# Patient Record
Sex: MALE | Race: WHITE | Employment: OTHER | URBAN - NONMETROPOLITAN AREA
[De-identification: names, ages, dates, MRNs, and addresses within clinical notes are randomized per-mention and may not be internally consistent; named-entity substitution may affect disease eponyms.]

---

## 2020-05-13 ENCOUNTER — HOSPITAL ENCOUNTER (EMERGENCY)
Age: 70
Discharge: HOME | End: 2020-05-13
Attending: EMERGENCY MEDICINE
Payer: MEDICARE

## 2020-05-13 VITALS
WEIGHT: 170 LBS | HEART RATE: 69 BPM | SYSTOLIC BLOOD PRESSURE: 120 MMHG | DIASTOLIC BLOOD PRESSURE: 88 MMHG | TEMPERATURE: 98 F | OXYGEN SATURATION: 98 % | RESPIRATION RATE: 17 BRPM

## 2020-05-13 LAB
EKG ATRIAL RATE: 72 BPM
EKG P AXIS: -9 DEGREES
EKG P-R INTERVAL: 154 MS
EKG Q-T INTERVAL: 404 MS
EKG QRS DURATION: 82 MS
EKG QTC CALCULATION (BAZETT): 442 MS
EKG R AXIS: -7 DEGREES
EKG T AXIS: 25 DEGREES
EKG VENTRICULAR RATE: 72 BPM

## 2020-05-13 PROCEDURE — 12011 RPR F/E/E/N/L/M 2.5 CM/<: CPT

## 2020-05-13 PROCEDURE — 2500000003 HC RX 250 WO HCPCS: Performed by: PHYSICIAN ASSISTANT

## 2020-05-13 PROCEDURE — 96372 THER/PROPH/DIAG INJ SC/IM: CPT

## 2020-05-13 PROCEDURE — 93005 ELECTROCARDIOGRAM TRACING: CPT | Performed by: PHYSICIAN ASSISTANT

## 2020-05-13 PROCEDURE — 93010 ELECTROCARDIOGRAM REPORT: CPT | Performed by: INTERNAL MEDICINE

## 2020-05-13 PROCEDURE — 99282 EMERGENCY DEPT VISIT SF MDM: CPT

## 2020-05-13 RX ORDER — LIDOCAINE HYDROCHLORIDE AND EPINEPHRINE 10; 10 MG/ML; UG/ML
20 INJECTION, SOLUTION INFILTRATION; PERINEURAL ONCE
Status: COMPLETED | OUTPATIENT
Start: 2020-05-13 | End: 2020-05-13

## 2020-05-13 RX ADMIN — LIDOCAINE HYDROCHLORIDE AND EPINEPHRINE 20 ML: 10; 10 INJECTION, SOLUTION INFILTRATION; PERINEURAL at 12:58

## 2020-05-13 SDOH — HEALTH STABILITY: MENTAL HEALTH: HOW OFTEN DO YOU HAVE A DRINK CONTAINING ALCOHOL?: NEVER

## 2020-05-13 ASSESSMENT — PAIN SCALES - GENERAL: PAINLEVEL_OUTOF10: 0

## 2020-05-13 NOTE — ED PROVIDER NOTES
Laceration Repair Procedure Note    Indication: Laceration    Procedure: The patient was placed in the appropriate position and anesthesia around the laceration was obtained by infiltration using 1% Lidocaine with epinephrine. The area was then cleansed with Shur-Clens and draped in a sterile fashion and explored with no foreign bodies discovered. The laceration was closed with 6-0- Vicryl using running sutures. There were no additional lacerations requiring repair. The wound area was then dressed with bacitracin. Total repaired wound length: 2 cm. Other Items: None and Suture count:     The patient tolerated the procedure well.     Complications: None        Chayo Mujica PA-C  05/13/20 1240

## 2020-05-13 NOTE — ED TRIAGE NOTES
Pt presents to ER after falling while running hitting his head. Pt has about 1 inch laceration to right cheek bone, abrasions to right shoulder and right knee. pt denies losing consciousness or any blood thinner use. Pain is rated 5 out of 10. EKG completed.

## 2020-05-18 ENCOUNTER — HOSPITAL ENCOUNTER (OUTPATIENT)
Age: 70
Discharge: HOME | End: 2020-05-18
Payer: MEDICARE

## 2020-05-18 ENCOUNTER — HOSPITAL ENCOUNTER (OUTPATIENT)
Dept: GENERAL RADIOLOGY | Age: 70
Discharge: HOME | End: 2020-05-18
Payer: MEDICARE

## 2020-05-18 ENCOUNTER — NURSE ONLY (OUTPATIENT)
Dept: LAB | Age: 70
End: 2020-05-18

## 2020-05-18 ENCOUNTER — TELEPHONE (OUTPATIENT)
Dept: FAMILY MEDICINE CLINIC | Age: 70
End: 2020-05-18

## 2020-05-18 ENCOUNTER — VIRTUAL VISIT (OUTPATIENT)
Dept: FAMILY MEDICINE CLINIC | Age: 70
End: 2020-05-18
Payer: COMMERCIAL

## 2020-05-18 VITALS — SYSTOLIC BLOOD PRESSURE: 146 MMHG | DIASTOLIC BLOOD PRESSURE: 87 MMHG | HEART RATE: 55 BPM

## 2020-05-18 PROBLEM — S09.90XA HEAD INJURY: Status: ACTIVE | Noted: 2020-05-18

## 2020-05-18 PROBLEM — R41.3 MEMORY DIFFICULTIES: Status: ACTIVE | Noted: 2020-05-18

## 2020-05-18 LAB
ALBUMIN SERPL-MCNC: 4.6 G/DL (ref 3.5–5.1)
ALP BLD-CCNC: 72 U/L (ref 38–126)
ALT SERPL-CCNC: 10 U/L (ref 11–66)
ANION GAP SERPL CALCULATED.3IONS-SCNC: 11 MEQ/L (ref 8–16)
AST SERPL-CCNC: 15 U/L (ref 5–40)
BASOPHILS # BLD: 0.9 %
BASOPHILS ABSOLUTE: 0 THOU/MM3 (ref 0–0.1)
BILIRUB SERPL-MCNC: 0.5 MG/DL (ref 0.3–1.2)
BILIRUBIN DIRECT: < 0.2 MG/DL (ref 0–0.3)
BUN BLDV-MCNC: 16 MG/DL (ref 7–22)
CALCIUM SERPL-MCNC: 9.9 MG/DL (ref 8.5–10.5)
CHLORIDE BLD-SCNC: 103 MEQ/L (ref 98–111)
CHOLESTEROL, TOTAL: 215 MG/DL (ref 100–199)
CO2: 26 MEQ/L (ref 23–33)
CREAT SERPL-MCNC: 0.7 MG/DL (ref 0.4–1.2)
EOSINOPHIL # BLD: 1.5 %
EOSINOPHILS ABSOLUTE: 0.1 THOU/MM3 (ref 0–0.4)
ERYTHROCYTE [DISTWIDTH] IN BLOOD BY AUTOMATED COUNT: 13.4 % (ref 11.5–14.5)
ERYTHROCYTE [DISTWIDTH] IN BLOOD BY AUTOMATED COUNT: 45.6 FL (ref 35–45)
GFR SERPL CREATININE-BSD FRML MDRD: > 90 ML/MIN/1.73M2
GLUCOSE BLD-MCNC: 90 MG/DL (ref 70–108)
HCT VFR BLD CALC: 44.1 % (ref 42–52)
HDLC SERPL-MCNC: 41 MG/DL
HEMOGLOBIN: 13.8 GM/DL (ref 14–18)
IMMATURE GRANS (ABS): 0.02 THOU/MM3 (ref 0–0.07)
IMMATURE GRANULOCYTES: 0.4 %
LDL CHOLESTEROL CALCULATED: 122 MG/DL
LYMPHOCYTES # BLD: 23.3 %
LYMPHOCYTES ABSOLUTE: 1.3 THOU/MM3 (ref 1–4.8)
MAGNESIUM: 2.1 MG/DL (ref 1.6–2.4)
MCH RBC QN AUTO: 28.8 PG (ref 26–33)
MCHC RBC AUTO-ENTMCNC: 31.3 GM/DL (ref 32.2–35.5)
MCV RBC AUTO: 91.9 FL (ref 80–94)
MONOCYTES # BLD: 9.2 %
MONOCYTES ABSOLUTE: 0.5 THOU/MM3 (ref 0.4–1.3)
NUCLEATED RED BLOOD CELLS: 0 /100 WBC
PLATELET # BLD: 238 THOU/MM3 (ref 130–400)
PMV BLD AUTO: 11.3 FL (ref 9.4–12.4)
POTASSIUM SERPL-SCNC: 3.7 MEQ/L (ref 3.5–5.2)
RBC # BLD: 4.8 MILL/MM3 (ref 4.7–6.1)
SEG NEUTROPHILS: 64.7 %
SEGMENTED NEUTROPHILS ABSOLUTE COUNT: 3.5 THOU/MM3 (ref 1.8–7.7)
SODIUM BLD-SCNC: 140 MEQ/L (ref 135–145)
T4 FREE: 0.98 NG/DL (ref 0.93–1.76)
TOTAL PROTEIN: 7.2 G/DL (ref 6.1–8)
TRIGL SERPL-MCNC: 259 MG/DL (ref 0–199)
TSH SERPL DL<=0.05 MIU/L-ACNC: 6.31 UIU/ML (ref 0.4–4.2)
VITAMIN D 25-HYDROXY: 19 NG/ML (ref 30–100)
WBC # BLD: 5.4 THOU/MM3 (ref 4.8–10.8)

## 2020-05-18 PROCEDURE — 99204 OFFICE O/P NEW MOD 45 MIN: CPT | Performed by: FAMILY MEDICINE

## 2020-05-18 PROCEDURE — 4040F PNEUMOC VAC/ADMIN/RCVD: CPT | Performed by: FAMILY MEDICINE

## 2020-05-18 PROCEDURE — 3017F COLORECTAL CA SCREEN DOC REV: CPT | Performed by: FAMILY MEDICINE

## 2020-05-18 PROCEDURE — 1123F ACP DISCUSS/DSCN MKR DOCD: CPT | Performed by: FAMILY MEDICINE

## 2020-05-18 PROCEDURE — 73030 X-RAY EXAM OF SHOULDER: CPT

## 2020-05-18 PROCEDURE — G8427 DOCREV CUR MEDS BY ELIG CLIN: HCPCS | Performed by: FAMILY MEDICINE

## 2020-05-18 ASSESSMENT — ENCOUNTER SYMPTOMS
COUGH: 0
CONSTIPATION: 0
DIARRHEA: 0
VOMITING: 0
SHORTNESS OF BREATH: 0
TROUBLE SWALLOWING: 0
EYE PAIN: 0
NAUSEA: 0
BLOOD IN STOOL: 0
ABDOMINAL PAIN: 0

## 2020-05-18 NOTE — PATIENT INSTRUCTIONS
prevent dehydration. If you have kidney, heart, or liver disease and have to limit fluids, talk with your doctor before you increase your fluid intake. · Try to avoid things that you think may set off vasovagal syncope. · Talk to your doctor about any medicines you take. Some medicines may increase the chance of this condition occurring. · If you feel symptoms, lie down with your legs raised. Talk to your doctor about what to do if your symptoms come back. When should you call for help? Call 911 anytime you think you may need emergency care. For example, call if:    · You have symptoms of a heart problem. These may include:  ? Chest pain or pressure. ? Severe trouble breathing. ? A fast or irregular heartbeat.    Watch closely for changes in your health, and be sure to contact your doctor if:    · You have more episodes of fainting at home.     · You do not get better as expected. Where can you learn more? Go to https://[employer]."[employer]. org and sign in to your [employer] account. Enter L754 in the [employer] box to learn more about \"Vasovagal Syncope: Care Instructions. \"     If you do not have an account, please click on the \"Sign Up Now\" link. Current as of: June 26, 2019Content Version: 12.4  © 6607-0473 Healthwise, Incorporated. Care instructions adapted under license by [facility] ([facility]). If you have questions about a medical condition or this instruction, always ask your healthcare professional. [place] [address] any warranty or liability for your use of this information. Patient Education        Learning About a Closed Head Injury  What is a closed head injury? A closed head injury happens when your head gets hit hard. The strong force of the blow causes your brain to shake in your skull. This movement can cause the brain to bruise, swell, or tear. Sometimes nerves or blood vessels also get damaged. This can cause bleeding in or around the brain.   A

## 2020-05-18 NOTE — PROGRESS NOTES
Negative for agitation. Objective:     As this is a video visit new vitals are not able to be obtained - the vitals listed below are from previous visits to our facility. Vitals:    05/18/20 0938   BP: (!) 146/87   Pulse: 55       There is no height or weight on file to calculate BMI. Wt Readings from Last 3 Encounters:   05/13/20 170 lb (77.1 kg)     BP Readings from Last 3 Encounters:   05/18/20 (!) 146/87   05/13/20 120/88       Physical Exam  Constitutional:       General: He is not in acute distress. Appearance: He is well-developed. He is not diaphoretic. HENT:      Head: Normocephalic. Right Ear: External ear normal.      Left Ear: External ear normal.   Eyes:      General: No scleral icterus. Right eye: No discharge. Left eye: No discharge. Conjunctiva/sclera: Conjunctivae normal.   Neck:      Musculoskeletal: Normal range of motion. Pulmonary:      Effort: Pulmonary effort is normal.   Skin:     General: Skin is warm and dry. Findings: No erythema or rash. Neurological:      Mental Status: He is alert and oriented to person, place, and time. Cranial Nerves: No cranial nerve deficit. Psychiatric:         Behavior: Behavior normal.         Thought Content: Thought content normal.         Judgment: Judgment normal.           Lab Results   Component Value Date    WBC 5.4 05/18/2020    HGB 13.8 (L) 05/18/2020    HCT 44.1 05/18/2020     05/18/2020    CHOL 215 (H) 05/18/2020    TRIG 259 (H) 05/18/2020    HDL 41 05/18/2020    LDLCALC 122 05/18/2020    AST 15 05/18/2020     05/18/2020    K 3.7 05/18/2020     05/18/2020    CREATININE 0.7 05/18/2020    BUN 16 05/18/2020    CO2 26 05/18/2020    TSH 6.310 (H) 05/18/2020    LABGLOM >90 05/18/2020    MG 2.1 05/18/2020    CALCIUM 9.9 05/18/2020    VITD25 19 (L) 05/18/2020       Imaging Results:    No results found. Assessment:      Diagnosis Orders   1.  Vasovagal syncope  Basic Metabolic

## 2020-05-19 ENCOUNTER — TELEPHONE (OUTPATIENT)
Dept: FAMILY MEDICINE CLINIC | Age: 70
End: 2020-05-19

## 2020-05-19 LAB — HOMOCYSTEINE: 11.3 UMOL/L

## 2020-05-22 ENCOUNTER — TELEPHONE (OUTPATIENT)
Dept: FAMILY MEDICINE CLINIC | Age: 70
End: 2020-05-22

## 2020-05-26 ENCOUNTER — HOSPITAL ENCOUNTER (OUTPATIENT)
Dept: OCCUPATIONAL THERAPY | Age: 70
Setting detail: THERAPIES SERIES
Discharge: HOME | End: 2020-05-26
Payer: MEDICARE

## 2020-05-26 PROCEDURE — 97035 APP MDLTY 1+ULTRASOUND EA 15: CPT

## 2020-05-26 PROCEDURE — 97110 THERAPEUTIC EXERCISES: CPT

## 2020-05-26 PROCEDURE — 97165 OT EVAL LOW COMPLEX 30 MIN: CPT

## 2020-05-26 ASSESSMENT — PAIN SCALES - GENERAL: PAINLEVEL_OUTOF10: 2

## 2020-05-26 ASSESSMENT — PAIN DESCRIPTION - LOCATION: LOCATION: SHOULDER

## 2020-05-26 ASSESSMENT — PAIN DESCRIPTION - ORIENTATION: ORIENTATION: RIGHT

## 2020-05-26 NOTE — PROGRESS NOTES
referring physician: June 16th  Chart Reviewed: Yes    Restrictions/Precautions:       Position Activity Restriction  Other position/activity restrictions: occasional lightheadedness; migraines since the fall on 5-13-20         Subjective:  Subjective: \"I can move my arm just about to any location if I do it slowly\". Pain:  Pain Assessment  Patient Currently in Pain: Yes  Pain Assessment: 0-10  Pain Level: 2  Pain Location: Shoulder  Pain Orientation: Right    Social/Functional History:    Lives With: Spouse             ADL Assistance: Independent     Meal Prep Responsibility: Secondary  Laundry Responsibility: Secondary  Cleaning Responsibility: Primary  Shopping Responsibility: Secondary  Dependent Care Responsibility: Secondary    Active : Yes  Occupation: Retired  Leisure & Hobbies: running, camping, swimming, pickleball, refinishing a building    Objective     Sensation  Overall Sensation Status: WFL(reports pain, does not remember any numbness or tingling at this time)         Hand Dominance: Right       RUE PROM (degrees)  R Shoulder Flex  0-180: 140  R Shoulder ABduction 0-180: 148  R Shoulder Int Rotation  0-70: 40  R Shoulder Ext Rotation  0-90: 40 in scaption and in abduction  RUE AROM (degrees)  R Shoulder Flexion 0-180: 133  R Shoulder ABduction 0-180: 85  R Shoulder Int Rotation  0-70: thumb tip 3 inches above the waistband  R Shoulder Ext Rotation 0-90: 72 with the arm at the side, unable to move into ER when at 90 degrees abduction              Other Assessment: denies any changes in fine motor coordination       ADL  Additional Comments: At this time patient reports increased difficulty with sleeping, participation in hobbies, putting on an overhead shirt, looping a belt, reaching into upper cupboards, washing hair/body. Activity Tolerance: Additional Comments: tolerated evaluation and treatment well.      Assessment:  Assessment: Patient presents to the and 60 degrees ER and IR to increase ability to wash his hair without pain. Short term goal 3: Patient will report pain with activity no greater than 1/10 with movement to increase ability to perform his showering routine. Short term goal 4: Patient will be independent with HEP to increase ability to sleep on the right side. Long term goals  Time Frame for Long term goals : 8 weeks  Long term goal 1: Patient will report ability to sleep on the right side without pain. Evaluation Complexity: Based on the findings of patient history, examination, clinical presentation, and decision making during this evaluation, this patient is of low complexity.     Alice Guzmán, SUZANNE, OTR/L [id number]

## 2020-05-27 ENCOUNTER — HOSPITAL ENCOUNTER (OUTPATIENT)
Dept: OCCUPATIONAL THERAPY | Age: 70
Setting detail: THERAPIES SERIES
Discharge: HOME | End: 2020-05-27
Payer: MEDICARE

## 2020-05-27 PROCEDURE — 97035 APP MDLTY 1+ULTRASOUND EA 15: CPT

## 2020-05-27 PROCEDURE — 97110 THERAPEUTIC EXERCISES: CPT

## 2020-05-27 ASSESSMENT — PAIN DESCRIPTION - LOCATION: LOCATION: SHOULDER

## 2020-05-27 ASSESSMENT — PAIN SCALES - GENERAL: PAINLEVEL_OUTOF10: 1

## 2020-05-27 ASSESSMENT — PAIN DESCRIPTION - ORIENTATION: ORIENTATION: RIGHT

## 2020-05-27 NOTE — PROGRESS NOTES
[facility]  OUTPATIENT OCCUPATIONAL THERAPY  Daily Note  [address]    Time In: 1100  Time Out: 2408  Minutes: 45  Timed Code Treatment Minutes: 45 Minutes                Date: 2020  Patient Name: Cayla Brown        CSN: [id number]   : 1950  (81 y.o.)  Gender: male   Referring Practitioner: Maya Young DO  Diagnosis:  Injury of right shoulder, initial encounter, S49.91XA      Additional Pertinent Hx: Patient reports that he fell onto the right shoulder on 2020. Reports that he is not sure or why he fell. Reports that he has had headaches since. Reports that his pain is average at 1-2, but can increase to a 3-4.  x-rays revealed no fractures and no additional testing. Has had migraine since the fall. General:  OT Visit Information  Onset Date: 20  OT Insurance Information: MEDICARE - no ionto or hot/cold packs  Total # of Visits to Date: 2  Certification Period Expiration Date: 20  Progress Note Counter: 2/10 for PN  Comments: return to the referring physician: not scheduled yet       Restrictions/Precautions:       Position Activity Restriction  Other position/activity restrictions: occasional lightheadedness; migraines since the fall on 20         Subjective:  Subjective: \"I havn't done much today\". Reports that his pain increased to a 4-5/10 this AM when opening the drapes. Pain:  Patient Currently in Pain: Yes  Pain Assessment: 0-10  Pain Level: 1  Pain Location: Shoulder  Pain Orientation: Right       Objective:     Upper Extremity Function  UE AROM: sidelying scapular depression  UE PROM: supine PROM with focus on ROM this date - tightness initially with flexion, ER and IR.     UE Stretching: STM to subscapularis, cross friction massage to tendons on the anterior and lateral shoulder; sleeper stretch, posterior and inferior joint mobs              Ultrasound  Ultrasound Location: anterior shoulder: 50%, 0.8w/cm2,

## 2020-06-02 ENCOUNTER — PATIENT MESSAGE (OUTPATIENT)
Dept: FAMILY MEDICINE CLINIC | Age: 70
End: 2020-06-02

## 2020-06-02 ENCOUNTER — HOSPITAL ENCOUNTER (OUTPATIENT)
Dept: OCCUPATIONAL THERAPY | Age: 70
Setting detail: THERAPIES SERIES
Discharge: HOME | End: 2020-06-02
Payer: MEDICARE

## 2020-06-02 PROCEDURE — 97035 APP MDLTY 1+ULTRASOUND EA 15: CPT

## 2020-06-02 PROCEDURE — 97110 THERAPEUTIC EXERCISES: CPT

## 2020-06-02 ASSESSMENT — PAIN DESCRIPTION - ORIENTATION: ORIENTATION: RIGHT

## 2020-06-02 ASSESSMENT — PAIN DESCRIPTION - LOCATION: LOCATION: SHOULDER

## 2020-06-02 ASSESSMENT — PAIN SCALES - GENERAL: PAINLEVEL_OUTOF10: 1

## 2020-06-02 NOTE — PROGRESS NOTES
[address] [facility]  OUTPATIENT OCCUPATIONAL THERAPY  Daily Note  [address]    Time In: 830  Time Out: 5488  Minutes: 50  Timed Code Treatment Minutes: 50 Minutes                Date: 2020  Patient Name: Cassi Wilkins        CSN: [id number]   : 1950  (81 y.o.)  Gender: male   Referring Practitioner: Terrence Romero DO  Diagnosis:  Injury of right shoulder, initial encounter, S49.91XA           General:  OT Visit Information  Onset Date: 20  OT Insurance Information: MEDICARE - no ionto or hot/cold packs  Total # of Visits to Date: 3  Certification Period Expiration Date: 20  Progress Note Counter: 3/10 for PN  Comments: return to the referring physician: not scheduled yet       Restrictions/Precautions:       Position Activity Restriction  Other position/activity restrictions: occasional lightheadedness; migraines since the fall on 20         Subjective:  Subjective: Reports that he has a headache today. When showering pain can increase to 5-6/10, when at rest, pain is mostly 1/10. Pain:  Patient Currently in Pain: Yes  Pain Assessment: 0-10(up to 5-6/10 with movement)  Pain Level: 1  Pain Location: Shoulder  Pain Orientation: Right       Objective:     Upper Extremity Function  UE AROM: sidelying scapular depression and retraction  UE PROM: supine PROM this date - tightness initially with flexion, ER and IR. UE Stretching: STM to infraspinatus, sleeper stretch, posterior and inferior joint mobs  UE Strengthing: Prone horizontal abduction with limited motion to avoid pain. prone rows                                     Ultrasound  Ultrasound Location: anterior shoulder: 50%, 0.8w/cm2, 1mHz for 8 minutes with US gel          Activity Tolerance: Additional Comments: tolerated treatment well.     Assessment:  Assessment: Patient is progressing toward goals, initially pain at end range PROM but pain decreased after activation of scapular stabilizing muscles    Patient Education:  Patient Education: wall slides and prone rows to HEP            Plan:  Current Treatment Recommendations: Strengthening, ROM, Manual Therapy:  Jt Manip, Manual Therapy:  STM  Plan Comment: Continue with current POC  Specific instructions for Next Treatment: IVONE, [address], MOT, OTR/L [id number]

## 2020-06-04 ENCOUNTER — HOSPITAL ENCOUNTER (OUTPATIENT)
Dept: OCCUPATIONAL THERAPY | Age: 70
Setting detail: THERAPIES SERIES
Discharge: HOME | End: 2020-06-04
Payer: MEDICARE

## 2020-06-04 PROCEDURE — 97110 THERAPEUTIC EXERCISES: CPT

## 2020-06-04 PROCEDURE — 97035 APP MDLTY 1+ULTRASOUND EA 15: CPT

## 2020-06-04 ASSESSMENT — PAIN DESCRIPTION - ORIENTATION: ORIENTATION: RIGHT

## 2020-06-04 ASSESSMENT — PAIN SCALES - GENERAL: PAINLEVEL_OUTOF10: 1

## 2020-06-04 ASSESSMENT — PAIN DESCRIPTION - LOCATION: LOCATION: SHOULDER

## 2020-06-08 ENCOUNTER — HOSPITAL ENCOUNTER (OUTPATIENT)
Dept: OCCUPATIONAL THERAPY | Age: 70
Setting detail: THERAPIES SERIES
Discharge: HOME | End: 2020-06-08
Payer: MEDICARE

## 2020-06-08 PROCEDURE — 97110 THERAPEUTIC EXERCISES: CPT

## 2020-06-08 PROCEDURE — 97035 APP MDLTY 1+ULTRASOUND EA 15: CPT

## 2020-06-08 NOTE — PROGRESS NOTES
[facility]  OUTPATIENT OCCUPATIONAL THERAPY  Daily Note  [address]    Time In: 0915  Time Out: 1000  Minutes: 45  Timed Code Treatment Minutes: 45 Minutes                Date: 2020  Patient Name: Sharon Castaneda        CSN: [id number]   : 1950  (81 y.o.)  Gender: male   Referring Practitioner: Enrigue Olszewski, DO  Diagnosis:  Injury of right shoulder, initial encounter, S49.91XA           General:  OT Visit Information  Onset Date: 20  OT Insurance Information: MEDICARE - no ionto or hot/cold packs  Total # of Visits to Date: 5  Certification Period Expiration Date: 20  Progress Note Counter: 5/10 for PN  Comments: return to the referring physician: not scheduled yet       Restrictions/Precautions:       Position Activity Restriction  Other position/activity restrictions: occasional lightheadedness; migraines since the fall on 20         Subjective:  Subjective: Patient reports his shoulder continues to improve, mild soreness 30 minutes following last session but it went away. Pain:  Patient Currently in Pain: No(No pain at rest today. )       Objective:     Upper Extremity Function  UE AROM: Seated scapular pinches x10 reps, sidelying shoulder depressors x10 reps to warm up. UE PROM: IASTM to R shoulder girdle following ultrasound, deep pressure at upper trap to decrease tightness, supine PROM to R shoulder all motions to tolerance with most tightness end range IR and flexion. UE AAROM: Supine dowel tyrone chest press, ER with elbow at side, flexion full arc in neutral position x10 reps each. UE Stretching: Upper trap stretch, corner stretch, pulleys, sleeper stretch  UE Strengthing: Supine peach theraband extension x8 reps, prone horizontal abduction palm in x10, bent over 2# row x10. Biodex 90 RPM x3 minutes backward without issues noted.                                       Ultrasound  Ultrasound Location: Right anterior

## 2020-06-08 NOTE — TELEPHONE ENCOUNTER
From: Lauren Boland  To: Lina Hunter DO  Sent: 6/2/2020 6:09 PM EDT  Subject: Non-Urgent Medical Question    2 questions:  I have noticed a short piece of thread projecting ~1/8 in. out of the top of my well healed facial laceration. In the ER I was told that the sutures were made with biodegradable thread. So why is this piece sticking out of my cheek? Yesterday and today I have noticed that my headache raises from 1 or 2 to 4 or 5 and my heart rate climbs from 60s to low 70s BPM up to 110+ when I am outside in 80+ F for 20 - 30 minutes. Headache remains while in the heat. When I come inside to [facility] of 68 F or less my headache and heart rate both go back down within 10 - 15 minutes. I have never been bothered by extreme hot or cold in the past. 80 certainly isn't extreme. Is this something to do with my concussion and should I be concerned?

## 2020-06-10 ENCOUNTER — HOSPITAL ENCOUNTER (OUTPATIENT)
Dept: OCCUPATIONAL THERAPY | Age: 70
Setting detail: THERAPIES SERIES
Discharge: HOME | End: 2020-06-10
Payer: MEDICARE

## 2020-06-10 PROCEDURE — 97110 THERAPEUTIC EXERCISES: CPT

## 2020-06-10 PROCEDURE — 97035 APP MDLTY 1+ULTRASOUND EA 15: CPT

## 2020-06-10 ASSESSMENT — PAIN SCALES - GENERAL: PAINLEVEL_OUTOF10: 0

## 2020-06-10 NOTE — PROGRESS NOTES
[address] [place] [address]  OUTPATIENT OCCUPATIONAL THERAPY  Daily Note  [address]    Time In: 7000  Time Out: 372 7101  Minutes: 30  Timed Code Treatment Minutes: 30 Minutes                Date: 6/10/2020  Patient Name: Leonidas Atkinson        CSN: [id number]   : 1950  (81 y.o.)  Gender: male   Referring Practitioner: Tim Stanton DO  Diagnosis:  Injury of right shoulder, initial encounter, S49.91XA           General:  OT Visit Information  Onset Date: 20  OT Insurance Information: MEDICARE - no ionto or hot/cold packs  Total # of Visits to Date: 6  Certification Period Expiration Date: 20  Progress Note Counter: 6  Comments: return to the referring physician: not scheduled yet       Restrictions/Precautions:       Position Activity Restriction  Other position/activity restrictions: occasional lightheadedness; migraines since the fall on 20         Subjective:  Subjective: Patient reports that he has no pain and he was able to mow the lawn on this past Monday and Tuesday with no issues with his shoulder. Pain:  Patient Currently in Pain: No(No pain at rest today. )  Pain Assessment: 0-10  Pain Level: 0       Objective:     Upper Extremity Function  UE AROM: Seated scapular pinches x10 reps, sidelying shoulder depressors x10 reps to warm up. UE PROM: Supine PROM to R shoulder all motions to tolerance. UE AAROM: Supine dowel tyrone chest press, ER with elbow at side, flexion full arc in neutral position x10 reps each. UE Strengthing: Supine peach theraband riivalid 10 reps each, prone horizontal abduction palm in x10, thumb up x10, prone row 2# row x10. Ultrasound  Ultrasound Location: Right anterior shoulder  Ultrasound Parameters: 50% 0.8w/cm2, 1 MHz for 8 minutes with US gel          Activity Tolerance: Additional Comments: Tolerated treatment well. Assessment:  Assessment: Patient is progressing towards goals.

## 2020-06-16 ENCOUNTER — HOSPITAL ENCOUNTER (OUTPATIENT)
Dept: OCCUPATIONAL THERAPY | Age: 70
Setting detail: THERAPIES SERIES
Discharge: HOME | End: 2020-06-16
Payer: MEDICARE

## 2020-06-16 ENCOUNTER — NURSE ONLY (OUTPATIENT)
Dept: LAB | Age: 70
End: 2020-06-16

## 2020-06-16 ENCOUNTER — OFFICE VISIT (OUTPATIENT)
Dept: FAMILY MEDICINE CLINIC | Age: 70
End: 2020-06-16
Payer: COMMERCIAL

## 2020-06-16 VITALS
TEMPERATURE: 97 F | SYSTOLIC BLOOD PRESSURE: 122 MMHG | WEIGHT: 181 LBS | BODY MASS INDEX: 29.09 KG/M2 | DIASTOLIC BLOOD PRESSURE: 78 MMHG | HEART RATE: 56 BPM | OXYGEN SATURATION: 98 % | HEIGHT: 66 IN | RESPIRATION RATE: 16 BRPM

## 2020-06-16 LAB
HEPATITIS C ANTIBODY: NEGATIVE
TSH SERPL DL<=0.05 MIU/L-ACNC: 5.83 UIU/ML (ref 0.4–4.2)

## 2020-06-16 PROCEDURE — G8417 CALC BMI ABV UP PARAM F/U: HCPCS | Performed by: STUDENT IN AN ORGANIZED HEALTH CARE EDUCATION/TRAINING PROGRAM

## 2020-06-16 PROCEDURE — G8427 DOCREV CUR MEDS BY ELIG CLIN: HCPCS | Performed by: STUDENT IN AN ORGANIZED HEALTH CARE EDUCATION/TRAINING PROGRAM

## 2020-06-16 PROCEDURE — 99213 OFFICE O/P EST LOW 20 MIN: CPT | Performed by: STUDENT IN AN ORGANIZED HEALTH CARE EDUCATION/TRAINING PROGRAM

## 2020-06-16 PROCEDURE — 97110 THERAPEUTIC EXERCISES: CPT

## 2020-06-16 PROCEDURE — 1123F ACP DISCUSS/DSCN MKR DOCD: CPT | Performed by: STUDENT IN AN ORGANIZED HEALTH CARE EDUCATION/TRAINING PROGRAM

## 2020-06-16 PROCEDURE — 4040F PNEUMOC VAC/ADMIN/RCVD: CPT | Performed by: STUDENT IN AN ORGANIZED HEALTH CARE EDUCATION/TRAINING PROGRAM

## 2020-06-16 PROCEDURE — 3017F COLORECTAL CA SCREEN DOC REV: CPT | Performed by: STUDENT IN AN ORGANIZED HEALTH CARE EDUCATION/TRAINING PROGRAM

## 2020-06-16 PROCEDURE — 1036F TOBACCO NON-USER: CPT | Performed by: STUDENT IN AN ORGANIZED HEALTH CARE EDUCATION/TRAINING PROGRAM

## 2020-06-16 RX ORDER — ACETAMINOPHEN 160 MG
TABLET,DISINTEGRATING ORAL DAILY
COMMUNITY
End: 2021-02-16 | Stop reason: ALTCHOICE

## 2020-06-16 SDOH — ECONOMIC STABILITY: INCOME INSECURITY: HOW HARD IS IT FOR YOU TO PAY FOR THE VERY BASICS LIKE FOOD, HOUSING, MEDICAL CARE, AND HEATING?: NOT HARD AT ALL

## 2020-06-16 SDOH — ECONOMIC STABILITY: TRANSPORTATION INSECURITY
IN THE PAST 12 MONTHS, HAS THE LACK OF TRANSPORTATION KEPT YOU FROM MEDICAL APPOINTMENTS OR FROM GETTING MEDICATIONS?: NO

## 2020-06-16 SDOH — ECONOMIC STABILITY: FOOD INSECURITY: WITHIN THE PAST 12 MONTHS, THE FOOD YOU BOUGHT JUST DIDN'T LAST AND YOU DIDN'T HAVE MONEY TO GET MORE.: NEVER TRUE

## 2020-06-16 SDOH — ECONOMIC STABILITY: TRANSPORTATION INSECURITY
IN THE PAST 12 MONTHS, HAS LACK OF TRANSPORTATION KEPT YOU FROM MEETINGS, WORK, OR FROM GETTING THINGS NEEDED FOR DAILY LIVING?: NO

## 2020-06-16 SDOH — ECONOMIC STABILITY: FOOD INSECURITY: WITHIN THE PAST 12 MONTHS, YOU WORRIED THAT YOUR FOOD WOULD RUN OUT BEFORE YOU GOT MONEY TO BUY MORE.: NEVER TRUE

## 2020-06-16 ASSESSMENT — ENCOUNTER SYMPTOMS
CONSTIPATION: 0
WHEEZING: 0
DIARRHEA: 0
ABDOMINAL PAIN: 0
SHORTNESS OF BREATH: 0

## 2020-06-16 ASSESSMENT — PATIENT HEALTH QUESTIONNAIRE - PHQ9
SUM OF ALL RESPONSES TO PHQ9 QUESTIONS 1 & 2: 0
SUM OF ALL RESPONSES TO PHQ QUESTIONS 1-9: 0
2. FEELING DOWN, DEPRESSED OR HOPELESS: 0
1. LITTLE INTEREST OR PLEASURE IN DOING THINGS: 0
SUM OF ALL RESPONSES TO PHQ QUESTIONS 1-9: 0

## 2020-06-16 NOTE — PROGRESS NOTES
Health Maintenance Due   Topic Date Due    Hepatitis C screen  1950    DTaP/Tdap/Td vaccine (1 - Tdap) 09/17/1971    Shingles Vaccine (1 of 2) 09/17/2002    Colon cancer screen colonoscopy  09/17/2002    Pneumococcal 65+ years Vaccine (1 of 1 - PPSV23) 09/17/2017
resident.   GE modifier added to this encounter      [place] ,  6/16/2020 9:02 AM

## 2020-06-16 NOTE — PATIENT INSTRUCTIONS
Thank you   1. Thank you for trusting us with your healthcare needs. You may receive a survey regarding today's visit. It would help us out if you would take a few moments to provide your feedback. We value your input. 2. Please bring in ALL medications BOTTLES, including inhalers, herbal supplements, over the counter, prescribed & non-prescribed medicine. The office would like actual medication bottles and a list.   3. Please note our OFFICE POLICIES:   a. Prior to getting your labs drawn, please check with your insurance company for benefits and eligibility of lab services. Often, insurance companies cover certain tests for preventative visits only. It is patient's responsibility to see what is covered. b. We are unable to change a diagnosis after the test has been performed. c. Lab orders will not be re-printed. Please hold onto your original lab orders and take them to your lab to be completed. d. If you no show your scheduled appointment three times, you will be dismissed from this practice. e. Saurabh Aster must be completed 24 hours prior to your schedule appointment. 4. If the list below has been completed, PLEASE FAX RECORDS TO OUR OFFICE @ [phone].  Once the records have been received we will update your records at our office:  Health Maintenance Due   Topic Date Due    Hepatitis C screen  1950    DTaP/Tdap/Td vaccine (1 - Tdap) 09/17/1971    Shingles Vaccine (1 of 2) 09/17/2002    Colon cancer screen colonoscopy  09/17/2002    Pneumococcal 65+ years Vaccine (1 of 1 - PPSV23) 09/17/2017

## 2020-06-18 ENCOUNTER — TELEPHONE (OUTPATIENT)
Dept: FAMILY MEDICINE CLINIC | Age: 70
End: 2020-06-18

## 2020-06-18 ENCOUNTER — HOSPITAL ENCOUNTER (OUTPATIENT)
Dept: OCCUPATIONAL THERAPY | Age: 70
Setting detail: THERAPIES SERIES
Discharge: HOME | End: 2020-06-18
Payer: MEDICARE

## 2020-06-18 PROCEDURE — 97110 THERAPEUTIC EXERCISES: CPT

## 2020-06-23 ENCOUNTER — HOSPITAL ENCOUNTER (OUTPATIENT)
Dept: OCCUPATIONAL THERAPY | Age: 70
Setting detail: THERAPIES SERIES
Discharge: HOME | End: 2020-06-23
Payer: MEDICARE

## 2020-06-23 PROCEDURE — 97110 THERAPEUTIC EXERCISES: CPT

## 2020-06-23 ASSESSMENT — PAIN SCALES - GENERAL: PAINLEVEL_OUTOF10: 0

## 2020-06-23 NOTE — PROGRESS NOTES
[facility]  OUTPATIENT OCCUPATIONAL THERAPY  Daily Note  [address]    Time In: 1464  Time Out: 1100  Minutes: 43  Timed Code Treatment Minutes: 43 Minutes                Date: 2020  Patient Name: Jamal Mark        CSN: [id number]   : 1950  (81 y.o.)  Gender: male   Referring Practitioner: Grant Cason DO  Diagnosis:  Injury of right shoulder, initial encounter, S49.91XA           General:  OT Visit Information  Onset Date: 20  OT Insurance Information: MEDICARE - no ionto or hot/cold packs  Total # of Visits to Date: 9  Certification Period Expiration Date: 20  Progress Note Counter: 9/10 for PN  Comments: return to the referring physician: not scheduled yet       Restrictions/Precautions:       Position Activity Restriction  Other position/activity restrictions: occasional lightheadedness; migraines since the fall on 20         Subjective:  Subjective: Patient reports his shoulder is feeling good but is still having the headaches from the fall. Pain:  Patient Currently in Pain: No(No pain at rest today. )  Pain Assessment: 0-10  Pain Level: 0       Objective:     Upper Extremity Function  UE AROM: Seated scapular pinches x15 reps, shoulder depressors x15 to warm up. UE PROM: Supine PROM to R shoulder all motions to tolerance all within functional limits. Mild complaints of pain with abduction. Added a slight distraction to the shoulder joint while abducting and patient tolerated the motion better. UE Stretching: Sidelying sleeper stretch 10 second hold x5 reps, supine over vertical towel roll over pressure for pec stretch. UE Strengthing: Supine peach theraband riivalid 10 reps each, standing orange band bilateral row x10 reps, orange theraband ary x10 reps each, biodex 75 RPM x4 minutes backward without issues noted. Activity Tolerance: Additional Comments:  Tolerated treatment well.    Assessment:  Assessment: Patient is progressing towards goals. Patient able to tolerate going to the next level of theraband for ary exercises. Patient Education:  Patient Education: Added Riivalid and [place] exercises to HEP. Plan:  Current Treatment Recommendations: Strengthening, ROM, Manual Therapy:  Jt Manip, Manual Therapy:  STM  Plan Comment: Continue with current POC.    Specific instructions for Next Treatment: IVONE, Johnt #[id number]

## 2020-06-25 ENCOUNTER — HOSPITAL ENCOUNTER (OUTPATIENT)
Dept: OCCUPATIONAL THERAPY | Age: 70
Setting detail: THERAPIES SERIES
Discharge: HOME | End: 2020-06-25
Payer: MEDICARE

## 2020-06-25 ENCOUNTER — TELEPHONE (OUTPATIENT)
Dept: FAMILY MEDICINE CLINIC | Age: 70
End: 2020-06-25

## 2020-06-25 PROCEDURE — 97110 THERAPEUTIC EXERCISES: CPT

## 2020-06-25 ASSESSMENT — PAIN SCALES - GENERAL: PAINLEVEL_OUTOF10: 0

## 2020-06-25 NOTE — TELEPHONE ENCOUNTER
Patient called stating that he was diagnosed with a mild concussion 05/13/2020 after fall on [place] while running and was told that if the pain increases to contact his PCP. He states that yesterday the pain stayed at a level 5 and was lethargic most of the day even after taking Curamin and Ibuprofen. Patient states that so far today he has been having a pain at a level 2. Which patient states that this is his normal since the fall and is usually a 1-2.

## 2020-06-25 NOTE — DISCHARGE SUMMARY
ROM and strength has improved both passive and active. Patient shows good understanding of HEP and aggreeable to continue on own at home. Patient Education:  Patient Education: Reviewed progress towards goals and POC to discharge. Plan:  Plan Comment: Discharge 6/25/2020         Short term goals  Short term goal 1: Patient will increase right shoulder AROM greater than 150 degrees flexion, 105 degrees abduction, thumb tip 4 inches above the waistband and 30 degrees ER in abduction to increase ability to put on an overhead shirt. GOAL MET flexion 150 degrees, 163 degrees abduction, 7 inches above waist band for IR, 90 degree ER in abduction with no continued difficulty for independent dressing task. DISCHARGE GOAL 6/25/2020  Short term goal 2: Patient will increase right shoulder PROM greater than 160 degrees flexion and abduction, and 60 degrees ER and IR to increase ability to wash his hair without pain. GOAL  degrees flexion, 90 degrees ER and 70 degrees IR with no continued difficulty with independent bathing, including haircare. DISCHARGE GOAL 6/25/2020  Short term goal 3: Patient will report pain with activity no greater than 1/10 with movement to increase ability to perform his showering routine. GOAL MET Patient reports occasional pain with activities no greater that 1/10 while completing ADLs. DISCHARGE GOAL 6/25/2020  Short term goal 4: Patient will be independent with HEP to increase ability to sleep on the right side. GOAL MET Patient is independent with HEP and is able to sleep on right side. DISCHARGE GOAL 6/25/2020  Long term goals  Long term goal 1: Patient will report ability to sleep on the right side without pain. GOAL MET Patient able to sleep on right side without pain.  DISCHARGE GOAL 6/25/2020         MAGNOLIA MALDONADO/NATALIE #[id number]

## 2020-06-29 ENCOUNTER — TELEPHONE (OUTPATIENT)
Dept: FAMILY MEDICINE CLINIC | Age: 70
End: 2020-06-29

## 2020-06-29 NOTE — TELEPHONE ENCOUNTER
Patient notified and voiced understanding. Not happy with the response time of this message. States that he is doing better. But when the headache gets to the 5-6 level the first time it was located behind the right eye and then this past time on Wednesday it was behind the left eye. When he has his 1-2 level pain which seems to be more common is normally all over constant ache. Patient also concerned as he was instructed by Dr. Nicolas First to try to keep his heart rate under 100. Recently has started to try to run again and heart rate was elevated up to 160 and takes a while to come back down below 100. When at rest is around 66. Patient denied needing a sooner appointment than the 7/10/2020 follow up.

## 2020-07-01 NOTE — TELEPHONE ENCOUNTER
As long as his running and increased heart rate do not worsen the headache, I am ok with him continuing to exercise.

## 2020-07-10 ENCOUNTER — VIRTUAL VISIT (OUTPATIENT)
Dept: FAMILY MEDICINE CLINIC | Age: 70
End: 2020-07-10
Payer: MEDICARE

## 2020-07-10 PROBLEM — R51.9 NONINTRACTABLE HEADACHE: Status: ACTIVE | Noted: 2020-07-10

## 2020-07-10 PROBLEM — S06.0X0A CONCUSSION WITH NO LOSS OF CONSCIOUSNESS: Status: ACTIVE | Noted: 2020-07-10

## 2020-07-10 PROCEDURE — 3017F COLORECTAL CA SCREEN DOC REV: CPT | Performed by: STUDENT IN AN ORGANIZED HEALTH CARE EDUCATION/TRAINING PROGRAM

## 2020-07-10 PROCEDURE — G8417 CALC BMI ABV UP PARAM F/U: HCPCS | Performed by: STUDENT IN AN ORGANIZED HEALTH CARE EDUCATION/TRAINING PROGRAM

## 2020-07-10 PROCEDURE — 4040F PNEUMOC VAC/ADMIN/RCVD: CPT | Performed by: STUDENT IN AN ORGANIZED HEALTH CARE EDUCATION/TRAINING PROGRAM

## 2020-07-10 PROCEDURE — 1123F ACP DISCUSS/DSCN MKR DOCD: CPT | Performed by: STUDENT IN AN ORGANIZED HEALTH CARE EDUCATION/TRAINING PROGRAM

## 2020-07-10 PROCEDURE — 1036F TOBACCO NON-USER: CPT | Performed by: STUDENT IN AN ORGANIZED HEALTH CARE EDUCATION/TRAINING PROGRAM

## 2020-07-10 PROCEDURE — 99213 OFFICE O/P EST LOW 20 MIN: CPT | Performed by: STUDENT IN AN ORGANIZED HEALTH CARE EDUCATION/TRAINING PROGRAM

## 2020-07-10 PROCEDURE — G8427 DOCREV CUR MEDS BY ELIG CLIN: HCPCS | Performed by: STUDENT IN AN ORGANIZED HEALTH CARE EDUCATION/TRAINING PROGRAM

## 2020-07-10 ASSESSMENT — ENCOUNTER SYMPTOMS
SHORTNESS OF BREATH: 0
WHEEZING: 0
CONSTIPATION: 0
ABDOMINAL PAIN: 0
DIARRHEA: 0

## 2020-07-10 NOTE — PROGRESS NOTES
This was a video visit with the patient at their home and the resident and myself in the continuity clinic. S: 81 y.o. male with   Chief Complaint   Patient presents with    3 Month Follow-Up       He went to the [facility] for the fall and syncope - had a concussion - a headache since then for a 2-3 range - motrin helps it but feels like something is off and worried about what else may be happening  Waking up with the headache    He is back to running - did not make the headache worse    BP Readings from Last 3 Encounters:   06/16/20 122/78   05/18/20 (!) 146/87   05/13/20 120/88     Wt Readings from Last 3 Encounters:   06/16/20 181 lb (82.1 kg)   05/13/20 170 lb (77.1 kg)           O: VS: There were no vitals filed for this visit. There is no height or weight on file to calculate BMI.    AAO/NAD, appropriate affect for mood  Normocephalic, atraumatic, eyes - conjunctiva and sclera normal,   skin no rashes on exposed areas   Judgement and insight intact  No Acute Distress      Lab Results   Component Value Date    WBC 5.4 05/18/2020    HGB 13.8 (L) 05/18/2020    HCT 44.1 05/18/2020     05/18/2020    CHOL 215 (H) 05/18/2020    TRIG 259 (H) 05/18/2020    HDL 41 05/18/2020    LDLCALC 122 05/18/2020    AST 15 05/18/2020     05/18/2020    K 3.7 05/18/2020     05/18/2020    CREATININE 0.7 05/18/2020    BUN 16 05/18/2020    CO2 26 05/18/2020    TSH 5.830 (H) 06/16/2020    LABGLOM >90 05/18/2020    MG 2.1 05/18/2020    CALCIUM 9.9 05/18/2020    VITD25 19 (L) 05/18/2020       No results found. Diagnosis Orders   1. Nonintractable headache, unspecified chronicity pattern, unspecified headache type  Lipid Panel    TSH with Reflex    CBC    Basic Metabolic Panel    CT HEAD WO CONTRAST   2. Concussion without loss of consciousness, subsequent encounter  CT HEAD WO CONTRAST   3. Elevated cholesterol  Lipid Panel    Basic Metabolic Panel   4. Anemia, unspecified type  CBC    Basic Metabolic Panel   5. TSH elevation  TSH with Reflex    Basic Metabolic Panel       Plan  Will offer a head CT    Will recheck the thyroid and cholesterol and in a few months in the fall       Return in about 2 months (around 9/10/2020). Orders Placed:  Orders Placed This Encounter   Procedures    CT HEAD WO CONTRAST    Lipid Panel    TSH with Reflex    CBC    Basic Metabolic Panel     Medications Prescribed:  No orders of the defined types were placed in this encounter. Future Appointments   Date Time Provider Joya Cronin   9/15/2020  1:30 PM Geri Alaniz MD SRPX  RES [facility] Maintenance Due   Topic Date Due    DTaP/Tdap/Td vaccine (1 - Tdap) 09/17/1971    Shingles Vaccine (1 of 2) 09/17/2002    Colon cancer screen colonoscopy  09/17/2002    Pneumococcal 65+ years Vaccine (1 of 1 - PPSV23) 09/17/2017         Attending Physician Statement  I have discussed the case, including pertinent history and exam findings with the resident. [id number] video call that turned into a telephone at the endI agree with the documented assessment and plan as documented by the resident.   GE modifier added to this encounter      Ellie Guardado DO 7/10/2020 12:17 PM

## 2020-07-10 NOTE — PROGRESS NOTES
7/10/2020    TELEHEALTH EVALUATION -- Audio/Visual (During OOGJI-53 public health emergency)    HPI:  THIS VISIT WAS PERFORMED VIA A SYNCHRONOUS TELECOMMUNICATION SYSTEM  I was present in the office, patient was in their home. Visit was started at 12  Was supervised by Dr. Ana Allen (:  1950) has requested an audio/video evaluation for the following concern(s):    History of concussion . Lingering headaches   Yesterday HA was 3-4/10. Today 1/10. Headache still has never gone away. Since it occurred, only bad 2/3 days. Mostly 2-3. Will wake up with them some days   Curamin helps with the headache. Ibuprofen also helps with the headache. Has ran twice since. 3 miles then 4-5 miles. Neither time exacerbated his HA. Had a colonoscopy on Wednesday. Sleep has been a bit off since but overall improving    TSH still mildly elevated  Slight anemia on prior blood work   Cholesterol elevated on prior labs     Review of Systems   Respiratory: Negative for shortness of breath and wheezing. Cardiovascular: Negative for chest pain and palpitations. Gastrointestinal: Negative for abdominal pain, constipation and diarrhea. Current Outpatient Medications   Medication Sig Dispense Refill    NONFORMULARY Curamin for pain Taking PRN      B Complex Vitamins (VITAMIN B COMPLEX 100 IJ) Take by mouth daily      Cholecalciferol (VITAMIN D3) 50 MCG ( UT) CAPS Take by mouth daily       No current facility-administered medications for this visit. Allergies   Allergen Reactions    Codeine Other (See Comments)     migraine    Hydrocodone Other (See Comments)     migraine     No past medical history on file.   Past Surgical History:   Procedure Laterality Date    APPENDECTOMY      HERNIA REPAIR       Family History   Problem Relation Age of Onset    Stroke Mother     Heart Disease Father     Other Father         Brain Aneuryseum       There is no immunization history on file for this patient. Health Maintenance   Topic Date Due    DTaP/Tdap/Td vaccine (1 - Tdap) 09/17/1971    Shingles Vaccine (1 of 2) 09/17/2002    Colon cancer screen colonoscopy  09/17/2002    Pneumococcal 65+ years Vaccine (1 of 1 - PPSV23) 09/17/2017    Flu vaccine (1) 09/01/2020    Lipid screen  05/18/2025    Hepatitis C screen  Completed    Hepatitis A vaccine  Aged Out    Hepatitis B vaccine  Aged Out    Hib vaccine  Aged Out    Meningococcal (ACWY) vaccine  Aged Out     Social History     Tobacco Use    Smoking status: Never Smoker    Smokeless tobacco: Never Used   Substance Use Topics    Alcohol use: Never     Frequency: Never    Drug use: Never       PHYSICAL EXAMINATION:  Due to this being a TeleHealth encounter, evaluation of the following organ systems is limited: Vitals/Constitutional/EENT/Resp/CV/GI//MS/Neuro/Skin/Heme-Lymph-Imm. Vital Signs: (As obtained by patient/caregiver or practitioner observation)    Blood pressure-  Heart rate-    Respiratory rate-    Temperature-  Pulse oximetry-     Wt Readings from Last 3 Encounters:   06/16/20 181 lb (82.1 kg)   05/13/20 170 lb (77.1 kg)     Temp Readings from Last 3 Encounters:   06/16/20 97 °F (36.1 °C) (Oral)   05/13/20 98 °F (36.7 °C) (Oral)     BP Readings from Last 3 Encounters:   06/16/20 122/78   05/18/20 (!) 146/87   05/13/20 120/88     Pulse Readings from Last 3 Encounters:   06/16/20 56   05/18/20 55   05/13/20 69       Physical Exam  Constitutional:       General: He is not in acute distress. Appearance: He is not ill-appearing. HENT:      Head: Normocephalic and atraumatic. Right Ear: External ear normal.      Left Ear: External ear normal.      Mouth/Throat:      Mouth: Mucous membranes are moist.   Eyes:      General:         Right eye: No discharge. Left eye: No discharge. Extraocular Movements: Extraocular movements intact.       Conjunctiva/sclera: Conjunctivae normal.   Neck:      Musculoskeletal: Normal electronic signature was used to authenticate this note. --Jewel Kerns MD on 7/10/2020 at 11:08 AM    {Coding Help -- Use CPT 22470-02023 with EM elements or Time rules for Office Visits. Pursuant to the emergency declaration under the [address] [place] [address], [facility] waiver authority and the [employer] Act, this Virtual  Visit was conducted, with patient's consent, to reduce the patient's risk of exposure to COVID-19 and provide continuity of care for an established patient. Services were provided through a video synchronous discussion virtually to substitute for in-person clinic visit.

## 2020-07-15 ENCOUNTER — TELEPHONE (OUTPATIENT)
Dept: FAMILY MEDICINE CLINIC | Age: 70
End: 2020-07-15

## 2020-07-15 ENCOUNTER — APPOINTMENT (OUTPATIENT)
Dept: CT IMAGING | Age: 70
End: 2020-07-15
Payer: MEDICARE

## 2020-07-15 ENCOUNTER — HOSPITAL ENCOUNTER (EMERGENCY)
Age: 70
Discharge: HOME | End: 2020-07-15
Payer: MEDICARE

## 2020-07-15 VITALS
OXYGEN SATURATION: 97 % | HEIGHT: 67 IN | RESPIRATION RATE: 18 BRPM | BODY MASS INDEX: 27.47 KG/M2 | DIASTOLIC BLOOD PRESSURE: 79 MMHG | HEART RATE: 57 BPM | WEIGHT: 175 LBS | SYSTOLIC BLOOD PRESSURE: 128 MMHG | TEMPERATURE: 98 F

## 2020-07-15 PROCEDURE — 6360000002 HC RX W HCPCS: Performed by: NURSE PRACTITIONER

## 2020-07-15 PROCEDURE — 99284 EMERGENCY DEPT VISIT MOD MDM: CPT

## 2020-07-15 PROCEDURE — 96375 TX/PRO/DX INJ NEW DRUG ADDON: CPT

## 2020-07-15 PROCEDURE — 96374 THER/PROPH/DIAG INJ IV PUSH: CPT

## 2020-07-15 PROCEDURE — 70450 CT HEAD/BRAIN W/O DYE: CPT

## 2020-07-15 PROCEDURE — 2580000003 HC RX 258: Performed by: NURSE PRACTITIONER

## 2020-07-15 RX ORDER — METOCLOPRAMIDE HYDROCHLORIDE 5 MG/ML
10 INJECTION INTRAMUSCULAR; INTRAVENOUS ONCE
Status: COMPLETED | OUTPATIENT
Start: 2020-07-15 | End: 2020-07-15

## 2020-07-15 RX ORDER — KETOROLAC TROMETHAMINE 30 MG/ML
15 INJECTION, SOLUTION INTRAMUSCULAR; INTRAVENOUS ONCE
Status: COMPLETED | OUTPATIENT
Start: 2020-07-15 | End: 2020-07-15

## 2020-07-15 RX ORDER — DIPHENHYDRAMINE HYDROCHLORIDE 50 MG/ML
25 INJECTION INTRAMUSCULAR; INTRAVENOUS ONCE
Status: COMPLETED | OUTPATIENT
Start: 2020-07-15 | End: 2020-07-15

## 2020-07-15 RX ORDER — 0.9 % SODIUM CHLORIDE 0.9 %
1000 INTRAVENOUS SOLUTION INTRAVENOUS ONCE
Status: COMPLETED | OUTPATIENT
Start: 2020-07-15 | End: 2020-07-15

## 2020-07-15 RX ADMIN — SODIUM CHLORIDE 1000 ML: 9 INJECTION, SOLUTION INTRAVENOUS at 04:19

## 2020-07-15 RX ADMIN — DIPHENHYDRAMINE HYDROCHLORIDE 25 MG: 50 INJECTION INTRAMUSCULAR; INTRAVENOUS at 04:19

## 2020-07-15 RX ADMIN — METOCLOPRAMIDE 10 MG: 5 INJECTION, SOLUTION INTRAMUSCULAR; INTRAVENOUS at 04:19

## 2020-07-15 RX ADMIN — KETOROLAC TROMETHAMINE 15 MG: 30 INJECTION, SOLUTION INTRAMUSCULAR at 04:19

## 2020-07-15 ASSESSMENT — ENCOUNTER SYMPTOMS
NAUSEA: 1
EYE REDNESS: 0
EYE PAIN: 0
VOMITING: 0
SORE THROAT: 0
EYE DISCHARGE: 0
SINUS PAIN: 0
RESPIRATORY NEGATIVE: 1
SINUS PRESSURE: 0
ABDOMINAL PAIN: 0

## 2020-07-15 ASSESSMENT — PAIN SCALES - GENERAL
PAINLEVEL_OUTOF10: 0
PAINLEVEL_OUTOF10: 7

## 2020-07-15 NOTE — TELEPHONE ENCOUNTER
[address] [facility]  [address], [address]  Phone: [phone]  Fax: [phone]    July 15, 2020    Adi Pardo  [address]      Dear Sam Maxwell,    This letter is regarding your Emergency Department (ED) visit at [address] [place] [address] on 7/15/20. Dr. Mendez Rhodes wanted to make sure that you understand your discharge instructions and that you were able to fill any prescriptions that may have been ordered for you. Please contact the office at the above phone number if the ED advised you to follow up with Dr. Mendez Rhodes, or if you have any further questions or needs. Also did you know -   *Visiting the ED for a non-emergency could result in higher co-pays than you would normally be subject to paying? *You can call your doctor even after hours so they can direct you to the most appropriate care. [facility]) practices can often offer you an appointment on the same day that you call. *We have some [facility] offices that offer Walk-in appointments; check our website for availability in your community, [email or web address].      *Evisits are now available for patients for $36 through [employer] for certain conditions:  * Sinus, cold and or cough       * Diarrhea            * Headache  * Heartburn                                * Poison Harleen          * Back pain     * Urinary problems                         If you do not have [employer] and are interested, please contact the office and a staff member may assist you or go to [email or web address].     Sincerely,     Mendez Rhodes, DO and your [facility]

## 2020-07-15 NOTE — ED NOTES
ED nurse-to-nurse bedside report    Chief Complaint   Patient presents with    Headache    Concussion      LOC: alert and orientated to name, place, date  Vital signs   Vitals:    07/15/20 0332 07/15/20 0422   BP: (!) 189/96 (!) 143/89   Pulse: 58 52   Resp: 18 16   Temp: 98 °F (36.7 °C)    TempSrc: Oral    SpO2: 100% 99%   Weight: 175 lb (79.4 kg)    Height: 5' 7\" (1.702 m)       Pain:    Pain Interventions: medications  Pain Goal: 3  Oxygen: No    Current needs required none   Telemetry: No  LDAs:   Peripheral IV 07/15/20 Left Antecubital (Active)   Site Assessment Clean; Intact;Dry 07/15/20 0423   Line Status Infusing 07/15/20 0423   Dressing Status Clean;Dry; Intact 07/15/20 0423     Continuous Infusions:   Mobility: Independent  [place] Fall Risk Score: Fall Risk 6/16/2020   2 or more falls in past year? no   Fall with injury in past year?  yes     Fall Interventions: none  Report given to: Ivan Vazquez RN  07/15/20 [id number]

## 2020-07-15 NOTE — ED TRIAGE NOTES
Pt to er. Pt states he ahs a headache and a concussion. States he fell on may 13th and dx concussion. States that he has had a headache everyday since but pain has worsened tonight. Pt states supposed to have a CT on Friday due to the constant headaches. Pt denies hitting head again. Denies emesis or blurry vision. Assessment completed. Resp regular. Family at side. Call light in reach. Pt a & o x 3.

## 2020-07-17 ENCOUNTER — NURSE ONLY (OUTPATIENT)
Dept: LAB | Age: 70
End: 2020-07-17

## 2020-07-17 ENCOUNTER — VIRTUAL VISIT (OUTPATIENT)
Dept: FAMILY MEDICINE CLINIC | Age: 70
End: 2020-07-17
Payer: MEDICARE

## 2020-07-17 PROBLEM — F07.81 POST CONCUSSIVE SYNDROME: Status: ACTIVE | Noted: 2020-07-17

## 2020-07-17 LAB
ANION GAP SERPL CALCULATED.3IONS-SCNC: 11 MEQ/L (ref 8–16)
BUN BLDV-MCNC: 17 MG/DL (ref 7–22)
C-REACTIVE PROTEIN: 0.15 MG/DL (ref 0–1)
CALCIUM SERPL-MCNC: 9.6 MG/DL (ref 8.5–10.5)
CHLORIDE BLD-SCNC: 104 MEQ/L (ref 98–111)
CO2: 25 MEQ/L (ref 23–33)
CREAT SERPL-MCNC: 0.9 MG/DL (ref 0.4–1.2)
ERYTHROCYTE [DISTWIDTH] IN BLOOD BY AUTOMATED COUNT: 13.5 % (ref 11.5–14.5)
ERYTHROCYTE [DISTWIDTH] IN BLOOD BY AUTOMATED COUNT: 45.9 FL (ref 35–45)
GFR SERPL CREATININE-BSD FRML MDRD: 84 ML/MIN/1.73M2
GLUCOSE BLD-MCNC: 128 MG/DL (ref 70–108)
HCT VFR BLD CALC: 40.7 % (ref 42–52)
HEMOGLOBIN: 12.8 GM/DL (ref 14–18)
MAGNESIUM: 2 MG/DL (ref 1.6–2.4)
MCH RBC QN AUTO: 28.9 PG (ref 26–33)
MCHC RBC AUTO-ENTMCNC: 31.4 GM/DL (ref 32.2–35.5)
MCV RBC AUTO: 91.9 FL (ref 80–94)
PLATELET # BLD: 240 THOU/MM3 (ref 130–400)
PMV BLD AUTO: 11.2 FL (ref 9.4–12.4)
POTASSIUM SERPL-SCNC: 4.1 MEQ/L (ref 3.5–5.2)
RBC # BLD: 4.43 MILL/MM3 (ref 4.7–6.1)
SODIUM BLD-SCNC: 140 MEQ/L (ref 135–145)
WBC # BLD: 6.7 THOU/MM3 (ref 4.8–10.8)

## 2020-07-17 PROCEDURE — 99213 OFFICE O/P EST LOW 20 MIN: CPT | Performed by: STUDENT IN AN ORGANIZED HEALTH CARE EDUCATION/TRAINING PROGRAM

## 2020-07-17 PROCEDURE — 3017F COLORECTAL CA SCREEN DOC REV: CPT | Performed by: STUDENT IN AN ORGANIZED HEALTH CARE EDUCATION/TRAINING PROGRAM

## 2020-07-17 PROCEDURE — 1036F TOBACCO NON-USER: CPT | Performed by: STUDENT IN AN ORGANIZED HEALTH CARE EDUCATION/TRAINING PROGRAM

## 2020-07-17 PROCEDURE — 1123F ACP DISCUSS/DSCN MKR DOCD: CPT | Performed by: STUDENT IN AN ORGANIZED HEALTH CARE EDUCATION/TRAINING PROGRAM

## 2020-07-17 PROCEDURE — G8428 CUR MEDS NOT DOCUMENT: HCPCS | Performed by: STUDENT IN AN ORGANIZED HEALTH CARE EDUCATION/TRAINING PROGRAM

## 2020-07-17 PROCEDURE — G8417 CALC BMI ABV UP PARAM F/U: HCPCS | Performed by: STUDENT IN AN ORGANIZED HEALTH CARE EDUCATION/TRAINING PROGRAM

## 2020-07-17 PROCEDURE — 4040F PNEUMOC VAC/ADMIN/RCVD: CPT | Performed by: STUDENT IN AN ORGANIZED HEALTH CARE EDUCATION/TRAINING PROGRAM

## 2020-07-17 RX ORDER — MAGNESIUM 200 MG
400 TABLET ORAL 2 TIMES DAILY
Qty: 90 TABLET | Refills: 3
Start: 2020-07-17 | End: 2021-01-28

## 2020-07-17 ASSESSMENT — ENCOUNTER SYMPTOMS
SHORTNESS OF BREATH: 0
WHEEZING: 0
ABDOMINAL PAIN: 0
DIARRHEA: 0
CONSTIPATION: 0

## 2020-07-17 NOTE — PROGRESS NOTES
This was a video visit with the patient at their home and the resident and myself in the continuity clinic. S: 81 y.o. male with   Chief Complaint   Patient presents with    Headache       Constant low dull headache for months since hitting his head - went to the [facility] with a really bad migraine    It is better with magnesium    Had a fall when running - doubt cardiac, have not ruled otu neurological    BP Readings from Last 3 Encounters:   07/15/20 128/79   06/16/20 122/78   05/18/20 (!) 146/87     Wt Readings from Last 3 Encounters:   07/15/20 175 lb (79.4 kg)   06/16/20 181 lb (82.1 kg)   05/13/20 170 lb (77.1 kg)           O: VS: There were no vitals filed for this visit. There is no height or weight on file to calculate BMI.    AAO/NAD, appropriate affect for mood  Normocephalic, atraumatic, eyes - conjunctiva and sclera normal,   skin no rashes on exposed areas   Judgement and insight intact  No Acute Distress      Lab Results   Component Value Date    WBC 6.7 07/17/2020    HGB 12.8 (L) 07/17/2020    HCT 40.7 (L) 07/17/2020     07/17/2020    CHOL 215 (H) 05/18/2020    TRIG 259 (H) 05/18/2020    HDL 41 05/18/2020    LDLCALC 122 05/18/2020    AST 15 05/18/2020     07/17/2020    K 4.1 07/17/2020     07/17/2020    CREATININE 0.9 07/17/2020    BUN 17 07/17/2020    CO2 25 07/17/2020    TSH 5.830 (H) 06/16/2020    LABGLOM 84 (A) 07/17/2020    MG 2.0 07/17/2020    CALCIUM 9.6 07/17/2020    VITD25 19 (L) 05/18/2020       Ct Head Wo Contrast    Result Date: 7/15/2020  PROCEDURE: CT HEAD WO CONTRAST CLINICAL INFORMATION: hx of concussion, headache. COMPARISON: No prior study. TECHNIQUE: Noncontrast 5 mm axial images were obtained through the brain. All CT scans at this facility use dose modulation, iterative reconstruction, and/or weight-based dosing when appropriate to reduce radiation dose to as low as reasonably achievable. FINDINGS: Exam is negative for acute fracture.  There is chronic changes of the right maxillary sinus and postsurgical changes correlate with chronic sinusitis. The brain is negative for hemorrhage, midline shift, edema, hydrocephalus or infarct. . 1. Negative CT of the brain. 2. 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 Correlate with history of chronic sinusitis. No acute pathology of the sinus on current study. **This report has been created using voice recognition software. It may contain minor errors which are inherent in voice recognition technology. ** Final report electronically signed by Dr. Kalli Hastings on 7/15/2020 4:47 AM           Diagnosis Orders   1. Post concussive syndrome  CBC    C-Reactive Protein    Magnesium    Basic Metabolic Panel    magnesium 200 MG TABS tablet   2. Concussion without loss of consciousness, subsequent encounter  CBC    C-Reactive Protein    Magnesium    Basic Metabolic Panel    magnesium 200 MG TABS tablet   3. Nonintractable headache, unspecified chronicity pattern, unspecified headache type  magnesium 200 MG TABS tablet   4. At high risk for falls         Plan  Will stay on the magnesium for the headache    If the headache happens again will order the MRI    Sed rate cbc magnesium    Will see you back in sept       Return in about 2 months (around 9/17/2020).     Orders Placed:  Orders Placed This Encounter   Procedures    CBC    C-Reactive Protein    Magnesium    Basic Metabolic Panel     Medications Prescribed:  Orders Placed This Encounter   Medications    magnesium 200 MG TABS tablet     Sig: Take 2 tablets by mouth 2 times daily     Dispense:  90 tablet     Refill:  3       Future Appointments   Date Time Provider Joya Cronin   9/15/2020  1:30 PM Rosy Iverson MD SRPX  RES [facility] Maintenance Due   Topic Date Due    DTaP/Tdap/Td vaccine (1 - Tdap) 09/17/1971    Diabetes screen  09/17/1992    Shingles Vaccine (1 of 2) 09/17/2002    Colon cancer screen colonoscopy  09/17/2002    Pneumococcal 65+ years Vaccine (1 of 1 - PPSV23) 09/17/2017    Annual Wellness Visit (AWV)  07/15/2020         Attending Physician Statement  I have discussed the case, including pertinent history and exam findings with the resident. [id number] I agree with the documented assessment and plan as documented by the resident. GE modifier added to this encounter      Ashlyn Martin DO 7/18/2020 7:19 AM    On the basis of positive falls risk screening, assessment and plan is as follows: referral to physical therapy provided for strength and balance training, patient will follow up in 4 week(s) for further evaluation. On the basis of positive falls risk screening, assessment and plan is as follows: mri brain ordered if headache not better.

## 2020-07-17 NOTE — PROGRESS NOTES
2020    TELEHEALTH EVALUATION -- Audio/Visual (During JRYGM-61 public health emergency)    HPI:  THIS VISIT WAS PERFORMED VIA A SYNCHRONOUS TELECOMMUNICATION SYSTEM  I was present in the office, patient was in their home. Visit was started at 65  Was supervised by Dr. Judy Alan (:  1950) has requested an audio/video evaluation for the following concern(s): Woke up at 220 am with a splitting headache 2 days ago. 15 minutes later took curamin which had no effect. 15 minutes after woke wife and went to the ED. Was worked up in the ED and got IV meds. The medicines worked well and brought headache down to a 0-1, best since the initial injury. Also got a CT which was negative. Pain was over the R eye and temple. That's where the pain normally is for him. No vision changes. Was having some phonophobia and photophobia with it. ED told him to start magnesium    Since then headache has been mostly 0-1. States he has had times where it has been completely gone. States BP was a little elevated this am, 150/100. Normally runs with normal limits. Review of Systems   Respiratory: Negative for shortness of breath and wheezing. Cardiovascular: Negative for chest pain and palpitations. Gastrointestinal: Negative for abdominal pain, constipation and diarrhea. Neurological: Positive for headaches. Current Outpatient Medications   Medication Sig Dispense Refill    magnesium 200 MG TABS tablet Take 2 tablets by mouth 2 times daily 90 tablet 3    NONFORMULARY Curamin for pain Taking PRN      B Complex Vitamins (VITAMIN B COMPLEX 100 IJ) Take by mouth daily      Cholecalciferol (VITAMIN D3) 50 MCG ( UT) CAPS Take by mouth daily       No current facility-administered medications for this visit. Allergies   Allergen Reactions    Codeine Other (See Comments)     migraine    Hydrocodone Other (See Comments)     migraine     No past medical history on file.   Past Surgical and atraumatic. Right Ear: External ear normal.      Left Ear: External ear normal.      Mouth/Throat:      Mouth: Mucous membranes are moist.   Eyes:      General:         Right eye: No discharge. Left eye: No discharge. Extraocular Movements: Extraocular movements intact. Conjunctiva/sclera: Conjunctivae normal.   Neck:      Musculoskeletal: Normal range of motion. Pulmonary:      Effort: Pulmonary effort is normal. No respiratory distress. Musculoskeletal: Normal range of motion. Skin:     Findings: No lesion or rash. Neurological:      General: No focal deficit present. Mental Status: He is alert and oriented to person, place, and time. Cranial Nerves: No cranial nerve deficit. Psychiatric:         Mood and Affect: Mood normal.         Behavior: Behavior normal.         Thought Content: Thought content normal.         Judgment: Judgment normal.         Other pertinent observable physical exam findings-     ASSESSMENT/PLAN:  Pilar Garces was seen today for headache. Diagnoses and all orders for this visit:    Post concussive syndrome  -     CBC; Future  -     C-Reactive Protein; Future  -     Magnesium; Future  -     Basic Metabolic Panel; Future  -     magnesium 200 MG TABS tablet; Take 2 tablets by mouth 2 times daily    Concussion without loss of consciousness, subsequent encounter  -     CBC; Future  -     C-Reactive Protein; Future  -     Magnesium; Future  -     Basic Metabolic Panel; Future  -     magnesium 200 MG TABS tablet; Take 2 tablets by mouth 2 times daily    Nonintractable headache, unspecified chronicity pattern, unspecified headache type  -     magnesium 200 MG TABS tablet;  Take 2 tablets by mouth 2 times daily      Will order blood work today  Can increase Mg to BID/TID as tolerated  Consider MRI/ neuro consult if intense headache returns   Instructed patient to return to ED if intense headache returns  Encouraged routine BP monitoring  All questions answered   F/u 2 months, earlier if needed     Medications Prescribed:  Orders Placed This Encounter   Medications    magnesium 200 MG TABS tablet     Sig: Take 2 tablets by mouth 2 times daily     Dispense:  90 tablet     Refill:  3       No follow-ups on file. Future Appointments   Date Time Provider Joya Cronin   9/15/2020  1:30 PM Monica Richard MD SRPX  RES [address]         An  electronic signature was used to authenticate this note. --Monica Richard MD on 7/17/2020 at 8:40 AM    {Coding Help -- Use CPT 14956-50810 with EM elements or Time rules for Office Visits. Pursuant to the emergency declaration under the [facility], [facility]5 waiver authority and the [employer] Act, this Virtual  Visit was conducted, with patient's consent, to reduce the patient's risk of exposure to COVID-19 and provide continuity of care for an established patient. Services were provided through a video synchronous discussion virtually to substitute for in-person clinic visit.

## 2020-09-08 ENCOUNTER — NURSE ONLY (OUTPATIENT)
Dept: LAB | Age: 70
End: 2020-09-08

## 2020-09-08 LAB
CHOLESTEROL, TOTAL: 192 MG/DL (ref 100–199)
HDLC SERPL-MCNC: 44 MG/DL
LDL CHOLESTEROL CALCULATED: 119 MG/DL
T4 FREE: 1.1 NG/DL (ref 0.93–1.76)
TRIGL SERPL-MCNC: 144 MG/DL (ref 0–199)
TSH SERPL DL<=0.05 MIU/L-ACNC: 4.98 UIU/ML (ref 0.4–4.2)

## 2020-09-15 ENCOUNTER — OFFICE VISIT (OUTPATIENT)
Dept: FAMILY MEDICINE CLINIC | Age: 70
End: 2020-09-15
Payer: MEDICARE

## 2020-09-15 VITALS
HEIGHT: 67 IN | HEART RATE: 57 BPM | WEIGHT: 179.6 LBS | DIASTOLIC BLOOD PRESSURE: 70 MMHG | RESPIRATION RATE: 12 BRPM | OXYGEN SATURATION: 98 % | BODY MASS INDEX: 28.19 KG/M2 | SYSTOLIC BLOOD PRESSURE: 110 MMHG | TEMPERATURE: 97 F

## 2020-09-15 PROCEDURE — G8427 DOCREV CUR MEDS BY ELIG CLIN: HCPCS | Performed by: STUDENT IN AN ORGANIZED HEALTH CARE EDUCATION/TRAINING PROGRAM

## 2020-09-15 PROCEDURE — 1123F ACP DISCUSS/DSCN MKR DOCD: CPT | Performed by: STUDENT IN AN ORGANIZED HEALTH CARE EDUCATION/TRAINING PROGRAM

## 2020-09-15 PROCEDURE — 3017F COLORECTAL CA SCREEN DOC REV: CPT | Performed by: STUDENT IN AN ORGANIZED HEALTH CARE EDUCATION/TRAINING PROGRAM

## 2020-09-15 PROCEDURE — 99213 OFFICE O/P EST LOW 20 MIN: CPT | Performed by: STUDENT IN AN ORGANIZED HEALTH CARE EDUCATION/TRAINING PROGRAM

## 2020-09-15 PROCEDURE — 4040F PNEUMOC VAC/ADMIN/RCVD: CPT | Performed by: STUDENT IN AN ORGANIZED HEALTH CARE EDUCATION/TRAINING PROGRAM

## 2020-09-15 PROCEDURE — G8417 CALC BMI ABV UP PARAM F/U: HCPCS | Performed by: STUDENT IN AN ORGANIZED HEALTH CARE EDUCATION/TRAINING PROGRAM

## 2020-09-15 PROCEDURE — 1036F TOBACCO NON-USER: CPT | Performed by: STUDENT IN AN ORGANIZED HEALTH CARE EDUCATION/TRAINING PROGRAM

## 2020-09-15 RX ORDER — ASCORBIC ACID 500 MG
1000 TABLET ORAL DAILY
COMMUNITY
End: 2021-12-07

## 2020-09-15 ASSESSMENT — ENCOUNTER SYMPTOMS
CONSTIPATION: 0
ABDOMINAL PAIN: 0
WHEEZING: 0
DIARRHEA: 0
SHORTNESS OF BREATH: 0

## 2020-09-15 NOTE — PROGRESS NOTES
Hydrocodone Other (See Comments)     migraine     History reviewed. No pertinent past medical history. Past Surgical History:   Procedure Laterality Date    APPENDECTOMY      HERNIA REPAIR       Family History   Problem Relation Age of Onset    Stroke Mother     Heart Disease Father     Other Father         Brain Aneuryseum     Social History     Tobacco Use    Smoking status: Never Smoker    Smokeless tobacco: Never Used   Substance Use Topics    Alcohol use: Never     Frequency: Never      Subjective:   Review of Systems   Respiratory: Negative for shortness of breath and wheezing. Cardiovascular: Negative for chest pain and palpitations. Gastrointestinal: Negative for abdominal pain, constipation and diarrhea. Objective:     Vitals:    09/15/20 1328   BP: 110/70   Site: Left Upper Arm   Position: Sitting   Cuff Size: Medium Adult   Pulse: 57   Resp: 12   Temp: 97 °F (36.1 °C)   TempSrc: Temporal   SpO2: 98%   Weight: 179 lb 9.6 oz (81.5 kg)   Height: 5' 7\" (1.702 m)     Body mass index is 28.13 kg/m². Wt Readings from Last 3 Encounters:   09/15/20 179 lb 9.6 oz (81.5 kg)   07/15/20 175 lb (79.4 kg)   06/16/20 181 lb (82.1 kg)     BP Readings from Last 3 Encounters:   09/15/20 110/70   07/15/20 128/79   06/16/20 122/78     Physical Exam  Vitals signs and nursing note reviewed. Constitutional:       General: He is not in acute distress. Appearance: He is well-developed. He is not diaphoretic. HENT:      Head: Normocephalic and atraumatic. Right Ear: External ear normal.      Left Ear: External ear normal.      Nose: Nose normal.   Eyes:      General:         Right eye: No discharge. Left eye: No discharge. Conjunctiva/sclera: Conjunctivae normal.   Cardiovascular:      Rate and Rhythm: Normal rate and regular rhythm. Heart sounds: Normal heart sounds. No murmur. Pulmonary:      Effort: Pulmonary effort is normal.      Breath sounds: Normal breath sounds.  No 10/13/2020), or MAW. Medications Prescribed:  No orders of the defined types were placed in this encounter. Future Appointments   Date Time Provider Joya Cronin   9/25/2020  8:40 AM Lakeshia Maya MD [address]   10/13/2020  9:20 AM MD SAL Pratt [address]       Patient given educational materials - see patient instructions. Discussed use, benefit, and sideeffects of prescribed medications. All patient questions answered. Pt voiced understanding. Reviewed health maintenance. Instructed to continue current medications, diet and exercise. Patient agreed with treatment plan. Follow up as directed.      Electronically signed by Lakeshia Maya MD on 9/15/2020 at 2:39 PM

## 2020-09-15 NOTE — PROGRESS NOTES
S: 81 y.o. male with   Chief Complaint   Patient presents with    Follow-up     2 Month Follow-up Headaches and Concussion also review labs completed 09/08/2020       HPI: fall and concussion with chronic headaches. Some headache free days now. Improving. Wrist cuff shows 140 SBP at home. BP Readings from Last 3 Encounters:   09/15/20 110/70   07/15/20 128/79   06/16/20 122/78     Wt Readings from Last 3 Encounters:   09/15/20 179 lb 9.6 oz (81.5 kg)   07/15/20 175 lb (79.4 kg)   06/16/20 181 lb (82.1 kg)           O: VS:  height is 5' 7\" (1.702 m) and weight is 179 lb 9.6 oz (81.5 kg). His temporal temperature is 97 °F (36.1 °C). His blood pressure is 110/70 and his pulse is 57. His respiration is 12 and oxygen saturation is 98%. Diagnosis Orders   1. Post concussive syndrome     2. Nonintractable headache, unspecified chronicity pattern, unspecified headache type         Plan:  F/u with BP cuff. Concussion symptoms improving. Monitor. Health Maintenance Due   Topic Date Due    DTaP/Tdap/Td vaccine (1 - Tdap) 09/17/1971    Diabetes screen  09/17/1992    Shingles Vaccine (1 of 2) 09/17/2002    Pneumococcal 65+ years Vaccine (1 of 1 - PPSV23) 09/17/2017    Annual Wellness Visit (AWV)  07/15/2020    Flu vaccine (1) 09/01/2020         Attending Physician Statement  I have discussed the case, including pertinent history and exam findings with the resident. I agree with the documented assessment and plan as documented by the resident.   GE modifier added to this encounter      Valentin Santana DO 9/15/2020 2:10 PM

## 2020-09-15 NOTE — PATIENT INSTRUCTIONS
Thank you   1. Thank you for trusting us with your healthcare needs. You may receive a survey regarding today's visit. It would help us out if you would take a few moments to provide your feedback. We value your input. 2. Please bring in ALL medications BOTTLES, including inhalers, herbal supplements, over the counter, prescribed & non-prescribed medicine. The office would like actual medication bottles and a list.   3. Please note our OFFICE POLICIES:   a. Prior to getting your labs drawn, please check with your insurance company for benefits and eligibility of lab services. Often, insurance companies cover certain tests for preventative visits only. It is patient's responsibility to see what is covered. b. We are unable to change a diagnosis after the test has been performed. c. Lab orders will not be re-printed. Please hold onto your original lab orders and take them to your lab to be completed. d. If you no show your scheduled appointment three times, you will be dismissed from this practice. e. Vinetta Champagne must be completed 24 hours prior to your schedule appointment. 4. If the list below has been completed, PLEASE FAX RECORDS TO OUR OFFICE @ [phone]. Once the records have been received we will update your records at our office:  Health Maintenance Due   Topic Date Due    DTaP/Tdap/Td vaccine (1 - Tdap) 09/17/1971    Diabetes screen  09/17/1992    Shingles Vaccine (1 of 2) 09/17/2002    Pneumococcal 65+ years Vaccine (1 of 1 - PPSV23) 09/17/2017    Annual Wellness Visit (AWV)  07/15/2020    Flu vaccine (1) 09/01/2020             Patient Education        DASH Diet: Care Instructions  Your Care Instructions     The DASH diet is an eating plan that can help lower your blood pressure. DASH stands for Dietary Approaches to Stop Hypertension. Hypertension is high blood pressure. The DASH diet focuses on eating foods that are high in calcium, potassium, and magnesium.  These nutrients can lower account, please click on the \"Sign Up Now\" link. Current as of: December 16, 2019               Content Version: 12.5  © 8117-3780 Healthwise, Incorporated. Care instructions adapted under license by [facility] ([facility]). If you have questions about a medical condition or this instruction, always ask your healthcare professional. [address] any warranty or liability for your use of this information.

## 2020-09-25 ENCOUNTER — TELEPHONE (OUTPATIENT)
Dept: FAMILY MEDICINE CLINIC | Age: 70
End: 2020-09-25

## 2020-09-25 ENCOUNTER — OFFICE VISIT (OUTPATIENT)
Dept: FAMILY MEDICINE CLINIC | Age: 70
End: 2020-09-25
Payer: MEDICARE

## 2020-09-25 VITALS
OXYGEN SATURATION: 99 % | WEIGHT: 181.2 LBS | HEART RATE: 65 BPM | RESPIRATION RATE: 16 BRPM | BODY MASS INDEX: 28.44 KG/M2 | DIASTOLIC BLOOD PRESSURE: 86 MMHG | HEIGHT: 67 IN | TEMPERATURE: 97.1 F | SYSTOLIC BLOOD PRESSURE: 132 MMHG

## 2020-09-25 LAB — HBA1C MFR BLD: 5.6 % (ref 4.3–5.7)

## 2020-09-25 PROCEDURE — 3017F COLORECTAL CA SCREEN DOC REV: CPT | Performed by: STUDENT IN AN ORGANIZED HEALTH CARE EDUCATION/TRAINING PROGRAM

## 2020-09-25 PROCEDURE — 1123F ACP DISCUSS/DSCN MKR DOCD: CPT | Performed by: STUDENT IN AN ORGANIZED HEALTH CARE EDUCATION/TRAINING PROGRAM

## 2020-09-25 PROCEDURE — 4040F PNEUMOC VAC/ADMIN/RCVD: CPT | Performed by: STUDENT IN AN ORGANIZED HEALTH CARE EDUCATION/TRAINING PROGRAM

## 2020-09-25 PROCEDURE — G0438 PPPS, INITIAL VISIT: HCPCS | Performed by: STUDENT IN AN ORGANIZED HEALTH CARE EDUCATION/TRAINING PROGRAM

## 2020-09-25 RX ORDER — PNEUMOCOCCAL VACCINE POLYVALENT 25; 25; 25; 25; 25; 25; 25; 25; 25; 25; 25; 25; 25; 25; 25; 25; 25; 25; 25; 25; 25; 25; 25 UG/.5ML; UG/.5ML; UG/.5ML; UG/.5ML; UG/.5ML; UG/.5ML; UG/.5ML; UG/.5ML; UG/.5ML; UG/.5ML; UG/.5ML; UG/.5ML; UG/.5ML; UG/.5ML; UG/.5ML; UG/.5ML; UG/.5ML; UG/.5ML; UG/.5ML; UG/.5ML; UG/.5ML; UG/.5ML; UG/.5ML
0.5 INJECTION, SOLUTION INTRAMUSCULAR; SUBCUTANEOUS ONCE
Qty: 0.5 ML | Refills: 0 | Status: SHIPPED | OUTPATIENT
Start: 2020-09-25 | End: 2020-09-25

## 2020-09-25 RX ORDER — PNEUMOCOCCAL VACCINE POLYVALENT 25; 25; 25; 25; 25; 25; 25; 25; 25; 25; 25; 25; 25; 25; 25; 25; 25; 25; 25; 25; 25; 25; 25 UG/.5ML; UG/.5ML; UG/.5ML; UG/.5ML; UG/.5ML; UG/.5ML; UG/.5ML; UG/.5ML; UG/.5ML; UG/.5ML; UG/.5ML; UG/.5ML; UG/.5ML; UG/.5ML; UG/.5ML; UG/.5ML; UG/.5ML; UG/.5ML; UG/.5ML; UG/.5ML; UG/.5ML; UG/.5ML; UG/.5ML
0.5 INJECTION, SOLUTION INTRAMUSCULAR; SUBCUTANEOUS ONCE
Qty: 0.5 ML | Refills: 0 | Status: SHIPPED | OUTPATIENT
Start: 2020-09-25 | End: 2020-09-25 | Stop reason: SDUPTHER

## 2020-09-25 ASSESSMENT — LIFESTYLE VARIABLES: HOW OFTEN DO YOU HAVE A DRINK CONTAINING ALCOHOL: 0

## 2020-09-25 ASSESSMENT — PATIENT HEALTH QUESTIONNAIRE - PHQ9
1. LITTLE INTEREST OR PLEASURE IN DOING THINGS: 0
2. FEELING DOWN, DEPRESSED OR HOPELESS: 0
SUM OF ALL RESPONSES TO PHQ9 QUESTIONS 1 & 2: 0
SUM OF ALL RESPONSES TO PHQ QUESTIONS 1-9: 0
SUM OF ALL RESPONSES TO PHQ QUESTIONS 1-9: 0

## 2020-09-25 NOTE — PATIENT INSTRUCTIONS
Personalized Preventive Plan for Gabriel Treadwell - 9/25/2020  Medicare offers a range of preventive health benefits. Some of the tests and screenings are paid in full while other may be subject to a deductible, co-insurance, and/or copay. Some of these benefits include a comprehensive review of your medical history including lifestyle, illnesses that may run in your family, and various assessments and screenings as appropriate. After reviewing your medical record and screening and assessments performed today your provider may have ordered immunizations, labs, imaging, and/or referrals for you. A list of these orders (if applicable) as well as your Preventive Care list are included within your After Visit Summary for your review. Other Preventive Recommendations:    · A preventive eye exam performed by an eye specialist is recommended every 1-2 years to screen for glaucoma; cataracts, macular degeneration, and other eye disorders. · A preventive dental visit is recommended every 6 months. · Try to get at least 150 minutes of exercise per week or 10,000 steps per day on a pedometer . · Order or download the FREE \"Exercise & Physical Activity: Your Everyday Guide\" from [employer]. Call 4-136.517.3335 or search [employer] online. · You need 3313-1230 mg of calcium and 6016-6106 IU of vitamin D per day. It is possible to meet your calcium requirement with diet alone, but a vitamin D supplement is usually necessary to meet this goal.  · When exposed to the sun, use a sunscreen that protects against both UVA and UVB radiation with an SPF of 30 or greater. Reapply every 2 to 3 hours or after sweating, drying off with a towel, or swimming. · Always wear a seat belt when traveling in a car. Always wear a helmet when riding a bicycle or motorcycle.

## 2020-09-25 NOTE — PROGRESS NOTES
Medicare Annual Wellness Visit  Name: Corie Brady Date: 2020   MRN: [id number] Sex: Male   Age: 78 y.o. Ethnicity: Non-/Non    : 1950 Race: Mojgan Keen is here for Medicare AWV (Wellness Physical)    Screenings for behavioral, psychosocial and functional/safety risks, and cognitive dysfunction are all negative except as indicated below. These results, as well as other patient data from the [address] form, are documented in Flowsheets linked to this Encounter. Allergies   Allergen Reactions    Codeine Other (See Comments)     migraine    Hydrocodone Other (See Comments)     migraine         Prior to Visit Medications    Medication Sig Taking? Authorizing Provider   Tdap (ADACEL) 5-2-15.5 LF-MCG/0.5 injection Inject 0.5 mLs into the muscle once for 1 dose Yes Doug Kuo MD   zoster recombinant adjuvanted vaccine [facility]) 50 MCG/0.5ML SUSR injection Inject 0.5 mLs into the muscle once for 1 dose Yes Doug Kuo MD   pneumococcal polyvalent (PNEUMOVAX 23) 25 MCG/0.5ML inj Inject 0.5 mLs into the muscle once for 1 dose Yes Doug Kuo MD   vitamin C (ASCORBIC ACID) 500 MG tablet Take 1,000 mg by mouth daily Yes Historical Provider, MD   magnesium 200 MG TABS tablet Take 2 tablets by mouth 2 times daily  Patient taking differently: Take 200 mg by mouth daily  Yes Duog Kuo MD   NONFORMULARY Curamin for pain Taking PRN Yes Historical Provider, MD   B Complex Vitamins (VITAMIN B COMPLEX 100 IJ) Take by mouth daily Yes Historical Provider, MD   Cholecalciferol (VITAMIN D3) 50 MCG (2000) CAPS Take by mouth daily Yes Historical Provider, MD       History reviewed. No pertinent past medical history.     Past Surgical History:   Procedure Laterality Date    APPENDECTOMY      HERNIA REPAIR           Family History   Problem Relation Age of Onset    Stroke Mother     Heart Disease Father     Other Father         Belen Flight your eyesight?: No  Have you had an eye exam within the past year?: Yes  Hearing/Vision Interventions:  · Hearing concerns:  audiology referral provided    Safety:  Safety  Do you have working smoke detectors?: Yes  Have all throw rugs been removed or fastened?: (!) No  Do you have non-slip mats or surfaces in all bathtubs/showers?: Yes  Do all of your stairways have a railing or banister?: Yes  Are your doorways, halls and stairs free of clutter?: Yes  Do you always fasten your seatbelt when you are in a car?: Yes  Safety Interventions:  · Home safety tips provided    Personalized Preventive Plan   Current Health Maintenance Status    There is no immunization history on file for this patient. Health Maintenance   Topic Date Due    DTaP/Tdap/Td vaccine (1 - Tdap) 09/17/1971    Diabetes screen  09/17/1992    Shingles Vaccine (1 of 2) 09/17/2002    Pneumococcal 65+ years Vaccine (1 of 1 - PPSV23) 09/17/2017    Annual Wellness Visit (AWV)  07/15/2020    Flu vaccine (1) 09/01/2020    Lipid screen  09/08/2025    Colon cancer screen colonoscopy  07/08/2030    Hepatitis C screen  Completed    Hepatitis A vaccine  Aged Out    Hepatitis B vaccine  Aged Out    Hib vaccine  Aged Out    Meningococcal (ACWY) vaccine  Aged Out     Recommendations for raksul Due: see orders and patient instructions/AVS.  . Recommended screening schedule for the next 5-10 years is provided to the patient in written form: see Patient Instructions/AVS.    Abhijit Fernández was seen today for medicare awv. Diagnoses and all orders for this visit:    Need for pneumococcal vaccination  -     pneumococcal polyvalent (PNEUMOVAX 23) 25 MCG/0.5ML inj; Inject 0.5 mLs into the muscle once for 1 dose    ACP (advance care planning)  -     [address]    Need for prophylactic vaccination against diphtheria-tetanus-pertussis (DTP)  -     Tdap (ADACEL) 5-2-15.5 LF-MCG/0.5 injection;  Inject 0.5 mLs into the muscle once for 1 dose    Need for prophylactic vaccination and inoculation against varicella  -     zoster recombinant adjuvanted vaccine [facility]) 50 MCG/0.5ML SUSR injection;  Inject 0.5 mLs into the muscle once for 1 dose    Routine general medical examination at a health care facility    Hearing disorder, unspecified laterality  -     [id number] [facility]iology [facility]. [facility]    Encounter for screening for diabetes mellitus  -     POCT glycosylated hemoglobin (Hb A1C)    Elevated glucose level  -     POCT glycosylated hemoglobin (Hb A1C)        Electronically signed by Jewel Kerns MD on 9/25/2020 at 9:17 AM

## 2020-09-25 NOTE — PROGRESS NOTES
S: 78 y.o. male with   Chief Complaint   Patient presents with    Medicare AW     Wellness Physical       HPI: needs audiology referral and spiritual care services. BP Readings from Last 3 Encounters:   09/25/20 132/86   09/15/20 110/70   07/15/20 128/79     Wt Readings from Last 3 Encounters:   09/25/20 181 lb 3.2 oz (82.2 kg)   09/15/20 179 lb 9.6 oz (81.5 kg)   07/15/20 175 lb (79.4 kg)           O: VS:  height is 5' 7\" (1.702 m) and weight is 181 lb 3.2 oz (82.2 kg). His temporal temperature is 97.1 °F (36.2 °C). His blood pressure is 132/86 and his pulse is 65. His respiration is 16 and oxygen saturation is 99%. Diagnosis Orders   1. Need for pneumococcal vaccination  pneumococcal polyvalent (PNEUMOVAX 23) 25 MCG/0.5ML inj   2. ACP (advance care planning)  [address]   3. Need for prophylactic vaccination against diphtheria-tetanus-pertussis (DTP)  Tdap (ADACEL) 5-2-15.5 LF-MCG/0.5 injection   4. Need for prophylactic vaccination and inoculation against varicella  zoster recombinant adjuvanted vaccine (SHINGRIX) 50 MCG/0.5ML SUSR injection   5. Routine general medical examination at a health care facility     6. Hearing disorder, unspecified laterality  [facility] - [facility]   7. Encounter for screening for diabetes mellitus  POCT glycosylated hemoglobin (Hb A1C)       Plan:  Referral for ACP placed. Audiology referral placed. Immunizations ordered. DM screening done      Health Maintenance Due   Topic Date Due    DTaP/Tdap/Td vaccine (1 - Tdap) 09/17/1971    Diabetes screen  09/17/1992    Shingles Vaccine (1 of 2) 09/17/2002    Pneumococcal 65+ years Vaccine (1 of 1 - PPSV23) 09/17/2017    Annual Wellness Visit (AWV)  07/15/2020    Flu vaccine (1) 09/01/2020         Attending Physician Statement  I have discussed the case, including pertinent history and exam findings with the resident.   I agree with the documented assessment and plan as documented by the resident.   GE modifier added to this encounter      Radha Ruano DO 9/25/2020 9:16 AM

## 2020-09-28 ENCOUNTER — TELEPHONE (OUTPATIENT)
Dept: SPIRITUAL SERVICES | Facility: CLINIC | Age: 70
End: 2020-09-28

## 2020-09-28 NOTE — TELEPHONE ENCOUNTER
I made an initial attempt to contact Mick Koleleah via telephone call to offer support, if desired, for the completion of Advance Directives documents as part of an [address] conversation. No answer. Left message. Chaplains will follow-up.

## 2020-10-02 ENCOUNTER — TELEPHONE (OUTPATIENT)
Dept: SPIRITUAL SERVICES | Facility: CLINIC | Age: 70
End: 2020-10-02

## 2020-10-06 ENCOUNTER — OFFICE VISIT (OUTPATIENT)
Dept: INTERNAL MEDICINE CLINIC | Age: 70
End: 2020-10-06
Payer: MEDICARE

## 2020-10-06 VITALS — HEIGHT: 66 IN | BODY MASS INDEX: 28.99 KG/M2 | WEIGHT: 180.4 LBS | TEMPERATURE: 97.5 F

## 2020-10-06 PROBLEM — E78.00 ELEVATED LOW DENSITY LIPOPROTEIN (LDL) CHOLESTEROL LEVEL: Status: ACTIVE | Noted: 2020-10-06

## 2020-10-06 PROCEDURE — 97802 MEDICAL NUTRITION INDIV IN: CPT | Performed by: DIETITIAN, REGISTERED

## 2020-10-06 NOTE — PROGRESS NOTES
[address] [place] [address]. [address] [place] [address] Ross., [facility], [address]  [phone] (phone)  [phone] (fax)    Patient Name: Juan Ramon Arteaga. Date of Birth: [id number]. MRN: [id number]      Assessment: Patient is a 78 y.o. male seen for Initial MNT visit for Elevated LDL cholesterol level.     -Nutritionally relevant labs:   Lab Results   Component Value Date/Time    LABA1C 5.6 09/25/2020 08:14 AM    GLUCOSE 128 (H) 07/17/2020 02:04 PM    GLUCOSE 90 05/18/2020 12:31 PM    CHOL 192 09/08/2020 11:13 AM    HDL 44 09/08/2020 11:13 AM    LDLCALC 119 09/08/2020 11:13 AM    TRIG 144 09/08/2020 11:13 AM     Retired. Here with wife [facility]  Up 7 - 830 am.  Checks BP running high recently. Pt does not want to be put on a Statin med. And wants to follow heart healthy lifestyle and nutrition changes first    -Food recall:   Breakfast: based on running days vs non running days. Running once a week right now. Wants to do more. On Wed. Mornings goes to the  for swimming. Occ lifts weights on running days. Currently running once a week. When running - will eat a pre-run snack - melani cracker with almond butter and 1/2 glass of whole milk. On non-run days - cereal - \"Honey bunches of oats\" or \"Life\"    On Fridays - Oatmeal (steel cut or rolled oats),   Will have Eggs 1-2x/week. After a run brunch - 2 eggs with whole wheat toast and Protein shake made with 1 1/2 cups whole milk (pt resistant to lower fat milk) OR bagel. Lunch: Leftovers. On Wed. After Y meets up with friends and goes to [facility] for lunch - 2 piece fish, okra, green bean/brocc/coleslaw, 2 hush puppies. Dr Nora Nicole or Root beer or water to drink  Dinner: 6 pm - last night - stir vieira - no skin chicken breast, brocc/carrots/zucchini/mushrroms/peppers/red onions with sesame oil and seasonings. Rice qunioa mix with this. Tries to have fish 1 night a week.    Snacks: Evening - occ - handful of nuts OR sometimes chips but not as often anymore. Use to eat tortilla chips and salsa. Will eat Corn chips with brat and baked beans meal.    Eating out - 2x/week. [place] or [place] or [place] or [place] or [place]. Brkf on the way to Y - egg mcmuffin with [place] weinstein or sausage. Use to drink a large pop with this but cutting this out. Limiting to 2 cans of Dr Pepper/day. Always drinking water - has bottled water with him all the time. Vice -  Dr Jenkins Canes and salty foods and occ iced brownie, only whole milk. They have a Garden and have salads with most meals. -Main Beverages: water. 2 cans Dr. Loving Fruit. -Impression of Dietary Intake: on average, 3 meals per day, on average, 2 dining out or fast food meals per week, on average, 0 servings fruit per day, on average, 2-4 servings vegetables per day. Current Outpatient Medications on File Prior to Visit   Medication Sig Dispense Refill    Fexofenadine HCl (MUCINEX ALLERGY PO) Take by mouth as needed      vitamin C (ASCORBIC ACID) 500 MG tablet Take 1,000 mg by mouth daily      magnesium 200 MG TABS tablet Take 2 tablets by mouth 2 times daily (Patient taking differently: Take 200 mg by mouth daily ) 90 tablet 3    NONFORMULARY Curamin for pain Taking PRN      B Complex Vitamins (VITAMIN B COMPLEX 100 IJ) Take by mouth daily      Cholecalciferol (VITAMIN D3) 50 MCG (2000 UT) CAPS Take by mouth daily       No current facility-administered medications on file prior to visit. Vitals from current and previous visits:  Temp 97.5 °F (36.4 °C)   Ht 5' 6\" (1.676 m)   Wt 180 lb 6.4 oz (81.8 kg)   BMI 29.12 kg/m²     -Body mass index is 29.12 kg/m². 25-29.9 - Overweight. Per pt goal weight to be able to run [employer] again:  150 - 155#    -Weight goal: lose weight.      Nutrition Diagnosis:   Food and nutrition-related knowledge deficit related to Lack of previous MNT/currently undergoing MNT as evidenced by Conditions associated with a diagnosis or treatment: elevated low density lipoprotein level and per pt report wanting to avoid statin medication. Intervention:  -Impression: Pt and wife are proactive in following the heart healthy guidelines. They already do many things right with regard to healthy eating. Wife here with pt and she does the cooking. She was taking notes during the session.    -Instructed the patient on: Healthy Choices When [employer], Meal Planning for Regular, Balanced Meals & Snacks and heart healthy guidelines, good fats vs bad/ugly fats, lower fat food substitutions.    -Handouts given for: food logging, weight management tips and heart healthy nutrition therapy, heart healthy label reading, fats & cholesterol, good/bad/ugly fats, lower fat food choices, fancy plate pictures, budget  recipes, eat seafood twice a week, roasted vegetables recipes    Patient Instructions   1.) Choose foods that are less than 5 grams of total fat/serving when reading the label  - Choose foods with less than 2 grams of saturated fat/serving and 0 trans fats when reading the label. 2.)  To help with Blood pressure - budget sodium/meal 500 - 600 mg sodium/meal x 3 meals/day = 1500 - 1800 mg sodium/day. Recommendation is <2000 mg sodium/day    3.) Keep added sugars <50 gm/day.    4.) Great job with keeping active!!    Bring a 2 week food log to next dietitian appt. Thanks. -General Diet Recommendations: low fat, low cholesterol, substitute healthy fats, such as olive oil, canola oil, grapeseed oil for saturated fats like butter, margarine, and shortening, minimize processed foods, reduce sodium intake, minimize simple sugars and increase fiber intake.    -Nutrition prescription: 1800 calories/day. (patient runs and keeps active with exercise)    Comprehension verified using teachback method.     Monitoring/Evaluation:   -Followup visit: 8 weeks with dietitian.   -Receptiveness to education/goals: Agreeable.  -Evaluation of education: Indicates understanding.  -Readiness to change: action - ready to set action plan and implement following heart healthy guidelines. -Expected compliance: good. Thank you for your referral of this patient. Total time involved in direct patient education: 45 minutes for initial MNT visit.

## 2020-10-15 ENCOUNTER — TELEPHONE (OUTPATIENT)
Dept: FAMILY MEDICINE CLINIC | Age: 70
End: 2020-10-15

## 2020-10-15 NOTE — TELEPHONE ENCOUNTER
Pt sent my chart message to advise he received Pneumonia vaccine at [facility] on 10/8. Did not see that this has been scanned in or added to pt chart. Please update HM. Pt also inquired about audiology referral. I called audiology and made appt. Pt is aware of appt date, time, and location.

## 2020-10-19 ENCOUNTER — CLINICAL DOCUMENTATION (OUTPATIENT)
Dept: SPIRITUAL SERVICES | Facility: CLINIC | Age: 70
End: 2020-10-19

## 2020-10-19 NOTE — PROGRESS NOTES
I met with Matthias Thornton, along with his wife, to provide support in the completion of Advance Directives documents as part of an [address] conversation. He was A/O X4, and verified through ID as well as conversation.  explained both documents ([address] and Living Will) for clarity and understanding. Glenn completed, signed, and had notarized both documents according to his wishes at this time. Both original documents were returned to him prior to departing, as well as copy of both documents.  faxed a copy of both documents ([address]) to Medical Records to be scanned into his file upon completion.

## 2020-11-11 ENCOUNTER — HOSPITAL ENCOUNTER (OUTPATIENT)
Dept: AUDIOLOGY | Age: 70
Discharge: HOME | End: 2020-11-11
Payer: MEDICARE

## 2020-11-11 PROCEDURE — 92557 COMPREHENSIVE HEARING TEST: CPT | Performed by: AUDIOLOGIST

## 2020-11-11 PROCEDURE — 92567 TYMPANOMETRY: CPT | Performed by: AUDIOLOGIST

## 2020-11-11 NOTE — PROGRESS NOTES
AUDIOLOGICAL EVALUATION      REASON FOR TESTING:  The patient stated that his wife feels that he is not hearing as well as he should. He also notices difficulty understanding the TV. He denies otalgia, tinnitus and dizziness. He does have some history of noise exposure with the use of hearing protection. OTOSCOPY: Clear canal and normal appearing tympanic membrane, bilaterally. AUDIOGRAM        Reliability: Good  Audiometer Used:  [id number]        COMMENTS: Audiometry revealed normal hearing sloping to a mild sensorineural hearing loss at 5498-8309 Hz in both ears. Excellent word understanding ability in each ear. Tympanometry revealed normal middle ear function in both ears. RECOMMENDATION(S): Bilateral hearing aids are recommended. The patient was not ready to schedule a hearing aid consultation on this date. Consistent use of hearing protection when exposed to loud noise is encouraged. Annual audiometric testing to monitor thresholds.

## 2020-11-17 ENCOUNTER — HOSPITAL ENCOUNTER (OUTPATIENT)
Dept: AUDIOLOGY | Age: 70
Discharge: HOME | End: 2020-11-17

## 2020-11-17 PROCEDURE — 9990000010 HC NO CHARGE VISIT: Performed by: AUDIOLOGIST

## 2020-11-17 NOTE — PROGRESS NOTES
New Hearing Aid Consultation:  Available hearing aid styles, technologies and options were discussed. The patient is interested in pursuing Phonak Audeo P70 in the color P7 with #0 receivers and domes.   The patient's hearing aid fitting is scheduled for 12/15/2020

## 2020-11-19 ENCOUNTER — TELEPHONE (OUTPATIENT)
Dept: FAMILY MEDICINE CLINIC | Age: 70
End: 2020-11-19

## 2020-12-08 ENCOUNTER — OFFICE VISIT (OUTPATIENT)
Dept: INTERNAL MEDICINE CLINIC | Age: 70
End: 2020-12-08
Payer: MEDICARE

## 2020-12-08 VITALS — HEIGHT: 66 IN | TEMPERATURE: 97.5 F | WEIGHT: 178 LBS | BODY MASS INDEX: 28.61 KG/M2

## 2020-12-08 PROCEDURE — 97803 MED NUTRITION INDIV SUBSEQ: CPT | Performed by: DIETITIAN, REGISTERED

## 2020-12-08 NOTE — PROGRESS NOTES
mouth daily      magnesium 200 MG TABS tablet Take 2 tablets by mouth 2 times daily (Patient taking differently: Take 200 mg by mouth daily ) 90 tablet 3    NONFORMULARY Curamin for pain Taking PRN      B Complex Vitamins (VITAMIN B COMPLEX 100 IJ) Take by mouth daily      Cholecalciferol (VITAMIN D3) 50 MCG (2000 UT) CAPS Take by mouth daily       No current facility-administered medications on file prior to visit. Vitals from current and previous visits:  Temp 97.5 °F (36.4 °C)   Ht 5' 6\" (1.676 m)   Wt 178 lb (80.7 kg)   BMI 28.73 kg/m²     -Body mass index is 28.73 kg/m². 25-29.9 - Overweight. - Patient lost and 2 pounds over the last 6 weeks. .  -Weight goal: lose weight. Nutrition Diagnosis:   Food and nutrition-related knowledge deficit related to currently undergoing MNT as evidenced by Conditions associated with a diagnosis or treatment: elevated LDL cholesterol and for the prevention of heart disease. Intervention:  -Impression: Pt attends session with wife and she prepares foods low in fat and cholesterol most of the time. Pt chooses red meat when dining out.    -Instructed the patient on: See patient instructions below, rationale in keeping added sugars down, lowering saturated fat in meal/snack planning while increasing soluble fiber. Rationale for keeping physically active.    -Handouts given for:  Steps to dining out, soluble fiber food choices, Stevia samples and coupon, eat right with less sodium. Patient Instructions   1.)  Cut back on your [facility] - added sugars add up.    2.)  Good job cutting back on the amount of sweetened cereal.  - Good job cutting back on Dr. Nathaniel Gill and drinking primarily water. - Keeping added sugars down helps with keeping TG <150    3.)  Aim for 2-3 fresh fruit servings/day  - Aim for double portions of vegetables at each meal or have raw veggies for snacks. 4.)  Soluble fiber- lowers cholesterol.   Incorporate into meals and

## 2020-12-08 NOTE — PATIENT INSTRUCTIONS
1. )  Cut back on your [facility] - added sugars add up.    2.)  Good job cutting back on the amount of sweetened cereal.  - Good job cutting back on Dr. Alice Martinez and drinking primarily water. - Keeping added sugars down helps with keeping TG <150    3.)  Aim for 2-3 fresh fruit servings/day  - Aim for double portions of vegetables at each meal or have raw veggies for snacks. 4.)  Soluble fiber- lowers cholesterol. Incorporate into meals and snack times. 5.)  Trudi Ad job keeping active. This keeps your HDL (which is your good cholesterol) higher. Also avoiding trans fats intake low. Refer to [employer] trip[employer]. website for more recipes and more heart healthy information.

## 2020-12-15 ENCOUNTER — HOSPITAL ENCOUNTER (OUTPATIENT)
Dept: AUDIOLOGY | Age: 70
Discharge: HOME | End: 2020-12-15

## 2020-12-15 PROCEDURE — V5261 HEARING AID, DIGIT, BIN, BTE: HCPCS | Performed by: AUDIOLOGIST

## 2020-12-15 PROCEDURE — V5160 DISPENSING FEE BINAURAL: HCPCS | Performed by: AUDIOLOGIST

## 2020-12-15 NOTE — PROGRESS NOTES
[facility]#: [id number]   ACCT#: [de-identified]    DIAGNOSIS: H90.3    NEW HEARING AID FITTING: Dispensed Candy Gallus P70-R hearing aid(s) for the right and left ear(s). Explained care, use and insertion/removal.  Programmed. Hearing aids were not paired to the patient's phone or TV streamer per his request.  Hearing aid fitting  recheck scheduled for 12/29/2020. Speech mapping to be completed at follow up visit.

## 2020-12-29 ENCOUNTER — TELEPHONE (OUTPATIENT)
Dept: FAMILY MEDICINE CLINIC | Age: 70
End: 2020-12-29

## 2020-12-29 ENCOUNTER — HOSPITAL ENCOUNTER (OUTPATIENT)
Dept: AUDIOLOGY | Age: 70
Discharge: HOME | End: 2020-12-29

## 2020-12-29 PROCEDURE — 9990000010 HC NO CHARGE VISIT: Performed by: AUDIOLOGIST

## 2020-12-29 NOTE — TELEPHONE ENCOUNTER
----- Message from Delonte Lopez LPN sent at 3/62/6399 12:09 PM EDT -----  Regarding:  Follow-up Visit  Schedule Follow-up with Dr. Adalgisa Alston in Jan, 2021

## 2021-01-08 ENCOUNTER — HOSPITAL ENCOUNTER (OUTPATIENT)
Dept: AUDIOLOGY | Age: 71
Discharge: HOME | End: 2021-01-08

## 2021-01-08 PROCEDURE — 9990000010 HC NO CHARGE VISIT: Performed by: AUDIOLOGIST

## 2021-01-11 ENCOUNTER — TELEPHONE (OUTPATIENT)
Dept: FAMILY MEDICINE CLINIC | Age: 71
End: 2021-01-11

## 2021-01-11 NOTE — TELEPHONE ENCOUNTER
appt scheduled Wednesday at 1 pm.  Does he need lipids checked before that appointment? No labs are ordered. Please advise.

## 2021-01-13 ENCOUNTER — OFFICE VISIT (OUTPATIENT)
Dept: FAMILY MEDICINE CLINIC | Age: 71
End: 2021-01-13
Payer: MEDICARE

## 2021-01-13 VITALS
WEIGHT: 179 LBS | BODY MASS INDEX: 28.77 KG/M2 | HEART RATE: 66 BPM | TEMPERATURE: 98.1 F | RESPIRATION RATE: 16 BRPM | HEIGHT: 66 IN | DIASTOLIC BLOOD PRESSURE: 84 MMHG | OXYGEN SATURATION: 98 % | SYSTOLIC BLOOD PRESSURE: 130 MMHG

## 2021-01-13 DIAGNOSIS — R94.6 ABNORMAL RESULTS OF THYROID FUNCTION STUDIES: ICD-10-CM

## 2021-01-13 DIAGNOSIS — F07.81 POST CONCUSSIVE SYNDROME: ICD-10-CM

## 2021-01-13 DIAGNOSIS — R79.89 ELEVATED TSH: ICD-10-CM

## 2021-01-13 DIAGNOSIS — E78.5 HYPERLIPIDEMIA, UNSPECIFIED HYPERLIPIDEMIA TYPE: ICD-10-CM

## 2021-01-13 DIAGNOSIS — I10 ESSENTIAL HYPERTENSION: Primary | ICD-10-CM

## 2021-01-13 PROCEDURE — G8427 DOCREV CUR MEDS BY ELIG CLIN: HCPCS | Performed by: STUDENT IN AN ORGANIZED HEALTH CARE EDUCATION/TRAINING PROGRAM

## 2021-01-13 PROCEDURE — 1036F TOBACCO NON-USER: CPT | Performed by: STUDENT IN AN ORGANIZED HEALTH CARE EDUCATION/TRAINING PROGRAM

## 2021-01-13 PROCEDURE — 1123F ACP DISCUSS/DSCN MKR DOCD: CPT | Performed by: STUDENT IN AN ORGANIZED HEALTH CARE EDUCATION/TRAINING PROGRAM

## 2021-01-13 PROCEDURE — 3017F COLORECTAL CA SCREEN DOC REV: CPT | Performed by: STUDENT IN AN ORGANIZED HEALTH CARE EDUCATION/TRAINING PROGRAM

## 2021-01-13 PROCEDURE — 4040F PNEUMOC VAC/ADMIN/RCVD: CPT | Performed by: STUDENT IN AN ORGANIZED HEALTH CARE EDUCATION/TRAINING PROGRAM

## 2021-01-13 PROCEDURE — G8417 CALC BMI ABV UP PARAM F/U: HCPCS | Performed by: STUDENT IN AN ORGANIZED HEALTH CARE EDUCATION/TRAINING PROGRAM

## 2021-01-13 PROCEDURE — 99214 OFFICE O/P EST MOD 30 MIN: CPT | Performed by: STUDENT IN AN ORGANIZED HEALTH CARE EDUCATION/TRAINING PROGRAM

## 2021-01-13 PROCEDURE — G8484 FLU IMMUNIZE NO ADMIN: HCPCS | Performed by: STUDENT IN AN ORGANIZED HEALTH CARE EDUCATION/TRAINING PROGRAM

## 2021-01-13 RX ORDER — LISINOPRIL 10 MG/1
10 TABLET ORAL DAILY
Qty: 90 TABLET | Refills: 1 | Status: SHIPPED | OUTPATIENT
Start: 2021-01-13 | End: 2021-05-18 | Stop reason: SDUPTHER

## 2021-01-13 ASSESSMENT — PATIENT HEALTH QUESTIONNAIRE - PHQ9
SUM OF ALL RESPONSES TO PHQ QUESTIONS 1-9: 0
2. FEELING DOWN, DEPRESSED OR HOPELESS: 0
1. LITTLE INTEREST OR PLEASURE IN DOING THINGS: 0

## 2021-01-13 NOTE — PATIENT INSTRUCTIONS
Thank you   1. Thank you for trusting us with your healthcare needs. You may receive a survey regarding today's visit. It would help us out if you would take a few moments to provide your feedback. We value your input. 2. Please bring in ALL medications BOTTLES, including inhalers, herbal supplements, over the counter, prescribed & non-prescribed medicine. The office would like actual medication bottles and a list.   3. Please note our OFFICE POLICIES:   a. Prior to getting your labs drawn, please check with your insurance company for benefits and eligibility of lab services. Often, insurance companies cover certain tests for preventative visits only. It is patient's responsibility to see what is covered. b. We are unable to change a diagnosis after the test has been performed. c. Lab orders will not be re-printed. Please hold onto your original lab orders and take them to your lab to be completed. d. If you no show your scheduled appointment three times, you will be dismissed from this practice. e. Michell Kings must be completed 24 hours prior to your schedule appointment. 4. If the list below has been completed, PLEASE FAX RECORDS TO OUR OFFICE @ [phone].  Once the records have been received we will update your records at our office:  Health Maintenance Due   Topic Date Due    Shingles Vaccine (2 of 2) 12/17/2020

## 2021-01-13 NOTE — PROGRESS NOTES
Luisa Mendoza is a 78 y.o. male who presents today for:  Chief Complaint   Patient presents with    Follow-up     Elevated B/P       Goals    None         HPI:   HPI  BPs at home [phone] systolic     Has been losing some weight by working on diet     No longer having frequent headaches. Feels his concussion has resolved. Back to swimming running lifting     Need to recheck thyroid labs     Patient refuses statin     No question data found. Health Maintenance reviewed -   Health Maintenance   Topic Date Due    Shingles Vaccine (2 of 2) 12/17/2020    Potassium monitoring  07/17/2021    Creatinine monitoring  07/17/2021    Annual Wellness Visit (AWV)  09/26/2021    Diabetes screen  09/25/2023    Lipid screen  09/08/2025    Colon cancer screen colonoscopy  07/08/2030    DTaP/Tdap/Td vaccine (2 - Td) 10/22/2030    Flu vaccine  Completed    Pneumococcal 65+ years Vaccine  Completed    Hepatitis C screen  Completed    Hepatitis A vaccine  Aged Out    Hepatitis B vaccine  Aged Out    Hib vaccine  Aged Out    Meningococcal (ACWY) vaccine  Aged Out       Current Outpatient Medications   Medication Sig Dispense Refill    lisinopril (PRINIVIL;ZESTRIL) 10 MG tablet Take 1 tablet by mouth daily 90 tablet 1    Fexofenadine HCl (MUCINEX ALLERGY PO) Take by mouth as needed      vitamin C (ASCORBIC ACID) 500 MG tablet Take 1,000 mg by mouth daily      magnesium 200 MG TABS tablet Take 2 tablets by mouth 2 times daily (Patient taking differently: Take 200 mg by mouth daily ) 90 tablet 3    NONFORMULARY Curamin for pain Taking PRN      B Complex Vitamins (VITAMIN B COMPLEX 100 IJ) Take by mouth daily      Cholecalciferol (VITAMIN D3) 50 MCG (2000 UT) CAPS Take by mouth daily       No current facility-administered medications for this visit.       Allergies   Allergen Reactions    Codeine Other (See Comments)     migraine    Hydrocodone Other (See Comments)     migraine History reviewed. No pertinent past medical history. Past Surgical History:   Procedure Laterality Date    APPENDECTOMY      HERNIA REPAIR       Family History   Problem Relation Age of Onset    Stroke Mother     Heart Disease Father     Other Father         Brain Aneuryseum     Social History     Tobacco Use    Smoking status: Never Smoker    Smokeless tobacco: Never Used   Substance Use Topics    Alcohol use: Never     Frequency: Never      Subjective:   Review of Systems    Objective:     Vitals:    01/13/21 1254 01/13/21 1259   BP: (!) 150/82 130/84   Site: Left Upper Arm Left Upper Arm   Position: Sitting Sitting   Cuff Size: Medium Adult Medium Adult   Pulse: 66    Resp: 16    Temp: 98.1 °F (36.7 °C)    TempSrc: Temporal    SpO2: 98%    Weight: 179 lb (81.2 kg)    Height: 5' 6\" (1.676 m)      Body mass index is 28.89 kg/m². Wt Readings from Last 3 Encounters:   01/13/21 179 lb (81.2 kg)   12/08/20 178 lb (80.7 kg)   10/06/20 180 lb 6.4 oz (81.8 kg)     BP Readings from Last 3 Encounters:   01/13/21 130/84   09/25/20 132/86   09/15/20 110/70     Physical Exam    Lab Results   Component Value Date    WBC 6.7 07/17/2020    HGB 12.8 (L) 07/17/2020    HCT 40.7 (L) 07/17/2020     07/17/2020    CHOL 192 09/08/2020    TRIG 144 09/08/2020    HDL 44 09/08/2020    LDLCALC 119 09/08/2020    AST 15 05/18/2020     07/17/2020    K 4.1 07/17/2020     07/17/2020    CREATININE 0.9 07/17/2020    BUN 17 07/17/2020    CO2 25 07/17/2020    TSH 4.980 (H) 09/08/2020    LABA1C 5.6 09/25/2020    LABGLOM 84 (A) 07/17/2020    MG 2.0 07/17/2020    CALCIUM 9.6 07/17/2020    VITD25 19 (L) 05/18/2020       Imaging Results:    No results found. Assessment / Plan:   Hope Manning was seen today for follow-up. Diagnoses and all orders for this visit:    Essential hypertension  -     lisinopril (PRINIVIL;ZESTRIL) 10 MG tablet; Take 1 tablet by mouth daily    Elevated TSH  -     TSH;  Future  -     T4, Free; Future Hyperlipidemia, unspecified hyperlipidemia type  -     Lipid Panel; Future    Post concussive syndrome    Abnormal results of thyroid function studies   -     TSH; Future  -     T4, Free; Future      BP at home running high  Will start lisinopril  Will recheck thyroid as has been high, trending to normal without meds  Headaches have improved, will trial stopping magnesium   Patient does not want statin, will recheck lipids per patient request, recommended ASA 81 and fish oil  Follow up in 1 month to recheck BP        Return in about 4 weeks (around 2/10/2021). Medications Prescribed:  Orders Placed This Encounter   Medications    lisinopril (PRINIVIL;ZESTRIL) 10 MG tablet     Sig: Take 1 tablet by mouth daily     Dispense:  90 tablet     Refill:  1       Future Appointments   Date Time Provider Joya Cronin   2/16/2021 10:00 AM Shyla Hernandez MD [facility] [address]   4/6/2021  9:00 AM Ninoska Amor RD, LD [facility] [address]   9/27/2021  9:00 AM Magali France DO [facility] [address]       Patient given educational materials - see patient instructions. Discussed use, benefit, and sideeffects of prescribed medications. All patient questions answered. Pt voiced understanding. Reviewed health maintenance. Instructed to continue current medications, diet and exercise. Patient agreed with treatment plan. Follow up as directed.      Electronically signed by Shyla Hernandez MD on 1/13/2021 at 2:42 PM

## 2021-01-26 ENCOUNTER — NURSE ONLY (OUTPATIENT)
Dept: LAB | Age: 71
End: 2021-01-26

## 2021-01-26 ENCOUNTER — TELEPHONE (OUTPATIENT)
Dept: FAMILY MEDICINE CLINIC | Age: 71
End: 2021-01-26

## 2021-01-26 DIAGNOSIS — E78.5 HYPERLIPIDEMIA, UNSPECIFIED HYPERLIPIDEMIA TYPE: ICD-10-CM

## 2021-01-26 DIAGNOSIS — R79.89 ELEVATED TSH: ICD-10-CM

## 2021-01-26 DIAGNOSIS — R94.6 ABNORMAL RESULTS OF THYROID FUNCTION STUDIES: ICD-10-CM

## 2021-01-26 LAB
CHOLESTEROL, TOTAL: 217 MG/DL (ref 100–199)
HDLC SERPL-MCNC: 46 MG/DL
LDL CHOLESTEROL CALCULATED: 147 MG/DL
T4 FREE: 1.05 NG/DL (ref 0.93–1.76)
TRIGL SERPL-MCNC: 118 MG/DL (ref 0–199)
TSH SERPL DL<=0.05 MIU/L-ACNC: 6.85 UIU/ML (ref 0.4–4.2)

## 2021-01-26 NOTE — TELEPHONE ENCOUNTER
Patient would like to become new patient with Dr. Keren Gutierrez due to elevated lipid and not wanting to start statin. Please schedule.

## 2021-01-28 ENCOUNTER — OFFICE VISIT (OUTPATIENT)
Dept: FAMILY MEDICINE CLINIC | Age: 71
End: 2021-01-28
Payer: MEDICARE

## 2021-01-28 ENCOUNTER — NURSE ONLY (OUTPATIENT)
Dept: LAB | Age: 71
End: 2021-01-28

## 2021-01-28 VITALS
SYSTOLIC BLOOD PRESSURE: 126 MMHG | HEART RATE: 61 BPM | OXYGEN SATURATION: 99 % | DIASTOLIC BLOOD PRESSURE: 78 MMHG | BODY MASS INDEX: 27.97 KG/M2 | HEIGHT: 66 IN | WEIGHT: 174 LBS | TEMPERATURE: 97.1 F | RESPIRATION RATE: 12 BRPM

## 2021-01-28 DIAGNOSIS — R35.1 NOCTURIA: ICD-10-CM

## 2021-01-28 DIAGNOSIS — E78.00 ELEVATED CHOLESTEROL: Primary | ICD-10-CM

## 2021-01-28 DIAGNOSIS — Z71.89 ACP (ADVANCE CARE PLANNING): ICD-10-CM

## 2021-01-28 DIAGNOSIS — R41.3 MEMORY DIFFICULTIES: ICD-10-CM

## 2021-01-28 DIAGNOSIS — Z23 NEED FOR PROPHYLACTIC VACCINATION AGAINST DIPHTHERIA-TETANUS-PERTUSSIS (DTP): ICD-10-CM

## 2021-01-28 DIAGNOSIS — K90.89 OTHER INTESTINAL MALABSORPTION: ICD-10-CM

## 2021-01-28 DIAGNOSIS — R79.89 ELEVATED TSH: ICD-10-CM

## 2021-01-28 DIAGNOSIS — R94.6 ABNORMAL RESULTS OF THYROID FUNCTION STUDIES: ICD-10-CM

## 2021-01-28 DIAGNOSIS — I10 ESSENTIAL HYPERTENSION: ICD-10-CM

## 2021-01-28 DIAGNOSIS — F07.81 POST CONCUSSIVE SYNDROME: ICD-10-CM

## 2021-01-28 DIAGNOSIS — R79.89 TSH ELEVATION: ICD-10-CM

## 2021-01-28 DIAGNOSIS — Z80.42 FAMILY HISTORY OF PROSTATE CANCER: ICD-10-CM

## 2021-01-28 DIAGNOSIS — E78.5 HYPERLIPIDEMIA, UNSPECIFIED HYPERLIPIDEMIA TYPE: ICD-10-CM

## 2021-01-28 DIAGNOSIS — D64.9 ANEMIA, UNSPECIFIED TYPE: ICD-10-CM

## 2021-01-28 DIAGNOSIS — R73.09 ELEVATED GLUCOSE LEVEL: ICD-10-CM

## 2021-01-28 DIAGNOSIS — Z00.00 ROUTINE GENERAL MEDICAL EXAMINATION AT A HEALTH CARE FACILITY: ICD-10-CM

## 2021-01-28 DIAGNOSIS — E78.00 ELEVATED CHOLESTEROL: ICD-10-CM

## 2021-01-28 DIAGNOSIS — Z23 NEED FOR PNEUMOCOCCAL VACCINATION: ICD-10-CM

## 2021-01-28 DIAGNOSIS — E78.00 ELEVATED LDL CHOLESTEROL LEVEL: ICD-10-CM

## 2021-01-28 LAB
AMYLASE: 87 U/L (ref 20–104)
AVERAGE GLUCOSE: 105 MG/DL (ref 70–126)
BASOPHILS # BLD: 1 %
BASOPHILS ABSOLUTE: 0.1 THOU/MM3 (ref 0–0.1)
CALCIUM SERPL-MCNC: 10.2 MG/DL (ref 8.5–10.5)
CHOLESTEROL, TOTAL: 223 MG/DL (ref 100–199)
EOSINOPHIL # BLD: 1.6 %
EOSINOPHILS ABSOLUTE: 0.1 THOU/MM3 (ref 0–0.4)
ERYTHROCYTE [DISTWIDTH] IN BLOOD BY AUTOMATED COUNT: 13.8 % (ref 11.5–14.5)
ERYTHROCYTE [DISTWIDTH] IN BLOOD BY AUTOMATED COUNT: 46.2 FL (ref 35–45)
HBA1C MFR BLD: 5.5 % (ref 4.4–6.4)
HCT VFR BLD CALC: 44.4 % (ref 42–52)
HDLC SERPL-MCNC: 47 MG/DL
HEMOGLOBIN: 14.2 GM/DL (ref 14–18)
IMMATURE GRANS (ABS): 0.02 THOU/MM3 (ref 0–0.07)
IMMATURE GRANULOCYTES: 0.3 %
IRON: 79 UG/DL (ref 65–195)
LDL CHOLESTEROL CALCULATED: 143 MG/DL
LIPASE: 46.8 U/L (ref 5.6–51.3)
LYMPHOCYTES # BLD: 24.8 %
LYMPHOCYTES ABSOLUTE: 1.5 THOU/MM3 (ref 1–4.8)
MAGNESIUM: 2.2 MG/DL (ref 1.6–2.4)
MCH RBC QN AUTO: 29 PG (ref 26–33)
MCHC RBC AUTO-ENTMCNC: 32 GM/DL (ref 32.2–35.5)
MCV RBC AUTO: 90.8 FL (ref 80–94)
MONOCYTES # BLD: 7.2 %
MONOCYTES ABSOLUTE: 0.4 THOU/MM3 (ref 0.4–1.3)
NUCLEATED RED BLOOD CELLS: 0 /100 WBC
PLATELET # BLD: 270 THOU/MM3 (ref 130–400)
PMV BLD AUTO: 11.9 FL (ref 9.4–12.4)
PROSTATE SPECIFIC ANTIGEN: 0.71 NG/ML (ref 0–1)
PTH INTACT: 24.4 PG/ML (ref 15–65)
RBC # BLD: 4.89 MILL/MM3 (ref 4.7–6.1)
RHEUMATOID FACTOR: < 10 IU/ML (ref 0–13)
SEDIMENTATION RATE, ERYTHROCYTE: 5 MM/HR (ref 0–10)
SEG NEUTROPHILS: 65.1 %
SEGMENTED NEUTROPHILS ABSOLUTE COUNT: 4 THOU/MM3 (ref 1.8–7.7)
T3 TOTAL: 97 NG/DL (ref 72–181)
TOTAL IRON BINDING CAPACITY: 327 UG/DL (ref 171–450)
TRIGL SERPL-MCNC: 164 MG/DL (ref 0–199)
TSH SERPL DL<=0.05 MIU/L-ACNC: 5.93 UIU/ML (ref 0.4–4.2)
VITAMIN D 25-HYDROXY: 32 NG/ML (ref 30–100)
WBC # BLD: 6.2 THOU/MM3 (ref 4.8–10.8)

## 2021-01-28 PROCEDURE — G8484 FLU IMMUNIZE NO ADMIN: HCPCS | Performed by: FAMILY MEDICINE

## 2021-01-28 PROCEDURE — 3017F COLORECTAL CA SCREEN DOC REV: CPT | Performed by: FAMILY MEDICINE

## 2021-01-28 PROCEDURE — 1036F TOBACCO NON-USER: CPT | Performed by: FAMILY MEDICINE

## 2021-01-28 PROCEDURE — 1123F ACP DISCUSS/DSCN MKR DOCD: CPT | Performed by: FAMILY MEDICINE

## 2021-01-28 PROCEDURE — 99214 OFFICE O/P EST MOD 30 MIN: CPT | Performed by: FAMILY MEDICINE

## 2021-01-28 PROCEDURE — G8417 CALC BMI ABV UP PARAM F/U: HCPCS | Performed by: FAMILY MEDICINE

## 2021-01-28 PROCEDURE — G8427 DOCREV CUR MEDS BY ELIG CLIN: HCPCS | Performed by: FAMILY MEDICINE

## 2021-01-28 PROCEDURE — 4040F PNEUMOC VAC/ADMIN/RCVD: CPT | Performed by: FAMILY MEDICINE

## 2021-01-28 ASSESSMENT — ENCOUNTER SYMPTOMS
GASTROINTESTINAL NEGATIVE: 1
BACK PAIN: 1

## 2021-01-28 NOTE — PATIENT INSTRUCTIONS
Thank you   1. Thank you for trusting us with your healthcare needs. You may receive a survey regarding today's visit. It would help us out if you would take a few moments to provide your feedback. We value your input. 2. Please bring in ALL medications BOTTLES, including inhalers, herbal supplements, over the counter, prescribed & non-prescribed medicine. The office would like actual medication bottles and a list.   3. Please note our OFFICE POLICIES:   a. Prior to getting your labs drawn, please check with your insurance company for benefits and eligibility of lab services. Often, insurance companies cover certain tests for preventative visits only. It is patient's responsibility to see what is covered. b. We are unable to change a diagnosis after the test has been performed. c. Lab orders will not be re-printed. Please hold onto your original lab orders and take them to your lab to be completed. d. If you no show your scheduled appointment three times, you will be dismissed from this practice. e. [place] must be completed 24 hours prior to your schedule appointment. 4. If the list below has been completed, PLEASE FAX RECORDS TO OUR OFFICE @ [phone]. Once the records have been received we will update your records at our office: There are no preventive care reminders to display for this patient.

## 2021-01-28 NOTE — PROGRESS NOTES
[id number] [facility] Rigoberto VILLASENOR [address] [address]  Dept: [phone]  Dept Fax: [phone]  Loc: Edd Yung is a 78 y.o. White male. Jamshid Rowell  presents to the [facility] clinic today for   Chief Complaint   Patient presents with   Carlene Shane Doctor     molina protocol    Hyperlipidemia     LDL HIGH    Thyroid Problem   ,  and;   1. Elevated cholesterol    2. Essential hypertension    3. Elevated TSH    4. Post concussive syndrome    5. Hyperlipidemia, unspecified hyperlipidemia type    6. Abnormal results of thyroid function studies     7. Need for pneumococcal vaccination    8. Need for prophylactic vaccination against diphtheria-tetanus-pertussis (DTP)    9. ACP (advance care planning)    10. TSH elevation    11. Anemia, unspecified type    12. Routine general medical examination at a health care facility    13. Elevated LDL cholesterol level    14. Elevated glucose level    15. Memory difficulties    16. Nocturia    17. Family history of prostate cancer    18. Other intestinal malabsorption      I have reviewed [facility] medical, surgical and other pertinent history in detail, and have updated medication and allergy information in the computerized patientrecord. Clinical Care Team:     -Referring Provider for today's consult: Self Referred  -Primary Care Provider: Chante Reyes DO    Medical/Surgical History:   He  has no past medical history on file. His  has a past surgical history that includes hernia repair and Appendectomy. Family/Social History:     His family history includes Heart Disease in his father; Other in his father; Stroke in his mother. He  reports that he has never smoked. He has never used smokeless tobacco. He reports that he does not drink alcohol or use drugs.     Medications/Allergies/Immunizations:     His current medication(s) include Current Outpatient Medications:     Apoaequorin (PREVAGEN PO), Take by mouth every morning, Disp: , Rfl:     lisinopril (PRINIVIL;ZESTRIL) 10 MG tablet, Take 1 tablet by mouth daily, Disp: 90 tablet, Rfl: 1    Fexofenadine HCl (MUCINEX ALLERGY PO), Take by mouth as needed, Disp: , Rfl:     vitamin C (ASCORBIC ACID) 500 MG tablet, Take 1,000 mg by mouth daily, Disp: , Rfl:     NONFORMULARY, Curamin for pain Taking PRN, Disp: , Rfl:     B Complex Vitamins (VITAMIN B COMPLEX 100 IJ), Take by mouth daily, Disp: , Rfl:     Cholecalciferol (VITAMIN D3) 50 MCG (2000 UT) CAPS, Take by mouth daily, Disp: , Rfl:   Allergies: Codeine and Hydrocodone,  Immunizations:   Immunization History   Administered Date(s) Administered    Influenza, Quadv, adjuvanted, 65 yrs +, IM, PF (Fluad) 10/08/2020    Pneumococcal Polysaccharide ([id number]) 10/08/2020    Tdap (Boostrix, Adacel) 10/22/2020    Zoster Recombinant (Shingrix) 10/22/2020, 01/14/2021        History of PresentIllness:     Glenn's had concerns including Established New Doctor (molina protocol), Hyperlipidemia (LDL HIGH), and Thyroid Problem. Griselda Chowdhury  presents to the [address] [place] [address] today for;   Chief Complaint   Patient presents with   Alla auguste protocol    Hyperlipidemia     LDL HIGH    Thyroid Problem   , ,  abnormal labs follow up and these conditions as he  Is looking today for:     1. Elevated cholesterol    2. Essential hypertension    3. Elevated TSH    4. Post concussive syndrome    5. Hyperlipidemia, unspecified hyperlipidemia type    6. Abnormal results of thyroid function studies     7. Need for pneumococcal vaccination    8. Need for prophylactic vaccination against diphtheria-tetanus-pertussis (DTP)    9. ACP (advance care planning)    10. TSH elevation    11. Anemia, unspecified type    12. Routine general medical examination at a health care facility    13.  Elevated LDL cholesterol level 14. Elevated glucose level    15. Memory difficulties    16. Nocturia    17. Family history of prostate cancer    18. Other intestinal malabsorption      HPI    Subjective:     Review of Systems   Constitutional: Positive for fatigue and unexpected weight change. HENT: Positive for hearing loss. Eyes: Positive for visual disturbance. Respiratory:        Snores   Cardiovascular: Negative. Gastrointestinal: Negative. Endocrine: Negative. Genitourinary: Positive for difficulty urinating and frequency. Musculoskeletal: Positive for arthralgias, back pain and neck pain. Skin: Negative. Allergic/Immunologic: Positive for food allergies. Neurological: Positive for dizziness, syncope, light-headedness and headaches. Hematological: Negative. Psychiatric/Behavioral: Positive for decreased concentration. All other systems reviewed and are negative. Objective:     /78 (Site: Left Upper Arm, Position: Sitting, Cuff Size: Medium Adult)   Pulse 61   Temp 97.1 °F (36.2 °C) (Temporal)   Resp 12   Ht 5' 5.98\" (1.676 m)   Wt 174 lb (78.9 kg)   SpO2 99%   BMI 28.10 kg/m²   Physical Exam  Vitals signs and nursing note reviewed. Constitutional:       Appearance: Normal appearance. HENT:      Head: Normocephalic. Pulmonary:      Effort: Pulmonary effort is normal.   Neurological:      Mental Status: He is alert. Psychiatric:         Mood and Affect: Mood normal.         Thought Content: Thought content normal.            Laboratory Data:   Lab results were searched in Care Everywhere and/or those brought by the pateint were reviewed today with Tommy Doherty and he has a copy of their most recent labs to take home with them as notedbelow;       Imaging Data:   Imaging Data:       Assessment & Plan:       Impression:  1. Elevated cholesterol    2. Essential hypertension    3. Elevated TSH    4. Post concussive syndrome    5.  Hyperlipidemia, unspecified hyperlipidemia type 6. Abnormal results of thyroid function studies     7. Need for pneumococcal vaccination    8. Need for prophylactic vaccination against diphtheria-tetanus-pertussis (DTP)    9. ACP (advance care planning)    10. TSH elevation    11. Anemia, unspecified type    12. Routine general medical examination at a health care facility    13. Elevated LDL cholesterol level    14. Elevated glucose level    15. Memory difficulties    16. Nocturia    17. Family history of prostate cancer    18. Other intestinal malabsorption      Assessment and Plan:  After reviewing the patients chief complaints, reviewing their labfindings in great detail (with the patient and those accompanying them) which correlate to their chief complaints, symptoms, and or medical conditions; suggestions were made relating to changes in diet and or supplementswhich may improve the complaints and which will be reflected in their future lab findings; Chief Complaint   Patient presents with   Jorge A medley    Hyperlipidemia     LDL HIGH    Thyroid Problem   ;    Plans for the next visits:  - Abnormal and non-optimal Labs were ordered today to be repeated in the next 120-365 days to assess changes from adjustments in nutrition and or nutrients. - Patient instructed when having ablood draw to ask the  to divide their lab draws into multiple draws over several days if not feeling good at the time of the lab draw or if either prefers to do several smaller blood draws over several days  -Patient instructed to check with insurer before each lab draw and to to to the lab which the insurer directs them for the most cost effective lab draw with the least patient's cost  - Aaronniurka Bowers  will be scheduled subsequentto those results. Cam Coronel will bring in his drink and food log to his next visit    Chronic Problems Addressed on this Visit:                                   1.  Intensity of Service;     Uncontrolled items at this visit; Chief Complaint   Patient presents with   Bob medley    Hyperlipidemia     LDL HIGH    Thyroid Problem   ; Improved items at this visit; Stable items atthis visit;  2. Patients food and drinks were reviewed with the patient,       - Sharon Romero will bring food+drink symptom log to next visit for inclusion in their record      - 75 better food list reviewed & given topatient with the omega 6 food list to avoid         - Gluten in corn and oats abstracts sheet reviewed and given to the patient today   3. Greater than 45 minutes were spent face to face on this visit of which >50% was for counseling and coordination of care. Patients food and drinks were reviewed with thepatient,   - they will bring a food drink symptom log to future visits for inclusion in their record    - 75 better food list reviewed & given to patient along with the omega 6 food list to avoid      - Glutenin corn and oats abstracts sheet reviewed and given to the patient today    - 23 Foods containing Latex-like proteins was reviewed and copy to be taken if desired     - Nutrient Supplements list provided and copyto be taken if desired    - [facility] [employer] web site offered to patient to review at their convenience by staff with login information    Note:  I have discussed with the patient that with all nutraceuticals, there is often mixed data and emerging research which needs to be monitored; as well as an array of NIHfact sheets on nutrients and supplements. - B-50 or B-100 released balanced B complex tabs  - Cinnamon bark 500 mg (without Chromium or extracts)   some brands list 1000 mg / serving of 2 capsules,    some brands have 1000 mg caps with the undesireable chromium / extract  - Calcium carbonate/citrate, magnesium oxide/citrate, Vit D 3  as 3-4 tabs/caps/serving     Some Local Brands may contain Zincwhich is acceptable for the first bottle or two  - Magnesium oxide 250 mg tabs for those having < 2 bowel movements daily  - Magnesium citrate 200 mg if having > 2bowel movement/day  - Centrum Silver or look-a-like for most patients, Centrum plain or look-a-like with iron  - Vitamin D-3 comes as 1,000 IU or 2,000 IU or 5,000 IU gel caps or Liquid drops      Some brands containing or derived from soy oil or corn oil are OK if not allergic to soy  - Elemental Iron 65 mg tabsat bedtime is available over the counter if need more iron     Usually turns bowel movements grey, green or black but not a concern  - Apricot Kernel Oil (by Now) for dry skin sensitive perineal or perianal area skin    Nutrients for ongoing use by Mail order for less expense from [employer] ;  - Strength Fish Oil , 240 Softgels Item #[id number]  -B-100 time released balanced B complex Item #[id number]  - Cinnamon bark 500 mg without Chromium or extract Item #238940  - Calcium carbonate 1000 mg, Magnesium oxide 500 mg, Vit D 3  400 IU Item #[id number]  - Magnesium oxide 500 mg tabs Item #136440 if less than 2 bowel movements daily  - [employer] Seniors Item #786274 for mostpatients, One Daily Item #851958 with iron  - Vit D 3  1,000 Item #[id number]      2,000 IU Item #574869  ,000 IU Item #064109     Some brands containing orderived from soy oil or corn oil are OK if not allergic to soy    Nutrients for Special Needs by Yasmine Rivera for less expense from www. puritan.com ;  -Elemental Iron 65 mg tabs Item #166600 if need more iron for low iron on labs Usually turns bowel movements grey, green or black but not a concern  - Time released Niacin 250 mg Item #720219 for cold intolerance, low libido or impotence  - DHEA 50 mg Item #[id number] for improving DHEA levels on labs if having Fatigue    If stools too loose substitute for your Magnesium oxide using;   Magnesium citrate 200 mg tabs(NOT liquid) at [employer]   Magnesium gluconate 550 mgby Oziel at [employer] com or amazon. com  Magnesium chloride foot soaks or body sprays  www.[employer]ity   Magnesium chloride flakes 14.99 Item #: [id number] if Backordered get spray    Food Drink Symptom Log;  I asked this patient to track these items and any other symptoms on their list on a weekly basis to documenttheir progress or lack of same. This can be done on the symptom tracking sheet I gave them at today's visit but looks like this:                                                      Rate on scale of 0-10 with zero = notnoticeable  Symptom:                            Week 1               2                 3                 4               Etc            Hair loss    Foot cramps    Paresthesia    Aches    IBS (irritable bowel)    Constipation    Diarrhea  Nocturia    (up to bathroom at night)    Fatigue/Energy level  Stress      On the other side of the sheet they can track their food, drink, environment, activity, symptoms etc      Avoiding Latex-like proteins inmy foods; Avocados, Bananas, Celery, Figs & Kiwi proteins have latex-like proteins to inflame our immunesystems  How Can I Have A Latex Allergy? Eating foods with latex-like protein exposes us to latex allergies. Our body cannot tell the differencebetween these latex-like proteins and latex from rubber products since many people are allergic to fruit, vegetables and latex. Read labels on pre-packaged foods. This list to avoid is only a guide if you are known allergicto latex or have a latex rash on your chin, cheeks and lines on your neck and chest. The amount of latex is different in each food product or fruit variety. to Avoid out of Season if not grown locally: Melon, Nectarine, Papaya, Cherry, Passion fruit, Plum, Chestnuts, and Tomato. Avocado, Banana, Celery, Figs, and Kiwi always contain Latex-like protein. Whats in Season? Strawberries taste better in June than December because June is strawberry season so buy locally grown produce \"in season\" for the best flavor, cost and less Latex. Locally grown produce notonly tastes great requires little of no ethylene exposure in food distribution so has less latex content. Out of season, use canned, frozen or dried sinceprocessed ripe and are latex lower!!!   Month     [place] LocallyGrown Produce  January, February, March: use canned, frozen or dried fruits since lower in latex  April; asparagus, radishes  May; asparagus, broccoli, green onions, greens, peas, radishes,rhubarb  June; asparagus, beets, beans, broccoli, cabbage, cantaloupe, carrots, green onions, greens, lettuce,onions, parsley, peas, radishes, rhubarb, strawberries, watermelons  July; beans, beets, blueberries,broccoli, cabbage, cantaloupe, carrots, cauliflower, celery, cucumbers, eggplant, grapes, green onions, greens, lettuce, onions, parsley, peas, peaches, bell peppers, potatoes, radishes, summer raspberries, squash, sweetcorn, tomatoes, turnips, watermelons August; apples, beans, beets, blueberries, cabbage, cantaloupe, carrots,cauliflower, celery, cucumbers, eggplant, grapes, green onions, greens, lettuce, onions, parsley, peas, peaches, pears, bell peppers, potatoes, radishes, squash, sweet corn, tomatoes, turnips, watermelons  September; apples, beans, beets, blueberries, cabbage, cantaloupe, carrots, cauliflower, celery, cucumbers, eggplant, grapes,green onions, greens, lettuce, onions, parsley, peas, peaches, pears, bell peppers, plums, potatoes, pumpkins, radishes, fall red raspberries, squash, sweet corn, tomatoes, turnips, watermelons  October; apples, beets, broccoli, cabbage, carrots, cauliflower, celery, green onions, greens, lettuce, parsley, peas, pears, potatoes,pumpkins, radishes, fall red raspberries, squash, turnips  November; broccoli, cabbage, carrots, parsley,pears, peas  December: use canned, frozen ordried fruits since lower in latex    Upto half of latex-sensitive patients show allergic reactions to fruits (avocados, bananas, kiwifruits, papayas, peaches),   Annals of Allergy, 1994. These plants contain the same proteins that are allergens in latex. People with fruit allergies should warn physicians beforeundergoing procedures which may cause anaphylactic reaction if in contact with latex gloves. Some of the common foods with defined cross-reactivity to latexare avocado, banana, kiwi, chestnut, raw potato, tomato,stone fruits (e.g., peach, cherry), hazelnut, melons, celery, carrot, apple, pear, papaya, and almond.   Foods with less well-defined cross-reactivity to latex are peanuts, peppers, citrus fruits, coconut, pineapple, lizbet,fig, passion fruit, Ugli fruit, and grape This fruit/latex cross-reactivity is worsened by ethylene, a gas used to hasten commercial ripening. In nature, plants produce low levels of the hormone ethylene, which regulates germination, flowering, and ripening. Forced ripening by high ethyleneconcentrations, plants produce allergenic wound-repair proteins, which are similar to wound-repair proteins made during the tapping of rubber trees. Sensitive individualswho ingest the fruit get a higher dose and worse reaction. Some people may even first become sensitized to latex through fruit. Can food processing increase theconcentrations of allergenic proteins? Latex-sensitized children (and adults) in [place] often experience allergic reactions after eating bananas ripenedartificially with ethylene. In the [place] [facility], food distribution centers treat unripe bananas and other produce with ethylene to ripen; not commonly done in [facility] since fruit is tree-ripened there. Does treatmentof food with ethylene induce banana proteins that cross-react with latex? (Koki et al.    References:   Latex in Foods Allergy, http://ehp.niehs.nih.gov/members/2003/5811/5811.html    Search web for \" Whats in Season \" for whereyou live or are at the time you food shop  www.nutritioncouncil.org/pdf/healthy/SeasonalProduce. pdf ,   Management of Latex, ://[facility]. [email or web address].edu/  search for latex

## 2021-01-29 LAB
C-REACTIVE PROTEIN, HIGH SENSITIVITY: 1.1 MG/L
HOMOCYSTEINE: 10.9 UMOL/L
THYROXINE (T4): 7.1 UG/DL (ref 4.5–10.9)

## 2021-01-30 LAB — TESTOSTERONE FREE: NORMAL

## 2021-01-31 LAB — ANA SCREEN: NORMAL

## 2021-02-01 LAB
DHEA UNCONJUGATED: 1.03 NG/ML (ref 0.63–4.7)
THYROID PEROXIDASE ANTIBODY: < 4 IU/ML (ref 0–25)

## 2021-02-02 LAB — DHEAS (DHEA SULFATE): NORMAL

## 2021-02-16 ENCOUNTER — OFFICE VISIT (OUTPATIENT)
Dept: FAMILY MEDICINE CLINIC | Age: 71
End: 2021-02-16
Payer: MEDICARE

## 2021-02-16 ENCOUNTER — VIRTUAL VISIT (OUTPATIENT)
Dept: FAMILY MEDICINE CLINIC | Age: 71
End: 2021-02-16
Payer: MEDICARE

## 2021-02-16 VITALS
TEMPERATURE: 96.6 F | WEIGHT: 175.2 LBS | DIASTOLIC BLOOD PRESSURE: 62 MMHG | HEIGHT: 66 IN | HEART RATE: 71 BPM | OXYGEN SATURATION: 99 % | SYSTOLIC BLOOD PRESSURE: 100 MMHG | RESPIRATION RATE: 16 BRPM | BODY MASS INDEX: 28.16 KG/M2

## 2021-02-16 DIAGNOSIS — D64.9 ANEMIA, UNSPECIFIED TYPE: ICD-10-CM

## 2021-02-16 DIAGNOSIS — R79.89 ELEVATED TSH: ICD-10-CM

## 2021-02-16 DIAGNOSIS — K90.89 OTHER INTESTINAL MALABSORPTION: ICD-10-CM

## 2021-02-16 DIAGNOSIS — R79.89 TSH ELEVATION: ICD-10-CM

## 2021-02-16 DIAGNOSIS — E03.8 SUBCLINICAL HYPOTHYROIDISM: ICD-10-CM

## 2021-02-16 DIAGNOSIS — I10 ESSENTIAL HYPERTENSION: Primary | ICD-10-CM

## 2021-02-16 DIAGNOSIS — F07.81 POST CONCUSSIVE SYNDROME: ICD-10-CM

## 2021-02-16 DIAGNOSIS — E78.5 HYPERLIPIDEMIA, UNSPECIFIED HYPERLIPIDEMIA TYPE: ICD-10-CM

## 2021-02-16 DIAGNOSIS — E78.00 ELEVATED CHOLESTEROL: ICD-10-CM

## 2021-02-16 DIAGNOSIS — R94.6 ABNORMAL RESULTS OF THYROID FUNCTION STUDIES: ICD-10-CM

## 2021-02-16 DIAGNOSIS — R35.1 NOCTURIA: ICD-10-CM

## 2021-02-16 PROCEDURE — 4040F PNEUMOC VAC/ADMIN/RCVD: CPT | Performed by: FAMILY MEDICINE

## 2021-02-16 PROCEDURE — 4040F PNEUMOC VAC/ADMIN/RCVD: CPT | Performed by: STUDENT IN AN ORGANIZED HEALTH CARE EDUCATION/TRAINING PROGRAM

## 2021-02-16 PROCEDURE — G8484 FLU IMMUNIZE NO ADMIN: HCPCS | Performed by: STUDENT IN AN ORGANIZED HEALTH CARE EDUCATION/TRAINING PROGRAM

## 2021-02-16 PROCEDURE — G8427 DOCREV CUR MEDS BY ELIG CLIN: HCPCS | Performed by: FAMILY MEDICINE

## 2021-02-16 PROCEDURE — 1036F TOBACCO NON-USER: CPT | Performed by: STUDENT IN AN ORGANIZED HEALTH CARE EDUCATION/TRAINING PROGRAM

## 2021-02-16 PROCEDURE — 3017F COLORECTAL CA SCREEN DOC REV: CPT | Performed by: STUDENT IN AN ORGANIZED HEALTH CARE EDUCATION/TRAINING PROGRAM

## 2021-02-16 PROCEDURE — G8417 CALC BMI ABV UP PARAM F/U: HCPCS | Performed by: STUDENT IN AN ORGANIZED HEALTH CARE EDUCATION/TRAINING PROGRAM

## 2021-02-16 PROCEDURE — 99213 OFFICE O/P EST LOW 20 MIN: CPT | Performed by: STUDENT IN AN ORGANIZED HEALTH CARE EDUCATION/TRAINING PROGRAM

## 2021-02-16 PROCEDURE — 1123F ACP DISCUSS/DSCN MKR DOCD: CPT | Performed by: FAMILY MEDICINE

## 2021-02-16 PROCEDURE — 1123F ACP DISCUSS/DSCN MKR DOCD: CPT | Performed by: STUDENT IN AN ORGANIZED HEALTH CARE EDUCATION/TRAINING PROGRAM

## 2021-02-16 PROCEDURE — 99214 OFFICE O/P EST MOD 30 MIN: CPT | Performed by: FAMILY MEDICINE

## 2021-02-16 PROCEDURE — G8427 DOCREV CUR MEDS BY ELIG CLIN: HCPCS | Performed by: STUDENT IN AN ORGANIZED HEALTH CARE EDUCATION/TRAINING PROGRAM

## 2021-02-16 PROCEDURE — 3017F COLORECTAL CA SCREEN DOC REV: CPT | Performed by: FAMILY MEDICINE

## 2021-02-16 ASSESSMENT — ENCOUNTER SYMPTOMS
CONSTIPATION: 0
DIARRHEA: 0
SHORTNESS OF BREATH: 0
ABDOMINAL PAIN: 0
WHEEZING: 0

## 2021-02-16 NOTE — PROGRESS NOTES
  B Complex-Biotin-FA (B-100 COMPLEX CR PO), Take by mouth 3 times daily, Disp: , Rfl:     magnesium citrate solution, Take 250 mLs by mouth 4 times daily (before meals and nightly) Taking 2 soft gels TID , Disp: , Rfl:     Omega-3 Fatty Acids (OMEGA-3 FISH OIL CONCENTRATE PO), Take 2 capsules by mouth 4 times daily, Disp: , Rfl:     CINNAMON PO, Take 600 mg by mouth 4 times daily (before meals and nightly) Cinnamon Bark Taking 1-2 capsules QID, Disp: , Rfl:     Apoaequorin (PREVAGEN PO), Take by mouth every morning, Disp: , Rfl:     lisinopril (PRINIVIL;ZESTRIL) 10 MG tablet, Take 1 tablet by mouth daily, Disp: 90 tablet, Rfl: 1    vitamin C (ASCORBIC ACID) 500 MG tablet, Take 1,000 mg by mouth daily, Disp: , Rfl:     NONFORMULARY, Curamin for pain Taking PRN, Disp: , Rfl:   Allergies: Codeine and Hydrocodone,  Immunizations:   Immunization History   Administered Date(s) Administered    Influenza, Quadv, adjuvanted, 65 yrs +, IM, PF (Fluad) 10/08/2020    Pneumococcal Polysaccharide ([id number]) 10/08/2020    Tdap (Boostrix, Adacel) 10/22/2020    Zoster Recombinant (Shingrix) 10/22/2020, 01/14/2021        History of PresentIllness:     Glenn's had concerns including Discuss Labs. Isak Leonard  presents to the [address] [place] [address] today for;   Chief Complaint   Patient presents with   [address] [place] [address]   , ,  abnormal labs follow up and these conditions as he  Is looking today for:     1. Essential hypertension    2. Subclinical hypothyroidism    3. Elevated TSH    4. Post concussive syndrome    5. Hyperlipidemia, unspecified hyperlipidemia type    6. Abnormal results of thyroid function studies     7. TSH elevation    8. Anemia, unspecified type    9. Elevated cholesterol    10. Nocturia    11. Other intestinal malabsorption      HPI    Subjective:     Review of Systems   All other systems reviewed and are negative. Objective: There were no vitals taken for this visit.   Physical Exam Constitutional:       Appearance: Normal appearance. HENT:      Head: Normocephalic. Pulmonary:      Effort: Pulmonary effort is normal.   Neurological:      Mental Status: He is alert. Psychiatric:         Mood and Affect: Mood normal.         Thought Content: Thought content normal.            Laboratory Data:   Lab results were searched in Care Everywhere and/or those brought by the pateint were reviewed today with Kelby Dwyer and he has a copy of their most recent labs to take home with them as notedbelow;       Imaging Data:   Imaging Data:       Assessment & Plan:       Impression:  1. Essential hypertension    2. Subclinical hypothyroidism    3. Elevated TSH    4. Post concussive syndrome    5. Hyperlipidemia, unspecified hyperlipidemia type    6. Abnormal results of thyroid function studies     7. TSH elevation    8. Anemia, unspecified type    9. Elevated cholesterol    10. Nocturia    11. Other intestinal malabsorption      Assessment and Plan:  After reviewing the patients chief complaints, reviewing their labfindings in great detail (with the patient and those accompanying them) which correlate to their chief complaints, symptoms, and or medical conditions; suggestions were made relating to changes in diet and or supplementswhich may improve the complaints and which will be reflected in their future lab findings; Chief Complaint   Patient presents with   [employer] [place]   ;    Plans for the next visits:  - Abnormal and non-optimal Labs were ordered today to be repeated in the next 120-365 days to assess changes from adjustments in nutrition and or nutrients.    - Patient instructed when having ablood draw to ask the  to divide their lab draws into multiple draws over several days if not feeling good at the time of the lab draw or if either prefers to do several smaller blood draws over several days -Patient instructed to check with insurer before each lab draw and to to to the lab which the insurer directs them for the most cost effective lab draw with the least patient's cost  - Danna Rojas  will be scheduled subsequentto those results. Dwaine Novak will bring in his drink and food log to his next visit    Chronic Problems Addressed on this Visit:                                   1.  Intensity of Service; Uncontrolled items at this visit; Chief Complaint   Patient presents with   [address]   ; Improved items at this visit; Stable items atthis visit;  2. Patients food and drinks were reviewed with the patient,       - Danna Rojas will bring food+drink symptom log to next visit for inclusion in their record      - 75 better food list reviewed & given topatient with the omega 6 food list to avoid         - Gluten in corn and oats abstracts sheet reviewed and given to the patient today   3. Greater than 25 GT minutes were spent face to face on this visit of which >50% was for counseling and coordination of care; by doxy,me video visit which was performed due to the Covid-19 pandemic via a 'Sn[employer] telecommunication system and the Location of the Patient was in their Home, while the Location of the provider was in the provider's home;  This includes time spent with the patient face to face on this visit of which >50% was for counseling and coordination of care as well as time spent before and after the visit reviewing the chart, documenting the encounter, reviewing labs,reports, studies, making phone calls, etc..      Patients food and drinks were reviewed with thepatient,   - they will bring a food drink symptom log to future visits for inclusion in their record    - 75 better food list reviewed & given to patient along with the omega 6 food list to avoid      - Glutenin corn and oats abstracts sheet reviewed and given to the patient today - 23 Foods containing Latex-like proteins was reviewed and copy to be taken if desired     - Nutrient Supplements list provided and copyto be taken if desired    - [facility]. [employer] web site offered to patient to review at their convenience by staff with login information    Note:  I have discussed with the patient that with all nutraceuticals, there is often mixed data and emerging research which needs to be monitored; as well as an array of NIHfact sheets on nutrients and supplements. If I have recommended cinnamon at the request of this patient to assist them in control of their blood sugar, triglyceride and or weight issues. I discussed that thepatient's clinical use of cinnamon bark, calcium, magnesium, Vitamin D and pharmaceutical grade [employer] #531726 fish oil or triple-strength fish oil, and B-75 two phase time-released B complex by Vitaliy Ibarra will be for atime-limited trial to determine their individual effectiveness and safety in this patient. I also referred the patient to the NMCD: Nutrition, Metabolism, and Cardiovascular Diseases (journal) and concerns about long-termuse and hepatotoxicity of cinnamon and other nutrients and suggest they frequently search nih.gov for the latest non-proprietary information on nutriceuticals as well as consider a subscription to [employer] fordetails on reviewed supplements, or at the least review the nutrient files at [address] at [facility] [place], an insulin mimetic, reduces some High Carbohydrate Dietary Impacts. Methylhydroxychalcone polymers insulin-enhancing properties in fat cells are responsible for enhanced glucose uptake, inhibiting hepatic HMG-CoA reductase and lowers lipids. www.jacn. org/content/20/4/327.full     But cinnamon with additivessuch as Cinnamon Extract are not effective as insulin mimetics.  :eStoreDirectory.at Nutrients for Start up from [employer] or "[employer] for ease to get started now ;  Eemrald Rodarte has some useable products;  - Triple Strength Fish Oil, enteric coated  - Vit D 3 5000 IU gel caps  - Iron ferrous sulfat 325 mg tabs  - Centrum Silver look-a-like for most patients, or  - Centrum plain look-a-like if need iron    Localpharmacies or chains such as [employer], [employer], [employer], have;  - Triple Strength Fish Oil (enteric coated ifavailable) or    If not enteric coated, can take from freezer for less burps  - B-50 or B-100 released balanced B complex tabs  - Cinnamon bark 500 mg (without Chromium or extracts)   some brands list 1000 mg / serving of 2 capsules,    some brands have 1000 mg caps with the undesireable chromium / extract  - Calcium carbonate/citrate, magnesium oxide/citrate, Vit D 3  as 3-4 tabs/caps/serving     Some Local Brands may contain Zincwhich is acceptable for the first bottle or two  - Magnesium oxide 250 mg tabs for those having < 2 bowel movements daily  - Magnesium citrate 200 mg if having > 2bowel movement/day  - Centrum Silver or look-a-like for most patients, Centrum plain or look-a-like with iron  - Vitamin D-3 comes as 1,000 IU or 2,000 IU or 5,000 IU gel caps or Liquid drops      Some brands containing or derived from soy oil or corn oil are OK if not allergic to soy  - Elemental Iron 65 mg tabsat bedtime is available over the counter if need more iron     Usually turns bowel movements grey, green or black but not a concern  - Apricot Kernel Oil (by Now) for dry skin sensitive perineal or perianal area skin    Nutrients for ongoing use by Mail order for less expense from www. [employer] ;  - Strength Fish Oil , 240 Softgels Item [id number]  -B-100 time released balanced B complex Item #740228  - Cinnamon bark 500 mg without Chromium or extract Item #189753  - Calcium carbonate 1000 mg, Magnesium oxide 500 mg, Vit D 3  400 IU Item #263517 - Magnesium oxide 500 mg tabs Item #082853 if less than 2 bowel movements daily  - [employer] Item #276428 for mostpatients, One Daily Item #842086 with iron  - Vit D 3  1,000 Item #413344      2,000 IU Item #002325  ,000 IU Item #295375     Some brands containing orderived from soy oil or corn oil are OK if not allergic to soy    Nutrients for Special Needs by Angelo Best for less expense from www. puritan.com ;  -Elemental Iron 65 mg tabs Item #168007 if need more iron for low iron on labs    Usually turns bowel movements grey, green or black but not a concern  - Time released Niacin 250 mg Item #070858 for cold intolerance, low libido or impotence  - DHEA 50 mg Item #799293 for improving DHEA levels on labs if having Fatigue    If stools too loose substitute for your Magnesium oxide using;   Magnesium citrate 200 mg tabs(NOT liquid) at [employer]   Magnesium gluconate 550 mgby Oziel at [employer] or amazon. com  Magnesium chloride foot soaks or body sprays  www.[employer]   Magnesium chloride flakes 14.99 Item #: [id number] if Backordered get spray    Food Drink Symptom Log;  I asked this patient to track these items and any other symptoms on their list on a weekly basis to documenttheir progress or lack of same.  This can be done on the symptom tracking sheet I gave them at today's visit but looks like this:                                                      Rate on scale of 0-10 with zero = notnoticeable  Symptom:                            Week 1               2                 3                 4               Etc            Hair loss    Foot cramps    Paresthesia    Aches    IBS (irritable bowel)    Constipation    Diarrhea  Nocturia    (up to bathroom at night)    Fatigue/Energy level  Stress      On the other side of the sheet they can track their food, drink, environment, activity, symptoms etc      Avoiding Latex-like proteins inmy foods; Avocados, Bananas, Celery, Figs & Kiwi proteins have latex-like proteins to inflame our immunesystems  How Can I Have A Latex Allergy? Eating foods with latex-like protein exposes us to latex allergies. Our body cannot tell the differencebetween these latex-like proteins and latex from rubber products since many people are allergic to fruit, vegetables and latex. Read labels on pre-packaged foods. This list to avoid is only a guide if you are known allergicto latex or have a latex rash on your chin, cheeks and lines on your neck and chest. The amount of latex is different in each food product or fruit variety. to Avoid out of Season if not grown locally: Melon, Nectarine, Papaya, Cherry, Passion fruit, Plum, Chestnuts, and Tomato. Avocado, Banana, Celery, Figs, and Kiwi always contain Latex-like protein. Whats in Season? Strawberries taste better in June than December because June is strawberry season so buy locally grown produce \"in season\" for the best flavor, cost and less Latex. Locally grown produce notonly tastes great requires little of no ethylene exposure in food distribution so has less latex content. Out of season, use canned, frozen or dried sinceprocessed ripe and are latex lower!!!   Month     [place] Locally[employer]rown Produce  January, February, March: use canned, frozen or dried fruits since lower in latex  April; asparagus, radishes  May; asparagus, broccoli, green onions, greens, peas, radishes,rhubarb  June; asparagus, beets, beans, broccoli, cabbage, cantaloupe, carrots, green onions, greens, lettuce,onions, parsley, peas, radishes, rhubarb, strawberries, watermelons  July; beans, beets, blueberries,broccoli, cabbage, cantaloupe, carrots, cauliflower, celery, cucumbers, eggplant, grapes, green onions, greens, lettuce, onions, parsley, peas, peaches, bell peppers, potatoes, radishes, summer raspberries, squash, sweetcorn, tomatoes, turnips, watermelons August; apples, beans, beets, blueberries, cabbage, cantaloupe, carrots,cauliflower, celery, cucumbers, eggplant, grapes, green onions, greens, lettuce, onions, parsley, peas, peaches, pears, bell peppers, potatoes, radishes, squash, sweet corn, tomatoes, turnips, watermelons  September; apples, beans, beets, blueberries, cabbage, cantaloupe, carrots, cauliflower, celery, cucumbers, eggplant, grapes,green onions, greens, lettuce, onions, parsley, peas, peaches, pears, bell peppers, plums, potatoes, pumpkins, radishes, fall red raspberries, squash, sweet corn, tomatoes, turnips, watermelons  October; apples, beets, broccoli, cabbage, carrots, cauliflower, celery, green onions, greens, lettuce, parsley, peas, pears, potatoes,pumpkins, radishes, fall red raspberries, squash, turnips  November; broccoli, cabbage, carrots, parsley,pears, peas  December: use canned, frozen ordried fruits since lower in latex    Upto half of latex-sensitive patients show allergic reactions to fruits (avocados, bananas, kiwifruits, papayas, peaches),   Annals of Allergy, 1994. These plants contain the same proteins that are allergens in latex. People with fruit allergies should warn physicians beforeundergoing procedures which may cause anaphylactic reaction if in contact with latex gloves. Some of the common foods with defined cross-reactivity to latexare avocado, banana, kiwi, chestnut, raw potato, tomato,stone fruits (e.g., peach, cherry), hazelnut, melons, celery, carrot, apple, pear, papaya, and almond.   Foods with less well-defined cross-reactivity to latex are peanuts, peppers, citrus fruits, coconut, pineapple, lizbet,fig, passion fruit, Ugli fruit, and grape

## 2021-02-16 NOTE — PATIENT INSTRUCTIONS
Continue lisinopril 10mg Daily  Drink 80oz of water daily     Thank you   1. Thank you for trusting us with your healthcare needs. You may receive a survey regarding today's visit. It would help us out if you would take a few moments to provide your feedback. We value your input. 2. Please bring in ALL medications BOTTLES, including inhalers, herbal supplements, over the counter, prescribed & non-prescribed medicine. The office would like actual medication bottles and a list.   3. Please note our OFFICE POLICIES:   a. Prior to getting your labs drawn, please check with your insurance company for benefits and eligibility of lab services. Often, insurance companies cover certain tests for preventative visits only. It is patient's responsibility to see what is covered. b. We are unable to change a diagnosis after the test has been performed. c. Lab orders will not be re-printed. Please hold onto your original lab orders and take them to your lab to be completed. d. If you no show your scheduled appointment three times, you will be dismissed from this practice. e. Antonio Johnsone must be completed 24 hours prior to your schedule appointment. 4. If the list below has been completed, PLEASE FAX RECORDS TO OUR OFFICE @ [phone].  Once the records have been received we will update your records at our office:  Health Maintenance Due   Topic Date Due    COVID-19 Vaccine (1 of 2) 09/17/1966

## 2021-02-16 NOTE — PROGRESS NOTES
Health Maintenance Due   Topic Date Due    COVID-19 Vaccine (1 of 2) 09/17/1966 Scheduled for Thursday 02/18/2021 morning

## 2021-02-16 NOTE — PROGRESS NOTES
Elsie Pascal is a 78 y.o.male    Chief Complaint   Patient presents with    1 Month Follow-Up     review labs completed 01/26/2021        Pt presents for 4 week follow up of HTN- on Lisinopril 10 mg daily. The home BP readings have been in the 120's / 50-60's range usually, occasionally 140-150/ 60-70's. Tolerating well- no side effects. ? Occasional balance issues with Yoga since starting lisinopril. Patient Active Problem List   Diagnosis    Injury of head    Memory impairment    Concussion with no loss of consciousness    Nonintractable headache    Post concussive syndrome    Elevated low density lipoprotein (LDL) cholesterol level    S/P appendectomy    Essential hypertension    Subclinical hypothyroidism       Current Outpatient Medications   Medication Sig Dispense Refill    vitamin D (CHOLECALCIFEROL) 25 MCG (1000 UT) TABS tablet Take 1,000 Units by mouth daily Taking 2 tabs in morning and 1 tab at lunch, dinner, and bedtime      B Complex-Biotin-FA (B-100 COMPLEX CR PO) Take by mouth 3 times daily      magnesium citrate solution Take 250 mLs by mouth once Taking 2 soft gels TID      Omega-3 Fatty Acids (OMEGA-3 FISH OIL CONCENTRATE PO) Take 2 capsules by mouth 4 times daily      CINNAMON PO Take by mouth Cinnamon Bark Taking 2 capsules QID      Apoaequorin (PREVAGEN PO) Take by mouth every morning      lisinopril (PRINIVIL;ZESTRIL) 10 MG tablet Take 1 tablet by mouth daily 90 tablet 1    vitamin C (ASCORBIC ACID) 500 MG tablet Take 1,000 mg by mouth daily      NONFORMULARY Curamin for pain Taking PRN       No current facility-administered medications for this visit.         Review of Systems per Dr. Lo Gomez     /62 (Site: Left Upper Arm, Position: Sitting, Cuff Size: Medium Adult)   Pulse 71   Temp 96.6 °F (35.9 °C) (Temporal)   Resp 16   Ht 5' 6\" (1.676 m)   Wt 175 lb 3.2 oz (79.5 kg)   SpO2 99%   BMI 28.28 kg/m²  Potassium monitoring  07/17/2021    Creatinine monitoring  07/17/2021    Annual Wellness Visit (AWV)  09/26/2021    Diabetes screen  01/28/2024    Lipid screen  01/28/2026    Colon cancer screen colonoscopy  07/08/2030    DTaP/Tdap/Td vaccine (2 - Td) 10/22/2030    Flu vaccine  Completed    Shingles Vaccine  Completed    Pneumococcal 65+ years Vaccine  Completed    Hepatitis C screen  Completed    Hepatitis A vaccine  Aged Out    Hepatitis B vaccine  Aged Out    Hib vaccine  Aged Out    Meningococcal (ACWY) vaccine  Aged Out       AAA ultrasound (Male, 65-75, smoked ever) indicated at this time? No tobacco history  CT Lung Screen (55-80, 30 pk-yrs, smoking or quit <15 years) indicated at this time? No tobacco history    Future Appointments   Date Time Provider Joya Cronin   2/16/2021  1:30 PM Roxann Velasquez MD [facility] [place]   4/6/2021  9:00 AM Katie Kumari RD, LD [facility] [place]   4/20/2021  2:00 PM Roxann Velasquez MD [facility] [place]   5/18/2021 10:40 AM Malcolm Scott MD [facility] [place]   9/27/2021  9:00 AM Guero Grace DO [facility] [place]       ASSESSMENT       Diagnosis Orders   1. Essential hypertension  Comprehensive Metabolic Panel   2. Subclinical hypothyroidism  TSH    T4, Free       PLAN      After discussion with Dr. Giselle Nicole, we agreed on plan as follows:    Continue lisinopril 10 mg- 1 pill daily. Encouraged increased water intake, drinking greater than 64 ounces daily. Given elevated TSH/normal free T4, patient meets criteria for subclinical hypothyroidism- will hold on medication at this time again as patient asymptomatic.   Patient refuses statins at this time  Recheck free T4/TSH and CMP in 3 months  Follow-up in 3 months            Attending Physician Statement I have discussed the case, including pertinent history and exam findings with the resident. I agree with the documented assessment and plan as documented by the resident.   GE modifier added  to this encounter     Electronically signed by Lizzy Larson MD on 2/16/2021 at 12:38 PM

## 2021-02-16 NOTE — PROGRESS NOTES
Deep Harris is a 78 y.o. male who presents today for:  Chief Complaint   Patient presents with    1 Month Follow-Up     review labs completed 01/26/2021       HPI:   Has not noticed any side effects from the lisinopril   Home SBP 120s/60s on average, some 140/150s    Started some Yoga, having some balance issues that resolve quickly    Started seeing Dr. Kerrie San insecurity    Worry: Never true    Inability: Never true     Health Maintenance reviewed -   Health Maintenance   Topic Date Due    COVID-19 Vaccine (1 of 2) 09/17/1966    Potassium monitoring  07/17/2021    Creatinine monitoring  07/17/2021    Annual Wellness Visit (AWV)  09/26/2021    Diabetes screen  01/28/2024    Lipid screen  01/28/2026    Colon cancer screen colonoscopy  07/08/2030    DTaP/Tdap/Td vaccine (2 - Td) 10/22/2030    Flu vaccine  Completed    Shingles Vaccine  Completed    Pneumococcal 65+ years Vaccine  Completed    Hepatitis C screen  Completed    Hepatitis A vaccine  Aged Out    Hepatitis B vaccine  Aged Out    Hib vaccine  Aged Out    Meningococcal (ACWY) vaccine  Aged Out     Current Outpatient Medications   Medication Sig Dispense Refill    vitamin D (CHOLECALCIFEROL) 25 MCG (1000 UT) TABS tablet Take 1,000 Units by mouth daily Taking 2 tabs in morning and 1 tab at lunch, dinner, and bedtime      B Complex-Biotin-FA (B-100 COMPLEX CR PO) Take by mouth 3 times daily      magnesium citrate solution Take 250 mLs by mouth once Taking 2 soft gels TID      Omega-3 Fatty Acids (OMEGA-3 FISH OIL CONCENTRATE PO) Take 2 capsules by mouth 4 times daily      CINNAMON PO Take by mouth Cinnamon Bark Taking 2 capsules QID      Apoaequorin (PREVAGEN PO) Take by mouth every morning      lisinopril (PRINIVIL;ZESTRIL) 10 MG tablet Take 1 tablet by mouth daily 90 tablet 1    vitamin C (ASCORBIC ACID) 500 MG tablet Take 1,000 mg by mouth daily      NONFORMULARY Curamin for pain Taking PRN No current facility-administered medications for this visit. Social History     Tobacco Use    Smoking status: Never Smoker    Smokeless tobacco: Never Used   Substance Use Topics    Alcohol use: Never     Frequency: Never      Subjective:   Review of Systems   Respiratory: Negative for shortness of breath and wheezing. Cardiovascular: Negative for chest pain and palpitations. Gastrointestinal: Negative for abdominal pain, constipation and diarrhea. Objective:     Vitals:    02/16/21 0957   BP: 100/62   Site: Left Upper Arm   Position: Sitting   Cuff Size: Medium Adult   Pulse: 71   Resp: 16   Temp: 96.6 °F (35.9 °C)   TempSrc: Temporal   SpO2: 99%   Weight: 175 lb 3.2 oz (79.5 kg)   Height: 5' 6\" (1.676 m)     Body mass index is 28.28 kg/m². Wt Readings from Last 3 Encounters:   02/16/21 175 lb 3.2 oz (79.5 kg)   01/28/21 174 lb (78.9 kg)   01/13/21 179 lb (81.2 kg)     BP Readings from Last 3 Encounters:   02/16/21 100/62   01/28/21 126/78   01/13/21 130/84     Physical Exam  Vitals signs and nursing note reviewed. Constitutional:       General: He is not in acute distress. Appearance: He is well-developed. He is not diaphoretic. HENT:      Head: Normocephalic and atraumatic. Right Ear: External ear normal.      Left Ear: External ear normal.      Nose: Nose normal.   Eyes:      General:         Right eye: No discharge. Left eye: No discharge. Conjunctiva/sclera: Conjunctivae normal.   Cardiovascular:      Rate and Rhythm: Normal rate and regular rhythm. Heart sounds: Normal heart sounds. No murmur. Pulmonary:      Effort: Pulmonary effort is normal.      Breath sounds: Normal breath sounds. No wheezing. Abdominal:      General: There is no distension. Palpations: Abdomen is soft. Tenderness: There is no abdominal tenderness. Skin:     General: Skin is warm and dry. Findings: No erythema or rash.    Neurological: Mental Status: He is alert and oriented to person, place, and time. Cranial Nerves: No cranial nerve deficit. Psychiatric:         Behavior: Behavior normal.         Thought Content: Thought content normal.         Judgment: Judgment normal.         Lab Results   Component Value Date    LABA1C 5.5 01/28/2021       Lab Results   Component Value Date    CHOL 223 (H) 01/28/2021    TRIG 164 01/28/2021    HDL 47 01/28/2021    LDLCALC 143 01/28/2021       The 10-year ASCVD risk score (Jordyn Zhang et al., 2013) is: 13.3%    Values used to calculate the score:      Age: 78 years      Sex: Male      Is Non- : No      Diabetic: No      Tobacco smoker: No      Systolic Blood Pressure: 610 mmHg      Is BP treated: Yes      HDL Cholesterol: 47 mg/dL      Total Cholesterol: 223 mg/dL    Lab Results   Component Value Date     07/17/2020    K 4.1 07/17/2020     07/17/2020    CO2 25 07/17/2020    BUN 17 07/17/2020    CREATININE 0.9 07/17/2020    GLUCOSE 128 (H) 07/17/2020    CALCIUM 10.2 01/28/2021    PROT 7.2 05/18/2020    LABALBU 4.6 05/18/2020    BILITOT 0.5 05/18/2020    ALKPHOS 72 05/18/2020    AST 15 05/18/2020    ALT 10 (L) 05/18/2020    LABGLOM 84 (A) 07/17/2020       No results found for: Mniesh Nicole    Lab Results   Component Value Date    TSH 5.930 (H) 01/28/2021    L3JKXMV 97 01/28/2021    T4FREE 1.05 01/26/2021       Lab Results   Component Value Date    WBC 6.2 01/28/2021    HGB 14.2 01/28/2021    HCT 44.4 01/28/2021    MCV 90.8 01/28/2021     01/28/2021       Lab Results   Component Value Date    PSA 0.71 01/28/2021     Assessment / Plan:   Kelsy Dominguez was seen today for 1 month follow-up. Diagnoses and all orders for this visit:    Essential hypertension  -     Comprehensive Metabolic Panel; Future    Subclinical hypothyroidism  -     TSH;  Future  -     T4, Free; Future      BP stable on lisinopril, will continue, labs prior to next visit Will continue to follow thyroid labs  Encouraged adequate hydration   Patient not interested in statins, working with Dr. Mariposa Dale   Follow up in 3 months, earlier prn        Return in about 3 months (around 5/16/2021). Medications Prescribed:  No orders of the defined types were placed in this encounter. Future Appointments   Date Time Provider Joya Roseanne   2/16/2021  1:30 PM Jyoti Rodriguez MD [facility] [address]   4/6/2021  9:00 AM Pablo Millard RD, PAULA [facility] [address]   4/20/2021  2:00 PM Jyoti Rodriguez MD [facility] [address]   5/18/2021 10:40 AM [facility] [address]   9/27/2021  9:00 AM Ghazal Cornejo DO [facility] [address]       Patient given educational materials - see patient instructions. Discussed use, benefit, and sideeffects of prescribed medications. All patient questions answered. Pt voiced understanding. Reviewed health maintenance. Instructed to continue current medications, diet and exercise. Patient agreed with treatment plan. Follow up as directed.      Electronically signed by Cornelio Huff MD on 2/16/2021 at 10:53 AM

## 2021-04-06 ENCOUNTER — NURSE ONLY (OUTPATIENT)
Dept: LAB | Age: 71
End: 2021-04-06

## 2021-04-06 DIAGNOSIS — I10 ESSENTIAL HYPERTENSION: ICD-10-CM

## 2021-04-06 DIAGNOSIS — E03.8 SUBCLINICAL HYPOTHYROIDISM: ICD-10-CM

## 2021-04-06 LAB
ALBUMIN SERPL-MCNC: 4.3 G/DL (ref 3.5–5.1)
ALP BLD-CCNC: 49 U/L (ref 38–126)
ALT SERPL-CCNC: 16 U/L (ref 11–66)
ANION GAP SERPL CALCULATED.3IONS-SCNC: 9 MEQ/L (ref 8–16)
AST SERPL-CCNC: 18 U/L (ref 5–40)
BILIRUB SERPL-MCNC: 0.4 MG/DL (ref 0.3–1.2)
BUN BLDV-MCNC: 22 MG/DL (ref 7–22)
CALCIUM SERPL-MCNC: 9.6 MG/DL (ref 8.5–10.5)
CHLORIDE BLD-SCNC: 105 MEQ/L (ref 98–111)
CO2: 28 MEQ/L (ref 23–33)
CREAT SERPL-MCNC: 1 MG/DL (ref 0.4–1.2)
GFR SERPL CREATININE-BSD FRML MDRD: 74 ML/MIN/1.73M2
GLUCOSE BLD-MCNC: 58 MG/DL (ref 70–108)
POTASSIUM SERPL-SCNC: 4.1 MEQ/L (ref 3.5–5.2)
SODIUM BLD-SCNC: 142 MEQ/L (ref 135–145)
T4 FREE: 1.03 NG/DL (ref 0.93–1.76)
TOTAL PROTEIN: 7.3 G/DL (ref 6.1–8)
TSH SERPL DL<=0.05 MIU/L-ACNC: 6.13 UIU/ML (ref 0.4–4.2)

## 2021-04-20 ENCOUNTER — OFFICE VISIT (OUTPATIENT)
Dept: FAMILY MEDICINE CLINIC | Age: 71
End: 2021-04-20
Payer: MEDICARE

## 2021-04-20 VITALS
RESPIRATION RATE: 12 BRPM | OXYGEN SATURATION: 99 % | DIASTOLIC BLOOD PRESSURE: 68 MMHG | BODY MASS INDEX: 27.87 KG/M2 | HEART RATE: 57 BPM | TEMPERATURE: 97.7 F | WEIGHT: 173.4 LBS | SYSTOLIC BLOOD PRESSURE: 116 MMHG | HEIGHT: 66 IN

## 2021-04-20 DIAGNOSIS — S06.0X0D CONCUSSION WITHOUT LOSS OF CONSCIOUSNESS, SUBSEQUENT ENCOUNTER: ICD-10-CM

## 2021-04-20 DIAGNOSIS — R94.6 ABNORMAL RESULTS OF THYROID FUNCTION STUDIES: ICD-10-CM

## 2021-04-20 DIAGNOSIS — R35.1 NOCTURIA: ICD-10-CM

## 2021-04-20 DIAGNOSIS — E03.8 SUBCLINICAL HYPOTHYROIDISM: ICD-10-CM

## 2021-04-20 DIAGNOSIS — R41.3 MEMORY DIFFICULTIES: ICD-10-CM

## 2021-04-20 DIAGNOSIS — R79.89 TSH ELEVATION: ICD-10-CM

## 2021-04-20 DIAGNOSIS — I10 ESSENTIAL HYPERTENSION: Primary | ICD-10-CM

## 2021-04-20 DIAGNOSIS — D64.9 ANEMIA, UNSPECIFIED TYPE: ICD-10-CM

## 2021-04-20 DIAGNOSIS — R79.89 ELEVATED TSH: ICD-10-CM

## 2021-04-20 DIAGNOSIS — F07.81 POST CONCUSSIVE SYNDROME: ICD-10-CM

## 2021-04-20 DIAGNOSIS — K90.89 OTHER INTESTINAL MALABSORPTION: ICD-10-CM

## 2021-04-20 DIAGNOSIS — E78.00 ELEVATED LDL CHOLESTEROL LEVEL: ICD-10-CM

## 2021-04-20 DIAGNOSIS — E78.00 ELEVATED CHOLESTEROL: ICD-10-CM

## 2021-04-20 DIAGNOSIS — E78.5 HYPERLIPIDEMIA, UNSPECIFIED HYPERLIPIDEMIA TYPE: ICD-10-CM

## 2021-04-20 PROCEDURE — 1036F TOBACCO NON-USER: CPT | Performed by: FAMILY MEDICINE

## 2021-04-20 PROCEDURE — G8417 CALC BMI ABV UP PARAM F/U: HCPCS | Performed by: FAMILY MEDICINE

## 2021-04-20 PROCEDURE — 3017F COLORECTAL CA SCREEN DOC REV: CPT | Performed by: FAMILY MEDICINE

## 2021-04-20 PROCEDURE — G8427 DOCREV CUR MEDS BY ELIG CLIN: HCPCS | Performed by: FAMILY MEDICINE

## 2021-04-20 PROCEDURE — 99214 OFFICE O/P EST MOD 30 MIN: CPT | Performed by: FAMILY MEDICINE

## 2021-04-20 PROCEDURE — 4040F PNEUMOC VAC/ADMIN/RCVD: CPT | Performed by: FAMILY MEDICINE

## 2021-04-20 PROCEDURE — 1123F ACP DISCUSS/DSCN MKR DOCD: CPT | Performed by: FAMILY MEDICINE

## 2021-04-20 NOTE — PROGRESS NOTES
(CHOLECALCIFEROL) 25 MCG (1000 UT) TABS tablet, Take 5,000 Units by mouth daily Skip Sunday, Disp: , Rfl:     B Complex-Biotin-FA (B-100 COMPLEX CR PO), Take by mouth 3 times daily, Disp: , Rfl:     magnesium citrate solution, Take 250 mLs by mouth 4 times daily (before meals and nightly) Taking 2 soft gels TID , Disp: , Rfl:     Omega-3 Fatty Acids (OMEGA-3 FISH OIL CONCENTRATE PO), Take 2 capsules by mouth 4 times daily, Disp: , Rfl:     CINNAMON PO, Take 600 mg by mouth 4 times daily (before meals and nightly) Cinnamon Bark Taking 1-2 capsules QID, Disp: , Rfl:     Apoaequorin (PREVAGEN PO), Take by mouth every morning, Disp: , Rfl:     lisinopril (PRINIVIL;ZESTRIL) 10 MG tablet, Take 1 tablet by mouth daily, Disp: 90 tablet, Rfl: 1    vitamin C (ASCORBIC ACID) 500 MG tablet, Take 1,000 mg by mouth daily, Disp: , Rfl:     NONFORMULARY, Curamin for pain Taking PRN, Disp: , Rfl:   Allergies: Codeine and Hydrocodone  Immunizations:   Immunization History   Administered Date(s) Administered    Influenza, Quadv, adjuvanted, 65 yrs +, IM, PF (Fluad) 10/08/2020    Pneumococcal Polysaccharide ([id number]) 10/08/2020    Tdap (Boostrix, Adacel) 10/22/2020    Zoster Recombinant (Shingrix) 10/22/2020, 01/14/2021        History of Present Illness:     Glenn's had concerns including 3 Month Follow-Up, Other (foods), Hypertension (better), Hyperlipidemia, Memory Loss (short term), and Neck Pain (soreness but not perfect). Pato Perez  presents to the [address] [place] [address] today for;   Chief Complaint   Patient presents with    3 Month Follow-Up    Other     foods    Hypertension     better    Hyperlipidemia    Memory Loss     short term    Neck Pain     soreness but not perfect   , abnormal labs follow up and these conditions as he  Is looking today for:     1. Essential hypertension    2. Subclinical hypothyroidism    3. Post concussive syndrome    4. Elevated TSH    5.  Hyperlipidemia, unspecified hyperlipidemia type    6. Abnormal results of thyroid function studies     7. Anemia, unspecified type    8. TSH elevation    9. Elevated cholesterol    10. Nocturia    11. Other intestinal malabsorption    12. Elevated LDL cholesterol level    13. Memory difficulties    14. Concussion without loss of consciousness, subsequent encounter      HPI    Subjective:     Review of Systems   All other systems reviewed and are negative. Objective:     /68 (Site: Left Upper Arm, Position: Sitting, Cuff Size: Medium Adult)   Pulse 57   Temp 97.7 °F (36.5 °C) (Oral)   Resp 12   Ht 5' 5.98\" (1.676 m)   Wt 173 lb 6.4 oz (78.7 kg)   SpO2 99%   BMI 28.00 kg/m²   Physical Exam  Vitals signs and nursing note reviewed. Constitutional:       Appearance: Normal appearance. HENT:      Head: Normocephalic. Pulmonary:      Effort: Pulmonary effort is normal.   Neurological:      Mental Status: He is alert. Psychiatric:         Mood and Affect: Mood normal.         Thought Content: Thought content normal.            Laboratory Data:   Lab results were searched in Care Everywhere and/or those brought by the pateint were reviewed today with Alyson Rojo and he has a copy of their most recent labs to take home with them as noted below;       Imaging Data:   Imaging Data:       Assessment & Plan:       Impression:  1. Essential hypertension    2. Subclinical hypothyroidism    3. Post concussive syndrome    4. Elevated TSH    5. Hyperlipidemia, unspecified hyperlipidemia type    6. Abnormal results of thyroid function studies     7. Anemia, unspecified type    8. TSH elevation    9. Elevated cholesterol    10. Nocturia    11. Other intestinal malabsorption    12. Elevated LDL cholesterol level    13. Memory difficulties    14.  Concussion without loss of consciousness, subsequent encounter      Assessment and Plan:  After reviewing the patients chief complaints, reviewing their labfindings in great detail (with the patient and those accompanying them) which correlate to their chief complaints, symptoms, and or medical conditions; suggestions were made relating to changes in diet and or supplements which may improve the complaints and which will be reflected in their future lab findings; Chief Complaint   Patient presents with    3 Month Follow-Up    Other     foods    Hypertension     better    Hyperlipidemia    Memory Loss     short term    Neck Pain     soreness but not perfect   ;    Plans for the next visits:  - Abnormal and non-optimal Labs were ordered today to be repeated in the next 120-365 days to assess changes from adjustments in nutrition and or nutrients. - Patient instructed when having a blood draw to ask the  to divide their lab draws into multiple draws over several days if not feeling good at the time of the lab draw or if either prefers to do several smaller blood draws over several days  -Patient instructed to check with insurer before each lab draw and to to to the lab which the insurer directs them for the most cost effective lab draw with the least patient's cost  - Fco Marx  will be scheduled subsequent to those results. Bertin Zaragoza will bring in his drink and food log to his next visit    Chronic Problems Addressed on this Visit:                                   1.  Intensity of Service; Uncontrolled items at this visit; Chief Complaint   Patient presents with    3 Month Follow-Up    Other     foods    Hypertension     better    Hyperlipidemia    Memory Loss     short term    Neck Pain     soreness but not perfect   ; Improved items at this visit; Stable items at this visit;  2.  Patients food and drinks were reviewed with the patient,       - Fco Marx will bring food+drink symptom log to next visit for inclusion in their record      - 75 better food list reviewed & given to patient with the omega 6 food list to avoid         - Gluten in corn and oats abstracts sheet reviewed and given to the patient today   3. Greater than 25  minutes were spent face to face on this visit of which >50% was for counseling and coordination of care. Patients food and drinks were reviewed with the patient,   - They will bring a food drink symptom log to future visits for inclusion in their record    - 75 better food list reviewed & given to patient along with the omega 6 food list to avoid      - Gluten in corn and oats abstracts sheet reviewed and given to the patient today    - 23 Foods containing Latex-like proteins was reviewed and copy to be taken if desired     - Nutrient Supplements list provided and copyto be taken if desired    - [employer] web site offered to patient to review at their convenience by staff with login information    Note:  I have discussed with the patient that with all nutraceuticals, there is often mixed data and emerging research which needs to be monitored; as well as an array of NIH fact sheets on nutrients and supplements. If I have recommended cinnamon at the request of this patient to assist them in control of their blood sugar, triglyceride, and/or weight issues. I discussed that the patient's clinical use of cinnamon bark, calcium, magnesium, Vitamin D, and pharmaceutical grade [employer] #670482 fish oil or triple-strength fish oil, and B-75 two phase time-released B-complex by Zeeshan Lomeli will be for a time-limited trial to determine their individual effectiveness and safety in this patient. I also referred the patient to the NMCD: Nutrition, Metabolism, and Cardiovascular Diseases (journal) and concerns about long-term use and hepatotoxicity of cinnamon and other nutrients and suggest they frequently search nih.gov for the latest non-proprietary information on nutriceuticals as well as consider a subscription to [employer] for details on reviewed supplements, or at the least review the nutrient files at [facility] at or black but not a concern  - Apricot Kernel Oil (by Now) for dry skin sensitive perineal or perianal area skin    Nutrients for ongoing use by Mail order for less expense from [employer] ;  - Strength Fish Oil , 240 Softgels Item #168470  -B-100 time released balanced B complex Item #378572  - Cinnamon bark 500 mg without Chromium or extract Item #136650  - Calcium carbonate 1000 mg, Magnesium oxide 500 mg, Vit D-3 400 IU Item #393986  - Magnesium oxide 500 mg tabs Item #655182 if less than 2 bowel movements daily  - [employer] Sen[employer] Item #932918 for most patients, One Daily Item #036347 with iron  - Vit D 3  1,000 Item #861161      2,000 IU Item #010448   Item #579547     Some brands containing orderived from soy oil or corn oil are OK if not allergic to soy    Nutrients for Special Needs by Mail order for less expense from www. puritan.com;  -Elemental Iron 65 mg tabs Item #186952 if need more iron for low iron on labs    Usually turns bowel movements grey, green or black but not a concern  - Time released Niacin 250 mg Item #397056 for cold intolerance, low libido or impotence  - DHEA 50 mg Item #204242 for improving DHEA levels on labs if having Fatigue    If stools too loose substitute for your Magnesium oxide using;   Magnesium citrate 200 mg tabs (NOT liquid) at [employer]   Magnesium gluconate 550 mg by Mena at [employer] or [employer]  Magnesium chloride foot soaks or body sprays  www.[employer]. [employer]   Magnesium chloride flakes 14.99 Item #: LDF315 if back-ordered, get spray    Food Drink Symptom Log;  I asked this patient to track these items and any other symptoms on their list on a weekly basis to documenttheir progress or lack of same.  This can be done on the symptom tracking sheet I gave them at today's visit but looks like this:                                                      Rate on scale of 0-10 with zero = not noticeable  Symptom:                            Week 1               2 onions, greens, lettuce, onions, parsley, peas, radishes, rhubarb, strawberries, watermelons  July: beans, beets, blueberries, broccoli, cabbage, cantaloupe, carrots, cauliflower, celery, cucumbers, eggplant, grapes, green onions, greens, lettuce, onions, parsley, peas, peaches, bell peppers, potatoes, radishes, summer raspberries, squash, sweetcorn, tomatoes, turnips, watermelons  August: apples, beans, beets, blueberries, cabbage, cantaloupe, carrots, cauliflower, celery, cucumbers, eggplant, grapes, green onions, greens, lettuce, onions, parsley, peas, peaches, pears, bell peppers, potatoes, radishes, squash, sweet corn, tomatoes, turnips, watermelons  September: apples, beans, beets, blueberries, cabbage, cantaloupe, carrots, cauliflower, celery, cucumbers, eggplant, grapes, green onions, greens, lettuce, onions, parsley, peas, peaches, pears, bell peppers, plums, potatoes, pumpkins, radishes, fall red raspberries, squash, sweet corn, tomatoes, turnips, watermelons  October: apples, beets, broccoli, cabbage, carrots, cauliflower, celery, green onions, greens, lettuce, parsley, peas, pears, potatoes, pumpkins, radishes, fall red raspberries, squash, turnips  November: broccoli, cabbage, carrots, parsley, pears, peas  December: use canned, frozen or dried fruits since lower in latex    Upto half of latex-sensitive patients show allergic reactions to fruits (avocados, bananas, kiwifruits, papayas, peaches),   Annals of Allergy, 1994. These plants contain the same proteins that are allergens in latex. People with fruit allergies should warn physicians before undergoing procedures which may cause anaphylactic reaction if in contact with latex gloves. Some of the common foods with defined cross-reactivity to latex are avocado, banana, kiwi, chestnut, raw potato, tomato, stone fruits (e.g., peach, cherry), hazelnut, melons, celery, carrot, apple, pear, papaya, and almond.   Foods with less well-defined cross-reactivity

## 2021-05-18 ENCOUNTER — OFFICE VISIT (OUTPATIENT)
Dept: FAMILY MEDICINE CLINIC | Age: 71
End: 2021-05-18
Payer: MEDICARE

## 2021-05-18 VITALS
WEIGHT: 175.4 LBS | DIASTOLIC BLOOD PRESSURE: 76 MMHG | TEMPERATURE: 97.4 F | HEART RATE: 60 BPM | SYSTOLIC BLOOD PRESSURE: 126 MMHG | RESPIRATION RATE: 16 BRPM | BODY MASS INDEX: 28.19 KG/M2 | HEIGHT: 66 IN | OXYGEN SATURATION: 99 %

## 2021-05-18 DIAGNOSIS — E03.8 SUBCLINICAL HYPOTHYROIDISM: Primary | ICD-10-CM

## 2021-05-18 DIAGNOSIS — I10 ESSENTIAL HYPERTENSION: ICD-10-CM

## 2021-05-18 PROCEDURE — 1036F TOBACCO NON-USER: CPT | Performed by: STUDENT IN AN ORGANIZED HEALTH CARE EDUCATION/TRAINING PROGRAM

## 2021-05-18 PROCEDURE — G8427 DOCREV CUR MEDS BY ELIG CLIN: HCPCS | Performed by: STUDENT IN AN ORGANIZED HEALTH CARE EDUCATION/TRAINING PROGRAM

## 2021-05-18 PROCEDURE — 99213 OFFICE O/P EST LOW 20 MIN: CPT | Performed by: STUDENT IN AN ORGANIZED HEALTH CARE EDUCATION/TRAINING PROGRAM

## 2021-05-18 PROCEDURE — 3017F COLORECTAL CA SCREEN DOC REV: CPT | Performed by: STUDENT IN AN ORGANIZED HEALTH CARE EDUCATION/TRAINING PROGRAM

## 2021-05-18 PROCEDURE — 4040F PNEUMOC VAC/ADMIN/RCVD: CPT | Performed by: STUDENT IN AN ORGANIZED HEALTH CARE EDUCATION/TRAINING PROGRAM

## 2021-05-18 PROCEDURE — G8417 CALC BMI ABV UP PARAM F/U: HCPCS | Performed by: STUDENT IN AN ORGANIZED HEALTH CARE EDUCATION/TRAINING PROGRAM

## 2021-05-18 PROCEDURE — 1123F ACP DISCUSS/DSCN MKR DOCD: CPT | Performed by: STUDENT IN AN ORGANIZED HEALTH CARE EDUCATION/TRAINING PROGRAM

## 2021-05-18 RX ORDER — LISINOPRIL 10 MG/1
10 TABLET ORAL DAILY
Qty: 90 TABLET | Refills: 1 | Status: SHIPPED | OUTPATIENT
Start: 2021-05-18 | End: 2021-06-22

## 2021-05-18 ASSESSMENT — ENCOUNTER SYMPTOMS
SHORTNESS OF BREATH: 0
WHEEZING: 0

## 2021-05-18 NOTE — PROGRESS NOTES
S: 78 y.o. male with   Chief Complaint   Patient presents with    3 Month Follow-Up     review labs completed 04/06/2021    Discuss Medications     Patient has stopped lisinopril       HPI: please see resident note for HPI and ROS. BP Readings from Last 3 Encounters:   05/18/21 126/76   04/20/21 116/68   02/16/21 100/62     Wt Readings from Last 3 Encounters:   05/18/21 175 lb 6.4 oz (79.6 kg)   04/20/21 173 lb 6.4 oz (78.7 kg)   02/16/21 175 lb 3.2 oz (79.5 kg)       O: VS:  height is 5' 5.98\" (1.676 m) and weight is 175 lb 6.4 oz (79.6 kg). His oral temperature is 97.4 °F (36.3 °C). His blood pressure is 126/76 and his pulse is 60. His respiration is 16 and oxygen saturation is 99%. Diagnosis Orders   1. Subclinical hypothyroidism     2. Essential hypertension  lisinopril (PRINIVIL;ZESTRIL) 10 MG tablet       Plan:  Continue current medications. Thyroid labs reviewed. Subacute hypothyroidism. Monitor. F/u 4 months for MAW. There are no preventive care reminders to display for this patient. Attending Physician Statement  I have discussed the case, including pertinent history and exam findings with the resident. I agree with the documented assessment and plan as documented by the resident.         Kellee Liu DO 5/18/2021 12:06 PM

## 2021-05-18 NOTE — PROGRESS NOTES
04/06/2021    GLUCOSE 58 (L) 04/06/2021    CALCIUM 9.6 04/06/2021    PROT 7.3 04/06/2021    LABALBU 4.3 04/06/2021    BILITOT 0.4 04/06/2021    ALKPHOS 49 04/06/2021    AST 18 04/06/2021    ALT 16 04/06/2021    LABGLOM 74 (A) 04/06/2021       Lab Results   Component Value Date    WBC 6.2 01/28/2021    HGB 14.2 01/28/2021    HCT 44.4 01/28/2021    MCV 90.8 01/28/2021     01/28/2021       Lab Results   Component Value Date    TSH 6.130 (H) 04/06/2021    S7OOOKO 97 01/28/2021    T4FREE 1.03 04/06/2021       Lab Results   Component Value Date    CHOL 223 (H) 01/28/2021    TRIG 164 01/28/2021    HDL 47 01/28/2021    LDLCALC 143 01/28/2021         Lab Results   Component Value Date    LABA1C 5.5 01/28/2021       Lab Results   Component Value Date    PSA 0.71 01/28/2021     Assessment / Plan:   Mckenna Cunha was seen today for 3 month follow-up and discuss medications. Diagnoses and all orders for this visit:    Subclinical hypothyroidism    Essential hypertension  -     lisinopril (PRINIVIL;ZESTRIL) 10 MG tablet; Take 1 tablet by mouth daily      Patient presents to follow-up labs  Patient's TSH and T4 have remained stable, patient is not having any hypothyroid side effects at this time, no need to start Synthroid, continue to monitor  Encourage patient to continue taking lisinopril for better blood pressure control, denies any side effects, will refill  Patient has no other concerns at this time  Follow-up in 4 months for Medicare annual wellness, earlier as needed       Return in about 4 months (around 9/18/2021) for [address] [place] [address].     Medications Prescribed:  Orders Placed This Encounter   Medications    lisinopril (PRINIVIL;ZESTRIL) 10 MG tablet     Sig: Take 1 tablet by mouth daily     Dispense:  90 tablet     Refill:  1       Future Appointments   Date Time Provider Joya Cronin   9/21/2021  1:00 PM Kamlesh Todd MD [facility] [address] [place] [address]   10/4/2021 11:00 AM Neri Hernandez MD [facility] [address]       Patient given educational materials - see patient instructions. Discussed use, benefit, and side effects of prescribed medications. All patient questions answered. Pt voiced understanding. Reviewed health maintenance. Instructed to continue current medications, diet and exercise. Patient agreed with treatment plan. Follow up as directed. Part of this visit was documented via [id number] technology, please excuse any errors.      Electronically signed by Lakisha Saini MD on 5/18/2021 at 10:58 AM

## 2021-05-20 ENCOUNTER — TELEPHONE (OUTPATIENT)
Dept: FAMILY MEDICINE CLINIC | Age: 71
End: 2021-05-20

## 2021-05-28 ENCOUNTER — APPOINTMENT (OUTPATIENT)
Dept: CT IMAGING | Age: 71
DRG: 087 | End: 2021-05-28
Payer: MEDICARE

## 2021-05-28 ENCOUNTER — HOSPITAL ENCOUNTER (INPATIENT)
Age: 71
LOS: 5 days | Discharge: NURSING, REHAB OR LONG-TERM CARE | DRG: 087 | End: 2021-06-02
Attending: FAMILY MEDICINE | Admitting: SURGERY
Payer: MEDICARE

## 2021-05-28 DIAGNOSIS — S06.311A: Primary | ICD-10-CM

## 2021-05-28 DIAGNOSIS — S01.01XA LACERATION OF SCALP, INITIAL ENCOUNTER: ICD-10-CM

## 2021-05-28 DIAGNOSIS — S09.90XA CLOSED HEAD INJURY, INITIAL ENCOUNTER: ICD-10-CM

## 2021-05-28 DIAGNOSIS — S06.33AA SKULL FRACTURE WITH CEREBRAL CONTUSION, CLOSED, INITIAL ENCOUNTER (HCC): ICD-10-CM

## 2021-05-28 DIAGNOSIS — S02.91XA SKULL FRACTURE WITH CEREBRAL CONTUSION, CLOSED, INITIAL ENCOUNTER (HCC): ICD-10-CM

## 2021-05-28 PROBLEM — S06.31AA: Status: ACTIVE | Noted: 2021-05-28

## 2021-05-28 PROBLEM — I61.9 INTRAPARENCHYMAL HEMORRHAGE OF BRAIN (HCC): Status: ACTIVE | Noted: 2021-05-28

## 2021-05-28 PROBLEM — S02.101A CLOSED FRACTURE OF RIGHT SIDE OF BASE OF SKULL (HCC): Status: ACTIVE | Noted: 2021-05-28

## 2021-05-28 LAB
ALBUMIN SERPL-MCNC: 4.4 G/DL (ref 3.5–5.1)
ALP BLD-CCNC: 40 U/L (ref 38–126)
ALT SERPL-CCNC: 17 U/L (ref 11–66)
ANION GAP SERPL CALCULATED.3IONS-SCNC: 15 MEQ/L (ref 8–16)
APTT: 22.7 SECONDS (ref 22–38)
AST SERPL-CCNC: 43 U/L (ref 5–40)
BASOPHILS # BLD: 0.9 %
BASOPHILS ABSOLUTE: 0.1 THOU/MM3 (ref 0–0.1)
BILIRUB SERPL-MCNC: 0.6 MG/DL (ref 0.3–1.2)
BUN BLDV-MCNC: 17 MG/DL (ref 7–22)
CALCIUM SERPL-MCNC: 9.5 MG/DL (ref 8.5–10.5)
CHLORIDE BLD-SCNC: 102 MEQ/L (ref 98–111)
CO2: 19 MEQ/L (ref 23–33)
CREAT SERPL-MCNC: 0.9 MG/DL (ref 0.4–1.2)
EKG ATRIAL RATE: 62 BPM
EKG P AXIS: 69 DEGREES
EKG P-R INTERVAL: 140 MS
EKG Q-T INTERVAL: 486 MS
EKG QRS DURATION: 80 MS
EKG QTC CALCULATION (BAZETT): 493 MS
EKG R AXIS: 43 DEGREES
EKG T AXIS: 61 DEGREES
EKG VENTRICULAR RATE: 62 BPM
EOSINOPHIL # BLD: 1.2 %
EOSINOPHILS ABSOLUTE: 0.1 THOU/MM3 (ref 0–0.4)
ERYTHROCYTE [DISTWIDTH] IN BLOOD BY AUTOMATED COUNT: 13 % (ref 11.5–14.5)
ERYTHROCYTE [DISTWIDTH] IN BLOOD BY AUTOMATED COUNT: 44.1 FL (ref 35–45)
GFR SERPL CREATININE-BSD FRML MDRD: 84 ML/MIN/1.73M2
GLUCOSE BLD-MCNC: 108 MG/DL (ref 70–108)
HCT VFR BLD CALC: 40.9 % (ref 42–52)
HEMOGLOBIN: 12.7 GM/DL (ref 14–18)
IMMATURE GRANS (ABS): 0.02 THOU/MM3 (ref 0–0.07)
IMMATURE GRANULOCYTES: 0.2 %
INR BLD: 0.99 (ref 0.85–1.13)
LYMPHOCYTES # BLD: 39.1 %
LYMPHOCYTES ABSOLUTE: 3.1 THOU/MM3 (ref 1–4.8)
MCH RBC QN AUTO: 28.8 PG (ref 26–33)
MCHC RBC AUTO-ENTMCNC: 31.1 GM/DL (ref 32.2–35.5)
MCV RBC AUTO: 92.7 FL (ref 80–94)
MONOCYTES # BLD: 9.2 %
MONOCYTES ABSOLUTE: 0.7 THOU/MM3 (ref 0.4–1.3)
NUCLEATED RED BLOOD CELLS: 0 /100 WBC
OSMOLALITY CALCULATION: 274 MOSMOL/KG (ref 275–300)
PLATELET # BLD: 266 THOU/MM3 (ref 130–400)
PMV BLD AUTO: 10.4 FL (ref 9.4–12.4)
POTASSIUM REFLEX MAGNESIUM: 4.3 MEQ/L (ref 3.5–5.2)
RBC # BLD: 4.41 MILL/MM3 (ref 4.7–6.1)
SEG NEUTROPHILS: 49.4 %
SEGMENTED NEUTROPHILS ABSOLUTE COUNT: 4 THOU/MM3 (ref 1.8–7.7)
SODIUM BLD-SCNC: 136 MEQ/L (ref 135–145)
TOTAL PROTEIN: 7 G/DL (ref 6.1–8)
WBC # BLD: 8 THOU/MM3 (ref 4.8–10.8)

## 2021-05-28 PROCEDURE — 2500000003 HC RX 250 WO HCPCS: Performed by: PHYSICIAN ASSISTANT

## 2021-05-28 PROCEDURE — 99223 1ST HOSP IP/OBS HIGH 75: CPT | Performed by: SURGERY

## 2021-05-28 PROCEDURE — 99285 EMERGENCY DEPT VISIT HI MDM: CPT

## 2021-05-28 PROCEDURE — 70496 CT ANGIOGRAPHY HEAD: CPT

## 2021-05-28 PROCEDURE — 70450 CT HEAD/BRAIN W/O DYE: CPT

## 2021-05-28 PROCEDURE — 12002 RPR S/N/AX/GEN/TRNK2.6-7.5CM: CPT

## 2021-05-28 PROCEDURE — 2000000000 HC ICU R&B

## 2021-05-28 PROCEDURE — 6370000000 HC RX 637 (ALT 250 FOR IP): Performed by: PHYSICIAN ASSISTANT

## 2021-05-28 PROCEDURE — 85730 THROMBOPLASTIN TIME PARTIAL: CPT

## 2021-05-28 PROCEDURE — 70498 CT ANGIOGRAPHY NECK: CPT

## 2021-05-28 PROCEDURE — 97129 THER IVNTJ 1ST 15 MIN: CPT

## 2021-05-28 PROCEDURE — 6820000002 HC L2 INJURY CALL ACTIVATION: Performed by: SURGERY

## 2021-05-28 PROCEDURE — 80053 COMPREHEN METABOLIC PANEL: CPT

## 2021-05-28 PROCEDURE — APPSS60 APP SPLIT SHARED TIME 46-60 MINUTES: Performed by: PHYSICIAN ASSISTANT

## 2021-05-28 PROCEDURE — 6360000002 HC RX W HCPCS: Performed by: FAMILY MEDICINE

## 2021-05-28 PROCEDURE — 2580000003 HC RX 258: Performed by: PHYSICIAN ASSISTANT

## 2021-05-28 PROCEDURE — 99223 1ST HOSP IP/OBS HIGH 75: CPT | Performed by: NEUROLOGICAL SURGERY

## 2021-05-28 PROCEDURE — 96374 THER/PROPH/DIAG INJ IV PUSH: CPT

## 2021-05-28 PROCEDURE — 36415 COLL VENOUS BLD VENIPUNCTURE: CPT

## 2021-05-28 PROCEDURE — 85610 PROTHROMBIN TIME: CPT

## 2021-05-28 PROCEDURE — 0HQ0XZZ REPAIR SCALP SKIN, EXTERNAL APPROACH: ICD-10-PCS | Performed by: FAMILY MEDICINE

## 2021-05-28 PROCEDURE — 85025 COMPLETE CBC W/AUTO DIFF WBC: CPT

## 2021-05-28 PROCEDURE — 93005 ELECTROCARDIOGRAM TRACING: CPT | Performed by: FAMILY MEDICINE

## 2021-05-28 PROCEDURE — 6360000002 HC RX W HCPCS: Performed by: PHYSICIAN ASSISTANT

## 2021-05-28 PROCEDURE — 92523 SPEECH SOUND LANG COMPREHEN: CPT

## 2021-05-28 PROCEDURE — 6360000004 HC RX CONTRAST MEDICATION: Performed by: PHYSICIAN ASSISTANT

## 2021-05-28 PROCEDURE — 72125 CT NECK SPINE W/O DYE: CPT

## 2021-05-28 RX ORDER — SODIUM CHLORIDE 0.9 % (FLUSH) 0.9 %
5-40 SYRINGE (ML) INJECTION EVERY 12 HOURS SCHEDULED
Status: DISCONTINUED | OUTPATIENT
Start: 2021-05-28 | End: 2021-06-02 | Stop reason: HOSPADM

## 2021-05-28 RX ORDER — SODIUM CHLORIDE 9 MG/ML
INJECTION, SOLUTION INTRAVENOUS CONTINUOUS
Status: DISCONTINUED | OUTPATIENT
Start: 2021-05-28 | End: 2021-05-31

## 2021-05-28 RX ORDER — SODIUM CHLORIDE 0.9 % (FLUSH) 0.9 %
5-40 SYRINGE (ML) INJECTION PRN
Status: DISCONTINUED | OUTPATIENT
Start: 2021-05-28 | End: 2021-06-02 | Stop reason: HOSPADM

## 2021-05-28 RX ORDER — PROMETHAZINE HYDROCHLORIDE 25 MG/1
12.5 TABLET ORAL EVERY 6 HOURS PRN
Status: DISCONTINUED | OUTPATIENT
Start: 2021-05-28 | End: 2021-06-02 | Stop reason: HOSPADM

## 2021-05-28 RX ORDER — SODIUM CHLORIDE 9 MG/ML
25 INJECTION, SOLUTION INTRAVENOUS PRN
Status: DISCONTINUED | OUTPATIENT
Start: 2021-05-28 | End: 2021-06-02 | Stop reason: HOSPADM

## 2021-05-28 RX ORDER — ONDANSETRON 2 MG/ML
4 INJECTION INTRAMUSCULAR; INTRAVENOUS EVERY 6 HOURS PRN
Status: DISCONTINUED | OUTPATIENT
Start: 2021-05-28 | End: 2021-06-02 | Stop reason: HOSPADM

## 2021-05-28 RX ORDER — ONDANSETRON 2 MG/ML
4 INJECTION INTRAMUSCULAR; INTRAVENOUS ONCE
Status: COMPLETED | OUTPATIENT
Start: 2021-05-28 | End: 2021-05-28

## 2021-05-28 RX ORDER — ACETAMINOPHEN 325 MG/1
650 TABLET ORAL EVERY 4 HOURS PRN
Status: DISCONTINUED | OUTPATIENT
Start: 2021-05-28 | End: 2021-06-02 | Stop reason: HOSPADM

## 2021-05-28 RX ORDER — FENTANYL CITRATE 50 UG/ML
50 INJECTION, SOLUTION INTRAMUSCULAR; INTRAVENOUS
Status: DISCONTINUED | OUTPATIENT
Start: 2021-05-28 | End: 2021-06-02 | Stop reason: HOSPADM

## 2021-05-28 RX ORDER — LISINOPRIL 10 MG/1
10 TABLET ORAL DAILY
Status: ON HOLD | COMMUNITY
End: 2021-06-10 | Stop reason: HOSPADM

## 2021-05-28 RX ORDER — POLYETHYLENE GLYCOL 3350 17 G/17G
17 POWDER, FOR SOLUTION ORAL DAILY PRN
Status: DISCONTINUED | OUTPATIENT
Start: 2021-05-28 | End: 2021-06-02 | Stop reason: HOSPADM

## 2021-05-28 RX ORDER — TRANEXAMIC ACID 100 MG/ML
1000 INJECTION, SOLUTION INTRAVENOUS ONCE
Status: COMPLETED | OUTPATIENT
Start: 2021-05-28 | End: 2021-05-28

## 2021-05-28 RX ORDER — LEVETIRACETAM 500 MG/1
500 TABLET ORAL 2 TIMES DAILY
Status: DISCONTINUED | OUTPATIENT
Start: 2021-05-28 | End: 2021-06-02 | Stop reason: HOSPADM

## 2021-05-28 RX ORDER — FENTANYL CITRATE 50 UG/ML
25 INJECTION, SOLUTION INTRAMUSCULAR; INTRAVENOUS
Status: DISCONTINUED | OUTPATIENT
Start: 2021-05-28 | End: 2021-06-02 | Stop reason: HOSPADM

## 2021-05-28 RX ADMIN — IOPAMIDOL 80 ML: 755 INJECTION, SOLUTION INTRAVENOUS at 18:34

## 2021-05-28 RX ADMIN — SODIUM CHLORIDE, PRESERVATIVE FREE 10 ML: 5 INJECTION INTRAVENOUS at 21:32

## 2021-05-28 RX ADMIN — SODIUM CHLORIDE 125 ML/HR: 9 INJECTION, SOLUTION INTRAVENOUS at 22:38

## 2021-05-28 RX ADMIN — LEVETIRACETAM 500 MG: 500 TABLET, FILM COATED ORAL at 21:32

## 2021-05-28 RX ADMIN — FAMOTIDINE 20 MG: 10 INJECTION, SOLUTION INTRAVENOUS at 21:32

## 2021-05-28 RX ADMIN — TRANEXAMIC ACID 1000 MG: 1 INJECTION, SOLUTION INTRAVENOUS at 18:37

## 2021-05-28 RX ADMIN — ONDANSETRON 4 MG: 2 INJECTION INTRAMUSCULAR; INTRAVENOUS at 18:46

## 2021-05-28 RX ADMIN — FAMOTIDINE 20 MG: 10 INJECTION, SOLUTION INTRAVENOUS at 16:25

## 2021-05-28 RX ADMIN — SODIUM CHLORIDE: 9 INJECTION, SOLUTION INTRAVENOUS at 14:36

## 2021-05-28 RX ADMIN — ONDANSETRON 4 MG: 2 INJECTION INTRAMUSCULAR; INTRAVENOUS at 13:28

## 2021-05-28 SDOH — HEALTH STABILITY: MENTAL HEALTH: HOW OFTEN DO YOU HAVE A DRINK CONTAINING ALCOHOL?: NEVER

## 2021-05-28 ASSESSMENT — PAIN DESCRIPTION - DESCRIPTORS: DESCRIPTORS: SHARP

## 2021-05-28 ASSESSMENT — PAIN DESCRIPTION - LOCATION: LOCATION: HEAD

## 2021-05-28 ASSESSMENT — PAIN SCALES - GENERAL
PAINLEVEL_OUTOF10: 10
PAINLEVEL_OUTOF10: 0
PAINLEVEL_OUTOF10: 0

## 2021-05-28 ASSESSMENT — PAIN DESCRIPTION - PAIN TYPE: TYPE: ACUTE PAIN

## 2021-05-28 NOTE — ED NOTES
Pt having increased confusion. PT trying to get up out of bed. PT speech is becoming less recognizable. Wife at bedside. VSS. Provider notified.       Shruti Lawson RN  05/28/21 [address] Andres Person  05/28/21 [id number]

## 2021-05-28 NOTE — ED NOTES
Pt resting in bed with side rails up 2x2 and call light in reach. Vital signs assessed and stable. IV site checked. Pt updated on plan of care. Pt voiced understanding. Pt verbalized no other needs or concerns at this time. Neuro completed. Next neuro will be completed at 1330.        Aquiles Poole RN  05/28/21 800 [facility] Marek Hendrix  05/28/21 [id number]

## 2021-05-28 NOTE — ED NOTES
PT able to say and spell his name and knows he is \"waiting\" something. PT in no distress.       Danny Maher RN  05/28/21 [id number]

## 2021-05-28 NOTE — ED NOTES
Pt resting in bed with side rails up 2x2 and call light in reach. Vital signs assessed and stable. IV site checked. Pt updated on plan of care. Pt voiced understanding. Pt verbalized no other needs or concerns at this time. Neuro completed. RN will continue to monitor with 1:1 care.        Danny Maher RN  05/28/21 [id number]

## 2021-05-28 NOTE — ED NOTES
Bed: 009A  Expected date: 5/28/21  Expected time:   Means of arrival:   Comments:      Su Aaron RN  05/28/21 [id number]

## 2021-05-28 NOTE — ED PROVIDER NOTES
EMERGENCY MEDICINE DEPARTMENT ENCOUNTER      CHIEF COMPLAINT    Chief Complaint   Patient presents with    Fall    Loss of Consciousness       HPI    Roderick Lazo is a 78 y.o. male, previously healthy on who presents with a head injury, laceration, agitation and generalized acute confusion since the onset this morning while playing pickle ball. He was backing up when he lost his balance and struck the back of his head against a wall. The duration has been constant since the onset. The generalized confusion is associated with brief LOC and resultant agitation and bleeding. Pt is not on any anticoagulants. No aggravating or alleviating factors. REVIEW OF SYSTEMS    Neuro: +confusion and agitation  Cardiac: No chest pain or palpitations  Respiratory: No shortness of breath or new cough  General: No fevers  Skin: +scalp laceration   : No dysuria or hematuria  GI: No vomiting or diarrhea  See HPI for further details. All other systems reviewed and are negative. PAST MEDICAL OR SURGICAL HISTORY    Past Medical History:   Diagnosis Date    Hyperlipidemia     Hypertension      History reviewed. No pertinent surgical history. CURRENT MEDICATIONS        ALLERGIES    Allergies   Allergen Reactions    Oxycodone      Wife states gives pt a headache       FAMILY OR SOCIAL HISTORY    History reviewed. No pertinent family history.   Social History     Socioeconomic History    Marital status:      Spouse name: Not on file    Number of children: Not on file    Years of education: Not on file    Highest education level: Not on file   Occupational History    Not on file   Tobacco Use    Smoking status: Never Smoker    Smokeless tobacco: Never Used   Vaping Use    Vaping Use: Never assessed   Substance and Sexual Activity    Alcohol use: Never    Drug use: Never    Sexual activity: Not on file   Other Topics Concern    Not on file   Social History Narrative    Not on file     Social Determinants of Health     Financial Resource Strain:     Difficulty of Paying Living Expenses:    Food Insecurity:     Worried About Running Out of Food in the Last Year:     [address] in the Last Year:    Transportation Needs:     Lack of Transportation (Medical):      Lack of Transportation (Non-Medical):    Physical Activity:     Days of Exercise per Week:     Minutes of Exercise per Session:    Stress:     Feeling of Stress :    Social Connections:     Frequency of Communication with Friends and Family:     Frequency of Social Gatherings with Friends and Family:     Attends [employer] Services:     Active Member of Clubs or Organizations:     Attends Club or Organization Meetings:     Marital Status:    Intimate Partner Violence:     Fear of Current or Ex-Partner:     Emotionally Abused:     Physically Abused:     Sexually Abused:        PHYSICAL EXAM    VITAL SIGNS: /74   Pulse 55   Temp 97.7 °F (36.5 °C) (Oral)   Resp 20   Ht 5' 7\" (1.702 m)   Wt 170 lb 3.1 oz (77.2 kg)   SpO2 98%   BMI 26.66 kg/m²   Constitutional:  Well developed, well nourished, severe acute distress, pale  Eyes:  Pupils equally round and reactive to light, sclera nonicteric  HENT:  3 cm linear laceration to occiput, moist mucous membranes; no palpable crepitus to occiput  NECK: Normal range of motion, no JVD  Respiratory:  No respiratory distress, normal breath sounds, no wheezing   Cardiovascular:  normal rate, normal rhythm, no murmurs   GI:  Soft, nondistended, normal bowel sounds, nontender  Musculoskeletal:  No edema, no acute deformities   Integument:  Skin is warm and dry, no obvious rash    Neurologic: awake, alert, oriented x2 (self and place, not time), tremulous  Psychiatric:  agitated affect, does not appear anxious    Labs Reviewed   COMPREHENSIVE METABOLIC PANEL W/ REFLEX TO MG FOR LOW K - Abnormal; Notable for the following components:       Result Value    CO2 19 (*)     AST 43 (*)     All other following conditions:  CNS failure or compromise    Critical care was time spent personally by me on the following activities:  Examination of patient, evaluation of patient's response to treatment, discussions with consultants, ordering and review of laboratory studies, ordering and performing treatments and interventions, ordering and review of radiographic studies and re-evaluation of patient's condition    Lac Repair    Date/Time: 5/28/2021 6:30 PM  Performed by: Monica Carbajal MD  Authorized by: Nabila Hill MD     Consent:     Consent obtained:  Verbal    Consent given by:  Patient    Risks discussed:  Poor cosmetic result and poor wound healing    Alternatives discussed:  No treatment  Anesthesia (see MAR for exact dosages): Anesthesia method:  None  Laceration details:     Location:  Scalp    Scalp location:  Occipital    Length (cm):  3    Depth (mm):  0.5  Repair type:     Repair type:  Simple  Exploration:     Hemostasis achieved with:  Direct pressure    Wound exploration: entire depth of wound probed and visualized      Wound extent: no fascia violation noted, no foreign bodies/material noted and no nerve damage noted      Contaminated: no    Treatment:     Area cleansed with:  Saline    Amount of cleaning:  Standard    Irrigation solution:  Tap water    Irrigation method:  Tap    Visualized foreign bodies/material removed: no    Skin repair:     Repair method:  Staples    Number of staples:  7  Approximation:     Approximation:  Loose  Post-procedure details:     Dressing:  Open (no dressing)    Patient tolerance of procedure: Tolerated well, no immediate complications        ED COURSE & MEDICAL DECISION MAKING    Pertinent Labs & Imaging studies reviewed and interpreted. (See chart for details)  See chart for details of medications given during the ED stay.     Vitals:    05/28/21 1630 05/28/21 1700 05/28/21 1730 05/28/21 1800   BP: (!) 145/70 122/86 133/83 138/74   Pulse: 53 52 59 55   Resp: 16 15 16 20   Temp:       TempSrc:       SpO2: 100% 100% 95% 98%   Weight:       Height:         EKG Interpretation. EKG Interpretation    Interpreted by emergency department physician on 5/28/21     Rhythm: normal sinus   Rate: 62 bpm  Axis: normal  Ectopy: none  Conduction: normal  ST Segments: no acute change  T Waves: no acute change  Q Waves: none    Clinical Impression: non-specific EKG    Differential diagnosis: closed head injury, ICH, skull fracture, concussion, syncope, Dehydration, potentially/metabolic problem, anemia, infection, hypotension, Stroke, Structural CNS mass lesion, other    FINAL IMPRESSION    1. Intraparenchymal hematoma of right side of brain due to trauma, with loss of consciousness of 30 minutes or less, initial encounter ([facility] [address]    2. Laceration of scalp, initial encounter    3. Closed head injury, initial encounter    4. Skull fracture with cerebral contusion, closed, initial encounter ([facility] [address]        PLAN  MDM - pt did not recall what happened which precipitated his head injury and resultant LOC. It is unclear if there was a syncopal episode which led to the LOC and head injury. His scalp laceration was immediately repaired by me with medical staples followed by CT head and c-spine imaging. His GCS was 14. Pt does not qualify for trauma alert per protocol, but may need consult / trauma activation depending on his CT findings. As far as I know he is not on any anti-coagulants. Right basilar skull fracture avulsed not depressed with right frontal contracoup intra-cerebral hemorrhage, acting agitated. I received call from Dr. Mark Otero of radiology regarding this reading. I also communicated with Dr. Hernandez of trauma surgery regarding pt's injury (formally a trauma consult). Pt may need TXA pending neurosurgery consultation by trauma team.    Pt will need admission to ICU under trauma service.             Carmen Perez MD  05/28/21 3617

## 2021-05-28 NOTE — H&P
Consciousness []No [x]Yes[]Unknown  Unknown Duration(min)  Mechanism of Injury:  []Motor Vehicle crash   []Single Vehicle [] []Passenger []Scene Fatality []Front Seat  []Restrained   []Air Bag Deployed   []Ejected []Rollover []Pedestrian []Trapped   Type of vehicle:   Protective Devices:   []Motorcycle  Wearing Helmet []Yes []No  []Bicycle  Wearing Helmet []Yes []No  [x]Fall   Distance -standing   []Assault    Abuse Reported []Yes []No  []Gunshot  []Stabbing  []Work Related  []Burn: []Flame []Scald []Electrical []Chemical []Contact []Inhalation []House Fire  []Other:   Patient Active Problem List   Diagnosis    Intraparenchymal hemorrhage of brain (HCC)    Closed fracture of right side of base of skull (HCC)    Closed head injury    Scalp laceration, initial encounter     Subjective   Chief Complaint: Fall with laceration to head    History of Present Illness:    He is a 78 y.o. male evaluated for trauma consult, brought by EMS following a mechanical fall with positive LOC unknown duration; past medical history hypertension/hyperlipidemia. Per report of wife at bedside, patient was playing pickle ball when he went for a ball and fell backwards striking his posterior scalp and was unconscious for several moments reported by bystanders. Wife states patient has been acutely confused since incident, and occasionally appears agitated. Patient reports he does not know what is happening. Does not appear to cooperate with commands. Wife denies patient being on any blood thinning medications. Patient vomited once after being in the ED for an extended period. Patient unable to answer ROS due to confusion. Care in coordination with trauma surgeon Dr. Chapo Khalil. Review of Systems:   Review of Systems  Review of systems omitted at this time, pt unable to reliably answer questions secondary to Confusion\"  Patient has no known allergies. History reviewed. No pertinent surgical history.   Past Medical History:   Diagnosis Date    Hyperlipidemia     Hypertension      History reviewed. No pertinent surgical history. Social History     Socioeconomic History    Marital status:      Spouse name: None    Number of children: None    Years of education: None    Highest education level: None   Occupational History    None   Tobacco Use    Smoking status: Never Smoker    Smokeless tobacco: Never Used   Vaping Use    Vaping Use: Never assessed   Substance and Sexual Activity    Alcohol use: Never    Drug use: Never    Sexual activity: None   Other Topics Concern    None   Social History Narrative    None     Social Determinants of Health     Financial Resource Strain:     Difficulty of Paying Living Expenses:    Food Insecurity:     Worried About Running Out of Food in the Last Year:     Ran Out of Food in the Last Year:    Transportation Needs:     Lack of Transportation (Medical):  Lack of Transportation (Non-Medical):    Physical Activity:     Days of Exercise per Week:     Minutes of Exercise per Session:    Stress:     Feeling of Stress :    Social Connections:     Frequency of Communication with Friends and Family:     Frequency of Social Gatherings with Friends and Family:     Attends Hinduism Services:     Active Member of Clubs or Organizations:     Attends Club or Organization Meetings:     Marital Status:    Intimate Partner Violence:     Fear of Current or Ex-Partner:     Emotionally Abused:     Physically Abused:     Sexually Abused:      History reviewed. No pertinent family history.     Home medications:    Previous Medications    LISINOPRIL (PRINIVIL;ZESTRIL) 10 MG TABLET    Take 10 mg by mouth daily       Hospital medications:  Scheduled Meds:  Continuous Infusions:  PRN Meds:  Objective   ED TRIAGE VITALS  BP: 133/71, Temp: 97.9 °F (36.6 °C), Pulse: 52, Resp: 16, SpO2: 100 %  [place] Coma Scale  Eye Opening: Spontaneous  Best Verbal Response: Confused  Best Motor Response: Obeys commands  [place] Coma Scale Score: 14  Results for orders placed or performed during the hospital encounter of 05/28/21   Protime-INR   Result Value Ref Range    INR 0.99 0.85 - 1.13   APTT   Result Value Ref Range    aPTT 22.7 22.0 - 38.0 seconds   Comprehensive Metabolic Panel w/ Reflex to MG   Result Value Ref Range    Glucose 108 70 - 108 mg/dL    CREATININE 0.9 0.4 - 1.2 mg/dL    BUN 17 7 - 22 mg/dL    Sodium 136 135 - 145 meq/L    Potassium reflex Magnesium 4.3 3.5 - 5.2 meq/L    Chloride 102 98 - 111 meq/L    CO2 19 (L) 23 - 33 meq/L    Calcium 9.5 8.5 - 10.5 mg/dL    AST 43 (H) 5 - 40 U/L    Alkaline Phosphatase 40 38 - 126 U/L    Total Protein 7.0 6.1 - 8.0 g/dL    Albumin 4.4 3.5 - 5.1 g/dL    Total Bilirubin 0.6 0.3 - 1.2 mg/dL    ALT 17 11 - 66 U/L   CBC Auto Differential   Result Value Ref Range    WBC 8.0 4.8 - 10.8 thou/mm3    RBC 4.41 (L) 4.70 - 6.10 mill/mm3    Hemoglobin 12.7 (L) 14.0 - 18.0 gm/dl    Hematocrit 40.9 (L) 42.0 - 52.0 %    MCV 92.7 80.0 - 94.0 fL    MCH 28.8 26.0 - 33.0 pg    MCHC 31.1 (L) 32.2 - 35.5 gm/dl    RDW-CV 13.0 11.5 - 14.5 %    RDW-SD 44.1 35.0 - 45.0 fL    Platelets 898 544 - 450 thou/mm3    MPV 10.4 9.4 - 12.4 fL    Seg Neutrophils 49.4 %    Lymphocytes 39.1 %    Monocytes 9.2 %    Eosinophils 1.2 %    Basophils 0.9 %    Immature Granulocytes 0.2 %    Segs Absolute 4.0 1 - 7 thou/mm3    Lymphocytes Absolute 3.1 1.0 - 4.8 thou/mm3    Monocytes Absolute 0.7 0.4 - 1.3 thou/mm3    Eosinophils Absolute 0.1 0.0 - 0.4 thou/mm3    Basophils Absolute 0.1 0.0 - 0.1 thou/mm3    Immature Grans (Abs) 0.02 0.00 - 0.07 thou/mm3    nRBC 0 /100 wbc   Anion Gap   Result Value Ref Range    Anion Gap 15.0 8.0 - 16.0 meq/L   Osmolality   Result Value Ref Range    Osmolality Calc 274.0 (L) 275.0 - 300.0 mOsmol/kg   Glomerular Filtration Rate, Estimated   Result Value Ref Range    Est, Glom Filt Rate 84 (A) ml/min/1.73m2       Physical Exam:  Patient Vitals for the past 24 hrs: BP Temp Temp src Pulse Resp SpO2 Height Weight   05/28/21 1400 133/71   52  100 %     05/28/21 1345 132/88   61  100 %     05/28/21 1330 127/82   53  97 %     05/28/21 1320    (!) 49  100 %     05/28/21 1305 131/76   56  100 %     05/28/21 1250    61  100 %     05/28/21 1224 137/79   62  100 %     05/28/21 1111 122/77 97.9 °F (36.6 °C) Oral 60 16  6' (1.829 m) 175 lb (79.4 kg)     Primary Assessment:  Airway: Patent, trachea midline  Breathing: Breath sounds present and equal bilaterally, spontaneous, and unlabored  Circulation: Hemodynamically stable, 2+peripheral pulses. Disability: KAUR x 4, not following commands. GCS =12    Secondary Assessment:  GENERAL: Presents sitting upright in bed unassisted, A&Ox1 to person, only; In no acute distress and well nourished  HEAD: Laceration to right posterior parietal scalp. No raccoon eyes, allen signs or visible CSF leakage. EYES: No apparent trauma, discharge, or hematoma bilaterally. PERRL at 3mm and unable to assess EOMs secondary to lack of command following. THROAT: Patient not following commands and will not open mouth. NECK:Neck is atraumatic, trachea midline, no visible lacerations, step off deformity, expanding swelling or midline tenderness. CARDIO: No visible chest wall deformity. No heaves or lifts. Strong/regular S1/S2 rate and rhythm. No rubs murmurs, or gallops. 2+ radial, posterior tibial, and dorsalis pedal pulses. Capillary refill <2 sec. No extremity edema noted. PULMONARY:  Trachea midline, no audible wheezing, increased respiratory effort, accessory muscle use, or asymmetrical chest wall movement. Lung sounds are clear and audible to ascultation in superior and inferior fields. ABDOMEN: Abdomen is non distended without lacerations. Abdomen soft and nontender to palpation in all quadrants. MSK: Moving all extremities without obvious pain. No other deformity, contusion, or bleeding.    strength 4/5 and equal bilaterally (patient not following commands so questionable accuracy). No tenderness to palpation at the midline of cervical, thoracic, and lumbar spine. NEURO: Not follows commands, and does not recalls recent events. Remaining neurological exam inconclusive secondary to patient confusion. SKIN: Appropriate for ethnicity, warm and dry. No visible deformity, contusions, abrasions, punctures, burns, tenderness, lacerations, or swelling. WOUND: 3 centimeter laceration noted to right posterior parietal scalp as noted above, was closed prior to evaluation with surgical staples. , No surrounding calor, dolor, rubor. odor, discharge, swelling, bleeding present, or foreign body suspected; wound edges well approximated. Medical Decision Making: On exam patient vital signs stable. Patient work-up previously performed by ED providers. Patient appears to be acutely confused, struggles with fine motor tasks like putting in his hearing aids, intermittently recognizes his wife at bedside, not able to follow any commands. Positive reported loss of consciousness. Consulted neurosurgery for further evaluation and management of intracranial hemorrhage and basilar skull fracture. Admission to ICU. Continue to closely monitor for neurological changes. Repeat scan in 6 hours. Radiology:     CT Head WO Contrast   Final Result    IMPRESSION:  Left frontal lobe acute intraparenchymal hemorrhage   Right basilar skull fracture            **This report has been created using voice recognition software. It may contain minor errors which are inherent in voice recognition technology. **      Final report electronically signed by Dr. Ronna Villalta on 5/28/2021 12:50 PM      CT Cervical Spine WO Contrast   Final Result   Dextroscoliosis. No acute cervical spine fracture. Right basilar skull fracture which is slightly comminuted and has distracted fragment.             **This report has been created using voice

## 2021-05-28 NOTE — PROGRESS NOTES
Pt transported to ICU by RN. Bedside report given. Neuro check completed on transport. 15 min checks will be completed by ICU RN going forward. PT stable. VSS.

## 2021-05-28 NOTE — ED NOTES
PT came in via squad. Squad reported that the pt was playing pickle ball and dove backwards hitting his head on the wall. PT stated that he knows he is in the hospital but he does not know the month or president. PT has a small 1 inch laceration with controled bleeding. PT speech is clear. PT able to follow commands. Provider notified. Provider came to bedside and stapled head laceration. PT tolerated, but complained of severe pain. Provider notified of pain. PT's  wife came in and is at bedside.       Silva Walker RN  05/28/21 1142

## 2021-05-28 NOTE — PROGRESS NOTES
progression, potential IPR candidate    EDUCATION:  Learner: Patient and Significant Other  Education:  Reviewed results and recommendations of this evaluation, Reviewed ST goals and Plan of Care and Reviewed recommendations for follow-up  Evaluation of Education: Needs further instruction and No evidence of learning   *spouse demonstrates an understanding of education without assistance    PLAN:  Skilled SLP intervention on acute care 3-5 x per week or until goals met and/or pt plateaus in function. Specific interventions for next session may include: ?clinical swallow evaluation, expressive and receptive language tasks. PATIENT GOAL:    Did not state. Will further assess during treatment. SHORT TERM GOALS:  Short-term Goals  Timeframe for Short-term Goals: 2 weeks  Goal 1: Pt will complete automatic speech sequences, sentence completion, confrontational naming, and basic verbal fluency tasks with 50% accuracy, max cues to improve overall verbal output. Goal 2: Pt will answer basic yes/no questions and execute 1-step commands with 50% accuracy, max cues to improve comprehension for participation in current setting. Goal 3: Pt will ID words/objects/pictures in F02-3 with 50% accuracy, mod cues to improve recognition skills for contribution to ADL completion. Goal 4: Pt will complete functional carryover tasks (orientation, biographical data, call light, staff relations) with 25% accuracy, max cues to improve awareness to current environment. Goal 5: Complete clinical swallow evaluation upon clearance from neurosurgery to ascertain needs for potential dysphagia management.     LONG TERM GOALS:  No LTG established given short RENAE Cates M.A., [address] [place] [address]

## 2021-05-28 NOTE — ED NOTES
Pt resting in bed with side rails up 2x2 and call light in reach. Vital signs assessed and stable. IV site checked. Pt updated on plan of care. Pt voiced understanding. Pt verbalized no other needs or concerns at this time. Neuro completed.          Bhavik Vizcaino RN  05/28/21 4003

## 2021-05-28 NOTE — ED NOTES
PT in bed. VSS. Neuro completed Trauma team at bedside. PT and family verbalized no other needs.      Kiara Fofana RN  05/28/21 3531

## 2021-05-28 NOTE — CONSULTS
chest pain or palpitations  Respiratory: No shortness of breath or new cough  General: No fevers  Skin: +scalp laceration   : No dysuria or hematuria  GI: No vomiting or diarrhea  See HPI for further details. All other systems reviewed and are negative. PROBLEM LIST:  Patient Active Problem List   Diagnosis    [facility]arenchymal hemorrhage of brain ([facility] [address]    Closed fracture of right side of base of skull (HCC)    Closed head injury    Scalp laceration, initial encounter       MEDICATIONS:   Prior to Admission medications    Medication Sig Start Date End Date Taking?  Authorizing Provider   lisinopril (PRINIVIL;ZESTRIL) 10 MG tablet Take 10 mg by mouth daily   Yes Historical Provider, MD       Current Facility-Administered Medications   Medication Dose Route Frequency Provider Last Rate Last Admin    sodium chloride flush 0.9 % injection 5-40 mL  5-40 mL Intravenous 2 times per day [employer]        sodium chloride flush 0.9 % injection 5-40 mL  5-40 mL Intravenous PRN ADRIANA Fan        0.9 % sodium chloride infusion  25 mL Intravenous PRN [employer]        acetaminophen (TYLENOL) tablet 650 mg  650 mg Oral Q4H PRN ADRIANA Fan        promethazine (PHENERGAN) tablet 12.5 mg  12.5 mg Oral Q6H PRN ADRIANA Fan        Or    ondansetron (ZOFRAN) injection 4 mg  4 mg Intravenous Q6H PRN ADRIANA Fan        polyethylene glycol (GLYCOLAX) packet 17 g  17 g Oral Daily PRN ADRIANA Fan        0.9 % sodium chloride infusion   Intravenous Continuous ADRIANA Fan 125 mL/hr at 05/28/21 1436 New Bag at 05/28/21 1436    fentaNYL (SUBLIMAZE) injection 25 mcg  25 mcg Intravenous Q1H PRN ADRIANA Fan        Or    fentaNYL (SUBLIMAZE) injection 50 mcg  50 mcg Intravenous Q1H PRN ADRIANA Fan        famotidine (PEPCID) injection 20 mg  20 mg Intravenous BID ADRIANA Fan            sodium chloride flush, 5-40 mL, PRN  sodium chloride, 25 mL, PRN  acetaminophen,

## 2021-05-29 ENCOUNTER — APPOINTMENT (OUTPATIENT)
Dept: CT IMAGING | Age: 71
DRG: 087 | End: 2021-05-29
Payer: MEDICARE

## 2021-05-29 PROBLEM — S06.33AA FOCAL HEMORRHAGIC CONTUSION OF CEREBRUM (HCC): Status: ACTIVE | Noted: 2021-05-29

## 2021-05-29 LAB
ALBUMIN SERPL-MCNC: 3.6 G/DL (ref 3.5–5.1)
ALP BLD-CCNC: 37 U/L (ref 38–126)
ALT SERPL-CCNC: 12 U/L (ref 11–66)
ANION GAP SERPL CALCULATED.3IONS-SCNC: 10 MEQ/L (ref 8–16)
AST SERPL-CCNC: 24 U/L (ref 5–40)
BASOPHILS # BLD: 0.2 %
BASOPHILS ABSOLUTE: 0 THOU/MM3 (ref 0–0.1)
BILIRUB SERPL-MCNC: 0.8 MG/DL (ref 0.3–1.2)
BUN BLDV-MCNC: 16 MG/DL (ref 7–22)
CALCIUM SERPL-MCNC: 8.4 MG/DL (ref 8.5–10.5)
CHLORIDE BLD-SCNC: 104 MEQ/L (ref 98–111)
CO2: 19 MEQ/L (ref 23–33)
CREAT SERPL-MCNC: 0.9 MG/DL (ref 0.4–1.2)
EOSINOPHIL # BLD: 0 %
EOSINOPHILS ABSOLUTE: 0 THOU/MM3 (ref 0–0.4)
ERYTHROCYTE [DISTWIDTH] IN BLOOD BY AUTOMATED COUNT: 13.3 % (ref 11.5–14.5)
ERYTHROCYTE [DISTWIDTH] IN BLOOD BY AUTOMATED COUNT: 47.7 FL (ref 35–45)
GFR SERPL CREATININE-BSD FRML MDRD: 84 ML/MIN/1.73M2
GLUCOSE BLD-MCNC: 127 MG/DL (ref 70–108)
HCT VFR BLD CALC: 37.7 % (ref 42–52)
HEMOGLOBIN: 11.4 GM/DL (ref 14–18)
IMMATURE GRANS (ABS): 0.07 THOU/MM3 (ref 0–0.07)
IMMATURE GRANULOCYTES: 0.5 %
LYMPHOCYTES # BLD: 8.2 %
LYMPHOCYTES ABSOLUTE: 1.2 THOU/MM3 (ref 1–4.8)
MCH RBC QN AUTO: 29.5 PG (ref 26–33)
MCHC RBC AUTO-ENTMCNC: 30.2 GM/DL (ref 32.2–35.5)
MCV RBC AUTO: 97.7 FL (ref 80–94)
MONOCYTES # BLD: 8.8 %
MONOCYTES ABSOLUTE: 1.3 THOU/MM3 (ref 0.4–1.3)
NUCLEATED RED BLOOD CELLS: 0 /100 WBC
PLATELET # BLD: 199 THOU/MM3 (ref 130–400)
PMV BLD AUTO: 10.9 FL (ref 9.4–12.4)
POTASSIUM REFLEX MAGNESIUM: 4 MEQ/L (ref 3.5–5.2)
RBC # BLD: 3.86 MILL/MM3 (ref 4.7–6.1)
SEG NEUTROPHILS: 82.3 %
SEGMENTED NEUTROPHILS ABSOLUTE COUNT: 12 THOU/MM3 (ref 1.8–7.7)
SODIUM BLD-SCNC: 133 MEQ/L (ref 135–145)
TOTAL PROTEIN: 6.4 G/DL (ref 6.1–8)
WBC # BLD: 14.6 THOU/MM3 (ref 4.8–10.8)

## 2021-05-29 PROCEDURE — 6370000000 HC RX 637 (ALT 250 FOR IP): Performed by: NURSE PRACTITIONER

## 2021-05-29 PROCEDURE — 99449 NTRPROF PH1/NTRNET/EHR 31/>: CPT | Performed by: PSYCHIATRY & NEUROLOGY

## 2021-05-29 PROCEDURE — 6370000000 HC RX 637 (ALT 250 FOR IP): Performed by: PHYSICIAN ASSISTANT

## 2021-05-29 PROCEDURE — 70450 CT HEAD/BRAIN W/O DYE: CPT

## 2021-05-29 PROCEDURE — 2500000003 HC RX 250 WO HCPCS: Performed by: PHYSICIAN ASSISTANT

## 2021-05-29 PROCEDURE — 2060000000 HC ICU INTERMEDIATE R&B

## 2021-05-29 PROCEDURE — 85025 COMPLETE CBC W/AUTO DIFF WBC: CPT

## 2021-05-29 PROCEDURE — 99233 SBSQ HOSP IP/OBS HIGH 50: CPT | Performed by: NEUROLOGICAL SURGERY

## 2021-05-29 PROCEDURE — 2580000003 HC RX 258: Performed by: PHYSICIAN ASSISTANT

## 2021-05-29 PROCEDURE — 80053 COMPREHEN METABOLIC PANEL: CPT

## 2021-05-29 PROCEDURE — 36415 COLL VENOUS BLD VENIPUNCTURE: CPT

## 2021-05-29 PROCEDURE — 92610 EVALUATE SWALLOWING FUNCTION: CPT

## 2021-05-29 PROCEDURE — 99232 SBSQ HOSP IP/OBS MODERATE 35: CPT | Performed by: SURGERY

## 2021-05-29 PROCEDURE — APPSS30 APP SPLIT SHARED TIME 16-30 MINUTES: Performed by: PHYSICIAN ASSISTANT

## 2021-05-29 PROCEDURE — 6360000002 HC RX W HCPCS: Performed by: PHYSICIAN ASSISTANT

## 2021-05-29 PROCEDURE — 99222 1ST HOSP IP/OBS MODERATE 55: CPT | Performed by: PHYSICAL MEDICINE & REHABILITATION

## 2021-05-29 PROCEDURE — APPSS60 APP SPLIT SHARED TIME 46-60 MINUTES: Performed by: NURSE PRACTITIONER

## 2021-05-29 RX ORDER — DOCUSATE SODIUM 100 MG/1
100 CAPSULE, LIQUID FILLED ORAL 2 TIMES DAILY
Status: DISCONTINUED | OUTPATIENT
Start: 2021-05-29 | End: 2021-06-02 | Stop reason: HOSPADM

## 2021-05-29 RX ORDER — FAMOTIDINE 20 MG/1
20 TABLET, FILM COATED ORAL 2 TIMES DAILY
Status: DISCONTINUED | OUTPATIENT
Start: 2021-05-29 | End: 2021-06-02 | Stop reason: HOSPADM

## 2021-05-29 RX ADMIN — SODIUM CHLORIDE, PRESERVATIVE FREE 10 ML: 5 INJECTION INTRAVENOUS at 08:12

## 2021-05-29 RX ADMIN — ONDANSETRON 4 MG: 2 INJECTION INTRAMUSCULAR; INTRAVENOUS at 02:14

## 2021-05-29 RX ADMIN — SODIUM CHLORIDE: 9 INJECTION, SOLUTION INTRAVENOUS at 23:45

## 2021-05-29 RX ADMIN — LEVETIRACETAM 500 MG: 500 TABLET, FILM COATED ORAL at 08:08

## 2021-05-29 RX ADMIN — ACETAMINOPHEN 650 MG: 325 TABLET ORAL at 12:15

## 2021-05-29 RX ADMIN — FAMOTIDINE 20 MG: 20 TABLET ORAL at 21:41

## 2021-05-29 RX ADMIN — DOCUSATE SODIUM 100 MG: 100 CAPSULE, LIQUID FILLED ORAL at 21:40

## 2021-05-29 RX ADMIN — ACETAMINOPHEN 650 MG: 325 TABLET ORAL at 08:08

## 2021-05-29 RX ADMIN — FAMOTIDINE 20 MG: 10 INJECTION, SOLUTION INTRAVENOUS at 08:08

## 2021-05-29 RX ADMIN — SODIUM CHLORIDE 125 ML/HR: 9 INJECTION, SOLUTION INTRAVENOUS at 06:47

## 2021-05-29 RX ADMIN — SODIUM CHLORIDE, PRESERVATIVE FREE 10 ML: 5 INJECTION INTRAVENOUS at 21:41

## 2021-05-29 RX ADMIN — LEVETIRACETAM 500 MG: 500 TABLET, FILM COATED ORAL at 23:41

## 2021-05-29 ASSESSMENT — ENCOUNTER SYMPTOMS
RHINORRHEA: 0
BACK PAIN: 0
EYE DISCHARGE: 0
ABDOMINAL PAIN: 0
COUGH: 0
TROUBLE SWALLOWING: 0
PHOTOPHOBIA: 1
CONSTIPATION: 0
DIARRHEA: 0
NAUSEA: 0
EYE PAIN: 0
WHEEZING: 0
SORE THROAT: 1
VOMITING: 0
SHORTNESS OF BREATH: 0

## 2021-05-29 ASSESSMENT — PAIN DESCRIPTION - LOCATION
LOCATION: HEAD
LOCATION: HEAD

## 2021-05-29 ASSESSMENT — PAIN SCALES - GENERAL
PAINLEVEL_OUTOF10: 2
PAINLEVEL_OUTOF10: 0
PAINLEVEL_OUTOF10: 3
PAINLEVEL_OUTOF10: 0
PAINLEVEL_OUTOF10: 3

## 2021-05-29 ASSESSMENT — PAIN DESCRIPTION - PROGRESSION
CLINICAL_PROGRESSION: NOT CHANGED

## 2021-05-29 ASSESSMENT — PAIN DESCRIPTION - ORIENTATION
ORIENTATION: POSTERIOR;RIGHT
ORIENTATION: POSTERIOR;RIGHT

## 2021-05-29 ASSESSMENT — PAIN DESCRIPTION - FREQUENCY
FREQUENCY: CONTINUOUS
FREQUENCY: CONTINUOUS

## 2021-05-29 ASSESSMENT — PAIN DESCRIPTION - DESCRIPTORS
DESCRIPTORS: ACHING
DESCRIPTORS: ACHING

## 2021-05-29 ASSESSMENT — PAIN DESCRIPTION - PAIN TYPE
TYPE: ACUTE PAIN
TYPE: ACUTE PAIN

## 2021-05-29 ASSESSMENT — PAIN DESCRIPTION - ONSET
ONSET: ON-GOING
ONSET: ON-GOING

## 2021-05-29 NOTE — PROGRESS NOTES
[address] THERAPY MISSED TREATMENT NOTE  STR NEUROSCIENCES 4A  [id number]/[id number]      Date: 2021  Patient Name: Leslie Ghosh        CSN: [id number]   : 1950  (78 y.o.)  Gender: male                REASON FOR MISSED TREATMENT: Pt has strict bedrest orders, Nurse reports will contact neurosx to get upgraded activity orders. Will check back as time allows.

## 2021-05-29 NOTE — PROGRESS NOTES
Dr. Judson Covarrubias updated on critical CT and CTA results. New order obtained to consult NeuroInterventionalist. [id number] was called and consult is in process.

## 2021-05-29 NOTE — CONSULTS
Physical Medicine & Rehabilitation Consultation Note      Admitting Physician: sEsence Vaughan MD    Primary Care Provider: Kamlesh Todd MD     Reason for Consult:  Intracranial hemorrhage & basilar skull fracture ; for placement for IPR    History of Present Illness:  Christopher Pedersen is a 78 y.o. right-handed  male with history of hypertension and hyperlipidemia, ws admitted to [facility] on 5/28/2021 for mechanical fall. The patient does not remember what happened to him. The last thing he can remember is that he was playing pickle ball. Patient record showed that the patient was playing pickle ball on 5/28/2021. He went after a fall and fell backward with back of his head hitting the ground with loss of consciousness for a short period of time according the witness. He was confused and agitated when he was sent to [facility] ER. He had an episode of vomiting while he was in ER. Initial CT of head showed left frontal lobe acute intraparenchymal hemorrhage and right basilar skull fracture. Neurosurgery was consulted. CT of head repeated few hours later showed interval increase in left frontal lobe contusion with multiple foci of intraparenchymal hemorrhage measuring up to 1.2 cm, interval increase right cerebellar contusion with foci of intraparenchymal hemorrhage measuring up to approximately 1 cm, new small subdural hematomas & trace adjacent subarachnoid blood within left posterior parietal and anterior temporal regions. No surgical intervention was recommended. CTA of neck & head revealed no abnormality other than small focus of venous injury/thrombus within right transverse sinus. Repeated CT of head on 5/29/2021 showed no significant change. The patient currently is oriented to person, place and time other than year. He complains of headache, photophobia, neck pain and some sore throat. He denies having weakness, numbness or tingling sensation.  So far he has been seen by speech therapist only. He has not been evaluated by PT & OT. Current Rehabilitation Assessments:  PT:  Evaluation pending       OT:  evaluation pending   (5/29/2021) REASON FOR MISSED TREATMENT: Pt has strict bedrest orders, Nurse reports will contact neurosx to get upgraded activity orders. Will check back as time allows. ST:    Pt presents with swallow function that is suspected to be essentially [facility] based on findings outlined above. All labial/lingual/pharyngeal structures intact and appear to be functioning appropriately at bedside. Oral phase highly unremarkable during consumption of hard/textured solids with pt demonstrating adequate mastication pattern for textural breakdown, cohesive bolus formation, and manipulation. Thin liquids consumed without overt difficulty and with suspected control/containment of fluid bolus. Patient did consume all PO intake within 35 degree positioning; refused higher positioning despite encouragement d/t c/o 'headache.' Despite 35 degree positioning; NO overt s/s aspiration exhibited across all consistencies/trials consumed, certainly not able to exclude pharyngeal phase dysfunction and airway invasion events in its entirety at bedside alone. Pt's swallow physiology does appear appropriate to support PO intake without distress. Recommend continuation of regular diet with thin liquids. Patient would benefit from skilled dysphagia tx to complete dietary analysis 1-2x with focus on compensatory strategies (upright position) to reduce risk of aspiration. Pt presents with a severe-profound expressive and receptive language deficit as evidenced by skilled clinical findings outlined above. Inability to express BASIC wants/needs s/t prevalence for perseverative speech, neologisms, and semantic and phonemic paraphasias; limited-0 insight into communicative breakdowns with no attempts to self-repair erroneous output.  Significant difficulties for comprehension of basic/yes no questions and for execution of 1-step commands, impacting participation in current medical setting and for contribution to ADLs. Highly anticipate a profound cognitive deficit (oriented to self only via auditory recognition of personal name) with further impairments noted for functional carryover skills. Limitations for sustained attention with frequent needs for re-directions to elicit progression through evaluation. Skilled ST services are recommended in an intensive setting to address the above aforementioned impairments to improve effective communication and safe participation in current+future settings. Past Medical History:        Diagnosis Date    Hyperlipidemia     Hypertension        Past Surgical History:    History reviewed. No pertinent surgical history. Allergies:     Allergies   Allergen Reactions    Codone [Hydrocodone] Other (See Comments)     headache    Oxycodone      Wife states gives pt a headache        Current Medications:   Current Facility-Administered Medications   Medication Dose Route Frequency Provider Last Rate Last Admin    docusate sodium (COLACE) capsule 100 mg  100 mg Oral BID Jason Jose Luis, APRN - CNP        famotidine (PEPCID) tablet 20 mg  20 mg Oral BID Jason Jose Luis, APRN - CNP        sodium chloride flush 0.9 % injection 5-40 mL  5-40 mL Intravenous 2 times per day [facility], [facility] Naveed PA   10 mL at 05/29/21 1418    sodium chloride flush 0.9 % injection 5-40 mL  5-40 mL Intravenous PRN ADRIANA Fan        0.9 % sodium chloride infusion  25 mL Intravenous PRN Linnell Goodpasture, PA        acetaminophen (TYLENOL) tablet 650 mg  650 mg Oral Q4H PRN ADRIANA Fan   650 mg at 05/29/21 1215    promethazine (PHENERGAN) tablet 12.5 mg  12.5 mg Oral Q6H PRN ADRIANA Fan        Or    ondansetron (ZOFRAN) injection 4 mg  4 mg Intravenous Q6H PRN ADRIANA Fan   4 mg at 05/29/21 0214    polyethylene glycol (GLYCOLAX) packet 17 g Occupation: retired in 2018 ; 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 Negative for chills, diaphoresis and fever. HENT: Positive for sore throat. Negative for ear discharge, ear pain, hearing loss, mouth sores, rhinorrhea, sneezing, tinnitus and trouble swallowing. Eyes: Positive for photophobia. Negative for pain, discharge and visual disturbance. Respiratory: Negative for cough, shortness of breath and wheezing. Cardiovascular: Negative for chest pain, palpitations and leg swelling. Gastrointestinal: Negative for abdominal pain, constipation, diarrhea, nausea and vomiting. Endocrine: Negative for cold intolerance and heat intolerance. Genitourinary: Negative for difficulty urinating and dysuria. Musculoskeletal: Positive for neck pain. Negative for arthralgias, back pain, gait problem, joint swelling and myalgias. Skin: Negative for rash. Allergic/Immunologic: Negative for food allergies. Neurological: Positive for headaches. Negative for dizziness, tremors, seizures, speech difficulty, weakness, light-headedness and numbness. Hematological: Does not bruise/bleed easily. Psychiatric/Behavioral: Positive for decreased concentration. Negative for confusion, dysphoric mood, hallucinations and sleep disturbance. The patient is not nervous/anxious.         Physical Exam:  BP (!) 100/56   Pulse 51   Temp 98.3 °F (36.8 °C) (Oral)   Resp 16   Ht 5' 7\" (1.702 m)   Wt 167 lb 12.3 oz (76.1 kg)   SpO2 100%   BMI 26.28 kg/m²   Physical Exam  General:  well-developed, well nourished  male ; in no acute distress ; appropriate affect & mood ; lying on bed comfortably ; prefers dark environment   Eyes: pupil equally round ; extra-ocular motion intact bilaterally  Head, Ear, Nose, Mouth & Throat : normocephalic ; tenderness at posterior head over the occipital region more on the right side ; no tenderness at face ;l no discharge from ears or nose ; no deformity ; no facial swelling ; oral mucosa pink   Neck :  supple ; tenderness with mild muscle spasm at right posterior neck right paraspinal region  Cardiovascular : regular rate & rhythm ; normal S1 & S2 heart sound ; no murmur ; normal peripheral pulse at bilateral upper & lower extremities  Pulmonary : lung clear to auscultation ; no wheezing ; no rale  Gastrointestinal : soft, flat abdomen ; tenderness at right lower quadrant without rebound tenderness ; normal bowel sound present   Back : no tenderness; no muscle spasm  Skin: no skin lesion or rash ; no pitting edema at all 4 extremities  Musculoskeletal : no limb asymmetry; no limb deformity; no tenderness at bilateral upper & lower extremities; no palpable mass at limbs ; no joints laxity or crepitation ; normal functional joints ROM at bilateral upper & lower extremities  Cerebral :  alert ; awake ; oriented to place, person and time (except year - 2001 instead of 2021) ; able to recall all 3/3 items given immediately and about 3 minutes later ; able to repeat series of 5 digit numbers forward and backward in correct order ; abstract thinking intact ; performs serial 7 substraction test for 5 steps and making 3 mistakes ([id number])   Cerebellum : no dysmetria with bilateral finger-to-nose test ; mild dysmetria with bilateral heel-to-shin test  Cranial Nerves :  grossly intact CN II to XII function Sensory : intact light touch and pin prick sensation at bilateral upper & lower extremities  Motor : normal tone at bilateral upper & lower extremities ; normal 5/5 muscle strength at bilateral upper & lower extremities  Reflex : 3+ right knee reflex ; 2+ left knee and bilateral ankles reflexes : 1+ bilateral biceps, left triceps reflexes ; zero right triceps and bilateral brachioradialis reflexes   Pathological Reflex :  No Pedro's sign ; no Babinski sign ; no ankle clonus  Gait :  Not assessed      Diagnostics:  Recent Results (from the past 24 hour(s))   Comprehensive Metabolic Panel w/ Reflex to MG    Collection Time: 05/29/21  3:36 AM   Result Value Ref Range    Glucose 127 (H) 70 - 108 mg/dL    CREATININE 0.9 0.4 - 1.2 mg/dL    BUN 16 7 - 22 mg/dL    Sodium 133 (L) 135 - 145 meq/L    Potassium reflex Magnesium 4.0 3.5 - 5.2 meq/L    Chloride 104 98 - 111 meq/L    CO2 19 (L) 23 - 33 meq/L    Calcium 8.4 (L) 8.5 - 10.5 mg/dL    AST 24 5 - 40 U/L    Alkaline Phosphatase 37 (L) 38 - 126 U/L    Total Protein 6.4 6.1 - 8.0 g/dL    Albumin 3.6 3.5 - 5.1 g/dL    Total Bilirubin 0.8 0.3 - 1.2 mg/dL    ALT 12 11 - 66 U/L   CBC auto differential    Collection Time: 05/29/21  3:36 AM   Result Value Ref Range    WBC 14.6 (H) 4.8 - 10.8 thou/mm3    RBC 3.86 (L) 4.70 - 6.10 mill/mm3    Hemoglobin 11.4 (L) 14.0 - 18.0 gm/dl    Hematocrit 37.7 (L) 42.0 - 52.0 %    MCV 97.7 (H) 80.0 - 94.0 fL    MCH 29.5 26.0 - 33.0 pg    MCHC 30.2 (L) 32.2 - 35.5 gm/dl    RDW-CV 13.3 11.5 - 14.5 %    RDW-SD 47.7 (H) 35.0 - 45.0 fL    Platelets 667 095 - 726 thou/mm3    MPV 10.9 9.4 - 12.4 fL    Seg Neutrophils 82.3 %    Lymphocytes 8.2 %    Monocytes 8.8 %    Eosinophils 0.0 %    Basophils 0.2 %    Immature Granulocytes 0.5 %    Segs Absolute 12.0 (H) 1 - 7 thou/mm3    Lymphocytes Absolute 1.2 1.0 - 4.8 thou/mm3    Monocytes Absolute 1.3 0.4 - 1.3 thou/mm3    Eosinophils Absolute 0.0 0.0 - 0.4 thou/mm3    Basophils Absolute 0.0 0.0 - 0.1 thou/mm3    Immature Grans (Abs) 0.07 0.00 - 0.07 thou/mm3    nRBC 0 /100 wbc   Anion Gap    Collection Time: 05/29/21  3:36 AM   Result Value Ref Range    Anion Gap 10.0 8.0 - 16.0 meq/L   Glomerular Filtration Rate, Estimated    Collection Time: 05/29/21  3:36 AM   Result Value Ref Range    Est, Glom Filt Rate 84 (A) ml/min/1.73m2     CT of head without contrast (5/28/2021 12:50 pm) : Impression   Left frontal lobe acute intraparenchymal hemorrhage   Right basilar skull fracture     CT of cervical spine without contrast (5/28/2021) : Impression   Dextroscoliosis. No acute cervical spine fracture. Right basilar skull fracture which is slightly comminuted and has distracted fragment. CT of head without contrast (5/28/2021 7:55 pm) : Impression   1. Left frontal lobe contusion with multiple foci of intraparenchymal hemorrhage measuring up to 1.2 cm in greatest dimension, with interval increase. Right cerebellar contusion with foci of intraparenchymal hemorrhage measuring up to approximately 1 cm in greatest dimension, with interval increase. New small subdural hematomas and trace adjacent subarachnoid blood products within the left posterior parietal and anterior temporal regions. 2. Right occipital bone fracture, without significant change. CTA of head with & without contrast (5/28/2021) : Impression   1.  Patent head CTA.  No evidence of arterial injury. 2.  Small filling defect within the right transverse sinus, immediately adjacent to the fracture site.  This could represent a small focus of venous injury/thrombus. CTA of neck with & without contrast (5/28/2021) : Impression   Patent neck CTA.  No evidence of vascular injury at the level of the neck. CT of head without contrast (5/29/2021) : Impression   No significant change versus prior.        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 It was my pleasure to evaluate Katie Pacheco today. Please call with questions.     Aurelia Nickerson MD

## 2021-05-29 NOTE — PROGRESS NOTES
Addendum by Dr. Bonnie Simmons MD:  I have seen and examined the patient independently. Face to face evaluation and examination was performed. The below evaluation and note have been reviewed. Labs and radiographs were reviewed. I Have discussed with Neurosurgery PA about this patient in detail. The below assessment and plan have been reviewed. Please see my modifications mentioned below.      My additional comments and modifications:    -Acute event over the night.  -Patient is more awake and oriented today otherwise there is no change in patient rectal exam comparing with yesterday. Repeated brain CT yesterday:    1. Left frontal lobe contusion with multiple foci of intraparenchymal    hemorrhage measuring up to 1.2 cm in greatest dimension, with interval    increase. Right cerebellar contusion with foci of intraparenchymal    hemorrhage measuring up to approximately 1 cm in greatest dimension, with    interval increase. New small subdural hematomas and trace adjacent    subarachnoid blood products within the left posterior parietal and    anterior temporal regions. 2. Right occipital bone fracture, without significant change. Today repeat brain CT showed stable exam.    Brain and neck CTA:   Showed:    1.  Patent head CTA.  No evidence of arterial injury. 2.  Small filling defect within the right transverse sinus, immediately    adjacent to the fracture site.  This could represent a small focus of    venous injury/thrombus.        Patient's recommendation treatment plan from neurosurgical perspective:  · There is no acute neurosurgical event intervention is indicated at this time. · Medical management per ICU team and the patient's primary. · Keep systolic blood pressures less than 160 and above 100. · Seizure precautions. · Brain MRI tomorrow. · inpatient rehab consultation. · Neurosurgery will follow.   · The case was discussed in detail with the ICU team patient family.  Jessica Fiore, MD         Neurosurgery Progress Note    Patient:  Stephanie Burciaga      Unit/Bed:4D-11/011-A    YOB: 1950    MRN: [id number]     Acct: [de-identified]     Admit date: 5/28/2021    Chief Complaint   Patient presents with    Fall    Loss of Consciousness       Patient Seen, Chart, Physician notes, Labs, Radiology studies reviewed. Subjective: Patient is seen and evaluated in the ICU setting with evaluation exam findings reviewed and discussed with Dr. Gwenetta Osler with nursing. Patient is more alert and interactive and oriented today with pain moderately controlled. Past, Family, Social History unchanged from admission. Diet:  Diet NPO Effective Now    Medications:  Scheduled Meds:   sodium chloride flush  5-40 mL Intravenous 2 times per day    famotidine (PEPCID) injection  20 mg Intravenous BID    levETIRAcetam  500 mg Oral BID     Continuous Infusions:   sodium chloride      sodium chloride 125 mL/hr (05/29/21 0647)     PRN Meds:sodium chloride flush, sodium chloride, acetaminophen, promethazine **OR** ondansetron, polyethylene glycol, fentanNYL **OR** fentanNYL    Objective: Patient is lying in bed on his right side, comfortably with pain moderately controlled. He remained stable and intact to his baseline on exam with some improvement for orientation now oriented to name place and day. Vitals: BP (!) 89/57   Pulse 50   Temp 97.8 °F (36.6 °C) (Oral)   Resp 14   Ht 5' 7\" (1.702 m)   Wt 167 lb 12.3 oz (76.1 kg)   SpO2 99%   BMI 26.28 kg/m²   Physical Exam:  Alert and attentive and more interactive. Language appropriate, with expressive aphasia (improving). Pupils equal.  Facial strength symmetric. Physical Exam  Patient remained stable and intact his baseline with improved orientation and pain moderately controlled. ROS:  Review of Systems  Patient complains of fatigue and persistent headache and soreness at the contusion site.   He denies chest pain or shortness of breath, nausea or vomiting. A more comprehensive review of systems is unobtainable due to continued patient status    24 hour intake/output:    Intake/Output Summary (Last 24 hours) at 5/29/2021 1106  Last data filed at 5/29/2021 0500  Gross per 24 hour   Intake 1644 ml   Output 1100 ml   Net 544 ml     Last 3 weights: Wt Readings from Last 3 Encounters:   05/29/21 167 lb 12.3 oz (76.1 kg)         CBC:   Recent Labs     05/28/21  1120 05/29/21  0336   WBC 8.0 14.6*   HGB 12.7* 11.4*    199     BMP:    Recent Labs     05/28/21  1120 05/29/21  0336    133*   K 4.3 4.0    104   CO2 19* 19*   BUN 17 16   CREATININE 0.9 0.9   GLUCOSE 108 127*     Calcium:  Recent Labs     05/29/21  0336   CALCIUM 8.4*     Magnesium:No results for input(s): MG in the last 72 hours. Glucose:No results for input(s): POCGLU in the last 72 hours. HgbA1C: No results for input(s): LABA1C in the last 72 hours. Lipids: No results for input(s): CHOL, TRIG, HDL, LDLCALC in the last 72 hours. Invalid input(s): LDL    Radiology reports as per the Radiologist  Radiology: CTA HEAD W WO CONTRAST    Result Date: 5/28/2021   CT angiography head with contrast. 3D Postprocessing. Comparison:  CT,SR  - CT HEAD WO CONTRAST  - 05/28/2021 06:15 PM EDT Findings: No extravasation of contrast. Intracranial arteries are patent. No aneurysm, dissection, or occlusion. Subcentimeter circumscribed filling defect within the right transverse sinus. This is nonocclusive. Remaining paranasal sinuses are patent with an unremarkable appearance. Right occipital bone fracture and multifocal intracranial hemorrhage. Please see CT head report for further details. 1.  Patent head CTA. No evidence of arterial injury. 2.  Small filling defect within the right transverse sinus, immediately adjacent to the fracture site. This could represent a small focus of venous injury/thrombus.  This document has been electronically signed by: Tammy Russell MD on 05/28/2021 08:31 PM All CTs at this facility use dose modulation techniques and iterative reconstructions, and/or weight-based dosing when appropriate to reduce radiation to a low as reasonably achievable. CT HEAD WO CONTRAST    Result Date: 5/29/2021  CT head without contrast Comparison:  CT,SR  - CT HEAD WO CONTRAST  - 05/28/2021 06:15 PM EDT Findings: No significant change in the left frontal hemorrhagic contusions with surrounding vasogenic edema and mild subdural extension. No significant change in right cerebral hemorrhagic contusion with surrounding edema. No new hemorrhage. No midline shift or axial herniation. Gray-white differentiation maintained without anoxic changes. No sinus fluid levels. Stable multifocal right occipital fractures including one component extending to the posterolateral rim of the foramen magnum. Impression No significant change versus prior. This document has been electronically signed by: Odilon Snyder MD on 05/29/2021 04:10 AM All CTs at this facility use dose modulation techniques and iterative reconstructions, and/or weight-based dosing when appropriate to reduce radiation to a low as reasonably achievable. CT head without contrast    Result Date: 5/28/2021   CT head without contrast Comparison:  CT,SR  - CT HEAD WO CONTRAST  - 05/28/2021 12:09 PM EDT Findings: No mass or mass-effect. No midline shift. No significant atrophy-like change or white matter disease. Postsurgical changes of the paranasal sinuses. No air-fluid levels. The orbits are within normal limits. 1. Left frontal lobe contusion with multiple foci of intraparenchymal hemorrhage measuring up to 1.2 cm in greatest dimension, with interval increase. Right cerebellar contusion with foci of intraparenchymal hemorrhage measuring up to approximately 1 cm in greatest dimension, with interval increase.  New small subdural hematomas and trace adjacent subarachnoid blood products within the left posterior parietal and anterior temporal regions. 2. Right occipital bone fracture, without significant change. This document has been electronically signed by: Maxine Russell MD on 05/28/2021 07:55 PM All CTs at this facility use dose modulation techniques and iterative reconstructions, and/or weight-based dosing when appropriate to reduce radiation to a low as reasonably achievable. CT Head WO Contrast    Result Date: 5/28/2021  PROCEDURE: CT HEAD WO CONTRAST CLINICAL INFORMATION: AMs, head injury . COMPARISON: No prior study. TECHNIQUE: 2-D multiplanar noncontrast CT brain All CT scans at this facility use dose modulation, iterative reconstruction, and/or weight-based dosing when appropriate to reduce radiation dose to as low as reasonably achievable. FINDINGS: There is a left frontal intracerebral hemorrhage this is in the supraorbital left frontal lobe. The serpiginous acute hemorrhage. There is smooth no evidence of a subdural hemorrhage. No shift or mass effect. Gray-white differentiation is maintained. Ventricles are normal. There is a right basilar skull fracture seen on images 4-11 At the very base there is some avulsion of this fragment there is no depression skin staples are present over the right occipital lacerations. There is chronic right maxillary sinus disease and postsurgical changes right maxillary sinus. Results discussed with Dr. Tesha Wright. IMPRESSION:  Left frontal lobe acute intraparenchymal hemorrhage Right basilar skull fracture **This report has been created using voice recognition software. It may contain minor errors which are inherent in voice recognition technology. ** Final report electronically signed by Dr. Micheal Tijerina on 5/28/2021 12:50 PM    CTA NECK W WO CONTRAST    Result Date: 5/28/2021  CT angiography neck with contrast. 3D Postprocessing. Comparison: None Findings: Aortic arch and cervical great vessels are patent. Visualized intracranial arteries are patent.  No aneurysm, dissection, or occlusion. Thyroid gland unremarkable. No cervical mass or fluid collection. Lung apices clear. No acute fracture at the level of the neck. Patent neck CTA. No evidence of vascular injury at the level of the neck. This document has been electronically signed by: Lexi Shearer MD on 05/28/2021 08:46 PM All CTs at this facility use dose modulation techniques and iterative reconstructions, and/or weight-based dosing when appropriate to reduce radiation to a low as reasonably achievable. Carotid stenosis and measurements are in accordance with NASCET criteria. CT Cervical Spine WO Contrast    Result Date: 5/28/2021  PROCEDURE: CT CERVICAL SPINE WO CONTRAST CLINICAL INFORMATION: trauma to head, AMS 14 . COMPARISON: No prior study. TECHNIQUE: 2-D multiplanar noncontrast CT cervical spine bone windows only. All CT scans at this facility use dose modulation, iterative reconstruction, and/or weight-based dosing when appropriate to reduce radiation dose to as low as reasonably achievable. FINDINGS: There is no acute fracture or acute bony malalignment. The right basilar skull fracture can be seen with avulsed or distracted fragments. There is no foraminal or central encroachment. There is no precervical soft tissue swelling. Dextroscoliosis. No acute cervical spine fracture. Right basilar skull fracture which is slightly comminuted and has distracted fragment. **This report has been created using voice recognition software. It may contain minor errors which are inherent in voice recognition technology. ** Final report electronically signed by Dr. Ronna Villalta on 5/28/2021 12:56 PM    A/P: S/P patient is seen and evaluated in the ICU setting with evaluation exam findings reviewed and discussed with Dr. Rafaela Castro and with nursing. Patient is resting comfortably on his right side this morning with pain moderately controlled and was more alert and oriented on exam today.   He remained stable and intact to his baseline, neurologically, with no significant changes noted the patient chart overnight. CT scan shows no significant change from prior imaging with no new hemorrhages noted. A CTA of the head and neck were evaluated with no evidence of injury for CTA of the neck and a small focus noted on the CTA of the head. An MRI is ordered with and without contrast for review in the morning. Neurosurgery recommends a consult for inpatient rehabilitation and a transition from the ICU setting to the floor as space is available and additional criteria are met.   Neurosurgery to follow    Electronically signed by Tyrel Izaguirre PA-C on 5/29/2021 at 11:06 AM

## 2021-05-29 NOTE — PROGRESS NOTES
This nurse spoke with Dr. Jen Mchugh with [facility]'s neurointerventionalist team. No new orders given, no anticoagulents, and no transfer is needed. Dr. Juan Way suggests MRI/MRV with and without contrast prior to discharge to determine cause of aphasia. Pt's wife Kena Bell has been updated with new info.

## 2021-05-29 NOTE — PLAN OF CARE
Problem: Falls - Risk of:  Goal: Will remain free from falls  Description: Will remain free from falls  Outcome: Ongoing  Note: Fall risk wrist band and fall sign in place. Patient demonstrates proper use at this time. Problem: HEMODYNAMIC STATUS  Goal: Patient has stable vital signs and fluid balance  Outcome: Ongoing  Note: Vital signs stable at this time. Problem: ACTIVITY INTOLERANCE/IMPAIRED MOBILITY  Goal: Mobility/activity is maintained at optimum level for patient  Outcome: Ongoing  Note: Patient is in bed at this time. Problem: COMMUNICATION IMPAIRMENT  Goal: Ability to express needs and understand communication  Outcome: Ongoing  Note: Patient is able to express needs at this time. Care plan reviewed with patient and spouse. Patient and spouse verbalize understanding of the plan of care and contribute to goal setting.

## 2021-05-29 NOTE — PROGRESS NOTES
[facility]  Daily Progress Note  Pt Name: Shirley Marie  Medical Record Number: [id number]  Date of Birth 1950   Today's Date: 5/29/2021    HD: # 1    CC: Headache    ASSESSMENT  1. Active Hospital Problems    Diagnosis Date Noted    Right and left hemorrhagic contusion of cerebrum ([facility] [address] [L42.278W] 05/29/2021    Intraparenchymal hematoma of right side of brain due to trauma [facility]) [S06.340A] 05/28/2021    Closed fracture of right side of base of skull ([facility] [address] [S02.101A] 05/28/2021    Closed head injury [S09.90XA] 05/28/2021    Scalp laceration, initial encounter [S01.01XA] 05/28/2021         PLAN  Patient admitted under Trauma Services to ICU with telemetry   - OK to transfer out to  today      Left intraparenchymal hemorrhage              -Neurosurgery managing non operatively               -Neuro checks per protocol    -Seizure prophylaxis with Keppra              -Maintain systolic blood pressure between 100-160              -Repeat head CT in 6 hours was noted to have interval increase, TXA administered   -Repeat head CT this morning was stable       Right basilar skull fracture              -Neurosurgery managing non operatively at this time              -CTA notes filling defect within right transverse sinus by fracture site, possible venous injury/thrombus.   Neurointerventionalist was contacted, recommended MRI/MRV              -Neuro checks per protocol     Closed head injury              - SLP cog eval indicates severe-profound expressive and receptive language deficit               - Limited stimulation brain injury guidelines              - Monitor for postconcussive symptoms              - Neuro checks              - Anti emetics and pain control     Right posterior parietal scalp laceration              -Closed in ED with 8 surgical staples              -Staples to be removed in 10-14 days (6/76/11)     Mechanical fall   -PT, OT eval and treat     Pain Management              -Morphine, Norco, Tylenol     Prophylaxis: SCD's, Incentive Spirometry, GlycoLax, Pepcid, Zofran     Diet as per speech therapy      IVF Management  Regular Neurovascular Checks  Repeat Labs Tomorrow AM  PT/OT/SLP Eval and Treat  Bedrest until further evaluation by neurosurgery     Planned Discharge discharge pending clinical course   - Will need continued therapy at discharge. PM&R consulted.         SUBJECTIVE  Billy Benson continues in the ICU. He complains of a headache. His expressive & receptive aphasia has improved, however he does continue to have difficulties, it is just not as bad as it was upon arrival to the hospital.  He is still NPO, awaiting speech therapy to advise on what diet he may have. Neurosurgery has cleared for Billy Benson to be transferred to the Neuro unit. Awaiting an MRI to be completed, possibly today. Wife at bedside. Limited stimulation environment being maintained. Billy Benson is taking Tylenol for pain and states it is helping. Wife is concerned about the ecchymosis that is developing around the left eye, she was unaware this was related to the basilar skull fracture. All wife's questions answered to her satisfaction. Care coordinated with trauma surgeon, Dr. Karla Cherry.      Wt Readings from Last 3 Encounters:   05/29/21 167 lb 12.3 oz (76.1 kg)     Temp Readings from Last 3 Encounters:   05/29/21 97.8 °F (36.6 °C) (Oral)     BP Readings from Last 3 Encounters:   05/29/21 106/62     Pulse Readings from Last 3 Encounters:   05/29/21 61       24 HR INTAKE/OUTPUT :     Intake/Output Summary (Last 24 hours) at 5/29/2021 1004  Last data filed at 5/29/2021 0500  Gross per 24 hour   Intake 1644 ml   Output 1100 ml   Net 544 ml     Diet NPO Effective Now    OBJECTIVE  CURRENT VITALS /62   Pulse 61   Temp 97.8 °F (36.6 °C) (Oral)   Resp 15   Ht 5' 7\" (1.702 m)   Wt 167 lb 12.3 oz (76.1 kg)   SpO2 99%   BMI 26.28 kg/m²   GENERAL: alert, cooperative, no distress  NEURO: awake, alert and oriented to person and place with intermittent inappropriate answers. PERRL. No focal neurological deficits  LUNGS: clear to auscultation bilaterally- no wheezes, rales or rhonchi, normal air movement, no respiratory distress  HEART: normal rate, normal S1 and S2, no gallops, intact distal pulses and no carotid bruits  ABDOMEN: soft, non-tender, non-distended, normal bowel sounds, no masses or organomegaly  WOUNDS: Staples intact to posterior scalp laceration with minimal serosanguinous drainage noted to pillowcase. EXTREMITY: no cyanosis, no clubbing and no edema      LABS  CBC :   Recent Labs     05/28/21 1120 05/29/21  0336   WBC 8.0 14.6*   HGB 12.7* 11.4*   HCT 40.9* 37.7*   MCV 92.7 97.7*    199     BMP:   Recent Labs     05/28/21 1120 05/29/21  0336    133*   K 4.3 4.0    104   CO2 19* 19*   BUN 17 16   CREATININE 0.9 0.9     COAGS:   Recent Labs     05/28/21 1120 05/29/21  0336   APTT 22.7  --    PROT 7.0 6.4   INR 0.99  --      Pancreas/HFP:  No results for input(s): LIPASE, AMYLASE in the last 72 hours. Recent Labs     05/28/21 1120 05/29/21  0336   AST 43* 24   ALT 17 12   BILITOT 0.6 0.8   ALKPHOS 40 37*       RADIOLOGY:    Narrative   CT head without contrast       Comparison:  CT,SR  - CT HEAD WO CONTRAST  - 05/28/2021 06:15 PM EDT       Findings:       No significant change in the left frontal hemorrhagic contusions with    surrounding vasogenic edema and mild subdural extension. No significant change in right cerebral hemorrhagic contusion with    surrounding edema. No new hemorrhage.  No midline shift or axial herniation. Gray-white differentiation maintained without anoxic changes. No sinus fluid levels.    Stable multifocal right occipital fractures including one component    extending to the posterolateral rim of the foramen magnum.       Impression   No significant change versus prior.       This document has been electronically signed by: Corin Allen MD on    05/29/2021 04:10 AM       All CTs at this facility use dose modulation techniques and iterative    reconstructions, and/or weight-based dosing   when appropriate to reduce radiation to a low as reasonably achievable. Narrative   ** ADDENDUM #1 **   This report was discussed with Cielo Cruz RN on May 28, 2021 20:41:00    EDT.       This document has been electronically signed by: Oscar Hughes on    05/28/2021 08:42 PM   ** ORIGINAL REPORT **   CT angiography head with contrast. 3D Postprocessing.       Comparison:  CT,SR  - CT HEAD WO CONTRAST  - 05/28/2021 06:15 PM EDT       Findings:   No extravasation of contrast.   Intracranial arteries are patent. No aneurysm, dissection, or occlusion. Subcentimeter circumscribed filling defect within the right transverse    sinus.  This is nonocclusive.  Remaining paranasal sinuses are patent with    an unremarkable appearance.       Right occipital bone fracture and multifocal intracranial hemorrhage.     Please see CT head report for further details.           Impression   1.  Patent head CTA.  No evidence of arterial injury. 2.  Small filling defect within the right transverse sinus, immediately    adjacent to the fracture site.  This could represent a small focus of    venous injury/thrombus.       This document has been electronically signed by: Carissa Qureshi MD on    05/28/2021 08:31 PM       All CTs at this facility use dose modulation techniques and iterative    reconstructions, and/or weight-based dosing   when appropriate to reduce radiation to a low as reasonably achievable. Narrative   CT angiography neck with contrast. 3D Postprocessing.       Comparison: None       Findings: Aortic arch and cervical great vessels are patent. Visualized intracranial arteries are patent. No aneurysm, dissection, or    occlusion.       Thyroid gland unremarkable.  No cervical mass or fluid collection. Lung apices clear. No acute fracture at the level of the neck.           Impression   Patent neck CTA.  No evidence of vascular injury at the level of the neck.       This document has been electronically signed by: Juan Luis Jenkins MD on    05/28/2021 08:46 PM       All CTs at this facility use dose modulation techniques and iterative    reconstructions, and/or weight-based dosing   when appropriate to reduce radiation to a low as reasonably achievable.       Carotid stenosis and measurements are in accordance with NASCET criteria. Narrative   ** ADDENDUM #1 **   This report was discussed with Yuliya Perdomo RN on May 28, 2021 20:03:00    EDT.       This document has been electronically signed by: Marianela Blank on    05/28/2021 08:03 PM   ** ORIGINAL REPORT **   CT head without contrast       Comparison:  CT,SR  - CT HEAD WO CONTRAST  - 05/28/2021 12:09 PM EDT       Findings:   No mass or mass-effect. No midline shift. No significant atrophy-like change or white matter disease.       Postsurgical changes of the paranasal sinuses. No air-fluid levels. The orbits are within normal limits.           Impression   1. Left frontal lobe contusion with multiple foci of intraparenchymal    hemorrhage measuring up to 1.2 cm in greatest dimension, with interval    increase. Right cerebellar contusion with foci of intraparenchymal    hemorrhage measuring up to approximately 1 cm in greatest dimension, with    interval increase. New small subdural hematomas and trace adjacent    subarachnoid blood products within the left posterior parietal and    anterior temporal regions.    2. Right occipital bone fracture, without significant change.       This document has been electronically signed by: Juan Luis Jenkins MD on    05/28/2021 07:55 PM       All CTs at this facility use dose modulation techniques and iterative    reconstructions, and/or weight-based dosing   when appropriate to reduce radiation to a low as reasonably achievable. Electronically signed by Georjean Essex, APRN - CNP on 5/29/2021 at 10:05 AM  Patient seen and examined independently by me 5/29/2021    Vitals and Graphics reviewed       Labs, cultures, and radiographs where available were reviewed. I discussed patient concerns with the patient's nurse and instructions were given. Please see our orders if there are any new ones for the updated patient care plan. Above discussed and I agree with Randy Lopez CNP. See my additional comments below for updated orders and plan.   CT head shows new small sdh, and sah but cleared by NS for move out  Transfert to [facility] eval

## 2021-05-29 NOTE — PROGRESS NOTES
[facility]  SPEECH THERAPY  [facility] NEUROSCIENCES 4A  Clinical Swallow Evaluation      SLP Individual Minutes  Time In:   Time Out:   Minutes: 8  Timed Code Treatment Minutes: 0 Minutes       Date: 2021  Patient Name: Roderick Lazo      CSN: [id number]   : 1950  (78 y.o.)  Gender: male   Referring Physician:  ADRIANA Martínez  Diagnosis:  Intraparenchymal hemorrhage of brain   Secondary Diagnosis: Dysphagia, Expressive and receptive language deficits, cognitive impairment  History of Present Illness/Injury: Pt admitted to [facility] with above med dx; please refer to physician H&P for full details. Per chart review, \"70 y. o. male evaluated for trauma consult, brought by EMS following a mechanical fall with positive LOC unknown duration; past medical history hypertension/hyperlipidemia. Per report of wife at bedside, patient was playing pickle ball when he went for a ball and fell backwards striking his posterior scalp and was unconscious for several moments reported by bystanders. Aly Cheng states patient has been acutely confused since incident, and occasionally appears agitated.  Patient reports he does not know what is happening.  Does not appear to cooperate with commands. \" CT head:      IMPRESSION:  Left frontal lobe acute intraparenchymal hemorrhage   Right basilar skull fracture           ST completed speech/langauge/cognitive evaluation  with recommendations to target expressive/receptive language deficits + cognitive deficits + complete clinical swallow evaluation following clearance from neurosurgery. Patient with recent transfer from ICU to ; NORMA Ferrell Bottom reporting \"patient cleared for PO intake from neurosurgery and trauma, PO diet pending ST clinical recommendations. Past Medical History:   Diagnosis Date    Hyperlipidemia     Hypertension        SUBJECTIVE:  Patient seen at bedside; alert and pleasant. No family present.  Clinical Swallow Evaluation only completed this date d/t Dr. Michael Neumann entering room. OBJECTIVE:    Pain:  3/10 - Pain location: head; HOWEVER, patient rating head pain 3/10- refusing to sit upright higher than 35 degress, patient also holding \"head tight\" RN Magy notified    Current Diet: NPO; no means of nutrition at this time     Respiratory Status:  Independent    Behavioral Observation:  Alert, Confused, Limited Direction Following and Cooperative    Oral Mechanism Evaluation:      Facial / Labial WFL    Lingual WFL    Dentition WFL    Velum WFL    Vocal Quality WFL    Sensation WFL    Cough Not Tested      Patient Evaluated Using: Thin liquids via cup and straw, puree, hard solids     Oral Phase:  WFL    Pharyngeal Phase: WFL:  Pharyngeal phase appears WFL but cannot rule out pharyngeal phase deficits from a bedside swallowing evaluation alone. Signs and Symptoms of Laryngeal Penetration/Aspiration: No signs/symptoms of aspiration evident in this evaluation, but cannot rule out silent aspiration. Impresssions: Pt presents with swallow function that is suspected to be essentially [facility] based on findings outlined above. All labial/lingual/pharyngeal structures intact and appear to be functioning appropriately at bedside. Oral phase highly unremarkable during consumption of hard/textured solids with pt demonstrating adequate mastication pattern for textural breakdown, cohesive bolus formation, and manipulation. Thin liquids consumed without overt difficulty and with suspected control/containment of fluid bolus. Patient did consume all PO intake within 35 degree positioning; refused higher positioning despite encouragement d/t c/o 'headache.' Despite 35 degree positioning; NO overt s/s aspiration exhibited across all consistencies/trials consumed, certainly not able to exclude pharyngeal phase dysfunction and airway invasion events in its entirety at bedside alone.  Pt's swallow physiology does appear appropriate to support PO intake functional carryover tasks (orientation, biographical data, call light, staff relations) with 25% accuracy, max cues to improve awareness to current environment.   Goal 5: Patient will consume regular diet with thin liquids without s/s of aspiration in order to safley maintain adequate hydration and nutrition    LONG TERM GOALS:  No LTGs due to [address]

## 2021-05-30 ENCOUNTER — APPOINTMENT (OUTPATIENT)
Dept: MRI IMAGING | Age: 71
DRG: 087 | End: 2021-05-30
Payer: MEDICARE

## 2021-05-30 LAB
ALBUMIN SERPL-MCNC: 3.6 G/DL (ref 3.5–5.1)
ALP BLD-CCNC: 31 U/L (ref 38–126)
ALT SERPL-CCNC: 11 U/L (ref 11–66)
ANION GAP SERPL CALCULATED.3IONS-SCNC: 7 MEQ/L (ref 8–16)
AST SERPL-CCNC: 20 U/L (ref 5–40)
BILIRUB SERPL-MCNC: 0.7 MG/DL (ref 0.3–1.2)
BUN BLDV-MCNC: 15 MG/DL (ref 7–22)
CALCIUM SERPL-MCNC: 8.2 MG/DL (ref 8.5–10.5)
CHLORIDE BLD-SCNC: 109 MEQ/L (ref 98–111)
CO2: 23 MEQ/L (ref 23–33)
CREAT SERPL-MCNC: 0.8 MG/DL (ref 0.4–1.2)
ERYTHROCYTE [DISTWIDTH] IN BLOOD BY AUTOMATED COUNT: 13.3 % (ref 11.5–14.5)
ERYTHROCYTE [DISTWIDTH] IN BLOOD BY AUTOMATED COUNT: 45.3 FL (ref 35–45)
GFR SERPL CREATININE-BSD FRML MDRD: > 90 ML/MIN/1.73M2
GLUCOSE BLD-MCNC: 113 MG/DL (ref 70–108)
HCT VFR BLD CALC: 31 % (ref 42–52)
HEMOGLOBIN: 9.8 GM/DL (ref 14–18)
MCH RBC QN AUTO: 29.3 PG (ref 26–33)
MCHC RBC AUTO-ENTMCNC: 31.6 GM/DL (ref 32.2–35.5)
MCV RBC AUTO: 92.5 FL (ref 80–94)
PLATELET # BLD: 168 THOU/MM3 (ref 130–400)
PMV BLD AUTO: 10.9 FL (ref 9.4–12.4)
POTASSIUM SERPL-SCNC: 4.1 MEQ/L (ref 3.5–5.2)
RBC # BLD: 3.35 MILL/MM3 (ref 4.7–6.1)
SODIUM BLD-SCNC: 139 MEQ/L (ref 135–145)
TOTAL PROTEIN: 6 G/DL (ref 6.1–8)
WBC # BLD: 9.7 THOU/MM3 (ref 4.8–10.8)

## 2021-05-30 PROCEDURE — 85027 COMPLETE CBC AUTOMATED: CPT

## 2021-05-30 PROCEDURE — 97535 SELF CARE MNGMENT TRAINING: CPT

## 2021-05-30 PROCEDURE — 70553 MRI BRAIN STEM W/O & W/DYE: CPT

## 2021-05-30 PROCEDURE — 99232 SBSQ HOSP IP/OBS MODERATE 35: CPT | Performed by: SURGERY

## 2021-05-30 PROCEDURE — 97110 THERAPEUTIC EXERCISES: CPT

## 2021-05-30 PROCEDURE — 6370000000 HC RX 637 (ALT 250 FOR IP): Performed by: PHYSICIAN ASSISTANT

## 2021-05-30 PROCEDURE — 2580000003 HC RX 258: Performed by: PHYSICIAN ASSISTANT

## 2021-05-30 PROCEDURE — 6360000004 HC RX CONTRAST MEDICATION: Performed by: PHYSICIAN ASSISTANT

## 2021-05-30 PROCEDURE — 97165 OT EVAL LOW COMPLEX 30 MIN: CPT

## 2021-05-30 PROCEDURE — A9579 GAD-BASE MR CONTRAST NOS,1ML: HCPCS | Performed by: PHYSICIAN ASSISTANT

## 2021-05-30 PROCEDURE — 99233 SBSQ HOSP IP/OBS HIGH 50: CPT | Performed by: NEUROLOGICAL SURGERY

## 2021-05-30 PROCEDURE — 36415 COLL VENOUS BLD VENIPUNCTURE: CPT

## 2021-05-30 PROCEDURE — 6370000000 HC RX 637 (ALT 250 FOR IP): Performed by: NURSE PRACTITIONER

## 2021-05-30 PROCEDURE — APPSS45 APP SPLIT SHARED TIME 31-45 MINUTES: Performed by: PHYSICIAN ASSISTANT

## 2021-05-30 PROCEDURE — 80053 COMPREHEN METABOLIC PANEL: CPT

## 2021-05-30 PROCEDURE — APPSS30 APP SPLIT SHARED TIME 16-30 MINUTES: Performed by: PHYSICIAN ASSISTANT

## 2021-05-30 PROCEDURE — 2060000000 HC ICU INTERMEDIATE R&B

## 2021-05-30 RX ADMIN — LEVETIRACETAM 500 MG: 500 TABLET, FILM COATED ORAL at 20:20

## 2021-05-30 RX ADMIN — DOCUSATE SODIUM 100 MG: 100 CAPSULE, LIQUID FILLED ORAL at 20:20

## 2021-05-30 RX ADMIN — ACETAMINOPHEN 650 MG: 325 TABLET ORAL at 10:23

## 2021-05-30 RX ADMIN — FAMOTIDINE 20 MG: 20 TABLET ORAL at 10:23

## 2021-05-30 RX ADMIN — GADOTERIDOL 15 ML: 279.3 INJECTION, SOLUTION INTRAVENOUS at 09:09

## 2021-05-30 RX ADMIN — DOCUSATE SODIUM 100 MG: 100 CAPSULE, LIQUID FILLED ORAL at 10:23

## 2021-05-30 RX ADMIN — LEVETIRACETAM 500 MG: 500 TABLET, FILM COATED ORAL at 10:22

## 2021-05-30 RX ADMIN — SODIUM CHLORIDE, PRESERVATIVE FREE 10 ML: 5 INJECTION INTRAVENOUS at 20:20

## 2021-05-30 RX ADMIN — FAMOTIDINE 20 MG: 20 TABLET ORAL at 20:20

## 2021-05-30 ASSESSMENT — PAIN DESCRIPTION - PROGRESSION

## 2021-05-30 ASSESSMENT — PAIN SCALES - GENERAL
PAINLEVEL_OUTOF10: 0
PAINLEVEL_OUTOF10: 0
PAINLEVEL_OUTOF10: 1

## 2021-05-30 ASSESSMENT — PAIN DESCRIPTION - DESCRIPTORS
DESCRIPTORS: ACHING
DESCRIPTORS: ACHING

## 2021-05-30 ASSESSMENT — PAIN DESCRIPTION - LOCATION
LOCATION: HEAD
LOCATION: HEAD

## 2021-05-30 ASSESSMENT — PAIN DESCRIPTION - PAIN TYPE
TYPE: ACUTE PAIN
TYPE: ACUTE PAIN

## 2021-05-30 ASSESSMENT — PAIN DESCRIPTION - FREQUENCY
FREQUENCY: CONTINUOUS
FREQUENCY: CONTINUOUS

## 2021-05-30 ASSESSMENT — PAIN DESCRIPTION - ORIENTATION
ORIENTATION: POSTERIOR
ORIENTATION: POSTERIOR

## 2021-05-30 ASSESSMENT — PAIN DESCRIPTION - ONSET
ONSET: ON-GOING
ONSET: ON-GOING

## 2021-05-30 NOTE — PLAN OF CARE
Problem: Falls - Risk of:  Goal: Will remain free from falls  Description: Will remain free from falls  5/30/2021 1204 by Antonette Frias RN  Outcome: Ongoing  Note: Patient remained free from falls this shift. Bed is in low position with alarm on and siderails up x2. Patient ambulates with one assist. Education given on use of call light and patient voiced understanding. Patient uses call light appropriately. Call light and beside table within reach. Arm band and falling star in place. 5/30/2021 4564 by Tali Evans RN  Outcome: Ongoing  Goal: Absence of physical injury  Description: Absence of physical injury  5/30/2021 1204 by Antonette Frias RN  Outcome: Ongoing  Note: Patient remained free from falls this shift. Bed is in low position with alarm on and siderails up x2. Patient ambulates with one assist. Education given on use of call light and patient voiced understanding. Patient uses call light appropriately. Call light and beside table within reach. Arm band and falling star in place. 5/30/2021 5959 by Tali Evans RN  Outcome: Ongoing     Problem: HEMODYNAMIC STATUS  Goal: Patient has stable vital signs and fluid balance  5/30/2021 1204 by Antonette Frias RN  Outcome: Ongoing  Note: /60   Pulse 52   Temp 99.2 °F (37.3 °C) (Oral)   Resp 16   Ht 5' 7\" (1.702 m)   Wt 167 lb 12.3 oz (76.1 kg)   SpO2 99%   BMI 26.28 kg/m²   Pt VS remain stable. 5/30/2021 0643 by Tali Evans RN  Outcome: Ongoing     Problem: ACTIVITY INTOLERANCE/IMPAIRED MOBILITY  Goal: Mobility/activity is maintained at optimum level for patient  5/30/2021 1204 by Antonette Frias RN  Outcome: Ongoing  Note: Pt OOB as tolerated per order.    5/30/2021 0643 by Tali Evans RN  Outcome: Ongoing     Problem: COMMUNICATION IMPAIRMENT  Goal: Ability to express needs and understand communication  5/30/2021 1204 by Antonette Frias RN  Outcome: Ongoing  Note: Pt able to express needs and understand all communication this shift. Pt continues to have some delayed responses, but is oriented x 4. Some intermittent confusion noted. 5/30/2021 0643 by Luis Zepeda RN  Outcome: Ongoing     Problem: Pain:  Description: Pain management should include both nonpharmacologic and pharmacologic interventions. Goal: Pain level will decrease  Description: Pain level will decrease  5/30/2021 1204 by Austin Sutton RN  Outcome: Ongoing  Note: Patient complained of pain in head rating it a 1 on the 0-10 scale. Pain medication given as ordered and needed. Patient voiced satisfaction with this. Also instructed patient on distraction, repositioning, and rest as non-pharmacological methods of pain relief. Patient voiced understanding. 5/30/2021 8022 by Luis Zepeda RN  Outcome: Ongoing  Goal: Control of acute pain  Description: Control of acute pain  5/30/2021 1204 by Austin Sutton RN  Outcome: Ongoing  5/30/2021 0643 by Luis Zepeda RN  Outcome: Ongoing  Goal: Control of chronic pain  Description: Control of chronic pain  5/30/2021 1204 by Austin Sutton RN  Outcome: Ongoing  5/30/2021 0643 by Luis Zepeda RN  Outcome: Ongoing   Plan of Care discussed with patient and family. They verbalized understanding.

## 2021-05-30 NOTE — VIRTUAL HEALTH
[facility] Stroke and Vascular Neurology Consult for  [facility] Stroke Alert through [address] @ 21:12  5/29/2021 9:07 PM  Pt Name: Westley Hooker  MRN: [id number]  [place]: 1950  Date of evaluation: 5/29/2021  Primary Care Physician: Juventino Bello MD  Reason for Evaluation: Stroke evaluation with Phone Consult, Discussion and Review of imaging    Westley Hooker is a 78 y.o. male who presents with initial trauma after fall on May 28, 2021 while playing pickle ball. CT of the head concerning for left frontal lobe acute ICH with right basilar skull fracture. Part of the work-up CT head and neck demonstrated normal arterial vasculature with no dissection. Possible small thrombus that is nonflow limiting in the right transverse sinus. Review of clinical presentation and exam with the ICU team.  MRI brain due to some neurological deficits occluding aphasia. An MRV with and without gadolinium for baseline. Allergies  is allergic to codone [hydrocodone] and oxycodone. Medications  Prior to Admission medications    Medication Sig Start Date End Date Taking? Authorizing Provider   lisinopril (PRINIVIL;ZESTRIL) 10 MG tablet Take 10 mg by mouth daily   Yes Historical Provider, MD   MAGNESIUM CITRATE PO Take 250 mg by mouth 4 times daily   Yes Historical Provider, MD    Scheduled Meds:   docusate sodium  100 mg Oral BID    famotidine  20 mg Oral BID    sodium chloride flush  5-40 mL Intravenous 2 times per day    levETIRAcetam  500 mg Oral BID     Continuous Infusions:   sodium chloride      sodium chloride 125 mL/hr (05/29/21 0647)     PRN Meds:.sodium chloride flush, sodium chloride, acetaminophen, promethazine **OR** ondansetron, polyethylene glycol, fentanNYL **OR** fentanNYL  Past Medical History   has a past medical history of Hyperlipidemia and Hypertension.   Social History  Social History     Socioeconomic History    Marital status:      Spouse name: Not on file  Number of children: Not on file    Years of education: Not on file    Highest education level: Not on file   Occupational History    Not on file   Tobacco Use    Smoking status: Never Smoker    Smokeless tobacco: Never Used   Vaping Use    Vaping Use: Never assessed   Substance and Sexual Activity    Alcohol use: Never    Drug use: Never    Sexual activity: Not on file   Other Topics Concern    Not on file   Social History Narrative    Not on file     Social Determinants of Health     Financial Resource Strain:     Difficulty of Paying Living Expenses:    Food Insecurity:     Worried About Running Out of Food in the Last Year:     [address] in the Last Year:    Transportation Needs:     Lack of Transportation (Medical):  Lack of Transportation (Non-Medical):    Physical Activity:     Days of Exercise per Week:     Minutes of Exercise per Session:    Stress:     Feeling of Stress :    Social Connections:     Frequency of Communication with Friends and Family:     Frequency of Social Gatherings with Friends and Family:     Attends Alevism Services:     Active Member of Clubs or Organizations:     Attends Club or Organization Meetings:     Marital Status:    Intimate Partner Violence:     Fear of Current or Ex-Partner:     Emotionally Abused:     Physically Abused:     Sexually Abused:      Family History      Problem Relation Age of Onset    Glaucoma Mother     Glaucoma Father        OBJECTIVE  BP (!) 100/56   Pulse 51   Temp 98.3 °F (36.8 °C) (Oral)   Resp 16   Ht 5' 7\" (1.702 m)   Wt 167 lb 12.3 oz (76.1 kg)   SpO2 100%   BMI 26.28 kg/m²     NIH Stroke Scale  Interval: Reassessment  Level of Consciousness (1a. ): Alert  LOC Questions (1b. ):  Answers both correctly  LOC Commands (1c. ): Performs both tasks correctly  Best Gaze (2. ): Normal  Visual (3. ): No visual loss  Facial Palsy (4. ): Normal symmetrical movement  Motor Arm, Left (5a. ): No drift  Motor Arm, Right (5b. ): No drift  Motor Leg, Left (6a. ): No drift  Motor Leg, Right (6b. ): No drift  Limb Ataxia (7. ): Absent  Sensory (8. ): Normal  Best Language (9. ): Mild to moderate aphasia  Dysarthria (10. ): Normal  Extinction and Inattention (11): No abnormality  Total: 1  Pre-Morbid mRS: 1    Imaging:  Images were personally reviewed with PACS used to review images including:  CT brain without contrast: hemorrhage. Left frontal, skull fracture  CTA imaging: right transverse sinus fillind defect non occlusive    Assessment  78 y.o. male who presents with initial trauma after fall on May 28, 2021 while playing pickle ball. CT of the head concerning for left frontal lobe acute ICH with right basilar skull fracture. Differential DDx:  Possible non occlusive sinus thrombosis in setting of trauma  aphasia    Recommendations:  1. Inpatient neurology consult  2. MRI brain without nikole due to aphasia and possible stroke vs post conucssive symptoms  3. MRV with and without nikole for baseline due to concern of transverse sinus thrombosis  4. No anticoagulation at this time due to trauma  5. May consider repeat MRV once cleared for anticoagulation - if thrombus still present, would benefit from anticoagulation at the time to prevent propagation of the lesion        Discussed with ICU team    At least 36 min of Telemedicine and time in conversation directly with ED staff and physician for the patient who is in imminent and life threatening deterioration without further treatment and evaluation. This Virtual Visit was conducted with patient's (and/or legal guardian's) consent, to provide telestroke consultation and necessary medical care.   Time spent examining patient, reviewing the images personally, reviewing the chart, perform high complexity decision making and speaking with the nursing staff regarding recommendations    This is a Phone Consult, I have not seen the patient face to face, the telemedicine device was not utilized. Ana Florez MD, MD   Stroke, Neurocritical Care And/or [facility] Stroke [address] [address]  Electronically signed 5/29/2021 at 9:07 PM    Patient Location:  [place] [address] 4A    Provider Location ([facility]): [place], [place]    This virtual visit was conducted via interactive/real-time audio/video.

## 2021-05-30 NOTE — PROGRESS NOTES
Juliette Bond 62  INPATIENT OCCUPATIONAL THERAPY  [facility] NEUROSCIENCES 4A  EVALUATION    Time:    Time In: 1432  Time Out: 1503  Timed Code Treatment Minutes: 16 Minutes  Minutes: 31          Date: 2021  Patient Name: Bette Simmons,   Gender: male      MRN: [id number]  : 1950  (78 y.o.)  Referring Practitioner: LEIGHA WRIGHT  Diagnosis: intraparenchymal hematoma of R side of brain d/t trauma  Additional Pertinent Hx: Per ER note on 2021:70 y.o. male, previously healthy on who presents with a head injury, laceration, agitation and generalized acute confusion since the onset this morning while playing pickle ball. He was backing up when he lost his balance and struck the back of his head against a wall. The duration has been constant since the onset. The generalized confusion is associated with brief LOC and resultant agitation and bleeding. MRI of the brain imaged with and without contrast today is reviewed and reveals parenchymal hemorrhagic contusion in the anterior inferior right frontal lobe anterior left temporal lobe lateral left temporal lobe and posterior inferior right cerebellum with trace amount of extra-axial blood products on the left without mass-effect. Restrictions/Precautions:  Restrictions/Precautions: Fall Risk  Position Activity Restriction  Other position/activity restrictions: low stimulation guidlines s/p CHI    Subjective  Chart Reviewed: Yes, Orders, Progress Notes, History and Physical  Patient assessed for rehabilitation services?: Yes  Family / Caregiver Present:  (daughter)    Subjective: cooperative   Noted Pt got a little agitated with daughter correcting him at times.      Pain:  Pain Assessment  Patient Currently in Pain: No (\"just a soreness\"; headache 1/10)    Vitals: Vitals not assessed per clinical judgement, see nursing flowsheet    Social/Functional History:  Lives With: Spouse  Type of Home: House  Home Layout: One level  Home Access: Stairs to enter with rails (2)   Bathroom Shower/Tub: Walk-in shower  Bathroom Toilet: Standard  Bathroom Equipment: Grab bars in shower       ADL Assistance: Independent  Homemaking Assistance: Needs assistance (does dishes, ocassional cooking-eggs mostly, most yardwork)  Ambulation Assistance: Independent  Transfer Assistance: Independent    Active : Yes  Occupation: Retired  Additional Comments: Pt was fully indep PLOF. Pt reports he ran the [address] [place] [address] Dr Rivero once & hopes to do it again. VISION:difficult to assess, noted L eye bruised & swollen-tendency to keep closed-though had tendency to keep both eyes closed    HEARING:  WFL    COGNITION: Impaired Memory, Inattention, Decreased Problem Solving, Decreased Safety Awareness, Impaired Attention and Impulsive, noted expressive aphasia & word retrieval difficulty at times    RANGE OF MOTION:  Bilateral Upper Extremity:  WFL    STRENGTH:  Bilateral Upper Extremity:  WFL    SENSATION:   WFL    ADL:   Grooming: Contact Guard Assistance. stood at sink to brush teeth, min vcs for locating needed items, Pt impulsive with trying to get water from faucet-vcs to be careful not to bump head  Toileting: with set-up. used urinal while in supine. BALANCE:  Standing Balance: Contact Guard Assistance. BED MOBILITY:  Supine to Sit: Stand By Assistance vcs impulsive, Pt sat up on EOB prior to therapist taking rail out of way-Pt squeezed himself between top & bottom bed rails    TRANSFERS:  Sit to Stand:  Contact Guard Assistance. FUNCTIONAL MOBILITY:  Assistive Device: None  Assist Level:  Minimal Assistance. Distance: To and from bathroom  vcs for slow down & to avoid bumping into objects in room (closet door when turning around)     Activity Tolerance:  Patient tolerance of  treatment: fair. Assessment:  Assessment: Pt demo decreased ADL & functional mobility indep s/p fall with CHI.  Continued OT recommended to faciliate improved endurance, balance, & safety awareness for returning to ADL & IADLs at home. Performance deficits / Impairments: Decreased functional mobility , Decreased endurance, Decreased ADL status, Decreased balance, Decreased safe awareness, Decreased cognition  Prognosis: Fair, Good  REQUIRES OT FOLLOW UP: Yes  Decision Making: Low Complexity  Safety Devices in place: Yes  Type of devices: All fall risk precautions in place, Call light within reach, Gait belt, Chair alarm in place, Nurse notified    Treatment Initiated: Treatment and education initiated within context of evaluation. Evaluation time included review of current medical information, gathering information related to past medical, social and functional history, completion of standardized testing, formal and informal observation of tasks, assessment of data and development of plan of care and goals. Treatment time included skilled education and facilitation of tasks to increase safety and independence with ADL's for improved functional independence and quality of life. Discharge Recommendations:  IP Rehab    Patient Education:  OT Education: OT Role, Plan of Care, ADL Adaptive Strategies, Transfer Training, Precautions  Barriers to Learning: decreased cognition    Equipment Recommendations: Other: Monitor pending progress    Plan:  Times per week: 6x  Current Treatment Recommendations: Balance Training, Functional Mobility Training, Endurance Training, Safety Education & Training, Self-Care / ADL, Patient/Caregiver Education & Training, Cognitive Reorientation. See long-term goal time frame for expected duration of plan of care. If no long-term goals established, a short length of stay is anticipated.     Goals:  Patient goals : Pt did not state  Short term goals  Time Frame for Short term goals: until discharge  Short term goal 1: Pt will complete 5-6 min standing ADL task with SBA & min vcs for safety  Short term goal 2: Pt will complete BADL routine with min A & min vcs for safety &

## 2021-05-30 NOTE — PROGRESS NOTES
treatment plan from neurosurgical perspective. All questions and concerns were addressed and answered. · From this time on neurosurgery team will see this patient only as needed as long as he is in the hospital.  Please call me if you have any further questions or concern regarding this patient. January Weston MD       Neurosurgery Progress Note    Patient:  Alfredo Burnette      Unit/Bed:4A-03/003-A    YOB: 1950    MRN: [id number]     Acct: [de-identified]     Admit date: 5/28/2021    Chief Complaint   Patient presents with    Fall    Loss of Consciousness       Patient Seen, Chart, Physician notes, Labs, Radiology studies reviewed. Subjective: The patient is seen and evaluated on the floor having transition from the ICU setting without incident. Evaluation exam findings are reviewed and discussed with Dr. Beth Vega and with nursing. Patient is resting comfortably this morning with pain moderately controlled. Past, Family, Social History unchanged from admission. Diet:  DIET GENERAL;    Medications:  Scheduled Meds:   docusate sodium  100 mg Oral BID    famotidine  20 mg Oral BID    sodium chloride flush  5-40 mL Intravenous 2 times per day    levETIRAcetam  500 mg Oral BID     Continuous Infusions:   sodium chloride      sodium chloride 125 mL/hr at 05/29/21 2345     PRN Meds:sodium chloride flush, sodium chloride, acetaminophen, promethazine **OR** ondansetron, polyethylene glycol, fentanNYL **OR** fentanNYL    Objective: He is observed lying in bed on his right side, comfortably, with pain moderately controlled. He is more alert and interactive today and orientation has improved. Complaints of persistent headache and fatigue remain and are noted. He is otherwise stable and intact his baseline with no additional significant changes noted the patient chart overnight. Some ecchymosis around the left orbit is noted secondary to skull base fracture reaction.     Vitals: /68 Pulse (!) 49   Temp 99.2 °F (37.3 °C) (Oral)   Resp 18   Ht 5' 7\" (1.702 m)   Wt 167 lb 12.3 oz (76.1 kg)   SpO2 98%   BMI 26.28 kg/m²   Physical Exam:  Alert and attentive. Language appropriate, with no aphasia. Pupils equal.  Facial strength symmetric. Physical Exam  Patient remained stable and intact his baseline with no significant changes noted the patient chart overnight. ROS:  Review of Systems  Mild persistent headache remains as well as complaints of fatigue. No chest pain or shortness of breath, no nausea or vomiting. 24 hour intake/output:    Intake/Output Summary (Last 24 hours) at 5/30/2021 1018  Last data filed at 5/30/2021 0608  Gross per 24 hour   Intake 3141.46 ml   Output 925 ml   Net 2216.46 ml     Last 3 weights: Wt Readings from Last 3 Encounters:   05/29/21 167 lb 12.3 oz (76.1 kg)         CBC:   Recent Labs     05/28/21  1120 05/29/21  0336 05/30/21  0403   WBC 8.0 14.6* 9.7   HGB 12.7* 11.4* 9.8*    199 168     BMP:    Recent Labs     05/28/21  1120 05/29/21  0336 05/30/21  0403    133* 139   K 4.3 4.0 4.1    104 109   CO2 19* 19* 23   BUN 17 16 15   CREATININE 0.9 0.9 0.8   GLUCOSE 108 127* 113*     Calcium:  Recent Labs     05/30/21  0403   CALCIUM 8.2*     Magnesium:No results for input(s): MG in the last 72 hours. Glucose:No results for input(s): POCGLU in the last 72 hours. HgbA1C: No results for input(s): LABA1C in the last 72 hours. Lipids: No results for input(s): CHOL, TRIG, HDL, LDLCALC in the last 72 hours. Invalid input(s): LDL    Radiology reports as per the Radiologist  Radiology: CTA HEAD W WO CONTRAST    Result Date: 5/28/2021   CT angiography head with contrast. 3D Postprocessing. Comparison:  CT,SR  - CT HEAD WO CONTRAST  - 05/28/2021 06:15 PM EDT Findings: No extravasation of contrast. Intracranial arteries are patent. No aneurysm, dissection, or occlusion.  Subcentimeter circumscribed filling defect within the right transverse sinus.  This is nonocclusive. Remaining paranasal sinuses are patent with an unremarkable appearance. Right occipital bone fracture and multifocal intracranial hemorrhage. Please see CT head report for further details. 1.  Patent head CTA. No evidence of arterial injury. 2.  Small filling defect within the right transverse sinus, immediately adjacent to the fracture site. This could represent a small focus of venous injury/thrombus. This document has been electronically signed by: Alexander River MD on 05/28/2021 08:31 PM All CTs at this facility use dose modulation techniques and iterative reconstructions, and/or weight-based dosing when appropriate to reduce radiation to a low as reasonably achievable. CT HEAD WO CONTRAST    Result Date: 5/29/2021  CT head without contrast Comparison:  CT,SR  - CT HEAD WO CONTRAST  - 05/28/2021 06:15 PM EDT Findings: No significant change in the left frontal hemorrhagic contusions with surrounding vasogenic edema and mild subdural extension. No significant change in right cerebral hemorrhagic contusion with surrounding edema. No new hemorrhage. No midline shift or axial herniation. Gray-white differentiation maintained without anoxic changes. No sinus fluid levels. Stable multifocal right occipital fractures including one component extending to the posterolateral rim of the foramen magnum. Impression No significant change versus prior. This document has been electronically signed by: Ofelia Monique MD on 05/29/2021 04:10 AM All CTs at this facility use dose modulation techniques and iterative reconstructions, and/or weight-based dosing when appropriate to reduce radiation to a low as reasonably achievable. CT head without contrast    Result Date: 5/28/2021   This report was discussed with Brianne Arguelles RN on May 28, 2021 20:03:00 EDT.  This document has been electronically signed by: Gerard Ashley on 05/2* CT head without contrast Comparison:  CT,SR  - CT HEAD WO CONTRAST  - 05/28/2021 12:09 PM EDT Findin8/2021 08:03 PM  No mass or mass-effect. No midline shift. No significant atrophy-like change or white matter disease. Postsurgical changes of the paranasal sinuses. No air-fluid levels. The orbits are within normal limits. 1. Left frontal lobe contusion with multiple foci of intraparenchymal hemorrhage measuring up to 1.2 cm in greatest dimension, with interval increase. Right cerebellar contusion with foci of intraparenchymal hemorrhage measuring up to approximately 1 cm in greatest dimension, with interval increase. New small subdural hematomas and trace adjacent subarachnoid blood products within the left posterior parietal and anterior temporal regions. 2. Right occipital bone fracture, without significant change. This document has been electronically signed by: Shagufta Villanueva MD on 05/28/2021 07:55 PM All CTs at this facility use dose modulation techniques and iterative reconstructions, and/or weight-based dosing when appropriate to reduce radiation to a low as reasonably achievable. CT Head WO Contrast    Result Date: 5/28/2021  PROCEDURE: CT HEAD WO CONTRAST CLINICAL INFORMATION: AMs, head injury . COMPARISON: No prior study. TECHNIQUE: 2-D multiplanar noncontrast CT brain All CT scans at this facility use dose modulation, iterative reconstruction, and/or weight-based dosing when appropriate to reduce radiation dose to as low as reasonably achievable. FINDINGS: There is a left frontal intracerebral hemorrhage this is in the supraorbital left frontal lobe. The serpiginous acute hemorrhage. There is smooth no evidence of a subdural hemorrhage. No shift or mass effect. Gray-white differentiation is maintained. Ventricles are normal. There is a right basilar skull fracture seen on images 4-11 At the very base there is some avulsion of this fragment there is no depression skin staples are present over the right occipital lacerations.  There is chronic right maxillary sinus disease and postsurgical changes right maxillary sinus. Results discussed with Dr. Sherwin Maxwell. IMPRESSION:  Left frontal lobe acute intraparenchymal hemorrhage Right basilar skull fracture **This report has been created using voice recognition software. It may contain minor errors which are inherent in voice recognition technology. ** Final report electronically signed by Dr. William Jensen on 5/28/2021 12:50 PM    CTA NECK W WO CONTRAST    Result Date: 5/28/2021  CT angiography neck with contrast. 3D Postprocessing. Comparison: None Findings: Aortic arch and cervical great vessels are patent. Visualized intracranial arteries are patent. No aneurysm, dissection, or occlusion. Thyroid gland unremarkable. No cervical mass or fluid collection. Lung apices clear. No acute fracture at the level of the neck. Patent neck CTA. No evidence of vascular injury at the level of the neck. This document has been electronically signed by: Rangel Hall MD on 05/28/2021 08:46 PM All CTs at this facility use dose modulation techniques and iterative reconstructions, and/or weight-based dosing when appropriate to reduce radiation to a low as reasonably achievable. Carotid stenosis and measurements are in accordance with NASCET criteria. CT Cervical Spine WO Contrast    Result Date: 5/28/2021  PROCEDURE: CT CERVICAL SPINE WO CONTRAST CLINICAL INFORMATION: trauma to head, AMS 14 . COMPARISON: No prior study. TECHNIQUE: 2-D multiplanar noncontrast CT cervical spine bone windows only. All CT scans at this facility use dose modulation, iterative reconstruction, and/or weight-based dosing when appropriate to reduce radiation dose to as low as reasonably achievable. FINDINGS: There is no acute fracture or acute bony malalignment. The right basilar skull fracture can be seen with avulsed or distracted fragments. There is no foraminal or central encroachment. There is no precervical soft tissue swelling. Dextroscoliosis. No acute cervical spine fracture. Right basilar skull fracture which is slightly comminuted and has distracted fragment. **This report has been created using voice recognition software. It may contain minor errors which are inherent in voice recognition technology. ** Final report electronically signed by Dr. Han Strong on 5/28/2021 12:56 PM    MRI BRAIN W WO CONTRAST    Result Date: 5/30/2021  PROCEDURE: MRI BRAIN W R [place] [address] INFORMATIONSecondary to abnormal findings on CT, for comparison. Trauma by fall. Abnormal head CT. COMPARISON: Head CT 5/29/2021 and 5/28/2021. TECHNIQUE: Multiplanar and multiple spin echo T1 and T2-weighted images were obtained through the brain before and after the administration of intravenous contrast. FINDINGS: In the anterior inferior left frontal lobe, there is susceptibility artifact which abuts the calvarium. There is surrounding high signal on the FLAIR and T2-weighted sequences. This area measures 4.9 x 3.8 x 4.5 cm. There is some associated abnormal signal on the diffusion-weighted images. The pattern of abnormal signal is consistent with a parenchymal hemorrhagic contusion. There is a parenchymal hemorrhagic contusion involving the anterior left temporal lobe. This has similar signal characteristics with high signal on the FLAIR and T2-weighted images as well as low signal on the gradient echo T2-weighted images. This area measures 2.4 x 1.6 x 2.0 cm. There is a small contusion along the anterior lateral aspect left temporal lobe seen on axial image 9. There is no hemorrhage associated with this focus. This area measures 0.9 x 0.5 cm. There is a another parenchymal hemorrhagic contusion. This is in the inferior and posterior right cerebellum. This is deep to the patient's known skull fracture. There are some foci of susceptibility artifact. This area measures 2.8 x 2.1 x 1.4 cm.  There is no abnormal signal on the diffusion-weighted images suggestive today is reviewed and reveals parenchymal hemorrhagic contusion in the anterior inferior right frontal lobe anterior left temporal lobe lateral left temporal lobe and posterior inferior right cerebellum with trace amount of extra-axial blood products on the left without mass-effect.   Neurosurgery to follow    Electronically signed by Isela Mujica PA-C on 5/30/2021 at 10:18 AM

## 2021-05-30 NOTE — PROGRESS NOTES
[facility]  Daily Progress Note  Pt Name: Alfredo Burnette  Medical Record Number: [id number]  Date of Birth 1950   Today's Date: 5/30/2021    HD: # 2    CC: Headache    ASSESSMENT  1. Active Hospital Problems    Diagnosis Date Noted    Right and left hemorrhagic contusion of cerebrum ([facility] [address] [F26.076S] 05/29/2021    Intraparenchymal hematoma of right side of brain due to [facility]) [S06.340A] 05/28/2021    Closed fracture of right side of base of skull ([facility] [address] [S02.101A] 05/28/2021    Closed head injury [S09.90XA] 05/28/2021    Scalp laceration, initial encounter [S01.01XA] 05/28/2021         PLAN  Patient admitted under Trauma Services to ICU with telemetry   - OK to transfer out to  today      Left intraparenchymal hemorrhage with subsequent developing subdural hematomas              -Neurosurgery managing non operatively               -Neuro checks per protocol    -Seizure prophylaxis with Keppra              -Maintain systolic blood pressure between 100-160              -Repeat head CT in 6 hours was noted to have interval increase, TXA administered   -Repeat head CT 5/29 stable     Right basilar skull fracture              -Neurosurgery managing non operatively at this time              -CTA notes filling defect within right transverse sinus by fracture site, possible venous injury/thrombus.   Neurointerventionalist was contacted, recommended MRI/MRV              -Neuro checks per protocol   -MRI head showed: Parenchymal hemorrhagic contusion in the anterior inferior right frontal lobe, anterior left temporal lobe, lateral left temporal lobe and posterior inferior right cerebellum     Closed head injury              - SLP cog eval indicates severe-profound expressive and receptive language deficit               - Limited stimulation brain injury guidelines              - Monitor for postconcussive symptoms              - Neuro checks              - Anti 5/30/2021 5170  Gross per 24 hour   Intake 3141.46 ml   Output 925 ml   Net 2216.46 ml     DIET GENERAL;    OBJECTIVE  CURRENT VITALS /68   Pulse (!) 49   Temp 99.2 °F (37.3 °C) (Oral)   Resp 18   Ht 5' 7\" (1.702 m)   Wt 167 lb 12.3 oz (76.1 kg)   SpO2 98%   BMI 26.28 kg/m²   GENERAL: Presents sitting upright in bed unassisted, Awake and alert; oriented x4. In no acute distress and well nourished. SKIN: Appropriate for ethnicity, warm and dry. EYES: Left periorbital ecchymosis, no significant edema. PERRL at 3mm  CARDIO: No visible chest wall deformity. No heaves or lifts. Strong/regular S1/S2 rate and rhythm. No rubs murmurs, or gallops. 2+ radial and dorsalis pedal pulses. Capillary refill <2 sec. No extremity edema noted. PULMONARY:  Trachea midline, no audible wheezing, increased respiratory effort, accessory muscle use, or asymmetrical chest wall movement. Lung sounds are clear to ascultation in superior and inferior fields. No wheezing, crackles, or rhonchi. ABDOMEN: Abdomen is non distended. Bowel sounds present in all four quadrants. Soft and non tender to palpation in all quadrants. NEURO: Follows commands. PMS intact, moves limbs freely. No focal neurological deficits  MSK: Extremities intact and present. No new bruising, swelling, deformity, discoloration or bleeding. No new tenderness to muscular or bony structure palpation.  strength 5/5 and equal bilaterally.        LABS  CBC :   Recent Labs     05/28/21  1120 05/29/21  0336 05/30/21  0403   WBC 8.0 14.6* 9.7   HGB 12.7* 11.4* 9.8*   HCT 40.9* 37.7* 31.0*   MCV 92.7 97.7* 92.5    199 168     BMP:   Recent Labs     05/28/21  1120 05/29/21  0336 05/30/21  0403    133* 139   K 4.3 4.0 4.1    104 109   CO2 19* 19* 23   BUN 17 16 15   CREATININE 0.9 0.9 0.8     COAGS:   Recent Labs     05/28/21  1120 05/29/21  0336 05/30/21  0403   APTT 22.7  --   --    PROT 7.0 6.4 6.0*   INR 0.99  --   --      Pancreas/HFP:  No intracranial hemorrhage.     Please see CT head report for further details.           Impression   1.  Patent head CTA.  No evidence of arterial injury. 2.  Small filling defect within the right transverse sinus, immediately    adjacent to the fracture site.  This could represent a small focus of    venous injury/thrombus.       This document has been electronically signed by: Ceferino Hinson MD on    05/28/2021 08:31 PM       All CTs at this facility use dose modulation techniques and iterative    reconstructions, and/or weight-based dosing   when appropriate to reduce radiation to a low as reasonably achievable. Narrative   CT angiography neck with contrast. 3D Postprocessing.       Comparison: None       Findings: Aortic arch and cervical great vessels are patent. Visualized intracranial arteries are patent. No aneurysm, dissection, or    occlusion.       Thyroid gland unremarkable. No cervical mass or fluid collection. Lung apices clear. No acute fracture at the level of the neck.           Impression   Patent neck CTA.  No evidence of vascular injury at the level of the neck.       This document has been electronically signed by: Ceferino Hinson MD on    05/28/2021 08:46 PM       All CTs at this facility use dose modulation techniques and iterative    reconstructions, and/or weight-based dosing   when appropriate to reduce radiation to a low as reasonably achievable.       Carotid stenosis and measurements are in accordance with NASCET criteria. Narrative   ** ADDENDUM #1 **   This report was discussed with Anson Nova RN on May 28, 2021 20:03:00    EDT.       This document has been electronically signed by: Yuliana Martins on    05/28/2021 08:03 PM   ** ORIGINAL REPORT **   CT head without contrast       Comparison:  CT,SR  - CT HEAD WO CONTRAST  - 05/28/2021 12:09 PM EDT       Findings:   No mass or mass-effect. No midline shift.    No significant atrophy-like change or white matter disease.       Postsurgical changes of the paranasal sinuses. No air-fluid levels. The orbits are within normal limits.           Impression   1. Left frontal lobe contusion with multiple foci of intraparenchymal    hemorrhage measuring up to 1.2 cm in greatest dimension, with interval    increase. Right cerebellar contusion with foci of intraparenchymal    hemorrhage measuring up to approximately 1 cm in greatest dimension, with    interval increase. New small subdural hematomas and trace adjacent    subarachnoid blood products within the left posterior parietal and    anterior temporal regions. 2. Right occipital bone fracture, without significant change.       This document has been electronically signed by: Sebastián Lopez MD on    05/28/2021 07:55 PM       All CTs at this facility use dose modulation techniques and iterative    reconstructions, and/or weight-based dosing   when appropriate to reduce radiation to a low as reasonably achievable. Electronically signed by ADRIANA Mo on 5/30/2021 at 10:00 AM  Patient seen and examined independently by me 5/30/2021    Vitals and Graphics reviewed       Labs, cultures, and radiographs where available were reviewed. I discussed patient concerns with the patient's nurse and instructions were given. Please see our orders if there are any new ones for the updated patient care plan. Above discussed and I agree with Carla WRIGHT    See my additional comments below for updated orders and plan.    MRI today shows multiple sites of contusions  Neuro stable  Await dispo decision for IPR from PMR

## 2021-05-30 NOTE — PLAN OF CARE
Problem: Falls - Risk of:  Goal: Will remain free from falls  Description: Will remain free from falls  Outcome: Ongoing  Goal: Absence of physical injury  Description: Absence of physical injury  Outcome: Ongoing     Problem: HEMODYNAMIC STATUS  Goal: Patient has stable vital signs and fluid balance  Outcome: Ongoing     Problem: ACTIVITY INTOLERANCE/IMPAIRED MOBILITY  Goal: Mobility/activity is maintained at optimum level for patient  Outcome: Ongoing     Problem: COMMUNICATION IMPAIRMENT  Goal: Ability to express needs and understand communication  Outcome: Ongoing     Problem: Pain:  Goal: Pain level will decrease  Description: Pain level will decrease  Outcome: Ongoing  Goal: Control of acute pain  Description: Control of acute pain  Outcome: Ongoing  Goal: Control of chronic pain  Description: Control of chronic pain  Outcome: Ongoing   Care plan reviewed with patient and  verbalize understanding of the plan of care and contribute to goal setting.

## 2021-05-31 PROCEDURE — 2580000003 HC RX 258: Performed by: PHYSICIAN ASSISTANT

## 2021-05-31 PROCEDURE — 99232 SBSQ HOSP IP/OBS MODERATE 35: CPT | Performed by: SURGERY

## 2021-05-31 PROCEDURE — APPSS60 APP SPLIT SHARED TIME 46-60 MINUTES: Performed by: PHYSICIAN ASSISTANT

## 2021-05-31 PROCEDURE — 6370000000 HC RX 637 (ALT 250 FOR IP): Performed by: PHYSICIAN ASSISTANT

## 2021-05-31 PROCEDURE — 2060000000 HC ICU INTERMEDIATE R&B

## 2021-05-31 PROCEDURE — 6370000000 HC RX 637 (ALT 250 FOR IP): Performed by: NURSE PRACTITIONER

## 2021-05-31 PROCEDURE — 99232 SBSQ HOSP IP/OBS MODERATE 35: CPT | Performed by: PHYSICAL MEDICINE & REHABILITATION

## 2021-05-31 PROCEDURE — 99222 1ST HOSP IP/OBS MODERATE 55: CPT | Performed by: PSYCHIATRY & NEUROLOGY

## 2021-05-31 RX ADMIN — DOCUSATE SODIUM 100 MG: 100 CAPSULE, LIQUID FILLED ORAL at 08:54

## 2021-05-31 RX ADMIN — LEVETIRACETAM 500 MG: 500 TABLET, FILM COATED ORAL at 08:55

## 2021-05-31 RX ADMIN — LEVETIRACETAM 500 MG: 500 TABLET, FILM COATED ORAL at 20:26

## 2021-05-31 RX ADMIN — DOCUSATE SODIUM 100 MG: 100 CAPSULE, LIQUID FILLED ORAL at 20:26

## 2021-05-31 RX ADMIN — FAMOTIDINE 20 MG: 20 TABLET ORAL at 08:54

## 2021-05-31 RX ADMIN — FAMOTIDINE 20 MG: 20 TABLET ORAL at 20:26

## 2021-05-31 RX ADMIN — ACETAMINOPHEN 650 MG: 325 TABLET ORAL at 08:54

## 2021-05-31 RX ADMIN — ACETAMINOPHEN 650 MG: 325 TABLET ORAL at 18:37

## 2021-05-31 RX ADMIN — SODIUM CHLORIDE, PRESERVATIVE FREE 10 ML: 5 INJECTION INTRAVENOUS at 20:26

## 2021-05-31 RX ADMIN — SODIUM CHLORIDE, PRESERVATIVE FREE 10 ML: 5 INJECTION INTRAVENOUS at 08:54

## 2021-05-31 ASSESSMENT — PAIN DESCRIPTION - PROGRESSION
CLINICAL_PROGRESSION: NOT CHANGED
CLINICAL_PROGRESSION: GRADUALLY WORSENING
CLINICAL_PROGRESSION: NOT CHANGED
CLINICAL_PROGRESSION: NOT CHANGED
CLINICAL_PROGRESSION: GRADUALLY WORSENING
CLINICAL_PROGRESSION: NOT CHANGED

## 2021-05-31 ASSESSMENT — PAIN DESCRIPTION - PAIN TYPE
TYPE: ACUTE PAIN

## 2021-05-31 ASSESSMENT — PAIN DESCRIPTION - ONSET
ONSET: ON-GOING

## 2021-05-31 ASSESSMENT — ENCOUNTER SYMPTOMS
VOMITING: 0
TROUBLE SWALLOWING: 0
EYE DISCHARGE: 0
SHORTNESS OF BREATH: 0
DIARRHEA: 0
BACK PAIN: 0
WHEEZING: 0
SORE THROAT: 0
NAUSEA: 0
RHINORRHEA: 0
CONSTIPATION: 0
PHOTOPHOBIA: 1
COUGH: 0
ABDOMINAL PAIN: 0
EYE PAIN: 0

## 2021-05-31 ASSESSMENT — PAIN SCALES - GENERAL
PAINLEVEL_OUTOF10: 0
PAINLEVEL_OUTOF10: 3
PAINLEVEL_OUTOF10: 3
PAINLEVEL_OUTOF10: 0
PAINLEVEL_OUTOF10: 3
PAINLEVEL_OUTOF10: 0

## 2021-05-31 ASSESSMENT — PAIN DESCRIPTION - LOCATION
LOCATION: HEAD

## 2021-05-31 ASSESSMENT — PAIN DESCRIPTION - FREQUENCY
FREQUENCY: CONTINUOUS

## 2021-05-31 ASSESSMENT — PAIN - FUNCTIONAL ASSESSMENT
PAIN_FUNCTIONAL_ASSESSMENT: ACTIVITIES ARE NOT PREVENTED

## 2021-05-31 ASSESSMENT — PAIN DESCRIPTION - DESCRIPTORS
DESCRIPTORS: ACHING;CONSTANT

## 2021-05-31 ASSESSMENT — PAIN DESCRIPTION - ORIENTATION
ORIENTATION: LEFT;ANTERIOR
ORIENTATION: LEFT;ANTERIOR
ORIENTATION: ANTERIOR;LEFT

## 2021-05-31 NOTE — CONSULTS
famotidine (PEPCID) tablet 20 mg, BID  sodium chloride flush 0.9 % injection 5-40 mL, 2 times per day  sodium chloride flush 0.9 % injection 5-40 mL, PRN  0.9 % sodium chloride infusion, PRN  acetaminophen (TYLENOL) tablet 650 mg, Q4H PRN  promethazine (PHENERGAN) tablet 12.5 mg, Q6H PRN   Or  ondansetron (ZOFRAN) injection 4 mg, Q6H PRN  polyethylene glycol (GLYCOLAX) packet 17 g, Daily PRN  fentaNYL (SUBLIMAZE) injection 25 mcg, Q1H PRN   Or  fentaNYL (SUBLIMAZE) injection 50 mcg, Q1H PRN  levETIRAcetam (KEPPRA) tablet 500 mg, BID         Social History:  Social History     Tobacco Use   Smoking Status Never Smoker   Smokeless Tobacco Never Used     Social History     Substance and Sexual Activity   Alcohol Use Never     Social History     Substance and Sexual Activity   Drug Use Never         Family History:       Problem Relation Age of Onset    Glaucoma Mother     Glaucoma Father        Review of Systems:  All systems reviewed and are all negative except what is mentioned in history of present illness. I reviewed the patient PMH,medications, family history, social history. Physical Exam:  /68   Pulse 50   Temp 98.3 °F (36.8 °C) (Oral)   Resp 16   Ht 5' 7\" (1.702 m)   Wt 172 lb 9.6 oz (78.3 kg)   SpO2 96%   BMI 27.03 kg/m²  I Body mass index is 27.03 kg/m². I   Wt Readings from Last 1 Encounters:   05/31/21 172 lb 9.6 oz (78.3 kg)           General appearance - alert, in no distress, oriented to  person, place, and time and weight  Mental status -Level of Alertness: awake  Orientation: person, place, time  Memory: normal  Fund of Knowledge: normal  Attention/Concentration: normal  Language: normal. Mood is normal  Neck - supple, no neck adenopathy, carotids upstroke normal bilaterally, no carotid bruits. There is no LAP in the neck. There is no thyroid enlargement.      Neurological -   Cranial Fodkki-FU-GBW:   Cranial nerve II: Normal. There is full visual fields  Cranial nerve III: Pupils: equal, round, reactive to light   Cranial nerves III, IV, VI: Extraocular Movements: intact   Cranial nerve V: Facial sensation: intact   Cranial nerve VII:Facial strength: intact   Cranial nerve VIII: Hearing: intact   Cranial nerve IX: Palate Elevation intact bilaterally  Cranial nerve XI: Shoulder shrug intact bilaterally  Cranial nerve XII: Tongue midline   neck supple without rigidity    Motor exam is 5/5 in the upper and lower extremities . Normal muscle tone . There is no muscle atrophy. There is no muscle fasciculation    Sensory is intact forlight touch, cortical sensation and temp    Coordination: finger to nose intact  Gait and station not tested,     Abnormal movement none. Long tracts are intact  deferred. Musculoskeletal: Has no hand arthritis, no limitation of ROM in any of the four extremities, . no joint tenderness, deformity or swelling. There is no leg edema. The Heart was regular in rate and rhythm. No heart murmur  Chest- Clear decreased air entry, scattered rhonchi, good effort. Abdomen: soft, intact bowel sounds. Labs:    CBC:   Recent Labs     05/29/21  0336 05/30/21  0403   WBC 14.6* 9.7   HGB 11.4* 9.8*    168   MCV 97.7* 92.5   MCH 29.5 29.3   MCHC 30.2* 31.6*     CMP:  Recent Labs     05/29/21  0336 05/30/21  0403   * 139   K 4.0 4.1    109   CO2 19* 23   BUN 16 15   CREATININE 0.9 0.8   LABGLOM 84* >90   GLUCOSE 127* 113*   CALCIUM 8.4* 8.2*     Liver:   Recent Labs     05/30/21  0403   AST 20   ALT 11   ALKPHOS 31*   PROT 6.0*   LABALBU 3.6   BILITOT 0.7     Lipids: No results for input(s): CHOL, LDLDIRECT, TRIG, HDL, AMYLASE, LIPASE in the last 72 hours. A1C: No results for input(s): LABA1C in the last 72 hours. MRI BRAIN:  No results found for this or any previous visit. No results found for this or any previous visit.     Results for orders placed during the hospital encounter of 05/28/21    CTA HEAD W WO CONTRAST    Narrative ADDENDUM #1   This report was discussed with Roddy Cote RN on May 28, 2021 20:41:00  EDT. This document has been electronically signed by: Arianna Polk on  05/28/2021 08:42 PM   ORIGINAL REPORT   CT angiography head with contrast. 3D Postprocessing. Comparison:  CT,SR  - CT HEAD WO CONTRAST  - 05/28/2021 06:15 PM EDT    Findings:  No extravasation of contrast.  Intracranial arteries are patent. No aneurysm, dissection, or occlusion. Subcentimeter circumscribed filling defect within the right transverse  sinus. This is nonocclusive. Remaining paranasal sinuses are patent with  an unremarkable appearance. Right occipital bone fracture and multifocal intracranial hemorrhage. Please see CT head report for further details. Impression  1. Patent head CTA. No evidence of arterial injury. 2.  Small filling defect within the right transverse sinus, immediately  adjacent to the fracture site. This could represent a small focus of  venous injury/thrombus. This document has been electronically signed by: Mono Anaya MD on  05/28/2021 08:31 PM    All CTs at this facility use dose modulation techniques and iterative  reconstructions, and/or weight-based dosing  when appropriate to reduce radiation to a low as reasonably achievable. Results for orders placed during the hospital encounter of 05/28/21    CTA NECK W WO CONTRAST    Narrative  CT angiography neck with contrast. 3D Postprocessing. Comparison: None    Findings: Aortic arch and cervical great vessels are patent. Visualized intracranial arteries are patent. No aneurysm, dissection, or  occlusion. Thyroid gland unremarkable. No cervical mass or fluid collection. Lung apices clear. No acute fracture at the level of the neck. Impression  Patent neck CTA. No evidence of vascular injury at the level of the neck.     This document has been electronically signed by: Mono Anaya MD on  05/28/2021 08:46 PM    All CTs at this facility use dose modulation techniques and iterative  reconstructions, and/or weight-based dosing  when appropriate to reduce radiation to a low as reasonably achievable. Carotid stenosis and measurements are in accordance with NASCET criteria. No results found for this or any previous visit. Results for orders placed during the hospital encounter of 05/28/21    MRI BRAIN W WO CONTRAST    Narrative  PROCEDURE: MRI BRAIN W [address] INFORMATIONSecondary to abnormal findings on CT, for comparison. Trauma by fall. Abnormal head CT.    COMPARISON: Head CT 5/29/2021 and 5/28/2021. TECHNIQUE: Multiplanar and multiple spin echo T1 and T2-weighted images were obtained through the brain before and after the administration of intravenous contrast.    FINDINGS:    In the anterior inferior left frontal lobe, there is susceptibility artifact which abuts the calvarium. There is surrounding high signal on the FLAIR and T2-weighted sequences. This area measures 4.9 x 3.8 x 4.5 cm. There is some associated abnormal  signal on the diffusion-weighted images. The pattern of abnormal signal is consistent with a parenchymal hemorrhagic contusion. There is a parenchymal hemorrhagic contusion involving the anterior left temporal lobe. This has similar signal characteristics with high signal on the FLAIR and T2-weighted images as well as low signal on the gradient echo T2-weighted images. This area  measures 2.4 x 1.6 x 2.0 cm. There is a small contusion along the anterior lateral aspect left temporal lobe seen on axial image 9. There is no hemorrhage associated with this focus. This area measures 0.9 x 0.5 cm. There is a another parenchymal hemorrhagic contusion. This is in the inferior and posterior right cerebellum. This is deep to the patient's known skull fracture. There are some foci of susceptibility artifact. This area measures 2.8 x 2.1 x 1.4 cm.     There is no abnormal signal on the diffusion-weighted images suggestive of an infarct. All of the abnormal signal is associated with known hemorrhagic contusions. The brain volume is mildly reduced. . There is a trace amount of subarachnoid and subdural  hemorrhage on the left associated with a hemorrhagic contusions. There is no associated mass effect. There is no hydrocephalus or midline shift. On the FLAIR and T2-weighted sequences, separate from the hemorrhagic contusions, there is a minimal amount of abnormal signal in the white matter the brain suggesting chronic small vessel ischemic changes. There is no abnormal enhancement in the brain. The hemorrhagic contusions do have some intrinsic precontrast T1 signal hyperintensity associated with them. The major intracranial vascular flow voids are present. The midline craniocervical junction  structures are normal.  The brainstem and pituitary gland are normal.    Impression  1. Parenchymal hemorrhagic contusions in the anterior inferior right frontal lobe, anterior left temporal lobe, lateral left temporal lobe and posterior inferior right cerebellum. 2. Trace amount of extra-axial blood products on the left without mass effect. **This report has been created using voice recognition software. It may contain minor errors which are inherent in voice recognition technology. **      Final report electronically signed by Dr. Sol Miles on 5/30/2021 9:26 AM    No results found for this or any previous visit. Results for orders placed during the hospital encounter of 05/28/21    CT HEAD WO CONTRAST    Narrative  CT head without contrast    Comparison:  CT,SR  - CT HEAD WO CONTRAST  - 05/28/2021 06:15 PM EDT    Findings:    No significant change in the left frontal hemorrhagic contusions with  surrounding vasogenic edema and mild subdural extension. No significant change in right cerebral hemorrhagic contusion with  surrounding edema. No new hemorrhage.   No midline shift or axial herniation. Gray-white differentiation maintained without anoxic changes. No sinus fluid levels. Stable multifocal right occipital fractures including one component  extending to the posterolateral rim of the foramen magnum. Impression  No significant change versus prior. This document has been electronically signed by: Odilon Snyder MD on  05/29/2021 04:10 AM    All CTs at this facility use dose modulation techniques and iterative  reconstructions, and/or weight-based dosing  when appropriate to reduce radiation to a low as reasonably achievable. We reviewed the patient records and available information in the EHR       Impression:    Active Problems:    Intraparenchymal hematoma of right side of brain due to trauma [facility])    Closed fracture of right side of base of skull (HCC)    Closed head injury    Scalp laceration, initial encounter    Right and left hemorrhagic contusion of cerebrum ([facility] [address]    Scalp laceration  Resolved Problems:    * No resolved hospital problems. *    78 y.o. male with TBI with Parenchymal hemorrhagic contusions in the anterior inferior right frontal lobe, anterior left temporal lobe, lateral left temporal lobe and posterior inferior right cerebellum. Clinically on exam non-focal. MRI with findings above and questionable small thrombus that is nonflow limiting in the right transverse sinus. Recommendations:                                            1. MRV Brain with and without nikole for baseline due to concern of transverse sinus thrombosis  2. No anticoagulation at this time due to trauma  3. Continue keppra for seizure prophylaxis  4. Speech therapy to do cognitive evaluation to establish baseline  5. Inpatient rehabilitation once bed available  6. Please call if any questions. Time spent was at least 60 min of time evaluating patient, discussion with staff,  planning for treatment and evaluation.   The time was spent examining patient, reviewing the images personally, reviewing the chart, perform high complexity decision making and speaking with the nursing staff regarding recommendations.      Electronically signed by Bia Brizuela MD on 5/31/2021 at 1:15 PM

## 2021-05-31 NOTE — PLAN OF CARE
Problem: Falls - Risk of:  Goal: Will remain free from falls  Description: Will remain free from falls  Outcome: Ongoing  Note: Call light in reach, bed in lowest position, and bed alarm activated. Education given on use of call light before ambulation and when in need of assistance. Patient expressed understanding. Hourly visual checks performed and charted. Toileting offered to patient. No falls this shift, at any time. Will continue to monitor. Problem: HEMODYNAMIC STATUS  Goal: Patient has stable vital signs and fluid balance  Outcome: Ongoing  Note:   Vitals:    05/30/21 2021 05/31/21 0254 05/31/21 0848 05/31/21 1111   BP: 127/70 133/73 126/88 122/68   Pulse: 53 58 (!) 49 50   Resp: 18 18 16 16   Temp: 98.3 °F (36.8 °C) 99.3 °F (37.4 °C) 98.8 °F (37.1 °C) 98.3 °F (36.8 °C)   TempSrc: Oral Oral Oral Oral   SpO2: 98% 97% 99% 96%   Weight:  172 lb 9.6 oz (78.3 kg)     Height:              Problem: ACTIVITY INTOLERANCE/IMPAIRED MOBILITY  Goal: Mobility/activity is maintained at optimum level for patient  Outcome: Ongoing  Note: PT and OT to work with pt. Problem: COMMUNICATION IMPAIRMENT  Goal: Ability to express needs and understand communication  Outcome: Ongoing  Note: Pt alert and oriented x4. Follows commands appropriately. Impulsive at times. Problem: Pain:  Goal: Pain level will decrease  Description: Pain level will decrease  Outcome: Ongoing  Note: Patient able to use 0-10 pain scale, pain goal of no pain. Agreeable to take PRN pain medications. Care plan reviewed with patient. Patient verbalizes understanding of the plan of care and contributed to goal setting.

## 2021-05-31 NOTE — PROGRESS NOTES
[facility]  Daily Progress Note  Pt Name: Jt France  Medical Record Number: [id number]  Date of Birth 1950   Today's Date: 5/31/2021    HD: # 3    CC: \"okay\"    ASSESSMENT  1. Active Hospital Problems    Diagnosis Date Noted    Scalp laceration [S01.01XA]     Right and left hemorrhagic contusion of cerebrum ([address] [S06.339A] 05/29/2021    Intraparenchymal hematoma of right side of brain due to trauma [facility]) [S06.340A] 05/28/2021    Closed fracture of right side of base of skull ([address] [S02.101A] 05/28/2021    Closed head injury [S09.90XA] 05/28/2021    Scalp laceration, initial encounter [S01.01XA] 05/28/2021         PLAN  Patient admitted under Trauma Services to ICU with telemetry   - OK to transfer out to  today      Left intraparenchymal hemorrhage with subsequent developing subdural hematomas              -Neurosurgery managing non operatively               -Neuro checks per protocol    -Seizure prophylaxis with Keppra x10 days              -Maintain systolic blood pressure between 100-160              -Repeat head CT in 6 hours was noted to have interval increase, TXA administered   -Repeat head CT 5/29 stable   -MRI head yesterday showed: Parenchymal hemorrhagic contusion in the anterior inferior right frontal lobe, anterior left temporal lobe, lateral left temporal lobe and posterior inferior right cerebellum   -Repeat CT head in 1 week per NSx (6/6/21), NSx signed off     Right basilar skull fracture              -Neurosurgery managing non operatively at this time              -CTA notes filling defect within right transverse sinus by fracture site, possible venous injury/thrombus.   Neurointerventionalist was contacted, recommended MRI/MRV              -Neuro checks per protocol   -MRI head showed: Parenchymal hemorrhagic contusion in the anterior inferior right frontal lobe, anterior left temporal lobe, lateral left temporal lobe and posterior inferior right cerebellum    Filling defect within right transverse sinus   - Noted on CTA head which could represent a focus of venous injury/thrombus   -Endovascular surgery was consulted   -MRI brain obtained yesterday   -Inpatient neurology consulted for recommendations of endovascular surgery   -MRV brain ordered by neurology today     Closed head injury              - SLP cog eval indicates severe-profound expressive and receptive language deficit               - Limited stimulation brain injury guidelines              - Monitor for postconcussive symptoms              - Neuro checks              - Anti emetics and pain control     Right posterior parietal scalp laceration              -Closed in ED with 8 surgical staples              -Staples to be removed in 10-14 days (6/76/11)     Mechanical fall   -PT, OT eval and treat     Pain Management              -Morphine, Norco, Tylenol     Prophylaxis: SCD's, Incentive Spirometry, GlycoLax, Pepcid, Zofran     Diet as per speech therapy      IVF Management  Regular Neurovascular Checks  Repeat Labs Tomorrow AM  PT/OT/SLP Eval and Treat  Activity as tolerated     Planned Discharge discharge pending clinical course   - Will need continued therapy at discharge. PM&R consulted.     -PM&R has seen, waiting patient to be evaluated by PT    SUBJECTIVE  Patient seen on 4A this morning. Patient endorsed doing \"okay \". Patient endorsed having 4/10 headache but denied any other pain or complaints. Patient denied any lightheadedness, dizziness, neck pain, back pain, chest pain, shortness of breath, abdominal pain, nausea/vomiting, and paresthesias. On exam laceration intact to posterior scalp with no bleeding or drainage. Patient alert and oriented x3, PMS intact in all 4 extremities, no signs of focal neurological deficits. MRI from yesterday reviewed. Neurosurgery signing off and recommended repeat CT head in 1 week.   Inpatient neurology consult placed freely. No focal neurological deficits  MSK: Extremities without cyanosis or edema. PMS intact in all 4 extremities. Strength 5/5 throughout. WOUNDS: Posterior scalp laceration with staples in place with no bleeding, erythema, or drainage. LABS  CBC :   Recent Labs     05/29/21 0336 05/30/21  0403   WBC 14.6* 9.7   HGB 11.4* 9.8*   HCT 37.7* 31.0*   MCV 97.7* 92.5    168     BMP:   Recent Labs     05/29/21 0336 05/30/21  0403   * 139   K 4.0 4.1    109   CO2 19* 23   BUN 16 15   CREATININE 0.9 0.8     COAGS:   Recent Labs     05/29/21 0336 05/30/21  0403   PROT 6.4 6.0*     Pancreas/HFP:  No results for input(s): LIPASE, AMYLASE in the last 72 hours. Recent Labs     05/29/21 0336 05/30/21  0403   AST 24 20   ALT 12 11   BILITOT 0.8 0.7   ALKPHOS 37* 31*       RADIOLOGY:    Narrative   CT head without contrast       Comparison:  CT,SR  - CT HEAD WO CONTRAST  - 05/28/2021 06:15 PM EDT       Findings:       No significant change in the left frontal hemorrhagic contusions with    surrounding vasogenic edema and mild subdural extension. No significant change in right cerebral hemorrhagic contusion with    surrounding edema. No new hemorrhage.  No midline shift or axial herniation. Gray-white differentiation maintained without anoxic changes. No sinus fluid levels. Stable multifocal right occipital fractures including one component    extending to the posterolateral rim of the foramen magnum.       Impression   No significant change versus prior.       This document has been electronically signed by: Odilon Snyder MD on    05/29/2021 04:10 AM       All CTs at this facility use dose modulation techniques and iterative    reconstructions, and/or weight-based dosing   when appropriate to reduce radiation to a low as reasonably achievable.          Narrative   ** ADDENDUM #1 **   This report was discussed with Tung Mcgarry RN on May 28, 2021 20:41:00    EDT.       This document has been electronically signed by: Ariadna Pearl on    05/28/2021 08:42 PM   ** ORIGINAL REPORT **   CT angiography head with contrast. 3D Postprocessing.       Comparison:  CT,SR  - CT HEAD WO CONTRAST  - 05/28/2021 06:15 PM EDT       Findings:   No extravasation of contrast.   Intracranial arteries are patent. No aneurysm, dissection, or occlusion. Subcentimeter circumscribed filling defect within the right transverse    sinus.  This is nonocclusive.  Remaining paranasal sinuses are patent with    an unremarkable appearance.       Right occipital bone fracture and multifocal intracranial hemorrhage.     Please see CT head report for further details.           Impression   1.  Patent head CTA.  No evidence of arterial injury. 2.  Small filling defect within the right transverse sinus, immediately    adjacent to the fracture site.  This could represent a small focus of    venous injury/thrombus.       This document has been electronically signed by: Nicole Garcia MD on    05/28/2021 08:31 PM       All CTs at this facility use dose modulation techniques and iterative    reconstructions, and/or weight-based dosing   when appropriate to reduce radiation to a low as reasonably achievable. Narrative   CT angiography neck with contrast. 3D Postprocessing.       Comparison: None       Findings: Aortic arch and cervical great vessels are patent. Visualized intracranial arteries are patent. No aneurysm, dissection, or    occlusion.       Thyroid gland unremarkable. No cervical mass or fluid collection. Lung apices clear.    No acute fracture at the level of the neck.           Impression   Patent neck CTA.  No evidence of vascular injury at the level of the neck.       This document has been electronically signed by: Nicole Garcia MD on    05/28/2021 08:46 PM       All CTs at this facility use dose modulation techniques and iterative    reconstructions, and/or weight-based dosing   when appropriate to reduce radiation to a low as reasonably achievable.       Carotid stenosis and measurements are in accordance with NASCET criteria. Narrative   ** ADDENDUM #1 **   This report was discussed with Dixie Chapman RN on May 28, 2021 20:03:00    EDT.       This document has been electronically signed by: Nicole Carrillo on    05/28/2021 08:03 PM   ** ORIGINAL REPORT **   CT head without contrast       Comparison:  CT,SR  - CT HEAD WO CONTRAST  - 05/28/2021 12:09 PM EDT       Findings:   No mass or mass-effect. No midline shift. No significant atrophy-like change or white matter disease.       Postsurgical changes of the paranasal sinuses. No air-fluid levels. The orbits are within normal limits.           Impression   1. Left frontal lobe contusion with multiple foci of intraparenchymal    hemorrhage measuring up to 1.2 cm in greatest dimension, with interval    increase. Right cerebellar contusion with foci of intraparenchymal    hemorrhage measuring up to approximately 1 cm in greatest dimension, with    interval increase. New small subdural hematomas and trace adjacent    subarachnoid blood products within the left posterior parietal and    anterior temporal regions. 2. Right occipital bone fracture, without significant change.       This document has been electronically signed by: Ginger Arriaza MD on    05/28/2021 07:55 PM       All CTs at this facility use dose modulation techniques and iterative    reconstructions, and/or weight-based dosing   when appropriate to reduce radiation to a low as reasonably achievable. Narrative   PROCEDURE: MRI BRAIN W WO CONTRAST       CLINICAL INFORMATIONSecondary to abnormal findings on CT, for comparison. Trauma by fall.  Abnormal head CT.       COMPARISON: Head CT 5/29/2021 and 5/28/2021.       TECHNIQUE: Multiplanar and multiple spin echo T1 and T2-weighted images were obtained through the brain before and after the administration of intravenous contrast.     FINDINGS:       In the anterior inferior left frontal lobe, there is susceptibility artifact which abuts the calvarium. There is surrounding high signal on the FLAIR and T2-weighted sequences. This area measures 4.9 x 3.8 x 4.5 cm. There is some associated abnormal    signal on the diffusion-weighted images. The pattern of abnormal signal is consistent with a parenchymal hemorrhagic contusion.       There is a parenchymal hemorrhagic contusion involving the anterior left temporal lobe. This has similar signal characteristics with high signal on the FLAIR and T2-weighted images as well as low signal on the gradient echo T2-weighted images. This area    measures 2.4 x 1.6 x 2.0 cm. There is a small contusion along the anterior lateral aspect left temporal lobe seen on axial image 9. There is no hemorrhage associated with this focus. This area measures 0.9 x 0.5 cm.       There is a another parenchymal hemorrhagic contusion. This is in the inferior and posterior right cerebellum. This is deep to the patient's known skull fracture. There are some foci of susceptibility artifact. This area measures 2.8 x 2.1 x 1.4 cm.       There is no abnormal signal on the diffusion-weighted images suggestive of an infarct. All of the abnormal signal is associated with known hemorrhagic contusions. The brain volume is mildly reduced. . There is a trace amount of subarachnoid and subdural    hemorrhage on the left associated with a hemorrhagic contusions. Claudia Pilon is no associated mass effect.       There is no hydrocephalus or midline shift.       On the FLAIR and T2-weighted sequences, separate from the hemorrhagic contusions, there is a minimal amount of abnormal signal in the white matter the brain suggesting chronic small vessel ischemic changes.       There is no abnormal enhancement in the brain. The hemorrhagic contusions do have some intrinsic precontrast T1 signal hyperintensity associated with them.  The major intracranial vascular flow voids are present.  The midline craniocervical junction    structures are normal.  The brainstem and pituitary gland are normal.                   Impression       1. Parenchymal hemorrhagic contusions in the anterior inferior right frontal lobe, anterior left temporal lobe, lateral left temporal lobe and posterior inferior right cerebellum.       2. Trace amount of extra-axial blood products on the left without mass effect.                   **This report has been created using voice recognition software. It may contain minor errors which are inherent in voice recognition technology. **           Final report electronically signed by Dr. Sol Miles on 5/30/2021 9:26 AM         Electronically signed by Lyndsay Sanchez PA-C on 5/31/2021 at 3:37 PM  PMR saw again but still cant make decision as no input from physical therapy again  Patient stable

## 2021-05-31 NOTE — PROGRESS NOTES
Physical Medicine & Rehabilitation Follow Up Note    Chief Complaint:  headache    Subjective:    Jocelynn Cousin is a 78 y.o. right-handed  male with history of hypertension and hyperlipidemia, was admitted to [facility] on 5/28/2021 for mechanical fall. The patient does not remember what happened to him. Patient record's showed that the patient was playing pickle ball on 5/28/2021. He went after the ball and fell backward with back of his head hitting the ground with loss of consciousness for a short period of time according witness account. He was confused and agitated when he was sent to [facility] ER. He had an episode of vomiting while he was in ER. Initial CT of head showed left frontal lobe acute intraparenchymal hemorrhage and right basilar skull fracture. Neurosurgery was consulted. CT of head repeated few hours later showed interval increase in left frontal lobe contusion with multiple foci of intraparenchymal hemorrhage measuring up to 1.2 cm, interval increase right cerebellar contusion with foci of intraparenchymal hemorrhage measuring up to approximately 1 cm, new small subdural hematomas & trace adjacent subarachnoid blood within left posterior parietal and anterior temporal regions. No surgical intervention was recommended. CTA of neck & head revealed no abnormality other than small focus of venous injury/thrombus within right transverse sinus. Repeated CT of head on 5/29/2021 showed no significant change. The patient had MRI of brain down which continues to have parenchymal hemorrhagic contusion in anterior left inferior frontal lobe, anterior left temporal lobe, lateral left temporal lobe and posterior right inferior cerebellum, and trace amount of blood products on left without mass effect. Neurologist saw the patient today. MRV of brain was ordered due to concern of transverse sinus thrombosis.   The patient says he still has headache on his right posterior head and forehead but it is more intermittently with reduced intensity at 2~3/10 level. He denies having neck pain now. He says he feels tired, fatigued with questionable generalized weakness. He denies having nausea, vomiting, lightheadedness, dizziness, chest pain, abdominal pain, numbness or tingling sensation. He had OT yesterday and tolerate the therapy. He was not seen by PT or speech therapist yesterday. Rehabilitation:  PT: Reviewed. Patient not seen on 5/30/2021  REASON FOR MISSED TREATMENT:  Missed Treat. Patient off the floor at MRI on attempt. Pt also still has strict bedrest activity orders that will need updated prior to being seen. Will re-attempt PT eval at a later time/date as able and orders updated. OT: Reviewed. ADL:   Grooming: Contact Guard Assistance. stood at sink to brush teeth, min vcs for locating needed items, Pt impulsive with trying to get water from faucet-vcs to be careful not to bump head  Toileting: with set-up. used urinal while in supine.     BALANCE:  Standing Balance: Contact Guard Assistance.       BED MOBILITY:  Supine to Sit: Stand By Assistance vcs impulsive, Pt sat up on EOB prior to therapist taking rail out of way-Pt squeezed himself between top & bottom bed rails     TRANSFERS:  Sit to Stand:  Contact Guard Assistance.       FUNCTIONAL MOBILITY:  Assistive Device: None  Assist Level:  Minimal Assistance. Distance: To and from bathroom  vcs for slow down & to avoid bumping into objects in room (closet door when turning around)       ST: Reviewed. Patient not seen on 5/30/2021      Review of Systems:  Review of Systems   Constitutional: Positive for fatigue. Negative for chills, diaphoresis and fever. HENT: Negative for ear discharge, ear pain, hearing loss, mouth sores, rhinorrhea, sneezing, sore throat, tinnitus and trouble swallowing. Eyes: Positive for photophobia. Negative for pain, discharge and visual disturbance. : no limb asymmetry; no limb deformity; no tenderness at bilateral upper & lower extremities; no palpable mass at limbs ; no joints laxity or crepitation ; normal functional joints ROM at bilateral upper & lower extremities  Cerebral :  alert ; awake ; oriented to place, person and time ; able to follow 1-2 steps verbal commend  Cerebellum : no dysmetria with bilateral finger-to-nose test ; mild dysmetria with bilateral heel-to-shin test  Cranial Nerves :  grossly intact CN II to XII function  Sensory : intact light touch and pin prick sensation at bilateral upper & lower extremities  Motor : normal tone at bilateral upper & lower extremities ; normal 5/5 muscle strength at bilateral upper & lower extremities  Reflex : 3+ right knee reflex ; 2+ left knee, right biceps reflex : 1+ left biceps reflex ; zero bilateral brachioradialis reflexes   Pathological Reflex :  No Pedro's sign ; no ankle clonus  Gait :  Not assessed      Diagnostics:   No results found for this or any previous visit (from the past 24 hour(s)). MRI of brain with & without contrast (5/30/2021) : Impression   1. Parenchymal hemorrhagic contusions in the anterior inferior right frontal lobe, anterior left temporal lobe, lateral left temporal lobe and posterior inferior right cerebellum. 2. Trace amount of extra-axial blood products on the left without mass effect.        Impression:  · Left frontal lobe & right cerebellar contusion with intraparenchymal hemorrhage, left posterior parietal & anterior temporal subdural hematoma & subarachnoid blood, and right basilar skull fracture secondary to fall  · Traumatic brain injury with brief loss of consciousness  · Impaired ADLs, ambulation & cognition, and photophobia due to traumatic brain injury and left frontal lobe & right cerebellar contusion with intraparenchymal hemorrhage    · Neck pain due to cervical spine sprain & muscular strain  · Hypertension  · hyperlipidemia    The patient's headache symptom seen to improve. He tolerated the OT treatment. He still has not been seen by PT due to out for MRI study when the PT went to see him yesterday. He now is waiting for MRV of head ordered by neurologist to be done. Plan:  · Waiting for patient to be evaluated by PT   · Continue current OT & speech therapy treatment while the patient is in acute hospital  · We will continue following up with patient for his rehabilitation care and further rehab recommendation.       Ciera Barron MD

## 2021-06-01 ENCOUNTER — APPOINTMENT (OUTPATIENT)
Dept: MRI IMAGING | Age: 71
DRG: 087 | End: 2021-06-01
Payer: MEDICARE

## 2021-06-01 LAB
ANION GAP SERPL CALCULATED.3IONS-SCNC: 7 MEQ/L (ref 8–16)
BUN BLDV-MCNC: 16 MG/DL (ref 7–22)
CALCIUM SERPL-MCNC: 8.7 MG/DL (ref 8.5–10.5)
CHLORIDE BLD-SCNC: 109 MEQ/L (ref 98–111)
CO2: 25 MEQ/L (ref 23–33)
CREAT SERPL-MCNC: 1 MG/DL (ref 0.4–1.2)
ERYTHROCYTE [DISTWIDTH] IN BLOOD BY AUTOMATED COUNT: 12.8 % (ref 11.5–14.5)
ERYTHROCYTE [DISTWIDTH] IN BLOOD BY AUTOMATED COUNT: 42 FL (ref 35–45)
GFR SERPL CREATININE-BSD FRML MDRD: 74 ML/MIN/1.73M2
GLUCOSE BLD-MCNC: 99 MG/DL (ref 70–108)
HCT VFR BLD CALC: 34.8 % (ref 42–52)
HEMOGLOBIN: 11.2 GM/DL (ref 14–18)
MCH RBC QN AUTO: 29.2 PG (ref 26–33)
MCHC RBC AUTO-ENTMCNC: 32.2 GM/DL (ref 32.2–35.5)
MCV RBC AUTO: 90.6 FL (ref 80–94)
PLATELET # BLD: 192 THOU/MM3 (ref 130–400)
PMV BLD AUTO: 11.1 FL (ref 9.4–12.4)
POTASSIUM SERPL-SCNC: 4.2 MEQ/L (ref 3.5–5.2)
RBC # BLD: 3.84 MILL/MM3 (ref 4.7–6.1)
SODIUM BLD-SCNC: 141 MEQ/L (ref 135–145)
WBC # BLD: 6.2 THOU/MM3 (ref 4.8–10.8)

## 2021-06-01 PROCEDURE — 2580000003 HC RX 258: Performed by: PHYSICIAN ASSISTANT

## 2021-06-01 PROCEDURE — 6370000000 HC RX 637 (ALT 250 FOR IP): Performed by: PHYSICIAN ASSISTANT

## 2021-06-01 PROCEDURE — 70546 MR ANGIOGRAPH HEAD W/O&W/DYE: CPT

## 2021-06-01 PROCEDURE — 2060000000 HC ICU INTERMEDIATE R&B

## 2021-06-01 PROCEDURE — 80048 BASIC METABOLIC PNL TOTAL CA: CPT

## 2021-06-01 PROCEDURE — 92507 TX SP LANG VOICE COMM INDIV: CPT

## 2021-06-01 PROCEDURE — 36415 COLL VENOUS BLD VENIPUNCTURE: CPT

## 2021-06-01 PROCEDURE — 6370000000 HC RX 637 (ALT 250 FOR IP): Performed by: NURSE PRACTITIONER

## 2021-06-01 PROCEDURE — 97112 NEUROMUSCULAR REEDUCATION: CPT

## 2021-06-01 PROCEDURE — 85027 COMPLETE CBC AUTOMATED: CPT

## 2021-06-01 PROCEDURE — A9579 GAD-BASE MR CONTRAST NOS,1ML: HCPCS | Performed by: PSYCHIATRY & NEUROLOGY

## 2021-06-01 PROCEDURE — APPSS60 APP SPLIT SHARED TIME 46-60 MINUTES: Performed by: NURSE PRACTITIONER

## 2021-06-01 PROCEDURE — 99232 SBSQ HOSP IP/OBS MODERATE 35: CPT | Performed by: SURGERY

## 2021-06-01 PROCEDURE — 97162 PT EVAL MOD COMPLEX 30 MIN: CPT

## 2021-06-01 PROCEDURE — 6360000004 HC RX CONTRAST MEDICATION: Performed by: PSYCHIATRY & NEUROLOGY

## 2021-06-01 PROCEDURE — 97129 THER IVNTJ 1ST 15 MIN: CPT

## 2021-06-01 RX ADMIN — LEVETIRACETAM 500 MG: 500 TABLET, FILM COATED ORAL at 07:47

## 2021-06-01 RX ADMIN — SODIUM CHLORIDE, PRESERVATIVE FREE 10 ML: 5 INJECTION INTRAVENOUS at 20:12

## 2021-06-01 RX ADMIN — SODIUM CHLORIDE, PRESERVATIVE FREE 10 ML: 5 INJECTION INTRAVENOUS at 07:47

## 2021-06-01 RX ADMIN — ACETAMINOPHEN 650 MG: 325 TABLET ORAL at 14:01

## 2021-06-01 RX ADMIN — FAMOTIDINE 20 MG: 20 TABLET ORAL at 20:12

## 2021-06-01 RX ADMIN — ACETAMINOPHEN 650 MG: 325 TABLET ORAL at 07:47

## 2021-06-01 RX ADMIN — DOCUSATE SODIUM 100 MG: 100 CAPSULE, LIQUID FILLED ORAL at 07:47

## 2021-06-01 RX ADMIN — LEVETIRACETAM 500 MG: 500 TABLET, FILM COATED ORAL at 20:11

## 2021-06-01 RX ADMIN — GADOTERIDOL 15 ML: 279.3 INJECTION, SOLUTION INTRAVENOUS at 13:30

## 2021-06-01 RX ADMIN — FAMOTIDINE 20 MG: 20 TABLET ORAL at 07:47

## 2021-06-01 RX ADMIN — DOCUSATE SODIUM 100 MG: 100 CAPSULE, LIQUID FILLED ORAL at 20:12

## 2021-06-01 RX ADMIN — ACETAMINOPHEN 650 MG: 325 TABLET ORAL at 18:55

## 2021-06-01 ASSESSMENT — PAIN DESCRIPTION - DIRECTION: RADIATING_TOWARDS: NECK

## 2021-06-01 ASSESSMENT — PAIN DESCRIPTION - PROGRESSION
CLINICAL_PROGRESSION: NOT CHANGED
CLINICAL_PROGRESSION: GRADUALLY WORSENING

## 2021-06-01 ASSESSMENT — PAIN - FUNCTIONAL ASSESSMENT
PAIN_FUNCTIONAL_ASSESSMENT: ACTIVITIES ARE NOT PREVENTED
PAIN_FUNCTIONAL_ASSESSMENT: ACTIVITIES ARE NOT PREVENTED

## 2021-06-01 ASSESSMENT — PAIN DESCRIPTION - FREQUENCY
FREQUENCY: CONTINUOUS
FREQUENCY: CONTINUOUS

## 2021-06-01 ASSESSMENT — PAIN DESCRIPTION - LOCATION
LOCATION: HEAD
LOCATION: HEAD

## 2021-06-01 ASSESSMENT — PAIN DESCRIPTION - ONSET
ONSET: ON-GOING
ONSET: ON-GOING

## 2021-06-01 ASSESSMENT — PAIN DESCRIPTION - PAIN TYPE
TYPE: ACUTE PAIN
TYPE: ACUTE PAIN

## 2021-06-01 ASSESSMENT — PAIN SCALES - GENERAL
PAINLEVEL_OUTOF10: 3
PAINLEVEL_OUTOF10: 2
PAINLEVEL_OUTOF10: 0
PAINLEVEL_OUTOF10: 0
PAINLEVEL_OUTOF10: 3

## 2021-06-01 ASSESSMENT — PAIN DESCRIPTION - DESCRIPTORS
DESCRIPTORS: ACHING;CONSTANT
DESCRIPTORS: ACHING;CONSTANT

## 2021-06-01 ASSESSMENT — PAIN DESCRIPTION - ORIENTATION: ORIENTATION: POSTERIOR;ANTERIOR

## 2021-06-01 NOTE — PROGRESS NOTES
ADL completion. INTERVENTIONS: DNT due to focus on additional STGs. SHORT TERM GOAL #4:  Goal 4: Pt will complete functional carryover tasks (orientation, biographical data, call light, staff relations) with 25% accuracy, max cues to improve awareness to current environment. INTERVENTIONS:   Orientation  Date: independent  Month: logical cuing following paraphasia (e.g. July)  Year: independent  Place: independent  *Patient with improved orientation this date with mild presence of paraphasias. Patient would benefit from completion of cognitive evaluation 6/2 following improved language status as deemed clinically appropriate with anticipated IPR. SHORT TERM GOAL #5:  Goal 5: Patient will consume regular diet with thin liquids without s/s of aspiration in order to safley maintain adequate hydration and nutrition  INTERVENTIONS: RN with report patient safely consuming regular diet with thin liquids with no overt difficulty. Long-Term Goals:   No LTGs due to short ELOS. EDUCATION:  Learner: Patient and Significant Other  Education:  Reviewed results and recommendations of this evaluation and Reviewed ST goals and Plan of Care  Evaluation of Education: Verbalizes understanding    ASSESSMENT/PLAN:  Activity Tolerance:  Patient tolerance of  treatment: good. Active engagement in therapy. Assessment/Plan: Patient progressing toward established goals. Continues to require skilled care of licensed speech pathologist to progress toward achievement of established goals and plan of care. .     Plan for Next Session: Cognitive Evaluation     Jass Casanova M.S., [facility]

## 2021-06-01 NOTE — PLAN OF CARE
Problem: Falls - Risk of:  Goal: Will remain free from falls  Description: Will remain free from falls  6/1/2021 0900 by Manuel Sullivan RN  Outcome: Ongoing  Note: Call light in reach, bed in lowest position, and bed alarm activated. Education given on use of call light before ambulation and when in need of assistance. Patient expressed understanding. Hourly visual checks performed and charted. Toileting offered to patient. No falls this shift, at any time. Will continue to monitor. Problem: HEMODYNAMIC STATUS  Goal: Patient has stable vital signs and fluid balance  6/1/2021 0900 by Manuel Sullivan RN  Outcome: Ongoing  Note:   Vitals:    05/31/21 2022 05/31/21 2313 06/01/21 0303 06/01/21 0742   BP: 121/74 124/68 123/84 135/85   Pulse: 54 52 55 54   Resp: 16 16 16 16   Temp: 98.6 °F (37 °C) 98.9 °F (37.2 °C) 98.1 °F (36.7 °C)    TempSrc: Oral Oral Oral    SpO2: 96% 98% 98% 100%   Weight:   169 lb (76.7 kg)    Height:              Problem: ACTIVITY INTOLERANCE/IMPAIRED MOBILITY  Goal: Mobility/activity is maintained at optimum level for patient  Outcome: Ongoing  Note: PT and OT working with patient. IPR evaluating patient. Problem: COMMUNICATION IMPAIRMENT  Goal: Ability to express needs and understand communication  6/1/2021 0900 by Manuel Sullivan RN  Outcome: Ongoing  Note: Pt alert and appropriate. Oriented x4. Follows commands appropriately. Problem: Pain:  Goal: Pain level will decrease  Description: Pain level will decrease  Outcome: Ongoing  Note: Patient able to use 0-10 pain scale, pain goal of no pain. Complaining of 3-10 headache pain. Agreeable to take PRN pain medications. Problem: Discharge Planning:  Goal: Patients continuum of care needs are met  Description: Patients continuum of care needs are met  6/1/2021 0900 by Manuel Sullivan RN  Outcome: Ongoing  Note: From home with wife. Discharge planning in process and discussed with patient/family.   Social work consulted for any additional needs. Care manager aware of discharge needs. Care plan reviewed with patient. Patient verbalizes understanding of the plan of care and contributed to goal setting.

## 2021-06-01 NOTE — PROGRESS NOTES
[facility] THER[facility] MISSED TREATMENT NOTE  [facility] 4A  4A-03/003-A      Date: 2021  Patient Name: Ly Rush        CSN: [id number]   : 1950  (78 y.o.)  Gender: male   Referring Practitioner: LEIGHA WRIGHT  Diagnosis: intraparenchymal hematoma of R side of brain d/t trauma         REASON FOR MISSED TREATMENT: Patient Off Floor for Testing.  Will check back as able

## 2021-06-01 NOTE — PLAN OF CARE
Problem: Falls - Risk of:  Goal: Will remain free from falls  Description: Will remain free from falls  5/31/2021 2343 by Teresa Contreras RN  Outcome: Ongoing  Note: Patient remained free from falls this shift. Bed is in low position with alarm on and siderails up x2. Education given on use of call light and patient voiced understanding. Call light and beside table within reach. Arm band and falling star in place. Hourly rounds completed. Will continue to monitor. Problem: HEMODYNAMIC STATUS  Goal: Patient has stable vital signs and fluid balance  5/31/2021 2343 by Teresa Contreras RN  Outcome: Ongoing  Note:   Vitals:    05/31/21 1111 05/31/21 1506 05/31/21 2022 05/31/21 2313   BP: 122/68 120/84 121/74 124/68   Pulse: 50 (!) 49 54 52   Resp: 16 16 16 16   Temp: 98.3 °F (36.8 °C) 99.3 °F (37.4 °C) 98.6 °F (37 °C) 98.9 °F (37.2 °C)   TempSrc: Oral Oral Oral Oral   SpO2: 96% 98% 96% 98%   Weight:       Height:              Problem: COMMUNICATION IMPAIRMENT  Goal: Ability to express needs and understand communication  5/31/2021 2343 by Teresa Contreras RN  Outcome: Ongoing  Note: Pt is able to express needs and understand communication at this time. Problem: Pain:  Goal: Control of acute pain  Description: Control of acute pain  5/31/2021 2343 by Teresa Contreras RN  Outcome: Ongoing  Note: Patient complained of no pain rating it a 0 on the 0-10 scale. Pain medication given as ordered and needed. Patient voiced satisfaction with this. Also instructed patient on distraction, repositioning, and ambulation as non-pharmacological methods of pain relief. Patient voiced understanding. Problem: Daily Care:  Goal: Daily care needs are met  Description: Daily care needs are met  Outcome: Ongoing  Note: Daily care needs are being met at this time.       Problem: Discharge Planning:  Goal: Patients continuum of care needs are met  Description: Patients continuum of care needs are met  Outcome: Ongoing  Note: Discharge planning in process and discussed with patient/family. Social work consulted for any additional needs. Care manager aware of discharge needs. Plan of care discussed with pt and family. Pt verbalizes understand.

## 2021-06-01 NOTE — CARE COORDINATION
6/1/21, 7:37 AM EDT  DISCHARGE PLANNING EVALUATION:    Kuldeep Gonsales       Admitted: 5/28/2021/ [address] day: 4   Location: -03/003-A Reason for admit: Intraparenchymal hemorrhage of brain ([facility] [address] [I61.9]   PMH:  has a past medical history of Hyperlipidemia and Hypertension. Procedure:   5/28 CT Head WO Contrast  IMPRESSION:  Left frontal lobe acute intraparenchymal hemorrhage   Right basilar skull fracture     5/30 MRI Brain WO Contrast    Impression:           1. Parenchymal hemorrhagic contusions in the anterior inferior right frontal lobe, anterior left temporal lobe, lateral left temporal lobe and posterior inferior right cerebellum. 2. Trace amount of extra-axial blood products on the left without mass effect. Barriers to Discharge:  Fm ED, PT/OT, neuro checks, Neurosurgery consult, telemetry, Physiatry consult, Neurology consult, IV fluids, Pepcid, pain control, Keppra. PCP: Veena Rodriguez MD  Readmission Risk Score: 10%    Patient Goals/Plan/Treatment Preferences: Met with Glenn. He currently lives at home with his wife. Await Physiatry recommendations for potential admission to IP Rehab. Will continue to follow. Transportation/Food Security/Housekeeping Addressed:  No issues identified.

## 2021-06-01 NOTE — PROGRESS NOTES
[address] [address]  INPATIENT PHYSICAL THERAPY  EVALUATION  [facility] 4A - 4A-03/003-A    Time In: 2283  Time Out: 1446  Timed Code Treatment Minutes: 15 Minutes  Minutes: 29          Date: 2021  Patient Name: Andrew Campos,  Gender:  male        MRN: [id number]  : 1950  (79 y.o.)      Referring Practitioner: ADRIANA Lira  Diagnosis: intraparenchymal hemorrhage of brain  Additional Pertinent Hx: Per neuro HPI: \"Per report of wife at bedside, patient was playing pickle ball when he went for a ball and fell backwards striking his posterior scalp and was unconscious for several moments reported by bystanders. In the ED he was found to have left ICH, right basilar skull fracture, and right posterior parietal scalp lesion. CT of the head concerning for left frontal lobe acute ICH with right basilar skull fracture. Part of the work-up CT head and neck demonstrated normal arterial vasculature with no dissection. Possible small thrombus that is nonflow limiting in the right transverse sinus. Mri Brain with parenchymal hemorrhacic contusion in the anterior inferior right frontal lobe, anterior left temporal lobe, lateral left temporal lobe, and posterior inferior right cerebellum and trace amount of extra-axial blood on the left without mass effect. \"     Restrictions/Precautions:  Restrictions/Precautions: Fall Risk, General Precautions  Position Activity Restriction  Other position/activity restrictions: low stimulation guidlines s/p CHI    Subjective:  Chart Reviewed: Yes  Patient assessed for rehabilitation services?: Yes  Family / Caregiver Present: Yes (wife present on evaluation)  Subjective: RN approved PT evaluation. Pt agreeable to therapy, highly motivated, wife present and very supportive. Pt did demonstrate decreased insight into deficits and had some minor impulsivity, though did respond well to cues throughout session.  Pt and wife are both hopeful that pt will go to [facility] as he is far below PLOF.     General:  Follows Commands: Within Functional Limits    Vision: Impaired  Vision Exceptions: Wears glasses at all times    Hearing: Within functional limits    Pain: 3/10: headache--frontal, temporal, and posterior regions identified    Vitals: Vitals not assessed per clinical judgement, see nursing flowsheet    Social/Functional History:    Lives With: Spouse  Type of Home: House  Home Layout: One level  Home Access: Stairs to enter without rails  Entrance Stairs - Number of Steps: 3  Home Equipment: Cane     Bathroom Shower/Tub: Walk-in shower  Bathroom Toilet: Standard  Bathroom Equipment: Grab bars in shower       ADL Assistance: Independent  Homemaking Assistance: Needs assistance (does dishes, ocassional cooking-eggs mostly, most yardwork)  Ambulation Assistance: Independent  Transfer Assistance: Independent    Active : Yes  Occupation: Retired  Additional Comments: Pt was fully indep PLOF, runs 2 days a week, [facility] [employer]    OBJECTIVE:  Range of Motion:  Bilateral Lower Extremity: WFL    Strength:  Bilateral Lower Extremity: WFL    Balance:  Static Sitting Balance:  Supervision  Dynamic Sitting Balance: Supervision  Static Standing Balance: Contact Guard Assistance  Dynamic Standing Balance: Contact Guard Assistance, Minimal Assistance  Single Leg Balance: CGA to Magnus  Tandem Stance: CGA to Min A   *assist to attain SLS and tandem stance able to maintain with CGA for ~10 seconds    Bed Mobility:  Supine to Sit: Supervision, with increased time for completion  Scooting: Supervision, with verbal cues , with increased time for completion, for seated scooting    Transfers:  Sit to Stand: Stand By Assistance  Stand to Sit:Stand By Assistance  *several repetitions throughout session    Ambulation:  Contact Guard Assistance, X 1, with cues for safety, with verbal cues , with increased time for completion  Distance: 50' , various short distances in room,  50'  Surface: Level Tile  Device:No Device  Gait Deviations:  Slow Lois, Decreased Step Length Bilaterally, Decreased Gait Speed and Mild Path Deviations  *cues for watching objects in hallway--pt nearly bumped into bed while walking in hallway, appears unaware of some of his surroundings    Stairs:  Ascent/descent of 3 steps with BHRs and CGA--cues for safety with stepping    Neuro Facilitation: Forward and lateral steps over 6\" jesse x 6 reps with CGA--brief pauses to readjust steps  Stepping to various numbered/colored pods with PT stating which extremity to use 10/10 identified correctly--CGA  Throwing task--CGA to Magnus to throw bean bags into bucket while standing on black foam (firm) surface, then SLS on tile--able to throw accurately but had several  LOB corrected with Magnus from PT    Functional Outcome Measures: Completed  Dynamic Gait Total Score: 16/24, indicating 20-39% disability  -PAC Inpatient Mobility Raw Score : 18  AM-PAC Inpatient T-Scale Score : 43.63    ASSESSMENT:  Activity Tolerance:  Patient tolerance of  treatment: good. Treatment Initiated: Treatment and education initiated within context of evaluation. Evaluation time included review of current medical information, gathering information related to past medical, social and functional history, completion of standardized testing, formal and informal observation of tasks, assessment of data and development of plan of care and goals. Treatment time included skilled education and facilitation of tasks to increase safety and independence with functional mobility for improved independence and quality of life. Assessment: Body structures, Functions, Activity limitations: Decreased functional mobility , Decreased endurance, Decreased balance, Decreased safe awareness, Increased pain, Decreased posture  Assessment: Pt is below PLOF by way of transfers and ambulation.  He is especially limited with ambulation and other dynamic standing tasks when

## 2021-06-01 NOTE — PROGRESS NOTES
posterior inferior right cerebellum   - MRV pending today     Filling defect within right transverse sinus   - Noted on CTA head which could represent a focus of venous injury/thrombus   -Endovascular surgery recommends no anticoagulation at this time due to trauma   -MRI brain obtained yesterday   -Inpatient neurology concurs with recommendations by Endovascular Neurosurgery    -MRV brain pending today      Closed head injury              - SLP cog eval indicates severe-profound expressive and receptive language deficit               - Limited stimulation brain injury guidelines              - Monitor for postconcussive symptoms              - Neuro checks              - Anti emetics and pain control     Right posterior parietal scalp laceration              -Closed in ED with 8 surgical staples              -Staples to be removed in 10-14 days (6/76/11)     Mechanical fall   -PT, OT eval and treat     Pain Management              -Morphine, Norco, Tylenol     Prophylaxis: SCD's, Incentive Spirometry, GlycoLax, Pepcid, Zofran    General diet     Regular Neurovascular Checks  Repeat Labs Tomorrow AM  PT/OT/SLP continue to treat  Activity as tolerated     Planned Discharge discharge pending clinical course    - Planning IPR at discharge. PM&R awaiting PT to evaluate and make recommendations. SUBJECTIVE  Patient seen on 4A this morning. He complains of a headache, states that it comes and goes but currently is not \"too bad\". Coughing and laughing make the pain worse. He is tolerating a general diet without any nausea or vomiting. He is passing flatus and had a bowel movement this morning. Wife at bedside states that he is doing much better with his aphasia symptoms. Patient also concurs, stating that he feels that he has much less difficulty in word finding. Awaiting MRV to be completed today. Planning on IPR at discharge. Awaiting recommendations from PT.  Case discussed with trauma surgeon,  Carmela. Wt Readings from Last 3 Encounters:   06/01/21 169 lb (76.7 kg)     Temp Readings from Last 3 Encounters:   06/01/21 98.1 °F (36.7 °C) (Oral)     BP Readings from Last 3 Encounters:   06/01/21 123/84     Pulse Readings from Last 3 Encounters:   06/01/21 55       24 HR INTAKE/OUTPUT :     Intake/Output Summary (Last 24 hours) at 6/1/2021 0728  Last data filed at 6/1/2021 0303  Gross per 24 hour   Intake 1020 ml   Output 1975 ml   Net -955 ml     DIET GENERAL;    OBJECTIVE  CURRENT VITALS /84   Pulse 55   Temp 98.1 °F (36.7 °C) (Oral)   Resp 16   Ht 5' 7\" (1.702 m)   Wt 169 lb (76.7 kg)   SpO2 98%   BMI 26.47 kg/m²   GENERAL:  Awake and alert; oriented x4. In no acute distress and well nourished. SKIN: Appropriate for ethnicity, warm and dry. EYES: Left periorbital ecchymosis, no significant edema. PERRL at 3mm  CARDIO: Strong/regular S1/S2 rate and rhythm. No rubs murmurs, or gallops. 2+ radial and dorsalis pedal pulses. Capillary refill <2 sec. No extremity edema noted. PULMONARY:  Lung sounds are clear to auscultation throughout bilaterally. No wheezing, crackles, or rhonchi. ABDOMEN: Abdomen is non distended. Bowel sounds present in all four quadrants. Soft and non tender to palpation in all quadrants. NEURO: Follows commands. PMS intact, moves limbs freely. No focal neurological deficits  MSK: Extremities without cyanosis or edema. PMS intact in all 4 extremities. Strength 5/5 throughout. WOUNDS: Posterior scalp laceration with staples in place with no bleeding, erythema, or drainage.       LABS  CBC :   Recent Labs     05/30/21  0403 06/01/21  0354   WBC 9.7 6.2   HGB 9.8* 11.2*   HCT 31.0* 34.8*   MCV 92.5 90.6    192     BMP:   Recent Labs     05/30/21  0403 06/01/21  0355    141   K 4.1 4.2    109   CO2 23 25   BUN 15 16   CREATININE 0.8 1.0     COAGS:   Recent Labs     05/30/21  0403   PROT 6.0*     Pancreas/HFP:  No results for input(s): LIPASE, AMYLASE in the last 72 hours. Recent Labs     05/30/21  0403   AST 20   ALT 11   BILITOT 0.7   ALKPHOS 31*       RADIOLOGY:  MRV pending        Electronically signed by SOLO Woods CNP on 6/1/2021 at 7:28 AM  Patient seen and examined independently by me 6/1/2021    Vitals and Graphics reviewed       Labs, cultures, and radiographs where available were reviewed. I discussed patient concerns with the patient's nurse and instructions were given. Please see our orders if there are any new ones for the updated patient care plan. Above discussed and I agree with Janes Bernal CNP. See my additional comments below for updated orders and plan.    Phys Therapyhas seen and recs IPR   as does other disciplines  Await PMR now

## 2021-06-02 ENCOUNTER — HOSPITAL ENCOUNTER (INPATIENT)
Age: 71
LOS: 9 days | Discharge: HOME | DRG: 949 | End: 2021-06-11
Attending: PHYSICAL MEDICINE & REHABILITATION | Admitting: PHYSICAL MEDICINE & REHABILITATION
Payer: MEDICARE

## 2021-06-02 VITALS
OXYGEN SATURATION: 98 % | RESPIRATION RATE: 16 BRPM | DIASTOLIC BLOOD PRESSURE: 87 MMHG | TEMPERATURE: 98.4 F | SYSTOLIC BLOOD PRESSURE: 138 MMHG | BODY MASS INDEX: 26.53 KG/M2 | HEIGHT: 67 IN | HEART RATE: 50 BPM | WEIGHT: 169 LBS

## 2021-06-02 DIAGNOSIS — S06.9X1A: ICD-10-CM

## 2021-06-02 DIAGNOSIS — S06.33AA FOCAL HEMORRHAGIC CONTUSION OF CEREBRUM: ICD-10-CM

## 2021-06-02 DIAGNOSIS — I61.9 INTRAPARENCHYMAL HEMORRHAGE OF BRAIN (HCC): Primary | ICD-10-CM

## 2021-06-02 DIAGNOSIS — S02.101A CLOSED FRACTURE OF RIGHT SIDE OF BASE OF SKULL, INITIAL ENCOUNTER (HCC): ICD-10-CM

## 2021-06-02 PROBLEM — S06.32AA INTRAPARENCHYMAL HEMATOMA OF LEFT SIDE OF BRAIN DUE TO TRAUMA: Status: ACTIVE | Noted: 2021-05-28

## 2021-06-02 PROCEDURE — 6370000000 HC RX 637 (ALT 250 FOR IP): Performed by: PHYSICAL MEDICINE & REHABILITATION

## 2021-06-02 PROCEDURE — APPNB180 APP NON BILLABLE TIME > 60 MINS: Performed by: PHYSICIAN ASSISTANT

## 2021-06-02 PROCEDURE — APPSS45 APP SPLIT SHARED TIME 31-45 MINUTES: Performed by: PHYSICIAN ASSISTANT

## 2021-06-02 PROCEDURE — 97112 NEUROMUSCULAR REEDUCATION: CPT

## 2021-06-02 PROCEDURE — 97110 THERAPEUTIC EXERCISES: CPT

## 2021-06-02 PROCEDURE — 99222 1ST HOSP IP/OBS MODERATE 55: CPT | Performed by: PHYSICAL MEDICINE & REHABILITATION

## 2021-06-02 PROCEDURE — 99233 SBSQ HOSP IP/OBS HIGH 50: CPT | Performed by: PSYCHIATRY & NEUROLOGY

## 2021-06-02 PROCEDURE — 1180000000 HC REHAB R&B

## 2021-06-02 PROCEDURE — 97116 GAIT TRAINING THERAPY: CPT

## 2021-06-02 PROCEDURE — 2580000003 HC RX 258: Performed by: PHYSICIAN ASSISTANT

## 2021-06-02 PROCEDURE — 6370000000 HC RX 637 (ALT 250 FOR IP): Performed by: PHYSICIAN ASSISTANT

## 2021-06-02 PROCEDURE — 2580000003 HC RX 258: Performed by: PHYSICAL MEDICINE & REHABILITATION

## 2021-06-02 PROCEDURE — 6370000000 HC RX 637 (ALT 250 FOR IP): Performed by: NURSE PRACTITIONER

## 2021-06-02 PROCEDURE — 99239 HOSP IP/OBS DSCHRG MGMT >30: CPT | Performed by: SURGERY

## 2021-06-02 RX ORDER — ONDANSETRON 2 MG/ML
4 INJECTION INTRAMUSCULAR; INTRAVENOUS EVERY 6 HOURS PRN
Status: DISCONTINUED | OUTPATIENT
Start: 2021-06-02 | End: 2021-06-03

## 2021-06-02 RX ORDER — ACETAMINOPHEN 325 MG/1
TABLET ORAL
Status: COMPLETED
Start: 2021-06-02 | End: 2021-06-03

## 2021-06-02 RX ORDER — DOCUSATE SODIUM 100 MG/1
100 CAPSULE, LIQUID FILLED ORAL 2 TIMES DAILY
Status: DISCONTINUED | OUTPATIENT
Start: 2021-06-02 | End: 2021-06-11 | Stop reason: HOSPADM

## 2021-06-02 RX ORDER — LANOLIN ALCOHOL/MO/W.PET/CERES
6 CREAM (GRAM) TOPICAL NIGHTLY PRN
Status: DISCONTINUED | OUTPATIENT
Start: 2021-06-02 | End: 2021-06-04

## 2021-06-02 RX ORDER — ACETAMINOPHEN 325 MG/1
650 TABLET ORAL EVERY 4 HOURS PRN
Status: DISCONTINUED | OUTPATIENT
Start: 2021-06-02 | End: 2021-06-11 | Stop reason: HOSPADM

## 2021-06-02 RX ORDER — POLYETHYLENE GLYCOL 3350 17 G/17G
17 POWDER, FOR SOLUTION ORAL DAILY PRN
Status: CANCELLED | OUTPATIENT
Start: 2021-06-02

## 2021-06-02 RX ORDER — LEVETIRACETAM 500 MG/1
500 TABLET ORAL 2 TIMES DAILY
Status: DISCONTINUED | OUTPATIENT
Start: 2021-06-02 | End: 2021-06-11 | Stop reason: HOSPADM

## 2021-06-02 RX ORDER — FAMOTIDINE 20 MG/1
20 TABLET, FILM COATED ORAL 2 TIMES DAILY
Status: CANCELLED | OUTPATIENT
Start: 2021-06-02

## 2021-06-02 RX ORDER — LISINOPRIL 10 MG/1
10 TABLET ORAL DAILY
Status: DISCONTINUED | OUTPATIENT
Start: 2021-06-02 | End: 2021-06-03

## 2021-06-02 RX ORDER — SENNA PLUS 8.6 MG/1
1 TABLET ORAL NIGHTLY PRN
Status: CANCELLED | OUTPATIENT
Start: 2021-06-02

## 2021-06-02 RX ORDER — POLYETHYLENE GLYCOL 3350 17 G/17G
17 POWDER, FOR SOLUTION ORAL DAILY PRN
Status: DISCONTINUED | OUTPATIENT
Start: 2021-06-02 | End: 2021-06-02 | Stop reason: SDUPTHER

## 2021-06-02 RX ORDER — BISACODYL 10 MG
10 SUPPOSITORY, RECTAL RECTAL DAILY PRN
Status: DISCONTINUED | OUTPATIENT
Start: 2021-06-02 | End: 2021-06-11 | Stop reason: HOSPADM

## 2021-06-02 RX ORDER — BISACODYL 10 MG
10 SUPPOSITORY, RECTAL RECTAL DAILY PRN
Status: CANCELLED | OUTPATIENT
Start: 2021-06-02

## 2021-06-02 RX ORDER — PROMETHAZINE HYDROCHLORIDE 25 MG/1
12.5 TABLET ORAL EVERY 6 HOURS PRN
Status: CANCELLED | OUTPATIENT
Start: 2021-06-02

## 2021-06-02 RX ORDER — DOCUSATE SODIUM 100 MG/1
100 CAPSULE, LIQUID FILLED ORAL 2 TIMES DAILY
Status: CANCELLED | OUTPATIENT
Start: 2021-06-02

## 2021-06-02 RX ORDER — PROMETHAZINE HYDROCHLORIDE 25 MG/1
12.5 TABLET ORAL EVERY 6 HOURS PRN
Status: DISCONTINUED | OUTPATIENT
Start: 2021-06-02 | End: 2021-06-11 | Stop reason: HOSPADM

## 2021-06-02 RX ORDER — SODIUM CHLORIDE 0.9 % (FLUSH) 0.9 %
5-40 SYRINGE (ML) INJECTION PRN
Status: DISCONTINUED | OUTPATIENT
Start: 2021-06-02 | End: 2021-06-11 | Stop reason: HOSPADM

## 2021-06-02 RX ORDER — LEVETIRACETAM 500 MG/1
500 TABLET ORAL 2 TIMES DAILY
Status: CANCELLED | OUTPATIENT
Start: 2021-06-02

## 2021-06-02 RX ORDER — SODIUM CHLORIDE 0.9 % (FLUSH) 0.9 %
5-40 SYRINGE (ML) INJECTION EVERY 12 HOURS SCHEDULED
Status: DISCONTINUED | OUTPATIENT
Start: 2021-06-02 | End: 2021-06-07

## 2021-06-02 RX ORDER — SODIUM CHLORIDE 0.9 % (FLUSH) 0.9 %
5-40 SYRINGE (ML) INJECTION PRN
Status: CANCELLED | OUTPATIENT
Start: 2021-06-02

## 2021-06-02 RX ORDER — LANOLIN ALCOHOL/MO/W.PET/CERES
6 CREAM (GRAM) TOPICAL NIGHTLY PRN
Status: CANCELLED | OUTPATIENT
Start: 2021-06-02

## 2021-06-02 RX ORDER — ONDANSETRON 2 MG/ML
4 INJECTION INTRAMUSCULAR; INTRAVENOUS EVERY 6 HOURS PRN
Status: CANCELLED | OUTPATIENT
Start: 2021-06-02

## 2021-06-02 RX ORDER — ACETAMINOPHEN 325 MG/1
650 TABLET ORAL EVERY 4 HOURS PRN
Status: CANCELLED | OUTPATIENT
Start: 2021-06-02

## 2021-06-02 RX ORDER — SENNA PLUS 8.6 MG/1
1 TABLET ORAL NIGHTLY PRN
Status: DISCONTINUED | OUTPATIENT
Start: 2021-06-02 | End: 2021-06-07

## 2021-06-02 RX ORDER — POLYETHYLENE GLYCOL 3350 17 G/17G
17 POWDER, FOR SOLUTION ORAL DAILY PRN
Status: DISCONTINUED | OUTPATIENT
Start: 2021-06-02 | End: 2021-06-11 | Stop reason: HOSPADM

## 2021-06-02 RX ORDER — SODIUM CHLORIDE 0.9 % (FLUSH) 0.9 %
5-40 SYRINGE (ML) INJECTION EVERY 12 HOURS SCHEDULED
Status: CANCELLED | OUTPATIENT
Start: 2021-06-02

## 2021-06-02 RX ORDER — FAMOTIDINE 20 MG/1
20 TABLET, FILM COATED ORAL 2 TIMES DAILY
Status: DISCONTINUED | OUTPATIENT
Start: 2021-06-02 | End: 2021-06-11 | Stop reason: HOSPADM

## 2021-06-02 RX ADMIN — ACETAMINOPHEN 650 MG: 325 TABLET ORAL at 17:42

## 2021-06-02 RX ADMIN — LISINOPRIL 10 MG: 10 TABLET ORAL at 20:56

## 2021-06-02 RX ADMIN — FAMOTIDINE 20 MG: 20 TABLET, FILM COATED ORAL at 20:57

## 2021-06-02 RX ADMIN — SODIUM CHLORIDE, PRESERVATIVE FREE 10 ML: 5 INJECTION INTRAVENOUS at 08:40

## 2021-06-02 RX ADMIN — LEVETIRACETAM 500 MG: 500 TABLET, FILM COATED ORAL at 20:57

## 2021-06-02 RX ADMIN — Medication 6 MG: at 20:57

## 2021-06-02 RX ADMIN — ACETAMINOPHEN 650 MG: 325 TABLET ORAL at 07:03

## 2021-06-02 RX ADMIN — SODIUM CHLORIDE, PRESERVATIVE FREE 10 ML: 5 INJECTION INTRAVENOUS at 20:58

## 2021-06-02 RX ADMIN — FAMOTIDINE 20 MG: 20 TABLET ORAL at 08:39

## 2021-06-02 RX ADMIN — ACETAMINOPHEN 650 MG: 325 TABLET ORAL at 23:04

## 2021-06-02 RX ADMIN — ACETAMINOPHEN 650 MG: 325 TABLET ORAL at 03:03

## 2021-06-02 RX ADMIN — LEVETIRACETAM 500 MG: 500 TABLET, FILM COATED ORAL at 08:40

## 2021-06-02 RX ADMIN — ACETAMINOPHEN 650 MG: 325 TABLET ORAL at 11:15

## 2021-06-02 ASSESSMENT — ENCOUNTER SYMPTOMS
WHEEZING: 0
DIARRHEA: 0
RHINORRHEA: 0
TROUBLE SWALLOWING: 0
SORE THROAT: 0
SHORTNESS OF BREATH: 0
VOMITING: 0
CONSTIPATION: 0
NAUSEA: 0
EYE PAIN: 0
ABDOMINAL PAIN: 0
EYE DISCHARGE: 0
COUGH: 0
BACK PAIN: 0

## 2021-06-02 ASSESSMENT — PAIN DESCRIPTION - ONSET
ONSET: ON-GOING

## 2021-06-02 ASSESSMENT — PAIN DESCRIPTION - FREQUENCY
FREQUENCY: CONTINUOUS

## 2021-06-02 ASSESSMENT — PAIN SCALES - GENERAL
PAINLEVEL_OUTOF10: 1
PAINLEVEL_OUTOF10: 2
PAINLEVEL_OUTOF10: 3
PAINLEVEL_OUTOF10: 1
PAINLEVEL_OUTOF10: 2
PAINLEVEL_OUTOF10: 4
PAINLEVEL_OUTOF10: 3

## 2021-06-02 ASSESSMENT — PAIN DESCRIPTION - ORIENTATION
ORIENTATION: ANTERIOR;POSTERIOR
ORIENTATION: POSTERIOR;MID
ORIENTATION: ANTERIOR;POSTERIOR;RIGHT;LEFT
ORIENTATION: ANTERIOR;POSTERIOR

## 2021-06-02 ASSESSMENT — PAIN - FUNCTIONAL ASSESSMENT
PAIN_FUNCTIONAL_ASSESSMENT: PREVENTS OR INTERFERES SOME ACTIVE ACTIVITIES AND ADLS
PAIN_FUNCTIONAL_ASSESSMENT: PREVENTS OR INTERFERES SOME ACTIVE ACTIVITIES AND ADLS
PAIN_FUNCTIONAL_ASSESSMENT: ACTIVITIES ARE NOT PREVENTED
PAIN_FUNCTIONAL_ASSESSMENT: ACTIVITIES ARE NOT PREVENTED

## 2021-06-02 ASSESSMENT — PAIN DESCRIPTION - PROGRESSION
CLINICAL_PROGRESSION: GRADUALLY IMPROVING
CLINICAL_PROGRESSION: GRADUALLY WORSENING

## 2021-06-02 ASSESSMENT — PAIN DESCRIPTION - LOCATION
LOCATION: HEAD

## 2021-06-02 ASSESSMENT — PAIN DESCRIPTION - DESCRIPTORS
DESCRIPTORS: HEADACHE
DESCRIPTORS: ACHING;CONSTANT;HEADACHE
DESCRIPTORS: HEADACHE
DESCRIPTORS: HEADACHE

## 2021-06-02 ASSESSMENT — PAIN DESCRIPTION - PAIN TYPE
TYPE: ACUTE PAIN

## 2021-06-02 ASSESSMENT — PAIN DESCRIPTION - DIRECTION
RADIATING_TOWARDS: NECK
RADIATING_TOWARDS: NECK

## 2021-06-02 NOTE — PROGRESS NOTES
[address] [address]  Acute Inpatient Rehab Preadmission Assessment    Patient Name: Aniya Land        MRN: [id number]    : 1950  (78 y.o.)  Gender: male     Admitted from:[address]  Initial Assessment    Date of admission to the hospital: 2021 11:10 AM  Date patient eligible for admission:2021    Primary Diagnosis: Intraparenchymal hemorrhage of brain ([address] [I61.9] TBI      Did patient have surgery?  no    Physicians: Angy Crystal MD, Dr. Catarina Roy, Dr. Nolvia Cormier, Dr. Fabian Boswell for clinical complications/co-morbidities:   Past Medical History:   Diagnosis Date    Hyperlipidemia     Hypertension        Financial Information  Primary insurance: Medicare    Secondary Insurance:   . Has the patient had two or more falls in the past year or any fall with injury in the past year? no    Did the patient have major surgery during the 100 days prior to admission?   no    Precautions:   falls, infections and skin  Restrictions/Precautions: Fall Risk, General Precautions  Other position/activity restrictions: low stimulation guidlines s/p CHI    Isolation Precautions: None       Physiatrist: Dr. Catarina Roy    Patients Occupation: Retired  Reviewed Lab and Diagnostic reports from Current Admission: Yes    Patients Prior Functional  Level: Prior Function  ADL Assistance: Independent  Homemaking Assistance: Needs assistance (does dishes, ocassional cooking-eggs mostly, most yardwork)  Ambulation Assistance: Independent  Transfer Assistance: Independent  Additional Comments: Pt was fully indep PLOF, runs 2 days a week, [facility] and [facility] [employer]    Current functional status for upper extremity ADLs: contact guard assistance    Current functional status for lower extremity ADLs: contact guard assistance    Current functional status for bed, chair, wheelchair transfers: contact guard assistance    Current functional status for toilet transfers: contact guard assistance    Current functional status for locomotion:    contact guard assistance  Current functional status for bladder management: Modified independence    Current functional status for bowel management:Modified independence    Current functional status for comprehension: Modified independence    Current functional status for expression: Complete independence    Current functional status for social interaction: Complete independence    Current functional status for problem solving: Complete independence    Current functional status for memory: Complete independence    Expected level of Improvement in Self-Care:  Complete independence    Expected level of Improvement in Sphincter Control:  Complete independence    Expected level of Improvement in Transfers: Complete independence    Expected level of Improvement in Locomotion:  Complete independence    Expected level of Improvement in Communication and Social Cognition: Complete independence    Expected length of time to achieve that level of improvement: 2 weeks    Current rehab issues: ADL dysfunction,bladder management,bowel management,carry over of therapy techniques, discharge planning, disease and co-morbidity management, gait/mobility dysfunction, medication management, nutrition and hydration management,Ongoing assessment of safety, Pain management, Patient and family education, Prevention of secondary complications, Skin Integrity  Required therapy: Physical Therapy, Occupational Therapy and Speech Therapy 3 hours per day, 5-6 days per week. Recreational Therapy 1 hour per week.     Expected Discharge Destination: Home    Expected Post Discharge Treatments: Out Patient    Other information relevant to the care needs:   Lives With: Spouse  Type of Home: House  Home Layout: One level  Home Access: Stairs to enter without rails  Entrance Stairs - Number of Steps: 3  Bathroom Shower/Tub: Walk-in shower  Bathroom Toilet: Standard  Bathroom Equipment: Grab bars in shower  Home Equipment: Cristine Dys  ADL Assistance: Independent  Homemaking Assistance: Needs assistance (does dishes, ocassional cooking-eggs mostly, most yardwork)  Ambulation Assistance: Independent  Transfer Assistance: Independent  Active : Yes  Occupation: Retired  Additional Comments: Pt was fully indep PLOF, runs 2 days a week, swims and plays pickleball at the     Acute Inpatient Rehabilitation Disclosure Statement provided to patient. Patient verbalized understanding. I have reviewed and concur with the findings and results of the pre-admission screening assessment completed by the Inpatient Rehabilitation Admissions Coordinator.     Neeta Lawson MD

## 2021-06-02 NOTE — PLAN OF CARE
Problem: Falls - Risk of:  Goal: Will remain free from falls  Description: Will remain free from falls  Outcome: Ongoing  Note: Patient remained free from falls this shift. Bed is in low position with alarm on and siderails up x2. Education given on use of call light and patient voiced understanding. Call light and beside table within reach. Arm band and falling star in place. Hourly rounds completed. Will continue to monitor. Problem: HEMODYNAMIC STATUS  Goal: Patient has stable vital signs and fluid balance  Vitals:    06/01/21 0742 06/01/21 1148 06/01/21 1528 06/01/21 2008   BP: 135/85 127/81 120/76 126/79   Pulse: 54 50 54 51   Resp: 16 18 16 16   Temp:  97.3 °F (36.3 °C) 99.1 °F (37.3 °C) 98.8 °F (37.1 °C)   TempSrc:  Axillary Oral Oral   SpO2: 100% 100% 100% 98%   Weight:       Height:              Problem: COMMUNICATION IMPAIRMENT  Goal: Ability to express needs and understand communication  Outcome: Ongoing  Note: Pt is able to express needs and understand communication at this time. Problem: Pain:  Goal: Control of acute pain  Description: Control of acute pain  Outcome: Ongoing  Note: Patient complained of no pain rating it a 0 on the 0-10 scale. Pain medication given as ordered and needed. Patient voiced satisfaction with this. Also instructed patient on distraction, repositioning, and ambulation as non-pharmacological methods of pain relief. Patient voiced understanding. Problem: Daily Care:  Goal: Daily care needs are met  Description: Daily care needs are met  Outcome: Ongoing  Note: Daily care needs are being met at this time. Problem: Discharge Planning:  Goal: Patients continuum of care needs are met  Description: Patients continuum of care needs are met  Outcome: Ongoing  Note: Discharge planning in process and discussed with patient/family. Social work consulted for any additional needs. Care manager aware of discharge needs.      Plan of care discussed with pt and

## 2021-06-02 NOTE — PROGRESS NOTES
Marry Son Dr. Param Gibbons  Daily Progress Note  Pt Name: Syd Shelley  Medical Record Number: [id number]  Date of Birth 1950   Today's Date: 6/2/2021    HD: # 5    CC: Headache    ASSESSMENT  1. Active Hospital Problems    Diagnosis Date Noted    Scalp laceration [S01.01XA]     Right and left hemorrhagic contusion of cerebrum ([address] [S06.339A] 05/29/2021    Intraparenchymal hematoma of right side of brain due to trauma [facility]) [S06.340A] 05/28/2021    Closed fracture of right side of base of skull ([facility] [address] [S02.101A] 05/28/2021    Closed head injury [S09.90XA] 05/28/2021    Scalp laceration, initial encounter [S01.01XA] 05/28/2021         PLAN  Patient admitted under Trauma Services to ICU with telemetry   - Transferred to  on 5/29      Left intraparenchymal hemorrhage with subsequent developing subdural hematomas              -Neurosurgery managing non operatively               -Neuro checks per protocol    -Seizure prophylaxis with Keppra x10 days              -Maintain systolic blood pressure between 100-160              -Repeat head CT in 6 hours was noted to have interval increase, TXA administered   -Repeat head CT 5/29 stable   -MRI head 5/30 showed: Parenchymal hemorrhagic contusion in the anterior inferior right frontal lobe, anterior left temporal lobe, lateral left temporal lobe and posterior inferior right cerebellum   -MRV yesterday showed redemonstration of a prominent area of hemorrhagic contusion in the left frontal lobe at the convexity and smaller areas of contusion at the left temporal lobe and right cerebellar hemisphere, similar to prior MRI. -Repeat CT head in 1 week per NSx (6/4/21), NSx signed off     Right basilar skull fracture              -Neurosurgery managing non operatively at this time              -CTA notes filling defect within right transverse sinus by fracture site, possible venous injury/thrombus.   Neurointerventionalist was but overall has been well controlled with Tylenol alone. Patient denies any other pain or complaints. Patient denies any lightheadedness, dizziness, nausea/vomiting, neck pain, back pain, chest pain, shortness of breath, abdominal pain, pain in extremities, and paresthesias. He is tolerating a general diet without any nausea or vomiting. He is passing flatus and had a mutiple bowel movements documented yesterday. On exam patient awake, alert and orient x3, no acute distress. PMS intact in all 4 extremities. Strength 5/5 x 4. No signs of focal logical deficits. MRV head yesterday revealed small nonocclusive deep venous thrombus at the right transverse/sigmoid junction with redemonstration of areas of hemorrhagic contusions which is similar to prior MRI. No anticoagulation at this time secondary to trauma. Vital signs reviewed. Patient afebrile, vital signs stable. Repeat labs yesterday stable. Patient stable from a trauma surgery perspective for discharge readmit to inpatient rehab today. Per neurosurgery recommendations continue Keppra for 10 days status post injury for seizure precaution and repeat CT head in one week. Patient will need staples from scalp laceration removed (6/76/11). Trauma surgeon, Dr. Patsy Hicks, updated and discharge/readmit to [facility].       Wt Readings from Last 3 Encounters:   06/01/21 169 lb (76.7 kg)     Temp Readings from Last 3 Encounters:   06/02/21 98.4 °F (36.9 °C) (Oral)     BP Readings from Last 3 Encounters:   06/02/21 138/87     Pulse Readings from Last 3 Encounters:   06/02/21 50       24 HR INTAKE/OUTPUT :     Intake/Output Summary (Last 24 hours) at 6/2/2021 1119  Last data filed at 6/2/2021 0840  Gross per 24 hour   Intake 2070 ml   Output 300 ml   Net 1770 ml     DIET GENERAL;    OBJECTIVE  CURRENT VITALS /87   Pulse 50   Temp 98.4 °F (36.9 °C) (Oral)   Resp 16   Ht 5' 7\" (1.702 m)   Wt 169 lb (76.7 kg)   SpO2 98%   BMI 26.47 kg/m²   GENERAL:  Awake and alert; oriented x4. In no acute distress and well nourished. SKIN: Appropriate for ethnicity, warm and dry. EYES: Left periorbital ecchymosis, no significant edema. PERRL at 3mm  CARDIO: Strong/regular S1/S2 rate and rhythm. No rubs murmurs, or gallops. 2+ radial and dorsalis pedal pulses. Capillary refill <2 sec. No extremity edema noted. PULMONARY:  Lung sounds are clear to auscultation throughout bilaterally. No wheezing, crackles, or rhonchi. ABDOMEN: Abdomen is non distended. Bowel sounds present in all four quadrants. Soft and non tender to palpation in all quadrants. NEURO: Follows commands. PMS intact, moves limbs freely. No focal neurological deficits  MSK: Extremities without cyanosis or edema. PMS intact in all 4 extremities. Strength 5/5 throughout. WOUNDS: Posterior scalp laceration with staples in place with no bleeding, erythema, or drainage. LABS  CBC :   Recent Labs     06/01/21  0354   WBC 6.2   HGB 11.2*   HCT 34.8*   MCV 90.6        BMP:   Recent Labs     06/01/21  0355      K 4.2      CO2 25   BUN 16   CREATININE 1.0     COAGS:   No results for input(s): APTT, PROT, INR in the last 72 hours. Pancreas/HFP:  No results for input(s): LIPASE, AMYLASE in the last 72 hours. No results for input(s): AST, ALT, BILIDIR, BILITOT, ALKPHOS in the last 72 hours. RADIOLOGY:  Narrative   PROCEDURE: MRV HEAD W WO CONTRAST       CLINICAL INFORMATION: hx of possible nonocclusive thrombus seen on MRI brain .       COMPARISON: MR brain dated 5/30/2021 and CTA head dated 5/28/2021.       TECHNIQUE: 2-D multiplanar MRV images were obtained through the brain with radiation of volumetric maximum intensity projection reconstructions. . Sagittal T1 3-D pre and postcontrast images of the brain with axial and coronal reconstructions were also    obtained following intravenous administration of 15 mL ProHance injected in the left [facility].           FINDINGS:    There is a focal nonocclusive filling defect at the right transverse sigmoid junction which has appeared in the interval since prior CTA. Small peripheral focal filling defect in the bilateral transverse sinuses likely represent arachnoid granulations    and are stable compared to prior CTA. The superior sagittal sinus, confluence, transverse sinuses, sigmoid sinuses and proximal internal jugular veins are otherwise patent without focal stenosis or filling defect. Deep cerebral veins, vein of Keon and    straight sinus are also patent.       Redemonstration of a prominent area of hemorrhagic contusion in the left frontal lobe at the convexity and smaller areas of contusion at the left temporal lobe and right cerebellar hemisphere, similar to prior MRI.           Impression       1. Small nonocclusive deep venous thrombus at the right transverse/sigmoid junction. 2. Redemonstration of areas of hemorrhagic contusion.                   **This report has been created using voice recognition software. It may contain minor errors which are inherent in voice recognition technology. **       Final report electronically signed by Dr. Rebekah Mendoza MD on 6/1/2021 2:55 PM             Electronically signed by Hector Hernandez PA-C on 6/2/2021 at 11:19 AM  Patient seen and examined independently by me 6/2/2021    Vitals and Graphics reviewed       Labs, cultures, and radiographs where available were reviewed. I discussed patient concerns with the patient's nurse and instructions were given. Please see our orders if there are any new ones for the updated patient care plan. Above discussed and I agree with Shagufta WRIGHT    See my additional comments below for updated orders and plan. Patient is stable and is stable for discharge to the rehab unit today.   MRV did confirm that there was a small venous occluding the sinus but no anticoagulation was recommended by neurology    Plan is to be discharged inpatient rehab today  Meds and orders

## 2021-06-02 NOTE — DISCHARGE SUMMARY
Discharge Summary   Trauma Services    Patient Identification:  Laura Khalil  : 1950  MRN: [id number]   Account: [de-identified]     Admit date: 2021  Discharge date: 21  Attending provider: Ileana Mendez MD        Primary care provider: Mao Alicia MD     Discharge Diagnoses: Active Problems:    Intraparenchymal hematoma of right side of brain due to trauma [facility])    Closed fracture of right side of base of skull (HCC)    Closed head injury    Scalp laceration, initial encounter    Right and left hemorrhagic contusion of cerebrum (HCC)    Scalp laceration  Resolved Problems:    * No resolved hospital problems. *       Hospital Course:   Laura Khalil is a 78 y.o. male admitted to [address] [place] [address] on 2021 following a fall with positive loss of consciousness. Per trauma H&P patient was playing pickle ball when he went for a ball and fell backwards striking his posterior scalp and was unconscious for several moments reported by bystanders. Wife is at bedside and stated patient was confused following the accident with occasional agitation. Patient was found to have a left intraparenchymal hemorrhage and a right basilar skull fracture. Right posterior scalp laceration was closed in the emergency department with 8 staples. Patient was admitted under trauma surgery to the ICU with consults placed to neurosurgery. Repeat CT scan after 6 hours was noted to have an interval increase in bleeding and TXA was administered. Keppra was started for seizure prophylaxis. CTAs of the head and neck noted right transverse sinus filling defect by the fracture site, possible venous injury/thrombosis. Neuro interventional list was contacted and recommended MRI/MRV. SLP cognitive evaluation revealed severe expressive and receptive language deficits. Repeat CT head on 21 was stable.   MRI of the head showed parenchymal hemorrhagic contusions in the anterior inferior frontal lobe, anterior left temporal lobe, lateral left temporal lobe, and posterior right inferior cerebellum. Patient remained neurovascularly intact on exam and on 5/31/2021 neurosurgery recommended repeat CT head in 1 week and signed off. Neurology was consulted on 5/31/2021 for filling defect noted within right transverse sinus and an MRV brain was ordered. MRV showed small nonocclusive deep venous thrombosis at the right transverse/sigmoid junction but no anticoagulation was ordered at that time due to trauma and head bleed. PM&R consulted during admission for rehab recommendations. Patient remained stable from a trauma surgery perspective and on 6/2/21 patient was discharged readmitted to inpatient rehab. Per neurosurgery recommendations continue Keppra for 10 days status post injury for seizure precaution and repeat CT head in one week. Patient will need staples from scalp laceration removed (6/76/11). Trauma surgeon, Dr. Javi Reno, updated and discharge/readmit to [facility]. Discharge Medications:   Silvina Morfin   Home Medication Instructions BWF:[id number]    Printed on:06/02/21 3713   Medication Information                      lisinopril (PRINIVIL;ZESTRIL) 10 MG tablet  Take 10 mg by mouth daily             MAGNESIUM CITRATE PO  Take 250 mg by mouth 4 times daily                 Patient Instructions:    Discharge lab work: Repeat CT head one week  Activity: activity as tolerated and no driving while on analgesics  Diet: DIET GENERAL;    Code Status: Full Code    Follow-up visits:   Enzo Anand MD  [address] [place] [address]  [phone]    Schedule an appointment as soon as possible for a visit in 1 week  For hospitalization follow up. Juan Winn MD  [address] [place] [address]    Schedule an appointment as soon as possible for a visit in 3 weeks  Follow up head bleed and skull fracture. Needs a CT scan of head 3-4 days prior to appointment.        Procedures: none    Consults:   Neurosurgery  Neurology  Neuro endovascular  PM&R    Examination:  Vitals:  Vitals:    06/01/21 2008 06/01/21 2311 06/02/21 0300 06/02/21 0833   BP: 126/79 (!) 144/88 (!) 160/79 138/87   Pulse: 51 51 53 50   Resp: 16 16 16 16   Temp: 98.8 °F (37.1 °C) 98.9 °F (37.2 °C) 98.7 °F (37.1 °C) 98.4 °F (36.9 °C)   TempSrc: Oral Oral Oral Oral   SpO2: 98% 99% 97% 98%   Weight:       Height:         Weight: Weight: 169 lb (76.7 kg)     24 hour intake/output:    Intake/Output Summary (Last 24 hours) at 6/2/2021 1057  Last data filed at 6/2/2021 0840  Gross per 24 hour   Intake 2070 ml   Output 300 ml   Net 1770 ml       GENERAL:  Awake and alert; oriented x4. In no acute distress and well nourished. SKIN: Appropriate for ethnicity, warm and dry. EYES: Left periorbital ecchymosis, no significant edema. PERRL at 3mm  CARDIO: Strong/regular S1/S2 rate and rhythm. No rubs murmurs, or gallops. 2+ radial and dorsalis pedal pulses. Capillary refill <2 sec. No extremity edema noted. PULMONARY:  Lung sounds are clear to auscultation throughout bilaterally. No wheezing, crackles, or rhonchi. ABDOMEN: Abdomen is non distended. Bowel sounds present in all four quadrants. Soft and non tender to palpation in all quadrants. NEURO: Follows commands. PMS intact, moves limbs freely. No focal neurological deficits  MSK: Extremities without cyanosis or edema. PMS intact in all 4 extremities. Strength 5/5 throughout. WOUNDS: Posterior scalp laceration with staples in place with no bleeding, erythema, or drainage.       Significant Diagnostics:   Radiology: CTA HEAD W WO CONTRAST    Result Date: 5/28/2021  ** ADDENDUM #1 ** This report was discussed with Italia Bower RN on May 28, 2021 20:41:00 EDT. This document has been electronically signed by: Alfa Sanchez on 05/28/2021 08:42 PM ** ORIGINAL REPORT ** CT angiography head with contrast. 3D Postprocessing.  Comparison:  CT,SR  - CT HEAD WO CONTRAST  - 05/28/2021 06:15 PM EDT Findings: No extravasation of contrast. Intracranial arteries are patent. No aneurysm, dissection, or occlusion. Subcentimeter circumscribed filling defect within the right transverse sinus. This is nonocclusive. Remaining paranasal sinuses are patent with an unremarkable appearance. Right occipital bone fracture and multifocal intracranial hemorrhage. Please see CT head report for further details. 1.  Patent head CTA. No evidence of arterial injury. 2.  Small filling defect within the right transverse sinus, immediately adjacent to the fracture site. This could represent a small focus of venous injury/thrombus. This document has been electronically signed by: Tammy Russell MD on 05/28/2021 08:31 PM All CTs at this facility use dose modulation techniques and iterative reconstructions, and/or weight-based dosing when appropriate to reduce radiation to a low as reasonably achievable. CT HEAD WO CONTRAST    Result Date: 5/29/2021  CT head without contrast Comparison:  CT,SR  - CT HEAD WO CONTRAST  - 05/28/2021 06:15 PM EDT Findings: No significant change in the left frontal hemorrhagic contusions with surrounding vasogenic edema and mild subdural extension. No significant change in right cerebral hemorrhagic contusion with surrounding edema. No new hemorrhage. No midline shift or axial herniation. Gray-white differentiation maintained without anoxic changes. No sinus fluid levels. Stable multifocal right occipital fractures including one component extending to the posterolateral rim of the foramen magnum. Impression No significant change versus prior. This document has been electronically signed by: Jacky Alexis MD on 05/29/2021 04:10 AM All CTs at this facility use dose modulation techniques and iterative reconstructions, and/or weight-based dosing when appropriate to reduce radiation to a low as reasonably achievable.     CT head without contrast    Result Date: 5/28/2021  ** Ventricles are normal. There is a right basilar skull fracture seen on images 4-11 At the very base there is some avulsion of this fragment there is no depression skin staples are present over the right occipital lacerations. There is chronic right maxillary sinus disease and postsurgical changes right maxillary sinus. Results discussed with Dr. Tesha Wright. IMPRESSION:  Left frontal lobe acute intraparenchymal hemorrhage Right basilar skull fracture **This report has been created using voice recognition software. It may contain minor errors which are inherent in voice recognition technology. ** Final report electronically signed by Dr. Micheal Tijerina on 5/28/2021 12:50 PM    CTA NECK W WO CONTRAST    Result Date: 5/28/2021  CT angiography neck with contrast. 3D Postprocessing. Comparison: None Findings: Aortic arch and cervical great vessels are patent. Visualized intracranial arteries are patent. No aneurysm, dissection, or occlusion. Thyroid gland unremarkable. No cervical mass or fluid collection. Lung apices clear. No acute fracture at the level of the neck. Patent neck CTA. No evidence of vascular injury at the level of the neck. This document has been electronically signed by: Maxine Russell MD on 05/28/2021 08:46 PM All CTs at this facility use dose modulation techniques and iterative reconstructions, and/or weight-based dosing when appropriate to reduce radiation to a low as reasonably achievable. Carotid stenosis and measurements are in accordance with NASCET criteria. CT Cervical Spine WO Contrast    Result Date: 5/28/2021  PROCEDURE: CT CERVICAL SPINE WO CONTRAST CLINICAL INFORMATION: trauma to head, AMS 14 . COMPARISON: No prior study. TECHNIQUE: 2-D multiplanar noncontrast CT cervical spine bone windows only. All CT scans at this facility use dose modulation, iterative reconstruction, and/or weight-based dosing when appropriate to reduce radiation dose to as low as reasonably achievable.  FINDINGS: There is no acute fracture or acute bony malalignment. The right basilar skull fracture can be seen with avulsed or distracted fragments. There is no foraminal or central encroachment. There is no precervical soft tissue swelling. Dextroscoliosis. No acute cervical spine fracture. Right basilar skull fracture which is slightly comminuted and has distracted fragment. **This report has been created using voice recognition software. It may contain minor errors which are inherent in voice recognition technology. ** Final report electronically signed by Dr. Fuentes Denton on 5/28/2021 12:56 PM      Labs:   Recent Results (from the past 72 hour(s))   CBC    Collection Time: 06/01/21  3:54 AM   Result Value Ref Range    WBC 6.2 4.8 - 10.8 thou/mm3    RBC 3.84 (L) 4.70 - 6.10 mill/mm3    Hemoglobin 11.2 (L) 14.0 - 18.0 gm/dl    Hematocrit 34.8 (L) 42.0 - 52.0 %    MCV 90.6 80.0 - 94.0 fL    MCH 29.2 26.0 - 33.0 pg    MCHC 32.2 32.2 - 35.5 gm/dl    RDW-CV 12.8 11.5 - 14.5 %    RDW-SD 42.0 35.0 - 45.0 fL    Platelets 186 463 - 769 thou/mm3    MPV 11.1 9.4 - 12.4 fL   Basic Metabolic Panel    Collection Time: 06/01/21  3:55 AM   Result Value Ref Range    Sodium 141 135 - 145 meq/L    Potassium 4.2 3.5 - 5.2 meq/L    Chloride 109 98 - 111 meq/L    CO2 25 23 - 33 meq/L    Glucose 99 70 - 108 mg/dL    BUN 16 7 - 22 mg/dL    CREATININE 1.0 0.4 - 1.2 mg/dL    Calcium 8.7 8.5 - 10.5 mg/dL   Anion Gap    Collection Time: 06/01/21  3:55 AM   Result Value Ref Range    Anion Gap 7.0 (L) 8.0 - 16.0 meq/L   Glomerular Filtration Rate, Estimated    Collection Time: 06/01/21  3:55 AM   Result Value Ref Range    Est, Glom Filt Rate 74 (A) ml/min/1.73m2       Discharge condition: Stable  Disposition: Discharge/Readmit to [facility]  Time spent on discharge: >60 minutes     Electronically signed by Purvi Montiel PA-C on 6/2/2021 at 10:57 AM

## 2021-06-02 NOTE — CARE COORDINATION
6/2/21, 11:20 AM EDT    Patient goals/plan/ treatment preferences discussed by  and . Patient goals/plan/ treatment preferences reviewed with patient/ family. Patient/ family verbalize understanding of discharge plan and are in agreement with goal/plan/treatment preferences. Understanding was demonstrated using the teach back method. AVS provided by RN at time of discharge, which includes all necessary medical information pertaining to the patients current course of illness, treatment, post-discharge goals of care, and treatment preferences. IMM Letter  IMM Letter given to Patient/Family/Significant other/Guardian/POA/by[de-identified]   IMM Letter date given[de-identified] 06/02/21  IMM Letter time given[de-identified] 0214     Pt to be discharged to [facility]. Pt and spouse are in agreement.

## 2021-06-02 NOTE — PROGRESS NOTES
Admitted to the Inpatient Rehabilitation Unit via wheelchair. Patient was then oriented to room and unit. Education provided on the rehabilitation routine: three hours of therapy five days per week and dining room for lunch. Explained patients right to have family, representative or physician notified of their admission. Patient has N/A for physician to be notified. Patient has Requested for family/representative to be notified. Wife present on admission, states she will notify the family doctor of the patient being admitted. Admitting medication orders compared with acute stay medications; home medication list reviewed with patient/family. Medication issues identified No  Medication issue: None  If yes, physician notified Dr Leila Santos. Bladder and Bowel Function Assessment:  1. Prior history of bladder problems: no problems with bladder  2. Number of pads used per day: none  3. Frequency of night time voiding: 3 times  4. Fluid intake volume and pattern: Adequate  5. Last BM: 6/1/2021  6. Bowel problems (prior or current) No      Incontinence      Frequent diarrhea      No BM in 3 days this stay or history of constipation      Hemorrhoids      Diverticulitis      Bowel Surgery     Two nurse skin assessment performed by Jayashree Klein  and Cristi DIMAS RN.        Weight: 165.7      Care plan was created with patient's input and goals were agreed upon. Admission folder provided with education regarding patients diagnoses, fall prevention, skin care, and M in the box. \"Data Collection Information Summary for Patients in Inpatient Rehabilitation Facilities\" and \"Privacy Act Statement - Health Care Records\" provided. Please refer to the admission navigator for further information.

## 2021-06-02 NOTE — PROGRESS NOTES
[address] [address]  INPATIENT PHYSICAL THERAPY  DAILY NOTE  [facility] 4A - [id number]    Time In: 09  Time Out: 4114  Timed Code Treatment Minutes: 36 Minutes  Minutes: 40          Date: 2021  Patient Name: Andrew Campos,  Gender:  male        MRN: [id number]  : 1950  (79 y.o.)     Referring Practitioner: ADRIANA Lira  Diagnosis: intraparenchymal hemorrhage of brain  Additional Pertinent Hx: Per neuro HPI: \"Per report of wife at bedside, patient was playing pickle ball when he went for a ball and fell backwards striking his posterior scalp and was unconscious for several moments reported by bystanders. In the ED he was found to have left ICH, right basilar skull fracture, and right posterior parietal scalp lesion. CT of the head concerning for left frontal lobe acute ICH with right basilar skull fracture. Part of the work-up CT head and neck demonstrated normal arterial vasculature with no dissection. Possible small thrombus that is nonflow limiting in the right transverse sinus. Mri Brain with parenchymal hemorrhacic contusion in the anterior inferior right frontal lobe, anterior left temporal lobe, lateral left temporal lobe, and posterior inferior right cerebellum and trace amount of extra-axial blood on the left without mass effect. \"     Prior Level of Function:  Lives With: Spouse  Type of Home: House  Home Layout: One level  Home Access: Stairs to enter without rails  Entrance Stairs - Number of Steps: 3  Home Equipment: Cane   Bathroom Shower/Tub: Walk-in shower  Bathroom Toilet: Standard  Bathroom Equipment: Grab bars in shower    ADL Assistance: [address] [place] [address] Avenue: Needs assistance (does dishes, ocassional cooking-eggs mostly, most yardwork)  Ambulation Assistance: Independent  Transfer Assistance: Independent  Active :  Yes  Additional Comments: Pt was fully indep PLOF, runs 2 days a week, [facility] [employer]    Restrictions/Precautions: Restrictions/Precautions: Fall Risk, General Precautions  Position Activity Restriction  Other position/activity restrictions: low stimulation guidlines s/p CHI     SUBJECTIVE: RN approved therapy session. Patient supine in bed upon arrival. Patient pleasant and agreeable to therapy. PAIN: 7/10: Patient notes having a headache and neck pain. Vitals: Vitals not assessed per clinical judgement, see nursing flowsheet    OBJECTIVE:  Bed Mobility:  Supine to Sit: Supervision  Sit to Supine: Supervision     Transfers:  Sit to Stand: Stand By Assistance  Stand to Sit:Stand By Assistance    Ambulation:  Contact Guard Assistance, with increased time for completion  Distance: 70'x2  Surface: Level Tile  Device:No Device  Gait Deviations:  Slow Lois, Decreased Step Length Bilaterally, Decreased Gait Speed, Mild Path Deviations, Unsteady Gait and Patient reached out for objects in hallway to help assist with balance with ambulation. Neuro Facilitation:  *Patient performed forward/retro, edison lateral step over hurdles x5 ea with CGA. Patient paused to readjust before performing next step. Patient with unsteadiness while performing lateral step overs while reaching for objects to improve steadiness. *Patient performed ring toss while reaching outside of FIDEL with edison UE from variable heights (from knee height to above shoulder) with unsteadiness while performing but no LOB. Patient performed on black foam x2 trials. Patient notes feeling nauseous after performing ring toss and wanting to go back to his room at this time. Exercise:  Patient was guided in 1 set(s) 20 reps of exercise to both lower extremities. Seated marches, Seated heel/toe raises, Long arc quads and Seated abduction/adduction. Exercises were completed for increased independence with functional mobility.     Functional Outcome Measures: Completed  -PAC Inpatient Mobility Raw Score : 18  -PAC Inpatient T-Scale Score : 43.63    ASSESSMENT: Assessment: Patient progressing toward established goals. Activity Tolerance:  Patient tolerance of  treatment: good. Patient demonstrated unsteadiness with gait at this time but no LOB observed. Equipment Recommendations:Equipment Needed: No  Discharge Recommendations:  IP Rehab, Continue to assess pending progress    Plan: Times per week: 6-7x N  Times per day: Daily  Current Treatment Recommendations: Balance Training, Functional Mobility Training, Transfer Training, Safety Education & Training, Gait Training, Patient/Caregiver Education & Training, Cognitive/Perceptual Training, Endurance Training    Patient Education  Patient Education: Plan of Care, Gait, Verbal Exercise Instruction    Goals:  Patient goals : go to [employer]  Short term goals  Time Frame for Short term goals: by acute discharge  Short term goal 1: Supine to/from sit with independence for ease of transfers at discharge site. Short term goal 2: Sit to/from stand with independence for ease of transfers at discharge site. Short term goal 3: Ambulate 48' curved path with supervision for ambulation in community. Short term goal 4: AScend/descend 3 steps with no HRs with supervision for entry into home. Short term goal 5: Improve dynamic gait index score to 24/24, indicating 0% disability. Long term goals  Time Frame for Long term goals : N/A due to short ELOS. Following session, patient left in safe position with all fall risk precautions in place.

## 2021-06-02 NOTE — H&P
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 · Improved neck pain due to cervical spine sprain & muscular strain  · Hypertension  · hyperlipidemia       Plan:   · Admit to the inpatient rehabilitation unit. The patient demonstrates good potential to participate in an inpatient rehabilitation program involving at least 3 hours per day, 5 days per week of intensive rehabilitation. Rehabilitation services will include PT, OT and SLP/RT in order to improve functional status prior to discharge. Family education and training will be completed. Equipment evaluations and recommendations will be completed as appropriate. · Rehabilitation nursing will be involved for bowel, bladder, skin, and pain management. Nursing will also provide education and training to patient and family. · Prophylaxis:  DVT: Silvestre stocking; intermittent pneumatic compression device. GI: Colace, Dulcolax suppository as needed, GlycoLax as needed, milk of magnesia as needed, Senokot as needed. · Pain: Tylenol as needed; Ultram as needed  · Resume lisinopril for hypertension  · Continue Keppra for seizure prevention  · Nutrition:  Consultation to dietician for nutritional counseling and recommendations. Prealbumin will be checked on admission.     · Bladder: Monitoring for signs or symptoms of UTI  · Bowel: Monitoring for sign or symptom of constipation  ·  and case management consultations for coordination of care and discharge planning    The main medical problem(s) and comorbidities being actively managed by the physicians and requiring 24 hour rehabilitation nursing care during this stay include hypertension, hyperlipidemia, intraparenchymal hemorrhage. The domains of functional impairment present in this patient which will require an intensive and interdisciplinary rehabilitation environment include self care, mobility, motor dysfunction, bowel/bladder management, pain management, safety and cognitive function. Estimated length of stay for this admission : Probably 10 to 14 days    Anticipated disposition: Home. The potential to achieve that is good.    ==========================================================================================================================      Chief Complaint and Reason for Rehabilitation Admission:   headache      History of Present Illness:  Nuno Reynolds  is a 78 y.o. right-handed  male with history of hypertension and hyperlipidemia, is admitted to the inpatient rehabilitation unit on 6/2/2021 for intensive inpatient rehabilitation for impaired ADLs, ambulation & cognition due to left frontal lobe & right cerebellar contusion with intraparenchymal hemorrhage, left posterior parietal & anterior temporal subdural hematoma & subarachnoid blood, and right basilar skull fracture secondary to fall on 5/28/2021. The patient does not remember what happened to him. The last thing he can remember is that he was playing pickle ball. His record showed that the patient was playing pickle ball on 5/28/2021. He went after a fall and fell backward with back of his head hitting the ground with loss of consciousness for a short period of time according the witness's account. He was confused and agitated when he was sent to [address] [place] [address] ER. He had an episode of vomiting while he was in ER.   Initial CT of head showed left frontal lobe acute intraparenchymal hemorrhage and right basilar skull fracture. Neurosurgery was consulted. CT of head repeated few hours later showed interval increase in left frontal lobe contusion with multiple foci of intraparenchymal hemorrhage measuring up to 1.2 cm, interval increase right cerebellar contusion with foci of intraparenchymal hemorrhage measuring up to approximately 1 cm, new small subdural hematomas & trace adjacent subarachnoid blood within left posterior parietal and anterior temporal regions. No surgical intervention was recommended. CTA of neck & head revealed no abnormality other than small focus of venous injury/thrombus within right transverse sinus. Repeated CT of head on 5/29/2021 showed no significant change. MRV of head done on 6/1/2021 showed small nonocclusive deep venous thrombus at the right transverse/sigmoid junction. The patient continues to complains intermittent headache with intensity sometime goes up to 6/10 level. The headache mainly on his right side front and back region and sometime on the right lateral side of head. The headache is a pressure-like pain. Tylenol helps to reduce the headache. He also still has fatigue and mild generalized weakness. Most Recent Rehabilitation Assessments:  PT:    Bed Mobility:  Supine to Sit: Supervision  Sit to Supine: Supervision      Transfers:  Sit to Stand: Stand By Assistance  Stand to Sit:Stand By Assistance     Ambulation:  Contact Guard Assistance, with increased time for completion  Distance: 70'x2  Surface: Level Tile  Device:No Device  Gait Deviations:  Slow Lois, Decreased Step Length Bilaterally, Decreased Gait Speed, Mild Path Deviations, Unsteady Gait and Patient reached out for objects in hallway to help assist with balance with ambulation.     Neuro Facilitation:  *Patient performed forward/retro, edison lateral step over hurdles x5 ea with CGA.  Patient paused to readjust before performing next step. Patient with unsteadiness while performing lateral step overs while reaching for objects to improve steadiness. *Patient performed ring toss while reaching outside of FIDEL with edison UE from variable heights (from knee height to above shoulder) with unsteadiness while performing but no LOB. Patient performed on black foam x2 trials.     Patient notes feeling nauseous after performing ring toss and wanting to go back to his room at this time.         OT:  (2021)  ADL:   Grooming: [employer]. stood at sink to brush teeth, min vcs for locating needed items, Pt impulsive with trying to get water from faucet-vcs to be careful not to bump head  Toileting: with set-up. used urinal while in supine.     BALANCE:  Standing Balance: Contact Guard Assistance.       BED MOBILITY:  Supine to Sit: Stand By Assistance vcs impulsive, Pt sat up on EOB prior to therapist taking rail out of way-Pt squeezed himself between top & bottom bed rails     TRANSFERS:  Sit to Stand:  Contact Guard Assistance.       FUNCTIONAL MOBILITY:  Assistive Device: None  Assist Level:  Minimal Assistance. Distance: To and from bathroom  vcs for slow down & to avoid bumping into objects in room (closet door when turning around)        ST:    Automatic Speech  1-10: independent  SAL: independent  MINA: independent  MINA in Reverse: independent  *Patient with highly improved automatic speech evidenced by independent completion of tasks above.     Confrontational Namin/5 independent, 1/5 with self correction     Responsive Namin/5 independent    Y/N  Basic: 10/10 independent  Moderate: 10/10 independent     SHORT TERM GOAL #3:  Goal 3: Pt will ID words/objects/pictures in F02-3 with 50% accuracy, mod cues to improve recognition skills for contribution to ADL completion. INTERVENTIONS: DNT due to focus on additional STGs.     Orientation  Date: independent  Month: logical cuing following paraphasia (e.g. 15 mL  15 mL Oral Daily PRN Dani Loredo MD        [facility]) tablet 8.6 mg  1 tablet Oral Nightly PRN Dani Loredo MD        lisinopril (PRINIVIL;ZESTRIL) tablet 10 mg  10 mg Oral Daily Dani Loredo MD            Social History:  Social History     Socioeconomic History    Marital status:      Spouse name: Not on file    Number of children: Not on file    Years of education: Not on file    Highest education level: Not on file   Occupational History    Not on file   Tobacco Use    Smoking status: Never Smoker    Smokeless tobacco: Never Used   Vaping Use    Vaping Use: Never assessed   Substance and Sexual Activity    Alcohol use: Never    Drug use: Never    Sexual activity: Not on file   Other Topics Concern    Not on file   Social History Narrative    Not on file     Social Determinants of Health     Financial Resource Strain:     Difficulty of Paying Living Expenses:    Food Insecurity:     Worried About [address] in the Last Year:     [address] [place] in the Last Year:    Transportation Needs:     Lack of Transportation (Medical):      Lack of Transportation (Non-Medical):    Physical Activity:     Days of Exercise per Week:     Minutes of Exercise per Session:    Stress:     Feeling of Stress :    Social Connections:     Frequency of Communication with Friends and Family:     Frequency of Social Gatherings with Friends and Family:     Attends Latter-day Services:     Active Member of Clubs or Organizations:     Attends Club or Organization Meetings:     Marital Status:    Intimate Partner Violence:     Fear of Current or Ex-Partner:     Emotionally Abused:     Physically Abused:     Sexually Abused:      Occupation: retired in 2018 ; 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   Lives with: wife and occasionally his son (36years old)  Home setup: one level house with 2 steps outside front door with bilateral hand rail, one small step outside kg/m²   General:  well-developed, well nourished  male ; in no acute distress ; appropriate affect & mood ; lying on bed comfortably   Eyes: pupil equally round ; extra-ocular motion intact bilaterally  Head, Ear, Nose, Mouth & Throat : normocephalic ; presence of stitches at posterior right head over the occipital region with tenderness ; no tenderness at face ; no discharge from ears or nose ; no deformity ; no facial swelling ; oral mucosa pink   Neck :  supple ; no tenderness ; no muscle spasm  Cardiovascular : regular rate & rhythm ; normal S1 & S2 heart sound ; no murmur ; normal peripheral pulse at bilateral upper & lower extremities  Pulmonary : lung clear to auscultation ; no wheezing ; no rale  Gastrointestinal : soft, flat abdomen ; no tenderness ; normal bowel sound present   Back : no tenderness; no muscle spasm  Skin: no skin lesion or rash ; no pitting edema at all 4 extremities  Musculoskeletal : no limb asymmetry; no limb deformity; no tenderness at bilateral upper & lower extremities; no palpable mass at limbs ; no joints laxity or crepitation ; normal functional joints ROM at bilateral upper & lower extremities  Cerebral :  alert ; awake ; oriented to place, person and time   Cerebellum : no dysmetria with bilateral finger-to-nose test ; no dysmetria with bilateral heel-to-shin test  Cranial Nerves :  grossly intact CN II to XII function  Sensory : intact light touch and pin prick sensation at bilateral upper & lower extremities  Motor : normal tone at bilateral upper & lower extremities ; normal 5/5 muscle strength at bilateral upper & lower extremities  Reflex : 3+ right knee reflex ; 2+ left knee and bilateral ankles reflexes : 1+ bilateral biceps, left triceps reflexes ; zero right triceps and bilateral brachioradialis reflexes   Pathological Reflex :  No Pedro's sign ; no Babinski sign ; no ankle clonus  Gait :  Not assessed      Diagnostics:  Recent Results (from the past 48 hour(s))   CBC    Collection Time: 06/01/21  3:54 AM   Result Value Ref Range    WBC 6.2 4.8 - 10.8 thou/mm3    RBC 3.84 (L) 4.70 - 6.10 mill/mm3    Hemoglobin 11.2 (L) 14.0 - 18.0 gm/dl    Hematocrit 34.8 (L) 42.0 - 52.0 %    MCV 90.6 80.0 - 94.0 fL    MCH 29.2 26.0 - 33.0 pg    MCHC 32.2 32.2 - 35.5 gm/dl    RDW-CV 12.8 11.5 - 14.5 %    RDW-SD 42.0 35.0 - 45.0 fL    Platelets 346 349 - 529 thou/mm3    MPV 11.1 9.4 - 12.4 fL   Basic Metabolic Panel    Collection Time: 06/01/21  3:55 AM   Result Value Ref Range    Sodium 141 135 - 145 meq/L    Potassium 4.2 3.5 - 5.2 meq/L    Chloride 109 98 - 111 meq/L    CO2 25 23 - 33 meq/L    Glucose 99 70 - 108 mg/dL    BUN 16 7 - 22 mg/dL    CREATININE 1.0 0.4 - 1.2 mg/dL    Calcium 8.7 8.5 - 10.5 mg/dL   Anion Gap    Collection Time: 06/01/21  3:55 AM   Result Value Ref Range    Anion Gap 7.0 (L) 8.0 - 16.0 meq/L   Glomerular Filtration Rate, Estimated    Collection Time: 06/01/21  3:55 AM   Result Value Ref Range    Est, Glom Filt Rate 74 (A) ml/min/1.73m2       CT of head without contrast (5/28/2021 12:50 pm) : Impression   Left frontal lobe acute intraparenchymal hemorrhage   Right basilar skull fracture        CT of cervical spine without contrast (5/28/2021) : Impression   Dextroscoliosis. No acute cervical spine fracture. Right basilar skull fracture which is slightly comminuted and has distracted fragment.        CT of head without contrast (5/28/2021 7:55 pm) : Impression   1. Left frontal lobe contusion with multiple foci of intraparenchymal hemorrhage measuring up to 1.2 cm in greatest dimension, with interval increase. Right cerebellar contusion with foci of intraparenchymal hemorrhage measuring up to approximately 1 cm in greatest dimension, with interval increase. New small subdural hematomas and trace adjacent subarachnoid blood products within the left posterior parietal and anterior temporal regions.    2. Right occipital bone fracture, without significant change.      CTA of head with & without contrast (5/28/2021) : Impression   1.  Patent head CTA.  No evidence of arterial injury. 2.  Small filling defect within the right transverse sinus, immediately adjacent to the fracture site.  This could represent a small focus of venous injury/thrombus.        CTA of neck with & without contrast (5/28/2021) : Impression   Patent neck CTA.  No evidence of vascular injury at the level of the neck.        CT of head without contrast (5/29/2021) : Impression   No significant change versus prior. MRI of brain with & without contrast (5/30/2021) : Impression   1. Parenchymal hemorrhagic contusions in the anterior inferior right frontal lobe, anterior left temporal lobe, lateral left temporal lobe and posterior inferior right cerebellum. 2. Trace amount of extra-axial blood products on the left without mass effect. MRV of head with & without contrast (6/1/2021) : Impression   1. Small nonocclusive deep venous thrombus at the right transverse/sigmoid junction. 2. Redemonstration of areas of hemorrhagic contusion. The post admission physician evaluation (TIM) is consistent with the pre-admission assessment. See above findings to reflect the elements required in the TIM. Patient's admitting condition is consistent with the findings of the preadmission assessment by the rehabilitation admissions coordinator.     Ovidio Ferreira MD

## 2021-06-02 NOTE — PLAN OF CARE
Discharge Planning:  Goal: Patients continuum of care needs are met  Description: Patients continuum of care needs are met  Outcome: Ongoing  Note: Patient being transferred to [facility] today. Care plan reviewed with patient and spouse. Patient and spouse verbalize understanding of the plan of care and contribute to goal setting.

## 2021-06-02 NOTE — PROGRESS NOTES
RBC 3.84 06/01/2021    HGB 11.2 06/01/2021    HCT 34.8 06/01/2021    MCV 90.6 06/01/2021    MCH 29.2 06/01/2021    MCHC 32.2 06/01/2021     06/01/2021    MPV 11.1 06/01/2021      Lipids: No results for input(s): CHOL, LDLDIRECT, TRIG, HDL, AMYLASE, LIPASE in the last 72 hours. A1C: No results for input(s): LABA1C in the last 72 hours. No results found for this or any previous visit. No results found for this or any previous visit. Results for orders placed during the hospital encounter of 05/28/21    CTA HEAD W WO CONTRAST    Narrative  ** ADDENDUM #1 **  This report was discussed with Maximiliano Mcgarry RN on May 28, 2021 20:41:00  EDT. This document has been electronically signed by: Loida Macias on  05/28/2021 08:42 PM  ** ORIGINAL REPORT **  CT angiography head with contrast. 3D Postprocessing. Comparison:  CT,SR  - CT HEAD WO CONTRAST  - 05/28/2021 06:15 PM EDT    Findings:  No extravasation of contrast.  Intracranial arteries are patent. No aneurysm, dissection, or occlusion. Subcentimeter circumscribed filling defect within the right transverse  sinus. This is nonocclusive. Remaining paranasal sinuses are patent with  an unremarkable appearance. Right occipital bone fracture and multifocal intracranial hemorrhage. Please see CT head report for further details. Impression  1. Patent head CTA. No evidence of arterial injury. 2.  Small filling defect within the right transverse sinus, immediately  adjacent to the fracture site. This could represent a small focus of  venous injury/thrombus. This document has been electronically signed by: Shagufta Villanueva MD on  05/28/2021 08:31 PM    All CTs at this facility use dose modulation techniques and iterative  reconstructions, and/or weight-based dosing  when appropriate to reduce radiation to a low as reasonably achievable.     Results for orders placed during the hospital encounter of 05/28/21    CTA [address] Narrative  CT angiography neck with contrast. 3D Postprocessing. Comparison: None    Findings: Aortic arch and cervical great vessels are patent. Visualized intracranial arteries are patent. No aneurysm, dissection, or  occlusion. Thyroid gland unremarkable. No cervical mass or fluid collection. Lung apices clear. No acute fracture at the level of the neck. Impression  Patent neck CTA. No evidence of vascular injury at the level of the neck. This document has been electronically signed by: Tremayne Ferreira MD on  05/28/2021 08:46 PM    All CTs at this facility use dose modulation techniques and iterative  reconstructions, and/or weight-based dosing  when appropriate to reduce radiation to a low as reasonably achievable. Carotid stenosis and measurements are in accordance with NASCET criteria. No results found for this or any previous visit. Results for orders placed during the hospital encounter of 05/28/21    MRI BRAIN W WO CONTRAST    Narrative  PROCEDURE: MRI BRAIN W [address] [employer] INFORMATIONSecondary to abnormal findings on CT, for comparison. Trauma by fall. Abnormal head CT.    COMPARISON: Head CT 5/29/2021 and 5/28/2021. TECHNIQUE: Multiplanar and multiple spin echo T1 and T2-weighted images were obtained through the brain before and after the administration of intravenous contrast.    FINDINGS:    In the anterior inferior left frontal lobe, there is susceptibility artifact which abuts the calvarium. There is surrounding high signal on the FLAIR and T2-weighted sequences. This area measures 4.9 x 3.8 x 4.5 cm. There is some associated abnormal  signal on the diffusion-weighted images. The pattern of abnormal signal is consistent with a parenchymal hemorrhagic contusion. There is a parenchymal hemorrhagic contusion involving the anterior left temporal lobe.  This has similar signal characteristics with high signal on the FLAIR and T2-weighted images as well as low signal on the gradient echo T2-weighted images. This area  measures 2.4 x 1.6 x 2.0 cm. There is a small contusion along the anterior lateral aspect left temporal lobe seen on axial image 9. There is no hemorrhage associated with this focus. This area measures 0.9 x 0.5 cm. There is a another parenchymal hemorrhagic contusion. This is in the inferior and posterior right cerebellum. This is deep to the patient's known skull fracture. There are some foci of susceptibility artifact. This area measures 2.8 x 2.1 x 1.4 cm. There is no abnormal signal on the diffusion-weighted images suggestive of an infarct. All of the abnormal signal is associated with known hemorrhagic contusions. The brain volume is mildly reduced. . There is a trace amount of subarachnoid and subdural  hemorrhage on the left associated with a hemorrhagic contusions. There is no associated mass effect. There is no hydrocephalus or midline shift. On the FLAIR and T2-weighted sequences, separate from the hemorrhagic contusions, there is a minimal amount of abnormal signal in the white matter the brain suggesting chronic small vessel ischemic changes. There is no abnormal enhancement in the brain. The hemorrhagic contusions do have some intrinsic precontrast T1 signal hyperintensity associated with them. The major intracranial vascular flow voids are present. The midline craniocervical junction  structures are normal.  The brainstem and pituitary gland are normal.    Impression  1. Parenchymal hemorrhagic contusions in the anterior inferior right frontal lobe, anterior left temporal lobe, lateral left temporal lobe and posterior inferior right cerebellum. 2. Trace amount of extra-axial blood products on the left without mass effect. **This report has been created using voice recognition software. It may contain minor errors which are inherent in voice recognition technology. **      Final report electronically signed by Dr. Genaro Cordova Agata Enrico on 5/30/2021 9:26 AM    No results found for this or any previous visit. Results for orders placed during the hospital encounter of 05/28/21    CT HEAD WO CONTRAST    Narrative  CT head without contrast    Comparison:  CT,SR  - CT HEAD WO CONTRAST  - 05/28/2021 06:15 PM EDT    Findings:    No significant change in the left frontal hemorrhagic contusions with  surrounding vasogenic edema and mild subdural extension. No significant change in right cerebral hemorrhagic contusion with  surrounding edema. No new hemorrhage. No midline shift or axial herniation. Gray-white differentiation maintained without anoxic changes. No sinus fluid levels. Stable multifocal right occipital fractures including one component  extending to the posterolateral rim of the foramen magnum. Impression  No significant change versus prior. This document has been electronically signed by: Rolo Wagner MD on  05/29/2021 04:10 AM    All CTs at this facility use dose modulation techniques and iterative  reconstructions, and/or weight-based dosing  when appropriate to reduce radiation to a low as reasonably achievable. MRV Brain  FINDINGS:    There is a focal nonocclusive filling defect at the right transverse sigmoid junction which has appeared in the interval since prior CTA. Small peripheral focal filling defect in the bilateral transverse sinuses likely represent arachnoid granulations    and are stable compared to prior CTA. The superior sagittal sinus, confluence, transverse sinuses, sigmoid sinuses and proximal internal jugular veins are otherwise patent without focal stenosis or filling defect. Deep cerebral veins, vein of Keon and    straight sinus are also patent.       Redemonstration of a prominent area of hemorrhagic contusion in the left frontal lobe at the convexity and smaller areas of contusion at the left temporal lobe and right cerebellar hemisphere, similar to prior MRI.         Impression       1. Small nonocclusive deep venous thrombus at the right transverse/sigmoid junction. 2. Redemonstration of areas of hemorrhagic contusion. Assessment:  Active Problems:    Intraparenchymal hematoma of right side of brain due to trauma [facility])    Closed fracture of right side of base of skull (HCC)    Closed head injury    Scalp laceration, initial encounter    Right and left hemorrhagic contusion of cerebrum (HCC)    Scalp laceration  Resolved Problems:    * No resolved hospital problems. *    78 y.o. male with TBI with stable parenchymal hemorrhagic contusions in the anterior inferior right frontal lobe, anterior left temporal lobe, lateral left temporal lobe and posterior inferior right cerebellum. Clinically on exam non-focal. MRV with non-occlusive small thrombus in the right transverse sinus. Plan:    1. No anticoagulation for small non-occlusive thrombus at this time, can repeat MRV when cleared for anticoagulation (if thrombus still present at that time)  2. OK for inpatient rehab transfer  3. Will need follow up with Dr Per Angel in 1 month  4. Discussed plan with nurse and patient, and patient's family.    5. Will sign off, please call with questions    Electronically signed by Jadiel Peterson MD on 6/2/21 at 12:07 PM EDT

## 2021-06-02 NOTE — PROGRESS NOTES
junction. The patient says his headache started getting worse again last night. He did not have good sleep last night due to the headache. He rates the headache intensity at 4/10 level which his wife says was high for the patient. He denies focal weakness or numbness. He feels some fatigue and mild generalized weakness. He received Tylenol at 05:10 but the headache has not improved much. He also tried ice pad which did not help to reduce the headache / pain. He is starting the intensive inpatient rehab treatment today. Rehabilitation:  PT: Reviewed. Initial evaluation in progress and pending    OT: Reviewed. Initial evaluation in progress and pending    ST: Reviewed. Initial evaluation in progress and pending      Review of Systems:  Review of Systems   Constitutional: Positive for fatigue. Negative for chills, diaphoresis and fever. HENT: Negative for ear discharge, ear pain, hearing loss, nosebleeds, rhinorrhea, sneezing, sore throat, tinnitus and trouble swallowing. Eyes: Negative for pain, discharge and visual disturbance. Respiratory: Negative for cough, shortness of breath and wheezing. Cardiovascular: Negative for chest pain, palpitations and leg swelling. Gastrointestinal: Negative for abdominal pain, constipation, diarrhea, nausea and vomiting. Genitourinary: Negative for difficulty urinating and dysuria. Musculoskeletal: Positive for gait problem. Negative for arthralgias, back pain, myalgias and neck pain. Skin: Negative for rash. Neurological: Positive for weakness (generalized) and headaches. Negative for dizziness, tremors, seizures, speech difficulty, light-headedness and numbness. Hematological: Does not bruise/bleed easily. Psychiatric/Behavioral: Negative for dysphoric mood, hallucinations and sleep disturbance. The patient is not nervous/anxious.          Objective:  /83   Pulse 64   Temp 98.4 °F (36.9 °C) (Oral)   Resp 17   Ht 5' 7\" needed  · Continue lisinopril for hypertension ; reduce lisinopril dosage to 5 mg and adding parameter of holding the medicine if systolic BP less than 511  · Continue Keppra for seizure prevention  · Nutrition:  Consultation to dietician for nutritional counseling and recommendations.     · Bladder: Monitoring for signs or symptoms of UTI  · Bowel: Monitoring for sign or symptom of constipation  ·  and case management consultations for coordination of care and discharge planning       Missed Therapy Time:  · None    Bud Solomon MD

## 2021-06-03 LAB
ALBUMIN SERPL-MCNC: 4 G/DL (ref 3.5–5.1)
ALP BLD-CCNC: 45 U/L (ref 38–126)
ALT SERPL-CCNC: 12 U/L (ref 11–66)
ANION GAP SERPL CALCULATED.3IONS-SCNC: 13 MEQ/L (ref 8–16)
AST SERPL-CCNC: 13 U/L (ref 5–40)
BASOPHILS # BLD: 0.4 %
BASOPHILS ABSOLUTE: 0 THOU/MM3 (ref 0–0.1)
BILIRUB SERPL-MCNC: 0.6 MG/DL (ref 0.3–1.2)
BUN BLDV-MCNC: 13 MG/DL (ref 7–22)
CALCIUM SERPL-MCNC: 9.1 MG/DL (ref 8.5–10.5)
CHLORIDE BLD-SCNC: 102 MEQ/L (ref 98–111)
CHOLESTEROL, TOTAL: 219 MG/DL (ref 100–199)
CO2: 24 MEQ/L (ref 23–33)
CREAT SERPL-MCNC: 0.8 MG/DL (ref 0.4–1.2)
EOSINOPHIL # BLD: 0.7 %
EOSINOPHILS ABSOLUTE: 0.1 THOU/MM3 (ref 0–0.4)
ERYTHROCYTE [DISTWIDTH] IN BLOOD BY AUTOMATED COUNT: 12.4 % (ref 11.5–14.5)
ERYTHROCYTE [DISTWIDTH] IN BLOOD BY AUTOMATED COUNT: 40.7 FL (ref 35–45)
GFR SERPL CREATININE-BSD FRML MDRD: > 90 ML/MIN/1.73M2
GLUCOSE BLD-MCNC: 128 MG/DL (ref 70–108)
HCT VFR BLD CALC: 39.2 % (ref 42–52)
HDLC SERPL-MCNC: 42 MG/DL
HEMOGLOBIN: 12.6 GM/DL (ref 14–18)
IMMATURE GRANS (ABS): 0.02 THOU/MM3 (ref 0–0.07)
IMMATURE GRANULOCYTES: 0.3 %
LDL CHOLESTEROL CALCULATED: 143 MG/DL
LYMPHOCYTES # BLD: 10 %
LYMPHOCYTES ABSOLUTE: 0.8 THOU/MM3 (ref 1–4.8)
MCH RBC QN AUTO: 28.6 PG (ref 26–33)
MCHC RBC AUTO-ENTMCNC: 32.1 GM/DL (ref 32.2–35.5)
MCV RBC AUTO: 89.1 FL (ref 80–94)
MONOCYTES # BLD: 6.5 %
MONOCYTES ABSOLUTE: 0.5 THOU/MM3 (ref 0.4–1.3)
NUCLEATED RED BLOOD CELLS: 0 /100 WBC
PLATELET # BLD: 241 THOU/MM3 (ref 130–400)
PMV BLD AUTO: 10.7 FL (ref 9.4–12.4)
POTASSIUM REFLEX MAGNESIUM: 4.2 MEQ/L (ref 3.5–5.2)
PREALBUMIN: 24 MG/DL (ref 20–40)
RBC # BLD: 4.4 MILL/MM3 (ref 4.7–6.1)
SEG NEUTROPHILS: 82.1 %
SEGMENTED NEUTROPHILS ABSOLUTE COUNT: 6.2 THOU/MM3 (ref 1.8–7.7)
SODIUM BLD-SCNC: 139 MEQ/L (ref 135–145)
TOTAL PROTEIN: 7.1 G/DL (ref 6.1–8)
TRIGL SERPL-MCNC: 170 MG/DL (ref 0–199)
WBC # BLD: 7.6 THOU/MM3 (ref 4.8–10.8)

## 2021-06-03 PROCEDURE — 92610 EVALUATE SWALLOWING FUNCTION: CPT

## 2021-06-03 PROCEDURE — 2580000003 HC RX 258: Performed by: PHYSICAL MEDICINE & REHABILITATION

## 2021-06-03 PROCEDURE — 85025 COMPLETE CBC W/AUTO DIFF WBC: CPT

## 2021-06-03 PROCEDURE — 1180000000 HC REHAB R&B

## 2021-06-03 PROCEDURE — 36415 COLL VENOUS BLD VENIPUNCTURE: CPT

## 2021-06-03 PROCEDURE — 80061 LIPID PANEL: CPT

## 2021-06-03 PROCEDURE — 6360000002 HC RX W HCPCS: Performed by: PHYSICAL MEDICINE & REHABILITATION

## 2021-06-03 PROCEDURE — 97162 PT EVAL MOD COMPLEX 30 MIN: CPT

## 2021-06-03 PROCEDURE — 97530 THERAPEUTIC ACTIVITIES: CPT

## 2021-06-03 PROCEDURE — 97112 NEUROMUSCULAR REEDUCATION: CPT

## 2021-06-03 PROCEDURE — 6370000000 HC RX 637 (ALT 250 FOR IP)

## 2021-06-03 PROCEDURE — 6370000000 HC RX 637 (ALT 250 FOR IP): Performed by: PHYSICAL MEDICINE & REHABILITATION

## 2021-06-03 PROCEDURE — 97110 THERAPEUTIC EXERCISES: CPT

## 2021-06-03 PROCEDURE — 80053 COMPREHEN METABOLIC PANEL: CPT

## 2021-06-03 PROCEDURE — 97166 OT EVAL MOD COMPLEX 45 MIN: CPT

## 2021-06-03 PROCEDURE — 84134 ASSAY OF PREALBUMIN: CPT

## 2021-06-03 PROCEDURE — 99232 SBSQ HOSP IP/OBS MODERATE 35: CPT | Performed by: PHYSICAL MEDICINE & REHABILITATION

## 2021-06-03 PROCEDURE — 92523 SPEECH SOUND LANG COMPREHEN: CPT

## 2021-06-03 PROCEDURE — 97535 SELF CARE MNGMENT TRAINING: CPT

## 2021-06-03 PROCEDURE — 97116 GAIT TRAINING THERAPY: CPT

## 2021-06-03 RX ORDER — TRAMADOL HYDROCHLORIDE 50 MG/1
100 TABLET ORAL EVERY 6 HOURS PRN
Status: DISCONTINUED | OUTPATIENT
Start: 2021-06-03 | End: 2021-06-11 | Stop reason: HOSPADM

## 2021-06-03 RX ORDER — ONDANSETRON 4 MG/1
4 TABLET, ORALLY DISINTEGRATING ORAL EVERY 8 HOURS PRN
Status: DISCONTINUED | OUTPATIENT
Start: 2021-06-03 | End: 2021-06-11 | Stop reason: HOSPADM

## 2021-06-03 RX ORDER — LISINOPRIL 5 MG/1
5 TABLET ORAL DAILY
Status: DISCONTINUED | OUTPATIENT
Start: 2021-06-03 | End: 2021-06-11 | Stop reason: HOSPADM

## 2021-06-03 RX ORDER — TRAMADOL HYDROCHLORIDE 50 MG/1
50 TABLET ORAL EVERY 6 HOURS PRN
Status: DISCONTINUED | OUTPATIENT
Start: 2021-06-03 | End: 2021-06-11 | Stop reason: HOSPADM

## 2021-06-03 RX ADMIN — SODIUM CHLORIDE, PRESERVATIVE FREE 10 ML: 5 INJECTION INTRAVENOUS at 09:08

## 2021-06-03 RX ADMIN — TRAMADOL HYDROCHLORIDE 50 MG: 50 TABLET ORAL at 09:08

## 2021-06-03 RX ADMIN — ACETAMINOPHEN 650 MG: 325 TABLET ORAL at 18:13

## 2021-06-03 RX ADMIN — FAMOTIDINE 20 MG: 20 TABLET, FILM COATED ORAL at 09:07

## 2021-06-03 RX ADMIN — TRAMADOL HYDROCHLORIDE 50 MG: 50 TABLET ORAL at 15:11

## 2021-06-03 RX ADMIN — LEVETIRACETAM 500 MG: 500 TABLET, FILM COATED ORAL at 09:07

## 2021-06-03 RX ADMIN — ONDANSETRON 4 MG: 2 INJECTION INTRAMUSCULAR; INTRAVENOUS at 10:33

## 2021-06-03 RX ADMIN — SODIUM CHLORIDE, PRESERVATIVE FREE 10 ML: 5 INJECTION INTRAVENOUS at 21:16

## 2021-06-03 RX ADMIN — TRAMADOL HYDROCHLORIDE 50 MG: 50 TABLET ORAL at 21:15

## 2021-06-03 RX ADMIN — ACETAMINOPHEN 650 MG: 325 TABLET ORAL at 14:12

## 2021-06-03 RX ADMIN — LEVETIRACETAM 500 MG: 500 TABLET, FILM COATED ORAL at 21:16

## 2021-06-03 RX ADMIN — DOCUSATE SODIUM 100 MG: 100 CAPSULE, LIQUID FILLED ORAL at 09:07

## 2021-06-03 RX ADMIN — ACETAMINOPHEN 650 MG: 325 TABLET ORAL at 05:10

## 2021-06-03 RX ADMIN — FAMOTIDINE 20 MG: 20 TABLET, FILM COATED ORAL at 21:16

## 2021-06-03 RX ADMIN — DOCUSATE SODIUM 100 MG: 100 CAPSULE, LIQUID FILLED ORAL at 21:15

## 2021-06-03 RX ADMIN — Medication 6 MG: at 21:15

## 2021-06-03 RX ADMIN — LISINOPRIL 5 MG: 5 TABLET ORAL at 09:07

## 2021-06-03 RX ADMIN — ACETAMINOPHEN 650 MG: 325 TABLET ORAL at 09:08

## 2021-06-03 ASSESSMENT — PAIN DESCRIPTION - DESCRIPTORS: DESCRIPTORS: ACHING

## 2021-06-03 ASSESSMENT — PAIN DESCRIPTION - FREQUENCY: FREQUENCY: CONTINUOUS

## 2021-06-03 ASSESSMENT — PAIN SCALES - GENERAL
PAINLEVEL_OUTOF10: 5
PAINLEVEL_OUTOF10: 10
PAINLEVEL_OUTOF10: 5
PAINLEVEL_OUTOF10: 4
PAINLEVEL_OUTOF10: 5
PAINLEVEL_OUTOF10: 3
PAINLEVEL_OUTOF10: 5
PAINLEVEL_OUTOF10: 3
PAINLEVEL_OUTOF10: 5
PAINLEVEL_OUTOF10: 4
PAINLEVEL_OUTOF10: 3

## 2021-06-03 ASSESSMENT — PAIN DESCRIPTION - ONSET: ONSET: ON-GOING

## 2021-06-03 ASSESSMENT — PAIN DESCRIPTION - LOCATION: LOCATION: HEAD

## 2021-06-03 ASSESSMENT — PAIN DESCRIPTION - ORIENTATION: ORIENTATION: DISTAL;MID

## 2021-06-03 ASSESSMENT — PAIN - FUNCTIONAL ASSESSMENT: PAIN_FUNCTIONAL_ASSESSMENT: ACTIVITIES ARE NOT PREVENTED

## 2021-06-03 ASSESSMENT — PAIN DESCRIPTION - PAIN TYPE: TYPE: ACUTE PAIN

## 2021-06-03 ASSESSMENT — 9 HOLE PEG TEST
TESTTIME_SECONDS: 32.6
TESTTIME_SECONDS: 28.8

## 2021-06-03 ASSESSMENT — PAIN DESCRIPTION - PROGRESSION: CLINICAL_PROGRESSION: NOT CHANGED

## 2021-06-03 NOTE — PROGRESS NOTES
[facility]  Speech - Language - Cognitive Evaluation + Clinical Swallow Evaluation    SLP Individual Minutes  Time In: 1000  Time Out: 9762  Minutes: 40  Timed Code Treatment Minutes: 40 Minutes   CSE- 10 minutes  Cognitive assessment- 30 minutes          Date: 6/3/2021  Patient Name: Marisol Thomas      CSN: [id number]   : 1950  (78 y.o.)  Gender: male   Referring Physician:  Dale Riding  Diagnosis: Intraparenchymal hemorrhage of brain [facility])  Secondary Diagnosis: Cognitive impairment  Precautions: Low-stimulation, Fall risk   History of Present Illness/Injury: Per MD report, Ramirez Mckeon  is a 78 y.o. right-handed  male with history of hypertension and hyperlipidemia, is admitted to the inpatient rehabilitation unit on 2021 for intensive inpatient rehabilitation for impaired ADLs, ambulation & cognition due to left frontal lobe & right cerebellar contusion with intraparenchymal hemorrhage, left posterior parietal & anterior temporal subdural hematoma & subarachnoid blood, and right basilar skull fracture secondary to fall on 2021. \" ST consulted to assess cognition for establishment of POC.        Past Medical History:   Diagnosis Date    Hyperlipidemia     Hypertension        Pain: 4/10 - Pain location: head    Subjective:  Patient was sitting upright in bed upon ST arrival. Wife supportive and at the bedside. Patient agreeable to evaluation and demonstrated engagement throughout, despite headache and nausea/vomiting.     SOCIAL HISTORY:   Living Arrangements: Home with wife   Work History: Retired  Education Level: College degree; retired   Driving Status: Active   Finance Management: Independent  Medication Management: Independent  ADL's: Independent  Hobbies: running, swimming, hiking, pickleball  Vision Status: Wears glasses  [address] / VOICE:  Speech and Voice appear to be grossly intact for basic and complex daily communication    LANGUAGE:  Receptive:  Receptive language skills appear to be grossly intact for basic and complex daily communication. Expressive:  Expressive language skills appear to be grossly intact for basic and complex daily communication. COGNITION:  Reynaldo Cognitive Assessment (MOCA) version 7.2 completed. Pt scored 24/30. Normal is greater than or equal to 26/30. Cog-Lo/30  Orientation:   Immediate Recall:   Short-Term Recall:   Divergent Naming: 3/3  Problem Solving: Patient able to form solutions to 2 basic problem situations  Reasoning: Patient demonstrated ability to reason through situations  Attention:   Math Computation: 2/3  Executive Functionin/5       SWALLOWING:  Patient reports no difficulty swallowing solids/liquids; however does report reduced appetite. Oral mechanism examination revealed no abnormalities; patient was able to complete in entirety. CSE was performed and no overt s/s of aspiration were present across 3 trials of thin liquids and 1 trial of puree; CSE was terminated due to patient nausea and no s/s of airway compromise. Recommend general diet with thin liquids. Recommend patient eat in an upright position. Respiratory Status: Independent      Behavioral Observation: Alert and Oriented    ORAL MECHANISM EVALUATION:      Facial / Labial WFL    Lingual WFL    Dentition WFL    Velum WFL    Vocal Quality WFL    Sensation WFL    Cough Not Tested      PATIENT WAS EVALUATED USING:  CSE    ORAL PHASE:  WFL    PHARYNGEAL PHASE:  WFL:  Pharyngeal phase appears WFL but cannot rule out pharyngeal phase deficits from a bedside swallowing evaluation alone. SIGNS AND SYMPTOMS OF LARYNGEAL PENETRATION / ASPIRATION:  No signs/symptoms of aspiration evident in this evaluation, but cannot rule out silent aspiration. INSTRUMENTAL EVALUATION: Instrumental evaluation not indicated at this time.     DIET RECOMMENDATIONS: General diet, thin liquids    STRATEGIES: Full Upright Position     Comprehension: 6 - Complex ideas 90% or device (hearing aid or glasses- if patient is primarily a visual learner)  Expression: 7 - Patient expresses complex ideas/needs  Social Interaction: 5 - Patient is appropriate with supervision/cues  Problem Solvin - Patient able to solve simple/routine tasks  Memory: 3 - Patient remembers 50%-74% of the time    RECOMMENDATIONS/ASSESSMENT:  DIAGNOSTIC IMPRESSIONS:  Patient's cognition and swallowing function was evaluated this date due to recent fall resulting in [address] MOCA 7.2 was administered in which patient scored 24/30 (WNL = >/= 26). Patient demonstrated greatest deficits in memory which is consistent with patient/spouse report. No report of memory deficits prior to recent fall. ACE was also administered to access presence/extent of concussion and resulting symptoms. Patient reported +LOC at time of fall, as well as presence of 8/10 physical symptoms, 4/4 cognitive symptoms, 0/4 emotional symptoms, and 3/4 sleep symptoms. Patient does have previous history of concussion (1), but no history of headache, developmental disabilities, or psychiatric disorders. Patient reports the need for low-stimulation; ST advised no tv/phone use until concussive symptoms improve, will continue to monitor closely. ST to provide intensive cognitive rehab addressing aforementioned areas of deficit. Patient reported no difficulties with swallowing and no overt s/s of aspiration were present during CSE. Patient demonstrating appropriate oral bolus control and posterior transit, as well as timely swallow initiation. Recommend general diet with thin liquids.    Rehabilitation Potential: Good    EDUCATION:  Learner: Patient and Significant Other  Education:  Reviewed results and recommendations of this evaluation, Reviewed diet and strategies, Reviewed ST goals and Plan of Care, Reviewed recommendations for follow-up and Education Related to Potential Risks and Complications Due to Impairment/Illness/Injury  Evaluation of Education: Verbalizes understanding    PLAN:  Skilled SLP intervention on IP Rehab or TCU 30 minutes per day 5 days per week. Specific interventions for next session may include: memory strategies, problem solving/reasoning    PATIENT GOAL:    Return to prior level of function. SHORT TERM GOALS:  Short-term Goals  Timeframe for Short-term Goals: 2 weeks  Goal 1: Patient will complete higher level memory tasks (delayed) at 80% accuracy given min cues to improve recall abilities to return to baseline functioning. Goal 2: Patient will complete higher level problem solving/reasonsing (including finance mgmt, time/money mgmt) tasks with 80% accuracy given min cues in order to return to home and hobbies with least amount of supervision/assistance upon discharge. Goal 3: Patient will complete thought organization/sequencing (including med mgmt) tasks with 80% accuracy given min cues to improve organization to return to baseline functioning. Goal 4: Patient will complete attention tasks (selective, divided) with no more than 2 errors within a 5 minute task in order to return to driving and daily living responsibilities. LONG TERM GOALS:  Patient will improve overall cognition to Supervision/Mod I level in order to return to baseline cognitive function and IADLs/ADLs. NGOZI Loya., Speech Therapy Student Intern  [facility].  [address] [facility], [facility], [place] [address], [address]

## 2021-06-03 NOTE — CONSULTS
Department of Family Practice  Consult Note        Reason for Consult:  Medical management while on the Inpatient Rehab unit. Requesting Physician:  Dr Louie Sandhu:  The need to continue time with therapies following the acute hospital stay    History Obtained From:  patient, wife EMr    HISTORY OF PRESENT ILLNESS:              The patient is a 78 y.o. male with significant past medical history of       Diagnosis Date    Hyperlipidemia     Hypertension       who presents with a posterior head injury when playing pickleball. He was found to have a intracranial bleed and skull fracture. He now is felt to be more stable so he comes to the Inpatient Rehab Unit for more intensive time with therapies prior to the return home.     Past Medical History:        Diagnosis Date    Hyperlipidemia     Hypertension      Past Surgical History:        Procedure Laterality Date    APPENDECTOMY  2017     Current Medications:   Current Facility-Administered Medications: traMADol (ULTRAM) tablet 50 mg, 50 mg, Oral, Q6H PRN  diclofenac sodium (VOLTAREN) 1 % gel 2 g, 2 g, Topical, 4x Daily PRN  lisinopril (PRINIVIL;ZESTRIL) tablet 5 mg, 5 mg, Oral, Daily  ondansetron (ZOFRAN-ODT) disintegrating tablet 4 mg, 4 mg, Oral, Q8H PRN  melatonin tablet 6 mg, 6 mg, Oral, Nightly PRN  acetaminophen (TYLENOL) tablet 650 mg, 650 mg, Oral, Q4H PRN  polyethylene glycol (GLYCOLAX) packet 17 g, 17 g, Oral, Daily PRN  promethazine (PHENERGAN) tablet 12.5 mg, 12.5 mg, Oral, Q6H PRN **OR** [DISCONTINUED] ondansetron (ZOFRAN) injection 4 mg, 4 mg, Intravenous, Q6H PRN  sodium chloride flush 0.9 % injection 5-40 mL, 5-40 mL, Intravenous, 2 times per day  sodium chloride flush 0.9 % injection 5-40 mL, 5-40 mL, Intravenous, PRN  bisacodyl (DULCOLAX) suppository 10 mg, 10 mg, Rectal, Daily PRN  docusate sodium (COLACE) capsule 100 mg, 100 mg, Oral, BID  famotidine (PEPCID) tablet 20 mg, 20 mg, Oral, BID  levETIRAcetam (KEPPRA) tablet 500 mg, 500 mg, Oral, BID  magnesium hydroxide (MILK OF MAGNESIA) 400 MG/5ML suspension 15 mL, 15 mL, Oral, Daily PRN  senna (SENOKOT) tablet 8.6 mg, 1 tablet, Oral, Nightly PRN  Allergies:  Codone [hydrocodone] and Oxycodone    Social History:   MARITAL STATUS:      Family History:       Problem Relation Age of Onset    Glaucoma Mother     Stroke Mother     Heart Disease Mother     Glaucoma Father     Heart Disease Father     High Blood Pressure Father     High Cholesterol Father     Cancer Father     Prostate Cancer Father     Stroke Father      REVIEW OF SYSTEMS:    CONSTITUTIONAL:  positive for  fatigue  EYES:  positive for  glasses  HEENT:  negative for  nasal congestion  RESPIRATORY:  negative for  dyspnea  CARDIOVASCULAR:  negative for  chest pain  GASTROINTESTINAL:  negative for constipation  GENITOURINARY:  negative for dysuria  INTEGUMENT/BREAST:  negative for rash  HEMATOLOGIC/LYMPHATIC:  positive for easy bruising  ALLERGIC/IMMUNOLOGIC:  negative for anaphylaxis  ENDOCRINE:  negative for hair loss  MUSCULOSKELETAL:  positive for  myalgias, arthralgias, muscle weakness and bone pain  NEUROLOGICAL:  positive for headaches and weakness  BEHAVIOR/PSYCH:  negative for increased agitation  PHYSICAL EXAM:      Vitals:    BP (!) 140/82   Pulse 62   Temp 98.4 °F (36.9 °C) (Oral)   Resp 14   Ht 5' 7\" (1.702 m)   Wt 165 lb 11.2 oz (75.2 kg)   SpO2 92%   BMI 25.95 kg/m²     Well developed well nourished white male who is awake alert and cooperative  Skin atrophic  Membranes moist  Head normocephalic, there is some ecchymosis about the eyes  Neck without mass  Chest symmetrical expansion  Heart S1S2 without murmur  Lungs CTA  Abd soft, non tender, normoactive BS and no mass  Ext without edema  Neuro weak  Psy pleasant    IMPRESSION/RECOMMENDATIONS:      Active Hospital Problems    Diagnosis Date Noted    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 ([facility] [address] [I12.3J7B] 06/02/2021    Closed fracture of right side of base of skull ([facility] [S02.101A] 05/28/2021    Scalp laceration, initial encounter [S01.01XA] 05/28/2021    Intraparenchymal hematoma of left side of brain due to trauma ([facility] [address] [S06.350A] 05/28/2021

## 2021-06-03 NOTE — PLAN OF CARE
Problem: Pain:  Goal: Pain level will decrease  Description: Pain level will decrease  Outcome: Ongoing  Reposition frequently to alleviate pain. Pt has prn Tylenol, Tramadol, Voltaren gel and heat to alleviate headache pain, pt states 3-5/10 for pain, pts wife is present and states his pain is higher, she can tell, wife usually answers questions for pt unless question is directed pointedly to pt, even then he will give a response and she will give her response. Problem: Physical Regulation:  Goal: Ability to maintain a stable neurologic state will improve  Description: Ability to maintain a stable neurologic state will improve  Outcome: Ongoing  Pt able to ambulate without device, answers questions appropriately. Problem: Mobility - Impaired:  Goal: Mobility will improve  Description: Mobility will improve  Outcome: Ongoing  Pt will participate in PT daily as directed to increase mobility. Problem: Bleeding:  Goal: Will show no signs and symptoms of excessive bleeding  Description: Will show no signs and symptoms of excessive bleeding  Outcome: Ongoing  Pt will have no active bleeding. Pt has no active bleeding present. Problem: Falls - Risk of:  Goal: Will remain free from falls  Description: Will remain free from falls  Outcome: Ongoing  Pt will remain free of falls. Pt is 1 assist with no device used, pt is slow but with steady gait.

## 2021-06-03 NOTE — PLAN OF CARE
Individualized Plan of [facility] Inpatient Rehabilitation Unit    Rehabilitation physician: Dr. Britney Champagne Date: 6/2/2021     Rehabilitation Diagnosis: Left-sided intracerebral hemorrhage ([address] [I61.2]      Rehabilitation impairments: self care, mobility, motor dysfunction, bowel/bladder management, pain management, safety and cognitive function    Factors facilitating achievement of predicted outcomes: Family support, Motivated, Cooperative and Pleasant  Barriers to the achievement of predicted outcomes: Pain, Cognitive deficit and Decreased endurance    Patient Goals: Improve independence with mobility, Improvement of mobility at a wheelchair level, Increase overall strength and endurance, Increase balance, Increase endurance, Increase independence with activities of daily living, Improve cognition, Increase self-awareness, Increase safety awareness, Increase community integration, Increase socialization, Functional communication with caregivers, Integrate appropriate pain management plan, Assure adequate nutritional option for discharge, Continence of bowel and bladder and Provide appropriate patient and family education      NURSING:  Nursing goals for Tory Punches while on the rehabilitation unit will include:  Continence of bowel and bladder, Adequate number of bowel movements, Urinate with no urinary retention >300ml in bladder, Maintain O2 SATs at an acceptable level during stay, Effective pain management while on the rehabilitation unit, Establish adequate pain control plan for discharge, Absence of skin breakdown while on the rehabilitation unit, Improved skin integrity via assessments including wound measurements, Avoidance of any hospital acquired infections, No signs/symptoms of infection at the wound site, Freedom from injury during hospitalization and Complete education with patient/family with understanding demonstrated regarding disease process and resultant impairment     In order to achieve these goals, nursing interventions may include bowel/bladder training, education for medical assistive devices, medication education, O2 saturation management, energy conservation, stress management techniques, fall prevention, alarms protocol, seating and positioning, skin/wound care, pressure relief instruction, dressing changes, infection protection, DVT prophylaxis, assistance with safe transfers , and/or assistance with bathroom activities and hygiene. PHYSICAL THERAPY:  Goals:        Short term goals  Time Frame for Short term goals: 1 week  Short term goal 1: Patient will complete supine < > sit with head of bed elevated 30 degrees with no bed rail and modified independence to transfer in/out of bed safely. Short term goal 2: Patient will complete sit < > stand with SBA to stand to ambulate with decreased difficulty. Short term goal 3: Patient will ambulate Sameer  1560' with 2 turns with CGA to progress towards navigating home environment safely. Short term goal 4: Patient will ascend/descend 3 steps with no hand rails and CGA to progress towards safe home entry. Short term goal 5: Patient will improve PEPE balance score to greater than or equal to 25/56 to improve balance and reduce fall risk. Long term goals  Time Frame for Long term goals : 4 weeks  Long term goal 1: Patient will complete supine < > sit with modified independence to transfer in/out of bed safely. Long term goal 2: Patient will complete sit  <> stand and stand pivot transfer with modified independence to transfer surface to surface safely. Long term goal 3: Patient will ambulate 80' with no AD and supervion to navigate living environment. Long term goal 4: Patient will ascend/descend 3 steps with no hand rail and SBA for safe home entry. Long term goal 5: Patient will improve PEPE balance test to greater than or equal to 51/56 to reduce his risk for falls.     Plan of Care: Patient to be seen by baseline functioning. Goal 2: Patient will complete higher level problem solving/reasonsing (including finance mgmt, time/money mgmt) tasks with 80% accuracy given min cues in order to return to home and hobbies with least amount of supervision/assistance upon discharge. Goal 3: Patient will complete thought organization/sequencing (including med mgmt) tasks with 80% accuracy given min cues to improve organization to return to baseline functioning. Goal 4: Patient will complete attention tasks (selective, divided) with no more than 2 errors within a 5 minute task in order to return to driving and daily living responsibilities. Plan of Care: Pt to be seen by speech therapy services 60 minutes per day Monday through Friday . Anticipated interventions may include speech/language/communication therapy, cognitive training, group therapy, education, and/or dysphagia therapy based on the above goals. CASE MANAGEMENT:  Goals:   Assist patient/family with discharge planning, patient/family counseling,  and coordination with insurance during the inpatient rehabilitation stay. Other members of the multidisciplinary rehabilitation team that will be involved in the patient's plan of care include recreational therapy, dietary, respiratory therapy, and neuropsychology. Medical issues being managed closely and that require 24 hour availability of a physician:  Bowel/Bladder function, Pain management, Infection protection, DVT prophylaxis, Fall precautions, Fluid/Electrolyte balance, Nutritional status and Anemia                                           Physician anticipated functional outcomes: Improved independence with functional measures   Estimated length of stay for this admission : probably 10~14 days  Medical Prognosis: Good  Anticipated disposition: Home.       The potential to achieve the above medical and rehabilitative goals is good. This plan of care has been developed with the assistance and input of the multidisciplinary rehabilitation team.  The plan was reviewed with the patient. The patient has had the opportunity to provide input to the therapy team.    I have reviewed this Individualized Plan of Care and agree with its contents. Above documentation has been expanded, modified, adjusted to reflect the findings of my evaluations and goals for the patient.     Physician:  Ciera Barron MD

## 2021-06-03 NOTE — PROGRESS NOTES
[employer] [facility]  Individualized Disclosure Statement      Patient: April Abed      [facility] [address] provides 24 hour individualized service to patients with functional limitations due to, but not limited to: stroke, brain injury, spinal cord injury, major multiple trauma, fractures, amputation, and neurological disorders. The [address] [place] [address] provides rehabilitative nursing and medical services as well as physical, occupational, speech, and recreation therapies. [address] [facility] is fully accredited by the Commission on Accreditation of Rehabilitation Facilities (CARF) as a comprehensive provider of rehabilitation services. Patients admitted to the [address] [place] [address] receive a minimum of three hours of therapy per day, at least six days per week, with a revised therapy schedule on weekends and holidays. Physical therapy, occupational therapy, and speech therapy are provided seven days per week including holidays. Other therapeutic services are available on weekends and evenings as needed or scheduled. Intensity of Treatment  Your treatment program will consist of Nursing Care and:  1.5 hours of Physical Therapy, per day  1.5 hours of Occupational Therapy, per day   30-60 minutes of Speech Therapy, per day  1 hour of Recreational Therapy, per week    [address] [address] maintains contracts with most insurance plans. Depending on the type of coverage, the insurance may impose limits on the coverage for rehabilitation care. Coverage is based on the premise that you are able to fully participate in the rehabilitation program and show continued progress. Please verify your own insurance information A copy of this was given to the patient/ family on this date.   Insurance Coverage  Your insurance company has made the following determination relative to the length of your stay:   Your estimated length of stay is 14 days   Your insurance Coverage has been verified as follows:    Primary Insurance:Medicare    Deductible: $[id number]  Coverage: Active  Secondary Insurance:;secondary insurance policies often cover co-pay amounts, but to ensure payment please contact your insurance company.     Alternative Resources: Please ask the  for more information [phone]

## 2021-06-03 NOTE — PROGRESS NOTES
[facility] PHYSICAL THERAPY  DAILY NOTE  [facility] - [id number]  Time In: 1406  Time Out: 1436  Timed Code Treatment Minutes: 30 Minutes  Minutes: 30          Date: 6/3/2021  Patient Name: Nakul Dozier,  Gender:  male        MRN: [id number]  : 1950  (78 y.o.)  Referral Date : 21  Referring Practitioner: Gustavo Carbajal MD  Diagnosis: Left sided intracerebral hemorrhage  Additional Pertinent Hx: Nakul Dozier  is a 78 y.o. right-handed  male with history of hypertension and hyperlipidemia, is admitted to the inpatient rehabilitation unit on 2021 for intensive inpatient rehabilitation for impaired ADLs, ambulation & cognition due to left frontal lobe & right cerebellar contusion with intraparenchymal hemorrhage, left posterior parietal & anterior temporal subdural hematoma & subarachnoid blood, and right basilar skull fracture secondary to fall on 2021. Prior Level of Function:  Lives With: Spouse  Type of Home: House  Home Layout: One level  Home Access: Stairs to enter without rails  Entrance Stairs - Number of Steps: 3  Home Equipment: Cane   Bathroom Shower/Tub: Walk-in shower, Tub/Shower unit  Bathroom Toilet: Standard  Bathroom Equipment: Grab bars in shower (in walk-in shower)    ADL Assistance: [address]: Independent  Transfer Assistance: Independent  Active : Yes  Additional Comments: Pt was fully indep PLOF, runs 2 days a week, [facility]    Restrictions/Precautions:  Restrictions/Precautions: Fall Risk, General Precautions  Position Activity Restriction  Other position/activity restrictions: low stimulation guidlines s/p CHI     SUBJECTIVE: Patient in therapy gym, finished with OT session, pt agreeable to PT. Pt noting continued headache, nurse into therapy gym to provide medication for headache.   During session pt starting to shake intermittently. Pt assisted back to room to bed and nurse notified. Pt assist with warm blankets and with eyes closed, shaking not present while therapist in room. Educated pt with spouse present to call nurse if any changes noted. Nurse notified of shaking. PAIN: 5/10 head    Vitals: Vitals not assessed per clinical judgement, see nursing flowsheet    OBJECTIVE:  Bed Mobility:  Supine to Sit: Supervision  Sit to Supine: Supervision    Head of bed elevated  *Verbal cues to scoot up in bed. Time spent assisting pt to reposition to reduce pain and allow pt to rest    Transfers:  Sit to Stand: Contact Guard Assistance  Stand to [facility]  *some impulsivity with sit to stand    Ambulation:  Contact Guard Assistance, Minimal Assistance  Distance: ~30' and ~50' with gait task, 60'  Surface: Level Tile  Device:No Device  Gait Deviations: Forward Flexed Posture, Slow Lois, Decreased Step Length Bilaterally, Decreased Arm Swing, Decreased Gait Speed, Decreased Heel Strike Bilaterally, Mild Path Deviations and Unsteady Gait  *Pt completing task in therapy gym, ambulating while locating objects. One verbal cue to avoid ambulating over cord and to go around for pt safety. Pt with decreased cervical rotation noted with locating objects and needing cues to turn head to stack cones     Balance:  Static Standing Balance: Contact Guard Assistance  Dynamic Standing Balance: Contact Guard Assistance, Minimal Assistance    Exercise:  Patient was guided in 1 set(s) 10 reps of exercise to both lower extremities. Supine: ankle pumps x20, heel slides x10, hip abduction x10. Fatou Sida Exercises were completed for increased independence with functional mobility. Functional Outcome Measures: Not completed    ASSESSMENT:  Assessment: Patient progressing toward established goals. Activity Tolerance:  Patient tolerance of  treatment: good.       Equipment Recommendations:Equipment Needed: No (continue to monitor)  Discharge Recommendations:  Continue to assess pending progress    Plan: Times per week: 5x/wk 90min, 1x/wk 30min  Times per day: Twice a day  Current Treatment Recommendations: Strengthening, Neuromuscular Re-education, Home Exercise Program, Balance Training, Endurance Training, Patient/Caregiver Education & Training, Functional Mobility Training, Gait Training, Transfer Training, Stair training    Patient Education  Patient Education: [employer] Mobility, Gait, Verbal Exercise Instruction,  - Patient Verbalized Understanding, - Patient Requires Continued Education    Goals:  Patient goals : go home  Short term goals  Time Frame for Short term goals: 1 week  Short term goal 1: Patient will complete supine < > sit with head of bed elevated 30 degrees with no bed rail and modified independence to transfer in/out of bed safely. Short term goal 2: Patient will complete sit < > stand with SBA to stand to ambulate with decreased difficulty. Short term goal 3: Patient will ambulate Sameer  1560' with 2 turns with CGA to progress towards navigating home environment safely. Short term goal 4: Patient will ascend/descend 3 steps with no hand rails and CGA to progress towards safe home entry. Short term goal 5: Patient will improve PEPE balance score to greater than or equal to 25/56 to improve balance and reduce fall risk. Long term goals  Time Frame for Long term goals : 4 weeks  Long term goal 1: Patient will complete supine < > sit with modified independence to transfer in/out of bed safely. Long term goal 2: Patient will complete sit  <> stand and stand pivot transfer with modified independence to transfer surface to surface safely. Long term goal 3: Patient will ambulate 80' with no AD and supervion to navigate living environment. Long term goal 4: Patient will ascend/descend 3 steps with no hand rail and SBA for safe home entry.   Long term goal 5: Patient will improve PEPE balance test to greater than or equal to 51/56 to reduce his risk for falls. Following session, patient left in safe position with all fall risk precautions in place.

## 2021-06-03 NOTE — PROGRESS NOTES
Magalie Gann  INPATIENT PHYSICAL THERAPY  EVALUATION  [address] [facility] - [id number]    Time In: 1130  Time Out: 1232  Timed Code Treatment Minutes: 42 Minutes  Minutes: 62          Date: 6/3/2021  Patient Name: Jenaro Leggett,  Gender:  male        MRN: [id number]  : 1950  (78 y.o.)  Referral Date : 21   Referring Practitioner: Dimas Oden MD  Diagnosis: Left sided intracerebral hemorrhage  Additional Pertinent Hx: Jenaro Leggett  is a 78 y.o. right-handed  male with history of hypertension and hyperlipidemia, is admitted to the inpatient rehabilitation unit on 2021 for intensive inpatient rehabilitation for impaired ADLs, ambulation & cognition due to left frontal lobe & right cerebellar contusion with intraparenchymal hemorrhage, left posterior parietal & anterior temporal subdural hematoma & subarachnoid blood, and right basilar skull fracture secondary to fall on 2021. Restrictions/Precautions:  Restrictions/Precautions: Fall Risk, General Precautions  Position Activity Restriction  Other position/activity restrictions: low stimulation guidlines s/p CHI    Subjective:  Chart Reviewed: Yes  Patient assessed for rehabilitation services?: Yes  Family / Caregiver Present: Yes (wife)  Subjective: Patient in room in bed, agreeable to PT. Pt with headache upon arrival, medication already provided. Pt's spouse present, supportive. General:  Overall Orientation Status:  (alert and oriented to self, place, situation and date.  Increased time to respond, some impulsivity)    Vision: Impaired  Vision Exceptions: Wears glasses at all times    Hearing: Within functional limits         Pain: headache 5/10 back of head and neck, medication given prior to session    Vitals: Vitals not assessed per clinical judgement, see nursing flowsheet    Social/Functional History:    Lives With: Spouse  Type of Home: House  Home Layout: One level  Home Access: Stairs 2x-CGA/min assist, verbal cues to reduce speed  2. Side step over hurdles x 5 each direction, CGA-min assist, 2-3 second pause between hurdles  3. Stand on black balance pad: shoulder flexion x10, shoulder horizontal abduction/adduction x10 with cues for upright posture. 4. Step taps on balance pods on 6\" step with commands such as \"right foot to blue, left foot to purple. \"  Decreased single leg stance noted right compared to left. *All completed to improve LE proprioception and trunk proprioception for reduce fall risk. Functional Outcome Measures: Completed  [place] Balance Score: 16/56  PEPE BALANCE TEST SCORING   Score of < 45 indicates a greater risk of falling  41-56= low fall risk  21-40= medium fall risk (recommendation of walking with assist at all times)  0-20= high fall risk      ASSESSMENT:  Activity Tolerance:  Patient tolerance of  treatment: good. Treatment Initiated: Treatment and education initiated within context of evaluation. Evaluation time included review of current medical information, gathering information related to past medical, social and functional history, completion of standardized testing, formal and informal observation of tasks, assessment of data and development of plan of care and goals. Treatment time included skilled education and facilitation of tasks to increase safety and independence with functional mobility for improved independence and quality of life. Education on utilizing sunglasses as needed to reduce light if headache increased with light. Light decreased in therapy gym. Education on arms at side with ambulation. Spouse questioning purchasing wedge for bed at home and education provided, pt may benefit from one at discharge. Assessment:   Body structures, Functions, Activity limitations: Decreased functional mobility , Decreased balance, Decreased endurance  Assessment: The evaluation of Mr Nay Tabor indicates a decline in functional mobility compared to baseline. Patient was independent at [facility] with no AD and very active. Patient requiring CGA/min assist with mobility at this time, demonstrating impaired balance, gait and transfers. Patient limited by continuous headache. Patient would benefit from skilled PT services to improve his ability to complete functional mobility, reduce his risk for falls and allow patient to return to OF. Prognosis: Good   Co-morbidities: HTN  Pt with low stimulation guidelines, continuous headache. Discharge Recommendations:  Discharge Recommendations: Continue to assess pending progress    Patient Education:  PT Education: PT Role, Goals, Plan of Care, Gait Training, Functional Mobility Training    Equipment Recommendations:  Equipment Needed: No (continue to monitor)    Plan:  Times per week: 5x/wk 90min, 1x/wk 30min  Times per day: Twice a day  Current Treatment Recommendations: Strengthening, Neuromuscular Re-education, Home Exercise Program, Balance Training, Endurance Training, Patient/Caregiver Education & Training, Functional Mobility Training, Gait Training, Transfer Training, Stair training    Goals:  Patient goals : go home  Short term goals  Time Frame for Short term goals: 1 week  Short term goal 1: Patient will complete supine < > sit with head of bed elevated 30 degrees with no bed rail and modified independence to transfer in/out of bed safely. Short term goal 2: Patient will complete sit < > stand with SBA to stand to ambulate with decreased difficulty. Short term goal 3: Patient will ambulate Sameer  1560' with 2 turns with CGA to progress towards navigating home environment safely. Short term goal 4: Patient will ascend/descend 3 steps with no hand rails and CGA to progress towards safe home entry. Short term goal 5: Patient will improve PEPE balance score to greater than or equal to 25/56 to improve balance and reduce fall risk.   Long term goals  Time Frame for Long term goals : 4 weeks  Long term goal 1: Patient will complete supine < > sit with modified independence to transfer in/out of bed safely. Long term goal 2: Patient will complete sit  <> stand and stand pivot transfer with modified independence to transfer surface to surface safely. Long term goal 3: Patient will ambulate 80' with no AD and supervion to navigate living environment. Long term goal 4: Patient will ascend/descend 3 steps with no hand rail and SBA for safe home entry. Long term goal 5: Patient will improve PEPE balance test to greater than or equal to 49/56 to reduce his risk for falls. Following session, patient left in safe position with all fall risk precautions in place.

## 2021-06-03 NOTE — PLAN OF CARE
[facility]  Physical Medicine Case Management Assessment    [x] Inpatient Rehabilitation Unit  [] Transitional Care Unit    Patient Name: Vamshi Anna        MRN: [id number]    : 1950  (78 y.o.)  Gender: male   Date of Admission: 2021  1:15 PM    Family/Social/Home Environment:    Prior to stay patient was independent living with spouse and not using any devices. Patient retired from being an . Family doctor is Dr Maximo Hair and pharmacy of preference is [facility]. Patient was no on any blood thinners or diabetic prior to stay and has a support system consisting of spouse, a daughter Gwendolyn Salcedo and son Francisco Cruz. Support system all lives in [facility]  and patient sees spouse daily and children at least once a week. Patients biggest concern is getting rid of headache and getting back to how patient was prior to stay. After discharge patients spouse will cover transportation. Contact/Guardian Information: Emergency Contacts  Healthcare Agent Appointed: Healthcare power of   Healthcare Agent's Name: Germain Wright Resources Utilized: No community resources used prior to admission    Sexuality/Intimacy: None at time of SW meeting. Complementary Health Approaches: Patient has no current interest in complementary health approaches     Anticipated Needs/Discharge Plans: Anticipate patient would benefit from continued therapy upon discharge.           Discharge Planning  Living Arrangements: Spouse/Significant Other  Support Systems: None  Potential Assistance Needed: Home Care  Potential Assistance Purchasing Medications: No  Meds-to-Beds: Does the patient want to have any new prescriptions delivered to bedside prior to discharge?: No (Patient prefers to use [facility] for discharge medications.)  Type of Home Care Services: OT, PT, Nursing Services  Patient expects to be discharged to[de-identified] Home with spouse  Expected Discharge Date:  (Undetermined)  Follow Up Appointment: Best Day/Time : Tuesday PM      Queta Rinaldi Link 6/3/2021 9:29 AM  Electronically signed by Geovany Moody on 6/3/2021 at 9:38 AM

## 2021-06-03 NOTE — PROGRESS NOTES
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   Improved neck pain due to cervical spine sprain & muscular strain  Hypertension  hyperlipidemia     Rosa Butler MD

## 2021-06-03 NOTE — PROGRESS NOTES
Standard  Bathroom Equipment: Grab bars in shower (in walk-in shower)    ADL Assistance: Independent  Homemaking Assistance: Independent  Ambulation Assistance: Independent  Transfer Assistance: Independent    Active : Yes  Occupation: Retired  Additional Comments: Pt was fully indep PLOF, runs 2 days a week, [facility] [employer]    HEARING:  WFL    COGNITION: Slow Processing, Impaired Memory and flat affect,   pt kept eyes intermittently due to headache     ADL:   EATING:Setup or clean-up assistance. [facility] CARE Score: 5. ORAL HYGIENE:Supervision or touching assistance. CGA standing at sink. CARE Score: 4. TOILETING HYGIENE:Supervision or touching assistance. CGA. CARE Score: 4. SHOWERING/BATHING:Supervision or touching assistance. CGA while standing, completed mostly utilzing shower bench in walk in shower due to unsteadiness. [facility] CARE Score: 4.     UPPER BODY DRESSING:Supervision or touching assistance. SBA seated, good orientation of clothing and sequencing. CARE Score: 4. LOWER BODY DRESSING:Supervision or touching assistance. CGA,  good safety to complete while sitting down. CARE Score: 4. FOOTWEAR:Supervision or touching assistance  SBA while seated. [facility] CARE Score: 4. TOILET TRANSFER: Supervision or touching assistance. CGA. CARE Score: 4. BALANCE:  Sitting Balance:  Stand By Assistance. Standing Balance: Contact Guard Assistance. BED MOBILITY:  Supine to Sit: Stand By Assistance      TRANSFERS:  Sit to Stand:  Contact Guard Assistance. Stand to Sit: Contact Guard Assistance. FUNCTIONAL MOBILITY:  Assistive Device: None  Assist Level:  Contact Guard Assistance. Distance:  To and from bathroom, To and from shower room and To and from therapy gym     PM SESSION:   RANGE OF MOTION:  Bilateral Upper Extremity:  WNL    STRENGTH:  Hand Dominance: Right  Left Hand Strength -  (lbs)  Handle Setting 2: 65, 65, 60  Right Hand Strength -  (lbs)  Handle Setting 2: 65, 62, 65  Fine Motor Skills  Left 9 Hole Peg Test Time (secs): 32.6  Right 9 Hole Peg Test Time (secs): 28.8    SENSATION:   WFL    Vision:  Vision - Basic Assessment  Prior Vision: Wears glasses all the time  Visual History: No significant visual history  Patient Visual Report: No visual complaint reported. Visual Field Cut: No  Oculo Motor Control: WNL  Vision Comments: 1 min vertical gaze stabilization WFL. 1 min horiz gaze stabilization WFL. Pt denies dizziness/ nausea with both testing. Activity Tolerance:  Patient tolerance of  treatment: good. Assessment:  Assessment: Pt demonstrates decreased ADL & functional mobility indep s/p fall with CHI. Continued OT recommended to faciliate improved endurance, balance, & safety awareness for returning to ADL & IADLs at home. Performance deficits / Impairments: Decreased functional mobility , Decreased endurance, Decreased ADL status, Decreased balance, Decreased safe awareness, Decreased cognition  Prognosis: Good  Safety Devices in place: Yes  Type of devices: All fall risk precautions in place, Call light within reach, Chair alarm in place, Left in chair    Treatment Initiated: Treatment and education initiated within context of evaluation. Evaluation time included review of current medical information, gathering information related to past medical, social and functional history, completion of standardized testing, formal and informal observation of tasks, assessment of data and development of plan of care and goals. Treatment time included skilled education and facilitation of tasks to increase safety and independence with ADL's for improved functional independence and quality of life. Discharge Recommendations:  Continue to assess pending progress    Patient Education:  OT Education: OT Role, Plan of Care, ADL Adaptive Strategies, Transfer Training, Precautions, Family Education    Equipment Recommendations:   Other:

## 2021-06-04 PROCEDURE — 6370000000 HC RX 637 (ALT 250 FOR IP): Performed by: PHYSICAL MEDICINE & REHABILITATION

## 2021-06-04 PROCEDURE — 97110 THERAPEUTIC EXERCISES: CPT

## 2021-06-04 PROCEDURE — 97130 THER IVNTJ EA ADDL 15 MIN: CPT | Performed by: SPEECH-LANGUAGE PATHOLOGIST

## 2021-06-04 PROCEDURE — 97129 THER IVNTJ 1ST 15 MIN: CPT | Performed by: SPEECH-LANGUAGE PATHOLOGIST

## 2021-06-04 PROCEDURE — 97530 THERAPEUTIC ACTIVITIES: CPT

## 2021-06-04 PROCEDURE — 97535 SELF CARE MNGMENT TRAINING: CPT

## 2021-06-04 PROCEDURE — 97116 GAIT TRAINING THERAPY: CPT

## 2021-06-04 PROCEDURE — 99232 SBSQ HOSP IP/OBS MODERATE 35: CPT | Performed by: PHYSICAL MEDICINE & REHABILITATION

## 2021-06-04 PROCEDURE — 1180000000 HC REHAB R&B

## 2021-06-04 PROCEDURE — 6370000000 HC RX 637 (ALT 250 FOR IP): Performed by: FAMILY MEDICINE

## 2021-06-04 RX ORDER — ACETAMINOPHEN 325 MG/1
650 TABLET ORAL EVERY 6 HOURS
Status: DISCONTINUED | OUTPATIENT
Start: 2021-06-04 | End: 2021-06-11 | Stop reason: HOSPADM

## 2021-06-04 RX ORDER — LANOLIN ALCOHOL/MO/W.PET/CERES
6 CREAM (GRAM) TOPICAL NIGHTLY
Status: DISCONTINUED | OUTPATIENT
Start: 2021-06-04 | End: 2021-06-11 | Stop reason: HOSPADM

## 2021-06-04 RX ADMIN — LEVETIRACETAM 500 MG: 500 TABLET, FILM COATED ORAL at 21:14

## 2021-06-04 RX ADMIN — FAMOTIDINE 20 MG: 20 TABLET, FILM COATED ORAL at 21:14

## 2021-06-04 RX ADMIN — ACETAMINOPHEN 650 MG: 325 TABLET ORAL at 21:14

## 2021-06-04 RX ADMIN — TRAMADOL HYDROCHLORIDE 50 MG: 50 TABLET ORAL at 18:57

## 2021-06-04 RX ADMIN — ACETAMINOPHEN 650 MG: 325 TABLET ORAL at 15:00

## 2021-06-04 RX ADMIN — FAMOTIDINE 20 MG: 20 TABLET, FILM COATED ORAL at 08:54

## 2021-06-04 RX ADMIN — TRAMADOL HYDROCHLORIDE 100 MG: 50 TABLET, FILM COATED ORAL at 07:57

## 2021-06-04 RX ADMIN — ACETAMINOPHEN 650 MG: 325 TABLET ORAL at 05:38

## 2021-06-04 RX ADMIN — Medication 6 MG: at 21:15

## 2021-06-04 RX ADMIN — ONDANSETRON 4 MG: 4 TABLET, ORALLY DISINTEGRATING ORAL at 13:04

## 2021-06-04 RX ADMIN — LEVETIRACETAM 500 MG: 500 TABLET, FILM COATED ORAL at 08:54

## 2021-06-04 RX ADMIN — DOCUSATE SODIUM 100 MG: 100 CAPSULE, LIQUID FILLED ORAL at 08:54

## 2021-06-04 RX ADMIN — DICLOFENAC SODIUM 2 G: 10 GEL TOPICAL at 08:54

## 2021-06-04 ASSESSMENT — PAIN DESCRIPTION - ONSET
ONSET: ON-GOING
ONSET: ON-GOING

## 2021-06-04 ASSESSMENT — PAIN DESCRIPTION - PROGRESSION: CLINICAL_PROGRESSION: NOT CHANGED

## 2021-06-04 ASSESSMENT — PAIN SCALES - GENERAL
PAINLEVEL_OUTOF10: 4
PAINLEVEL_OUTOF10: 3
PAINLEVEL_OUTOF10: 3
PAINLEVEL_OUTOF10: 1
PAINLEVEL_OUTOF10: 4
PAINLEVEL_OUTOF10: 1
PAINLEVEL_OUTOF10: 1

## 2021-06-04 ASSESSMENT — ENCOUNTER SYMPTOMS
COUGH: 0
EYE PAIN: 0
WHEEZING: 0
RHINORRHEA: 0
EYE DISCHARGE: 0
ABDOMINAL PAIN: 0
VOMITING: 0
NAUSEA: 0
TROUBLE SWALLOWING: 0
DIARRHEA: 0
SHORTNESS OF BREATH: 0
CONSTIPATION: 0
SORE THROAT: 0
BACK PAIN: 0

## 2021-06-04 ASSESSMENT — PAIN DESCRIPTION - FREQUENCY
FREQUENCY: CONTINUOUS
FREQUENCY: CONTINUOUS

## 2021-06-04 ASSESSMENT — PAIN DESCRIPTION - PAIN TYPE
TYPE: ACUTE PAIN

## 2021-06-04 ASSESSMENT — PAIN - FUNCTIONAL ASSESSMENT
PAIN_FUNCTIONAL_ASSESSMENT: ACTIVITIES ARE NOT PREVENTED
PAIN_FUNCTIONAL_ASSESSMENT: PREVENTS OR INTERFERES SOME ACTIVE ACTIVITIES AND ADLS

## 2021-06-04 ASSESSMENT — PAIN DESCRIPTION - LOCATION
LOCATION: HEAD

## 2021-06-04 ASSESSMENT — PAIN DESCRIPTION - ORIENTATION
ORIENTATION: MID
ORIENTATION: RIGHT;MID

## 2021-06-04 ASSESSMENT — PAIN DESCRIPTION - DESCRIPTORS
DESCRIPTORS: ACHING;NAGGING
DESCRIPTORS: ACHING

## 2021-06-04 NOTE — PROGRESS NOTES
Assistance, cues for hand placement. Stand to Sit: [employer], cues for hand placement. FUNCTIONAL MOBILITY:  Assistive Device: None  Assist Level:  Contact Guard Assistance. Distance: To and from therapy gym  occasional unsteadiness     ADDITIONAL ACTIVITIES:  Patient completed IADL homemaking task this date of making mac n cheese in [place] - task was graded to challenge sequencing an ADL task, balance, and safety in the kitchen. Patient was able to retrieve all items from upper cupboard and microwave with CGA and  VCs for safety during the retrieval and transportation of items. Patient required CGA  throughout task with a standing endurance of fair. Pt. Required 2 seated rest breaks to complete. Pt. Instructed on activity with use of UB incorporating access/retrieval of items at graded levels challenging standing tolerance. Pt. Completed reaching over FIDEL with standing tolerance of 5 mins consecutively. ASSESSMENT:     Activity Tolerance:  Patient tolerance of  treatment: fair. Discharge Recommendations: Continue to assess pending progress  Equipment Recommendations: Other: Recommend shower chair  Plan: Times per week: 5x/wk for 90 min and 1x/wk for 30 min  Current Treatment Recommendations: Balance Training, Functional Mobility Training, Endurance Training, Safety Education & Training, Self-Care / ADL, Patient/Caregiver Education & Training, Cognitive Reorientation, Neuromuscular Re-education, Equipment Evaluation, Education, & procurement, Home Management Training    Patient Education  Patient Education: ADL's, Home Exercise Program, Precautions, Family Education, Equipment Education, Importance of Increasing Activity and Assistive Device Safety and safety with functional mobility and transfers.     Goals  Short term goals  Time Frame for Short term goals: 1 week  Short term goal 1: Pt will attend and sequence together a multiple step ADL routine min vcs to improve indep with self care. Short term goal 2: Pt will demo 4 minute good dynamic standing balance with SBA to improve ability to reach into cupboards. Short term goal 3: Pt will attend to and complete simple meal prep task with SBA to improve indep with preparing an egg. Short term goal 4: Pt will complete various t/fs including toilet with SBA & 0-2 vcs for safety  Short term goal 5: Pt will navigate around obstacles during mobility to/from bathroom + into hallway with SBA & min vcs for safety to improve safety within home. Long term goals  Time Frame for Long term goals : 2 weeks  Long term goal 1: Pt will complete BADL routine with mod I and 0 vcs for safety to improve indep with self care. 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 Following session, patient left in safe position with all fall risk precautions in place.

## 2021-06-04 NOTE — PROGRESS NOTES
Comprehensive Nutrition Assessment    Type and Reason for Visit:  Initial, Consult (nutrition assessment)    Nutrition Recommendations/Plan:   Continue current diet  ONS started: ensure enlive TID (it is gluten free)  Consider daily MVI  Bowel meds per MD - colace    Nutrition Assessment:  Pt. severely malnourished AEB criteria listed below. At risk for further nutritional compromise r/t admitted with intracerebral hemorrhage resulting from skull fx following a fall, await BM,  and underlying medical condition (hx: HLD, HTN). Nutrition recommendations/interventions as per above. Malnutrition Assessment:  Malnutrition Status:  Severe malnutrition    Context:  Acute Illness     Findings of the 6 clinical characteristics of malnutrition:  Energy Intake:  7 - 50% or less of estimated energy requirements for 5 or more days  Weight Loss:  1 - 1% to 2% over 1 week     Body Fat Loss:  No significant body fat loss     Muscle Mass Loss:  No significant muscle mass loss    Fluid Accumulation:  No significant fluid accumulation     Strength:  Normal  strength    Estimated Daily Nutrient Needs:  Energy (kcal):  1528-5562 (25-30 kcals/kg); Weight Used for Energy Requirements:  Current (74 kg - current 6/4)     Protein (g):   (1.2-2.0 grams/kg); Weight Used for Protein Requirements:  Current (74kg - current 6/4)            Nutrition Related Findings:  Patient with intracerebral hemorrhage resulting from a skull fx followin a fall. Patient seen and reports that he does not have an appetite. Patient reports that he has not had a BM. Patient feels that he is drinking fluids OK. Note patient's breakfast tray came to room and there was only fruit and yogurt on it; patient denied wanting anything else. Patient fell asleep during visit. Will go ahead and start ONS. Note patient is on colace.       Wounds:   (head wound - skull fx due to fall)       Current Nutrition Therapies:    DIET GENERAL;  Adult Oral Nutrition Supplement; Standard High Calorie/High Protein Oral Supplement    Anthropometric Measures:  · Height: 5' 7\" (170.2 cm)  · Current Body Weight: 163 lb (73.9 kg) (6/4, no edema)   · Admission Body Weight: 165 lb (74.8 kg) (6/2, bedscale)    · Usual Body Weight: 169 lb (76.7 kg) (5/28, bedscale)     · Ideal Body Weight: 148 lbs;      · BMI: 25.5  · Adjusted Body Weight:  ; No Adjustment     · BMI Categories: Overweight (BMI 25.0-29. 9)       Nutrition Diagnosis:   · Inadequate oral intake related to  (lack of appetite) as evidenced by intake 0-25%, intake 26-50%    Nutrition Interventions:   Food and/or Nutrient Delivery:  Continue Current Diet, Start Oral Nutrition Supplement  Nutrition Education/Counseling:  No recommendation at this time   Coordination of Nutrition Care:  Continue to monitor while inpatient    Goals:  Patient will consume 75% or greater of meals and ONS during LOS       Nutrition Monitoring and Evaluation:   Behavioral-Environmental Outcomes:  None Identified   Food/Nutrient Intake Outcomes:  Diet Advancement/Tolerance, Food and Nutrient Intake, Supplement Intake  Physical Signs/Symptoms Outcomes:  Biochemical Data, GI Status, Fluid Status or Edema, Hemodynamic Status, Meal Time Behavior, Nutrition Focused Physical Findings, Skin, Weight     Discharge Planning:     Too soon to determine     Electronically signed by Carolina Pinzon RD, LD on 6/4/21 at 11:16 AM EDT    Contact: [phone]

## 2021-06-04 NOTE — PROGRESS NOTES
[address] [facility]  [address] [facility]  DAILY NOTE    TIME   SLP Individual Minutes  Time In: 1000  Time Out: 1030  Minutes: 30  Timed Code Treatment Minutes: 30 Minutes       Date: 2021  Patient Name: Syd Shelley      CSN: [id number]   : 1950  (78 y.o.)  Gender: male   Referring Physician:  Dr. Camila Zazueta  Diagnosis: Intraparenchymal Hemorrhage of brain [facility])  Secondary Diagnosis: Cognitive impairment   Precautions: Low stimulation, Fall risk   Current Diet: Regular with thin liquids  Swallowing Strategies: Standard Universal Swallow Precautions  Date of Last MBS/FEES: Not Applicable    Pain:   - Pain location: Headache    Subjective:  Patient seen sitting upright in chair, pleasant and cooperative. Patient often closing eyes, but denying visual disturbance. Wife present and asking appropriate questions pertaining to the low stimulation guidelines and what type of activities would be appropriate for him to engage in over the weekend. Discussed that looking at magazine pictures, playing card games and completing word puzzle were all appropriate, but that patient should rest periodically throughout the day and monitor brain injury symptoms. Short-Term Goals:  SHORT TERM GOAL #1:  Goal 1: Patient will complete higher level memory tasks (delayed) at 80% accuracy given min cues to improve recall abilities to return to baseline functioning. INTERVENTIONS:    SHORT TERM GOAL #2:  Goal 2: Patient will complete higher level problem solving/reasonsing (including finance mgmt, time/money mgmt) tasks with 80% accuracy given min cues in order to return to home and hobbies with least amount of supervision/assistance upon discharge. INTERVENTIONS: Higher level money related word problems [employer]): 2/5 indep, 3/5 with mod cues to correct errors during review.   *Increased time needed for task completion due to slow processing Symptoms: 4/4     Acute Concussion Evaluation (ACE) completed this date. Cognitive linguistic evaluation was completed on 6/3/21 with score of 24/30 on MOCA, indicating a mild impairment level. ACE score of  calculated; It should be noted that a score with concerns for concussion are greater than . *Reviewed low stimulation guidelines with patient and wife, who have been extremely receptive to information and excellent with implementing the principles. Wife reporting that patient had multiple symptoms yesterday (nausea, vomiting, headache) and indicating that they choose not to participate in the one hour of TV that was allotted in the TBI protocol plan. Discussed that ST will continue to review the ACE daily to provide feedback on progress and assist with building awareness of what symptoms to watch for in relation to the brain injury (as indicated by the questions on the ACE). Discussed using his performance as a guide to activity tolerance throughout the day, as well as an indication for when rest breaks are needed. Wife expressed understanding. Long-Term Goals:  Timeframe for Long-term Goals: 3 weeks    LONG TERM GOAL #1:  Goal 1: Patient will improve overall cognition to Supervision/Mod I level in order to return to baseline cognitive function and IADLs/ADLs.       Comprehension: 6 - Complex ideas 90% or device (hearing aid or glasses- if patient is primarily a visual learner)  Expression: 7 - Patient expresses complex ideas/needs  Social Interaction: 5 - Patient is appropriate with supervision/cues  Problem Solvin - Patient able to solve simple/routine tasks  Memory: 3 - Patient remembers 50%-74% of the time    EDUCATION:  Learner: Patient and Significant Other  Education:  Reviewed results and recommendations of this evaluation, Reviewed ST goals and Plan of Care, Reviewed recommendations for follow-up, Education Related to Potential Risks and Complications Due to Impairment/Illness/Injury, Education Related to Prevention of Recurrence of Impairment/Illness/Injury and Low stimulation/TBI protocol  Evaluation of Education: Verbalizes understanding and Needs further instruction    ASSESSMENT/PLAN:  Activity Tolerance:  Patient tolerance of  treatment: good. Appropriate participation      Assessment/Plan: Patient progressing toward established goals. Continues to require skilled care of licensed speech pathologist to progress toward achievement of established goals and plan of care. .     Plan for Next Session: Thought organization, Complex attention, ACE     [facility]  [address], [address], [address]

## 2021-06-04 NOTE — PROGRESS NOTES
[facility]  INPATIENT PHYSICAL THERAPY  DAILY NOTE  [facility] - [id number]    Time In: 1130  Time Out: 1230  Timed Code Treatment Minutes: 60 Minutes  Minutes: 60          Date: 2021  Patient Name: Marisol Thomas,  Gender:  male        MRN: [id number]  : 1950  (78 y.o.)  Referral Date : 21  Referring Practitioner: Angel Taylor MD  Diagnosis: Left sided intracerebral hemorrhage  Additional Pertinent Hx: Marisol Thomas  is a 78 y.o. right-handed  male with history of hypertension and hyperlipidemia, is admitted to the inpatient rehabilitation unit on 2021 for intensive inpatient rehabilitation for impaired ADLs, ambulation & cognition due to left frontal lobe & right cerebellar contusion with intraparenchymal hemorrhage, left posterior parietal & anterior temporal subdural hematoma & subarachnoid blood, and right basilar skull fracture secondary to fall on 2021. Prior Level of Function:  Lives With: Spouse  Type of Home: House  Home Layout: One level  Home Access: Stairs to enter without rails  Entrance Stairs - Number of Steps: 3  Home Equipment: Cane   Bathroom Shower/Tub: Walk-in shower, Tub/Shower unit  Bathroom Toilet: Standard  Bathroom Equipment: Grab bars in shower (in walk-in shower)    ADL Assistance: [address]: Independent  Transfer Assistance: Independent  Active : Yes  Additional Comments: Pt was fully indep PLOF, runs 2 days a week, [facility] [employer]    Restrictions/Precautions:  Restrictions/Precautions: Fall Risk, General Precautions  Position Activity Restriction  Other position/activity restrictions: low stimulation guidlines s/p CHI     SUBJECTIVE: pt in bed upon arrival and agrees to therapy. Pt fatigued throughout session and requires freq rest breaks.  Pt A&O x3, able to state why he is here and how it happened- states he was playing pickle ball and ran into the concrete wall and hit his head    PAIN: no c/o pain  Did report headache \"I know I have a headache but it isnt that bad right now\"    Vitals: Oxygen: >92% throughout  Heart Rate: 42-47 , per pt he normally rests between 42-57    OBJECTIVE:  Bed Mobility:  Rolling to Left: Stand By Assistance   Supine to Sit: Stand By Assistance  Scooting: Stand By Assistance    Transfers:  Sit to Stand: Contact Guard Assistance  Stand to [employer] Assistance  Pt unsteady on feet    Ambulation:  Contact Guard Assistance, Minimal Assistance, 1LOB with mod A to correct  Distance: 50ft x2 and mult short distances in gym during therapy  Surface: Level Tile  Device:No Device  Gait Deviations:  Slow Lois, Decreased Step Length Bilaterally, Mild Path Deviations, Moderate Path Deviations, Unsteady Gait and guarded, did scuff feet together a few times however able to catch self  Pt reaching out for rails in hallway, furniture and walls due to unsteadiness    Balance:  Dynamic Standing Balance: Minimal Assistance  Pt stood and completed reaching task outside of FIDEL- pt then tossed object at ring on floor. Pt min unsteady no LOB, fatigued qucikly. Able to stand ~2mins prior to needing a rest break  Pt sat down and started rubbing hair/head (did this mult times during session) stating \"it feels like i'm wearing a ball cap, but I know i'm not and I haven't for a week now\"    Exercise:  Patient was guided in 1 set(s) 20 reps of exercise to both lower extremities. Standing heel/toe raises, Standing marches, Standing hip abduction/adduction, Standing hip extension, Standing hamstring curls, Standing hip flexion, Mini squats and much extra time to complete due to fatigue, pt needing rest breaks after every 1-2 exercises, occasionally requesting a break midway through an exercise. Exercises were completed for increased independence with functional mobility.     Functional Outcome Measures: Completed ASSESSMENT:  Assessment: Patient progressing toward established goals. Activity Tolerance:  Patient tolerance of  treatment: fair. Pt limited by headache, fatigue and is very unsteady on feet. Pt requiring many freq extended rest breaks prior to session (pt reporting he was very active prior to coming here- running marathons, playing pickle ball, weight training once a week) will require cont PT at this time to improve independence and safety with mobility. Equipment Recommendations:Equipment Needed: No (continue to monitor)  Discharge Recommendations:  Continue to assess pending progress    Plan: Times per week: 5x/wk 90min, 1x/wk 30min  Times per day: Twice a day  Current Treatment Recommendations: Strengthening, Neuromuscular Re-education, Home Exercise Program, Balance Training, Endurance Training, Patient/Caregiver Education & Training, Functional Mobility Training, Gait Training, Transfer Training, Stair training    Patient Education  Patient Education: Plan of Care, Transfers, Gait, Verbal Exercise Instruction    Goals:  Patient goals : go home  Short term goals  Time Frame for Short term goals: 1 week  Short term goal 1: Patient will complete supine < > sit with head of bed elevated 30 degrees with no bed rail and modified independence to transfer in/out of bed safely. Short term goal 2: Patient will complete sit < > stand with SBA to stand to ambulate with decreased difficulty. Short term goal 3: Patient will ambulate 80' with 2 turns with CGA to progress towards navigating home environment safely. Short term goal 4: Patient will ascend/descend 3 steps with no hand rails and CGA to progress towards safe home entry. Short term goal 5: Patient will improve PEPE balance score to greater than or equal to 25/56 to improve balance and reduce fall risk.   Long term goals  Time Frame for Long term goals : 4 weeks  Long term goal 1: Patient will complete supine < > sit with modified independence to transfer in/out of bed safely. Long term goal 2: Patient will complete sit  <> stand and stand pivot transfer with modified independence to transfer surface to surface safely. Long term goal 3: Patient will ambulate 80' with no AD and supervion to navigate living environment. Long term goal 4: Patient will ascend/descend 3 steps with no hand rail and SBA for safe home entry. Long term goal 5: Patient will improve PEPE balance test to greater than or equal to 51/56 to reduce his risk for falls. Following session, patient left in safe position with all fall risk precautions in place.

## 2021-06-04 NOTE — PROGRESS NOTES
Physical Medicine & Rehabilitation Progress Note    Chief Complaint:  headache    Subjective:    Antonino Leigh is a 78 y.o. right-handed  male with history of hypertension and hyperlipidemia, was admitted on 6/2/2021 for intensive inpatient rehabilitation for impaired ADLs, ambulation & cognition due to left frontal lobe & right cerebellar contusion with intraparenchymal hemorrhage, left posterior parietal & anterior temporal subdural hematoma & subarachnoid blood, and right basilar skull fracture secondary to fall on 5/28/2021.      The patient does not remember what happened to him. The last thing he can remember is that he was playing pickle ball. His record showed that the patient went after a ball and fell backward while he was playing pickle ball on 5/28/2021. The back of his head hit the ground with loss of consciousness for a short period of time according the witness's account.  He was confused and agitated when he was sent to [address] [place] [address] Drive had an episode of vomiting while he was in ER.  Initial CT of head showed left frontal lobe acute intraparenchymal hemorrhage and right basilar skull fracture. Neurosurgery was consulted. CT of head repeated few hours later showed interval increase in left frontal lobe contusion with multiple foci of intraparenchymal hemorrhage measuring up to 1.2 cm, interval increase right cerebellar contusion with foci of intraparenchymal hemorrhage measuring up to approximately 1 cm, new small subdural hematomas & trace adjacent subarachnoid blood within left posterior parietal and anterior temporal regions. No surgical intervention was recommended.  CTA of neck & head revealed no abnormality other than small focus of venous injury/thrombus within right transverse sinus.  Repeated CT of head on 5/29/2021 showed no significant change.   MRV of head done on 6/1/2021 showed small nonocclusive deep venous thrombus at the right transverse/sigmoid junction. The patient says his headache is better today at 2/10 level. He says he slept better last night. Ultram helps to reduce the headache. He says Tylenol also provides slight headache intensity reduction. He does not know whether or not heat pad helps because the heat pad he used is only warm to touch. He denies having focal weakness or numbness. He tolerated the intensive inpatient rehab treatment well yesterday. Rehabilitation:  PT: Reviewed. Bed Mobility:  Rolling to Left: Supervision   Rolling to Right: Supervision   Supine to Sit: Supervision  Sit to Supine: Supervision    Head of bed elevated  *Verbal cues to scoot up in bed. Time spent assisting pt to reposition to reduce pain and allow pt to rest     Transfers:  Sit to Stand: Contact Guard Assistance  Stand to [facility] 68  *some impulsivity with sit to stand  Stand Pivot:Contact [address]   *Recommendation following assessment of car transfer    Ambulation:  Contact Guard Assistance, Minimal Assistance  Distance: ~30' and ~50' with gait task, 60'  Surface: Level Tile  Device:No Device  Gait Deviations: Forward Flexed Posture, Slow Lois, Decreased Step Length Bilaterally, Decreased Arm Swing, Decreased Gait Speed, Decreased Heel Strike Bilaterally, Mild Path Deviations and Unsteady Gait  *Pt completing task in therapy gym, ambulating while locating objects. One verbal cue to avoid ambulating over cord and to go around for pt safety. Pt with decreased cervical rotation noted with locating objects and needing cues to turn head to stack cones      Stairs:  Minimal Assistance  Number of Steps: 1  Height: 6\" step with No Device      Stairs:  Minimal Assistance  Number of Steps: 3  Height: 6\" step with No Device    Balance:  Static Standing Balance: Contact Guard Assistance  Dynamic Standing Balance: Contact Guard Assistance, Minimal Assistance      OT: Reviewed.     ADL: EATING:Setup or clean-up assistance. [facility] CARE Score: 5. ORAL HYGIENE:Supervision or touching assistance. CGA standing at sink. CARE Score: 4. TOILETING HYGIENE:Supervision or touching assistance. CGA. CARE Score: 4. SHOWERING/BATHING:Supervision or touching assistance. CGA while standing, completed mostly utilizing shower bench in walk in shower due to unsteadiness. [facility] CARE Score: 4.     UPPER BODY DRESSING:Supervision or touching assistance. SBA seated, good orientation of clothing and sequencing. CARE Score: 4. LOWER BODY DRESSING:Supervision or touching assistance. CGA,  good safety to complete while sitting down. CARE Score: 4. FOOTWEAR:Supervision or touching assistance  SBA while seated. [facility] CARE Score: 4. TOILET TRANSFER: Supervision or touching assistance. CGA. CARE Score: 4.        BALANCE:  Sitting Balance:  Stand By Assistance. Standing Balance: Contact Guard Assistance.       BED MOBILITY:  Supine to Sit: Stand By Assistance       TRANSFERS:  Sit to Stand:  Contact Guard Assistance. Stand to Sit: Contact Guard Assistance.       FUNCTIONAL MOBILITY:  Assistive Device: None  Assist Level:  Contact Guard Assistance. Distance: To and from bathroom, To and from shower room and To and from therapy gym       ST: Reviewed. Patient's cognition and swallowing function was evaluated this date due to recent fall resulting in [address] MOCA 7.2 was administered in which patient scored 24/30 (WNL = >/= 26). Patient demonstrated greatest deficits in memory which is consistent with patient/spouse report. No report of memory deficits prior to recent fall. ACE was also administered to access presence/extent of concussion and resulting symptoms. Patient reported +LOC at time of fall, as well as presence of 8/10 physical symptoms, 4/4 cognitive symptoms, 0/4 emotional symptoms, and 3/4 sleep symptoms.  Patient does have previous history of concussion (1), but no history of headache, & mood ; lying on bed comfortably   Eyes: pupil equally round ; extra-ocular motion intact bilaterally ; slight ecchymosis at left orbit  Head, Ear, Nose, Mouth & Throat : normocephalic ; presence of stitches at posterior right head over the occipital region with mild tenderness ; no tenderness at face ; no discharge from ears or nose ; no deformity ; no facial swelling ; oral mucosa pink   Neck :  supple ; no tenderness ; no muscle spasm  Cardiovascular : regular rate & rhythm ; normal S1 & S2 heart sound ; no murmur ; normal peripheral pulse at bilateral upper & lower extremities  Pulmonary : lung clear to auscultation ; no wheezing ; no rale  Gastrointestinal : soft, flat abdomen ; no tenderness ; normal bowel sound present   Back : no tenderness; no muscle spasm  Skin: no skin lesion or rash ; no pitting edema at all 4 extremities  Musculoskeletal : no limb asymmetry; no limb deformity; no tenderness at bilateral upper & lower extremities; no palpable mass at limbs ; no joints laxity or crepitation ; normal functional joints ROM at bilateral upper & lower extremities  Cerebral :  alert ; awake ; oriented to place, person and time ; follow 2 steps verbal commend  Cerebellum : no dysmetria with bilateral finger-to-nose test ; no dysmetria with bilateral heel-to-shin test  Cranial Nerves :  grossly intact CN II to XII function  Sensory : intact light touch and pin prick sensation at bilateral upper & lower extremities  Motor : normal tone at bilateral upper & lower extremities ; normal 5/5 muscle strength at bilateral upper & lower extremities  Reflex : 2+ bilateral knees reflexes : 1+ bilateral biceps reflexes ; zero bilateral brachioradialis reflexes   Pathological Reflex :  No Pedro's sign ; no ankle clonus  Gait :  Not assessed      Diagnostics:   Recent Results (from the past 24 hour(s))   CBC auto differential    Collection Time: 06/03/21 12:46 PM   Result Value Ref Range    WBC 7.6 4.8 - 10.8 thou/mm3 RBC 4.40 (L) 4.70 - 6.10 mill/mm3    Hemoglobin 12.6 (L) 14.0 - 18.0 gm/dl    Hematocrit 39.2 (L) 42.0 - 52.0 %    MCV 89.1 80.0 - 94.0 fL    MCH 28.6 26.0 - 33.0 pg    MCHC 32.1 (L) 32.2 - 35.5 gm/dl    RDW-CV 12.4 11.5 - 14.5 %    RDW-SD 40.7 35.0 - 45.0 fL    Platelets 419 337 - 613 thou/mm3    MPV 10.7 9.4 - 12.4 fL    Seg Neutrophils 82.1 %    Lymphocytes 10.0 %    Monocytes 6.5 %    Eosinophils 0.7 %    Basophils 0.4 %    Immature Granulocytes 0.3 %    Segs Absolute 6.2 1 - 7 thou/mm3    Lymphocytes Absolute 0.8 (L) 1.0 - 4.8 thou/mm3    Monocytes Absolute 0.5 0.4 - 1.3 thou/mm3    Eosinophils Absolute 0.1 0.0 - 0.4 thou/mm3    Basophils Absolute 0.0 0.0 - 0.1 thou/mm3    Immature Grans (Abs) 0.02 0.00 - 0.07 thou/mm3    nRBC 0 /100 wbc   Comprehensive Metabolic Panel w/ Reflex to MG    Collection Time: 06/03/21 12:46 PM   Result Value Ref Range    Glucose 128 (H) 70 - 108 mg/dL    CREATININE 0.8 0.4 - 1.2 mg/dL    BUN 13 7 - 22 mg/dL    Sodium 139 135 - 145 meq/L    Potassium reflex Magnesium 4.2 3.5 - 5.2 meq/L    Chloride 102 98 - 111 meq/L    CO2 24 23 - 33 meq/L    Calcium 9.1 8.5 - 10.5 mg/dL    AST 13 5 - 40 U/L    Alkaline Phosphatase 45 38 - 126 U/L    Total Protein 7.1 6.1 - 8.0 g/dL    Albumin 4.0 3.5 - 5.1 g/dL    Total Bilirubin 0.6 0.3 - 1.2 mg/dL    ALT 12 11 - 66 U/L   Prealbumin    Collection Time: 06/03/21 12:46 PM   Result Value Ref Range    Prealbumin 24.0 20.0 - 40.0 mg/dl   Lipid panel    Collection Time: 06/03/21 12:46 PM   Result Value Ref Range    Cholesterol, Total 219 (H) 100 - 199 mg/dL    Triglycerides 170 0 - 199 mg/dL    HDL 42 mg/dL    LDL Calculated 143 mg/dL   Anion Gap    Collection Time: 06/03/21 12:46 PM   Result Value Ref Range    Anion Gap 13.0 8.0 - 16.0 meq/L   Glomerular Filtration Rate, Estimated    Collection Time: 06/03/21 12:46 PM   Result Value Ref Range    Est, Glom Filt Rate >90 ml/min/1.73m2         Impression:  · Left frontal lobe & right cerebellar contusion discharge planning       Missed Therapy Time:  · None    Rosa Butler MD

## 2021-06-04 NOTE — PROGRESS NOTES
in/out of bed safely. Short term goal 2: Patient will complete sit < > stand with SBA to stand to ambulate with decreased difficulty. Short term goal 3: Patient will ambulate [address]  1560' with 2 turns with CGA to progress towards navigating home environment safely. Short term goal 4: Patient will ascend/descend 3 steps with no hand rails and CGA to progress towards safe home entry. Short term goal 5: Patient will improve PEPE balance score to greater than or equal to 25/56 to improve balance and reduce fall risk. Long term goals  Time Frame for Long term goals : 4 weeks  Long term goal 1: Patient will complete supine < > sit with modified independence to transfer in/out of bed safely. Long term goal 2: Patient will complete sit  <> stand and stand pivot transfer with modified independence to transfer surface to surface safely. Long term goal 3: Patient will ambulate 80' with no AD and supervion to navigate living environment. Long term goal 4: Patient will ascend/descend 3 steps with no hand rail and SBA for safe home entry. Long term goal 5: Patient will improve PEPE balance test to greater than or equal to 51/56 to reduce his risk for falls. Following session, patient left in safe position with all fall risk precautions in place.

## 2021-06-04 NOTE — PROGRESS NOTES
RECREATIONAL THERAPY  MISSED TREATMENT    []Transitional Care Unit  [x]Inpatient Rehabilitation    Date:  6/4/2021            Patient Name: Jenaro Leggett           MRN: [id number]  Ann: [de-identified]          YOB: 1950 (79 y.o.)       Gender: male   Diagnosis: Left sided intracerebral hemorrhage  Physician: Referring Practitioner: Dr. Laura Lopes:    []Hold treatment per nursing request  []Patient refusal  []Family declined treatment  []Patient at testing and/or off the floor  []Patient unavailable, with PT/OT/nursing, etc.  [x]Other pt sound asleep in bed this am-wife in room on her phone-no RT this am  DORCAS CardozaS    6/4/2021

## 2021-06-04 NOTE — PLAN OF CARE
Problem: Pain:  Goal: Pain level will decrease  Description: Pain level will decrease  Outcome: Ongoing  Pt has Tylenol, tramadol, voltaren gel, heat for headache pain, patient seems to have pain level between 1-5, pt states the pain is better with Tramadol 100mg. Problem: Mobility - Impaired:  Goal: Mobility will improve  Description: Mobility will improve  Outcome: Ongoing  Pt will participate in PT daily as directed to increase mobility. Problem: Falls - Risk of:  Goal: Will remain free from falls  Description: Will remain free from falls  Outcome: Ongoing  Pt will remain free of falls. Pt uses no device with transfers. Problem: SKIN INTEGRITY  Goal: LTG - Patient will be free from infection  Outcome: Ongoing  Skin assessment every shift. Pt has scattered bruising, bruising to face, scattered abrasions, no new areas of concern.

## 2021-06-05 PROCEDURE — 97110 THERAPEUTIC EXERCISES: CPT

## 2021-06-05 PROCEDURE — 97530 THERAPEUTIC ACTIVITIES: CPT

## 2021-06-05 PROCEDURE — 97130 THER IVNTJ EA ADDL 15 MIN: CPT

## 2021-06-05 PROCEDURE — 1180000000 HC REHAB R&B

## 2021-06-05 PROCEDURE — 6370000000 HC RX 637 (ALT 250 FOR IP): Performed by: FAMILY MEDICINE

## 2021-06-05 PROCEDURE — 6370000000 HC RX 637 (ALT 250 FOR IP): Performed by: PHYSICAL MEDICINE & REHABILITATION

## 2021-06-05 PROCEDURE — 97129 THER IVNTJ 1ST 15 MIN: CPT

## 2021-06-05 PROCEDURE — 97116 GAIT TRAINING THERAPY: CPT

## 2021-06-05 PROCEDURE — 97535 SELF CARE MNGMENT TRAINING: CPT

## 2021-06-05 RX ADMIN — TRAMADOL HYDROCHLORIDE 50 MG: 50 TABLET ORAL at 07:28

## 2021-06-05 RX ADMIN — LEVETIRACETAM 500 MG: 500 TABLET, FILM COATED ORAL at 08:24

## 2021-06-05 RX ADMIN — FAMOTIDINE 20 MG: 20 TABLET, FILM COATED ORAL at 20:09

## 2021-06-05 RX ADMIN — ACETAMINOPHEN 650 MG: 325 TABLET ORAL at 20:09

## 2021-06-05 RX ADMIN — DOCUSATE SODIUM 100 MG: 100 CAPSULE, LIQUID FILLED ORAL at 20:09

## 2021-06-05 RX ADMIN — ACETAMINOPHEN 650 MG: 325 TABLET ORAL at 13:31

## 2021-06-05 RX ADMIN — FAMOTIDINE 20 MG: 20 TABLET, FILM COATED ORAL at 13:32

## 2021-06-05 RX ADMIN — DOCUSATE SODIUM 100 MG: 100 CAPSULE, LIQUID FILLED ORAL at 08:24

## 2021-06-05 RX ADMIN — LISINOPRIL 5 MG: 5 TABLET ORAL at 08:24

## 2021-06-05 RX ADMIN — ACETAMINOPHEN 650 MG: 325 TABLET ORAL at 07:28

## 2021-06-05 RX ADMIN — Medication 6 MG: at 20:09

## 2021-06-05 RX ADMIN — ONDANSETRON 4 MG: 4 TABLET, ORALLY DISINTEGRATING ORAL at 08:24

## 2021-06-05 RX ADMIN — POLYETHYLENE GLYCOL 3350 17 G: 17 POWDER, FOR SOLUTION ORAL at 08:24

## 2021-06-05 RX ADMIN — LEVETIRACETAM 500 MG: 500 TABLET, FILM COATED ORAL at 20:09

## 2021-06-05 ASSESSMENT — PAIN SCALES - GENERAL
PAINLEVEL_OUTOF10: 6
PAINLEVEL_OUTOF10: 4
PAINLEVEL_OUTOF10: 6
PAINLEVEL_OUTOF10: 2

## 2021-06-05 NOTE — PROGRESS NOTES
mgmt, time/money mgmt) tasks with 80% accuracy given min cues in order to return to home and hobbies with least amount of supervision/assistance upon discharge. INTERVENTIONS: Higher level word problems with topographical chart (Vechile comparison chart): 5/5 indep  *excellent success to complete mental and visual math problems   *slightly delayed processing speed noted     Patient with decreased ability/insight to outline specific physical/mental goals related to rational of hospitalization in which need to be \"worked on or improved\" prior to returning to completion of hobbies (working in the shop, working with lumber and cars). Education and support provided        *Reviewed provided  deductive reasoning puzzle for patient and wife to complete on Sunday for review on Monday with SLP. SHORT TERM GOAL #3:  Goal 3: Patient will complete thought organization/sequencing (including med mgmt) tasks with 80% accuracy given min cues to improve organization to return to baseline functioning. INTERVENTIONS: DNT secondary to focus on other goals     SHORT TERM GOAL #4:  Goal 4: Patient will complete attention tasks (selective, divided) with no more than 2 errors within a 5 minute task in order to return to driving and daily living responsibilities. INTERVENTIONS:  Patient presented with visual number elimination task; cued to eliminate \"odd numbers\" while listening to music of patient's choice: x1 error (omission of number), 7 minutes 10 seconds, x0 re-directions provided  Education provided to patient and wife re: functional rational of therapy task     SHORT TERM GOAL #5: NEW GOAL: Patient and caregiver will demonstrate understanding of and management of concussive/brain injury symptoms (ie: symptoms outlined on ACE, low stimulation environment) with min assist to optimize brain healing.       INTERVENTIONS:Acute Concussion Evaluation (ACE):   Physical Symptoms: 6/10  Cognitive Symptoms: 4/4  Emotional Symptoms: 0/4  Sleep Symptoms: 2/4     Acute Concussion Evaluation (ACE) completed this date. ACE score of  calculated; It should be noted that a score with concerns for concussion are greater than . *previous score ; . *Reviewed low stimulation guidelines with patient and wife, who have been extremely receptive to information and excellent with implementing the principles. Patient's wife reporting, \"We did do an hour of TV a day but then they said to stop that completely. ST inquired re: patient symptoms as one hour of TV was reintroduced. Patient's wife reporting, \"He did have increasing headaches at that time too so maybe that is why. \"     Discussed that ST will continue to review the ACE daily to provide feedback on progress and assist with building awareness of what symptoms to watch for in relation to the brain injury (as indicated by the questions on the ACE). Discussed using his performance as a guide to activity tolerance throughout the day, as well as an indication for when rest breaks are needed. Wife expressed understanding. Long-Term Goals:  Timeframe for Long-term Goals: 3 weeks    LONG TERM GOAL #1:  Goal 1: Patient will improve overall cognition to Supervision/Mod I level in order to return to baseline cognitive function and IADLs/ADLs.       Comprehension: 6 - Complex ideas 90% or device (hearing aid or glasses- if patient is primarily a visual learner)  Expression: 7 - Patient expresses complex ideas/needs  Social Interaction: 5 - Patient is appropriate with supervision/cues  Problem Solvin - Patient able to solve simple/routine tasks  Memory: 3 - Patient remembers 50%-74% of the time    EDUCATION:  Learner: Patient and Significant Other  Education:  Reviewed results and recommendations of this evaluation, Reviewed ST goals and Plan of Care, Reviewed recommendations for follow-up, Education Related to Potential Risks and Complications Due to Impairment/Illness/Injury, Education Related to Prevention of Recurrence of Impairment/Illness/Injury and Low stimulation/TBI protocol  Evaluation of Education: Verbalizes understanding and Needs further instruction    ASSESSMENT/PLAN:  Activity Tolerance:  Patient tolerance of  treatment: good. Appropriate participation      Assessment/Plan: Patient progressing toward established goals. Continues to require skilled care of licensed speech pathologist to progress toward achievement of established goals and plan of care. .     Plan for Next Session: Thought organization, Complex attention, ACE     Devi Del Rosario M.S. [address]

## 2021-06-05 NOTE — PROGRESS NOTES
[address] [address]  INPATIENT PHYSICAL THERAPY  DAILY NOTE  [facility] - 8E-069/69-A    Time In: 1100  Time Out: 1200  Timed Code Treatment Minutes: 60 Minutes  Minutes: 60          Date: 2021  Patient Name: Radha Priest,  Gender:  male        MRN: [id number]  : 1950  (78 y.o.)  Referral Date : 21  Referring Practitioner: Jasmeet Guerrero MD  Diagnosis: Left sided intracerebral hemorrhage  Additional Pertinent Hx: Radha Priest  is a 78 y.o. right-handed  male with history of hypertension and hyperlipidemia, is admitted to the inpatient rehabilitation unit on 2021 for intensive inpatient rehabilitation for impaired ADLs, ambulation & cognition due to left frontal lobe & right cerebellar contusion with intraparenchymal hemorrhage, left posterior parietal & anterior temporal subdural hematoma & subarachnoid blood, and right basilar skull fracture secondary to fall on 2021. Prior Level of Function:  Lives With: Spouse  Type of Home: House  Home Layout: One level  Home Access: Stairs to enter without rails  Entrance Stairs - Number of Steps: 3  Home Equipment: Cane   Bathroom Shower/Tub: Walk-in shower, Tub/Shower unit  Bathroom Toilet: Standard  Bathroom Equipment: Grab bars in shower (in walk-in shower)    ADL Assistance: [address]: Independent  Transfer Assistance: Independent  Active : Yes  Additional Comments: Pt was fully indep PLOF, runs 2 days a week, [facility]    Restrictions/Precautions:  Restrictions/Precautions: Fall Risk, General Precautions  Position Activity Restriction  Other position/activity restrictions: low stimulation guidlines s/p CHI     SUBJECTIVE: Pt in chair upon arrival with wife present. Wife attended therapy. Pt pleasantly agrees to therapy. Time taken throughout for frequent rest breaks and education.  Pt educated and had hamstring stretches demonstrated by heel prop on stool with a stretch due to tight hamstrings    PAIN: 2 /10: head at normal 4 when exacerbated in PT however subside after a couple minutes    Vitals: Vitals not assessed per clinical judgement, see nursing flowsheet    OBJECTIVE:  Bed Mobility:  Not Tested    Transfers:  Sit to Stand: Stand By Assistance, [address], with verbal cues  Stand to Sit:Stand By Assistance, [address], with verbal cues  1 cue one time for alignment to chair and what to reach for     Ambulation:  Contact Guard Assistance, with verbal cues   Distance: 75'x2, multiple distances in gym ~10 feet  Surface: Level Tile  Device:No Device  Gait Deviations:  Slow Lois, Decreased Step Length Bilaterally, Decreased Heel Strike Bilaterally and Mild Path Deviations  Pt unsteady at times however able to correct minor sway. Pt reaching for walls most of ambulation due to slight unsteadiness     *Pt weaved in and out of cones in gym to challenge balance with complicated gait. Pt shown course then asked to use memory to complete to challenge cognition    *Pt ambulated around room and collected cones from floor to high surfaces to promote ADLs in home life. Pt educated on importance of picking objects from floor by bending knees vs back. Pt able to maintain proper body mechanics    Balance:  Static Sitting Balance:  Independent  Dynamic Standing Balance: Contact Guard Assistance, Minimal Assistance, X 1, with verbal cues   *Pt stood on foam pad ~5 minutes in normal stance and 5 minutes in narrow stance while completing ring toss. Pt reaching outside FIDEL to challenge core and ankle mechanics. Minimal sway noted with 1 min assist to correct lateral lean. Pt sways more to right side than left     Single Leg Balance: Pt stood on each foot 25 seconds with no UE support and moderate sway. Pt completed better on L LE vs R.  CGA to min assist to correct sway NO LOB  Tandem Stance: Pt stood in each direction 3x15 seconds with no UE support. pt demos minimal sway howver improved as each trial went on. CGA to min assist to correct sway. NO LOB    Stairs:  Stairs:  6\" steps. X 8 using One Handrail and Contact Guard Assistance, with verbal cues . Cues for sequencing and slow pace. Pt impulsive at times to complete    Exercise:  Patient was guided in 1 set(s) 10-12 reps of exercise to both lower extremities. Seated marches, Seated heel/toe raises, Long arc quads, Standing heel/toe raises, Standing marches, Standing hip abduction/adduction, Standing hip extension, Standing hamstring curls and Mini squats. Exercises were completed for increased independence with functional mobility. Pt required frequent seated rest breaks after 2-3 exercise sessions. Functional Outcome Measures: Not completed       ASSESSMENT:  Assessment: Patient progressing toward established goals. Activity Tolerance:  Patient tolerance of  treatment: good. Pt tolerated session well. Pt required multiple rest breaks throughout. Decreased balance especially in single leg stance, strength, and endurance. Pt required multiple cues for slow pace and safety awareness as well.  Pt would benefit from continued PT for improved functional mobility     Equipment Recommendations:Equipment Needed: No (continue to monitor)  Discharge Recommendations: Continue to assess pending progress    Plan: Times per week: 5x/wk 90min, 1x/wk 30min  Times per day: Twice a day  Current Treatment Recommendations: Strengthening, Neuromuscular Re-education, Home Exercise Program, Balance Training, Endurance Training, Patient/Caregiver Education & Training, Functional Mobility Training, Gait Training, Transfer Training, Stair training    Patient Education  Patient Education: Plan of Care, [employer] Mobility, Transfers, Gait, Use of Gait Danese, Verbal Exercise Instruction    Goals:  Patient goals : go home  Short term goals  Time Frame for Short term goals: 1 week  Short term goal 1: Patient will complete supine < > sit with head of bed elevated 30 degrees with no bed rail and modified independence to transfer in/out of bed safely. Short term goal 2: Patient will complete sit < > stand with SBA to stand to ambulate with decreased difficulty. Short term goal 3: Patient will ambulate Same[address] 1560' with 2 turns with CGA to progress towards navigating home environment safely. Short term goal 4: Patient will ascend/descend 3 steps with no hand rails and CGA to progress towards safe home entry. Short term goal 5: Patient will improve PEPE balance score to greater than or equal to 25/56 to improve balance and reduce fall risk. Long term goals  Time Frame for Long term goals : 4 weeks  Long term goal 1: Patient will complete supine < > sit with modified independence to transfer in/out of bed safely. Long term goal 2: Patient will complete sit  <> stand and stand pivot transfer with modified independence to transfer surface to surface safely. Long term goal 3: Patient will ambulate 80' with no AD and supervion to navigate living environment. Long term goal 4: Patient will ascend/descend 3 steps with no hand rail and SBA for safe home entry. Long term goal 5: Patient will improve PEPE balance test to greater than or equal to 51/56 to reduce his risk for falls. Following session, patient left in safe position with all fall risk precautions in place.

## 2021-06-05 NOTE — PROGRESS NOTES
[address]  Occupational Therapy  Daily Note  Time:   Time In: 0700  Time Out: 0800  Timed Code Treatment Minutes: 60 Minutes  Minutes: 60          Date: 2021  Patient Name: Nirali Francis,   Gender: male      Room: [facility]77-A  MRN: [id number]  : 1950  (78 y.o.)  Referring Practitioner: Dr. Madiha Rice  Diagnosis: Left sided intracerebral hemorrhage  Additional Pertinent Hx: 78 y.o. male, previously healthy on who presents with a head injury, laceration, agitation and generalized acute confusion since the onset this morning while playing pickle ball. He has a history of hypertension and hyperlipidemia, is admitted to the inpatient rehabilitation unit on 2021 for intensive inpatient rehabilitation for impaired ADLs, ambulation & cognition due to left frontal lobe & right cerebellar contusion with intraparenchymal hemorrhage, left posterior parietal & anterior temporal subdural hematoma & subarachnoid blood, and right basilar skull fracture secondary to fall on 2021. Restrictions/Precautions:  Restrictions/Precautions: Fall Risk, General Precautions  Position Activity Restriction  Other position/activity restrictions: low stimulation guidlines s/p CHI     SUBJECTIVE: Ptl. In bed upon arrival reports had a decent nights sleep. Pt. With minimal pain upon arrival, with movement Pt. Reports an increase. PAIN: 2/10: initially after ADL completed pain increased to 6/10- headache    Vitals: Blood Pressure: 105/73  Oxygen: 99%  Heart Rate: 58    COGNITION: Slow Processing, Decreased Recall, Decreased Insight, Decreased Problem Solving, Decreased Safety Awareness and Impulsive    ADL:   Grooming: Contact Guard Assistance, with set-up and with verbal cues . Bathing: Stand By Assistance, [employer], with set-up and with verbal cues . SBA while seated, CGA for standing portion  Upper Extremity Dressing: Stand By Assistance.      Lower Extremity Dressing: Stand By Assistance, [employer], with set-up and with verbal cues . vcs for safety Pt. attempted to stand to don underwear demo LOB, recommended Pt. sit to don clothing on LB vs standing to decrease fall risk. Toileting: Contact Guard Assistance. Toilet Transfer: Contact Guard Assistance. Cm Arredondo BALANCE:  Sitting Balance:  Stand By Assistance. sitting EOB prior to ADL and to don shoes and socks  Standing Balance: Contact Guard Assistance, with cues for safety. stood for 3 mins, then 2 mins    BED MOBILITY:  Supine to Sit: Stand By Assistance      TRANSFERS:  Sit to Stand:  Contact Guard Assistance. Stand to Sit: Contact Guard Assistance. FUNCTIONAL MOBILITY:  Assistive Device: None  Assist Level:  Contact Guard Assistance. Distance: To and from bathroom and To and from therapy gym  Impulsive at times vcs to slow down. Occasional unsteadiness     ADDITIONAL ACTIVITIES:  Pt completed B UE exs with moderate resistance band x 10 reps, x 1 set, while following a handout fair tolerance of exs, with  RBs throughout, and verbal and visual cues for tech with resistance band in attempt to improve Machias and safety with ADLs. Pt. And spouse provided with theraband HEP handout and educated on. Spouse voices an understanding Pt. Will require continued review. ASSESSMENT:     Activity Tolerance:  Patient tolerance of  treatment: fair. Discharge Recommendations: Continue to assess pending progress   Equipment Recommendations:  Other: Recommend shower chair  Plan: Times per week: 5x/wk for 90 min and 1x/wk for 30 min  Current Treatment Recommendations: Balance Training, Functional Mobility Training, Endurance Training, Safety Education & Training, Self-Care / ADL, Patient/Caregiver Education & Training, Cognitive Reorientation, Neuromuscular Re-education, Equipment Evaluation, Education, & procurement, Home Management Training    Patient Education  Patient Education: ADL's, Home Exercise Program, Importance of Increasing Activity and Assistive Device Safety    Goals  Short term goals  Time Frame for Short term goals: 1 week  Short term goal 1: Pt will attend and sequence together a multiple step ADL routine min vcs to improve indep with self care. Short term goal 2: Pt will demo 4 minute good dynamic standing balance with SBA to improve ability to reach into cupboards. Short term goal 3: Pt will attend to and complete simple meal prep task with SBA to improve indep with preparing an egg. Short term goal 4: Pt will complete various t/fs including toilet with SBA & 0-2 vcs for safety  Short term goal 5: Pt will navigate around obstacles during mobility to/from bathroom + into hallway with SBA & min vcs for safety to improve safety within home. Long term goals  Time Frame for Long term goals : 2 weeks  Long term goal 1: Pt will complete BADL routine with mod I and 0 vcs for safety to improve indep with self care. 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 Following session, patient left in safe position with all fall risk precautions in place.

## 2021-06-05 NOTE — PROGRESS NOTES
[facility]  [address]  Occupational Therapy  Daily Note  Time:   Time In: 1330  Time Out: 1400  Timed Code Treatment Minutes: 30 Minutes  Minutes: 30          Date: 2021  Patient Name: Bernardino Nuñez,   Gender: male      Room: [facility]77-A  MRN: [id number]  : 1950  (78 y.o.)  Referring Practitioner: Dr. Anthony Duran  Diagnosis: Left sided intracerebral hemorrhage  Additional Pertinent Hx: 78 y.o. male, previously healthy on who presents with a head injury, laceration, agitation and generalized acute confusion since the onset this morning while playing pickle ball. He has a history of hypertension and hyperlipidemia, is admitted to the inpatient rehabilitation unit on 2021 for intensive inpatient rehabilitation for impaired ADLs, ambulation & cognition due to left frontal lobe & right cerebellar contusion with intraparenchymal hemorrhage, left posterior parietal & anterior temporal subdural hematoma & subarachnoid blood, and right basilar skull fracture secondary to fall on 2021. Restrictions/Precautions:  Restrictions/Precautions: Fall Risk, General Precautions  Position Activity Restriction  Other position/activity restrictions: low stimulation guidlines s/p CHI     SUBJECTIVE: Pt. Sitting in room with spouse pleasant and agreeable to OT treatment. PAIN: 4/10: headache    Vitals: Vitals not assessed per clinical judgement, see nursing flowsheet    COGNITION: Decreased Recall, Decreased Insight, Impaired Memory, Decreased Problem Solving, Decreased Safety Awareness and Impulsive    ADL:   No ADL's completed this session. Elisha Brittle BALANCE:  Standing Balance: Contact Guard Assistance, with cues for safety. BED MOBILITY:  Sit to Supine: Stand By Assistance      TRANSFERS:  Sit to Stand:  Contact Guard Assistance. Stand to Sit: Contact Guard Assistance.       FUNCTIONAL MOBILITY:  Assistive Device: None  Assist Level:  Contact Guard Assistance and with verbal cues . Distance: To and from therapy gym  occasional unsteadiness. ADDITIONAL ACTIVITIES:  Patient identified one of their personal goals is to be able to sustain functional standing positions in order to complete various ADL and IADL skills in standing position, such as sinkside grooming or washing dishes. Dynamic standing task of reaching up and placing items onto closeline challenging balance, and activity tolerance while sequencing 3 step task. Patient  demo'ed an endurance of 5 min consecutively prior to seated rest break. Pt. Spouse brought photos of bathroom in home to determine any DME needs. Recommendation made of shower chair and assuring grab bar is within reach. ASSESSMENT:     Activity Tolerance:  Patient tolerance of  treatment: fair. Discharge Recommendations: Continue to assess pending progress   Equipment Recommendations: Other: Recommend shower chair  Plan: Times per week: 5x/wk for 90 min and 1x/wk for 30 min  Current Treatment Recommendations: Balance Training, Functional Mobility Training, Endurance Training, Safety Education & Training, Self-Care / ADL, Patient/Caregiver Education & Training, Cognitive Reorientation, Neuromuscular Re-education, Equipment Evaluation, Education, & procurement, Home Management Training    Patient Education  Patient Education: Importance of Increasing Activity and Assistive Device Safety and safety with functional mobility. Goals  Short term goals  Time Frame for Short term goals: 1 week  Short term goal 1: Pt will attend and sequence together a multiple step ADL routine min vcs to improve indep with self care. Short term goal 2: Pt will demo 4 minute good dynamic standing balance with SBA to improve ability to reach into cupboards. Short term goal 3: Pt will attend to and complete simple meal prep task with SBA to improve indep with preparing an egg.   Short term goal 4: Pt will complete various t/fs including toilet with SBA & 0-2 vcs for safety  Short term goal 5: Pt will navigate around obstacles during mobility to/from bathroom + into hallway with SBA & min vcs for safety to improve safety within home. Long term goals  Time Frame for Long term goals : 2 weeks  Long term goal 1: Pt will complete BADL routine with mod I and 0 vcs for safety to improve indep with self care. 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 Following session, patient left in safe position with all fall risk precautions in place.

## 2021-06-06 PROCEDURE — 1180000000 HC REHAB R&B

## 2021-06-06 PROCEDURE — 6370000000 HC RX 637 (ALT 250 FOR IP): Performed by: PHYSICAL MEDICINE & REHABILITATION

## 2021-06-06 RX ADMIN — DOCUSATE SODIUM 100 MG: 100 CAPSULE, LIQUID FILLED ORAL at 07:50

## 2021-06-06 RX ADMIN — FAMOTIDINE 20 MG: 20 TABLET, FILM COATED ORAL at 21:05

## 2021-06-06 RX ADMIN — LISINOPRIL 5 MG: 5 TABLET ORAL at 07:49

## 2021-06-06 RX ADMIN — FAMOTIDINE 20 MG: 20 TABLET, FILM COATED ORAL at 07:50

## 2021-06-06 RX ADMIN — LEVETIRACETAM 500 MG: 500 TABLET, FILM COATED ORAL at 20:44

## 2021-06-06 RX ADMIN — LEVETIRACETAM 500 MG: 500 TABLET, FILM COATED ORAL at 07:49

## 2021-06-06 RX ADMIN — ACETAMINOPHEN 650 MG: 325 TABLET ORAL at 07:49

## 2021-06-06 RX ADMIN — TRAMADOL HYDROCHLORIDE 100 MG: 50 TABLET, FILM COATED ORAL at 07:49

## 2021-06-06 RX ADMIN — ACETAMINOPHEN 650 MG: 325 TABLET ORAL at 14:38

## 2021-06-06 RX ADMIN — DOCUSATE SODIUM 100 MG: 100 CAPSULE, LIQUID FILLED ORAL at 20:44

## 2021-06-06 RX ADMIN — Medication 6 MG: at 20:44

## 2021-06-06 RX ADMIN — ACETAMINOPHEN 650 MG: 325 TABLET ORAL at 20:45

## 2021-06-06 RX ADMIN — SENNOSIDES 8.6 MG: 8.6 TABLET, FILM COATED ORAL at 20:47

## 2021-06-06 ASSESSMENT — PAIN DESCRIPTION - LOCATION
LOCATION: HEAD
LOCATION: HEAD

## 2021-06-06 ASSESSMENT — PAIN DESCRIPTION - PAIN TYPE
TYPE: ACUTE PAIN
TYPE: ACUTE PAIN

## 2021-06-06 ASSESSMENT — PAIN DESCRIPTION - ONSET
ONSET: ON-GOING
ONSET: ON-GOING

## 2021-06-06 ASSESSMENT — PAIN SCALES - GENERAL
PAINLEVEL_OUTOF10: 6
PAINLEVEL_OUTOF10: 6
PAINLEVEL_OUTOF10: 7
PAINLEVEL_OUTOF10: 1
PAINLEVEL_OUTOF10: 5

## 2021-06-06 ASSESSMENT — PAIN DESCRIPTION - PROGRESSION
CLINICAL_PROGRESSION: NOT CHANGED
CLINICAL_PROGRESSION: NOT CHANGED

## 2021-06-06 ASSESSMENT — PAIN - FUNCTIONAL ASSESSMENT: PAIN_FUNCTIONAL_ASSESSMENT: PREVENTS OR INTERFERES SOME ACTIVE ACTIVITIES AND ADLS

## 2021-06-06 ASSESSMENT — PAIN DESCRIPTION - ORIENTATION: ORIENTATION: MID

## 2021-06-06 ASSESSMENT — PAIN DESCRIPTION - FREQUENCY
FREQUENCY: CONTINUOUS
FREQUENCY: CONTINUOUS

## 2021-06-06 ASSESSMENT — PAIN DESCRIPTION - DESCRIPTORS
DESCRIPTORS: ACHING
DESCRIPTORS: ACHING

## 2021-06-06 NOTE — PLAN OF CARE
Problem: Pain:  Goal: Control of acute pain  Description: Control of acute pain  Outcome: Ongoing  Note: Patient continues to c/o headahce, on schedule tylenol, PRN ultram.      Problem: Physical Regulation:  Goal: Ability to maintain a stable neurologic state will improve  Description: Ability to maintain a stable neurologic state will improve  Outcome: Ongoing  Note: Neurochecks WDL. Alert and oriented. Problem: Mobility - Impaired:  Goal: Mobility will improve  Description: Mobility will improve  Outcome: Ongoing  Note: Standy assist with no device. Problem: Bleeding:  Goal: Will show no signs and symptoms of excessive bleeding  Description: Will show no signs and symptoms of excessive bleeding  Outcome: Ongoing  Note: No s/s excessive bleeding noted. To have repeat head CT to monitor subdural hematoma      Problem: Falls - Risk of:  Goal: Will remain free from falls  Description: Will remain free from falls  Outcome: Ongoing  Note: No falls sustained at this time. Patient alert to call light and uses appropriately to alert staff to needs. Bed/chair alarms in use. \"Stay with me\" in the bathroom     Problem: SKIN INTEGRITY  Goal: LTG - Patient will be free from infection  Outcome: Ongoing  Note: No new skin issues noted this shift. Laceration to the back of the head clean and intact, no drainage noted. Staples intact. Problem: DISCHARGE BARRIERS  Goal: Patient's continuum of care needs are met  Outcome: Ongoing  Note: No discharge plans at this time.  will continue to assist with ongoing discharge planning. Patient to have team conference on Tuesday      Problem: IP DRESSINGS LOWER EXTREMITIES  Goal: LTG - patient will dress lower body with or without assistive device  Outcome: Ongoing  Note: Patient bathed and dressed himself after set up this morning.       Problem: IP COMMUNICATION/DYSARTHRIA  Goal: LTG - Patient will effectively communicate in all situations with the use of

## 2021-06-06 NOTE — PROGRESS NOTES
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 *      Plan:     Continue to follow        Subjective:     Patient has no complaint of CP or SOB. .   Medication side effects: none    Scheduled Meds:   acetaminophen  650 mg Oral Q6H    melatonin  6 mg Oral Nightly    lisinopril  5 mg Oral Daily    sodium chloride flush  5-40 mL Intravenous 2 times per day    docusate sodium  100 mg Oral BID    famotidine  20 mg Oral BID    levETIRAcetam  500 mg Oral BID     Continuous Infusions:  PRN Meds:traMADol, diclofenac sodium, ondansetron, traMADol, acetaminophen, polyethylene glycol, promethazine **OR** [DISCONTINUED] ondansetron, sodium chloride flush, bisacodyl, magnesium hydroxide, senna    Review of Systems  Pertinent items are noted in HPI. Objective:     Patient Vitals for the past 8 hrs:   BP Temp Temp src Pulse Resp SpO2   06/06/21 0745 119/70 98.7 °F (37.1 °C) Oral 58 18 99 %     I/O last 3 completed shifts:   In: 1320 [P.O.:1320]  Out: -   I/O this shift:  In: 240 [P.O.:240]  Out: -     /70   Pulse 58   Temp 98.7 °F (37.1 °C) (Oral)   Resp 18   Ht 5' 7\" (1.702 m)   Wt 161 lb 9.6 oz (73.3 kg)   SpO2 99%   BMI 25.31 kg/m²     General appearance: alert, appears stated age and cooperative  Lungs: clear to auscultation bilaterally  Chest wall: no tenderness  Heart: regular rate and rhythm, S1, S2 normal, no murmur, click, rub or gallop  Abdomen: soft, non-tender; bowel sounds normal; no masses,  no organomegaly  Extremities: extremities normal, atraumatic, no cyanosis or edema  Skin: Skin color, texture, turgor normal. No rashes or lesions  Neurologic: weak      Electronically signed by Manny Rodriguez MD on 6/6/2021 at 12:59 PM

## 2021-06-06 NOTE — PROGRESS NOTES
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)    Closed fracture of right side of base of skull (HCC)    Scalp laceration, initial encounter  Resolved Problems:    * No resolved hospital problems. *      Plan:     I ordered the follow up CT of the brain today as requested in the acute care notes  Misa Morales in the posterior scalp come out tomorrow. Subjective:     Patient has no complaint of CP or SOB. .   Medication side effects: none    Scheduled Meds:   acetaminophen  650 mg Oral Q6H    melatonin  6 mg Oral Nightly    lisinopril  5 mg Oral Daily    sodium chloride flush  5-40 mL Intravenous 2 times per day    docusate sodium  100 mg Oral BID    famotidine  20 mg Oral BID    levETIRAcetam  500 mg Oral BID     Continuous Infusions:  PRN Meds:traMADol, diclofenac sodium, ondansetron, traMADol, acetaminophen, polyethylene glycol, promethazine **OR** [DISCONTINUED] ondansetron, sodium chloride flush, bisacodyl, magnesium hydroxide, senna    Review of Systems  Pertinent items are noted in HPI. Objective:     No data found. I/O last 3 completed shifts:   In: 5 [P.O.:840]  Out: -   I/O this shift:  In: 240 [P.O.:240]  Out: -     /70   Pulse 58   Temp 98.7 °F (37.1 °C) (Oral)   Resp 18   Ht 5' 7\" (1.702 m)   Wt 161 lb 9.6 oz (73.3 kg)   SpO2 99%   BMI 25.31 kg/m²     General appearance: alert, appears stated age and cooperative  Head: Normocephalic, without obvious abnormality, atraumatic  Lungs: clear to auscultation bilaterally  Chest wall: no tenderness  Heart: regular rate and rhythm, S1, S2 normal, no murmur, click, rub or gallop  Abdomen: soft, non-tender; bowel sounds normal; no masses,  no organomegaly  Extremities: extremities normal, atraumatic, no cyanosis or edema  Skin: Skin color, texture, turgor normal. No rashes or lesions  Neurologic: Grossly normal      Electronically signed by Enid Closs, MD on 6/6/2021 at 6:39 PM

## 2021-06-07 ENCOUNTER — APPOINTMENT (OUTPATIENT)
Dept: CT IMAGING | Age: 71
DRG: 949 | End: 2021-06-07
Attending: PHYSICAL MEDICINE & REHABILITATION
Payer: MEDICARE

## 2021-06-07 PROCEDURE — 97535 SELF CARE MNGMENT TRAINING: CPT

## 2021-06-07 PROCEDURE — 1180000000 HC REHAB R&B

## 2021-06-07 PROCEDURE — 97110 THERAPEUTIC EXERCISES: CPT

## 2021-06-07 PROCEDURE — 70450 CT HEAD/BRAIN W/O DYE: CPT

## 2021-06-07 PROCEDURE — 97116 GAIT TRAINING THERAPY: CPT

## 2021-06-07 PROCEDURE — 99232 SBSQ HOSP IP/OBS MODERATE 35: CPT | Performed by: PHYSICAL MEDICINE & REHABILITATION

## 2021-06-07 PROCEDURE — 6370000000 HC RX 637 (ALT 250 FOR IP): Performed by: PHYSICAL MEDICINE & REHABILITATION

## 2021-06-07 PROCEDURE — 97129 THER IVNTJ 1ST 15 MIN: CPT

## 2021-06-07 PROCEDURE — 97530 THERAPEUTIC ACTIVITIES: CPT

## 2021-06-07 PROCEDURE — 97130 THER IVNTJ EA ADDL 15 MIN: CPT

## 2021-06-07 PROCEDURE — 97112 NEUROMUSCULAR REEDUCATION: CPT

## 2021-06-07 RX ORDER — SENNA PLUS 8.6 MG/1
1 TABLET ORAL NIGHTLY
Status: DISCONTINUED | OUTPATIENT
Start: 2021-06-07 | End: 2021-06-11 | Stop reason: HOSPADM

## 2021-06-07 RX ADMIN — ACETAMINOPHEN 650 MG: 325 TABLET ORAL at 14:49

## 2021-06-07 RX ADMIN — Medication 6 MG: at 20:44

## 2021-06-07 RX ADMIN — LEVETIRACETAM 500 MG: 500 TABLET, FILM COATED ORAL at 10:23

## 2021-06-07 RX ADMIN — ACETAMINOPHEN 650 MG: 325 TABLET ORAL at 03:36

## 2021-06-07 RX ADMIN — FAMOTIDINE 20 MG: 20 TABLET, FILM COATED ORAL at 10:23

## 2021-06-07 RX ADMIN — SENNOSIDES 8.6 MG: 8.6 TABLET, FILM COATED ORAL at 20:43

## 2021-06-07 RX ADMIN — FAMOTIDINE 20 MG: 20 TABLET, FILM COATED ORAL at 20:44

## 2021-06-07 RX ADMIN — TRAMADOL HYDROCHLORIDE 50 MG: 50 TABLET ORAL at 10:23

## 2021-06-07 RX ADMIN — LISINOPRIL 5 MG: 5 TABLET ORAL at 10:23

## 2021-06-07 RX ADMIN — ACETAMINOPHEN 650 MG: 325 TABLET ORAL at 20:44

## 2021-06-07 RX ADMIN — LEVETIRACETAM 500 MG: 500 TABLET, FILM COATED ORAL at 20:44

## 2021-06-07 RX ADMIN — DOCUSATE SODIUM 100 MG: 100 CAPSULE, LIQUID FILLED ORAL at 20:44

## 2021-06-07 RX ADMIN — DOCUSATE SODIUM 100 MG: 100 CAPSULE, LIQUID FILLED ORAL at 10:23

## 2021-06-07 RX ADMIN — TRAMADOL HYDROCHLORIDE 50 MG: 50 TABLET ORAL at 03:35

## 2021-06-07 RX ADMIN — ACETAMINOPHEN 650 MG: 325 TABLET ORAL at 09:18

## 2021-06-07 ASSESSMENT — ENCOUNTER SYMPTOMS
WHEEZING: 0
ABDOMINAL PAIN: 0
CONSTIPATION: 0
EYE DISCHARGE: 0
SORE THROAT: 0
RHINORRHEA: 0
TROUBLE SWALLOWING: 0
EYE PAIN: 0
COUGH: 0
DIARRHEA: 0
NAUSEA: 0
SHORTNESS OF BREATH: 0
BACK PAIN: 0
VOMITING: 0

## 2021-06-07 ASSESSMENT — PAIN SCALES - GENERAL
PAINLEVEL_OUTOF10: 5
PAINLEVEL_OUTOF10: 1
PAINLEVEL_OUTOF10: 3
PAINLEVEL_OUTOF10: 1
PAINLEVEL_OUTOF10: 4
PAINLEVEL_OUTOF10: 3
PAINLEVEL_OUTOF10: 2

## 2021-06-07 ASSESSMENT — PAIN DESCRIPTION - LOCATION
LOCATION: HEAD
LOCATION: HEAD

## 2021-06-07 ASSESSMENT — PAIN DESCRIPTION - DESCRIPTORS: DESCRIPTORS: ACHING

## 2021-06-07 ASSESSMENT — PAIN DESCRIPTION - PAIN TYPE
TYPE: ACUTE PAIN
TYPE: ACUTE PAIN

## 2021-06-07 ASSESSMENT — PAIN DESCRIPTION - ORIENTATION: ORIENTATION: MID

## 2021-06-07 ASSESSMENT — PAIN DESCRIPTION - FREQUENCY: FREQUENCY: CONTINUOUS

## 2021-06-07 ASSESSMENT — PAIN DESCRIPTION - PROGRESSION: CLINICAL_PROGRESSION: NOT CHANGED

## 2021-06-07 ASSESSMENT — PAIN - FUNCTIONAL ASSESSMENT: PAIN_FUNCTIONAL_ASSESSMENT: PREVENTS OR INTERFERES SOME ACTIVE ACTIVITIES AND ADLS

## 2021-06-07 NOTE — PROGRESS NOTES
[address] [address]  INPATIENT PHYSICAL THERAPY  DAILY NOTE  [facility] - 8E-069/69-A  Time In: 2244  Time Out: [address]  Minutes: 30   Timed Code Minutes: 30          Date: 2021  Patient Name: Vamshi Anna,  Gender:  male        MRN: [id number]  : 1950  (78 y.o.)  Referral Date : 21  Referring Practitioner: Vinicio Peralta MD  Diagnosis: Left sided intracerebral hemorrhage  Additional Pertinent Hx: Vamshi Anna  is a 78 y.o. right-handed  male with history of hypertension and hyperlipidemia, is admitted to the inpatient rehabilitation unit on 2021 for intensive inpatient rehabilitation for impaired ADLs, ambulation & cognition due to left frontal lobe & right cerebellar contusion with intraparenchymal hemorrhage, left posterior parietal & anterior temporal subdural hematoma & subarachnoid blood, and right basilar skull fracture secondary to fall on 2021. Prior Level of Function:  Lives With: Spouse  Type of Home: House  Home Layout: One level  Home Access: Stairs to enter without rails  Entrance Stairs - Number of Steps: 3  Home Equipment: Cane   Bathroom Shower/Tub: Walk-in shower, Tub/Shower unit  Bathroom Toilet: Standard  Bathroom Equipment: Grab bars in shower (in walk-in shower)    ADL Assistance: [address]: Independent  Transfer Assistance: Independent  Active : Yes  Additional Comments: Pt was fully indep PLOF, runs 2 days a week, [facility]    Restrictions/Precautions:  Restrictions/Precautions: Fall Risk, General Precautions  Position Activity Restriction  Other position/activity restrictions: low stimulation guidlines s/p CHI     SUBJECTIVE: Patient in rehab gym, agreeable to PT. Pt's spouse present. Pt starting to feel fatigued. MALDONADO noting 2 instances where foot caught ground resulting in loss of balance. PAIN: mild headache.   Pt notes with modified independence for safe home entry. Long term goal 5: Patient will improve PEPE balance test to greater than or equal to 51/56 to reduce his risk for falls. Long term goal 6: Patient will ascend/descend 1 step with no AD and supervision to navigate home and community safely. Long term goal 7: Patient will complete car transfer with modified independence to transfer in/out of vehicle to home and appointments safely. Following session, patient left in safe position with all fall risk precautions in place.

## 2021-06-07 NOTE — PLAN OF CARE
Problem: Pain:  Goal: Pain level will decrease  Description: Pain level will decrease  Outcome: Ongoing     Problem: Pain:  Goal: Control of acute pain  Description: Control of acute pain  6/7/2021 0215 by Mick Carlton RN  Outcome: Ongoing     Problem: Pain:  Goal: Control of chronic pain  Description: Control of chronic pain  Outcome: Ongoing     Problem: Physical Regulation:  Goal: Ability to maintain a stable neurologic state will improve  Description: Ability to maintain a stable neurologic state will improve  6/7/2021 0215 by Mick Carlton RN  Outcome: Ongoing     Problem: Mobility - Impaired:  Goal: Mobility will improve  Description: Mobility will improve  6/7/2021 0215 by Mick Carlton RN  Outcome: Ongoing     Problem: Bleeding:  Goal: Will show no signs and symptoms of excessive bleeding  Description: Will show no signs and symptoms of excessive bleeding  6/7/2021 0215 by Mick Carlton RN  Outcome: Ongoing     Problem: Falls - Risk of:  Goal: Will remain free from falls  Description: Will remain free from falls  6/7/2021 0215 by Mick Carlton RN  Outcome: Ongoing     Problem: Falls - Risk of:  Goal: Absence of physical injury  Description: Absence of physical injury  Outcome: Ongoing     Problem: SKIN INTEGRITY  Goal: LTG - Patient will be free from infection  6/7/2021 0215 by Mick Carlton RN  Outcome: Ongoing     Problem: SKIN INTEGRITY  Goal: LTG - patient will maintain/improve skin integrity through proper skin care techniques  Outcome: Ongoing     Problem: SKIN INTEGRITY  Goal: LTG - Patient will demonstrate appropriate pressure relief techniques  Outcome: Ongoing     Problem: SKIN INTEGRITY  Goal: LTG - Patient will be free from infection  Outcome: Ongoing     Problem: DISCHARGE BARRIERS  Goal: Patient's continuum of care needs are met  6/7/2021 0215 by Mick Carlton RN  Outcome: Ongoing     Problem: IP DRESSINGS LOWER EXTREMITIES  Goal: LTG - patient will dress lower body with or without assistive device  6/7/2021 0215 by Tha Tabor RN  Outcome: Ongoing     Problem: IP COMMUNICATION/DYSARTHRIA  Goal: LTG - Patient will effectively communicate in all situations with the use of compensatory strategies  6/7/2021 0215 by Tha Tabor RN  Outcome: Ongoing    Problem: IP BALANCE  Goal: LTG - Patient will maintain balance to allow for safe/functional mobility  Outcome: Ongoing     Problem: IP MOBILITY  Goal: LTG - patient will ambulate community distance  Outcome: Ongoing     Problem: SAFETY  Goal: LTG - patient will utilize safety techniques  Outcome: Ongoing     Problem: Nutrition  Goal: Optimal nutrition therapy  6/7/2021 0215 by Tha Tabor RN  Outcome: Ongoing   Care plan reviewed with patient. Patient verbalizes understanding of care plan and treatment goals.

## 2021-06-07 NOTE — PLAN OF CARE
Problem: Pain:  Goal: Pain level will decrease  Description: Pain level will decrease  Outcome: Ongoing    Patient request pain medications PRN as prescribed. Patient has maintained a pain level of 3 or below. Problem: Bleeding:  Goal: Will show no signs and symptoms of excessive bleeding  Description: Will show no signs and symptoms of excessive bleeding  Outcome: Ongoing    No signs or symptoms of bleeding noted     Problem: Falls - Risk of:  Goal: Will remain free from falls  Description: Will remain free from falls  Outcome: Ongoing    Patient has remained free from falls during shift. Problem: SAFETY  Goal: LTG - patient will utilize safety techniques  Outcome: Ongoing    Patient utilizes call light, walker, gait belt, and non-slip socks when ambulating.

## 2021-06-07 NOTE — PROGRESS NOTES
[address] [facility] [address]  [address] [place] [address]  Occupational Therapy  Daily Note  Time:   Time In:   Time Out:   Timed Code Treatment Minutes: 30 Minutes  Minutes: 30          Date: 2021  Patient Name: Tayler Arguello,   Gender: male      Room: 8E-069/69-A  MRN: [id number]  : 1950  (78 y.o.)  Referring Practitioner: Dr. Brandon Marrero  Diagnosis: Left sided intracerebral hemorrhage  Additional Pertinent Hx: 78 y.o. male, previously healthy on who presents with a head injury, laceration, agitation and generalized acute confusion since the onset this morning while playing pickle ball. He has a history of hypertension and hyperlipidemia, is admitted to the inpatient rehabilitation unit on 2021 for intensive inpatient rehabilitation for impaired ADLs, ambulation & cognition due to left frontal lobe & right cerebellar contusion with intraparenchymal hemorrhage, left posterior parietal & anterior temporal subdural hematoma & subarachnoid blood, and right basilar skull fracture secondary to fall on 2021. Restrictions/Precautions:  Restrictions/Precautions: Fall Risk, General Precautions  Position Activity Restriction  Other position/activity restrictions: low stimulation guidlines s/p CHI     SUBJECTIVE: Pt. Sitting in recliner with spouse present, agreeable to OT treatment. PAIN: 1/10: light headach    Vitals: Vitals not assessed per clinical judgement, see nursing flowsheet    COGNITION: Decreased Recall and Impaired Memory    ADL:   No ADL's completed this session. Cindy Morteza BALANCE:  Standing Balance: Stand By Assistance, [employer], with cues for safety. BED MOBILITY:  Not Tested    TRANSFERS:  Sit to Stand:  Stand By Assistance. Stand to Sit: Stand By Assistance. FUNCTIONAL MOBILITY:  Assistive Device: None  Assist Level:  Stand By Assistance and Contact Guard Assistance. Distance: To and from therapy apt.  On 7th floor             ADDITIONAL ACTIVITIES:  Patient completed IADL homemaking task this date of cooking an egg on stove top - task was graded to challenge safety with cooking on stove top burners, balance, and standing tolerance. Patient was able to retrieve all items from refridgerator with SBA/CGA and min VCs for safety during the retrieval and transportation of items. Patient demo a standing endurance of 25 mins. Patient identified one of their personal goals is to be able to sustain functional standing positions in order to complete various ADL and IADL skills in standing position, such as sinkside grooming or washing dishes. Dynamic standing task-washing dishes was then facilitated to challenge Pt. Balance and overall activity tolerance. ASSESSMENT:  Assessment: Patient is making good progress towards his goals with ability to meet 3/6. Patient continues to demo decreased safety awareness with strength, endurance, balance, awareness requiring education on importance of assistance from the therapist in order to complete ADLs/IADLs independently. Activity Tolerance:  Patient tolerance of  treatment: fair. Discharge Recommendations: Continue to assess pending progress   Equipment Recommendations: Other: Recommend shower chair  Plan: Times per week: 5x/wk for 90 min and 1x/wk for 30 min  Current Treatment Recommendations: Balance Training, Functional Mobility Training, Endurance Training, Safety Education & Training, Self-Care / ADL, Patient/Caregiver Education & Training, Cognitive Reorientation, Neuromuscular Re-education, Equipment Evaluation, Education, & procurement, Home Management Training    Patient Education  Patient Education: IADL's, Family Education, Home Safety and Importance of Increasing Activity and safety with functional mobility and transfers.     Goals  Short term goals  Time Frame for Short term goals: 1 week  Short term goal 1: Pt will attend and sequence together a multiple step ADL routine with SUP to improve indep with self care. Short term goal 2: Pt will demo 4 minute good dynamic standing balance with SBA to improve ability to reach into cupboards. Short term goal 3: Pt will attend to and complete simple meal prep task with SBA to improve indep with preparing an egg. Short term goal 4: Pt will complete various t/fs including toilet with SUP& 0-2 vcs for safety  Short term goal 5: Pt will navigate around obstacles during mobility to/from bathroom + into hallway with SUP & min vcs for safety to improve safety within home. Long term goals  Time Frame for Long term goals : 2 weeks  Long term goal 1: Pt will complete BADL routine with mod I and 0 vcs for safety to improve indep with self care. 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 Following session, patient left in safe position with all fall risk precautions in place.

## 2021-06-07 NOTE — PROGRESS NOTES
[address]  Occupational Therapy  Progress Note  Time:   Time In: 0800  Time Out: 0830  Timed Code Treatment Minutes: 30 Minutes  Minutes: 30          Date: 2021  Patient Name: Radha Priest,   Gender: male      Room: [facility]77-A  MRN: [id number]  : 1950  (78 y.o.)  Referring Practitioner: Dr. Tatiana Poon  Diagnosis: Left sided intracerebral hemorrhage  Additional Pertinent Hx: 78 y.o. male, previously healthy on who presents with a head injury, laceration, agitation and generalized acute confusion since the onset this morning while playing pickle ball. He has a history of hypertension and hyperlipidemia, is admitted to the inpatient rehabilitation unit on 2021 for intensive inpatient rehabilitation for impaired ADLs, ambulation & cognition due to left frontal lobe & right cerebellar contusion with intraparenchymal hemorrhage, left posterior parietal & anterior temporal subdural hematoma & subarachnoid blood, and right basilar skull fracture secondary to fall on 2021. Restrictions/Precautions:  Restrictions/Precautions: Fall Risk, General Precautions  Position Activity Restriction  Other position/activity restrictions: low stimulation guidlines s/p CHI     SUBJECTIVE: Patient supine in bed with HOB elevated upon arrival; agreeable to therapy this date. Patient wife present throughout session. Patient wife states she has ordered a shower chair, reachers, and suction grab bars and should be delivered today. Patient's wife also notes her son is putting in a handrail on porch in order to get into house this weekend. PAIN: 1/10:     Vitals: Vitals not assessed per clinical judgement, see nursing flowsheet    COGNITION: Decreased Insight, Decreased Problem Solving and Decreased Safety Awareness    ADL:   Grooming: Stand By Assistance. standing at sink in order to brush hair  Bathing: Stand By Assistance.   completed sponge bath UB/LB seated on toilet an standing with front/rear periarea  Upper Extremity Dressing: Stand By Assistance. seated  Lower Extremity Dressing: Stand By Assistance. doff/ulices B socks, shorts, underwear  Toileting: Stand By Assistance. standard toilet, seated  Toilet Transfer: Stand By Assistance. verbal cues for safety; demonstrating understanding. BALANCE:  Sitting Balance:  Supervision. bedside chair, EOB  Standing Balance: Stand By Assistance. no device, no LOB    BED MOBILITY:  Supine to Sit: Stand By Assistance increased time  Scooting: Stand By Assistance increased time    TRANSFERS:  Sit to Stand:  Stand By Assistance. EOB to no device   Stand to Sit: Stand By Assistance. no device to bedside chair    FUNCTIONAL MOBILITY:  Assistive Device: None  Assist Level:  Stand By Assistance. Distance: To and from bathroom  Min unsteadiness with gait with no LOB     ASSESSMENT:  Assessment: Patient is making good progress towards his goals with ability to meet 3/6. Patient continues to demo decreased safety awareness with strength, endurance, balance, awareness requiring education on importance of assistance from the therapist in order to complete ADLs/IADLs independently. Activity Tolerance:  Patient tolerance of  treatment: good. Discharge Recommendations: Continue to assess pending progress   Equipment Recommendations:  Other: Recommend shower chair  Plan: Times per week: 5x/wk for 90 min and 1x/wk for 30 min  Current Treatment Recommendations: Balance Training, Functional Mobility Training, Endurance Training, Safety Education & Training, Self-Care / ADL, Patient/Caregiver Education & Training, Cognitive Reorientation, Neuromuscular Re-education, Equipment Evaluation, Education, & procurement, Home Management Training    Patient Education  Patient Education: Plan of Care, ADL's, Family Education, Home Safety and Importance of Increasing Activity    Goals  Short term goals  Time Frame for Short term goals: 1 week  Short term goal 1: Pt will attend and sequence together a multiple step ADL routine min vcs to improve indep with self care. MET, GOAL REVISED  Short term goal 2: Pt will demo 4 minute good dynamic standing balance with SBA to improve ability to reach into cupboards. MET, GOAL REVISED  Short term goal 3: Pt will attend to and complete simple meal prep task with SBA to improve indep with preparing an egg. NOT MET, CONTINUE  Short term goal 4: Pt will complete various t/fs including toilet with SBA & 0-2 vcs for safety MET, GOAL REVISED  Short term goal 5: Pt will navigate around obstacles during mobility to/from bathroom + into hallway with SBA & min vcs for safety to improve safety within home. MET; GOAL REVISED  Long term goals  Time Frame for Long term goals : 2 weeks  Long term goal 1: Pt will complete BADL routine with mod I and 0 vcs for safety to improve indep with self care. 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 Short term goal 2: Pt will demo 4 minute good dynamic standing balance with SBA to improve ability to reach into cupboards. Short term goal 3: Pt will attend to and complete simple meal prep task with SBA to improve indep with preparing an egg. Short term goal 4: Pt will complete various t/fs including toilet with SUP& 0-2 vcs for safety  Short term goal 5: Pt will navigate around obstacles during mobility to/from bathroom + into hallway with SUP & min vcs for safety to improve safety within home. Long term goals  Time Frame for Long term goals : 2 weeks  Long term goal 1: Pt will complete BADL routine with mod I and 0 vcs for safety to improve indep with self care.   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 Following session, patient left in safe position with all fall risk precautions in place.

## 2021-06-07 NOTE — PROGRESS NOTES
concussive/brain injury symptoms (ie: symptoms outlined on ACE, low stimulation environment) with min assist to optimize brain healing. GOAL MET ; continue for consistency   INTERVENTIONS:   Acute Concussion Evaluation (ACE):   Physical Symptoms: 3/10  Cognitive Symptoms: 3/4  Emotional Symptoms: 0/4  Sleep Symptoms: 2/4     Acute Concussion Evaluation (ACE) completed this date. ACE score of 8/22 calculated; It should be noted that a score with concerns for concussion are greater than 12/22. *previous score 06/05; 12/22  *previous score 06/04; 13/22. Discussed that ST will continue to review the ACE daily to provide feedback on progress and assist with building awareness of what symptoms to watch for in relation to the brain injury (as indicated by the questions on the ACE). Discussed using his performance as a guide to activity tolerance throughout the day, as well as an indication for when rest breaks are needed. Wife expressed understanding. Completed detailed review of the Low Stimulation Environment Guidelines:  1. Keep light dim or limit number of lights on at one time. 2. Keep door to the room closed. 3. Encourage and allow frequent rest breaks. 4. Limit the amount of time the television or radio is turned on & turn off the TV when visitors are present. 5. Limit visitors in the room; no more than 2 at a time. 6. Always identify yourself when entering the room. 8. Consider the amount of visual stimulation (number of pictures, banners, colors, etc). **REMEMBER, the brain is working when it is processing information of any kind. *EXCELLENT success - environment set up appropriately without needing cues. Long-Term Goals:  Timeframe for Long-term Goals: 3 weeks    LONG TERM GOAL #1:  Goal 1: Patient will improve overall cognition to Supervision/Mod I level in order to return to baseline cognitive function and IADLs/ADLs.       Comprehension: 6 - Complex ideas 90% or device (hearing aid or glasses- if patient is primarily a visual learner)  Expression: 7 - Patient expresses complex ideas/needs  Social Interaction: 5 - Patient is appropriate with supervision/cues  Problem Solvin - Patient able to solve simple/routine tasks  Memory: 3 - Patient remembers 50%-74% of the time    PROGRESS NOTE:  Patient met 3/5 STG and 0/1 LTG this period. Pt is demonstrating improve understanding of low stimulation guidelines consistent with slow decline in concussion symptoms since admission to [facility]. Patient continues to present with Mild cognitive impairment in the domains of memory (delayed recall) and working memory, which remains consistent with patient/spouse report. Additionally, the pt presents with deficits in higher level complex reasoning, organization, and executive functioning. ST to provide intensive cognitive rehab addressing aforementioned deficits to ensure successful return to the home setting. Without continued skilled ST pt remains at risk for unsuccessful return to PLOF. Recommended consideration to slowly reduce overt stimulation guidelines as the pt is demonstrating reduced symptoms (as evidenced by ACE completed several consecutive days). EDUCATION:  Learner: Patient and Significant Other  Education:  Reviewed results and recommendations of this evaluation, Reviewed ST goals and Plan of Care, Reviewed recommendations for follow-up, Education Related to Potential Risks and Complications Due to Impairment/Illness/Injury, Education Related to Prevention of Recurrence of Impairment/Illness/Injury and Low stimulation/TBI protocol  Evaluation of Education: Verbalizes understanding and Needs further instruction    ASSESSMENT/PLAN:  Activity Tolerance:  Patient tolerance of  treatment: good. Appropriate participation      Assessment/Plan: Patient progressing toward established goals.   Continues to require skilled care of licensed speech pathologist to progress toward achievement of established goals and plan of care. .     Plan for Next Session: Thought organization, Complex attention, LC Orellana) Alison Summers MA., CCC-SLP

## 2021-06-07 NOTE — PROGRESS NOTES
[address]  Occupational Therapy  Daily Note  Time:   Time In: 1330  Time Out: 1400  Timed Code Treatment Minutes: 30 Minutes  Minutes: 30          Date: 2021  Patient Name: Nakul Dozier,   Gender: male      Room: 8E-069/69-A  MRN: [id number]  : 1950  (78 y.o.)  Referring Practitioner: Dr. Elinor Hardy  Diagnosis: Left sided intracerebral hemorrhage  Additional Pertinent Hx: 78 y.o. male, previously healthy on who presents with a head injury, laceration, agitation and generalized acute confusion since the onset this morning while playing pickle ball. He has a history of hypertension and hyperlipidemia, is admitted to the inpatient rehabilitation unit on 2021 for intensive inpatient rehabilitation for impaired ADLs, ambulation & cognition due to left frontal lobe & right cerebellar contusion with intraparenchymal hemorrhage, left posterior parietal & anterior temporal subdural hematoma & subarachnoid blood, and right basilar skull fracture secondary to fall on 2021. Restrictions/Precautions:  Restrictions/Precautions: Fall Risk, General Precautions  Position Activity Restriction  Other position/activity restrictions: low stimulation guidlines s/p CHI     SUBJECTIVE: Patient sleeping upon arrival requiring min verbal stimuli to alert; agreeable to therapy this date. Patient pleasant and cooperative throughout session with wife present. Patient able to explain the game of pickle ball to this therapist demonstrating slow processing requiring increased time and verbal reminders from wife throughout. Patient demonstrates decreased safety awareness during mobility with shuffling of L foot x 3 episodes with ability to self correct with min A with patient stating \"my feet just aren't awake yet\" with each shuffle.     PAIN: 1/10:     Vitals: Vitals not assessed per clinical judgement, see nursing flowsheet    COGNITION: Slow Processing, Decreased Recall, Decreased Insight, Decreased Problem Solving, Decreased Safety Awareness and Impulsive    ADL:   Grooming: Supervision. standing at sink to wash hands  Toileting: Supervision. standard seat, seated  Toilet Transfer: Supervision. standard toilet; seated. Lower Body Dressing: able to doff/ulices B socks as well as ulices B shoes seated on room bench    BALANCE:  Sitting Balance:  Supervision. bench in room  Standing Balance: Stand By Assistance Assistance. BED MOBILITY:  Supine to Sit: Supervision increased time    TRANSFERS:  Sit to Stand:  Stand By Assistance. EOB to no device  Stand to Sit: Stand By Assistance. no device to room bench, no device to edge of mat    FUNCTIONAL MOBILITY:  Assistive Device: None  Assist Level:  Stand By Assistance and [employer]. Distance: To and from bathroom and 7E therapy apartment  Patient demonstrates x 3 LOB requiring min A in order to self correct d/t shuffling of L foot. Unsteadiness noted Patient able to recall direction from late mornings session in order to return to therapy apartment with ability to locate washer/dryer with increased time. ADDITIONAL ACTIVITIES:  Patient completed homemaking task of completing laundry. Patient able to gather clothing items from bottom drawer of dressing in order to place into bag in which patient completed by kneeling on B knees to retrieve with SBA for safety. Patient able to correctly place clothes into wash requiring reassurance for completing correctly, able to select appropriate settings, able to start washer however patient required increased time in order to add soap demonstrating increased ability to problem solve with visual cue from laundry bottle sitting on dryer. ASSESSMENT:  Assessment: Patient is making good progress towards his goals with ability to meet 3/6.   Patient continues to demo decreased safety awareness with strength, endurance, balance, awareness requiring education on importance

## 2021-06-07 NOTE — PROGRESS NOTES
Physical Medicine & Rehabilitation Progress Note    Chief Complaint:  headache    Subjective:    Laura Khalil is a 78 y.o. right-handed  male with history of hypertension and hyperlipidemia, was admitted on 6/2/2021 for intensive inpatient rehabilitation for impaired ADLs, ambulation & cognition due to left frontal lobe & right cerebellar contusion with intraparenchymal hemorrhage, left posterior parietal & anterior temporal subdural hematoma & subarachnoid blood, and right basilar skull fracture secondary to fall on 5/28/2021.      The patient does not remember what happened to him. The last thing he can remember is that he was playing pickle ball. His record showed that the patient went after a ball and fell backward while he was playing pickle ball on 5/28/2021. The back of his head hit the ground with loss of consciousness for a short period of time according the witness's account.  He was confused and agitated when he was sent to [address] [place] [address] Drive had an episode of vomiting while he was in ER.  Initial CT of head showed left frontal lobe acute intraparenchymal hemorrhage and right basilar skull fracture. Neurosurgery was consulted. CT of head repeated few hours later showed interval increase in left frontal lobe contusion with multiple foci of intraparenchymal hemorrhage measuring up to 1.2 cm, interval increase right cerebellar contusion with foci of intraparenchymal hemorrhage measuring up to approximately 1 cm, new small subdural hematomas & trace adjacent subarachnoid blood within left posterior parietal and anterior temporal regions. No surgical intervention was recommended.  CTA of neck & head revealed no abnormality other than small focus of venous injury/thrombus within right transverse sinus.  Repeated CT of head on 5/29/2021 showed no significant change.   MRV of head done on 6/1/2021 showed small nonocclusive deep venous thrombus at the right transverse/sigmoid junction. The patient says he still has intermittent headache which intensity varies from mild to severe. He says the headache woke him up in the middle of night last night. He is still taking Tylenol and Ultram for the headache. He took Ultram 100 mg once yesterday, and 50 mg twice today so far. The skin staples on back of his right head had removed. CT of head was ordered by Dr. Mono Ma yesterday and waiting to be done. He says his knees also feel somewhat stiff from time to time. He had an record bowel movement yesterday. He still denies having focal weakness or numbness. He continues tolerating the intensive inpatient rehab treatment well. Rehabilitation:  PT: Reviewed. Transfers:  Sit to Stand: Stand By Assistance, [employer], with verbal cues  Stand to Sit:Stand By Assistance, [employer], with verbal cues  1 cue one time for alignment to chair and what to reach for      Ambulation:  [employer], with verbal cues   Distance: 75'x2, multiple distances in gym ~10 feet  Surface: Level Tile  Device:No Device  Gait Deviations:  Slow Lois, Decreased Step Length Bilaterally, Decreased Heel Strike Bilaterally and Mild Path Deviations  Pt unsteady at times however able to correct minor sway. Pt reaching for walls most of ambulation due to slight unsteadiness      *Pt weaved in and out of cones in gym to challenge balance with complicated gait. Pt shown course then asked to use memory to complete to challenge cognition     *Pt ambulated around room and collected cones from floor to high surfaces to promote ADLs in home life. Pt educated on importance of picking objects from floor by bending knees vs back.  Pt able to maintain proper body mechanics     Balance:  Static Sitting Balance:  Independent  Dynamic Standing Balance: Contact Guard Assistance, Minimal Assistance, X 1, with verbal cues   *Pt stood on foam pad ~5 minutes in normal stance and 5 minutes in narrow stance while completing ring toss. Pt reaching outside FIDEL to challenge core and ankle mechanics. Minimal sway noted with 1 min assist to correct lateral lean. Pt sways more to right side than left      Single Leg Balance: Pt stood on each foot 25 seconds with no UE support and moderate sway. Pt completed better on L LE vs R. CGA to min assist to correct sway NO LOB  Tandem Stance: Pt stood in each direction 3x15 seconds with no UE support. pt demos minimal sway howver improved as each trial went on. CGA to min assist to correct sway. NO LOB     Stairs:  Stairs:  6\" steps. X 8 using One Handrail and Contact Guard Assistance, with verbal cues . Cues for sequencing and slow pace. Pt impulsive at times to complete       OT: Reviewed. ADL:   Grooming: Stand By Assistance. standing at sink in order to brush hair  Bathing: Stand By Assistance. completed sponge bath UB/LB seated on toilet an standing with front/rear periarea  Upper Extremity Dressing: Stand By Assistance. seated  Lower Extremity Dressing: Stand By Assistance. doff/ulices B socks, shorts, underwear  Toileting: Stand By Assistance. standard toilet, seated  Toilet Transfer: Stand By Assistance. verbal cues for safety; demonstrating understanding.     BALANCE:  Standing Balance: Stand By Assistance, [address], with cues for safety. BED MOBILITY:  Supine to Sit: Stand By Assistance increased time  Scooting: Stand By Assistance increased time    TRANSFERS:  Sit to Stand:  Stand By Assistance. Stand to Sit: Stand By Assistance.       FUNCTIONAL MOBILITY:  Assistive Device: None  Assist Level:  Stand By Assistance and Contact Guard Assistance. Distance: To and from therapy apt. On 7th floor     ADDITIONAL ACTIVITIES:  Patient completed IADL homemaking task this date of cooking an egg on stove top - task was graded to challenge safety with cooking on stove top burners, balance, and standing tolerance.   Patient was able to retrieve all items from refrigerator with SBA/CGA and min VCs for safety during the retrieval and transportation of items. Patient demo a standing endurance of 25 mins.      Patient identified one of their personal goals is to be able to sustain functional standing positions in order to complete various ADL and IADL skills in standing position, such as sinkside grooming or washing dishes. Dynamic standing task-washing dishes was then facilitated to challenge Pt. Balance and overall activity tolerance.         ST: Reviewed. Orientation: 9/9 indep      Delayed recall; patient cued to generate x5 items to retreive from the hardware store cued to recall items throughout session. Patient provided with pen/paper to write information down to improve carryover/recall. 5 minute delay: 5/5 indep. 20 minute delay: 4/5 indep, 1/5 mod cues      Education provided to patient and wife re: different types of memory with specific examples provided; both highly receptive    Higher level word problems with topographical chart ([place] comparison chart): 5/5 indep  *excellent success to complete mental and visual math problems   *slightly delayed processing speed noted      Patient with decreased ability/insight to outline specific physical/mental goals related to rational of hospitalization in which need to be \"worked on or improved\" prior to returning to completion of hobbies (working in the shop, working with lumber and cars). Education and support provided       *Reviewed provided  deductive reasoning puzzle for patient and wife to complete on Sunday for review on Monday with SLP.      Patient presented with visual number elimination task; cued to eliminate \"odd numbers\" while listening to music of patient's choice: x1 error (omission of number), 7 minutes 10 seconds, x0 re-directions provided  Education provided to patient and wife re: functional rational of therapy task     Acute Concussion Evaluation (ACE):   Physical Symptoms: 6/10  Cognitive Symptoms: 4/4  Emotional Symptoms: 0/4  Sleep Symptoms: 2/4     Acute Concussion Evaluation (ACE) completed this date. ACE score of 12/22 calculated; It should be noted that a score with concerns for concussion are greater than 12/22. *previous score 06/04; 13/22.      *Reviewed low stimulation guidelines with patient and wife, who have been extremely receptive to information and excellent with implementing the principles. Patient's wife reporting, \"We did do an hour of TV a day but then they said to stop that completely. ST inquired re: patient symptoms as one hour of TV was reintroduced. Patient's wife reporting, \"He did have increasing headaches at that time too so maybe that is why. \"      Discussed that ST will continue to review the ACE daily to provide feedback on progress and assist with building awareness of what symptoms to watch for in relation to the brain injury (as indicated by the questions on the ACE). Discussed using his performance as a guide to activity tolerance throughout the day, as well as an indication for when rest breaks are needed. Wife expressed understanding. Review of Systems:  Review of Systems   Constitutional: Positive for fatigue. Negative for chills, diaphoresis and fever. HENT: Negative for ear discharge, ear pain, hearing loss, nosebleeds, rhinorrhea, sneezing, sore throat, tinnitus and trouble swallowing. Eyes: Negative for pain, discharge and visual disturbance. Respiratory: Negative for cough, shortness of breath and wheezing. Cardiovascular: Negative for chest pain, palpitations and leg swelling. Gastrointestinal: Negative for abdominal pain, constipation, diarrhea, nausea and vomiting. Genitourinary: Negative for difficulty urinating and dysuria. Musculoskeletal: Positive for gait problem. Negative for arthralgias, back pain, myalgias and neck pain. Skin: Negative for rash.    Neurological: Positive for weakness (generalized) and headaches. Negative for dizziness, tremors, seizures, speech difficulty, light-headedness and numbness. Hematological: Does not bruise/bleed easily. Psychiatric/Behavioral: Negative for dysphoric mood, hallucinations and sleep disturbance. The patient is not nervous/anxious.          Objective:  /74   Pulse 52   Temp 98 °F (36.7 °C) (Oral)   Resp 14   Ht 5' 7\" (1.702 m)   Wt 162 lb 12.8 oz (73.8 kg)   SpO2 98%   BMI 25.50 kg/m²   Physical Exam   General:  well-developed, well nourished  male ; in no acute distress ; appropriate affect & mood ; sitting on reclining chair comfortably   Eyes: pupil equally round ; extra-ocular motion intact bilaterally ; slight ecchymosis at left orbit  Head, Ear, Nose, Mouth & Throat : normocephalic ; scab at posterior right head over the occipital region with mild tenderness ; no tenderness at face ; no discharge from ears or nose ; no deformity ; no facial swelling ; oral mucosa pink   Neck :  supple ; no tenderness ; no muscle spasm  Cardiovascular : regular rate & rhythm ; normal S1 & S2 heart sound ; no murmur ; normal peripheral pulse at bilateral upper & lower extremities  Pulmonary : lung clear to auscultation ; no wheezing ; no rale  Gastrointestinal : soft, flat abdomen ; no tenderness ; normal bowel sound present   Back : no tenderness; no muscle spasm  Skin: no skin lesion or rash ; no pitting edema at all 4 extremities  Musculoskeletal : no limb asymmetry; no limb deformity; no tenderness at bilateral upper & lower extremities; no palpable mass at limbs ; no joints laxity or crepitation ; normal functional joints ROM at bilateral upper & lower extremities  Cerebral :  alert ; awake ; oriented to place, person and time ; follow 2 steps verbal commend  Cerebellum : no dysmetria with bilateral finger-to-nose test ; no dysmetria with bilateral heel-to-shin test  Cranial Nerves :  grossly intact CN II to XII function  Sensory : intact light touch and pin prick sensation at bilateral upper & lower extremities  Motor : normal tone at bilateral upper & lower extremities ; normal 5/5 muscle strength at bilateral upper & lower extremities  Reflex : 2+ bilateral knees reflexes : 1+ bilateral biceps reflexes ; zero bilateral brachioradialis reflexes   Pathological Reflex :  No Pedro's sign ; no ankle clonus  Gait :  Not assessed      Diagnostics:   No results found for this or any previous visit (from the past 24 hour(s)). 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 · Improved neck pain due to cervical spine sprain & muscular strain  · Hypertension  · hyperlipidemia     The patient's headache continues to present but intermittently. Current medications for pain, Tylenol & Ultram, appear to control the headache to tolerable level. He still has some fatigue but no focal weakness. He is tolerating the intensive rehab treatment well and is making functional improvement slowly. Plan:  · Continues intensive PT/OT/SLP/RT inpatient rehabilitation program at least 3 hours per day, 5 days per week in order to improve functional status prior to discharge. Family education and training will be completed. Equipment evaluations and recommendations will be completed as appropriate. · Rehabilitation nursing continues to be involved for bowel, bladder, skin, and pain management. Nursing will also provide education and training to patient and family.

## 2021-06-07 NOTE — PROGRESS NOTES
[facility]  INPATIENT PHYSICAL THERAPY  Progress Note  [facility] - [id number]  Time In: 0900  Time Out: 1000  Minutes: 60  Timed Code Minutes: 60          Date: 2021  Patient Name: Philippe Yung,  Gender:  male        MRN: [id number]  : 1950  (78 y.o.)  Referral Date : 21  Referring Practitioner: Mahogany Martínez MD  Diagnosis: Left sided intracerebral hemorrhage  Additional Pertinent Hx: Philippe Yung  is a 78 y.o. right-handed  male with history of hypertension and hyperlipidemia, is admitted to the inpatient rehabilitation unit on 2021 for intensive inpatient rehabilitation for impaired ADLs, ambulation & cognition due to left frontal lobe & right cerebellar contusion with intraparenchymal hemorrhage, left posterior parietal & anterior temporal subdural hematoma & subarachnoid blood, and right basilar skull fracture secondary to fall on 2021. Prior Level of Function:  Lives With: Spouse  Type of Home: House  Home Layout: One level  Home Access: Stairs to enter without rails  Entrance Stairs - Number of Steps: 3  Home Equipment: Cane    Vitals: Vitals not assessed per clinical judgement, see nursing flowsheet    Restrictions/Precautions:  Restrictions/Precautions: Fall Risk, General Precautions  Position Activity Restriction  Other position/activity restrictions: low stimulation guidlines s/p CHI    SUBJECTIVE: Patient in room in chair, agreeable to PT. Pt's spouse present and supportive. Pt alert this session. PAIN: headache, nurse in during session to provide medication, pt noting headache mild.     OBJECTIVE:  Bed Mobility:  Supine to Sit: Modified Independent  Sit to Supine: Modified Independent    Head of bed elevated 30 degrees    Transfers:  Sit to Stand: Stand By Assistance  Stand to Sit:Stand By Assistance    Ambulation:  [address], Stand By Assistance  Distance: 90', multiple short distances in therapy gym, 160'  Surface: Level Tile  Device:No Device  Gait Deviations: Forward Flexed Posture, Slow Lois, Decreased Step Length Bilaterally, Decreased Arm Swing, Decreased Gait Speed and Decreased Heel Strike Bilaterally  *Verbal cues for arm swing    1. Pt able to weave in/out of cones with CGA forward x2 and retro x2. Verbal cues for increased speed/step length. With forward ambulation, pt counting to 9 first time through then backwards from 9 second time through. Stairs:  Stand By Assistance  Number of Steps: 4  Height: 6\" step with One Handrail   -reciprocal pattern  *Wife reports planning to finish installing rail on right 6/11 or 6/12. Pt also has another place in home where he would ascend/descend 1 step. Plan to add goal for 1 step with no AD and modify stair goal to use hand rail    Stairs:  Contact Guard Assistance  Number of Steps: 4  Height: 6\" step with No Device   -non-reciprocal pattern    Neuro education:  1. Step over cones x2  2. Standing on balance pad: cervical rotation, shoulder flexion with ball with cues to tighten abdominal muscles, shoulder horizontal abduction/adduction x10 each. 3. Standing on balance pad: balance pods on 6\" step and one on second step, 2 on level ground. Random commands such as \"right foot to 4\" and \"left foot to purple\" provided. Pt required one brief standing rest break. Pt completed with CGA to min assist.  Decreased single leg stance noted on the right. SLS on left 20 seconds, SLS on right 6 seconds    Functional Outcome Measures: Completed  Pepe Balance Score: 35/56  PEPE BALANCE TEST SCORING   Score of < 45 indicates a greater risk of falling  41-56= low fall risk  21-40= medium fall risk (recommendation of walking with assist at all times)  0-20= high fall risk      ASSESSMENT:  Assessment:   . Patient met 5/5 STG's and 0/4 LTG's.   Patient is making good gains towards goals set for him, progressing sit < > stand from CGA to SBA, supine < > sit from supervision to modified independence, gait from CGA/min assist to SBA/CGA and stairs from min assist to CGA with no hand rail. Patient also improved PEPE from 16 to 35/56, however score indicates medium fall risk. Patient continues to demonstrate decrease single leg stance on right LE and requires rest breaks due to fatigue, however this is gradually improving. Patient would benefit from continued skilled PT services to improve his ability to complete functional mobility, reduce his risk for falls and allow patient to return to PLOF. Activity Tolerance:  Patient tolerance of  treatment: good. Equipment Recommendations:Equipment Needed: No (continue to monitor)  Discharge Recommendations:  Discharge Recommendations: Continue to assess pending progress    Plan: Times per week: 5x/wk 90min, 1x/wk 30min  Times per day: Twice a day  Current Treatment Recommendations: Strengthening, Neuromuscular Re-education, Home Exercise Program, Balance Training, Endurance Training, Patient/Caregiver Education & Training, Functional Mobility Training, Gait Training, Transfer Training, Stair training    Patient Education  Patient Education: Gait, Stairs, Verbal Exercise Instruction,  - Patient Verbalized Understanding, - Patient Requires Continued Education    Goals:  Patient goals : go home  Short term goals  Time Frame for Short term goals: 1 week  Short term goal 1: Patient will complete supine < > sit with head of bed elevated 30 degrees with no bed rail and modified independence to transfer in/out of bed safely. GOAL MET, SEE LTG  Short term goal 2: Patient will complete sit < > stand with SBA to stand to ambulate with decreased difficulty. GOAL MET, SEE LTG  Short term goal 3: Patient will ambulate 100' with 2 turns with CGA to progress towards navigating home environment safely.  GOAL MET, SEE UPDATED GOAL  Short term goal 4: Patient will ascend/descend 3 steps with no hand rails and CGA to progress towards safe home entry. GOAL MET, SEE LTG  Short term goal 5: Patient will improve PEPE balance score to greater than or equal to 25/56 to improve balance and reduce fall risk. GOAL MET, SEE UPDATED GOAL  Long term goals  Time Frame for Long term goals : 4 weeks  Long term goal 1: Patient will complete supine < > sit with modified independence to transfer in/out of bed safely. GOAL NOT MET, CONTINUE  Long term goal 2: Patient will complete sit  <> stand and stand pivot transfer with modified independence to transfer surface to surface safely. GOAL NOT MET, CONTINUE  Long term goal 3: Patient will ambulate 150' with no AD and supervion to navigate living environment. GOAL NOT MET, CONTINUE  Long term goal 4: Patient will ascend/descend 3 steps with no hand rail and SBA for safe home entry. GOAL NOT MET, CONTINUE  Long term goal 5: Patient will improve PEPE balance test to greater than or equal to 51/56 to reduce his risk for falls. GOAL NOT MET, CONTINUE    Revised Short-Term Goals:    Short term goals  Time Frame for Short term goals: 1 week  Short term goal 1: Patient will complete supine < > sit with head of bed elevated 30 degrees with no bed rail and modified independence to transfer in/out of bed safely. GOAL MET, SEE LTG  Short term goal 2: Patient will complete sit < > stand with SBA to stand to ambulate with decreased difficulty. GOAL MET, SEE LTG  Short term goal 3: Patient will ambulate 150' with 2 turns with SBA to progress towards navigating home environment safely. Short term goal 4: Patient will ascend/descend 3 steps with no hand rails and CGA to progress towards safe home entry. GOAL MET, SEE LTG  Short term goal 5: Patient will improve PEPE balance score to greater than or equal to 45/56 to improve balance and reduce fall risk.     Revised Long-Term Goals  Long term goals  Time Frame for Long term goals : 4 weeks  Long term goal 1: Patient will complete supine < > sit with modified independence to transfer in/out of

## 2021-06-07 NOTE — PROGRESS NOTES
[facility] NOTE    Conference Date: 2021  Admit Date:  2021  1:15 PM  Patient Name: Fanny Fragoso    MRN: [id number]    : 1950  (78 y.o.)  Rehabilitation Admitting Diagnosis:  Left-sided intracerebral hemorrhage ([facility] [address] [I61.2]  Referring Practitioner: Doreen Wynn MD      CASE MANAGEMENT  Current issues/needs regarding patient and family discharge status: Prior to stay patient was independent living with spouse and not using any devices. Patient retired from being an . Family doctor is Dr Jonathan Amin and pharmacy of preference is [facility]. Patient was no on any blood thinners or diabetic prior to stay and has a support system consisting of spouse, a daughter Rachel Mike and son Kimberly Blair. [facility]  and patient sees spouse daily and children at least once a week. Patients biggest concern is getting rid of headache and getting back to how patient was prior to stay. After discharge patients spouse will cover transportation. SW to follow and maintain involvement in discharge planning. PHYSICAL THERAPY  Patient met 5/5 STG's and 0/4 LTG's. Patient is making good gains towards goals set for him, progressing sit < > stand from CGA to SBA, supine < > sit from supervision to modified independence, gait from CGA/min assist to SBA/CGA and stairs from min assist to CGA with no hand rail. Patient also improved PEPE from 16 to 35/56, however score indicates medium fall risk. Patient continues to demonstrate decrease single leg stance on right LE and requires rest breaks due to fatigue, however this is gradually improving. Patient would benefit from continued skilled PT services to improve his ability to complete functional mobility, reduce his risk for falls and allow patient to return to [facility]. Equipment Needed: No (continue to monitor)    SPEECH THERAPY  Patient met 3/5 STG and 0/1 LTG this period.  Pt is demonstrating am     NUTRITION  Weight: 162 lb 12.8 oz (73.8 kg) / Body mass index is 25.5 kg/m². Current diet: Adult Oral Nutrition Supplement; Standard High Calorie/High Protein Oral Supplement  DIET GENERAL; Gluten Free  Please see nutrition note for details. NURSING  Continent of Bowel: Yes. Frequency: yes. Management: prn bowel meds. Continent of Bladder: Yes. Frequency: several times daily. Management: na.  Pain is Managed:  Yes. Management: Tylenol, Tramadol, heat, Voltaren gel. Frequency of Intervention: 4-6 hours prn, routine Tylenol. Adequately Controlled: Yes  Sleep: Adequate  Signs and Symptoms of Infection:  No.   Signs and Symptoms of Skin Breakdown:  No.   Injury and Fall Free during Inpatient Rehabilitation Admission: Yes  Anticoagulants: no oral or injection  Diabetic: No  Consultations/Labs/X-rays: repeat CT of head on 6/7/21(not completed yet)    No results for input(s): POCGLU in the last 72 hours. Lab Results   Component Value Date    LDLCALC 143 06/03/2021         Vitals:    06/07/21 0752 06/07/21 0752 06/07/21 2042 06/08/21 0958   BP:  136/74 99/64 107/73   Pulse: 52 52 61 73   Resp:  14 16 18   Temp:  98 °F (36.7 °C) 98.6 °F (37 °C) 98.9 °F (37.2 °C)   TempSrc:  Oral Oral Oral   SpO2:  98% 100% 99%   Weight:       Height:              Family Education: Family available and participating in education   Fall Risk:  Falling star program initiated  Is the patient appropriate for a stay in the functional apartment? no    Discharge Plan   Estimated Discharge Date: 6/11/2021   Destination: discharge home with supervision  Services at Discharge:  Outpatient Physical Therapy, Occupational Therapy and Speech Therapy 3x week  Is patient appropriate for an outpatient driving evaluation? yes, later need outpatient  evaluation  Equipment at Discharge: possible need for shower chair  Factors facilitating achievement of predicted outcomes: Family support, Motivated and Cooperative  Barriers to the achievement of predicted outcomes: Pain and Decreased endurance, poor insight    Team Members Present at Conference:  :Sheila Ferguson, [address] OTR/L [address] [facility], [address] [place] [address] Drive, [facility] [address], [address]  Nurse:Jazzy Bill Ettatawer  Psychologist: Haley Grey, PhD.    I approve the established interdisciplinary plan of care as documented within the medical record of [facility].     Pramod Ceron MD

## 2021-06-08 PROCEDURE — 97530 THERAPEUTIC ACTIVITIES: CPT

## 2021-06-08 PROCEDURE — 97110 THERAPEUTIC EXERCISES: CPT

## 2021-06-08 PROCEDURE — 97116 GAIT TRAINING THERAPY: CPT

## 2021-06-08 PROCEDURE — 97130 THER IVNTJ EA ADDL 15 MIN: CPT

## 2021-06-08 PROCEDURE — 97535 SELF CARE MNGMENT TRAINING: CPT

## 2021-06-08 PROCEDURE — 97129 THER IVNTJ 1ST 15 MIN: CPT

## 2021-06-08 PROCEDURE — 6370000000 HC RX 637 (ALT 250 FOR IP): Performed by: PHYSICAL MEDICINE & REHABILITATION

## 2021-06-08 PROCEDURE — 1180000000 HC REHAB R&B

## 2021-06-08 PROCEDURE — 99232 SBSQ HOSP IP/OBS MODERATE 35: CPT | Performed by: PHYSICAL MEDICINE & REHABILITATION

## 2021-06-08 RX ADMIN — SENNOSIDES 8.6 MG: 8.6 TABLET, FILM COATED ORAL at 19:31

## 2021-06-08 RX ADMIN — DOCUSATE SODIUM 100 MG: 100 CAPSULE, LIQUID FILLED ORAL at 09:56

## 2021-06-08 RX ADMIN — FAMOTIDINE 20 MG: 20 TABLET, FILM COATED ORAL at 19:31

## 2021-06-08 RX ADMIN — Medication 6 MG: at 20:37

## 2021-06-08 RX ADMIN — ACETAMINOPHEN 650 MG: 325 TABLET ORAL at 13:31

## 2021-06-08 RX ADMIN — FAMOTIDINE 20 MG: 20 TABLET, FILM COATED ORAL at 09:56

## 2021-06-08 RX ADMIN — LEVETIRACETAM 500 MG: 500 TABLET, FILM COATED ORAL at 20:37

## 2021-06-08 RX ADMIN — DOCUSATE SODIUM 100 MG: 100 CAPSULE, LIQUID FILLED ORAL at 19:30

## 2021-06-08 RX ADMIN — ACETAMINOPHEN 650 MG: 325 TABLET ORAL at 09:56

## 2021-06-08 RX ADMIN — LEVETIRACETAM 500 MG: 500 TABLET, FILM COATED ORAL at 09:56

## 2021-06-08 RX ADMIN — ACETAMINOPHEN 650 MG: 325 TABLET ORAL at 05:47

## 2021-06-08 RX ADMIN — ACETAMINOPHEN 650 MG: 325 TABLET ORAL at 19:28

## 2021-06-08 ASSESSMENT — PAIN DESCRIPTION - LOCATION: LOCATION: HEAD

## 2021-06-08 ASSESSMENT — ENCOUNTER SYMPTOMS
CONSTIPATION: 0
ABDOMINAL PAIN: 0
EYE DISCHARGE: 0
WHEEZING: 0
SHORTNESS OF BREATH: 0
NAUSEA: 0
TROUBLE SWALLOWING: 0
SORE THROAT: 0
BACK PAIN: 0
DIARRHEA: 0
COUGH: 0
EYE PAIN: 0
VOMITING: 0
RHINORRHEA: 0

## 2021-06-08 ASSESSMENT — PAIN DESCRIPTION - PAIN TYPE: TYPE: ACUTE PAIN

## 2021-06-08 ASSESSMENT — PAIN SCALES - GENERAL
PAINLEVEL_OUTOF10: 0
PAINLEVEL_OUTOF10: 2
PAINLEVEL_OUTOF10: 0
PAINLEVEL_OUTOF10: 1
PAINLEVEL_OUTOF10: 0
PAINLEVEL_OUTOF10: 1
PAINLEVEL_OUTOF10: 0

## 2021-06-08 NOTE — PROGRESS NOTES
junction. The patient says he feels well today. He has only mild headache now but he did not have headache most of the day today. He is still on Tylenol every 6 hours. He took Ultram 50 mg twice yesterday. CT of head done yesterday showed no acute finding but low attenuation at left frontal lobe consistent with residual edema, and subtle area of low attenuation in periphery of right cerebellum deep to right occipital bone fracture. He had record bowel movement yesterday. He still denies having focal weakness or numbness. He continues tolerating the intensive inpatient rehab treatment well. Rehabilitation:  PT: Reviewed.     Bed Mobility:  Supine to Sit: Stand By Assistance    Transfers:  Sit to Stand: Stand By Assistance  Stand to Sit:Stand By Assistance     Ambulation:  light CGA to close SBA  Distance: ~80ft, 6ft, 15ft, 85ft  Surface: Level Tile  Device:No Device  Gait Deviations:  Slow Lois, Decreased Step Length Bilaterally, Decreased Arm Swing, Decreased Gait Speed, Decreased Heel Strike Bilaterally and Mild Path Deviations    Stand By Assistance, Contact Guard Assistance  Distance: ~80ft, ~180ft  Surface: Level Tile  Device:No Device  Gait Deviations:  Slow Lois, Decreased Step Length Bilaterally, Decreased Arm Swing, Decreased Gait Speed, Decreased Heel Strike Bilaterally and Mild Path Deviations     Stairs:  Stand By Assistance, [employer] Assistance  Number of Steps: 1 (forward/lateral step ups leading R/L x10 reps each)  Height: 6\" step with initially B rails, decreased to no rails    Balance:  Dynamic Standing Balance: Stand By Assistance, standing on green foam, large ball toss, no UE support, ~1min      Dynamic gait: CGA            -stepping forward over hurdles leading R/L and side-stepping over hurdles, using no UE support, also reciprocal stepping over hurdles carrying cone with ball on top  **cues for desired technique and sequencing, 1 LOB required Min A to correct, cues for slow down for safety                -ambulating around unit balancing ball on top of cone, no LOB, fairly steady     OT: Reviewed. ADL:   Grooming: Stand By Assistance, with set-up and with verbal cues . stood for 4 mins to complete at sink with no LOB  Bathing: Stand By Assistance, with set-up and with verbal cues . sat on tub seat for bathing stood for pat care  Upper Extremity Dressing: Supervision. Lower Extremity Dressing: Stand By Assistance. Pt. gathered clothing and items needed from closet and dresser drawers. Toileting: Stand By Assistance. Toilet Transfer: Stand By Assistance. Shower Transfer: Stand By Assistance, with set-up and with verbal cues . .     BALANCE:  Standing Balance: Stand By Assistance.       BED MOBILITY:  Supine to Sit: Stand By Assistance       TRANSFERS:  Sit to Stand:  Stand By Assistance. Stand to Sit: Stand By Assistance.       FUNCTIONAL MOBILITY:  Assistive Device: None  Assist Level:  Stand By Assistance, [employer] and with verbal cues . Distance: To and from bathroom  And to/from therapy floor 7E to [facility], gym and work shop. No LOB this date.     ADDITIONAL ACTIVITIES:  Patient completed BUE strengthening exercises with skilled education on HEP: completed x12 reps x1 set with 3# dowel in all joints and all planes in order to improve UE strength and activity tolerance required for BADL routine and toilet / shower transfers. Patient tolerated , requiring \ rest breaks. Patient also required  cues for technique.      Patient identified one of their personal goals is to be able to sustain functional standing positions in order to complete various ADL and IADL skills in standing position, such as sinkside grooming or washing dishes. Dynamic standing task of watering plants in greenhouse with BUE's while reaching over FIDEL, and Pt. Was able to identify when to stop and rest when fatigued. This was facilitated to challenge Pt.  Balance, standing tolerance, and activity tolerance. Patient required SBA/CGA, and dynamic standing in vita shed Pt. demo'ed an endurance of 15 minutes consecutive standing        ST: Reviewed. Immediate and delayed recall of 7 grocery list items.  -Immediate: 3/7 increased to 4/7 given categorical cue  -5 minutes: 6/7 increased to 7/7 given categorical cue  -20 minutes: 6/7 increased to 7/7 given categorical cue   *Patient demonstrated decreased speed of recall  *EXCELLENT use of memory strategies to improve recall abilities (I.e. repeat it aloud)  *Discussed use of additional strategies to improve recall (I.e write it down, associate it, picture it)  *Categorical cues (I.e. dessert, dairy, etc.) aided patient in increasing accuracy of recall     Med mgmt task in which patient was required to fill am/pm pill box with 6 different prescriptions:   -6/6 indep for comprehension of prescription labels and physically filling the pill box. *2 situations arose with prescriptions that required problem solving, with patient appropriately solving on both occasions (If out of meds patient problem solved to call pharmacy and order more, and If pill box does not accommodate certain prescription (I.e. 3x/day), patient problem solved to get alternative pill box)  *Patient demonstrated independent problem solving with no cues necessary  *Independent review of work to ensure accuracy (double checking)   *Patient completed task in appropriate timeframe and demonstrated the ability to remain on task in the presence of distractions (I.e. others talking, interrupting for memory recall task)       Review of Systems:  Review of Systems   Constitutional: Positive for fatigue. Negative for chills, diaphoresis and fever. HENT: Negative for ear discharge, ear pain, hearing loss, nosebleeds, rhinorrhea, sneezing, sore throat, tinnitus and trouble swallowing. Eyes: Negative for pain, discharge and visual disturbance.    Respiratory: Negative for cough, shortness of breath and wheezing. Cardiovascular: Negative for chest pain, palpitations and leg swelling. Gastrointestinal: Negative for abdominal pain, constipation, diarrhea, nausea and vomiting. Genitourinary: Negative for difficulty urinating and dysuria. Musculoskeletal: Positive for gait problem. Negative for arthralgias, back pain, myalgias and neck pain. Skin: Negative for rash. Neurological: Positive for weakness (generalized) and headaches. Negative for dizziness, tremors, seizures, speech difficulty, light-headedness and numbness. Hematological: Does not bruise/bleed easily. Psychiatric/Behavioral: Negative for dysphoric mood, hallucinations and sleep disturbance. The patient is not nervous/anxious.          Objective:  /73   Pulse 73   Temp 98.9 °F (37.2 °C) (Oral)   Resp 18   Ht 5' 7\" (1.702 m)   Wt 162 lb 12.8 oz (73.8 kg)   SpO2 99%   BMI 25.50 kg/m²   Physical Exam   General:  well-developed, well nourished  male ; in no acute distress ; appropriate affect & mood ; sitting on reclining chair comfortably   Eyes: pupil equally round ; extra-ocular motion intact bilaterally ; slight ecchymosis at left orbit  Head, Ear, Nose, Mouth & Throat : normocephalic ; scab at posterior right head over the occipital region with mild tenderness ; no tenderness at face ; no discharge from ears or nose ; no deformity ; no facial swelling ; oral mucosa pink   Neck :  supple ; no tenderness ; no muscle spasm  Cardiovascular : regular rate & rhythm ; normal S1 & S2 heart sound ; no murmur ; normal peripheral pulse at bilateral upper & lower extremities  Pulmonary : lung clear to auscultation ; no wheezing ; no rale  Gastrointestinal : soft, flat abdomen ; no tenderness ; normal bowel sound present   Back : no tenderness; no muscle spasm  Skin: no skin lesion or rash ; no pitting edema at all 4 extremities  Musculoskeletal : no limb asymmetry; no limb deformity; no tenderness at bilateral upper & lower extremities; no palpable mass at limbs ; no joints laxity or crepitation ; normal functional joints ROM at bilateral upper & lower extremities  Cerebral :  alert ; awake ; oriented to place, person and time ; follow 2 steps verbal commend  Cerebellum : no dysmetria with bilateral finger-to-nose test ; no dysmetria with bilateral heel-to-shin test  Cranial Nerves :  grossly intact CN II to XII function  Sensory : intact light touch and pin prick sensation at bilateral upper & lower extremities  Motor : normal tone at bilateral upper & lower extremities ; normal 5/5 muscle strength at bilateral upper & lower extremities  Reflex : 2+ bilateral knees reflexes : 1+ bilateral biceps reflexes ; zero bilateral brachioradialis reflexes   Pathological Reflex :  No Pedro's sign ; no ankle clonus  Gait :  Not assessed      Diagnostics:   No results found for this or any previous visit (from the past 24 hour(s)). CT of head without contrast (6/7/2021) : Impression   1. No acute findings. 2. Low-attenuation the left frontal lobe consistent with residual edema associated with the hemorrhagic contusion. 3. Subtle area of low attenuation in the periphery of the right cerebellum deep to the right occipital bone fracture also at the site of prior contusion. 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   · Improved neck pain due to cervical spine sprain & muscular strain  · Hypertension  · hyperlipidemia     The patient's headache seem to improve significantly today with mild to almost no headache. He still has some fatigue but no focal weakness. The repeat head CT shows no new acute finding. He is tolerating the intensive rehab treatment well and is making functional improvement slowly. The patient's case was discussed in multidisciplinary meeting today. He is scheduled to be discharged on 6/11/2021. Plan:  · Continues intensive PT/OT/SLP/RT inpatient rehabilitation program at least 3 hours per day, 5 days per week in order to improve functional status prior to discharge. Family education and training will be completed. Equipment evaluations and recommendations will be completed as appropriate. · Rehabilitation nursing continues to be involved for bowel, bladder, skin, and pain management. Nursing will also provide education and training to patient and family. · Prophylaxis:  DVT: Silvestre stocking; intermittent pneumatic compression device. GI: Colace, Dulcolax suppository as needed, GlycoLax as needed, milk of magnesia as needed, Senokot nightly.   · Pain: Tylenol 650 mg every 6 hour and as needed every 4 hours; Ultram as needed; Voltaren gel as needed  · Continue lisinopril 5 mg for hypertension  · Continue Keppra for seizure prevention  · Nutrition:  continue current diet    · Bladder: Monitoring for signs or symptoms of UTI  · Bowel: Monitoring for sign or symptom of constipation  ·  and case management for coordination of care and discharge planning       Missed Therapy Time:  · None    Soo Gomez MD

## 2021-06-08 NOTE — PROGRESS NOTES
[address] [address]  INPATIENT PHYSICAL THERAPY  DAILY NOTE  [facility] - 8E-069/69-A    Time In: 0700  Time Out: 0730  Timed Code Treatment Minutes: 30 Minutes  Minutes: 30          Date: 2021  Patient Name: Lázaro Leonard,  Gender:  male        MRN: [id number]  : 1950  (78 y.o.)  Referral Date : 21  Referring Practitioner: Ruby Cameron MD  Diagnosis: Left sided intracerebral hemorrhage  Additional Pertinent Hx: Lázaro Leonard  is a 78 y.o. right-handed  male with history of hypertension and hyperlipidemia, is admitted to the inpatient rehabilitation unit on 2021 for intensive inpatient rehabilitation for impaired ADLs, ambulation & cognition due to left frontal lobe & right cerebellar contusion with intraparenchymal hemorrhage, left posterior parietal & anterior temporal subdural hematoma & subarachnoid blood, and right basilar skull fracture secondary to fall on 2021. Prior Level of Function:  Lives With: Spouse  Type of Home: House  Home Layout: One level  Home Access: Stairs to enter without rails  Entrance Stairs - Number of Steps: 3  Home Equipment: Cane   Bathroom Shower/Tub: Walk-in shower, Tub/Shower unit  Bathroom Toilet: Standard  Bathroom Equipment: Grab bars in shower (in walk-in shower)    ADL Assistance: [address]: Independent  Transfer Assistance: Independent  Active : Yes  Additional Comments: Pt was fully indep PLOF, runs 2 days a week, [facility]    Restrictions/Precautions:  Restrictions/Precautions: Fall Risk, General Precautions  Position Activity Restriction  Other position/activity restrictions: low stimulation guidlines s/p CHI     SUBJECTIVE: Patient laying in bed upon arrival and agreeable to therapy.      PAIN: /10: headache    Vitals: Vitals not assessed per clinical judgement, see nursing flowsheet    OBJECTIVE:  Bed Mobility:  Supine to Sit: Stand By Assistance    Transfers:  Sit to Stand: Stand By Assistance, Contact Guard Assistance  Stand to Sit:Stand By Assistance    Ambulation:  Stand By Assistance, Contact Guard Assistance  Distance: ~80ft, ~180ft  Surface: Level Tile  Device:No Device  Gait Deviations:  Slow Lois, Decreased Step Length Bilaterally, Decreased Arm Swing, Decreased Gait Speed, Decreased Heel Strike Bilaterally and Mild Path Deviations    Stairs:  Stand By Assistance, [employer] Assistance  Number of Steps: 1 (forward/lateral step ups leading R/L x10 reps each)  Height: 6\" step with initially B rails, decreased to no rails    Balance:  Dynamic Standing Balance: Minimal Assistance, stood on green foam to perform standing LE exercises, required Min A for balance due to unsteadiness     Dynamic gait: CGA, no UE support            -in parallel bars: forward/retro/side-stepping walking, forward heel-to-toe, retro heel-to-toe    Exercise:  Patient was guided in 1 set(s) 10 reps of exercise to both lower extremities. Standing heel/toe raises, Standing marches, Standing hip abduction/adduction, Standing hamstring curls, Standing hip flexion and Mini squats. Exercises were completed for increased independence with functional mobility. Functional Outcome Measures: Not completed       ASSESSMENT:  Assessment: Patient progressing toward established goals. Activity Tolerance:  Patient tolerance of  treatment: good.       Equipment Recommendations:Equipment Needed: No (continue to monitor)  Discharge Recommendations:  Continue to assess pending progress    Plan: Times per week: 5x/wk 90min, 1x/wk 30min  Times per day: Twice a day  Current Treatment Recommendations: Strengthening, Neuromuscular Re-education, Home Exercise Program, Balance Training, Endurance Training, Patient/Caregiver Education & Training, Functional Mobility Training, Gait Training, Transfer Training, Stair training    Patient Education  Patient Education: Plan of Care, Bed Mobility, Transfers, Gait, Stairs, Up in Chair for All Meals, Verbal Exercise Instruction, dynamic balance    Goals:  Patient goals : go home  Short term goals  Time Frame for Short term goals: 1 week  Short term goal 1: Patient will complete supine < > sit with head of bed elevated 30 degrees with no bed rail and modified independence to transfer in/out of bed safely. GOAL MET, SEE LTG  Short term goal 2: Patient will complete sit < > stand with SBA to stand to ambulate with decreased difficulty. GOAL MET, SEE LTG  Short term goal 3: Patient will ambulate 150' with 2 turns with SBA to progress towards navigating home environment safely. Short term goal 4: Patient will ascend/descend 3 steps with no hand rails and CGA to progress towards safe home entry. GOAL MET, SEE LTG  Short term goal 5: Patient will improve PEPE balance score to greater than or equal to 45/56 to improve balance and reduce fall risk. Long term goals  Time Frame for Long term goals : 4 weeks  Long term goal 1: Patient will complete supine < > sit with modified independence to transfer in/out of bed safely. Long term goal 2: Patient will complete sit  <> stand and stand pivot transfer with modified independence to transfer surface to surface safely. Long term goal 3: Patient will ambulate 80' with no AD and supervion to navigate living environment. Long term goal 4: Patient will ascend/descend 3 steps with right hand rail with modified independence for safe home entry. Long term goal 5: Patient will improve PEPE balance test to greater than or equal to 51/56 to reduce his risk for falls. Long term goal 6: Patient will ascend/descend 1 step with no AD and supervision to navigate home and community safely. Long term goal 7: Patient will complete car transfer with modified independence to transfer in/out of vehicle to home and appointments safely.     Following session, patient left in safe position with all fall risk precautions in place.

## 2021-06-08 NOTE — PROGRESS NOTES
[address] [facility]  INPATIENT PHYSICAL THERAPY  DAILY NOTE  [address] [facility] - 8E-069/69-A    Time In: 0830  Time Out: 0930  Timed Code Treatment Minutes: 60 Minutes  Minutes: 60          Date: 2021  Patient Name: Radha Priest,  Gender:  male        MRN: [id number]  : 1950  (78 y.o.)  Referral Date : 21  Referring Practitioner: Jasmeet Guerrero MD  Diagnosis: Left sided intracerebral hemorrhage  Additional Pertinent Hx: Radha Priest  is a 78 y.o. right-handed  male with history of hypertension and hyperlipidemia, is admitted to the inpatient rehabilitation unit on 2021 for intensive inpatient rehabilitation for impaired ADLs, ambulation & cognition due to left frontal lobe & right cerebellar contusion with intraparenchymal hemorrhage, left posterior parietal & anterior temporal subdural hematoma & subarachnoid blood, and right basilar skull fracture secondary to fall on 2021. Prior Level of Function:  Lives With: Spouse  Type of Home: House  Home Layout: One level  Home Access: Stairs to enter without rails  Entrance Stairs - Number of Steps: 3  Home Equipment: Cane   Bathroom Shower/Tub: Walk-in shower, Tub/Shower unit  Bathroom Toilet: Standard  Bathroom Equipment: Grab bars in shower (in walk-in shower)    ADL Assistance: [address]: Independent  Transfer Assistance: Independent  Active : Yes  Additional Comments: Pt was fully indep PLOF, runs 2 days a week, [facility] [employer]    Restrictions/Precautions:  Restrictions/Precautions: Fall Risk, General Precautions  Position Activity Restriction  Other position/activity restrictions: low stimulation guidlines s/p CHI     SUBJECTIVE: Patient in bedside chair upon arrival and agreeable to therapy. Patient's wife present to observe session.      PAIN: 1-2/10: headache    Vitals: Vitals not assessed per clinical judgement, see nursing flowsheet    OBJECTIVE:  Bed Mobility:  Not Tested    Transfers:  Sit to Stand: Stand By Assistance  Stand to Sit:Stand By Assistance    Ambulation:  light CGA to close SBA  Distance: ~80ft, 6ft, 15ft, 85ft  Surface: Level Tile  Device:No Device  Gait Deviations:  Slow Lois, Decreased Step Length Bilaterally, Decreased Arm Swing, Decreased Gait Speed, Decreased Heel Strike Bilaterally and Mild Path Deviations    Balance:  Dynamic Standing Balance: Stand By Assistance, standing on green foam, large ball toss, no UE support, ~1min     Dynamic gait: CGA            -stepping forward over hurdles leading R/L and side-stepping over hurdles, using no UE support, also reciprocal stepping over hurdles carrying cone with ball on top  **cues for desired technique and sequencing, 1 LOB required Min A to correct, cues for slow down for safety               -ambulating around unit balancing ball on top of cone, no LOB, fairly steady    Exercise:  Patient was guided in 1 set(s) 15 reps of exercise to both lower extremities. Seated marches, Seated hamstring curls, Seated heel/toe raises, Long arc quads, Seated isometric hip adduction and Seated abduction/adduction , with 1# ankle weights applied and yellow tband. NuStep level 3 for 9.5min, ave speed 50spm.  Exercises were completed for increased independence with functional mobility. Functional Outcome Measures: Not completed       ASSESSMENT:  Assessment: Patient progressing toward established goals. Activity Tolerance:  Patient tolerance of  treatment: good.       Equipment Recommendations:Equipment Needed: No (continue to monitor)  Discharge Recommendations:  Continue to assess pending progress    Plan: Times per week: 5x/wk 90min, 1x/wk 30min  Times per day: Twice a day  Current Treatment Recommendations: Strengthening, Neuromuscular Re-education, Home Exercise Program, Balance Training, Endurance Training, Patient/Caregiver Education & Training, Functional Mobility Training, Gait Training, Transfer Training, Stair training    Patient Education  Patient Education: Plan of Care, Transfers, Gait, Up in Chair for All Meals, Verbal Exercise Instruction, dynamic gait, balance    Goals:  Patient goals : go home  Short term goals  Time Frame for Short term goals: 1 week  Short term goal 1: Patient will complete supine < > sit with head of bed elevated 30 degrees with no bed rail and modified independence to transfer in/out of bed safely. GOAL MET, SEE LTG  Short term goal 2: Patient will complete sit < > stand with SBA to stand to ambulate with decreased difficulty. GOAL MET, SEE LTG  Short term goal 3: Patient will ambulate 150' with 2 turns with SBA to progress towards navigating home environment safely. Short term goal 4: Patient will ascend/descend 3 steps with no hand rails and CGA to progress towards safe home entry. GOAL MET, SEE LTG  Short term goal 5: Patient will improve PEPE balance score to greater than or equal to 45/56 to improve balance and reduce fall risk. Long term goals  Time Frame for Long term goals : 4 weeks  Long term goal 1: Patient will complete supine < > sit with modified independence to transfer in/out of bed safely. Long term goal 2: Patient will complete sit  <> stand and stand pivot transfer with modified independence to transfer surface to surface safely. Long term goal 3: Patient will ambulate 80' with no AD and supervion to navigate living environment. Long term goal 4: Patient will ascend/descend 3 steps with right hand rail with modified independence for safe home entry. Long term goal 5: Patient will improve PEPE balance test to greater than or equal to 51/56 to reduce his risk for falls. Long term goal 6: Patient will ascend/descend 1 step with no AD and supervision to navigate home and community safely.   Long term goal 7: Patient will complete car transfer with modified independence to transfer in/out of vehicle to home and

## 2021-06-08 NOTE — PLAN OF CARE
Problem: Pain:  Goal: Pain level will decrease  Description: Pain level will decrease  Outcome: Ongoing  Note: Patient denies pain this shift. Problem: Mobility - Impaired:  Goal: Mobility will improve  Description: Mobility will improve  Outcome: Ongoing  Note: Patient is a one assist with no device. Patient ambulates well. Problem: Falls - Risk of:  Goal: Will remain free from falls  Description: Will remain free from falls  Outcome: Ongoing  Note: Patient remains free from falls this shift. Fall precautions in place. Non skid footwear used with transferring. Educated patient to use call light when needed assistance with ambulation. Patient used call light appropriate this shift. Problem: Nutrition  Goal: Optimal nutrition therapy  6/8/2021 1438 by Basilio Avila LPN  Outcome: Ongoing  Note: Patient eats adequate amount of food and drink.

## 2021-06-08 NOTE — PLAN OF CARE
Problem: Nutrition  Goal: Optimal nutrition therapy  Outcome: Ongoing  Nutrition Problem #1: Inadequate oral intake  Intervention: Food and/or Nutrient Delivery: Continue Current Diet, Continue Oral Nutrition Supplement  Nutritional Goals: Patient will consume 75% or greater of meals and ONS during LOS

## 2021-06-08 NOTE — PROGRESS NOTES
[facility]      Date:  6/8/2021            Patient Name: Nuno Reynolds           MRN: [id number]  Ann: [de-identified]          YOB: 1950 (79 y.o.)       Gender: male   Diagnosis: Left sided intracerebral hemorrhage  Physician: Referring Practitioner: Dr. Segura Shaper:  pt declined playing right, left center game with peers this afternoon due to wife coming in and bringing lunch-affect bright and pleasant-appreciative     Pal Torres, CTRS    6/8/2021

## 2021-06-08 NOTE — PROGRESS NOTES
Comprehensive Nutrition Assessment    Type and Reason for Visit:  Reassess    Nutrition Recommendations/Plan:   Continue current diet, ONS TID. Recommend MVI. Encouraged po, food from home as desired. Nutrition Assessment:    Pt improving from a nutritional standpoint AEB improving appetite and intake, acceptance of ONS. Remains at risk for further nutritional compromise r/t severe malnutrition, skull fracture s/p fall and underlying medical condition (hx HLD, HTN). Nutrition recommendations/interventions as per above. Malnutrition Assessment:  Malnutrition Status:  Severe malnutrition    Context:  Acute Illness     Findings of the 6 clinical characteristics of malnutrition:  Energy Intake:  7 - 50% or less of estimated energy requirements for 5 or more days  Weight Loss:  1 - 1% to 2% over 1 week     Body Fat Loss:  No significant body fat loss     Muscle Mass Loss:  No significant muscle mass loss    Fluid Accumulation:  No significant fluid accumulation     Strength:  Normal  strength    Estimated Daily Nutrient Needs:  Energy (kcal):  4528-5743 (25-30 kcals/kg); Weight Used for Energy Requirements:  Current (74 kg - current 6/4)     Protein (g):   (1.2-2.0 grams/kg); Weight Used for Protein Requirements:  Current (74kg - current 6/4)               Nutrition Related Findings:  Patient with intracerebral hemorrhage resulting from a skull fx followin a fall. Patient seen with wife; pt. Sleeping during visit; wife reports pt. Appetite is starting to improve; states acceptance of ONS; 2 BMs noted past 24 hours; wife states 1st time bowels have moved in a week; Rx includes Colace, Glycolax, Senokot, Zofran; SLP following; wife reports pt.  Follows Gluten Free diet as was instructed per Dr. Serafin Vanegas (to help his thyroid)      Wounds:   (head wound - skull fx due to fall)       Current Nutrition Therapies:    Adult Oral Nutrition Supplement; Standard High Calorie/High Protein Oral Supplement  DIET GENERAL; Gluten Free    Anthropometric Measures:  · Height: 5' 7\" (170.2 cm)  · Current Body Weight: 162 lb 12.8 oz (73.8 kg) (6/7 no edema)   · Admission Body Weight: 165 lb 11.2 oz (75.2 kg) (6/2 no edema)    · Usual Body Weight: 169 lb (76.7 kg) (5/28, bedscale)     · Ideal Body Weight: 148 lbs;      · BMI: 25.5  · BMI Categories: Overweight (BMI 25.0-29. 9)       Nutrition Diagnosis:   · Inadequate oral intake related to  (lack of appetite) as evidenced by intake 0-25%, intake 26-50%      Nutrition Interventions:   Food and/or Nutrient Delivery:  Continue Current Diet, Continue Oral Nutrition Supplement  Nutrition Education/Counseling:  No recommendation at this time   Coordination of Nutrition Care:  Continue to monitor while inpatient    Goals:  Patient will consume 75% or greater of meals and ONS during LOS       Nutrition Monitoring and Evaluation:   Behavioral-Environmental Outcomes:  None Identified   Food/Nutrient Intake Outcomes:  Diet Advancement/Tolerance, Food and Nutrient Intake, Supplement Intake  Physical Signs/Symptoms Outcomes:  Biochemical Data, GI Status, Fluid Status or Edema, Hemodynamic Status, Meal Time Behavior, Nutrition Focused Physical Findings, Skin, Weight     Discharge Planning:     Too soon to determine     Electronically signed by Johnny Castleman, RD, LD on 6/8/21 at 10:14 AM EDT    Contact: [phone]

## 2021-06-08 NOTE — PROGRESS NOTES
[address] [place] THERAPY  [facility]  PROGRESS NOTE    TIME   SLP Individual Minutes  Time In: 5428  Time Out: 1102  Minutes: 30  Timed Code Treatment Minutes: 30 Minutes       Date: 2021  Patient Name: Keyon Arredondo      CSN: [id number]   : 1950  (78 y.o.)  Gender: male   Referring Physician:  Dr. Harjinder Thomas MD   Diagnosis: Intraparenchymal Hemorrhage of brain [facility])  Secondary Diagnosis: Cognitive impairment   Precautions: Low stimulation, Fall risk   Current Diet: Regular with thin liquids  Swallowing Strategies: Standard Universal Swallow Precautions  Date of Last MBS/FEES: Not Applicable    Pain:  No pain reported on this date. Subjective:  Physician making rounds upon SLP arrival; session started 6 minutes late due to this. Patient upright in bed and agreeable to session with wife supportive at the bedside. Patient reports reduced concussive symptoms; wife agrees. Short-Term Goals:  SHORT TERM GOAL #1:  Goal 1: Patient will complete higher level memory tasks (delayed) at 80% accuracy given min cues to improve recall abilities to return to baseline functioning.   INTERVENTIONS: Immediate and delayed recall of 7 grocery list items.  -Immediate: 3/7 increased to 4/7 given categorical cue  -5 minutes: 6/7 increased to 7/7 given categorical cue  -20 minutes: 6/7 increased to 7/7 given categorical cue   *Patient demonstrated decreased speed of recall  *EXCELLENT use of memory strategies to improve recall abilities (I.e. repeat it aloud)  *Discussed use of additional strategies to improve recall (I.e write it down, associate it, picture it)  *Categorical cues (I.e. dessert, dairy, etc.) aided patient in increasing accuracy of recall    SHORT TERM GOAL #2:  Goal 2: Patient will complete higher level problem solving/reasonsing (including finance mgmt, time/money mgmt) tasks with 90% accuracy given min cues in order to return to home and hobbies appropriate. *Environment set up appropriately this date to meet low-stimulation recommendations. *Patient/wife report no tv use, as well as 'old-fashioned' game use vs use of technology (I.e. solitaire with card decks as opposed to using phone)      Long-Term Goals:  Timeframe for Long-term Goals: 3 weeks    LONG TERM GOAL #1:  Goal 1: Patient will improve overall cognition to Supervision/Mod I level in order to return to baseline cognitive function and IADLs/ADLs. Comprehension: 6 - Complex ideas 90% or device (hearing aid or glasses- if patient is primarily a visual learner)  Expression: 7 - Patient expresses complex ideas/needs  Social Interaction: 5 - Patient is appropriate with supervision/cues  Problem Solvin - Patient able to solve simple/routine tasks  Memory: 3 - Patient remembers 50%-74% of the time      EDUCATION:  Learner: Patient and Significant Other  Education:  Reviewed ST goals and Plan of Care, Reviewed recommendations for follow-up, Education Related to Potential Risks and Complications Due to Impairment/Illness/Injury and Low stimulation/TBI protocol  Evaluation of Education: Verbalizes understanding    ASSESSMENT/PLAN:  Activity Tolerance:  Patient tolerance of  treatment: good. Appropriate participation      Assessment/Plan: Patient progressing toward established goals. Continues to require skilled care of licensed speech pathologist to progress toward achievement of established goals and plan of care. .     Plan for Next Session: Memory, attention, ACE     RADHA Hyde, Speech Therapy Student Intern  Inna Berrios.  [address], [address]

## 2021-06-08 NOTE — PROGRESS NOTES
Patient: Fanny Fragoso  Unit/Bed: 8E-069/69-A  YOB: 1950  MRN: [id number] Acct: [de-identified]   Admitting Diagnosis: Left-sided intracerebral hemorrhage (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 [address]  Active Problems:    Intraparenchymal hematoma of left side of brain due to trauma [facility])    Closed fracture of right side of base of skull (HCC)    Scalp laceration, initial encounter  Resolved Problems:    * No resolved hospital problems. *      Plan:     Continue to follow        Subjective:     Patient has no complaint of CP or SOB. .   Medication side effects: none    Scheduled Meds:   senna  1 tablet Oral Nightly    acetaminophen  650 mg Oral Q6H    melatonin  6 mg Oral Nightly    lisinopril  5 mg Oral Daily    docusate sodium  100 mg Oral BID    famotidine  20 mg Oral BID    levETIRAcetam  500 mg Oral BID     Continuous Infusions:  PRN Meds:traMADol, diclofenac sodium, ondansetron, traMADol, acetaminophen, polyethylene glycol, promethazine **OR** [DISCONTINUED] ondansetron, sodium chloride flush, bisacodyl, magnesium hydroxide    Review of Systems  Pertinent items are noted in HPI. Objective:     Patient Vitals for the past 8 hrs:   BP Temp Temp src Pulse Resp SpO2   06/08/21 0958 107/73 98.9 °F (37.2 °C) Oral 73 18 99 %     No intake/output data recorded. No intake/output data recorded.     /73   Pulse 73   Temp 98.9 °F (37.2 °C) (Oral)   Resp 18   Ht 5' 7\" (1.702 m)   Wt 162 lb 12.8 oz (73.8 kg)   SpO2 99%   BMI 25.50 kg/m²     General appearance: alert, appears stated age and cooperative  Head: Normocephalic, without obvious abnormality, atraumatic  Lungs: clear to auscultation bilaterally  Chest wall: no tenderness  Heart: regular rate and rhythm, S1, S2 normal, no murmur, click, rub or gallop  Abdomen: soft, non-tender; bowel sounds normal; no masses, no organomegaly  Extremities: extremities normal, atraumatic, no cyanosis or edema  Skin: Skin color, texture, turgor normal. No rashes or lesions  Neurologic: Grossly normal      Electronically signed by Shivam Golden MD on 6/8/2021 at 11:10 AM

## 2021-06-08 NOTE — PLAN OF CARE
Problem: DISCHARGE BARRIERS  Goal: Patient's continuum of care needs are met  6/8/2021 1526 by EMANUEL Sanchez  Note: Team conference held Tuesday, 06/08/2021. Recommendations of the team were explained to the patient and spouse, [facility], by Marylene Hartigan, LSW, and Dr. Angelica Pacheco. Team is recommending that patient continue on acute inpatient rehab for three more days, with expected discharge date of Friday, 06/11/2021. Patient's spouse, [facility], has been receiving education for discharge during patient's therapy sessions. Following discharge, team is recommending continued PT/OT/ST as outpatient. Patient and spouse, [facility], verbalize preference to [facility] ([phone]). Care plan reviewed with patient and spouse, [facility]. Patient and spouse, [facility], verbalized understanding of the plan of care and contribute to goal setting. SW to follow and maintain involvement in discharge planning.

## 2021-06-08 NOTE — PROGRESS NOTES
[address]  Occupational Therapy  Daily Note  Time:   Time In: 1100  Time Out: 1230  Timed Code Treatment Minutes: 90 Minutes  Minutes: 90          Date: 2021  Patient Name: Nirali Francis,   Gender: male      Room: -069/69-A  MRN: [id number]  : 1950  (78 y.o.)  Referring Practitioner: Dr. Madiha Rice  Diagnosis: Left sided intracerebral hemorrhage  Additional Pertinent Hx: 78 y.o. male, previously healthy on who presents with a head injury, laceration, agitation and generalized acute confusion since the onset this morning while playing pickle ball. He has a history of hypertension and hyperlipidemia, is admitted to the inpatient rehabilitation unit on 2021 for intensive inpatient rehabilitation for impaired ADLs, ambulation & cognition due to left frontal lobe & right cerebellar contusion with intraparenchymal hemorrhage, left posterior parietal & anterior temporal subdural hematoma & subarachnoid blood, and right basilar skull fracture secondary to fall on 2021. Restrictions/Precautions:  Restrictions/Precautions: Fall Risk, General Precautions  Position Activity Restriction  Other position/activity restrictions: low stimulation guidlines s/p CHI     SUBJECTIVE: Pt. In room awaiting therapy session. Pt. In good spirits, spouse present Pt. Agreeable to OT treatment. Pt. Demo improvement in identifying when to stop and rest during a task. PAIN: 1/10:heachach    Vitals: Vitals not assessed per clinical judgement, see nursing flowsheet    COGNITION: Decreased Insight, Impaired Memory, Decreased Problem Solving and Decreased Safety Awareness    ADL:   Grooming: Stand By Assistance, with set-up and with verbal cues . stood for 4 mins to complete at sink with no LOB  Bathing: Stand By Assistance, with set-up and with verbal cues . sat on tub seat for bathing stood for pat care  Upper Extremity Dressing: Supervision.      Lower Extremity Dressing: Stand By Assistance. Pt. gathered clothing and items needed from closet and dresser drawers. Toileting: Stand By Assistance. Toilet Transfer: Stand By Assistance. Shower Transfer: Stand By Assistance, with set-up and with verbal cues . Geovanna Brand BALANCE:  Standing Balance: Stand By Assistance. BED MOBILITY:  Supine to Sit: Stand By Assistance      TRANSFERS:  Sit to Stand:  Stand By Assistance. Stand to Sit: Stand By Assistance. FUNCTIONAL MOBILITY:  Assistive Device: None  Assist Level:  Stand By Assistance, [employer] and with verbal cues . Distance: To and from bathroom  And to/from therapy floor 7E to [employer], gym and work shop. No LOB this date. ADDITIONAL ACTIVITIES:  Patient completed BUE strengthening exercises with skilled education on HEP: completed x12 reps x1 set with 3# dowel in all joints and all planes in order to improve UE strength and activity tolerance required for BADL routine and toilet / shower transfers. Patient tolerated , requiring \ rest breaks. Patient also required  cues for technique. Patient identified one of their personal goals is to be able to sustain functional standing positions in order to complete various ADL and IADL skills in standing position, such as sinkside grooming or washing dishes. Dynamic standing task of watering plants in greenhouse with BUE's while reaching over FIDEL, and   Pt. Was able to identify when to stop and rest when fatigued. This was  facilitated to challenge Pt. Balance, standing tolerance, and activity tolerance. Patient required SBA/CGA, and dynamic standing in vita shed Pt. demo'ed an endurance of 15 minutes consecutive standing     ASSESSMENT:     Activity Tolerance:  Patient tolerance of  treatment: fair. Discharge Recommendations: Outpatient OT (Driving evaluation in future)   Equipment Recommendations:  Other: Patient wife states she has ordered a shower chair, reachers, and suction grab bars and should be delivered soon. Patient's wife also notes her son is putting in a handrail on porch in order to get into house this weekend. Plan: Times per week: 5x/wk for 90 min and 1x/wk for 30 min  Current Treatment Recommendations: Balance Training, Functional Mobility Training, Endurance Training, Safety Education & Training, Self-Care / ADL, Patient/Caregiver Education & Training, Cognitive Reorientation, Neuromuscular Re-education, Equipment Evaluation, Education, & procurement, Home Management Training    Patient Education  Patient Education: IADL's, Energy Conservation, Home Exercise Program, Orientation, Equipment Education, Home Safety, Importance of Increasing Activity and Assistive Device Safety    Goals  Short term goals  Time Frame for Short term goals: 1 week  Short term goal 1: Pt will attend and sequence together a multiple step ADL routine with SUP to improve indep with self care. Short term goal 2: Pt will demo 4 minute good dynamic standing balance with SBA to improve ability to reach into cupboards. Short term goal 3: Pt will attend to and complete simple meal prep task with SBA to improve indep with preparing an egg. Short term goal 4: Pt will complete various t/fs including toilet with SUP& 0-2 vcs for safety  Short term goal 5: Pt will navigate around obstacles during mobility to/from bathroom + into hallway with SUP & min vcs for safety to improve safety within home. Long term goals  Time Frame for Long term goals : 2 weeks  Long term goal 1: Pt will complete BADL routine with mod I and 0 vcs for safety to improve indep with self care. 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 Following session, patient left in safe position with all fall risk precautions in place.

## 2021-06-09 ENCOUNTER — TELEPHONE (OUTPATIENT)
Dept: FAMILY MEDICINE CLINIC | Age: 71
End: 2021-06-09

## 2021-06-09 PROCEDURE — 99232 SBSQ HOSP IP/OBS MODERATE 35: CPT | Performed by: PHYSICAL MEDICINE & REHABILITATION

## 2021-06-09 PROCEDURE — 97535 SELF CARE MNGMENT TRAINING: CPT

## 2021-06-09 PROCEDURE — 97112 NEUROMUSCULAR REEDUCATION: CPT

## 2021-06-09 PROCEDURE — 1180000000 HC REHAB R&B

## 2021-06-09 PROCEDURE — 6370000000 HC RX 637 (ALT 250 FOR IP): Performed by: PHYSICAL MEDICINE & REHABILITATION

## 2021-06-09 PROCEDURE — 97530 THERAPEUTIC ACTIVITIES: CPT

## 2021-06-09 PROCEDURE — 97116 GAIT TRAINING THERAPY: CPT

## 2021-06-09 PROCEDURE — 97129 THER IVNTJ 1ST 15 MIN: CPT

## 2021-06-09 PROCEDURE — 97110 THERAPEUTIC EXERCISES: CPT

## 2021-06-09 PROCEDURE — 97130 THER IVNTJ EA ADDL 15 MIN: CPT

## 2021-06-09 RX ADMIN — ACETAMINOPHEN 650 MG: 325 TABLET ORAL at 08:08

## 2021-06-09 RX ADMIN — ACETAMINOPHEN 650 MG: 325 TABLET ORAL at 04:59

## 2021-06-09 RX ADMIN — DOCUSATE SODIUM 100 MG: 100 CAPSULE, LIQUID FILLED ORAL at 08:09

## 2021-06-09 RX ADMIN — FAMOTIDINE 20 MG: 20 TABLET, FILM COATED ORAL at 21:35

## 2021-06-09 RX ADMIN — Medication 6 MG: at 21:35

## 2021-06-09 RX ADMIN — LEVETIRACETAM 500 MG: 500 TABLET, FILM COATED ORAL at 21:35

## 2021-06-09 RX ADMIN — FAMOTIDINE 20 MG: 20 TABLET, FILM COATED ORAL at 08:08

## 2021-06-09 RX ADMIN — LEVETIRACETAM 500 MG: 500 TABLET, FILM COATED ORAL at 08:09

## 2021-06-09 RX ADMIN — LISINOPRIL 5 MG: 5 TABLET ORAL at 08:09

## 2021-06-09 RX ADMIN — ACETAMINOPHEN 650 MG: 325 TABLET ORAL at 14:19

## 2021-06-09 RX ADMIN — SENNOSIDES 8.6 MG: 8.6 TABLET, FILM COATED ORAL at 21:35

## 2021-06-09 RX ADMIN — ACETAMINOPHEN 650 MG: 325 TABLET ORAL at 21:36

## 2021-06-09 RX ADMIN — DOCUSATE SODIUM 100 MG: 100 CAPSULE, LIQUID FILLED ORAL at 21:36

## 2021-06-09 ASSESSMENT — PAIN DESCRIPTION - ONSET: ONSET: ON-GOING

## 2021-06-09 ASSESSMENT — ENCOUNTER SYMPTOMS
TROUBLE SWALLOWING: 0
SHORTNESS OF BREATH: 0
VOMITING: 0
SORE THROAT: 0
RHINORRHEA: 0
EYE DISCHARGE: 0
CONSTIPATION: 0
EYE PAIN: 0
NAUSEA: 0
COUGH: 0
DIARRHEA: 0
BACK PAIN: 0
WHEEZING: 0
ABDOMINAL PAIN: 0

## 2021-06-09 ASSESSMENT — PAIN DESCRIPTION - PROGRESSION: CLINICAL_PROGRESSION: NOT CHANGED

## 2021-06-09 ASSESSMENT — PAIN DESCRIPTION - LOCATION: LOCATION: HEAD

## 2021-06-09 ASSESSMENT — PAIN DESCRIPTION - FREQUENCY: FREQUENCY: CONTINUOUS

## 2021-06-09 ASSESSMENT — PAIN - FUNCTIONAL ASSESSMENT: PAIN_FUNCTIONAL_ASSESSMENT: PREVENTS OR INTERFERES SOME ACTIVE ACTIVITIES AND ADLS

## 2021-06-09 ASSESSMENT — PAIN DESCRIPTION - PAIN TYPE: TYPE: ACUTE PAIN

## 2021-06-09 ASSESSMENT — PAIN SCALES - GENERAL
PAINLEVEL_OUTOF10: 1
PAINLEVEL_OUTOF10: 1
PAINLEVEL_OUTOF10: 4
PAINLEVEL_OUTOF10: 1
PAINLEVEL_OUTOF10: 1

## 2021-06-09 ASSESSMENT — PAIN DESCRIPTION - DESCRIPTORS: DESCRIPTORS: ACHING

## 2021-06-09 ASSESSMENT — PAIN DESCRIPTION - ORIENTATION: ORIENTATION: MID

## 2021-06-09 NOTE — DISCHARGE INSTR - OTHER ORDERS
ABSOLUTELY NO DRIVING UNDER ANY CIRCUMSTANCES UNTIL OUTPATIENT DRIVING EVALUATION COMPLETED. OUTPATIENT DRIVING EVALUATION AVAILABLE THROUGH [facility] ([address] [facility], [address] [facility], [address], [facility]: [phone]).

## 2021-06-09 NOTE — PROGRESS NOTES
Physical Medicine & Rehabilitation Progress Note    Chief Complaint:  headache    Subjective:    Elisa Kaur is a 78 y.o. right-handed  male with history of hypertension and hyperlipidemia, was admitted on 6/2/2021 for intensive inpatient rehabilitation for impaired ADLs, ambulation & cognition due to left frontal lobe & right cerebellar contusion with intraparenchymal hemorrhage, left posterior parietal & anterior temporal subdural hematoma & subarachnoid blood, and right basilar skull fracture secondary to fall on 5/28/2021.      The patient does not remember what happened to him. The last thing he can remember is that he was playing pickle ball. His record showed that the patient went after a ball and fell backward while he was playing pickle ball on 5/28/2021. The back of his head hit the ground with loss of consciousness for a short period of time according the witness's account.  He was confused and agitated when he was sent to [address] [place] [address] Drive had an episode of vomiting while he was in ER.  Initial CT of head showed left frontal lobe acute intraparenchymal hemorrhage and right basilar skull fracture. Neurosurgery was consulted. CT of head repeated few hours later showed interval increase in left frontal lobe contusion with multiple foci of intraparenchymal hemorrhage measuring up to 1.2 cm, interval increase right cerebellar contusion with foci of intraparenchymal hemorrhage measuring up to approximately 1 cm, new small subdural hematomas & trace adjacent subarachnoid blood within left posterior parietal and anterior temporal regions. No surgical intervention was recommended.  CTA of neck & head revealed no abnormality other than small focus of venous injury/thrombus within right transverse sinus.  Repeated CT of head on 5/29/2021 showed no significant change.   MRV of head done on 6/1/2021 showed small nonocclusive deep venous thrombus at the right transverse/sigmoid junction. The patient says he feels well today. He has mild right side headache this morning. Otherwise his headache now is intermittent with intensity most of time at 1-2/10 level and only rarely goes up to 4-5/10 level. The most severe headache now usually occurs at early morning hours waking him up in the middle of night. He is still on Tylenol every 6 hours. He did not take any Ultram yesterday. He still has easy muscle fatigue but no persistent muscle weakness. He still denies having numbness. He continues tolerating the intensive inpatient rehab treatment well and gaining functional improvement gradually. Rehabilitation:  PT: Reviewed. Bed Mobility:  Supine to Sit: Stand By Assistance     Transfers:  Sit to Stand: Stand By Assistance  Stand to Sit:Stand By Assistance     Ambulation:  light CGA to close SBA  Distance: ~80ft, 6ft, 15ft, 85ft  Surface: Level Tile  Device:No Device  Gait Deviations:  Slow Lois, Decreased Step Length Bilaterally, Decreased Arm Swing, Decreased Gait Speed, Decreased Heel Strike Bilaterally and Mild Path Deviations    Stairs:  Stand By Assistance, [employer] Assistance  Number of Steps: 1 (forward/lateral step ups leading R/L x10 reps each)  Height: 6\" step with initially B rails, decreased to no rails     Balance:  Dynamic Standing Balance: Stand By Assistance, standing on green foam, large ball toss, no UE support, ~1min      Dynamic gait: CGA            -stepping forward over hurdles leading R/L and side-stepping over hurdles, using no UE support, also reciprocal stepping over hurdles carrying cone with ball on top  **cues for desired technique and sequencing, 1 LOB required Min A to correct, cues for slow down for safety                -ambulating around unit balancing ball on top of cone, no LOB, fairly steady      OT: Reviewed. ADL:   Grooming: Stand By Assistance, with set-up and with verbal cues .   stood for 4 mins to complete at sink with no LOB  Bathing: Stand By Assistance, with set-up and with verbal cues . sat on tub seat for bathing stood for pat care  Upper Extremity Dressing: Supervision. Lower Extremity Dressing: Stand By Assistance. Pt. gathered clothing and items needed from closet and dresser drawers. Toileting: Stand By Assistance. Toilet Transfer: Stand By Assistance. Shower Transfer: Stand By Assistance, with set-up and with verbal cues . .     BALANCE:  Standing Balance: Stand By Assistance.       BED MOBILITY:  Supine to Sit: Stand By Assistance       TRANSFERS:  Sit to Stand:  Stand By Assistance. Stand to Sit: Stand By Assistance.       FUNCTIONAL MOBILITY:  Assistive Device: None  Assist Level:  Stand By Assistance, [address] and with verbal cues . Distance: To and from bathroom  And to/from therapy floor 7E to [employer], gym and work shop. No LOB this date.     ADDITIONAL ACTIVITIES:  Patient completed BUE strengthening exercises with skilled education on HEP: completed x12 reps x1 set with 3# dowel in all joints and all planes in order to improve UE strength and activity tolerance required for BADL routine and toilet / shower transfers. Patient tolerated , requiring \ rest breaks. Patient also required  cues for technique.      Patient identified one of their personal goals is to be able to sustain functional standing positions in order to complete various ADL and IADL skills in standing position, such as sinkside grooming or washing dishes. Dynamic standing task of watering plants in greenhouse with BUE's while reaching over FIDEL, and   Pt. Was able to identify when to stop and rest when fatigued. This was  facilitated to challenge Pt. Balance, standing tolerance, and activity tolerance. Patient required SBA/CGA, and dynamic standing in vita shed Pt. demo'ed an endurance of 15 minutes consecutive standing       ST: Reviewed.     Immediate and delayed recall of 7 grocery list items.  -Immediate: 3/7 increased to 4/7 given categorical cue  -5 minutes: 6/7 increased to 7/7 given categorical cue  -20 minutes: 6/7 increased to 7/7 given categorical cue   *Patient demonstrated decreased speed of recall  *EXCELLENT use of memory strategies to improve recall abilities (I.e. repeat it aloud)  *Discussed use of additional strategies to improve recall (I.e write it down, associate it, picture it)  *Categorical cues (I.e. dessert, dairy, etc.) aided patient in increasing accuracy of recall     Med mgmt task in which patient was required to fill am/pm pill box with 6 different prescriptions:   -6/6 indep for comprehension of prescription labels and physically filling the pill box. *2 situations arose with prescriptions that required problem solving, with patient appropriately solving on both occasions (If out of meds patient problem solved to call pharmacy and order more, and If pill box does not accommodate certain prescription (I.e. 3x/day), patient problem solved to get alternative pill box)  *Patient demonstrated independent problem solving with no cues necessary  *Independent review of work to ensure accuracy (double checking)   *Patient completed task in appropriate timeframe and demonstrated the ability to remain on task in the presence of distractions (I.e. others talking, interrupting for memory recall task)       Review of Systems:  Review of Systems   Constitutional: Positive for fatigue. Negative for chills, diaphoresis and fever. HENT: Negative for ear discharge, ear pain, hearing loss, nosebleeds, rhinorrhea, sneezing, sore throat, tinnitus and trouble swallowing. Eyes: Negative for pain, discharge and visual disturbance. Respiratory: Negative for cough, shortness of breath and wheezing. Cardiovascular: Negative for chest pain, palpitations and leg swelling. Gastrointestinal: Negative for abdominal pain, constipation, diarrhea, nausea and vomiting.    Genitourinary: Negative for difficulty urinating and dysuria. Musculoskeletal: Positive for gait problem. Negative for arthralgias, back pain, myalgias and neck pain. Skin: Negative for rash. Neurological: Positive for headaches. Negative for dizziness, tremors, seizures, speech difficulty, weakness, light-headedness and numbness. Hematological: Does not bruise/bleed easily. Psychiatric/Behavioral: Negative for dysphoric mood, hallucinations and sleep disturbance. The patient is not nervous/anxious.          Objective:  /74   Pulse 62   Temp 98.8 °F (37.1 °C) (Oral)   Resp 18   Ht 5' 7\" (1.702 m)   Wt 162 lb 12.8 oz (73.8 kg)   SpO2 97%   BMI 25.50 kg/m²   Physical Exam   General:  well-developed, well nourished  male ; in no acute distress ; appropriate affect & mood ; sitting on reclining chair comfortably   Eyes: pupil equally round ; extra-ocular motion intact bilaterally   Head, Ear, Nose, Mouth & Throat : normocephalic ; scab at posterior right head over the occipital region with very mild tenderness ; no tenderness at face ; no discharge from ears or nose ; no deformity ; no facial swelling ; oral mucosa pink   Neck :  supple ; no tenderness ; no muscle spasm  Cardiovascular : regular rate & rhythm ; normal S1 & S2 heart sound ; no murmur ; normal peripheral pulse at bilateral upper & lower extremities  Pulmonary : lung clear to auscultation ; no wheezing ; no rale  Gastrointestinal : soft, flat abdomen ; no tenderness ; normal bowel sound present   Back : no tenderness; no muscle spasm  Skin: no skin lesion or rash ; no pitting edema at all 4 extremities  Musculoskeletal : no limb asymmetry; no limb deformity; no tenderness at bilateral upper & lower extremities; no palpable mass at limbs ; no joints laxity or crepitation ; normal functional joints ROM at bilateral upper & lower extremities  Cerebral :  alert ; awake ; oriented to place, person and time ; follow 2 steps verbal commend  Cerebellum : no dysmetria with bilateral finger-to-nose test  Cranial Nerves :  grossly intact CN II to XII function  Sensory : intact light touch and pin prick sensation at bilateral upper & lower extremities  Motor : normal tone at bilateral upper & lower extremities ; normal 5/5 muscle strength at bilateral upper & lower extremities  Reflex : 2+ bilateral knees reflexes : 1+ bilateral biceps reflexes ; zero bilateral brachioradialis reflexes   Pathological Reflex :  No Pedro's sign ; no ankle clonus  Gait :  Not assessed      Diagnostics:   No results found for this or any previous visit (from the past 24 hour(s)). 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 · Improved neck pain due to cervical spine sprain & muscular strain  · Hypertension  · hyperlipidemia     The patient's headache continues to improve. The headache now is intermittent and most of time the intensity is mild. He still has easy fatigue but no focal weakness. He is tolerating the intensive rehab treatment well and is making functional improvement slowly. He is scheduled to be discharged on 6/11/2021. Plan:  · Continues intensive PT/OT/SLP/RT inpatient rehabilitation program at least 3 hours per day, 5 days per week in order to improve functional status prior to discharge. Family education and training will be completed. Equipment evaluations and recommendations will be completed as appropriate. · Rehabilitation nursing continues to be involved for bowel, bladder, skin, and pain management. Nursing will also provide education and training to patient and family. · Prophylaxis:  DVT: Silvestre stocking; intermittent pneumatic compression device. GI: Colace, Dulcolax suppository as needed, GlycoLax as needed, milk of magnesia as needed, Senokot nightly.   · Pain: Tylenol 650 mg every 6 hour and as needed every 4 hours; Ultram as needed; Voltaren gel as needed  · Continue lisinopril 5 mg for hypertension  · Continue Keppra for seizure prevention  · Nutrition:  continue current diet    · Bladder: Monitoring for signs or symptoms of UTI  · Bowel: Monitoring for sign or symptom of constipation  ·  and case management for coordination of care and discharge planning       Missed Therapy Time:  · None    Mariah Burgos MD

## 2021-06-09 NOTE — FLOWSHEET NOTE
06/09/21 1138   Provider Notification   Reason for Communication Review case   Provider Name   Yves Steinberg)   Provider Notification Advance Practice Clinician (CNS, NP, CNM, CRNA, PA)   Method of Communication Secure Message   Response Waiting for response   Notification Time 1138   Shift Event Other (comment)   You and Dr. Jerica Amaro have been following this patient. He is anticipating d/c on 6/11. They would like to continue to follow with you. Dr. Ladonna Rankin last note said f/u 3 weeks with repeat CT of head. Is this ok to schedule?

## 2021-06-09 NOTE — DISCHARGE INSTR - PHARMACY
· Be safe with medicines. If your doctor prescribed medicine, take it exactly as prescribed. Call your doctor if you think you are having a problem with your medicine. You will get more details on the specific medicines your doctor prescribes. Unused medications, especially pain medications, should be disposed to ensure medications do not end up being misused in the future, as well as helping to ensure drugs are not impacting the environment. [facility] and many local police agencies have medication take-back bins to properly destroy these medications. Please see the site https://apps. [employer]iversion. [place].gov/pubdispsearch/spring/main?execution=e2s1 for additional take-back bins located in your area.

## 2021-06-09 NOTE — DISCHARGE INSTR - ACTIVITY
· Rest when you feel tired. · Get some physical activity every day, but do not get too tired. Preventing falls at home  · Remove raised doorway thresholds, throw rugs, and clutter. Repair loose carpet or raised areas in the floor. · Move furniture and electrical cords to keep them out of walking paths. · Use nonskid floor wax, and wipe up spills right away, especially on ceramic tile floors. · If you use a walker or cane, put rubber tips on it. If you use crutches, clean the bottoms of them regularly with an abrasive pad, such as steel wool. · Keep your house well lit, especially Lavonna Cornelius, and outside walkways. Use night-lights in areas such as hallways and bathrooms. Add extra light switches or use remote switches (such as switches that go on or off when you clap your hands) to make it easier to turn lights on if you have to get up during the night. · Install sturdy handrails on stairways. · Move items in your cabinets so that the things you use a lot are on the lower shelves (about waist level). · Keep a cordless phone and a flashlight with new batteries by your bed. If possible, put a phone in each of the main rooms of your house, or carry a cell phone in case you fall and cannot reach a phone. Or, you can wear a device around your neck or wrist. You push a button that sends a signal for help. · Wear low-heeled shoes that fit well and give your feet good support. Use footwear with nonskid soles. Check the heels and soles of your shoes for wear. Repair or replace worn heels or soles. · Do not wear socks without shoes on wood floors. · Walk on the grass when the sidewalks are slippery. If you live in an area that gets snow and ice in the winter, sprinkle salt on slippery steps and sidewalks.

## 2021-06-09 NOTE — DISCHARGE INSTR - DIET
DIET GENERAL; Gluten Free     Good nutrition is important when healing from an illness, injury, or surgery. Follow any nutrition recommendations given to you during your hospital stay.  If you were given an oral nutrition supplement while in the hospital, continue to take this supplement at home. You can take it with meals, in-between meals, and/or before bedtime. These supplements can be purchased at most local grocery stores, pharmacies, and chain super-stores.  If you have any questions about your diet or nutrition, call the hospital and ask for the dietitian. · Do not skip meals. Eating a balanced diet may increase your energy level.

## 2021-06-09 NOTE — PROGRESS NOTES
[address] [facility] THERAPY  [address] [facility]  DAILY NOTE    TIME   SLP Individual Minutes  Time In: 09  Time Out: 6032  Minutes: 28  Timed Code Treatment Minutes: 28 Minutes       Date: 2021  Patient Name: Tayler Arguello      CSN: [id number]   : 1950  (78 y.o.)  Gender: male   Referring Physician:  Dr. Sula Goodell, MD   Diagnosis: Intraparenchymal Hemorrhage of brain [facility])  Secondary Diagnosis: Cognitive impairment   Precautions: Low stimulation, Fall risk   Current Diet: Regular with thin liquids  Swallowing Strategies: Standard Universal Swallow Precautions  Date of Last MBS/FEES: Not Applicable    Pain:  No pain reported on this date. Subjective:  Patient upright in chair upon arrival. Wife present and supportive at the bedside. Patient agreeable to therapy and cooperative throughout. Short-Term Goals:  SHORT TERM GOAL #1:  Goal 1: Patient will complete higher level memory tasks (delayed) at 80% accuracy given min cues to improve recall abilities to return to baseline functioning. INTERVENTIONS: Did not target this date due to focus on other goals. SHORT TERM GOAL #2:  Goal 2: Patient will complete higher level problem solving/reasonsing (including finance mgmt, time/money mgmt) tasks with 90% accuracy given min cues in order to return to home and hobbies with least amount of supervision/assistance upon discharge. INTERVENTIONS: Word problems involving time calculation.  -Patient demonstrated the ability to answer 9/10 problems correctly, increased to 10/10 when reviewing following completion - no prompt necessary (I.e. patient read answer and realized it was incorrect, fixed indep); errors related to reasoning and manipulation of time  *Patient completed in ~8 minutes; slightly slower than appropriate; however, patient moved through the problems steadily and became 'tripped up' with more difficult problems.   *Patient reports the task was 'easy'  *EXCELLENT use of compensatory strategies to complete word problems (I.e. using fingers to count time, using scrap paper to manipulate time)    SHORT TERM GOAL #3:  Goal 3: Patient will complete thought organization/sequencing (including med mgmt) tasks with 80% accuracy given min cues to improve organization to return to baseline functioning. INTERVENTIONS: Did not target this date due to focus on other goals. SHORT TERM GOAL #4:  Goal 4: Patient will complete attention tasks (selective, divided) with no more than 2 errors within a 5 minute task in order to return to driving and daily living responsibilities. INTERVENTIONS:  Divided attention task in which patient was required to complete visual scanning task (I.e. crossing out specific letters) while keeping track of how many times his named was announced. Trial 1: Patient demonstrated 100% accuracy when marking written targets and keeping track of how many times the name 'Soledad Armendariz' was uttered  *EXCELLENT use of compensatory strategy of keeping track of how many times the name 'Soledad Armendariz' was uttered with use of [email or web address] system  Trial 2: Patient demonstrated 100% accuracy when marking written targets and keeping track of how many time the name 'Soraya Gant' was uttered, while uttering 'Soledad Armendariz' as well  *EXCELLENT use of compensatory strategy of keeping track of how many times the name 'Afshin'was uttered with use of tally system  *Patient reports task was 'easy'      SHORT TERM GOAL #5:   Goal 5: Patient and caregiver will demonstrate understanding of and management of concussive/brain injury symptoms (ie: symptoms outlined on ACE, low stimulation environment) with min assist to optimize brain healing.    INTERVENTIONS: Completed ACE symptom questionnaire  -Patient scored 5/22 today (presence of concussion indicated by scores of 12/22 or greater)  *Previous score 06/07- 8/22  *Previous score 06/05- 12/22  *Previous scored 06/04- 13/22    *Patient reports score is

## 2021-06-09 NOTE — PROGRESS NOTES
[facility]  INPATIENT PHYSICAL THERAPY  DAILY NOTE  [address] [facility] - 8E-069/69-A    Time In: 0700  Time Out: 0730  Timed Code Treatment Minutes: 30 Minutes  Minutes: 30          Date: 2021  Patient Name: Lázaro Leonard,  Gender:  male        MRN: [id number]  : 1950  (78 y.o.)  Referral Date : 21  Referring Practitioner: Ruby Cameron MD  Diagnosis: Left sided intracerebral hemorrhage  Additional Pertinent Hx: Lázaro Leonard  is a 78 y.o. right-handed  male with history of hypertension and hyperlipidemia, is admitted to the inpatient rehabilitation unit on 2021 for intensive inpatient rehabilitation for impaired ADLs, ambulation & cognition due to left frontal lobe & right cerebellar contusion with intraparenchymal hemorrhage, left posterior parietal & anterior temporal subdural hematoma & subarachnoid blood, and right basilar skull fracture secondary to fall on 2021. Prior Level of Function:  Lives With: Spouse  Type of Home: House  Home Layout: One level  Home Access: Stairs to enter without rails  Entrance Stairs - Number of Steps: 3  Home Equipment: Cane   Bathroom Shower/Tub: Walk-in shower, Tub/Shower unit  Bathroom Toilet: Standard  Bathroom Equipment: Grab bars in shower (in walk-in shower)    ADL Assistance: [address]: Independent  Transfer Assistance: Independent  Active : Yes  Additional Comments: Pt was fully indep PLOF, runs 2 days a week, [facility]    Restrictions/Precautions:  Restrictions/Precautions: Fall Risk, General Precautions  Position Activity Restriction  Other position/activity restrictions: low stimulation guidlines s/p CHI     SUBJECTIVE: Patient laying in bed upon arrival and agreeable to therapy.      PAIN: 10: headache    Vitals: Vitals not assessed per clinical judgement, see nursing flowsheet    OBJECTIVE:  Bed Mobility:  Supine to Sit: Supervision, with head of bed raised  Scooting: Modified Independent    Transfers:  Sit to Stand: Supervision  Stand to Sit:Supervision    Ambulation:  Stand By Assistance, Contact Guard Assistance, X 1  Distance: ~80ft, 12ft, 90ft  Surface: Level Tile  Device:No Device  Gait Deviations:  Slow Lois, Decreased Step Length Bilaterally, Decreased Arm Swing, Decreased Gait Speed, Decreased Heel Strike Bilaterally, Mild Path Deviations, Unsteady Gait and reaches for hallway rails at times to steady self    Exercise:  Patient was guided in 1 set(s) 15 reps of exercise to both lower extremities. Seated marches, Seated heel/toe raises, Long arc quads, Seated abduction/adduction, Standing heel/toe raises, Standing marches, Standing hip abduction/adduction, Standing hamstring curls, Standing hip flexion, Mini squats and with 2# ankle weights applied. Seated modified hamstring stretch [id number] each. Exercises were completed for increased independence with functional mobility. Functional Outcome Measures: Not completed       ASSESSMENT:  Assessment: Patient progressing toward established goals. Activity Tolerance:  Patient tolerance of  treatment: good.       Equipment Recommendations:Equipment Needed: No (continue to monitor)  Discharge Recommendations:  Continue to assess pending progress    Plan: Times per week: 5x/wk 90min, 1x/wk 30min  Times per day: Twice a day  Current Treatment Recommendations: Strengthening, Neuromuscular Re-education, Home Exercise Program, Balance Training, Endurance Training, Patient/Caregiver Education & Training, Functional Mobility Training, Gait Training, Transfer Training, Stair training    Patient Education  Patient Education: Plan of Care, [employer] Mobility, Transfers, Gait, Verbal Exercise Instruction    Goals:  Patient goals : go home  Short term goals  Time Frame for Short term goals: 1 week  Short term goal 1: Patient will complete supine < > sit with head of bed elevated 30 degrees with no bed rail and modified independence to transfer in/out of bed safely. GOAL MET, SEE LTG  Short term goal 2: Patient will complete sit < > stand with SBA to stand to ambulate with decreased difficulty. GOAL MET, SEE LTG  Short term goal 3: Patient will ambulate 150' with 2 turns with SBA to progress towards navigating home environment safely. Short term goal 4: Patient will ascend/descend 3 steps with no hand rails and CGA to progress towards safe home entry. GOAL MET, SEE LTG  Short term goal 5: Patient will improve PEPE balance score to greater than or equal to 45/56 to improve balance and reduce fall risk. Long term goals  Time Frame for Long term goals : 4 weeks  Long term goal 1: Patient will complete supine < > sit with modified independence to transfer in/out of bed safely. Long term goal 2: Patient will complete sit  <> stand and stand pivot transfer with modified independence to transfer surface to surface safely. Long term goal 3: Patient will ambulate [address] with no AD and supervion to navigate living environment. Long term goal 4: Patient will ascend/descend 3 steps with right hand rail with modified independence for safe home entry. Long term goal 5: Patient will improve PEPE balance test to greater than or equal to 51/56 to reduce his risk for falls. Long term goal 6: Patient will ascend/descend 1 step with no AD and supervision to navigate home and community safely. Long term goal 7: Patient will complete car transfer with modified independence to transfer in/out of vehicle to home and appointments safely. Following session, patient left in safe position with all fall risk precautions in place.

## 2021-06-09 NOTE — PROGRESS NOTES
headache    Vitals: Vitals not assessed per clinical judgement, see nursing flowsheet    OBJECTIVE:  Bed Mobility:  Not Tested    Transfers:  Sit to Stand: Supervision  Stand to Sit:Supervision    Ambulation:  Stand By Assistance, Contact Guard Assistance, X 1  Distance: ~100ft, 80ft  Surface: Level Tile  Device:No Device  Gait Deviations:  Slow Lois, Decreased Step Length Bilaterally, Decreased Arm Swing, Decreased Gait Speed, Decreased Heel Strike Bilaterally, Mild Path Deviations and reaches for hallway rails at times to steady self    Stairs:  Stand By Assistance  Number of Steps: 1 (platform step) completed x3 trials  Height: 6\" step with used L UE support to simulate grab-bar at home, wife provided hands-on training, education to wife for positioning and hand placement  Stairs:  Stand By Assistance  Number of Steps: 4  Height: 6\" step with Right handrail to simulate for home, wife provided hands-on training, education to wife for positioning and hand placement    Balance:  Dynamic Standing Balance: Contact Guard Assistance          -standing on green foam, tandem eyes open/eyes closed          -rockerboard: forward/backward taps and lateral taps 2x10 reps, [id number] balance, unsteady, cues for technique, mild to moderately unsteady without UE support          -stood on green foam for building task, no UE support 2x1min (fairly steady), balance on rockerboard (lateral) for building task, moderately unsteady  Single Leg Balance: CGA eyes open/eyes closed, stood on green foam  Tandem Stance: CGA, eyes open/eyes closed, stood on green foam    Dynamic gait: CGA, slightly unsteady and impulsive            -ball kicking around unit, cues for safety, cues to slow down and for soft kicks to keep ball in front of patient    Exercise:  None this session. **educated on aquatic \"water-walking\" in therapy pool, forward/retro/side-stepping, heel-to-toe forward/retro, grapevine. Patient and wife stated understanding. Functional Outcome Measures: Not completed       ASSESSMENT:  Assessment: Patient progressing toward established goals. Activity Tolerance:  Patient tolerance of  treatment: good. Equipment Recommendations:Equipment Needed: No (continue to monitor)  Discharge Recommendations:  Continue to assess pending progress    Plan: Times per week: 5x/wk 90min, 1x/wk 30min  Times per day: Twice a day  Current Treatment Recommendations: Strengthening, Neuromuscular Re-education, Home Exercise Program, Balance Training, Endurance Training, Patient/Caregiver Education & Training, Functional Mobility Training, Gait Training, Transfer Training, Stair training    Patient Education  Patient Education: Plan of Care, Home Exercise Program, Family Education, Transfers, Gait, Stairs, Up in Chair for All Meals, dynamic balance/tolerance    Goals:  Patient goals : go home  Short term goals  Time Frame for Short term goals: 1 week  Short term goal 1: Patient will complete supine < > sit with head of bed elevated 30 degrees with no bed rail and modified independence to transfer in/out of bed safely. GOAL MET, SEE LTG  Short term goal 2: Patient will complete sit < > stand with SBA to stand to ambulate with decreased difficulty. GOAL MET, SEE LTG  Short term goal 3: Patient will ambulate 150' with 2 turns with SBA to progress towards navigating home environment safely. Short term goal 4: Patient will ascend/descend 3 steps with no hand rails and CGA to progress towards safe home entry. GOAL MET, SEE LTG  Short term goal 5: Patient will improve PEPE balance score to greater than or equal to 45/56 to improve balance and reduce fall risk. Long term goals  Time Frame for Long term goals : 4 weeks  Long term goal 1: Patient will complete supine < > sit with modified independence to transfer in/out of bed safely.   Long term goal 2: Patient will complete sit  <> stand and stand pivot transfer with modified independence to transfer surface to surface safely. Long term goal 3: Patient will ambulate 80' with no AD and supervion to navigate living environment. Long term goal 4: Patient will ascend/descend 3 steps with right hand rail with modified independence for safe home entry. Long term goal 5: Patient will improve PEPE balance test to greater than or equal to 51/56 to reduce his risk for falls. Long term goal 6: Patient will ascend/descend 1 step with no AD and supervision to navigate home and community safely. Long term goal 7: Patient will complete car transfer with modified independence to transfer in/out of vehicle to home and appointments safely. Following session, patient left in safe position with all fall risk precautions in place.

## 2021-06-09 NOTE — PROGRESS NOTES
[address]  Occupational Therapy  Daily Note  Time:   Time In: 1100  Time Out: 1230  Timed Code Treatment Minutes: 90 Minutes  Minutes: 90          Date: 2021  Patient Name: Christa Castañeda,   Gender: male      Room: -069/69-A  MRN: [id number]  : 1950  (78 y.o.)  Referring Practitioner: Dr. Sumaya Kennedy  Diagnosis: Left sided intracerebral hemorrhage  Additional Pertinent Hx: 78 y.o. male, previously healthy on who presents with a head injury, laceration, agitation and generalized acute confusion since the onset this morning while playing pickle ball. He has a history of hypertension and hyperlipidemia, is admitted to the inpatient rehabilitation unit on 2021 for intensive inpatient rehabilitation for impaired ADLs, ambulation & cognition due to left frontal lobe & right cerebellar contusion with intraparenchymal hemorrhage, left posterior parietal & anterior temporal subdural hematoma & subarachnoid blood, and right basilar skull fracture secondary to fall on 2021. Restrictions/Precautions:  Restrictions/Precautions: Fall Risk, General Precautions  Position Activity Restriction  Other position/activity restrictions: low stimulation guidlines s/p CHI     SUBJECTIVE: Pt. Sitting in recliner upon entry with spouse present. PAIN: 1/10: headach    Vitals: Vitals not assessed per clinical judgement, see nursing flowsheet    COGNITION: Decreased Insight, Decreased Problem Solving and Decreased Safety Awareness    ADL:   No ADL's completed this session. [employer] Payment BALANCE:  Standing Balance: Stand By Assistance, with cues for safety. BED MOBILITY:  Not Tested    TRANSFERS:  Sit to Stand:  Stand By Assistance. Stand to Sit: Stand By Assistance. FUNCTIONAL MOBILITY:  Assistive Device: None  Assist Level:  Stand By Assistance, [employer] and with verbal cues . Distance:  To and from bathroom  To/from therapy gym on 8E, and Training, Functional Mobility Training, Endurance Training, Safety Education & Training, Self-Care / ADL, Patient/Caregiver Education & Training, Cognitive Reorientation, Neuromuscular Re-education, Equipment Evaluation, Education, & procurement, Home Management Training    Patient Education  Patient Education: ADL's, IADL's, Home Exercise Program, Precautions, Reviewed Prior Education, Home Safety, Importance of Increasing Activity and Assistive Device Safety and safety with functional mobility and transfers. Goals  Short term goals  Time Frame for Short term goals: 1 week  Short term goal 1: Pt will attend and sequence together a multiple step ADL routine with SUP to improve indep with self care. Short term goal 2: Pt will demo 4 minute good dynamic standing balance with SBA to improve ability to reach into cupboards. Short term goal 3: Pt will attend to and complete simple meal prep task with SBA to improve indep with preparing an egg. Short term goal 4: Pt will complete various t/fs including toilet with SUP& 0-2 vcs for safety  Short term goal 5: Pt will navigate around obstacles during mobility to/from bathroom + into hallway with SUP & min vcs for safety to improve safety within home. Long term goals  Time Frame for Long term goals : 2 weeks  Long term goal 1: Pt will complete BADL routine with mod I and 0 vcs for safety to improve indep with self care. 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 Following session, patient left in safe position with all fall risk precautions in place.

## 2021-06-09 NOTE — PLAN OF CARE
Problem: Pain:  Description: Pain management should include both nonpharmacologic and pharmacologic interventions. Goal: Pain level will decrease  6/8/2021 2334 by Norma Thomas RN  Outcome: Ongoing   Pain 1 out of 10 controlled with PRN tylenol.

## 2021-06-10 PROCEDURE — 1180000000 HC REHAB R&B

## 2021-06-10 PROCEDURE — 99232 SBSQ HOSP IP/OBS MODERATE 35: CPT | Performed by: PHYSICAL MEDICINE & REHABILITATION

## 2021-06-10 PROCEDURE — 6370000000 HC RX 637 (ALT 250 FOR IP): Performed by: PHYSICAL MEDICINE & REHABILITATION

## 2021-06-10 PROCEDURE — 97129 THER IVNTJ 1ST 15 MIN: CPT

## 2021-06-10 PROCEDURE — 97116 GAIT TRAINING THERAPY: CPT

## 2021-06-10 PROCEDURE — 97530 THERAPEUTIC ACTIVITIES: CPT

## 2021-06-10 PROCEDURE — 97535 SELF CARE MNGMENT TRAINING: CPT

## 2021-06-10 PROCEDURE — 97110 THERAPEUTIC EXERCISES: CPT

## 2021-06-10 PROCEDURE — 97130 THER IVNTJ EA ADDL 15 MIN: CPT

## 2021-06-10 RX ORDER — BISACODYL 10 MG
10 SUPPOSITORY, RECTAL RECTAL DAILY PRN
Qty: 30 SUPPOSITORY | Refills: 0 | COMMUNITY
Start: 2021-06-10 | End: 2021-07-10

## 2021-06-10 RX ORDER — LISINOPRIL 5 MG/1
5 TABLET ORAL DAILY
Qty: 30 TABLET | Refills: 3 | Status: SHIPPED | OUTPATIENT
Start: 2021-06-11 | End: 2021-10-12 | Stop reason: SDUPTHER

## 2021-06-10 RX ORDER — TRAMADOL HYDROCHLORIDE 50 MG/1
50 TABLET ORAL EVERY 6 HOURS PRN
Qty: 28 TABLET | Refills: 0 | Status: SHIPPED | OUTPATIENT
Start: 2021-06-10 | End: 2021-06-17

## 2021-06-10 RX ORDER — PSEUDOEPHEDRINE HCL 30 MG
100 TABLET ORAL 2 TIMES DAILY
Qty: 60 CAPSULE | Refills: 1 | Status: SHIPPED | OUTPATIENT
Start: 2021-06-10 | End: 2021-09-30

## 2021-06-10 RX ORDER — POLYETHYLENE GLYCOL 3350 17 G/17G
17 POWDER, FOR SOLUTION ORAL DAILY PRN
Qty: 527 G | Refills: 1 | Status: SHIPPED | OUTPATIENT
Start: 2021-06-10 | End: 2021-07-10

## 2021-06-10 RX ORDER — SENNA PLUS 8.6 MG/1
1 TABLET ORAL NIGHTLY
Qty: 30 TABLET | Refills: 0 | Status: SHIPPED | OUTPATIENT
Start: 2021-06-10 | End: 2021-07-10

## 2021-06-10 RX ORDER — LEVETIRACETAM 500 MG/1
500 TABLET ORAL 2 TIMES DAILY
Qty: 60 TABLET | Refills: 3 | Status: SHIPPED | OUTPATIENT
Start: 2021-06-10 | End: 2021-10-12 | Stop reason: SDUPTHER

## 2021-06-10 RX ORDER — LANOLIN ALCOHOL/MO/W.PET/CERES
6 CREAM (GRAM) TOPICAL NIGHTLY PRN
Qty: 60 TABLET | Refills: 3 | COMMUNITY
Start: 2021-06-10 | End: 2021-12-07

## 2021-06-10 RX ADMIN — FAMOTIDINE 20 MG: 20 TABLET, FILM COATED ORAL at 21:58

## 2021-06-10 RX ADMIN — FAMOTIDINE 20 MG: 20 TABLET, FILM COATED ORAL at 10:00

## 2021-06-10 RX ADMIN — LISINOPRIL 5 MG: 5 TABLET ORAL at 10:00

## 2021-06-10 RX ADMIN — Medication 6 MG: at 21:58

## 2021-06-10 RX ADMIN — LEVETIRACETAM 500 MG: 500 TABLET, FILM COATED ORAL at 10:00

## 2021-06-10 RX ADMIN — DOCUSATE SODIUM 100 MG: 100 CAPSULE, LIQUID FILLED ORAL at 10:00

## 2021-06-10 RX ADMIN — ACETAMINOPHEN 650 MG: 325 TABLET ORAL at 10:00

## 2021-06-10 RX ADMIN — ACETAMINOPHEN 650 MG: 325 TABLET ORAL at 14:07

## 2021-06-10 RX ADMIN — ACETAMINOPHEN 650 MG: 325 TABLET ORAL at 21:58

## 2021-06-10 RX ADMIN — LEVETIRACETAM 500 MG: 500 TABLET, FILM COATED ORAL at 21:58

## 2021-06-10 ASSESSMENT — PAIN SCALES - GENERAL
PAINLEVEL_OUTOF10: 2
PAINLEVEL_OUTOF10: 2
PAINLEVEL_OUTOF10: 0
PAINLEVEL_OUTOF10: 0

## 2021-06-10 ASSESSMENT — ENCOUNTER SYMPTOMS
VOMITING: 0
RHINORRHEA: 0
EYE PAIN: 0
ABDOMINAL PAIN: 0
NAUSEA: 0
BACK PAIN: 0
DIARRHEA: 0
TROUBLE SWALLOWING: 0
CONSTIPATION: 0
COUGH: 0
EYE DISCHARGE: 0
SHORTNESS OF BREATH: 0
WHEEZING: 0
SORE THROAT: 0

## 2021-06-10 ASSESSMENT — PAIN DESCRIPTION - DESCRIPTORS: DESCRIPTORS: ACHING

## 2021-06-10 ASSESSMENT — PAIN DESCRIPTION - LOCATION: LOCATION: HEAD

## 2021-06-10 ASSESSMENT — PAIN DESCRIPTION - ONSET: ONSET: ON-GOING

## 2021-06-10 NOTE — DISCHARGE SUMMARY
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   · Improved neck pain due to cervical spine sprain & muscular strain  · Hypertension  · hyperlipidemia       Discharge Functional Status:    Physical therapy:  Bed Mobility:  Rolling to Left: Independent   Rolling to Right: Independent   Supine to Sit: Independent  Sit to Supine: Independent    **mat table  Scooting: Modified Independent     Transfers:  Sit to Stand: Modified Independent  Stand to Sit:Modified Independent  Car:Modified Independent, educated on safety and proper technique  bed>chair: Supervision, no device     Ambulation:  Supervision  Distance: ~300ft, 30ft, 35ft, 85ft  Surface: Level Tile  Device:No Device  Gait Deviations:  Decreased Gait Speed and Mild Path Deviations    Supervision  Distance: level: ~15ft x2, 4ft, 40ft, 50ft, 60ft    Ramp: 10ft  Surface: Level Tile and Ramp  Device:No Device  Gait Deviations:  Decreased Gait Speed and Mild Path Deviations     Stairs:  Modified Independent  Number of Steps: 1  Height: 6\" step with left rail to simulate home    Stairs:  Modified Independent  Number of Steps: 12  Height: 6\" step with right rail to simulate home     Balance:  Dynamic Standing Balance: Modified Independent, Patient stood and used reacher to  object from floor level. Patient also bent to floor level to  object x2, slightly unsteady, educated on using reacher for safety and body mechanics. PT Equipment Recommendations  Equipment Needed: No (continue to monitor), Assessment: The evaluation of Mr Jose Keita indicates a decline in functional mobility compared to baseline. Patient was independent at [facility] with no AD and very active. Patient requiring CGA/min assist with mobility at this time, demonstrating impaired balance, gait and transfers. Patient limited by continuous headache. Patient would benefit from skilled PT services to improve his ability to complete functional mobility, reduce his risk for falls and allow patient to return to [facility]. Occupational therapy:  ADL:  EATING:Independent. Saulo Hsant CARE Score: 6. ORAL HYGIENE:Independent. Saulo Shant CARE Score: 6. TOILETING HYGIENE:Independent. Saulo Shant CARE Score: 6. SHOWERING/BATHING:Supervision or touching assistance. Saulo Shant CARE Score: 4.     UPPER BODY DRESSING:Independent. Saulo Shant CARE Score: 6. LOWER BODY DRESSING:Independent. Saulo Shant CARE Score: 6. FOOTWEAR:Independent   . CARE Score: 6. TOILET TRANSFER: Supervision or touching assistance. Saulo Shant CARE Score: 4. BALANCE:  Standing Balance: Stand By Assistance, with cues for safety.       TRANSFERS:  Sit to Stand:  Stand By Assistance. m  Stand to Sit: Independent, Stand By Assistance.       FUNCTIONAL MOBILITY:  Assistive Device: None  Assist Level:  Stand By Assistance. Distance: to/from therapy gym, impulsive at times when standing  Distance: To and from bathroom and To and from therapy apartment slow and steady 1 episode of RLE unsteadiness Pt. Able to quickly self correct      ADDITIONAL ACTIVITIES:  Pt completed B UE exs with mod resistance band x 20 reps, x 1 set, while following a handout.  Good tolerance of exs, with  RBs throughout, and min cues for tech with resistance band for increasd strength and activity tolerance to support ADL components. Pt. And spouse voice and demo good understanding of HEP review. Patient identified one of their personal goals is to be able to sustain functional standing positions in order to complete various ADL and IADL skills in standing position, such as sinkside grooming or washing dishes. Dynamic standing task of access/retrieval in refrigerator and upper/lower cupboards was instructed retrieving items from upper and lower area  and  was then facilitated to challenge balance, safe choices in the home, and overall activity tolerance. Patient required SBA, and demo'ed an endurance of 10 minutes.       Equipment Recommendations: Other: Patient wife states she has ordered a shower chair, reachers, and suction grab bars and should be delivered soon. Patient's wife also notes her son is putting in a handrail on porch in order to get into house this weekend. Assessment: Patient is making good progress towards his goals with ability to meet 3/6. Patient continues to demo decreased safety awareness with strength, endurance, balance, awareness requiring education on importance of assistance from the therapist in order to complete ADLs/IADLs independently.       Speech therapy:  Working memory and manipulation task (re-ordering based on typical progression):  -3 unit -7/7  indep  -4 unit - 6/7 indep increased to 7/7 following min verbal prompt  *EXCELLENT use of audible repetition to manipulate the words into the correct sequence  *Appropriate processing and completion time with task    Calender task in which patient was required to input 10 events onto calendar.  -Patient demonstrated the ability to input 10/10 events correctly onto calendar indep  *Task completed in appropriate amount of time  *EXCELLENT use of organizational strategies and inclusion of all pertinent information  (I.e. writing down event, with who, what time, etc.)  *Patient reports he keeps an electronic calendar for day-to-day activities at baseline.     Complex attention task- Patient prompted to provide information to describe each card based on the following parameters- color = holiday, number = animal, suit = associated word  -Color in isolation (I.e. black = halloween, red = quiroz's day) 7/7 indep  -Number in isolation (I.e. odd = octopus, even = elephant, face = arredondo) 6/7 indep, increased to 7/7 given min verbal prompt  -Suit in isolation (I.e. spade = shovel, heart = love, darleen = ring, club = clover) 7/7 indep  -Color, number, suit in combination 6/10 indep, increased to 10/10 following min verbal prompt- Errors noted with patient stating the correct number category (\"odd\"), but that stating an incorrect corresponding animal (\"elephant\")  *Patient demonstrated greatest difficulty with recall of number and corresponding animal; ST discussed memory strategies to aid in recall and patient was receptive  *Patient demonstrated slow processing speed with this task, reports it was one of the most difficult tasks he has been asked to complete  *EXCELLENT use of audible reasoning to aide in working through all components of combinations cards (I.e. color, number, suit)    Completed ACE symptom questionnaire  -Patient scored 3/22 today (presence of concussion indicated by scores of 12/22 or greater)  *Previous score 06/09- 5/22  *Previous score 06/07- 8/22  *Previous score 06/05- 12/22  *Previous scored 06/04- 13/22     *Patient reports score is indicative of how he is feeling; reports feeling much better; consistent with ST observation  *Discussed removing 'low-stimulation' requirements, as patient is demonstrating improvement and has started to reintroduce stimulation with no difficulties reported  *Discussed taking breaks as necessary, as patient knows his body and what he needs best; patient and wife receptive    **Given consistent decline in ACE symptom scores, recommend discontinuing low stim protocol. Patient and wife have been educated on symptoms, environmental modifications to maximize brain healing/rest, and recommendations for proceeding with slow re-introduction of auditory/visual stimuli (ie: phone, TV, music, lights etc.). Discussed keeping time spent completing these activities to hour increments with rest breaks as determined by cognitive, physical and emotional indications. Comprehension: 6 - Complex ideas 90% or device (hearing aid or glasses- if patient is primarily a visual learner)  Expression: 7 - Patient expresses complex ideas/needs  Social Interaction: 6 - Patient requires medication for mood and/or effect  Problem Solvin - Independent with device (e.g. notes, schedules)  Memory: 4 - Patient remembers 75-90%+ of the time      Inpatient Rehabilitation Course:   Fidelina Amaro is a 78 y.o. right-handed  male with history of hypertension and hyperlipidemia, was admitted to inpatient rehabilitation on 2021 for intensive inpatient rehabilitation for impaired ADLs, ambulation & cognition due to left frontal lobe & right cerebellar contusion with intraparenchymal hemorrhage, left posterior parietal & anterior temporal subdural hematoma & subarachnoid blood, and right basilar skull fracture secondary to fall on 2021.     The patient participated in an aggressive multidisciplinary inpatient rehabilitation program involving 3 hours per day, 5 days per week of rehabilitation. Hypertension management was undertaken with medication management on any patient with systolic blood pressure greater than 139 or diastolic blood pressure greater than 90. Hyperlipidemia was considered and the patient was started or continued on a statin medication for any LDL>100. Appropriate stroke prophylaxis was maintained throughout rehabilitation stay.       Appropriate DVT prophylaxis options were considered throughout rehabilitation stay. Dr Lotus Marino followed during the IPR stay for medical management    The patient's inpatient rehabilitation course overall is uneventful. His headache gradually improved with reduced intensity and became intermittent. He tolerated the intensive inpatient rehabilitation treatment well. He gained improvement in performing ADLs & ambulation more independently. Patient was discharged Home in Stable condition. Consults:   None      Significant Diagnostics:   CBC with Differential:    Lab Results   Component Value Date    WBC 7.6 06/03/2021    RBC 4.40 06/03/2021    HGB 12.6 06/03/2021    HCT 39.2 06/03/2021     06/03/2021    MCV 89.1 06/03/2021    MCH 28.6 06/03/2021    MCHC 32.1 06/03/2021    NRBC 0 06/03/2021    SEGSPCT 82.1 06/03/2021    MONOPCT 6.5 06/03/2021    MONOSABS 0.5 06/03/2021    LYMPHSABS 0.8 06/03/2021    EOSABS 0.1 06/03/2021    BASOSABS 0.0 06/03/2021     CMP:    Lab Results   Component Value Date     06/03/2021    K 4.2 06/03/2021     06/03/2021    CO2 24 06/03/2021    BUN 13 06/03/2021    CREATININE 0.8 06/03/2021    LABGLOM >90 06/03/2021    GLUCOSE 128 06/03/2021    PROT 7.1 06/03/2021    LABALBU 4.0 06/03/2021    CALCIUM 9.1 06/03/2021    BILITOT 0.6 06/03/2021    ALKPHOS 45 06/03/2021    AST 13 06/03/2021    ALT 12 06/03/2021     BMP:    Lab Results   Component Value Date     06/03/2021    K 4.2 06/03/2021     06/03/2021    CO2 24 06/03/2021    BUN 13 06/03/2021    LABALBU 4.0 06/03/2021    CREATININE 0.8 06/03/2021    CALCIUM 9.1 06/03/2021    LABGLOM >90 06/03/2021    GLUCOSE 128 06/03/2021        No results for input(s): POCGLU in the last 72 hours.     Cholesterol Panel:   Results in Past 30 Days  Result Component Current Result Ref Range Previous Result Ref Range   Cholesterol, Total 219 (H) (6/3/2021) 100 - 199 mg/dL Not in Time Range    HDL 42 (6/3/2021) mg/dL Not in Time Range    LDL Calculated 143 (6/3/2021) mg/dL Not in Time Range    Triglycerides 170 (6/3/2021) 0 - 199 mg/dL Not in Time Range        CT of head without contrast (6/7/2021) : Impression   1. No acute findings. 2. Low-attenuation the left frontal lobe consistent with residual edema associated with the hemorrhagic contusion. 3. Subtle area of low attenuation in the periphery of the right cerebellum deep to the right occipital bone fracture also at the site of prior contusion       Patient Instructions: Follow-up visits: See after visit summary from hospitalization  · [facility] on Monday Jun 14, 2021 at 11:30 am ([facility], [address], [place], [facility] [employer] [facility] ; [facility] 30: [phone])   · [facility] on Wednesday Jun 16, 2021 at 2:00 pm ([facility], [address], [place], [facility] [employer] [facility] ; [facility] 30: [phone])  · Enzo Anand MD on Tuesday Jun 22, 2021 at 11:00 AM ([address] [place] [address], [address] [place] [address]. [address], [facility] [address] ; [phone])  · CT HEAD WO CONTRAST on Wednesday Jun 30, 2021 at 10:40 AM ([address], [address], [place] [place] [address] ; [phone])   · Juan Winn MD (Neurosurgery) on Friday Jul 2, 2021 at 3:00 pm [facility]), [address] [place] [address] [address] [facility], [address], [facility] [address] ; [phone])  · Juan Winn MD on Wednesday Jul 7, 2021 at 3:00 PM ([address] [facility] Neurosurgery, [address] [place] [address]. [address], [facility] [address] ; [phone])  · Norma Thomas MD on Tuesday Aug 3, 2021 at 3:00 PM ([address] [facility], [address] [address] ; [phone])        Discharge Medications:   Silvina Morfin   Home Medication Instructions BIS:[id number]    Printed on:06/11/21 0753   Medication Information                      bisacodyl (DULCOLAX) 10 MG suppository  Place 1 suppository rectally daily as needed for Constipation             diclofenac sodium (VOLTAREN) 1 % GEL  Apply 2 g topically 4 times daily as needed for Pain             docusate sodium (COLACE, DULCOLAX) 100 MG CAPS  Take 100 mg by mouth 2 times daily             levETIRAcetam (KEPPRA) 500 MG tablet  Take 1 tablet by mouth 2 times daily             lisinopril (PRINIVIL;ZESTRIL) 5 MG tablet  Take 1 tablet by mouth daily             melatonin 3 MG TABS tablet  Take 2 tablets by mouth nightly as needed (insomnia)             polyethylene glycol (GLYCOLAX) 17 g packet  Take 17 g by mouth daily as needed for Constipation             senna (SENOKOT) 8.6 MG tablet  Take 1 tablet by mouth nightly             traMADol (ULTRAM) 50 MG tablet  Take 1 tablet by mouth every 6 hours as needed for Pain (for pain level 6-10/10) for up to 7 days. Controlled substances monitoring: possible medication side effects, risk of tolerance and/or dependence, and alternative treatments discussed.      45 minutes spent preparing the patient for discharge    Yaquelin Gar MD

## 2021-06-10 NOTE — PROGRESS NOTES
[address]  Occupational Therapy  Daily Note  Time:   Time In: 1130  Time Out: 1230  Timed Code Treatment Minutes: 60 Minutes  Minutes: 60          Date: 6/10/2021  Patient Name: Sandy Carpio,   Gender: male      Room: [facility]069/69-A  MRN: [id number]  : 1950  (78 y.o.)  Referring Practitioner: Dr. Jolynn Greenberg  Diagnosis: Left sided intracerebral hemorrhage  Additional Pertinent Hx: 78 y.o. male, previously healthy on who presents with a head injury, laceration, agitation and generalized acute confusion since the onset this morning while playing pickle ball. He has a history of hypertension and hyperlipidemia, is admitted to the inpatient rehabilitation unit on 2021 for intensive inpatient rehabilitation for impaired ADLs, ambulation & cognition due to left frontal lobe & right cerebellar contusion with intraparenchymal hemorrhage, left posterior parietal & anterior temporal subdural hematoma & subarachnoid blood, and right basilar skull fracture secondary to fall on 2021. Restrictions/Precautions:  Restrictions/Precautions: Fall Risk, General Precautions  Position Activity Restriction  Other position/activity restrictions: low stimulation guidlines s/p CHI     SUBJECTIVE: Pt. Sitting in room awaiting OT treatment. Pt. Pleasant and cooperative, spouse present for partial treatment time. PAIN: 1/10: head    Vitals: Vitals not assessed per clinical judgement, see nursing flowsheet    COGNITION: Decreased Insight, Decreased Problem Solving and Decreased Safety Awareness    ADL:  EATING:Independent. Dorena Tayo CARE Score: 6. ORAL HYGIENE:Independent. Dorena Tayo CARE Score: 6. TOILETING HYGIENE:Independent. Dorena Tayo CARE Score: 6. SHOWERING/BATHING:Supervision or touching assistance. Dorena Tayo CARE Score: 4.     UPPER BODY DRESSING:Independent. Dorena Tayo CARE Score: 6. LOWER BODY DRESSING:Independent. Dorena Tayo CARE Score: 6. FOOTWEAR:Independent   .   CARE Score: 6. TOILET TRANSFER: Supervision or touching assistance. Bettey Grad CARE Score: 4. BALANCE:  Standing Balance: Stand By Assistance. BED MOBILITY:  Not Tested    TRANSFERS:  Sit to Stand:  Stand By Assistance. Stand to Sit: Stand By Assistance. FUNCTIONAL MOBILITY:  Assistive Device: None  Assist Level:  Stand By Assistance. Distance: To and from bathroom and To and from therapy apartment slow and steady 1 episode of RLE unsteadiness Pt. Able to quickly self correct     ADDITIONAL ACTIVITIES:  Patient identified one of their personal goals is to be able to sustain functional standing positions in order to complete various ADL and IADL skills in standing position, such as sinkside grooming or washing dishes. Dynamic standing task of access/retrieval in refrigerator and upper/lower cupboards was instructed retrieving items from upper and lower area  and  was then facilitated to challenge balance, safe choices in the home, and overall activity tolerance. Patient required SBA, and demo'ed an endurance of 10 minutes. Spouse also educated and observed Pt. Treatment on safety with kitchen set up and fall prevention for Pt. And spouse both verbalize an understanding. ASSESSMENT:     Activity Tolerance:  Patient tolerance of  treatment: good. Discharge Recommendations: Outpatient OT (Driving evaluation in future)   Equipment Recommendations: Other: Patient wife states she has ordered a shower chair, reachers, and suction grab bars and should be delivered soon. Patient's wife also notes her son is putting in a handrail on porch in order to get into house this weekend.   Plan: Times per week: 5x/wk for 90 min and 1x/wk for 30 min  Current Treatment Recommendations: Balance Training, Functional Mobility Training, Endurance Training, Safety Education & Training, Self-Care / ADL, Patient/Caregiver Education & Training, Cognitive Reorientation, Neuromuscular Re-education, Equipment Evaluation, Education, & procurement, Home Management Training    Patient Education  Patient Education: IADL's, Energy Conservation, Family Education, Reviewed Prior Education, Home Safety and Importance of Increasing Activity    Goals  Short term goals  Time Frame for Short term goals: 1 week  Short term goal 1: Pt will attend and sequence together a multiple step ADL routine with SUP to improve indep with self care. Short term goal 2: Pt will demo 4 minute good dynamic standing balance with SBA to improve ability to reach into cupboards. Short term goal 3: Pt will attend to and complete simple meal prep task with SBA to improve indep with preparing an egg. Short term goal 4: Pt will complete various t/fs including toilet with SUP& 0-2 vcs for safety  Short term goal 5: Pt will navigate around obstacles during mobility to/from bathroom + into hallway with SUP & min vcs for safety to improve safety within home. Long term goals  Time Frame for Long term goals : 2 weeks  Long term goal 1: Pt will complete BADL routine with mod I and 0 vcs for safety to improve indep with self care. 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 Following session, patient left in safe position with all fall risk precautions in place.

## 2021-06-10 NOTE — PROGRESS NOTES
[address] [address]  Recreational Therapy  Discharge Note  Inpatient Rehabilitation Unit         Date:  6/10/2021       Patient Name: Ashley Rabago      MRN: [id number]       YOB: 1950 (78 y.o.)       Gender: male  Diagnosis: Left sided intracerebral hemorrhage  Referring Practitioner: Dr. Trever Sherman    Patient discharged from Recreational Therapy at this time. See recreational therapy notes for details.     Electronically signed by: DEEJAY Caballero  Date: 6/10/2021

## 2021-06-10 NOTE — PROGRESS NOTES
Alert and oriented to person, place, and time. Pupils CHAPIN 4mm to 2mm. Mucous membranes pink and moist. Smile symmetrical. Hair dry and shiny. Skin warm and dry. Speech clear. Denies difficulty swallowing. Lung sounds clear anterior, posterior, and lateral. Respirations easy and unlabored. 99% oxygen on room air. No cough noted. Heart sounds strong and regular. Bowel sounds active in all 4 quadrants. Non tender to palpation. Denies difficulty urinating. States has not had a bowel movent today. Last bowel movement was last night before dinner. Radial pulses strong and equal. Hand grasp strong and equal. Arm drift negative. Capillary refill less than 3 seconds. Skin turgor brisk. No edema noted. Did not check pedal pulses due to shoes on and having therapy. Pedal push and pull strong bilateral. Kayden's sign negative. Leg lift strong and equal. Denies numbness and tingling. Wife at bedside. Pleasant and cheerful with family and staff. Up in chair. Wheels locked. Call light within reach. Over bed table within reach. Denies needs at this time.

## 2021-06-10 NOTE — PLAN OF CARE
Problem: Pain:  Goal: Pain level will decrease  Description: Pain level will decrease  Outcome: Ongoing  Note: Patient reports head pain of 1/10, relieved by tylenol. Problem: Pain:  Goal: Control of acute pain  Description: Control of acute pain  Outcome: Ongoing     Problem: Pain:  Goal: Control of chronic pain  Description: Control of chronic pain  Outcome: Ongoing     Problem: Physical Regulation:  Goal: Ability to maintain a stable neurologic state will improve  Description: Ability to maintain a stable neurologic state will improve  Outcome: Ongoing     Problem: Mobility - Impaired:  Goal: Mobility will improve  Description: Mobility will improve  Outcome: Ongoing  Note: Patient one assist without device. Ambulated to bathroom and back to bed. Able to transfer in and out of bed and chair. Problem: Bleeding:  Goal: Will show no signs and symptoms of excessive bleeding  Description: Will show no signs and symptoms of excessive bleeding  Outcome: Ongoing  Note: No s/s of excessive bleeding. No DVT prophylaxis orders. Problem: Falls - Risk of:  Goal: Will remain free from falls  Description: Will remain free from falls  Outcome: Ongoing  Note: No falls noted this shift. Fall precautions in place. Patient uses call light appropriately to alert staff to needs. Patient uses gait belt when ambulating and wears non slip slippers. .       Problem: Falls - Risk of:  Goal: Absence of physical injury  Description: Absence of physical injury  Outcome: Ongoing     Problem: SKIN INTEGRITY  Goal: LTG - Patient will be free from infection  Outcome: Ongoing  Note:  No s/s of infection. Patient afebrile  Problem: SKIN INTEGRITY  Goal: LTG - patient will maintain/improve skin integrity through proper skin care techniques  Outcome: Ongoing  Note: Patient staples out, open to air. No other skin issues.       Problem: SKIN INTEGRITY  Goal: LTG - Patient will demonstrate appropriate pressure relief techniques  Outcome: Ongoing     Problem: SKIN INTEGRITY  Goal: LTG - Patient will be free from infection  Outcome: Ongoing     Problem: DISCHARGE BARRIERS  Goal: Patient's continuum of care needs are met  6/10/2021 0021 by Gillian Skelton RN  Outcome: Ongoing  Note: Patient discharge planned 6/11. Patient expressed no questions about discharge process or about after care at this time. Plan is discharge to home with outpatient PT     Problem: IP DRESSINGS LOWER EXTREMITIES  Goal: LTG - patient will dress lower body with or without assistive device  Outcome: Ongoing     Problem: IP COMMUNICATION/DYSARTHRIA  Goal: LTG - Patient will effectively communicate in all situations with the use of compensatory strategies  Outcome: Ongoing     Problem: IP BALANCE  Goal: LTG - Patient will maintain balance to allow for safe/functional mobility  Outcome: Ongoing     Problem: IP MOBILITY  Goal: LTG - patient will ambulate community distance  Outcome: Ongoing     Problem: SAFETY  Goal: LTG - patient will utilize safety techniques  Outcome: Ongoing     Problem: Nutrition  Goal: Optimal nutrition therapy  Outcome: Ongoing   Care plan reviewed with patient. Patient verbalizes understanding of care plan and treatment goals.

## 2021-06-10 NOTE — PROGRESS NOTES
Physical Medicine & Rehabilitation Progress Note    Chief Complaint:  headache    Subjective:    Philippe Yung is a 78 y.o. right-handed  male with history of hypertension and hyperlipidemia, was admitted on 6/2/2021 for intensive inpatient rehabilitation for impaired ADLs, ambulation & cognition due to left frontal lobe & right cerebellar contusion with intraparenchymal hemorrhage, left posterior parietal & anterior temporal subdural hematoma & subarachnoid blood, and right basilar skull fracture secondary to fall on 5/28/2021.      The patient does not remember what happened to him. The last thing he can remember is that he was playing pickle ball. His record showed that the patient went after a ball and fell backward while he was playing pickle ball on 5/28/2021. The back of his head hit the ground with loss of consciousness for a short period of time according the witness's account.  He was confused and agitated when he was sent to [address] [place] [address] Drive had an episode of vomiting while he was in ER.  Initial CT of head showed left frontal lobe acute intraparenchymal hemorrhage and right basilar skull fracture. Neurosurgery was consulted. CT of head repeated few hours later showed interval increase in left frontal lobe contusion with multiple foci of intraparenchymal hemorrhage measuring up to 1.2 cm, interval increase right cerebellar contusion with foci of intraparenchymal hemorrhage measuring up to approximately 1 cm, new small subdural hematomas & trace adjacent subarachnoid blood within left posterior parietal and anterior temporal regions. No surgical intervention was recommended.  CTA of neck & head revealed no abnormality other than small focus of venous injury/thrombus within right transverse sinus.  Repeated CT of head on 5/29/2021 showed no significant change.   MRV of head done on 6/1/2021 showed small nonocclusive deep venous thrombus at the right transverse/sigmoid junction. The patient says he feels well today. He only has very mild right side headache intermittently with intensity now rated at 1.5/10 level. this morning. He is still on Tylenol every 6 hours and he is not using any other pain medication. He has less fatigue now. He denies having numbness or weakness. He continues tolerating the intensive inpatient rehab treatment well. He is scheduled to be discharged on 6/11/2021      Rehabilitation:  PT: Reviewed. Bed Mobility:  Rolling to Left: Independent   Rolling to Right: Independent   Supine to Sit: Independent  Sit to Supine: Independent   Scooting: Modified Independent     Transfers:  Sit to Stand: Modified Independent  Stand to Sit:Modified Independent  Car:Modified Independent, educated on safety and proper technique  bed>chair: Supervision, no device     Ambulation:  Supervision  Distance: ~300ft, 30ft, 35ft, 85ft  Surface: Level Tile  Device:No Device  Gait Deviations:  Decreased Gait Speed and Mild Path Deviations    Stairs:  Modified Independent  Number of Steps: 1  Height: 6\" step with left rail to simulate home    Stairs:  Modified Independent  Number of Steps: 12  Height: 6\" step with right rail to simulate home     Balance:  Dynamic Standing Balance: Modified Independent, Patient stood and used reacher to  object from floor level. Patient also bent to floor level to  object x2, slightly unsteady, educated on using reacher for safety and body mechanics. OT: Reviewed. BALANCE:  Standing Balance: Stand By Assistance, with cues for safety.       TRANSFERS:  Sit to Stand:  Stand By Assistance. Stand to Sit: Stand By Assistance.       FUNCTIONAL MOBILITY:  Assistive Device: None  Assist Level:  Stand By Assistance, [address] and with verbal cues . Distance:  To and from bathroom  To/from therapy gym on [address], and therapy apartment and grocery store on [address] [facility]:  Pt completed B UE exs with moderate resistance band x 15 reps, x 1 set, with fair tolerance of exs, with  RBs throughout, and verbal and visual cues for tech with resistance band in attempt to improve [place] with ADL's. Spouse and Pt. Voice an understanding of handout for HEP.     Patient identified one of their personal goals is to be able to sustain functional standing positions in order to complete various ADL and IADL skills in standing position, such as sinkside grooming or washing dishes. Dynamic standing task of retrieving linens from floor with LHR and placing into washer and dryer and continuing to fold while reaching over FIDEL and completing unilateral and bilateral release was then facilitated to challenge balance and standing tolerance along with improving use with long handled reacher to prepare for utilizing at home, . Patient required SBA, and demo'ed an endurance of 14 minutes.      Pt. Composed a written grocery list with min/mod vcs to complete. Pt. Completed grocery shopping task demo min LOB during activity although demo slight decreased recall when providing substitution for item on list not available. Pt. Then transport groceries to car via grocery cart and placed in back seat. Pt. Tia Andino decreased safety loading groceries bumping into roof of car slightly each trial, unable demo decreased proprioception. ST: Reviewed.     Working memory and manipulation task, in which 3 and 4 sequence words were read to patient in and incorrect order and patient required to repeat words in correct sequence  -Patient able to order 3-sequence words correctly in 7/7 opportunities indep  -Patient able to order 4-sequence words correctly in 6/7 opportunities, increased to 7/7 following min verbal prompt  *EXCELLENT use of audible repetition to manipulate the words into the correct sequence  *Appropriate processing and completion time with task    Calender task in which patient was required to input 10 events onto calendar.  -Patient demonstrated the ability to input 10/10 events correctly onto calendar indep  *Task completed in appropriate amount of time  *EXCELLENT use of organizational strategies (I.e. writing down event, with who, what time, etc.)  *Patient reports he keeps an electronic calendar for day-to-day activities    Card game with a series of information required to describe each card (I.e. color = holiday, number = animal, suit = associated word)  -Color in isolation (I.e. black = halloween, red = quiroz's day) 7/7 indep  -Number in isolation (I.e. odd = octopus, even = elephant, face = arredondo) 6/7 indep, increased to 7/7 given min verbal prompt  -Suit in isolation (I.e. spade = shovel, heart = love, darleen = ring, club = clover) 7/7 indep  -Color, number, suit in combination 6/10 indep, increased to 10/10 following min verbal prompt  *Patient demonstrated greatest difficulty with recall of number and corresponding animal; ST discussed memory strategies to aid in recall and patient was receptive  *Patient demonstrated slow processing speed with this task, reports it was one of the most difficult tasks he has been asked to complete  *EXCELLENT use of audible reasoning to aide in working through all components of combinations cards (I.e. color, number, suit)     Completed ACE symptom questionnaire  -Patient scored 3/22 today (presence of concussion indicated by scores of 12/22 or greater)  *Previous score 06/09- 5/22  *Previous score 06/07- 8/22  *Previous score 06/05- 12/22  *Previous scored 06/04- 13/22     *Patient reports score is indicative of how he is feeling; reports feeling much better; consistent with ST observation  *Discussed removing 'low-stimulation' requirements, as patient is demonstrating improvement and has started to reintroduce stimulation with no difficulties reported  *Discussed taking breaks as necessary, as patient knows his body and what he needs best; patient and wife receptive       Review of Systems:  Review of Systems   Constitutional: Positive for fatigue. Negative for chills, diaphoresis and fever. HENT: Negative for ear discharge, ear pain, hearing loss, nosebleeds, rhinorrhea, sneezing, sore throat, tinnitus and trouble swallowing. Eyes: Negative for pain, discharge and visual disturbance. Respiratory: Negative for cough, shortness of breath and wheezing. Cardiovascular: Negative for chest pain, palpitations and leg swelling. Gastrointestinal: Negative for abdominal pain, constipation, diarrhea, nausea and vomiting. Genitourinary: Negative for difficulty urinating and dysuria. Musculoskeletal: Positive for gait problem. Negative for arthralgias, back pain, myalgias and neck pain. Skin: Negative for rash. Neurological: Positive for headaches. Negative for dizziness, tremors, seizures, speech difficulty, weakness, light-headedness and numbness. Hematological: Does not bruise/bleed easily. Psychiatric/Behavioral: Negative for dysphoric mood, hallucinations and sleep disturbance. The patient is not nervous/anxious.          Objective:  /76   Pulse 67   Temp 97.9 °F (36.6 °C) (Temporal)   Resp 18   Ht 5' 7\" (1.702 m)   Wt 162 lb 12.8 oz (73.8 kg)   SpO2 99%   BMI 25.50 kg/m²   Physical Exam   General:  well-developed, well nourished  male ; in no acute distress ; appropriate affect & mood ; sitting on reclining chair comfortably   Eyes: pupil equally round ; extra-ocular motion intact bilaterally   Head, Ear, Nose, Mouth & Throat : normocephalic ; scab at posterior right head over the occipital region with very mild tenderness ; no tenderness at face ; no discharge from ears or nose ; no deformity ; no facial swelling ; oral mucosa pink   Neck :  supple ; no tenderness ; no muscle spasm  Cardiovascular : regular rate & rhythm ; normal S1 & S2 heart sound ; no murmur ; normal peripheral pulse at bilateral upper & lower extremities  Pulmonary : lung clear to auscultation ; no wheezing ; no rale  Gastrointestinal : soft, flat abdomen ; no tenderness ; normal bowel sound present   Back : no tenderness; no muscle spasm  Skin: no skin lesion or rash ; no pitting edema at all 4 extremities  Musculoskeletal : no limb asymmetry; no limb deformity; no tenderness at bilateral upper & lower extremities; no palpable mass at limbs ; no joints laxity or crepitation ; normal functional joints ROM at bilateral upper & lower extremities  Cerebral :  alert ; awake ; oriented to place, person and time ; follow 2 steps verbal commend  Cerebellum : no dysmetria with bilateral finger-to-nose test  Cranial Nerves :  grossly intact CN II to XII function  Sensory : intact light touch and pin prick sensation at bilateral upper & lower extremities  Motor : normal tone at bilateral upper & lower extremities ; normal 5/5 muscle strength at bilateral upper & lower extremities  Reflex : 2+ bilateral knees reflexes : 1+ bilateral biceps reflexes ; zero bilateral brachioradialis reflexes   Pathological Reflex :  No Pedro's sign ; no ankle clonus  Gait :  Not assessed      Diagnostics:   No results found for this or any previous visit (from the past 24 hour(s)). 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   · Improved neck pain due to cervical spine sprain & muscular strain  · Hypertension  · hyperlipidemia     The patient's headache continues to improve. The headache continues to be intermittent and most of time the intensity remains mild. He still has fatigue but it is improving. He is tolerating the intensive rehab treatment well and is making functional improvement. He is scheduled to be discharged on 6/11/2021. Plan:  · Continues intensive PT/OT/SLP/RT inpatient rehabilitation program at least 3 hours per day, 5 days per week in order to improve functional status prior to discharge. Family education and training will be completed. Equipment evaluations and recommendations will be completed as appropriate. · Rehabilitation nursing continues to be involved for bowel, bladder, skin, and pain management. Nursing will also provide education and training to patient and family. · Prophylaxis:  DVT: Silvestre stocking; intermittent pneumatic compression device. GI: Colace, Dulcolax suppository as needed, GlycoLax as needed, milk of magnesia as needed, Senokot nightly. · Pain: Tylenol 650 mg every 6 hour and as needed every 4 hours; Ultram as needed; Voltaren gel as needed  · Continue lisinopril 5 mg for hypertension  · Continue Keppra for seizure prevention  · Nutrition:  continue current diet    · Bladder: Monitoring for signs or symptoms of UTI  · Bowel: Monitoring for sign or symptom of constipation  ·  and case management for coordination of care and discharge planning  · Current follow up appointment : [facility] on Wednesday Jun 16, 2021 at 2:00 pm ([facility], [address], [address] [facility], [address] [facility] [address] [facility] [address] ; [facility] [address]: [phone]) ; Rupali Rueda MD on Tuesday Jun 22, 2021 at 11:00 AM ([address] [place] [address], [address] [place] [address]. [address] [address] ; [phone]) ; CT HEAD WO CONTRAST on Wednesday Jun 30, 2021 at 10:40 AM [facility]. [facility] Scan, [address] [facility], [address] [facility] [address] ; [phone]) ;  Cheryl Bullard MD (Neurosurgery) on Friday Jul 2, 2021 at 3:00 pm [facility] ([facility]), [address] [facility] [address] [facility], [address], [facility] [address] ; [phone]) ; Rory Hunter MD on Wednesday Jul 7, 2021 at 3:00 PM ([address] [facility], [address] [facility], [facility] [address] ; [phone]) ; Jessa High MD on Tuesday Aug 3, 2021 at 3:00 PM ([address] [facility], [address] [address] [address], [facility] [address] ; [phone])       Missed Therapy Time:  · None    Jessa High MD

## 2021-06-10 NOTE — PROGRESS NOTES
Patient: [facility]  Unit/Bed: 8E-069/69-A  YOB: 1950  MRN: [id number] Acct: [de-identified]   Admitting Diagnosis: Left-sided intracerebral hemorrhage ([facility] [I61.2]  Admit Date:  6/2/2021  Hospital Day: 8    Assessment:     Principal Problem:    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 ([facility]  Active Problems:    Intraparenchymal hematoma of left side of brain due to trauma [facility])    Closed fracture of right side of base of skull (HCC)    Scalp laceration, initial encounter  Resolved Problems:    * No resolved hospital problems. *      Plan:     Looks brighter and energetic today        Subjective:     Patient has no complaint of CP or SOB or HA. .   Medication side effects: none    Scheduled Meds:   senna  1 tablet Oral Nightly    acetaminophen  650 mg Oral Q6H    melatonin  6 mg Oral Nightly    lisinopril  5 mg Oral Daily    docusate sodium  100 mg Oral BID    famotidine  20 mg Oral BID    levETIRAcetam  500 mg Oral BID     Continuous Infusions:  PRN Meds:traMADol, diclofenac sodium, ondansetron, traMADol, acetaminophen, polyethylene glycol, promethazine **OR** [DISCONTINUED] ondansetron, sodium chloride flush, bisacodyl, magnesium hydroxide    Review of Systems  Pertinent items are noted in HPI. Objective:     Patient Vitals for the past 8 hrs:   BP Temp Temp src Pulse Resp SpO2   06/10/21 0745 130/80 98.7 °F (37.1 °C) Temporal 67 17 99 %     I/O last 3 completed shifts: In: 100 [P.O.:100]  Out: -   No intake/output data recorded.     /80   Pulse 67   Temp 98.7 °F (37.1 °C) (Temporal)   Resp 17   Ht 5' 7\" (1.702 m)   Wt 162 lb 12.8 oz (73.8 kg)   SpO2 99%   BMI 25.50 kg/m²     General appearance: alert, appears stated age and cooperative  Head: Normocephalic, without obvious abnormality, atraumatic  Lungs: clear to auscultation bilaterally  Chest wall: no tenderness  Heart: regular rate and rhythm, S1, S2 normal, no murmur, click, rub or gallop  Abdomen: soft, non-tender; bowel sounds normal; no masses,  no organomegaly  Extremities: extremities normal, atraumatic, no cyanosis or edema  Skin: Skin color, texture, turgor normal. No rashes or lesions  Neurologic: Grossly normal      Electronically signed by Britney Champagne MD on 6/10/2021 at 11:00 AM

## 2021-06-10 NOTE — PROGRESS NOTES
progression, duration and frequency. PAIN: 1/10: headache    Vitals: Vitals not assessed per clinical judgement, see nursing flowsheet    OBJECTIVE:  Bed Mobility:  Rolling to Left: Independent   Rolling to Right: Independent   Supine to Sit: Independent  Sit to Supine: Independent   Scooting: Modified Independent    Transfers:  Sit to Stand: Modified Independent  Stand to Sit:Modified Independent  Car:Modified Independent, educated on safety and proper technique  bed>chair: Supervision, no device    Ambulation:  Supervision  Distance: level: ~15ft x2, 4ft, 40ft, 50ft, 60ft    Ramp: 10ft  Surface: Level Tile and Ramp  Device:No Device  Gait Deviations:  Decreased Gait Speed and Mild Path Deviations    Stairs:  Modified Independent  Number of Steps: 1  Height: 6\" step with left rail to simulate home  Stairs:  Modified Independent  Number of Steps: 12  Height: 6\" step with right rail to simulate home    Balance:  Dynamic Standing Balance: Modified Independent, Patient stood and used reacher to  object from floor level. Patient also bent to floor level to  object x2, slightly unsteady, educated on using reacher for safety and body mechanics. Exercise:  Patient was guided in 1 set(s) 15 reps of exercise to both lower extremities. Standing heel/toe raises, Standing marches, Standing hamstring curls, Standing hip flexion, Mini squats and with 2# ankle weights applied. Exercises were completed for increased independence with functional mobility. Functional Outcome Measures: Not completed       ASSESSMENT:  Assessment: Patient progressing toward established goals. Activity Tolerance:  Patient tolerance of  treatment: good.       Equipment Recommendations:Equipment Needed: No (continue to monitor)  Discharge Recommendations:  Continue to assess pending progress    Plan: Times per week: 5x/wk 90min, 1x/wk 30min  Times per day: Twice a day  Current Treatment Recommendations: Strengthening, Neuromuscular Re-education, Home Exercise Program, Balance Training, Endurance Training, Patient/Caregiver Education & Training, Functional Mobility Training, Gait Training, Transfer Training, Stair training    Patient Education  Patient Education: Plan of Care, Family Education, [employer], Transfers, Reviewed Prior Education, Gait, Stairs, Car Transfers, Verbal Exercise Instruction    Goals:  Patient goals : go home  Short term goals  Time Frame for Short term goals: 1 week  Short term goal 1: Patient will complete supine < > sit with head of bed elevated 30 degrees with no bed rail and modified independence to transfer in/out of bed safely. GOAL MET, SEE LTG  Short term goal 2: Patient will complete sit < > stand with SBA to stand to ambulate with decreased difficulty. GOAL MET, SEE LTG  Short term goal 3: Patient will ambulate 150' with 2 turns with SBA to progress towards navigating home environment safely. Short term goal 4: Patient will ascend/descend 3 steps with no hand rails and CGA to progress towards safe home entry. GOAL MET, SEE LTG  Short term goal 5: Patient will improve PEPE balance score to greater than or equal to 45/56 to improve balance and reduce fall risk. Long term goals  Time Frame for Long term goals : 4 weeks  Long term goal 1: Patient will complete supine < > sit with modified independence to transfer in/out of bed safely. Long term goal 2: Patient will complete sit  <> stand and stand pivot transfer with modified independence to transfer surface to surface safely. Long term goal 3: Patient will ambulate 80' with no AD and supervion to navigate living environment. Long term goal 4: Patient will ascend/descend 3 steps with right hand rail with modified independence for safe home entry. Long term goal 5: Patient will improve PEPE balance test to greater than or equal to 51/56 to reduce his risk for falls.   Long term goal 6: Patient will ascend/descend 1 step with no AD and

## 2021-06-10 NOTE — PLAN OF CARE
Problem: Pain:  Goal: Pain level will decrease  Description: Pain level will decrease  Outcome: Ongoing  Note: Complains of pain of 0-2 on a scale of 1-10. Has scheduled Tylenol every 4 hours and Altram PRN. Likes the lights dimmed and door closed. Problem: Physical Regulation:  Goal: Ability to maintain a stable neurologic state will improve  Description: Ability to maintain a stable neurologic state will improve  Outcome: Ongoing  Note: Neuro checks within defined limits. Continue on Keppra. Problem: Mobility - Impaired:  Goal: Mobility will improve  Description: Mobility will improve  Outcome: Ongoing  Note: 1 assist with gait belt. Tolerates well, walks with steady gait. Problem: SKIN INTEGRITY  Goal: LTG - Patient will be free from infection  Outcome: Ongoing  Note: Turns self. No signs of skin impairment during initial assessment. Will continue to monitor. Laceration without signs or symptoms of infection, scab remains intact. Problem: DISCHARGE BARRIERS  Goal: Patient's continuum of care needs are met  Outcome: Ongoing  Note: Discharge planned for tomorrow with outpatient therapy services. Problem: Falls - Risk of:  Goal: Will remain free from falls  Description: Will remain free from falls  Note: Alarm to chair and bed. 1 assist with gait belt. Call light kept within reach.

## 2021-06-10 NOTE — PROGRESS NOTES
ACTIVITIES:  Pt completed B UE exs with mod resistance band x 20 reps, x 1 set, while following a handout. Good tolerance of exs, with  RBs throughout, and min cues for tech with resistance band for increasd strength and activity tolerance to support ADL components. Pt. And spouse voice and demo good understanding of HEP review. Pt. And spouse with no discharge concerns at this time. Pt. And spouse have all AE/DME in place. ASSESSMENT:     Activity Tolerance:  Patient tolerance of  treatment: good. Discharge Recommendations: Outpatient OT (Driving evaluation in future)   Equipment Recommendations: Other: Patient wife states she has ordered a shower chair, reachers, and suction grab bars and should be delivered soon. Patient's wife also notes her son is putting in a handrail on porch in order to get into house this weekend. Plan: Times per week: 5x/wk for 90 min and 1x/wk for 30 min  Current Treatment Recommendations: Balance Training, Functional Mobility Training, Endurance Training, Safety Education & Training, Self-Care / ADL, Patient/Caregiver Education & Training, Cognitive Reorientation, Neuromuscular Re-education, Equipment Evaluation, Education, & procurement, Home Management Training    Patient Education  Patient Education: Home Exercise Program, Family Education and Importance of Increasing Activity    Goals  Short term goals  Time Frame for Short term goals: 1 week  Short term goal 1: Pt will attend and sequence together a multiple step ADL routine with SUP to improve indep with self care. Short term goal 2: Pt will demo 4 minute good dynamic standing balance with SBA to improve ability to reach into cupboards. Short term goal 3: Pt will attend to and complete simple meal prep task with SBA to improve indep with preparing an egg.   Short term goal 4: Pt will complete various t/fs including toilet with SUP& 0-2 vcs for safety  Short term goal 5: Pt will navigate around obstacles during mobility to/from bathroom + into hallway with SUP & min vcs for safety to improve safety within home. Long term goals  Time Frame for Long term goals : 2 weeks  Long term goal 1: Pt will complete BADL routine with mod I and 0 vcs for safety to improve indep with self care. 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 Following session, patient left in safe position with all fall risk precautions in place.

## 2021-06-10 NOTE — PROGRESS NOTES
Bed Mobility, Equipment Education, Transfers, Reviewed Prior Education, Gait, Verbal Exercise Instruction    Goals:  Patient goals : go home  Short term goals  Time Frame for Short term goals: 1 week  Short term goal 1: Patient will complete supine < > sit with head of bed elevated 30 degrees with no bed rail and modified independence to transfer in/out of bed safely. GOAL MET, SEE LTG  Short term goal 2: Patient will complete sit < > stand with SBA to stand to ambulate with decreased difficulty. GOAL MET, SEE LTG  Short term goal 3: Patient will ambulate 150' with 2 turns with SBA to progress towards navigating home environment safely. Short term goal 4: Patient will ascend/descend 3 steps with no hand rails and CGA to progress towards safe home entry. GOAL MET, SEE LTG  Short term goal 5: Patient will improve PEPE balance score to greater than or equal to 45/56 to improve balance and reduce fall risk. Long term goals  Time Frame for Long term goals : 4 weeks  Long term goal 1: Patient will complete supine < > sit with modified independence to transfer in/out of bed safely. Long term goal 2: Patient will complete sit  <> stand and stand pivot transfer with modified independence to transfer surface to surface safely. Long term goal 3: Patient will ambulate 80' with no AD and supervion to navigate living environment. Long term goal 4: Patient will ascend/descend 3 steps with right hand rail with modified independence for safe home entry. Long term goal 5: Patient will improve PEPE balance test to greater than or equal to 51/56 to reduce his risk for falls. Long term goal 6: Patient will ascend/descend 1 step with no AD and supervision to navigate home and community safely. Long term goal 7: Patient will complete car transfer with modified independence to transfer in/out of vehicle to home and appointments safely. Following session, patient left in safe position with all fall risk precautions in place.

## 2021-06-10 NOTE — PROGRESS NOTES
[address] [facility] THERAPY  [address] [facility]  DAILY NOTE    TIME   SLP Individual Minutes  Time In: 1001  Time Out: 1033  Minutes: 32  Timed Code Treatment Minutes: 32 Minutes       Date: 6/10/2021  Patient Name: Nakul Dozier      CSN: [id number]   : 1950  (78 y.o.)  Gender: male   Referring Physician:  Dr. Rae Rock MD   Diagnosis: Intraparenchymal Hemorrhage of brain [facility])  Secondary Diagnosis: Cognitive impairment   Precautions: Low stimulation, Fall risk   Current Diet: Regular with thin liquids  Swallowing Strategies: Standard Universal Swallow Precautions  Date of Last MBS/FEES: Not Applicable    Pain:  No pain reported on this date. Subjective:  Patient upright in chair upon arrival. Wife present and supportive at the bedside. Patient agreeable to therapy and cooperative throughout. Short-Term Goals:  SHORT TERM GOAL #1:  Goal 1: Patient will complete higher level memory tasks (delayed) at 80% accuracy given min cues to improve recall abilities to return to baseline functioning. INTERVENTIONS: Working memory and manipulation task (re-ordering based on typical progression):  -3 unit -  indep  -4 unit -  indep increased to  following min verbal prompt  *EXCELLENT use of audible repetition to manipulate the words into the correct sequence  *Appropriate processing and completion time with task    SHORT TERM GOAL #2:  Goal 2: Patient will complete higher level problem solving/reasonsing (including finance mgmt, time/money mgmt) tasks with 90% accuracy given min cues in order to return to home and hobbies with least amount of supervision/assistance upon discharge. INTERVENTIONS: Did not target this date due to focus on other goals. SHORT TERM GOAL #3:  Goal 3: Patient will complete thought organization/sequencing (including med mgmt) tasks with 80% accuracy given min cues to improve organization to return to baseline functioning. INTERVENTIONS: Calender task in which patient was required to input 10 events onto calendar.  -Patient demonstrated the ability to input 10/10 events correctly onto calendar indep  *Task completed in appropriate amount of time  *EXCELLENT use of organizational strategies and inclusion of all pertinent information  (I.e. writing down event, with who, what time, etc.)  *Patient reports he keeps an electronic calendar for day-to-day activities at baseline. SHORT TERM GOAL #4:  Goal 4: Patient will complete attention tasks (selective, divided) with no more than 2 errors within a 5 minute task in order to return to driving and daily living responsibilities.     INTERVENTIONS:  Complex attention task- Patient prompted to provide information to describe each card based on the following parameters- color = holiday, number = animal, suit = associated word  -Color in isolation (I.e. black = halloween, red = quiroz's day) 7/7 indep  -Number in isolation (I.e. odd = octopus, even = elephant, face = arredondo) 6/7 indep, increased to 7/7 given min verbal prompt  -Suit in isolation (I.e. spade = shovel, heart = love, darleen = ring, club = clover) 7/7 indep  -Color, number, suit in combination 6/10 indep, increased to 10/10 following min verbal prompt- Errors noted with patient stating the correct number category (\"odd\"), but that stating an incorrect corresponding animal (\"elephant\")  *Patient demonstrated greatest difficulty with recall of number and corresponding animal; ST discussed memory strategies to aid in recall and patient was receptive  *Patient demonstrated slow processing speed with this task, reports it was one of the most difficult tasks he has been asked to complete  *EXCELLENT use of audible reasoning to aide in working through all components of combinations cards (I.e. color, number, suit)     SHORT TERM GOAL #5:   Goal 5: Patient and caregiver will demonstrate understanding of and management of concussive/brain injury symptoms (ie: symptoms outlined on ACE, low stimulation environment) with min assist to optimize brain healing. INTERVENTIONS: Completed ACE symptom questionnaire  -Patient scored 3/22 today (presence of concussion indicated by scores of  or greater)  *Previous score -   *Previous score -   *Previous score -   *Previous scored -     *Patient reports score is indicative of how he is feeling; reports feeling much better; consistent with ST observation  *Discussed removing 'low-stimulation' requirements, as patient is demonstrating improvement and has started to reintroduce stimulation with no difficulties reported  *Discussed taking breaks as necessary, as patient knows his body and what he needs best; patient and wife receptive      **Given consistent decline in ACE symptom scores, recommend discontinuing low stim protocol. Patient and wife have been educated on symptoms, environmental modifications to maximize brain healing/rest, and recommendations for proceeding with slow re-introduction of auditory/visual stimuli (ie: phone, TV, music, lights etc.). Discussed keeping time spent completing these activities to hour increments with rest breaks as determined by cognitive, physical and emotional indications. Long-Term Goals:  Timeframe for Long-term Goals: 3 weeks    LONG TERM GOAL #1:  Goal 1: Patient will improve overall cognition to Supervision/Mod I level in order to return to baseline cognitive function and IADLs/ADLs.       Comprehension: 6 - Complex ideas 90% or device (hearing aid or glasses- if patient is primarily a visual learner)  Expression: 7 - Patient expresses complex ideas/needs  Social Interaction: 6 - Patient requires medication for mood and/or effect  Problem Solvin - Independent with device (e.g. notes, schedules)  Memory: 4 - Patient remembers 75-90%+ of the time      EDUCATION:  Learner: Patient and Significant

## 2021-06-10 NOTE — PROGRESS NOTES
Alert and oriented to person, place, and time. Pupils CHAPIN 4mm to 2mm, brisk. Mucous membranes pink and moist. Smile symmetrical. Hair dry and shiny. Skin warm and dry. Speech clear. Denies difficulty swallowing. Lung sounds clear anterior, posterior, and laterally. Respirations easy and unlabored. No cough noted. Heart sounds strong and regular. Denies difficulty urinating. Bowel sounds active in all 4 quadrants. Abdomen soft and round. Non-tender to palpation. States last bowel movement was last night after dinner. Radial pulses strong bilateral. Hand grasp strong bilateral. Arm drift negative. Capillary refill less than 3 seconds. Skin turgor brisk. No edema noted. Pedal pulses strong bilateral. Pedal push and pull strong bilateral. Kayden's sign negative bilateral. Leg lift strong bilateral. Denies numbness and tingling. Bed in low position. Wheels locked. Side rails up times 2. Call light within reach. Over bed table within reach. Calm, cheerful, and pleasant with family and staff. Wife at bedside. Eating breakfast upon leaving room. Denies any needs at current time.

## 2021-06-11 VITALS
SYSTOLIC BLOOD PRESSURE: 108 MMHG | OXYGEN SATURATION: 98 % | DIASTOLIC BLOOD PRESSURE: 76 MMHG | BODY MASS INDEX: 25.55 KG/M2 | TEMPERATURE: 97.9 F | WEIGHT: 162.8 LBS | HEART RATE: 67 BPM | RESPIRATION RATE: 16 BRPM | HEIGHT: 67 IN

## 2021-06-11 PROCEDURE — 97110 THERAPEUTIC EXERCISES: CPT

## 2021-06-11 PROCEDURE — 97116 GAIT TRAINING THERAPY: CPT

## 2021-06-11 PROCEDURE — 97530 THERAPEUTIC ACTIVITIES: CPT

## 2021-06-11 PROCEDURE — 6370000000 HC RX 637 (ALT 250 FOR IP): Performed by: PHYSICAL MEDICINE & REHABILITATION

## 2021-06-11 PROCEDURE — 99239 HOSP IP/OBS DSCHRG MGMT >30: CPT | Performed by: PHYSICAL MEDICINE & REHABILITATION

## 2021-06-11 PROCEDURE — 97130 THER IVNTJ EA ADDL 15 MIN: CPT | Performed by: SPEECH-LANGUAGE PATHOLOGIST

## 2021-06-11 PROCEDURE — 97129 THER IVNTJ 1ST 15 MIN: CPT | Performed by: SPEECH-LANGUAGE PATHOLOGIST

## 2021-06-11 RX ADMIN — ACETAMINOPHEN 650 MG: 325 TABLET ORAL at 10:03

## 2021-06-11 RX ADMIN — FAMOTIDINE 20 MG: 20 TABLET, FILM COATED ORAL at 10:03

## 2021-06-11 RX ADMIN — DOCUSATE SODIUM 100 MG: 100 CAPSULE, LIQUID FILLED ORAL at 10:02

## 2021-06-11 RX ADMIN — LEVETIRACETAM 500 MG: 500 TABLET, FILM COATED ORAL at 10:03

## 2021-06-11 ASSESSMENT — PAIN SCALES - GENERAL: PAINLEVEL_OUTOF10: 0

## 2021-06-11 NOTE — PROGRESS NOTES
[facility]  INPATIENT PHYSICAL THERAPY  DISCHARGE NOTE  [address] [facility] - [id number]    Time In: 0700  Time Out: 9644  Timed Code Treatment Minutes: 32 Minutes  Minutes: 27          Date: 2021  Patient Name: Peter Jordan,  Gender:  male        MRN: [id number]  : 1950  (78 y.o.)  Referral Date : 21  Referring Practitioner: Adria Beckman MD  Diagnosis: Left sided intracerebral hemorrhage  Additional Pertinent Hx: Peter Jordan  is a 78 y.o. right-handed  male with history of hypertension and hyperlipidemia, is admitted to the inpatient rehabilitation unit on 2021 for intensive inpatient rehabilitation for impaired ADLs, ambulation & cognition due to left frontal lobe & right cerebellar contusion with intraparenchymal hemorrhage, left posterior parietal & anterior temporal subdural hematoma & subarachnoid blood, and right basilar skull fracture secondary to fall on 2021. Prior Level of Function:  Lives With: Spouse  Type of Home: House  Home Layout: One level  Home Access: Stairs to enter without rails  Entrance Stairs - Number of Steps: 3  Home Equipment: Cane   Bathroom Shower/Tub: Walk-in shower, Tub/Shower unit  Bathroom Toilet: Standard  Bathroom Equipment: Grab bars in shower (in walk-in shower)    ADL Assistance: [address]: Independent  Transfer Assistance: Independent  Active : Yes  Additional Comments: Pt was fully indep PLOF, runs 2 days a week, [facility]    Restrictions/Precautions:  Restrictions/Precautions: Fall Risk, General Precautions  Position Activity Restriction  Other position/activity restrictions: low stimulation guidlines s/p CHI     SUBJECTIVE: Patient sitting edge of bed upon arrival and agreeable to therapy. Wife arrived to observe session.      PAIN: denies    Vitals: Vitals not assessed per clinical judgement, see nursing flowsheet    OBJECTIVE:  Bed Mobility:  Not Tested    Transfers:  Sit to Stand: Modified Independent  Stand to Sit:Modified Independent  bed>chair, chair<>chair: Modified Independent    Ambulation:  Supervision  Distance: ~3ft, 100ft, 20ft, 120ft  Surface: Level Tile  Device:No Device  Gait Deviations:  Decreased Step Length Bilaterally, Decreased Arm Swing, Decreased Gait Speed, Decreased Heel Strike Bilaterally, Mild Path Deviations and unsteady at times, impulsive at times, cues to slow down for safety, occasionally reaches for objects to steady self    Balance:  Pepe Balance test, see below    Exercise:  Patient was guided in 1 set(s) 10 reps of exercise to both lower extremities. Standing heel/toe raises, Standing marches, Standing hip abduction/adduction, Standing hamstring curls, Standing hip flexion, Mini squats and with 2# ankle weights applied. Exercises were completed for increased independence with functional mobility. Functional Outcome Measures: Completed  Pepe Balance Score: 55 /56    PEPE BALANCE TEST SCORING   Score of < 45 indicates a greater risk of falling  41-56= low fall risk  21-40= medium fall risk (recommendation of walking with assist at all times)  0-20= high fall risk        ASSESSMENT:  Assessment: Patient progressing toward established goals. At discharge Mr Isidro Pinon met 5/5 STG's and 7/7 LTG's. Patient met all goals and has shown significant gains since initial evaluation. Pt has progressed sit < > stand from CGA to modified independence, supine < > sit from supervision to modified independence, gait from CGA/min assist to supervision all with no AD and PEPE balance test from 16 to 55/56 indicating low fall risk. Patient is returning to home with spouse and spouse has been provided with education on how to assist pt with mobility. Recommend supervision with mobility at this time and outpatient PT. Activity Tolerance:  Patient tolerance of  treatment: good.       Equipment Recommendations:Equipment Needed: No   Discharge Recommendations:  Outpatient PT, Home with assist PRN, Patient would benefit from continued therapy after discharge    Plan: patient is returning to home with spouse and transitioning to outpatient PT. Patient Education  Patient Education: Plan of Care, Transfers, Gait, Verbal Exercise Instruction, dynamic balance    Goals:  Patient goals : go home  Short term goals  Time Frame for Short term goals: 1 week  Short term goal 1: Patient will complete supine < > sit with head of bed elevated 30 degrees with no bed rail and modified independence to transfer in/out of bed safely. GOAL MET, SEE LTG  Short term goal 2: Patient will complete sit < > stand with SBA to stand to ambulate with decreased difficulty. GOAL MET, SEE LTG  Short term goal 3: Patient will ambulate 150' with 2 turns with SBA to progress towards navigating home environment safely. GOAL MET, SEE LTG  Short term goal 4: Patient will ascend/descend 3 steps with no hand rails and CGA to progress towards safe home entry. GOAL MET, SEE LTG  Short term goal 5: Patient will improve PEPE balance score to greater than or equal to 45/56 to improve balance and reduce fall risk. GOAL MET, SEE LTG  Long term goals  Time Frame for Long term goals : 4 weeks  Long term goal 1: Patient will complete supine < > sit with modified independence to transfer in/out of bed safely. GOAL MET  Long term goal 2: Patient will complete sit  <> stand and stand pivot transfer with modified independence to transfer surface to surface safely. GOAL MET  Long term goal 3: Patient will ambulate 80' with no AD and supervion to navigate living environment. GOAL MET  Long term goal 4: Patient will ascend/descend 3 steps with right hand rail with modified independence for safe home entry. GOAL MET  Long term goal 5: Patient will improve PEPE balance test to greater than or equal to 51/56 to reduce his risk for falls.  GOAL MET  Long term goal 6: Patient will ascend/descend 1 step with no AD and supervision to navigate home and community safely. GOAL MET  Long term goal 7: Patient will complete car transfer with modified independence to transfer in/out of vehicle to home and appointments safely. GOAL MET    Following session, patient left in safe position with all fall risk precautions in place. Treatment and noted completed by CARMEN Oswald , note modified only for PT discharge assessment.

## 2021-06-11 NOTE — PROGRESS NOTES
Alert and oriented to person, place, and time. Pupils CHAPIN 4mm to 2mm. Speech clear. Mucous membranes pink and moist. Smile symmetrical. Hair clean and dry. Denies difficulty swallowing. Lung sounds clear anterior and lateral. Respirations easy and unlabored. No cough noted. Heart sounds strong and regular. Bowel sounds active in all 4 quadrants. Abdomen soft and round. States last bowel movement was yesterday and had 2. Denies difficulty urinating. Radial pulses strong and equal. Hand grasp strong and equal. Arm drift negative. Capillary refill less than 3 seconds. Skin turgor brisk. Pedal pulses strong and equal. Pedal push and pull strong bilateral. Kayden's sign negative bilateral. Leg lift strong bilateral. Denies numbness and tingling. Up in chair with wife present at bedside. Wheels locked. Call light within reach. Over bed side table within reach. Denies any needs a current time.  Waiting for breakfast.

## 2021-06-11 NOTE — PROGRESS NOTES
[address] [address]  Inpatient Rehabilitation  Occupational Therapy  Discharge Note  Time:    Time in:0900  Time out:0930  Timed code tx min:30  Min:30               Date: 2021  Patient Name: Nirali Francis,   Gender: male      Room: [facility]-A  MRN: [id number]  : 1950  (78 y.o.)  Referring Practitioner: Dr. Madiha Rice  Diagnosis: Left sided intracerebral hemorrhage  Additional Pertinent Hx: 78 y.o. male, previously healthy on who presents with a head injury, laceration, agitation and generalized acute confusion since the onset this morning while playing pickle ball. He has a history of hypertension and hyperlipidemia, is admitted to the inpatient rehabilitation unit on 2021 for intensive inpatient rehabilitation for impaired ADLs, ambulation & cognition due to left frontal lobe & right cerebellar contusion with intraparenchymal hemorrhage, left posterior parietal & anterior temporal subdural hematoma & subarachnoid blood, and right basilar skull fracture secondary to fall on 2021. Restrictions/Precautions:  Restrictions/Precautions: Fall Risk, General Precautions  Position Activity Restriction  Other position/activity restrictions: low stimulation guidlines s/p CHI    SUBJECTIVE: Pt sitting in chair upon arrival, pt agreeable to OT session, pt's wife present during session. Discussed all AE required with wife. PAIN: 0/10:     Vitals: Vitals not assessed per clinical judgement, see nursing flowsheet    COGNITION: WFL    ADL:   Toilet Transfer: Modified Independent. with lower surface toilet and pt not using a hand rails. BALANCE:  Standing Balance: Modified Independent. with pt standing with no AE with standing for 10+ minutes and no LOB or safety concerns with pt able to complete simple meal prep with reaching into cupboards, following directions on the back of packages, and handling hot objects with no concerns for safety.  Pt able to simulate laundry tasks with bending over to laundry basket, and grabbing towels to fold on the table with pt able to bend/twist/turn with no LOB, and good safety with pt then picking up laundry basket and transporting with B hands to the table. BED MOBILITY:  Not Tested    TRANSFERS:  Sit to Stand:  Modified Independent. .  Stand to Sit: Modified Independent. .    FUNCTIONAL MOBILITY:  Assistive Device: None  Assist Level:  Modified Independent. Distance: To and from therapy gym       ASSESSMENT:  Activity Tolerance:  Patient tolerance of  treatment: good. Assessment: Assessment: Patient made good progress towards his goals with ability to meet all goals. Patient demo's increased safety awareness, increased progress with strength, endurance, balance, awareness requiring decreased assistance from OTR/MALDONADO. Discharge Recommendations: Outpatient OT (Driving evaluation in future)  Equipment Recommendations: Other: Patient wife states she has ordered a shower chair, reachers, and suction grab bars and should be delivered soon. Patient's wife also notes her son is putting in a handrail on porch in order to get into house this weekend. Plan: discharge  Current Treatment Recommendations: Balance Training, Functional Mobility Training, Endurance Training, Safety Education & Training, Self-Care / ADL, Patient/Caregiver Education & Training, Cognitive Reorientation, Neuromuscular Re-education, Equipment Evaluation, Education, & procurement, Home Management Training    Patient Education  Patient Education: Equipment Education    Goals  Short term goals  Time Frame for Short term goals: 1 week  Short term goal 1: Pt will attend and sequence together a multiple step ADL routine with SUP to improve indep with self care. Met goal  Short term goal 2: Pt will demo 4 minute good dynamic standing balance with SBA to improve ability to reach into cupboards.  Met goal  Short term goal 3: Pt will attend to and complete simple meal prep task with SBA to improve indep with preparing an egg. Met goal  Short term goal 4: Pt will complete various t/fs including toilet with SUP& 0-2 vcs for safety met goal  Short term goal 5: Pt will navigate around obstacles during mobility to/from bathroom + into hallway with SUP & min vcs for safety to improve safety within home. Met goal  Long term goals  Time Frame for Long term goals : 2 weeks  Long term goal 1: Pt will complete BADL routine with mod I and 0 vcs for safety to improve indep with self care. 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

## 2021-06-11 NOTE — PROGRESS NOTES
[address] [facility] THERAPY  [address]  DISCHARGE NOTE    TIME   SLP Individual Minutes  Time In: 930  Time Out: 1010  Minutes: 40  Timed Code Treatment Minutes: 40 Minutes       Date: 2021  Patient Name: Taya Aaron      CSN: [id number]   : 1950  (78 y.o.)  Gender: male   Referring Physician:  Dr. August Guillory MD   Diagnosis: Intraparenchymal Hemorrhage of brain [facility])  Secondary Diagnosis: Cognitive impairment   Precautions: Low stimulation, Fall risk   Current Diet: Regular with thin liquids  Swallowing Strategies: Standard Universal Swallow Precautions  Date of Last MBS/FEES: Not Applicable    Pain:  No pain reported on this date. Subjective:  Upon arrival, patient sitting upright in chair, pleasant and attentive, expressing anticipation regarding discharge home today. *Spent a significant portion of the session reviewing patient's progress, continued areas of difficulty and ongoing functional goals. Wife reporting ongoing concern regarding patient's higher level aphasia, stating that he continues to have difficulty with word retrieval on a day to day basis. Provided education regarding use of circumlocution cues to assist patient with word retrieval, as well as HEP tasks to continue to facilitate lexical retrieval. Will be sure to pass along to treating OP therapist information regarding wife and patient's concern regarding impairments with higher level language. Short-Term Goals:  SHORT TERM GOAL #1:  Goal 1: Patient will complete higher level memory tasks (delayed) at 80% accuracy given min cues to improve recall abilities to return to baseline functioning. GOAL MET  INTERVENTIONS: Did not address due to focus on other goals.    PREVIOUS SESSION: Working memory and manipulation task (re-ordering based on typical progression):  -3 unit -7/7  indep  -4 unit - 6/7 indep increased to 7/7 following min verbal prompt  *EXCELLENT use of audible repetition to manipulate the words into the correct sequence  *Appropriate processing and completion time with task    SHORT TERM GOAL #2:  Goal 2: Patient will complete higher level problem solving/reasonsing (including finance mgmt, time/money mgmt) tasks with 90% accuracy given min cues in order to return to home and hobbies with least amount of supervision/assistance upon discharge. GOAL MET  INTERVENTIONS: Moderately complex finance management task:  -Ordering transactions chronologically- 8/8 indep  -Transcribing information from receipts onto the [employer] register- 30/32 indep, 1/32 with indep self correction, 1/32 with total assist from therapist to locate and correct a set of numbers that were switched thus impacting completion of math computation. *Discussed with patient the need to review organization of information a second time to ensure accuracy and that reading information aloud may be beneficial in identifying errors. Patient and wife expressed understanding.   -Math computation for determining balanced register- 7/8 indep, 1/8 in error due to utilizing incorrect number from the register (math computation was correct). *Reviewed performance with patient and wife, providing feedback regarding errors and continued mild impairments. Stressed the importance of completing cognitively taxing tasks when patient is well rested and in a distraction free environment. SHORT TERM GOAL #3:  Goal 3: Patient will complete thought organization/sequencing (including med mgmt) tasks with 80% accuracy given min cues to improve organization to return to baseline functioning.  GOAL MET  INTERVENTIONS: PREVIOUS SESSION:   Calender task in which patient was required to input 10 events onto calendar.  -Patient demonstrated the ability to input 10/10 events correctly onto calendar indep  *Task completed in appropriate amount of time  *EXCELLENT use of organizational strategies and inclusion of all pertinent information  (I.e. writing down event, with who, what time, etc.)  *Patient reports he keeps an electronic calendar for day-to-day activities at baseline. SHORT TERM GOAL #4:  Goal 4: Patient will complete attention tasks (selective, divided) with no more than 2 errors within a 5 minute task in order to return to driving and daily living responsibilities. GOAL NOT MET  INTERVENTIONS: Did not address due to focus on other goals. PREVIOUS SESSION:  Complex attention task- Patient prompted to provide information to describe each card based on the following parameters- color = holiday, number = animal, suit = associated word  -Color in isolation (I.e. black = halloween, red = quiroz's day) 7/7 indep  -Number in isolation (I.e. odd = octopus, even = elephant, face = arredondo) 6/7 indep, increased to 7/7 given min verbal prompt  -Suit in isolation (I.e. spade = shovel, heart = love, darleen = ring, club = clover) 7/7 indep  -Color, number, suit in combination 6/10 indep, increased to 10/10 following min verbal prompt- Errors noted with patient stating the correct number category (\"odd\"), but that stating an incorrect corresponding animal (\"elephant\")  *Patient demonstrated greatest difficulty with recall of number and corresponding animal; ST discussed memory strategies to aid in recall and patient was receptive  *Patient demonstrated slow processing speed with this task, reports it was one of the most difficult tasks he has been asked to complete  *EXCELLENT use of audible reasoning to aide in working through all components of combinations cards (I.e. color, number, suit)     SHORT TERM GOAL #5:   Goal 5: Patient and caregiver will demonstrate understanding of and management of concussive/brain injury symptoms (ie: symptoms outlined on ACE, low stimulation environment) with min assist to optimize brain healing.  GOAL MET  INTERVENTIONS:Patient with consistent decline in concussive/brain injury symptoms over the past 6 days with scores less than 12 on the ACE. Patient and wife have been educated on symptoms, environmental modifications to maximize brain healing/rest, and recommendations for proceeding with slow re-introduction of auditory/visual stimuli (ie: phone, TV, music, lights etc.). Excellent receptiveness to education with patient and wife asking appropriate questions and demonstrating appropriate follow through with recommendations. Discussed keeping time spent completing highly stimulating activities to hour increments with rest breaks as determined by cognitive, physical and emotional indications. Also discussed community re-integration with need to consider types of environments and busy times of operation (ie: selecting a quiet restaurant vs a loud restaurant with lots of TV screens, and completing shopping/errands during less busy times). Patient and wife expressing understanding. Long-Term Goals:  Timeframe for Long-term Goals: 3 weeks    LONG TERM GOAL #1:  Goal 1: Patient will improve overall cognition to Supervision/Mod I level in order to return to baseline cognitive function and IADLs/ADLs.  GOAL MET      Comprehension: 6 - Complex ideas 90% or device (hearing aid or glasses- if patient is primarily a visual learner)  Expression: 7 - Patient expresses complex ideas/needs  Social Interaction: 6 - Patient requires medication for mood and/or effect  Problem Solvin - Independent with device (e.g. notes, schedules)  Memory: 5 - Patient requires prompting with stress/unfamiliar situations      EDUCATION:  Learner: Patient and Significant Other  Education:  Reviewed ST goals and Plan of Care, Reviewed recommendations for follow-up, Education Related to Potential Risks and Complications Due to Impairment/Illness/Injury and Discharge plan See subjective and goal 5 above for additional education points  Evaluation of Education: Verbalizes understanding    ASSESSMENT/PLAN:  SUMMARY: Patient met / STG and  LTG this period. Improvements evident with working memory/mental manipulation, delayed recall (with and without use of compensatory strategies), reading comprehension, thought organization, and functional math computation. Patient continues to present with Mild cognitive-linguistic impairment in the domains of higher level reasoning, complex attention, lexical retrieval at the conversation level, deductive thinking and executive functioning. Additionally, patient is demonstrating limited insight into physical and cognitive impairments as they relate to ability/safety with return to baseline household/leizure activities. Wife continuing to present concerns with regards to patient's cognitive-linguistic impairments, warranting need for ongoing speech therapy services. Recommend outpatient speech therapy services to address the aforementioned impairments to progress patient towards baseline functioning and return to driving, high level finance management, leisure hobbies, and household tasks.      Activity Tolerance:  Patient tolerance of  treatment: good. Appropriate participation      Assessment/Plan: Patient discharged from Speech Therapy at this time due to completion of inpatient rehab stay. Discharge Disposition: home with spouse. Continued Speech Therapy Services recommended: Yes. ABDOUL Ivory.  [address], [address], [address] [place]

## 2021-06-14 ENCOUNTER — HOSPITAL ENCOUNTER (OUTPATIENT)
Dept: PHYSICAL THERAPY | Age: 71
Setting detail: THERAPIES SERIES
Discharge: HOME | End: 2021-06-14
Payer: MEDICARE

## 2021-06-14 ENCOUNTER — TELEPHONE (OUTPATIENT)
Dept: FAMILY MEDICINE CLINIC | Age: 71
End: 2021-06-14

## 2021-06-14 PROCEDURE — 97162 PT EVAL MOD COMPLEX 30 MIN: CPT

## 2021-06-14 NOTE — PROGRESS NOTES
** PLEASE SIGN, DATE AND TIME CERTIFICATION BELOW AND RETURN TO [facility] OUTPATIENT REHABILITATION (FAX #: [phone]). ATTEST/CO-SIGN IF ACCESSING VIA [employer]. THANK YOU.**    I certify that I have examined the patient below and determined that Physical Medicine and Rehabilitation service is necessary and that I approve the established plan of care for up to 90 days or as specifically noted. Attestation, signature or co-signature of physician indicates approval of certification requirements.    ________________________ ____________ __________  Physician Signature   Date   Time  [address]  PHYSICAL THERAPY  [x] EVALUATION  [] DAILY NOTE (LAND) [] DAILY NOTE (AQUATIC ) [] PROGRESS NOTE [] DISCHARGE NOTE    [x] [facility]   [] [address] [address]    [] [address] [facility]   [] [facility]    Date: 2021  Patient Name:  Jayro Rosenbaum  : 1950  MRN: [id number]  CSN: [id number]    Referring Practitioner Ish Dillard MD   Diagnosis Nontraumatic intracerebral hemorrhage in hemisphere, unspecified [I61.2]; Focal hemorrhagic contusion of cerebrum S06.339   Treatment Diagnosis TBI, difficulty with ambulation   Date of Evaluation 21    Additional Pertinent History High BP, TBI May 28, 2021, low heart rate      Functional Outcome Measure Used Buck   Functional Outcome Score 52 (21)       Insurance: Primary: Payor: MEDICARE /  /  / ,   Secondary: [employer] BENEFIT   Authorization Information: Aquatics and modalities as needed except ionto and hot/cold packs. Visit # 1, 1/10 for progress note   Visits Allowed: Unlimited visits based on medical necessity   Recertification Date:    Physician Follow-Up: 2021, next CT scan on  to check on bleeding   Physician Orders: Eval and treat   History of Present Illness: On May 28th, Ro Marks was playing pickleball at the [facility].   He ran into a wall hard hitting back of head on wall and lost conscious. Jose Koehler has a fracture back of head. He also has multiple brain bleeds. At hospital he was originally in ICU and spouse reports he had aphasia at that time. Progressed quickly and was admitted to Inpatient Rehab. Was discharged home on June 11th. He is very active with running, swimming, pickleball, working in garage. SUBJECTIVE: On May 28th, Jose Koehler was playing pickleball at the [facility]. He ran into a wall hard  And lost conscious. Jose Koehler has a fracture back of head. He also has multiple brain bleeds. At hospital he was originally in ICU and spouse reports he had aphasia at that time. Progressed quickly and was admitted to Inpatient Rehab. Was discharged home on June 11th. He is very active with running, swimming, pickleball, working in garage. Social/Functional History and Current Status:  Medications and Allergies have been reviewed and are listed on Medical History Questionnaire. Shorty Voss lives with spouse in a single story home with stairs and no handrail to enter. Task Previous Current   ADLs  Independent Modified Independent   IADL's Independent Modified Independent   Ambulation Independent Independent   Transfers Independent Modified Independent   Recreation Independent Assistance Required   Community Integration Independent Assistance Required   Driving Active  Does not drive   Work Retired  Retired     OBJECTIVE:    Pain: Occasional pain in head                   Range of Motion LE ROM=WFL   Strength LE strength=5/5   Coordination Decreased bilateral LE   Sensation No numbness or tingling   Bed Mobility NA   Transfers Mod I   Ambulation Ambulates with no AD. Slight path deviation and slight decreased pace. Stairs Ascend/descend 4 steps using reciprocal pattern and 1-2 handrails.    Balance See Madeleine Birmingham             TREATMENT   Precautions: High BP, TBI May 28, 2021, low heart rate   Pain: Occasional pain in head    X in shaded column indicates activity completed today   Modalities Parameters/  Location  Notes                     Manual Therapy Time/Technique  Notes                     Exercise/Intervention   Notes                                                                                  Specific Interventions Next Treatment: Advanced gait, balance on foam, rockerboard, hamstring stretch    Activity/Treatment Tolerance:  [x]  Patient tolerated treatment well  []  Patient limited by fatigue  []  Patient limited by pain   []  Patient limited by medical complications  []  Other:     Assessment: Delta fagan presents to therapy today following TBI. He will benefit from therapy to assist with improving balance, endurance, ambulation to assist with safety at home. Body Structures/Functions/Activity Limitations: impaired activity tolerance, impaired balance, impaired endurance, impaired ROM, impaired strength, pain and abnormal gait  Prognosis: good    GOALS:  Patient Goal: \"Get back to running, working around house and workshop. \"    Short Term Goals:  Time Frame: 4 weeks  1. Improve balance with Buck score of 54 or greater to assist with safety at home. 2.  Improve steps to allow [place] to ascend/descend 4 steps using reciprocal pattern and no handrails to assist with community outings. 3.  [place] able to ambulate on unlevel surfaces to assist with working in his garage. Long Term Goals:  Time Frame: 12 weeks  1. Independent with HEP and with progression to assist with improving balance. Patient Education:    [x]  HEP/Education Completed: Plan of Care, Goals   Jose Martin Hand Access Code:  []  No new Education completed  []  Reviewed Prior HEP      []  Patient verbalized and/or demonstrated understanding of education provided. []  Patient unable to verbalize and/or demonstrate understanding of education provided. Will continue education.   [x]  Barriers to learning: nothing per patient    PLAN:  Treatment Recommendations: Strengthening, Range of Motion, Balance Training, Functional Mobility Training, Transfer Training, Endurance Training, Gait Training, Stair Training, Pain Management, Home Exercise Program, Patient Education, Positioning, Aquatics and Modalities    [x]  Plan of care initiated. Plan to see patient 2 times per week for 12 weeks to address the treatment planned outlined above.   []  Continue with current plan of care  []  Modify plan of care as follows:    []  Hold pending physician visit  []  Discharge    Time In 1135   Time Out 1235   Timed Code Minutes: 0 min   Total Treatment Time: 60 min       Electronically Signed by: Deborah Newman, PT

## 2021-06-14 NOTE — TELEPHONE ENCOUNTER
[facility] Transitions Initial Follow Up Call    Outreach made within 2 business days of discharge: Yes    Patient: Verita Sandifer Patient : 1950   MRN: [id number]  Reason for Admission: There are no discharge diagnoses documented for the most recent discharge.   Discharge Date: 21        Spoke with: Left message     [facility]    TCM Interactive Patient Contact:      Scheduled appointment with PCP within 7-14 days    Follow Up  Future Appointments   Date Time Provider Joya Cronin   2021  2:00 PM Tad Shone, [address] [facility]   2021  3:00 PM Mony Barkley, [facility]   2021 11:00 AM Loida Khan MD SRPX FM RES [facility] - [facility]   2021 10:40 AM STR CT IMAGING RM1  [facility] STR[facility] OUT EXP STR Radiolog   2021  3:00 PM John St MD N SRPXNEURSU P - [facility]u   8/3/2021  3:00 PM Merrill Burrows MD N SRPX Pain [facility] - [facility]   2021  1:00 PM Loida Khan MD SRPX FM RES P - [facility]   10/4/2021 11:00 AM Lázaro Strange  [address]       Jax Spicer LPN

## 2021-06-15 NOTE — TELEPHONE ENCOUNTER
[facility] Transitions Initial Follow Up Call    Outreach made within 2 business days of discharge: Yes    Patient: Annette Paniagua Patient : 1950   MRN: [id number]  Reason for Admission: There are no discharge diagnoses documented for the most recent discharge. Discharge Date: 21       Spoke with: Pt    Discharge department/facility: [facility]    TCM Interactive Patient Contact:  Was patient able to fill all prescriptions: Yes  Was patient instructed to bring all medications to the follow-up visit: Yes  Is patient taking all medications as directed in the discharge summary?  Yes  Does patient understand their discharge instructions: Yes  Does patient have questions or concerns that need addressed prior to 7-14 day follow up office visit: no    Scheduled appointment with PCP within 7-14 days    Follow Up  Future Appointments   Date Time Provider Joya Cronin   2021  2:00 PM Suresh Singleton 2   2021  3:00 PM Boni Hitchcock, [address]   2021 11:00 AM Kenna Grullon MD SRPX FM RES [facility] - [address]   2021 10:40 AM STR CT IMAGING RM1  [facility] STRZ OUT EXP STR Radiolog   2021  3:00 PM Aniya Palomares MD N SRPXNEURSU [address]   8/3/2021  3:00 PM Linda Santos MD N SRPX Pain [facility] - [address] [facility]   2021  1:00 PM Kenna Grullon MD SRPX FM RES [facility] - [address] [facility]   10/4/2021 11:00 AM Jaciel Rondon MD SRPX FM RES [facility] - [address] [facility] ([address] [place] [address])

## 2021-06-15 NOTE — TELEPHONE ENCOUNTER
Yulisa 45 Transitions Initial Follow Up Call    Outreach made within 2 business days of discharge: Yes    Patient: Henrique Sellers Patient : 1950   MRN: [id number]  Reason for Admission: There are no discharge diagnoses documented for the most recent discharge. Discharge Date: 21       Spoke with: LM for patient to return call at their earliest convenience.      Discharge department/facility: [facility]    Contact letter sent  Scheduled appointment with PCP within 7-14 days    Follow Up  Future Appointments   Date Time Provider Joya Cronin   2021  2:00 PM Luisa Zarate, [address] [facility]   2021  3:00 PM Jennifer Catalan, [facility]   2021 11:00 AM Kike Hameed MD SRPX FM RES [facility] [place]   2021 10:40 AM STR CT IMAGING RM1  OP [facility] STRZ OUT EXP STR Radiolog   2021  3:00 PM MD ZANDRA Cano SRPXNEURSU [facility] [place]   8/3/2021  3:00 PM MD ZANDRA Armendariz SRPX Pain [facility] [place]   2021  1:00 PM Kike Hameed MD SRPX FM RES [facility] [place]   10/4/2021 11:00 AM Sina Perera MD SRPX FM RES [facility] - [address] [facility] ([address] [place] [address]

## 2021-06-16 ENCOUNTER — HOSPITAL ENCOUNTER (OUTPATIENT)
Dept: SPEECH THERAPY | Age: 71
Setting detail: THERAPIES SERIES
Discharge: HOME | End: 2021-06-16
Payer: MEDICARE

## 2021-06-16 ENCOUNTER — HOSPITAL ENCOUNTER (OUTPATIENT)
Dept: OCCUPATIONAL THERAPY | Age: 71
Setting detail: THERAPIES SERIES
Discharge: HOME | End: 2021-06-16
Payer: MEDICARE

## 2021-06-16 PROCEDURE — 92523 SPEECH SOUND LANG COMPREHEN: CPT

## 2021-06-16 PROCEDURE — 97165 OT EVAL LOW COMPLEX 30 MIN: CPT

## 2021-06-16 NOTE — PROGRESS NOTES
** PLEASE SIGN, DATE AND TIME CERTIFICATION BELOW AND RETURN TO [facility] OUTPATIENT REHABILITATION (FAX #: [phone]). ATTEST/CO-SIGN IF ACCESSING VIA [employer]. THANK YOU.**    I certify that I have examined the patient below and determined that Physical Medicine and Rehabilitation service is necessary and that I approve the established plan of care for up to 90 days or as specifically noted. Attestation, signature or co-signature of physician indicates approval of certification requirements.    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 AQUATIC THERAPY COVERED:   Yes  MODALITIES COVERED:  Yes, with the exception of iontophoresis and hot packs/cold packs  TELEHEALTH COVERED: Yes   Visit # 1, 1/10 for progress note   Visits Allowed:    Recertification Date: 3/58/43   Physician Follow-Up:    Physician Orders: Evaluate for appropriateness of driving evaluation   Pertinent History: On May 28th, Carolyne Coleman was playing pickleball at the [facility]. He ran into a wall hard hitting back of head on wall and lost conscious.   Carolyne Coleman has a fracture back of head. He also has multiple brain bleeds. At hospital he was originally in ICU and spouse reports he had aphasia at that time. Progressed quickly and was admitted to Inpatient Rehab. Was discharged home on June 11th. He is very active with running, swimming, pickleball, working in garage. SUBJECTIVE: Patient pleasant and cooperative, asking appropriate questions. Wife present and supportive. Social/Functional History:  Medications and Allergies have been reviewed and are listed on the Medical History Questionnaire    Zaid Hess lives with spouse in a single story home with stairs and no handrail to enter. .      Task Prior Level of Function  (current level of function addressed below)   ADLs  Independent   Ambulation Independent   Transfers Independent   Hobbies Pickleball, running, swimming, exercising with free weights, hiking, backpacking, camping, currently renovating his garage, rebuilding [employer] sports car   Driving Active    Work Retired       OBJECTIVE:    VISION:Corrected getting an eye appointment set up. Dynavision Mode A/60 - completed 57 hits in 60 second self paced trial.    HEARING:  has hearing aides for high frequency sound. Has been having sensitivity to loud noise. COGNITION:  Mildly slower processing, mild word retrieval    RANGE OF MOTION:  Bilateral Upper Extremity:  WNL    STRENGTH:  Bilateral Upper Extremity:  WFL    testing level 2: R hand 100#, L hand 85#    SENSATION:  WFL    COORDINATION:  9 hole peg test L hand: 25 seconds, R hand: 24 seconds    TONE:   Normal    ADL:   Feeding: Independent. Grooming: Independent. Bathing: Independent. Upper Extremity Dressing: Independent. Lower Extremity Dressing: Independent. Toileting: Independent. Toilet Transfer: Independent. Shower Transfer: Modified Independent. has shower chair and grab bars. BALANCE:  Sitting Balance:  Independent. Standing Balance: Independent.

## 2021-06-16 NOTE — PROGRESS NOTES
** PLEASE SIGN, DATE AND TIME CERTIFICATION BELOW AND RETURN TO [facility] OUTPATIENT REHABILITATION (FAX #: [phone]). ATTEST/CO-SIGN IF ACCESSING VIA [employer]. THANK YOU.**    I certify that I have examined the patient below and determined that Physical Medicine and Rehabilitation service is necessary and that I approve the established plan of care for up to 90 days or as specifically noted. Attestation, signature or co-signature of physician indicates approval of certification requirements.    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 Gonzalo Crawford,  f/u with Dr. Osman Keen, 8/3 with Dr. Zeeshan Taylor   Physician Orders: Dr. Cira Kessler MD   Pertinent History: Patient reports 2 1/2 weeks ago he was playing pickle ball in which he ran sideways and fell down and hit his head the concrete block wall.  Patient woke up a few hours later in ICU with amnesia of playing pickle ball which is still present to date. Patient spent rest of hospital stay in inpatient rehab where he also received skilled speech therapy to address word-finding and cognitive skills. SUBJECTIVE: Patient alert and pleasant throughout evaluation with wife present. Patient reports he is feeling very tired this date as he experienced a painful headache last night which resulted in him sleeping most of the morning. Patient's wife reported patient had a hard time in Confucianism this week due to loud noises. Patient is noticing loud noises and bright lights are causes of overstimulation for him. No pain reported during evaluation. Social/Functional History:  Medications and Allergies have been reviewed and are listed on the Medical History Questionnaire. In addition, Tremedol is being taken as needed. Allergies to oxycodone and hydrocodone      Thehansel Rosenbaum lives with spouse in a single story home with step with heavy duty handrail . Juno Warren Task Prior Level of Function Current Level of Function   ADLs  Independent Assistance Required, patients he is able to handle most activities of daily living; however, his fatigue is presenting as a barrier.    Finance Management Independent Modified Independent   Medication Management Independent Dependent/Unable due to fatigue as patient is sleeping often during the day   Educational Level Bachelor of Science Bachelor of Science   Driving Active  Does not drive   Work Retired Retired   Hobbies Working on [employer], running, playing pickle ball Working on [employer], running, playing pickle ball   Vision Status [facility]   Hearing Status Corrected -- Hearing aids for high frequency loss Hearing aids for high frequency loss   Diet Gluten free diet with thin liquids Same as previous diet however patient is not consuming as much food as previously       SPEECH / VOICE:  Speech and Voice appear to be grossly intact for basic and complex daily improved success communicating with family and friends  Goal 5: Patient will complete higher level naming tasks (divergent, convergent naming) with 85% accuracy given min cues to promote enhanced expressive communication. Goal 6: Patient will complete complex executive function (finances, medication management, scheduling) with 75% accuracy given mod cues to improve overall participation in ADL/IADL tasks. Long-term Goals  Timeframe for Long-term Goals: 4 weeks  Goal 1: Patient will improve YOHAN NOMS memory score from a Level 4 to a Level 5 for improved participation in ADL/IADL tasks an d hobbies. Patient Education:   [x]  HEP/Education Completed: Plan of Care + Goals  []  No new Education completed  []  Reviewed Prior HEP      [x]  Patient verbalized and/or demonstrated understanding of education provided. []  Patient unable to verbalize and/or demonstrate understanding of education provided. Will continue education. []  Barriers to learning    PLAN:  Treatment Recommendations:     [x]  Plan of care initiated. Plan to see patient 2 times per week for 4 weeks to address the treatment planned outlined above.   []  Continue with current plan of care  []  Modify plan of care as follows:    []  Hold pending physician visit  []  Discharge    Time In 1400   Time Out 1402   Timed Code Minutes: 62 min   Total Treatment Time: 58 min     Juan Jackson M.S., CF-SLP, [id number]

## 2021-06-22 ENCOUNTER — OFFICE VISIT (OUTPATIENT)
Dept: FAMILY MEDICINE CLINIC | Age: 71
End: 2021-06-22
Payer: MEDICARE

## 2021-06-22 VITALS
HEART RATE: 61 BPM | HEIGHT: 67 IN | TEMPERATURE: 97.9 F | RESPIRATION RATE: 16 BRPM | BODY MASS INDEX: 26.34 KG/M2 | SYSTOLIC BLOOD PRESSURE: 110 MMHG | WEIGHT: 167.8 LBS | DIASTOLIC BLOOD PRESSURE: 68 MMHG | OXYGEN SATURATION: 91 %

## 2021-06-22 DIAGNOSIS — S02.101D: ICD-10-CM

## 2021-06-22 DIAGNOSIS — S06.33AA FOCAL HEMORRHAGIC CONTUSION OF CEREBRUM: Primary | ICD-10-CM

## 2021-06-22 DIAGNOSIS — S06.9X1D TRAUMATIC BRAIN INJURY WITH LOSS OF CONSCIOUSNESS OF 30 MINUTES OR LESS, SUBSEQUENT ENCOUNTER: ICD-10-CM

## 2021-06-22 PROCEDURE — 1111F DSCHRG MED/CURRENT MED MERGE: CPT | Performed by: STUDENT IN AN ORGANIZED HEALTH CARE EDUCATION/TRAINING PROGRAM

## 2021-06-22 PROCEDURE — 1036F TOBACCO NON-USER: CPT | Performed by: STUDENT IN AN ORGANIZED HEALTH CARE EDUCATION/TRAINING PROGRAM

## 2021-06-22 PROCEDURE — 4040F PNEUMOC VAC/ADMIN/RCVD: CPT | Performed by: STUDENT IN AN ORGANIZED HEALTH CARE EDUCATION/TRAINING PROGRAM

## 2021-06-22 PROCEDURE — G8417 CALC BMI ABV UP PARAM F/U: HCPCS | Performed by: STUDENT IN AN ORGANIZED HEALTH CARE EDUCATION/TRAINING PROGRAM

## 2021-06-22 PROCEDURE — 99213 OFFICE O/P EST LOW 20 MIN: CPT | Performed by: STUDENT IN AN ORGANIZED HEALTH CARE EDUCATION/TRAINING PROGRAM

## 2021-06-22 PROCEDURE — 3017F COLORECTAL CA SCREEN DOC REV: CPT | Performed by: STUDENT IN AN ORGANIZED HEALTH CARE EDUCATION/TRAINING PROGRAM

## 2021-06-22 PROCEDURE — G8427 DOCREV CUR MEDS BY ELIG CLIN: HCPCS | Performed by: STUDENT IN AN ORGANIZED HEALTH CARE EDUCATION/TRAINING PROGRAM

## 2021-06-22 PROCEDURE — 1123F ACP DISCUSS/DSCN MKR DOCD: CPT | Performed by: STUDENT IN AN ORGANIZED HEALTH CARE EDUCATION/TRAINING PROGRAM

## 2021-06-22 RX ORDER — ACETAMINOPHEN 500 MG
500 TABLET ORAL EVERY 6 HOURS PRN
COMMUNITY
End: 2022-01-14 | Stop reason: ALTCHOICE

## 2021-06-22 RX ORDER — TRAMADOL HYDROCHLORIDE 50 MG/1
50 TABLET ORAL EVERY 6 HOURS PRN
COMMUNITY
End: 2021-10-08

## 2021-06-22 SDOH — ECONOMIC STABILITY: FOOD INSECURITY: WITHIN THE PAST 12 MONTHS, THE FOOD YOU BOUGHT JUST DIDN'T LAST AND YOU DIDN'T HAVE MONEY TO GET MORE.: NEVER TRUE

## 2021-06-22 SDOH — ECONOMIC STABILITY: FOOD INSECURITY: WITHIN THE PAST 12 MONTHS, YOU WORRIED THAT YOUR FOOD WOULD RUN OUT BEFORE YOU GOT MONEY TO BUY MORE.: NEVER TRUE

## 2021-06-22 ASSESSMENT — SOCIAL DETERMINANTS OF HEALTH (SDOH): HOW HARD IS IT FOR YOU TO PAY FOR THE VERY BASICS LIKE FOOD, HOUSING, MEDICAL CARE, AND HEATING?: NOT VERY HARD

## 2021-06-22 NOTE — PROGRESS NOTES
Kenia Bazan is a 78 y.o.male    Chief Complaint   Patient presents with    Follow-Up from Hospital     Traumatic Brain injury       Chief complaint, [place], and all pertinent details of the case reviewed with the resident. Please see resident's note for specific details discussed at today's visit. Patient Active Problem List   Diagnosis    Injury of head    Memory impairment    Concussion with no loss of consciousness    Nonintractable headache    Post concussive syndrome    Elevated low density lipoprotein (LDL) cholesterol level    S/P appendectomy    Essential hypertension    Subclinical hypothyroidism    Intraparenchymal hematoma of left side of brain due to trauma [facility])    Closed fracture of right side of base of skull (HCC)    Closed head injury    Scalp laceration, initial encounter    Right and left hemorrhagic contusion of cerebrum (HCC)    Scalp laceration    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       levETIRAcetam (KEPPRA) 500 MG tablet Take 1 tablet by mouth 2 times daily 60 tablet 3    lisinopril (PRINIVIL;ZESTRIL) 5 MG tablet Take 1 tablet by mouth daily 30 tablet 3    diclofenac sodium (VOLTAREN) 1 % GEL Apply 2 g topically 4 times daily as needed for Pain 100 g 1    bisacodyl (DULCOLAX) 10 MG suppository Place 1 suppository rectally daily as needed for Constipation 30 suppository 0    docusate sodium (COLACE, DULCOLAX) 100 MG CAPS Take 100 mg by mouth 2 times daily 60 capsule 1    polyethylene glycol (GLYCOLAX) 17 g packet Take 17 g by mouth daily as needed for Constipation 527 g 1    senna (SENOKOT) 8.6 MG tablet Take 1 tablet by mouth nightly 30 tablet 0    melatonin 3 MG TABS tablet Take 2 tablets by mouth nightly as needed (insomnia) 60 tablet 3    Nutritional Supplements (DHEA PO) Take by mouth daily      KLSQMWOQU-[id number]-KCXPCSMWCH PO Place 1 tablet under the tongue daily CHANDLER      vitamin D (CHOLECALCIFEROL) 25 MCG (1000 UT) TABS tablet Take 5,000 Units by mouth daily       B Complex-Biotin-FA (B-100 COMPLEX CR PO) Take by mouth 3 times daily      magnesium citrate solution Take 250 mLs by mouth 4 times daily (before meals and nightly) Taking 2 soft gels TID       Omega-3 Fatty Acids (OMEGA-3 FISH OIL CONCENTRATE PO) Take 2 capsules by mouth 4 times daily      CINNAMON PO Take 3 capsules by mouth 4 times daily (before meals and nightly) Cinnamon Bark      vitamin C (ASCORBIC ACID) 500 MG tablet Take 1,000 mg by mouth daily      NONFORMULARY Curamin for pain Taking PRN       No current facility-administered medications for this visit. Review of Systems per Dr. Yessy Tilley     /68 (Site: Left Upper Arm, Position: Sitting, Cuff Size: Medium Adult)   Pulse 61   Temp 97.9 °F (36.6 °C) (Oral)   Resp 16   Ht 5' 7\" (1.702 m)   Wt 167 lb 12.8 oz (76.1 kg)   SpO2 91%   BMI 26.28 kg/m²   BP Readings from Last 3 Encounters:   06/22/21 110/68   06/11/21 108/76   06/02/21 138/87       Wt Readings from Last 3 Encounters:   06/22/21 167 lb 12.8 oz (76.1 kg)   06/07/21 162 lb 12.8 oz (73.8 kg)   06/01/21 169 lb (76.7 kg)     Body mass index is 26.28 kg/m². Physical Exam per Dr. Reyes Prasad    No results found for this visit on 06/22/21.     Lab Results   Component Value Date    LABA1C 5.5 01/28/2021       Lab Results   Component Value Date    CHOL 219 (H) 06/03/2021    TRIG 170 06/03/2021    HDL 42 06/03/2021    LDLCALC 143 06/03/2021       The 10-year ASCVD risk score (Jack Mcgill et al., 2013) is: 16.3%    Values used to calculate the score:      Age: 78 years      Sex: Male      Is Non-  American: No      Diabetic: No      Tobacco smoker: No      Systolic Blood Pressure: 765 mmHg      Is BP treated: Yes      HDL Cholesterol: 42 mg/dL      Total Cholesterol: 219 mg/dL    Lab Results   Component Value Date     06/03/2021    K 4.2 06/03/2021     06/03/2021    CO2 24 06/03/2021    BUN 13 06/03/2021    CREATININE 0.8 06/03/2021    GLUCOSE 128 (H) 06/03/2021    CALCIUM 9.1 06/03/2021    PROT 7.1 06/03/2021    LABALBU 4.0 06/03/2021    BILITOT 0.6 06/03/2021    ALKPHOS 45 06/03/2021    AST 13 06/03/2021    ALT 12 06/03/2021    LABGLOM >90 06/03/2021       No results found for: LABMICR, [id number]    Lab Results   Component Value Date    TSH 6.130 (H) 04/06/2021    A2MLMNR 97 01/28/2021    T4FREE 1.03 04/06/2021       Lab Results   Component Value Date    WBC 7.6 06/03/2021    HGB 12.6 (L) 06/03/2021    HCT 39.2 (L) 06/03/2021    MCV 89.1 06/03/2021     06/03/2021       Lab Results   Component Value Date    PSA 0.71 01/28/2021       Immunization History   Administered Date(s) Administered    COVID-19, Moderna, PF, 100mcg/0.5mL 02/23/2021, 03/23/2021    Influenza, Quadv, adjuvanted, 65 yrs +, IM, PF (Fluad) 10/08/2020    Pneumococcal Polysaccharide ([id number]) 10/08/2020    Tdap (Boostrix, Adacel) 10/22/2020    Zoster Recombinant (Shingrix) 10/22/2020, 01/14/2021       Health Maintenance   Topic Date Due    Annual Wellness Visit (AWV)  09/26/2021    TSH testing  04/06/2022    Potassium monitoring  06/03/2022    Creatinine monitoring  06/03/2022    Diabetes screen  01/28/2024    Lipid screen  06/03/2026    Colon cancer screen colonoscopy  07/08/2030    DTaP/Tdap/Td vaccine (2 - Td or Tdap) 10/22/2030    Flu vaccine  Completed    Shingles Vaccine  Completed    Pneumococcal 65+ years Vaccine  Completed    COVID-19 Vaccine  Completed    Hepatitis C screen  Completed    Hepatitis A vaccine  Aged Out    Hepatitis B vaccine  Aged Out    Hib vaccine  Aged C/ Kalia Arias  Meningococcal (ACWY) vaccine  Aged Out                Future Appointments   Date Time Provider Joya Cronin   6/30/2021  9:00 AM Sekou Lemon, PTA STRZ PT [place] [facility]   6/30/2021  9:45 AM Georgina Riggs, SLP STRZ SPEECH [place] [facility]   6/30/2021 10:40 AM STR CT IMAGING RM1  OP [facility] STRZ OUT EXP STR Radiolog   7/2/2021  9:00 AM Zara Ralph, PT STRZ PT [place] [facility]   7/2/2021  9:45 AM Beni Greenberg, SLP STRZ SPEECH [place] [facility]   7/7/2021  9:45 AM Alexander Serrano, PTA STRZ PT [facility] HOD   7/7/2021 10:30 AM Georgina Riggs,  [place]   7/7/2021  3:00 PM Cal Garcia MD N [facility]   7/8/2021 10:45 AM Beni Greenberg, SLP STRZ SPEECH [place] [facility]   7/8/2021 11:15 AM Marli Urena, PTA STRZ PT [place] [facility]   7/14/2021  9:00 AM Brandan Frazier, PT STRZ PT [place] [facility]   7/14/2021  9:45 AM Georgina Riggs, SLP STRZ SPEECH [place] [facility]   7/16/2021  9:45 AM Beni Greenberg, SLP STRZ SPEECH [place] [facility]   7/16/2021 10:15 AM Sekou Lemon, PTA STRZ PT [place] [facility]   7/21/2021 10:00 AM Brandan Frazier, PT STRZ PT [place] [facility]   7/21/2021 10:45 AM Georgina Riggs, SLP STRZ SPEECH [place] [facility]   7/23/2021  9:45 AM Brandan Frazier, PT STRZ PT [place] [facility]   7/23/2021 10:45 AM Rafaela Rich, OT STRZ OT [place] [facility]   8/3/2021  3:00 PM Chase Seymour MD N SRPX Pain [address]   9/21/2021  1:00 PM Diaz Washington MD SRPX FM RES [address]   10/4/2021 11:00 AM Jeromy Arciniega MD SRPX [facility]       ASSESSMENT       Diagnosis Orders   1. Right and left hemorrhagic contusion of cerebrum ([facility] [address]  MT DISCHARGE MEDS RECONCILED W/ CURRENT OUTPATIENT MED LIST   2.  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 Closed fracture of right side of base of skull with routine healing, subsequent encounter  MT DISCHARGE MEDS RECONCILED W/ CURRENT OUTPATIENT MED LIST       PLAN      After discussion with Dr. Jing Whitney, we agreed on plan as follows: Continue PT/ST/OT  Continue Keppra for seizure prophylaxis  Follow-up with all specialists as plannedneurosurgery/PM&R  Reassurance given per Dr. Wendi Fontaine  Consider free T4/TSH and CBC in future  Consider statins in the futurehowever, patient has resisted to this point in discussion with Dr. Wendi Fontaine previously. Follow-up as scheduled for Medicare annual wellness visit in 3 months. Attending Physician Statement  I have discussed the case, including pertinent history and exam findings with the resident. I agree with the documented assessment and plan as documented by the resident.   GE modifier added  to this encounter     Electronically signed by Jared Casarez MD on 6/22/2021 at 2:00 PM

## 2021-06-22 NOTE — PROGRESS NOTES
suppository  Place 1 suppository rectally daily as needed for Constipation             CINNAMON PO  Take 3 capsules by mouth 4 times daily (before meals and nightly) Cinnamon Bark             diclofenac sodium (VOLTAREN) 1 % GEL  Apply 2 g topically 4 times daily as needed for Pain             docusate sodium (COLACE, DULCOLAX) 100 MG CAPS  Take 100 mg by mouth 2 times daily             levETIRAcetam (KEPPRA) 500 MG tablet  Take 1 tablet by mouth 2 times daily             lisinopril (PRINIVIL;ZESTRIL) 5 MG tablet  Take 1 tablet by mouth daily             magnesium citrate solution  Take 250 mLs by mouth 4 times daily (before meals and nightly) Taking 2 soft gels TID              melatonin 3 MG TABS tablet  Take 2 tablets by mouth nightly as needed (insomnia)             AFKSUWGEM-ZGCSARBPI-UCQEVSOCSD PO  Place 1 tablet under the tongue daily CHANDLER             NONFORMULARY  Curamin for pain Taking PRN             Nutritional Supplements (DHEA PO)  Take by mouth daily             Omega-3 Fatty Acids (OMEGA-3 FISH OIL CONCENTRATE PO)  Take 2 capsules by mouth 4 times daily             polyethylene glycol (GLYCOLAX) 17 g packet  Take 17 g by mouth daily as needed for Constipation             senna (SENOKOT) 8.6 MG tablet  Take 1 tablet by mouth nightly             traMADol (ULTRAM) 50 MG tablet  Take 50 mg by mouth every 6 hours as needed for Pain.             vitamin C (ASCORBIC ACID) 500 MG tablet  Take 1,000 mg by mouth daily             vitamin D (CHOLECALCIFEROL) 25 MCG (1000 UT) TABS tablet  Take 5,000 Units by mouth daily                    Medications marked \"taking\" at this time  Outpatient Medications Marked as Taking for the 6/22/21 encounter (Office Visit) with Jeff Harper MD   Medication Sig Dispense Refill    acetaminophen (TYLENOL) 500 MG tablet Take 500 mg by mouth every 6 hours as needed for Pain Extra strength 2 tabs      traMADol (ULTRAM) 50 MG tablet Take 50 mg by mouth every 6 hours as needed for Pain.       levETIRAcetam (KEPPRA) 500 MG tablet Take 1 tablet by mouth 2 times daily 60 tablet 3    lisinopril (PRINIVIL;ZESTRIL) 5 MG tablet Take 1 tablet by mouth daily 30 tablet 3    diclofenac sodium (VOLTAREN) 1 % GEL Apply 2 g topically 4 times daily as needed for Pain 100 g 1    bisacodyl (DULCOLAX) 10 MG suppository Place 1 suppository rectally daily as needed for Constipation 30 suppository 0    docusate sodium (COLACE, DULCOLAX) 100 MG CAPS Take 100 mg by mouth 2 times daily 60 capsule 1    polyethylene glycol (GLYCOLAX) 17 g packet Take 17 g by mouth daily as needed for Constipation 527 g 1    senna (SENOKOT) 8.6 MG tablet Take 1 tablet by mouth nightly 30 tablet 0    melatonin 3 MG TABS tablet Take 2 tablets by mouth nightly as needed (insomnia) 60 tablet 3    Nutritional Supplements (DHEA PO) Take by mouth daily      HKLYFBMWW-ZZWYTQJNI-FNXCUBYVGK PO Place 1 tablet under the tongue daily CHANDLER      vitamin D (CHOLECALCIFEROL) 25 MCG (1000 UT) TABS tablet Take 5,000 Units by mouth daily       B Complex-Biotin-FA (B-100 COMPLEX CR PO) Take by mouth 3 times daily      magnesium citrate solution Take 250 mLs by mouth 4 times daily (before meals and nightly) Taking 2 soft gels TID       Omega-3 Fatty Acids (OMEGA-3 FISH OIL CONCENTRATE PO) Take 2 capsules by mouth 4 times daily      CINNAMON PO Take 3 capsules by mouth 4 times daily (before meals and nightly) Cinnamon Bark      vitamin C (ASCORBIC ACID) 500 MG tablet Take 1,000 mg by mouth daily      NONFORMULARY Curamin for pain Taking PRN          Medications patient taking as of now reconciled against medications ordered at time of hospital discharge: Yes    Chief Complaint   Patient presents with    Follow-Up from Hospital     Traumatic Brain injury       History of Present illness - Follow up of Hospital diagnosis(es):   Left intraparenchymal hemorrhage  Right basilar skull fracture  Mechanical fall    Inpatient course: Discharge less, subsequent encounter    3.  Closed fracture of right side of base of skull with routine healing, subsequent encounter        Medical Decision Making: moderate complexity      Patient presents for hospital follow up  Overall doing well  Continue to follow with therapy as scheduled  Continue keppra for seizure prophylaxis  Continue to follow with neurosurgery and PM&R as scheduled   Reassurance and future expectations discussed  Follow up in 3 months, earlier PRN    Electronically signed by Kamlesh Todd MD on 6/22/2021 at 12:43 PM

## 2021-06-30 ENCOUNTER — HOSPITAL ENCOUNTER (OUTPATIENT)
Dept: PHYSICAL THERAPY | Age: 71
Setting detail: THERAPIES SERIES
Discharge: HOME | End: 2021-06-30
Payer: MEDICARE

## 2021-06-30 ENCOUNTER — HOSPITAL ENCOUNTER (OUTPATIENT)
Dept: CT IMAGING | Age: 71
Discharge: HOME | End: 2021-06-30
Payer: MEDICARE

## 2021-06-30 ENCOUNTER — HOSPITAL ENCOUNTER (OUTPATIENT)
Dept: SPEECH THERAPY | Age: 71
Setting detail: THERAPIES SERIES
Discharge: HOME | End: 2021-06-30
Payer: MEDICARE

## 2021-06-30 DIAGNOSIS — I61.9 INTRAPARENCHYMAL HEMORRHAGE OF BRAIN (HCC): ICD-10-CM

## 2021-06-30 PROCEDURE — 97129 THER IVNTJ 1ST 15 MIN: CPT

## 2021-06-30 PROCEDURE — 97130 THER IVNTJ EA ADDL 15 MIN: CPT

## 2021-06-30 PROCEDURE — 70450 CT HEAD/BRAIN W/O DYE: CPT

## 2021-06-30 PROCEDURE — 97110 THERAPEUTIC EXERCISES: CPT

## 2021-06-30 PROCEDURE — 97112 NEUROMUSCULAR REEDUCATION: CPT

## 2021-06-30 NOTE — PROGRESS NOTES
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 Visit # 2, 2/10 for progress note   Visits Allowed: Unlimited visits based on medical necessity   Recertification Date: 3/4/44   Physician Follow-Up: 2021, next CT scan on  to check on bleeding   Physician Orders: Eval and treat   History of Present Illness: On May 28th, Rito Jasmine was playing pickleball at the [facility]. He ran into a wall hard hitting back of head on wall and lost conscious. Rito Jasmine has a fracture back of head. He also has multiple brain bleeds. At hospital he was originally in ICU and spouse reports he had aphasia at that time. Progressed quickly and was admitted to Inpatient Rehab. Was discharged home on . He is very active with running, swimming, pickleball, working in garage. SUBJECTIVE: Patient reports that this morning he is having a headache. He states that he took tylenol prior to coming to therapy.    TREATMENT   Precautions: High BP, TBI May 28, 2021, low heart rate   Pain: 210 headache    X in shaded column indicates activity completed today   Modalities Parameters/  Location  Notes                     Manual Therapy Time/Technique  Notes                     Exercise/Intervention   Notes   Standing on foam: heel/toe raises, marches, mini-squats, 3 way hip kicks, HS curls 10x B   X 1 UE support, encouraged decreased UE support   Balance on foam: feet together EC,  B step stance EC, tandem stance EO  2 sets ea  30 sec  X No UE support    Rockerboard: taps, balance (front/back, lateral)  10x taps  30 sec balance  X No UE support          Dynamic gait: tandem walking, marches, retro walking, side stepping  20'x4  X No UE support, light CGA to close SBA                                               Specific Interventions Next Treatment: Advanced gait, balance on foam, rockerboard, hamstring stretch    Activity/Treatment Tolerance:  [x]  Patient tolerated treatment well  []  Patient limited by fatigue  []  Patient limited by pain   []  Patient limited by medical complications  []  Other:     Assessment: Initiated exercises as documented above. Patient provided with demonstration and cues on proper technique with added exercises to ensure maximal muscle activation. He had various UE support throughout treatment session. Patient unsteady at times during session, more when he had a narrow base of support. He required close SBA to light CGA at times during session for safety. Patient reported that his headache decreased to a 1/10 at the end of therapy. He also noted increased fatigue at the conclusion of session. GOALS:  Patient Goal: \"Get back to running, working around house and workshop. \"    Short Term Goals:  Time Frame: 4 weeks  1. Improve balance with Buck score of 54 or greater to assist with safety at home. 2.  Improve steps to allow [facility] to ascend/descend 4 steps using reciprocal pattern and no handrails to assist with community outings.   3.  [facility] able to ambulate on unlevel surfaces to assist with working in his garage. Long Term Goals:  Time Frame: 12 weeks  1. Independent with HEP and with progression to assist with improving balance. Patient Education:   [x]  HEP/Education Completed: Initiated exercises as documented above. Patient educated on having UE support nearby at home if needed for exercises and also for his wife to be present for safety. [place] Access Code: [email or web address]  []  No new Education completed  []  Reviewed Prior HEP      [x]  Patient verbalized and/or demonstrated understanding of education provided. []  Patient unable to verbalize and/or demonstrate understanding of education provided. Will continue education. [x]  Barriers to learning: nothing per patient    PLAN:  Treatment Recommendations: Strengthening, Range of Motion, Balance Training, Functional Mobility Training, Transfer Training, Endurance Training, Gait Training, Stair Training, Pain Management, Home Exercise Program, Patient Education, Positioning, Aquatics and Modalities    []  Plan of care initiated. Plan to see patient 2 times per week for 12 weeks to address the treatment planned outlined above.   [x]  Continue with current plan of care  []  Modify plan of care as follows:    []  Hold pending physician visit  []  Discharge    Time In 0902   Time Out 0945   Timed Code Minutes: 43 min   Total Treatment Time: 43 min       Electronically Signed by: Ayaz Nichols PTA

## 2021-06-30 NOTE — PROGRESS NOTES
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 Moustapha Swann, 8/3 f/u with Dr. Srikanth Mckeon   Physician Orders: Abigail Bass MD   Pertinent History: Patient reports 2 1/2 weeks ago he was playing pickle ball in which he ran sideways and fell down and hit his head the concrete block wall. Patient woke up a few hours later in ICU with amnesia of playing pickle ball which is still present to date. Patient spent rest of hospital stay in inpatient rehab where he also received skilled speech therapy to address word-finding and cognitive skills. SUBJECTIVE: Patient reports appointment with Dr. Gus Landaverde went fine with only a few medication changes. Wife stated patient forgot to bring cognitive task given while patient was receiving skilled ST in inpatient rehab. Patient states task required patient to determine which streets to take to get to a specific location.  Patient's wife reports she will bring it to the next session to go over with ST as patient demonstrated increased difficulty in completing task at home. Throughout session, patient asked to bring volume down due to overstimulation as patient reports he has noticed that noises (even lower intensity noises) are bothering him. ST conversed with patient during session at almost a whisper intensity. Patient stated he was in the [place] [employer] with wife and noticed a small child playing with blocks on the mall floor. Patient reports from 75-80 ft away he could hear the blocks clanking and mentioned to his wife they would need to go a different way around the child as it was too overstimulating. Patient's wife states she did not even hear the boy with the blocks even after patient pointed it out. Patient with great insight towards s/s of overstimulation and how to problem solve these types of situations. ST provided strategies and cues for patient to instruct people if they are being too loud or if something is overstimulating him. Patient also has a f/u CAT scan of his head this date to assess the bleeding caused from the fall. Patient reports this is scheduled as a \"check up\" as the previous CAT scan conducted during inpatient rehab stay found the bleeding was \"stable\". Today's scan is to ensure patient's bleeding is still stable and not increased. SHORT TERM GOAL #1:  Goal 1: Patient will utilize memory compensatory strategies to complete delayed recall tasks (>20 minutes) with 4 units of newly learned information with 70% accuracy given mod cues to improve retention of novel information. INTERVENTIONS: Not specifically addressed this date; however, patient able to recall memory strategies provided during inpatient rehab stay. SHORT TERM GOAL #2:  Goal 2: Patient will complete short-term and working memory tasks with 70% accuracy given mod verbal cues to improve participation in multi-step activities/hobbies. INTERVENTIONS: Patient report she is noticing deficits in short-term memory at home.  He states wife has been on the phone with different people in the past weeks and has discussed with him different information regarding who she talked to, what they said, and what their plans are. Patient noted that it is challenging for him to remember all the details. Patient reports he has the hardest time with remembering people's names; however, he can easily remember the kind of vehicle they drive. ST provided rationale as patient is utilizing \"associate it\" strategy. Patient verbalized understanding. SHORT TERM GOAL #3:  Goal 3: Patient will complete complex attention (divided, alternating) tasks with 75% accuracy given min cues to improve multitasking skills needed for daily activities (ex: driving, running). INTERVENTIONS: Patient states last May he was running and left the park and switched to the street. Patient reports he passed out and awoke right before he hit his head on the pavement. Patient sought medical attention but no cause was determined. He says it has never happened again with running. ST provided education on the complex divided attention required for tasks such as running and driving in which patient reports he \"was never very good at multi-tasking before\". ST with lengthy discussion regarding how TBI affects attention and processing speed. Patient verbalized understanding of education and POC. SHORT TERM GOAL #4:  Goal 4: Patient will demonstrate use of word finding strategies within high level description tasks and conversation without cues at least 75% of the time for improved success communicating with family and friends  INTERVENTIONS: Within conversation this date, patient with no overt signs of word finding deficits. Patient able to converse fluently without a communication breakdown noted. However, patient reports he recently had an episode of anomia while at the community pool.  He was talking with his wife and stated \"the person that sits high up at the edge of the pool, not a swimmer, not swim, something with water\". Given aforementioned details, wife was able to provide \"\" to patient as the word. Patient states is frequently noticing having anomia with the word \"\". Patient reports his episodes of anomia not frequent and seem to occur over a small repertoire of words. ST provided patient with word finding strategies (give it a few seconds, picture it, circumlocution, state the letter it starts with, say a synonym)    SHORT TERM GOAL #5:  Goal 5: Patient will complete higher level naming tasks (divergent, convergent naming) with 85% accuracy given min cues to promote enhanced expressive communication. INTERVENTIONS: Not specifically addressed this session. Specific Interventions Next Treatment: delayed recall, short term memory, working memory, complex attention, word finding within conversation tasks, higher level naming    Activity/Treatment Tolerance:  [x]  Patient tolerated treatment well  []  Patient limited by fatigue  []  Patient limited by pain   []  Patient limited by other medical complications  [x]  Other: Patient limited by overstimulation of loud speech/noises    Patient Education:   [x]  HEP/Education Completed: Plan of Care, Goals, Memory Strategies, Word Finding Strategies  []  No new Education completed  []  Reviewed Prior HEP      [x]  Patient verbalized and/or demonstrated understanding of education provided. []  Patient unable to verbalize and/or demonstrate understanding of education provided. Will continue education. [x]  Barriers to learning: loud noises    PLAN:  [x]  Plan of care initiated. Plan to see patient 2 times per week for 4 weeks to address the treatment planned outlined above.   [x]  Continue with current plan of care  []  Modify plan of care as follows:    []  Hold pending physician visit  []  Discharge    Time In 0950   Time Out 1020   Timed Code Minutes: 30 min   Total Treatment Time: 30 min     Kamla Bledsoe M.S. CF-SLP, [id number]

## 2021-07-02 ENCOUNTER — HOSPITAL ENCOUNTER (OUTPATIENT)
Dept: SPEECH THERAPY | Age: 71
Setting detail: THERAPIES SERIES
Discharge: HOME | End: 2021-07-02
Payer: MEDICARE

## 2021-07-02 ENCOUNTER — HOSPITAL ENCOUNTER (OUTPATIENT)
Dept: PHYSICAL THERAPY | Age: 71
Setting detail: THERAPIES SERIES
Discharge: HOME | End: 2021-07-02
Payer: MEDICARE

## 2021-07-02 PROCEDURE — 97129 THER IVNTJ 1ST 15 MIN: CPT | Performed by: SPEECH-LANGUAGE PATHOLOGIST

## 2021-07-02 PROCEDURE — 97130 THER IVNTJ EA ADDL 15 MIN: CPT | Performed by: SPEECH-LANGUAGE PATHOLOGIST

## 2021-07-02 PROCEDURE — 97110 THERAPEUTIC EXERCISES: CPT

## 2021-07-02 NOTE — PROGRESS NOTES
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 Visit # 3, 3/10 for progress note   Visits Allowed: Unlimited visits based on medical necessity   Recertification Date: 00   Physician Follow-Up: 2021, next CT scan on  to check on bleeding   Physician Orders: Eval and treat   History of Present Illness: On May 28th, Dayana Sykes was playing pickleball at the [facility]. He ran into a wall hard hitting back of head on wall and lost conscious. Dayana Sykes has a fracture back of head. He also has multiple brain bleeds. At hospital he was originally in ICU and spouse reports he had aphasia at that time. Progressed quickly and was admitted to Inpatient Rehab. Was discharged home on . He is very active with running, swimming, pickleball, working in garage. SUBJECTIVE: Patient reports that this morning he is having a headache. He did exercises at the [facility] yesterday.     TREATMENT   Precautions: High BP, TBI May 28, 2021, low heart rate   Pain: 2/10 headache    X in shaded column and/or demonstrated understanding of education provided. []  Patient unable to verbalize and/or demonstrate understanding of education provided. Will continue education. [x]  Barriers to learning: nothing per patient    PLAN:  Treatment Recommendations: Strengthening, Range of Motion, Balance Training, Functional Mobility Training, Transfer Training, Endurance Training, Gait Training, Stair Training, Pain Management, Home Exercise Program, Patient Education, Positioning, Aquatics and Modalities    []  Plan of care initiated. Plan to see patient 2 times per week for 12 weeks to address the treatment planned outlined above.   [x]  Continue with current plan of care  []  Modify plan of care as follows:    []  Hold pending physician visit  []  Discharge    Time In 0900   Time Out 0945   Timed Code Minutes: 45 min   Total Treatment Time: 45 min       Electronically Signed by: Darrion Conte PT

## 2021-07-02 NOTE — PROGRESS NOTES
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 Rosa Tirado, 8/3 f/u with Dr. Tanner Cardoza   Physician Orders: Kiran Hines MD   Pertinent History: Patient reports 2 1/2 weeks ago he was playing pickle ball in which he ran sideways and fell down and hit his head the concrete block wall. Patient woke up a few hours later in ICU with amnesia of playing pickle ball which is still present to date. Patient spent rest of hospital stay in inpatient rehab where he also received skilled speech therapy to address word-finding and cognitive skills. SUBJECTIVE:  Patient requested a break after completing one of the cognitive tasks. Patient reports he got a little headache that he rated as 1-2/10. Patient was able to continue with the session after a very brief break. Patient's wife brought in some homework (map task) from inpatient rehab that the patient completed. Patient reported some frustration while completing the task, but his wife reports more frustration than was verbalized.   Patient reports he had to take a break from the task and then come back to it 1-2 days later and he was able to identify some errors that he had (he had included one of the streets twice). Provided patient with another more complex task to try at home. SHORT TERM GOAL #1:  Goal 1: Patient will utilize memory compensatory strategies to complete delayed recall tasks (>20 minutes) with 4 units of newly learned information with 70% accuracy given mod cues to improve retention of novel information. INTERVENTIONS: Completed education on compensatory memory strategies to assist with recall of information. Patient educated on 4 memory strategies utilizing the acronym WARP. Patient provided with examples of how to use the strategies as well as a written list of the strategies to take home and refer to frequently so he can start to incorporate the strategies within functional tasks. Patient provided with a list of 4 functional errands. Patient immediately recalled 4/4 of the errands without cues. Following a 10 minute delay, patient independently recalled 4/4 of errands with use of visualization and association. SHORT TERM GOAL #2:  Goal 2: Patient will complete short-term and working memory tasks with 70% accuracy given mod verbal cues to improve participation in multi-step activities/hobbies. INTERVENTIONS:   Working memory task:  Given 3-4 word lists with auditory provision, patient mentally manipulated the words into reverse order:     - 3 words: 10/10 without cues   - 4 words: 9/10 without cues    SHORT TERM GOAL #3:  Goal 3: Patient will complete complex attention (divided, alternating) tasks with 75% accuracy given min cues to improve multitasking skills needed for daily activities (ex: driving, running).   INTERVENTIONS:   Alternating attention task:    Given 4 words associated with the suits in a deck of cards and 2 words associated with the colors (red/black), patient switched between verbalizing the word for the suit and the word for the color throughout 23 cards with 21/23 accurate without cues, x 1 self-corrected. Patient did not require any redirects throughout. **Ongoing education on the rationale for addressing divided attention, despite the fact that the patient has never really been a multi-gallo. SHORT TERM GOAL #4:  Goal 4: Patient will demonstrate use of word finding strategies within high level description tasks and conversation without cues at least 75% of the time for improved success communicating with family and friends  INTERVENTIONS: Briefly reviewed word finding strategies that were discussed during the last session and patient verbalized understanding. No instances of anomia noted within structured tasks or in conversation this session. SHORT TERM GOAL #5:  Goal 5: Patient will complete higher level naming tasks (divergent, convergent naming) with 85% accuracy given min cues to promote enhanced expressive communication. INTERVENTIONS: did not test this date d/t focus on other goals. Specific Interventions Next Treatment: delayed recall, short term memory, working memory, complex attention, word finding within conversation tasks, higher level naming    Activity/Treatment Tolerance:  [x]  Patient tolerated treatment well overall  []  Patient limited by fatigue  []  Patient limited by pain   []  Patient limited by other medical complications  [x]  Other:  Required x 1 brief break d/t getting a headache    Patient Education:   [x]  HEP/Education Completed:  Memory Strategies, Word Finding Strategies  []  No new Education completed  []  Reviewed Prior HEP      [x]  Patient verbalized and/or demonstrated understanding of education provided. []  Patient unable to verbalize and/or demonstrate understanding of education provided. Will continue education. []  Barriers to learning:     PLAN:  []  Plan of care initiated.   Plan to see patient 2 times per week for 4 weeks to address the treatment planned outlined above.   [x]  Continue with current plan of care  []  Modify plan of care as follows:    []  Hold pending physician visit  []  Discharge    Time In 0948   Time Out 1015   Timed Code Minutes: 27 min   Total Treatment Time: 27 min       MILTON Pedraza 2167

## 2021-07-07 ENCOUNTER — OFFICE VISIT (OUTPATIENT)
Dept: NEUROSURGERY | Age: 71
End: 2021-07-07
Payer: MEDICARE

## 2021-07-07 ENCOUNTER — HOSPITAL ENCOUNTER (OUTPATIENT)
Dept: SPEECH THERAPY | Age: 71
Setting detail: THERAPIES SERIES
Discharge: HOME | End: 2021-07-07
Payer: MEDICARE

## 2021-07-07 ENCOUNTER — HOSPITAL ENCOUNTER (OUTPATIENT)
Dept: PHYSICAL THERAPY | Age: 71
Setting detail: THERAPIES SERIES
Discharge: HOME | End: 2021-07-07
Payer: MEDICARE

## 2021-07-07 VITALS
TEMPERATURE: 97.9 F | WEIGHT: 167.77 LBS | SYSTOLIC BLOOD PRESSURE: 138 MMHG | HEART RATE: 69 BPM | DIASTOLIC BLOOD PRESSURE: 84 MMHG | HEIGHT: 67 IN | BODY MASS INDEX: 26.33 KG/M2

## 2021-07-07 DIAGNOSIS — S06.319D: ICD-10-CM

## 2021-07-07 DIAGNOSIS — S06.9X1D TRAUMATIC BRAIN INJURY WITH LOSS OF CONSCIOUSNESS OF 30 MINUTES OR LESS, SUBSEQUENT ENCOUNTER: Primary | ICD-10-CM

## 2021-07-07 PROCEDURE — 97129 THER IVNTJ 1ST 15 MIN: CPT

## 2021-07-07 PROCEDURE — 97112 NEUROMUSCULAR REEDUCATION: CPT

## 2021-07-07 PROCEDURE — 97110 THERAPEUTIC EXERCISES: CPT

## 2021-07-07 PROCEDURE — 1111F DSCHRG MED/CURRENT MED MERGE: CPT | Performed by: PHYSICIAN ASSISTANT

## 2021-07-07 PROCEDURE — 4040F PNEUMOC VAC/ADMIN/RCVD: CPT | Performed by: PHYSICIAN ASSISTANT

## 2021-07-07 PROCEDURE — 97130 THER IVNTJ EA ADDL 15 MIN: CPT

## 2021-07-07 PROCEDURE — 3017F COLORECTAL CA SCREEN DOC REV: CPT | Performed by: PHYSICIAN ASSISTANT

## 2021-07-07 PROCEDURE — 99214 OFFICE O/P EST MOD 30 MIN: CPT | Performed by: PHYSICIAN ASSISTANT

## 2021-07-07 PROCEDURE — 1036F TOBACCO NON-USER: CPT | Performed by: PHYSICIAN ASSISTANT

## 2021-07-07 PROCEDURE — G8427 DOCREV CUR MEDS BY ELIG CLIN: HCPCS | Performed by: PHYSICIAN ASSISTANT

## 2021-07-07 PROCEDURE — 1123F ACP DISCUSS/DSCN MKR DOCD: CPT | Performed by: PHYSICIAN ASSISTANT

## 2021-07-07 PROCEDURE — G8417 CALC BMI ABV UP PARAM F/U: HCPCS | Performed by: PHYSICIAN ASSISTANT

## 2021-07-07 NOTE — PROGRESS NOTES
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) (Z86.640G)   Date of Evaluation 21      Functional Outcome [facility]    Functional Outcome Score Level 4 (21)        Insurance: Primary: Payor: Keaton Cason /  /  / ,   Secondary: Feliciano King BENEFIT   Authorization Information: No precertification needed   Visit # 5, 5/10 for progress note   Visits Allowed: Unlimited based on medical necessity   Recertification Date: 46   Physician Follow-Up:  f/u with Dr. Howie Guzman, 8/3 f/u with Dr. Angeles Santos   Physician Orders: Catherine Valencia MD   Pertinent History: Patient reports 2 1/2 weeks ago he was playing pickle ball in which he ran sideways and fell down and hit his head the concrete block wall. Patient woke up a few hours later in ICU with amnesia of playing pickle ball which is still present to date. Patient spent rest of hospital stay in inpatient rehab where he also received skilled speech therapy to address word-finding and cognitive skills. SUBJECTIVE:  Patient attentive and pleasant throughout therapy. Patient brought in deduction puzzle provided as HEP from previous session. Patient had written in answers in every box but 2 and used cues in each box as well such as \"not a sandal\" as a memory aid. ST praised patient for utilizing different strategies to aid in comprehension of what should and should not go into the box.  Upon review with ST, the remaining 2 boxes that were not filled provided patient with a challenge as the answer for what belongs in these boxes was not specifically stated. For example, one was \"the woman who does not wear moccasins, wears loafers\". Patient confused as 4 other women do not wear moccasins \"so it could be ANY of them\". ST educated patient that loafers has to fit in a box as it was the only shoe left that was not written in a box. Patient required moderate cueing and assistance to comprehend that since loafers were the last shoe stated in the clues and only one spot was available, loafers must go in that spot. Patient repeatedly stating \"it just defies logic because it could be for any of these\". Patient given another deduction puzzle to work on at home following this session. SHORT TERM GOAL #1:  Goal 1: Patient will utilize memory compensatory strategies to complete delayed recall tasks (>20 minutes) with 4 units of newly learned information with 70% accuracy given mod cues to improve retention of novel information. INTERVENTIONS: Not addressed this date due to focus on additional goals. SHORT TERM GOAL #2:  Goal 2: Patient will complete short-term and working memory tasks with 70% accuracy given mod verbal cues to improve participation in multi-step activities/hobbies. INTERVENTIONS: Working memory taxed in activity mentioned below in STG #5. Good success. SHORT TERM GOAL #3:  Goal 3: Patient will complete complex attention (divided, alternating) tasks with 75% accuracy given min cues to improve multitasking skills needed for daily activities (ex: driving, running). INTERVENTIONS: Not addressed this date due to focus on additional goals.      SHORT TERM GOAL #4:  Goal 4: Patient will demonstrate use of word finding strategies within high level description tasks and conversation without cues at least 75% of the time for improved success communicating with family and friends  INTERVENTIONS: No instances of anomia this date in structured therapy tasks nor conversation. SHORT TERM GOAL #5:  Goal 5: Patient will complete higher level naming tasks (divergent, convergent naming) with 85% accuracy given min cues to promote enhanced expressive communication. INTERVENTIONS:   Object Description: Patient given 2 items via auditory provision and asked to state the category the items belong to, 2 similarities, 2 differences, and add another item to the category.   -Category: 4/4 independent  -Similarities: 8/8 independent  -Differences: 8/8 independent  -Add to Category: 3/4 independent, 1/4 min cue to remember what needed to be stated about two objects (ex: \"add to category\"). *Great working memory in [employer] and things to say about items! *Great thought organization and higher level naming! Specific Interventions Next Treatment: delayed recall, short term memory, working memory, complex attention, word finding within conversation tasks, higher level naming    Activity/Treatment Tolerance:  [x]  Patient tolerated treatment well overall  []  Patient limited by fatigue  []  Patient limited by pain   []  Patient limited by other medical complications  []  Other:      Patient Education:   [x]  HEP/Education Completed:  Memory Strategies, Word Finding Strategies, Deduction Puzzle  []  No new Education completed  [x]  Reviewed Prior HEP      [x]  Patient verbalized and/or demonstrated understanding of education provided. []  Patient unable to verbalize and/or demonstrate understanding of education provided. Will continue education. []  Barriers to learning:     PLAN:  []  Plan of care initiated. Plan to see patient 2 times per week for 4 weeks to address the treatment planned outlined above.   [x]  Continue with current plan of care  []  Modify plan of care as follows:    []  Hold pending physician visit  []  Discharge    Time In 1034   Time Out 1101   Timed Code Minutes:  27 min   Total Treatment Time: 32 min       Ben Cunha M.S., CF-SLP, [id number]

## 2021-07-07 NOTE — PROGRESS NOTES
[address] [address] [facility]  Dept: [phone]  Dept Fax: [phone]  Loc: [facility] 1160 Follow Visit  Visit Date: 7/7/2021      Mojgan Fay  is a 78 y.o. male who is returning to the office today for a post-discharge hospital follow-up visit for evaluation of traumatic brain injury and associated hematoma. He was last seen and evaluated by myself and by Dr. Evelyne Jo while an inpatient on the floor on 5/30/2021. He was stable with complaints of persistent headache was more awake and alert and oriented. He presents today with a CT scan of the head imaged without contrast on 6/30/2021 for review which reveals subacute hemorrhagic contusion of the left frontal lobe with minimal hyperdense blood at the margin. Mildly commuted fracture of the right skull base involving the occipital bone with nondisplaced linear component extending cephalad towards the lambdoid suture. He presents to today's appointment arrives accompanied by his wife and is comfortable. Persistent headaches are infrequent and retractable to Tylenol. He relates that he is no longer taking tramadol. On physical exam he is stable and grossly intact without deficits for strength sensation vision or imbalance. He has increased some activity without any discomfort. He does relate being easily fatigued and somewhat photophobic for bright light which is to be expected following a traumatic brain injury. Viewed the CT scan findings and of agreed to follow-up with him with a repeat of the CT scan at a follow-up appointment in 6 weeks. He is encouraged in the interim to contact our office with any additional questions or concerns or should experience any significant changes in his general condition.   Remains very happy with the treatment he received in the hospital and subsequently after his discharge and with today's visit having his questions and concerns addressed and answered.     · Patient was evaluated today and is doing well overall. · No new complaints were voiced. · Patient  lives with their family  · Wound: none  · Follow-up Studies: No orders of the defined types were placed in this encounter. ·      Assessment/Plan:  · Status Post traumatic brain injury follow-up  · Doing well overall  · Encouraged gradual increase in physical and mental activity. · Fall precaution and home safety education provided to patient. · Follow-up: Follow-up in 6 weeks with new CT scan of the head to be imaged without contrast for comparison and review.       Electronically signed by Sirena Anderson PA-C on 7/7/21 at 3:10 PM EDT

## 2021-07-07 NOTE — PROGRESS NOTES
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 Visit # 4, 4/10 for progress note   Visits Allowed: Unlimited visits based on medical necessity   Recertification Date: 45   Physician Follow-Up: 2021, next CT scan on  to check on bleeding   Physician Orders: Eval and treat   History of Present Illness: On May 28th, Avelino Wilkins was playing pickleball at the [facility]. He ran into a wall hard hitting back of head on wall and lost conscious. Avelino Wilkins has a fracture back of head. He also has multiple brain bleeds. At hospital he was originally in ICU and spouse reports he had aphasia at that time. Progressed quickly and was admitted to Inpatient Rehab. Was discharged home on . He is very active with running, swimming, pickleball, working in garage. SUBJECTIVE: Patient has a follow up later today to discuss the CT results. Patient denies any adverse effects after PT sessions and wife present and states they bought a foam pad as recommended to complete HEP on.        TREATMENT Precautions: High BP, TBI May 28, 2021, low heart rate   Pain: 1/10 headache    X in shaded column indicates activity completed today   Modalities Parameters/  Location  Notes                     Manual Therapy Time/Technique  Notes                     Exercise/Intervention   Notes   Standing on foam: heel/toe raises, marches, mini-squats, 3 way hip kicks, HS curls 15x B   X No UE support    Balance on foam: feet together EC,  B step stance EC, tandem stance EO  2 sets ea  30 sec  X No UE support    Rockerboard: taps, balance (front/back, lateral)  15x taps  30 sec balance  X No UE support          Dynamic gait: tandem walking, marches, retro twalking, side stepping, grapevine 20'x4  X No UE support, light CGA to close SBA   Matrix bike level 3, seat=4  5 min  X    Hydrostick front to back, side to side 10x   Narrow stance                                 Specific Interventions Next Treatment: Advanced gait, balance on foam, rockerboard, hamstring stretch    Activity/Treatment Tolerance:  [x]  Patient tolerated treatment well  []  Patient limited by fatigue  []  Patient limited by pain   []  Patient limited by medical complications  []  Other:     Assessment: Patient was able to complete foam activities without upper extremity support. Patient most challenged with tandem stance on compliant surface with moderate postural sway noted when attempting EC, so continued with EO for this activity. GOALS:  Patient Goal: \"Get back to running, working around house and workshop. \"    Short Term Goals:  Time Frame: 4 weeks  1. Improve balance with Buck score of 54 or greater to assist with safety at home. 2.  Improve steps to allow [facility] to ascend/descend 4 steps using reciprocal pattern and no handrails to assist with community outings. 3.  [facility] able to ambulate on unlevel surfaces to assist with working in his garage. Long Term Goals:  Time Frame: 12 weeks  1.   Independent with HEP and with progression to assist with improving balance. Patient Education:   []  HEP/Education Completed:    [address] Access Code: [email or web address]  [x]  No new Education completed  []  Reviewed Prior HEP      [x]  Patient verbalized and/or demonstrated understanding of education provided. []  Patient unable to verbalize and/or demonstrate understanding of education provided. Will continue education. [x]  Barriers to learning: nothing per patient    PLAN:  Treatment Recommendations: Strengthening, Range of Motion, Balance Training, Functional Mobility Training, Transfer Training, Endurance Training, Gait Training, Stair Training, Pain Management, Home Exercise Program, Patient Education, Positioning, Aquatics and Modalities    []  Plan of care initiated. Plan to see patient 2 times per week for 12 weeks to address the treatment planned outlined above.   [x]  Continue with current plan of care  []  Modify plan of care as follows:    []  Hold pending physician visit  []  Discharge    Time In 0947   Time Out 1030   Timed Code Minutes: 43 min   Total Treatment Time: 43 min        Electronically Signed by: Henrietta Gonzales PTA

## 2021-07-08 ENCOUNTER — HOSPITAL ENCOUNTER (OUTPATIENT)
Dept: PHYSICAL THERAPY | Age: 71
Setting detail: THERAPIES SERIES
Discharge: HOME | End: 2021-07-08
Payer: MEDICARE

## 2021-07-08 ENCOUNTER — HOSPITAL ENCOUNTER (OUTPATIENT)
Dept: SPEECH THERAPY | Age: 71
Setting detail: THERAPIES SERIES
Discharge: HOME | End: 2021-07-08
Payer: MEDICARE

## 2021-07-08 PROCEDURE — 97129 THER IVNTJ 1ST 15 MIN: CPT | Performed by: SPEECH-LANGUAGE PATHOLOGIST

## 2021-07-08 PROCEDURE — 97112 NEUROMUSCULAR REEDUCATION: CPT

## 2021-07-08 PROCEDURE — 97130 THER IVNTJ EA ADDL 15 MIN: CPT | Performed by: SPEECH-LANGUAGE PATHOLOGIST

## 2021-07-08 PROCEDURE — 97110 THERAPEUTIC EXERCISES: CPT

## 2021-07-08 NOTE — PROGRESS NOTES
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 Jamie Zhang, 8/3 f/u with Dr. Hossein Hernandez   Physician Orders: Sandy Rowe MD   Pertinent History: Patient reports 2 1/2 weeks ago he was playing pickle ball in which he ran sideways and fell down and hit his head the concrete block wall. Patient woke up a few hours later in ICU with amnesia of playing pickle ball which is still present to date. Patient spent rest of hospital stay in inpatient rehab where he also received skilled speech therapy to address word-finding and cognitive skills. SUBJECTIVE:  Patient very pleasant and motivated. Patient reports he did not get a chance to work on the deduction puzzle given to him during the last session. Encouraged the patient to work on it over the weekend. No observation this session, but spoke with the patient's wife regarding patient's progress and home tasks to address goals.     SHORT TERM GOAL #1:  Goal 1: Patient will utilize memory compensatory strategies to complete delayed recall tasks (>20 minutes) with 4 units of newly learned information with 70% accuracy given mod cues to improve retention of novel information. INTERVENTIONS: Patient independently recalled the acronym WARP utilized in past sessions to recall the memory strategies. Patient independently recalled \"write it down, associate and repeat it\" as strategies, but was unable to recall \"picture it\" with max cues. Patient reports he is typically uses \"picture it\" frequently so he is surprised it was difficult to recall. Given a list of items to \"order\" at a [employer], patient immediately recalled 3/4 independently, 1/4 recalled with mod cues. Following an 18 minute delay, patient recalled 4/4 of the items (2 with details/toppings) independently. SHORT TERM GOAL #2:  Goal 2: Patient will complete short-term and working memory tasks with 70% accuracy given mod verbal cues to improve participation in multi-step activities/hobbies. INTERVENTIONS:   Working memory:  Given lists of 4 words with auditory provision, patient mentally manipulated the words into alphabetical order: 8/10 independent, 1/0 with min cues, 1/10 with mod cues    SHORT TERM GOAL #3:  Goal 3: Patient will complete complex attention (divided, alternating) tasks with 75% accuracy given min cues to improve multitasking skills needed for daily activities (ex: driving, running). INTERVENTIONS:   Divided attention task:  Patient sorted a deck of cards by suit while simultaneously flipping over the cards with the letter 't' in the spelling of their name with music playing in the background: 2 errors throughout, very timely completion of the task as he finished in 1 minute and 48 seconds.     SHORT TERM GOAL #4:  Goal 4: Patient will demonstrate use of word finding strategies within high level description tasks and conversation without cues at least 75% of the time for improved success communicating with family and friends  INTERVENTIONS: Patient reports he has not been having as much difficulty coming up with words he wants to say in conversation. Reports it usually only happens a few times a week. No instances of anomia noted in conversation this date. SHORT TERM GOAL #5:  Goal 5: Patient will complete higher level naming tasks (divergent, convergent naming) with 85% accuracy given min cues to promote enhanced expressive communication. INTERVENTIONS:   Divergent naming task: Words beginning with the letter 'b' given 1 minute: 11 words verbalized without cues  **Prior to starting this task, patient independently came up with a strategy that may help him think of words quicker. This SLP added an additional strategy. Specific Interventions Next Treatment: delayed recall, short term memory, working memory, complex attention, word finding within conversation tasks, higher level naming    Activity/Treatment Tolerance:  [x]  Patient tolerated treatment well overall  []  Patient limited by fatigue  []  Patient limited by pain   []  Patient limited by other medical complications  []  Other:      Patient Education:   [x]  HEP/Education Completed:  Memory Strategies, Deduction Puzzle  []  No new Education completed  [x]  Reviewed Prior HEP      [x]  Patient verbalized and/or demonstrated understanding of education provided. []  Patient unable to verbalize and/or demonstrate understanding of education provided. Will continue education. []  Barriers to learning:     PLAN:  []  Plan of care initiated. Plan to see patient 2 times per week for 4 weeks to address the treatment planned outlined above.   [x]  Continue with current plan of care  []  Modify plan of care as follows:    []  Hold pending physician visit  []  Discharge    Time In 1045   Time Out 1115   Timed Code Minutes:  30 min   Total Treatment Time: 30 min         Gillian Beltran M.S. [facility] [address]

## 2021-07-08 NOTE — PROGRESS NOTES
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 Visit # 5, 5/10 for progress note   Visits Allowed: Unlimited visits based on medical necessity   Recertification Date:    Physician Follow-Up: 2021, next CT scan on  to check on bleeding   Physician Orders: Eval and treat   History of Present Illness: On May 28th, Dayana Sykes was playing pickleball at the [facility]. He ran into a wall hard hitting back of head on wall and lost conscious. Dayana Sykes has a fracture back of head. He also has multiple brain bleeds. At hospital he was originally in ICU and spouse reports he had aphasia at that time. Progressed quickly and was admitted to Inpatient Rehab. Was discharged home on . He is very active with running, swimming, pickleball, working in garage. SUBJECTIVE: Patient's wife present for therapy session. Wife reports patient saw the doctor yesterday to get results of CT scan. Fracture is not healed but is healing well and could take months.  Patient states he felt good after last therapy session. TREATMENT   Precautions: High BP, TBI May 28, 2021, low heart rate   Pain: 1/10 headache    X in shaded column indicates activity completed today   Modalities Parameters/  Location  Notes                     Manual Therapy Time/Technique  Notes                     Exercise/Intervention   Notes   Standing on foam: heel/toe raises, marches, mini-squats, 3 way hip kicks, HS curls 15x B   X No UE support    Balance on foam: feet together EC,  B step stance EC, tandem stance EO   1 minute X No UE support    Rockerboard: taps, balance (front/back, lateral)  20x taps  30 sec balance  X No UE support          Dynamic gait: tandem walking, marches, retro twalking, side stepping, grapevine 20'x4  X No UE support, light CGA to close SBA   Matrix bike level 3, seat=4  5 min  X    Hydrostick front to back, side to side Step stance (bilateral lead) 10x  X                                  Specific Interventions Next Treatment: Advanced gait, balance on foam, rockerboard, hamstring stretch    Activity/Treatment Tolerance:  [x]  Patient tolerated treatment well []  Patient limited by fatigue  []  Patient limited by pain   []  Patient limited by medical complications  []  Other:     Assessment: Patient is demonstrating improvement with balance completing most activities without upper extremity support. Challenged with tandem stance on blue foam and step stance at hydrostick. Headache pain decreased to less than . 5/10 by end of session. GOALS:  Patient Goal: \"Get back to running, working around house and workshop. \"    Short Term Goals:  Time Frame: 4 weeks  1. Improve balance with Buck score of 54 or greater to assist with safety at home. 2.  Improve steps to allow Adams Johnson to ascend/descend 4 steps using reciprocal pattern and no handrails to assist with community outings. 3.  Adams Johnson able to ambulate on unlevel surfaces to assist with working in his garage. Long Term Goals:  Time Frame: 12 weeks  1. Independent with HEP and with progression to assist with improving balance. Patient Education:   []  HEP/Education Completed:    Nile Ng Access Code: [email or web address]  [x]  No new Education completed  []  Reviewed Prior HEP      [x]  Patient verbalized and/or demonstrated understanding of education provided. []  Patient unable to verbalize and/or demonstrate understanding of education provided. Will continue education. [x]  Barriers to learning: nothing per patient    PLAN:  Treatment Recommendations: Strengthening, Range of Motion, Balance Training, Functional Mobility Training, Transfer Training, Endurance Training, Gait Training, Stair Training, Pain Management, Home Exercise Program, Patient Education, Positioning, Aquatics and Modalities    []  Plan of care initiated. Plan to see patient 2 times per week for 12 weeks to address the treatment planned outlined above.   [x]  Continue with current plan of care  []  Modify plan of care as follows:    []  Hold pending physician visit  []  Discharge    Time In 1120   Time Out 1205   Timed Code Minutes: 45 min   Total Treatment Time: 45 min        Electronically Signed by: Eulalio Carrillo PTA

## 2021-07-14 ENCOUNTER — HOSPITAL ENCOUNTER (OUTPATIENT)
Dept: SPEECH THERAPY | Age: 71
Setting detail: THERAPIES SERIES
Discharge: HOME | End: 2021-07-14
Payer: MEDICARE

## 2021-07-14 ENCOUNTER — HOSPITAL ENCOUNTER (OUTPATIENT)
Dept: PHYSICAL THERAPY | Age: 71
Setting detail: THERAPIES SERIES
Discharge: HOME | End: 2021-07-14
Payer: MEDICARE

## 2021-07-14 PROCEDURE — 97129 THER IVNTJ 1ST 15 MIN: CPT

## 2021-07-14 PROCEDURE — 97130 THER IVNTJ EA ADDL 15 MIN: CPT

## 2021-07-14 PROCEDURE — 97110 THERAPEUTIC EXERCISES: CPT

## 2021-07-14 NOTE — PROGRESS NOTES
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 Visit # 6, 6/10 for progress note   Visits Allowed: Unlimited visits based on medical necessity   Recertification Date: 34   Physician Follow-Up: 2021, next CT scan on  to check on bleeding   Physician Orders: Eval and treat   History of Present Illness: On May 28th, Dayana Sykes was playing pickleball at the [facility]. He ran into a wall hard hitting back of head on wall and lost conscious. Dayana Sykes has a fracture back of head. He also has multiple brain bleeds. At hospital he was originally in ICU and spouse reports he had aphasia at that time. Progressed quickly and was admitted to Inpatient Rehab. Was discharged home on . He is very active with running, swimming, pickleball, working in garage. SUBJECTIVE: Patient feels therapy is helping, but he still has not been back to his normal activities at the [facility], but he does go to heated therapy pool.  He is still limited by his doctor with lifting more than 20 lbs, not able to be ascend/descend 4 steps using reciprocal pattern and no handrails to assist with community outings. 3.  Cici Castorena able to ambulate on unlevel surfaces to assist with working in his garage. Long Term Goals:  Time Frame: 12 weeks  1. Independent with HEP and with progression to assist with improving balance. Patient Education:   []  HEP/Education Completed:    [address] Access Code: [email or web address]  [x]  No new Education completed  []  Reviewed Prior HEP      [x]  Patient verbalized and/or demonstrated understanding of education provided. []  Patient unable to verbalize and/or demonstrate understanding of education provided. Will continue education. [x]  Barriers to learning: nothing per patient    PLAN:  Treatment Recommendations: Strengthening, Range of Motion, Balance Training, Functional Mobility Training, Transfer Training, Endurance Training, Gait Training, Stair Training, Pain Management, Home Exercise Program, Patient Education, Positioning, Aquatics and Modalities    []  Plan of care initiated. Plan to see patient 2 times per week for 12 weeks to address the treatment planned outlined above.   [x]  Continue with current plan of care  []  Modify plan of care as follows:    []  Hold pending physician visit  []  Discharge    Time In 906   Time Out 945   Timed Code Minutes: 39   Total Treatment Time: 39        Electronically Signed by: Otilia Galeano PT

## 2021-07-14 NOTE — PROGRESS NOTES
** PLEASE SIGN, DATE AND TIME CERTIFICATION BELOW AND RETURN TO [facility] OUTPATIENT REHABILITATION (FAX #: [phone]). ATTEST/CO-SIGN IF ACCESSING VIA [employer] network. THANK YOU.**    I certify that I have examined the patient below and determined that Physical Medicine and Rehabilitation service is necessary and that I approve the established plan of care for up to 90 days or as specifically noted. Attestation, signature or co-signature of physician indicates approval of certification requirements.    ________________________ ____________ __________  Physician Signature   Date   Time       [address]  [] SPEECH LANGUAGE COGNITIVE EVALUATION  [] DAILY NOTE   [x] PROGRESS NOTE [] DISCHARGE NOTE    [x] [address] [facility]   [] [address]    [] [address] [facility]   [] 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 Rosanne Grajeda, 8/3 f/u with Dr. Gabriela Go   Physician Orders: Felipe Tsai MD   Pertinent History: Patient reports 2 1/2 weeks ago he was playing pickle ball in which he ran sideways and fell down and hit his head the concrete block wall.  Patient woke up a few hours later in ICU with amnesia of playing pickle ball which is still present to date. Patient spent rest of hospital stay in inpatient rehab where he also received skilled speech therapy to address word-finding and cognitive skills. SUBJECTIVE:  Patient arrived to therapy following PT session with wife. Patient engaged and attentive during session. No observation via wife this date, ST updated wife on patient's progress during session. *Patient demonstrating lack of mental flexibility with deductive reasoning puzzles provided as HEP. Patient consistently reporting missing boxes as he wrote down \"all logical answers\" and the remaining missing boxes defy logic. ST explained these missing boxes require patient to look at clues and insert information that has not been written even if no specific clues were stated about that item. Patient asked if he could write additional clues so that puzzle would make sense. ST educated patient on inferencing skills that are needed in order to make puzzle complete. Patient reports he completed a 1000 piece jigsaw puzzle in three days and he \"does not understand why that complex of a jigsaw puzzle was easy but these deductive reasoning puzzles are hard\". Continued education regarding different skills needed for jigsaw puzzle rather than deductive reasoning puzzle. *ST asked patient to bring deductive reasoning puzzle to next session to offer assistance with completing puzzle. Patient verbalized understanding. SHORT TERM GOAL #1:  Goal 1: Patient will utilize memory compensatory strategies to complete delayed recall tasks (>20 minutes) with 4 units of newly learned information with 70% accuracy given mod cues to improve retention of novel information. GOAL MET -- NEW GOAL. Goal 1: Patient will utilize memory compensatory strategies to complete delayed recall tasks (>20 minutes) with >5 units of newly learned information with 70% accuracy given min cues to improve retention of novel information.   INTERVENTIONS: Patient independently recalled the acronym WARP utilized in past sessions to recall the memory strategies. Patient independently recalled \"write it down, associate\", min cues to remember \"repeat it\" and mod phonetic cues to remember \"picture it\" as memory strategy. Previous Sessions:  -Patient provided with a list of 4 functional errands. Patient immediately recalled 4/4 of the errands without cues. Following a 10 minute delay, patient independently recalled 4/4 of errands with use of visualization and association. SHORT TERM GOAL #2:  Goal 2: Patient will complete short-term and working memory tasks with 70% accuracy given mod verbal cues to improve participation in multi-step activities/hobbies. GOAL MET -- REVISE GOAL. NEW GOAL: Goal 2: Patient will complete short-term and working memory tasks with 75% accuracy without cueing to improve independent participation in multi-step activities/hobbies. INTERVENTIONS:   Working memory: Patient given words and asked to manipulate words and state in typical order of progression.   -9/10 indep, 1/10 MAX cues via verbal explanation  *Great success! SHORT TERM GOAL #3:  Goal 3: Patient will complete complex attention (divided, alternating) tasks with 75% accuracy given min cues to improve multitasking skills needed for daily activities (ex: driving, running). GOAL NOT MET -- CONTINUE GOAL. INTERVENTIONS:   Divided Attention:  ST flipped through cards and instructed patient to say Halloween for black cards and Valentines for red cards (Trial 1), Elephant for even cards, Octopus for odd cards, and Frog for face cards (Trial 2); and ID suit (Trial 3); then combine the holiday, suit, and animal (Trial 4).               Trial 1: 13/13 indep              Trial 2: 12/13 indep, 1/13 min cue              Trial 3: 12/13 indep, 1/13 mod verbal cue              Trial 4: 11/13 indep, 2/13 min cues  *Patient completed task in 7 minutes  *FAIR processing speed; however, expect greater accuracy and shorter duration with task to emulate skills required for COMPLEX divided attention activities such as driving and running. SHORT TERM GOAL #4:  Goal 4: Patient will demonstrate use of word finding strategies within high level description tasks and conversation without cues at least 75% of the time for improved success communicating with family and friends GOAL MET -- DISCONTINUE GOAL. NO NEW GOAL. INTERVENTIONS: Patient with one instance of anomia during session in which patient independently utilized circumlocution strategy \"clover suit, looks like puppy paws\" to state \"clubs\". Given mod phonetic verbal cues, patient able to state \"clubs\". SHORT TERM GOAL #5:  Goal 5: Patient will complete higher level naming tasks (divergent, convergent naming) with 85% accuracy given min cues to promote enhanced expressive communication. GOAL MET -- REVISE GOAL. NEW GOAL: Goal 5: Patient will complete higher level naming tasks (divergent, convergent naming) with 85% accuracy without cues to promote enhanced expressive communication. INTERVENTIONS: Not specifically addressed this session. Previous Sessions:  Divergent naming task: Words beginning with the letter 'b' given 1 minute: 11 words verbalized without cues  **Prior to starting this task, patient independently came up with a strategy that may help him think of words quicker. This SLP added an additional strategy. Object Description: Patient given 2 items via auditory provision and asked to state the category the items belong to, 2 similarities, 2 differences, and add another item to the category.   -Category: 4/4 independent  -Similarities: 8/8 independent  -Differences: 8/8 independent  -Add to Category: 3/4 independent, 1/4 min cue to remember what needed to be stated about two objects (ex: \"add to category\"). *Great working memory in [employer] and things to say about items! *Great thought organization and higher level naming!     Long-term Goals  Timeframe for Long-term Goals: 4 weeks  Goal 1: Patient will improve YOHAN NOMS memory score from a Level 4 to a Level 5 for improved participation in ADL/IADL tasks and hobbies. GOAL MET. NEW GOAL: Goal 1: Patient will improve YOHAN NOMS memory score from a Level 5 to a Level 6 for improved participation in ADL/IADL tasks and hobbies. PROGRESS SUMMARY: Patient has met 3/4 STGs and 1/1 LTGs this therapy period. Patient has demonstrated significant improvement in immediate/delayed recall, working memory, using word-finding strategies, and higher level naming. Barriers to success include occasional instances of anomia, short-term memory and divided attention. Although patient has made great gains in therapy, resulting in increasing FOM from a level 4 to a level 5, improvement can still be made. Patient and wife also report instances of anomia within day to day activities and would like for therapy to continue targeting word finding strategy and naming tasks. Since patient reports to be VERY INDEPENDENT with ADLs and actively participate in running and driving in typical lifestyle, it is recommended patient continue to receive skilled OP ST services addressing aforementioned cognitive deficits to promote a safe and successful return to these COMPLEX cognitive activities. Specific Interventions Next Treatment: delayed recall, short term memory, working memory, complex attention, word finding within conversation tasks, higher level naming     Activity/Treatment Tolerance:  [x]  Patient tolerated treatment well overall  []  Patient limited by fatigue  []  Patient limited by pain   []  Patient limited by other medical complications  []  Other:      Patient Education:   [x]  HEP/Education Completed:  Memory Strategies, Deduction Puzzle  []  No new Education completed  [x]  Reviewed Prior HEP      [x]  Patient verbalized and/or demonstrated understanding of education provided.   []  Patient unable to verbalize and/or demonstrate understanding of education provided. Will continue education. []  Barriers to learning:     PLAN:  [x]  Plan of care initiated. Plan to see patient 2 times per week for 4 weeks to address the treatment planned outlined above.   [x]  Continue with current plan of care  []  Modify plan of care as follows:    []  Hold pending physician visit  []  Discharge    Time In [phone]   Time Out 1015   Timed Code Minutes:  28 min   Total Treatment Time: 28 min       Abhijeet Ortiz M.S., CF-SLP, [id number]

## 2021-07-16 ENCOUNTER — HOSPITAL ENCOUNTER (OUTPATIENT)
Dept: PHYSICAL THERAPY | Age: 71
Setting detail: THERAPIES SERIES
Discharge: HOME | End: 2021-07-16
Payer: MEDICARE

## 2021-07-16 ENCOUNTER — HOSPITAL ENCOUNTER (OUTPATIENT)
Dept: SPEECH THERAPY | Age: 71
Setting detail: THERAPIES SERIES
Discharge: HOME | End: 2021-07-16
Payer: MEDICARE

## 2021-07-16 PROCEDURE — 97129 THER IVNTJ 1ST 15 MIN: CPT | Performed by: SPEECH-LANGUAGE PATHOLOGIST

## 2021-07-16 PROCEDURE — 97112 NEUROMUSCULAR REEDUCATION: CPT

## 2021-07-16 PROCEDURE — 97110 THERAPEUTIC EXERCISES: CPT

## 2021-07-16 PROCEDURE — 97130 THER IVNTJ EA ADDL 15 MIN: CPT | Performed by: SPEECH-LANGUAGE PATHOLOGIST

## 2021-07-16 NOTE — PROGRESS NOTES
TBI May 28, 2021, low heart rate   Pain: Denies headache today. 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), grapevine  20'x4  X No UE support, light CGA to close SBA   In hallway - hurdles tap and step over, and lateral step over 20' x4 ea  X Orange jesse    Matrix bike level 3, seat=4  5 min  X    Hydrostick front to back, side to side tandem stance (bilateral lead) 20x  X    Bosu ball step ups: forward  10x B lead   X 0-1 UE support, CGA to SBA                          Specific Interventions Next Treatment: Advanced gait, balance on foam, rockerboard, hamstring stretch    Activity/Treatment Tolerance:  [x]  Patient tolerated treatment well  []  Patient limited by fatigue  []  Patient limited by pain   []  Patient limited by medical complications  []  Other:     Assessment: Continued with exercises challenging patient balance. Patient reported that the retro tandem walking was challenging to complete today. He required CGA to SBA during session due to being unsteady at times. He tolerated treatment session well today. GOALS:  Patient Goal: \"Get back to running, working around house and workshop. \"    Short Term Goals:  Time Frame: 4 weeks  1. Improve balance with Buck score of 54 or greater to assist with safety at home.   2.  Improve steps to allow Juju Soriano to ascend/descend 4 steps using reciprocal pattern and no handrails to assist with community outings. 3.  [facility] able to ambulate on unlevel surfaces to assist with working in his garage. Long Term Goals:  Time Frame: 12 weeks  1. Independent with HEP and with progression to assist with improving balance. Patient Education:   [x]  HEP/Education Completed: Added BOSU ball step ups    [facility] Access Code: [email or web address]  [x]  No new Education completed  []  Reviewed Prior HEP      [x]  Patient verbalized and/or demonstrated understanding of education provided. []  Patient unable to verbalize and/or demonstrate understanding of education provided. Will continue education. [x]  Barriers to learning: nothing per patient    PLAN:  Treatment Recommendations: Strengthening, Range of Motion, Balance Training, Functional Mobility Training, Transfer Training, Endurance Training, Gait Training, Stair Training, Pain Management, Home Exercise Program, Patient Education, Positioning, Aquatics and Modalities    []  Plan of care initiated. Plan to see patient 2 times per week for 12 weeks to address the treatment planned outlined above.   [x]  Continue with current plan of care  []  Modify plan of care as follows:    []  Hold pending physician visit  []  Discharge    Time In 1018   Time Out 1100   Timed Code Minutes: 42 min   Total Treatment Time: 42 min        Electronically Signed by: Cory Coleman PTA

## 2021-07-16 NOTE — PROGRESS NOTES
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    Functional Outcome Score Level 4 (21), Level 5 (21)      Insurance: Primary: Payor: Danny Garza /  /  / ,   Secondary: Mathews Landau BENEFIT   Authorization Information: No precertification needed   Visit # 8, 1/10 for progress note   Visits Allowed: Unlimited based on medical necessity   Recertification Date: 3/59/23   Physician Follow-Up:  f/u with Dr. Isabel Jaimes, 8/3 f/u with Dr. Liza Presley   Physician Orders: Claudia Charles MD   Pertinent History: Patient reports 2 1/2 weeks ago he was playing pickle ball in which he ran sideways and fell down and hit his head the concrete block wall. Patient woke up a few hours later in ICU with amnesia of playing pickle ball which is still present to date. Patient spent rest of hospital stay in inpatient rehab where he also received skilled speech therapy to address word-finding and cognitive skills. SUBJECTIVE:  Patient cooperative. No family present. Shortened session as this SLP was running behind with another patient's family. *Patient brought in the deductive reasoning puzzle that he has been working on at home this session. Patient with multiple responses in some of the response boxes. Initiated working through the puzzle together with this SLP providing an explanation for each response.   However, patient stated it was difficult to determine how many dogs and cats each person owned because if the clue says they have \"3 dogs\" he said it could really be the person who has \"5 dogs\" because they have \"3 + 1+ 1=5.\"  Patient also mentioned the clue that said one of the people had \"2 animals\" was vague because he said it could be any animal...fish, snails, spiders, snakes, etc.  Then reviewed the instructions for the task that says to decide \"how many cats and dogs each man owns. \"  Patient stated these clues are \"not logical.\"  Patient also stated that \"slacks\" which is one of the clothes choices could be part of the \"suit\" which is also one of the clothes choices. Discussed that given the clothing items within the clues, they are separate clothing items. This SLP wrote down a list of all the possible drinks and clothes that could be chosen to fill in the responses for the puzzle to review that those are the ONLY responses that could be used. When going through some of the other clues to check the patient's responses, it was noted that the patient had 5/9 responses correct. Patient with 2-3 responses in some of the boxes and did have the correct answer in 4/5 of the other boxes, despite having other responses as well. In one of the boxes, patient's response said \"2 animals, no cats or dogs. \"  Patient then went on to talk about how the pieces to the jigsaw puzzle can only fit in one location, but there are multiple shades of different colors. Further discussion took place how that relates to the deduction puzzle because there should only been one right answer for each box. During review of the deduction puzzles, patient stated, \"I pick everything apart. I'm an  by background. \"  Patient reports his wife looked at the puzzle after he did it and told him that he had several responses incorrect and then they would argue about which one was actually correct.   Reviewing this task took the entire session as the patient was only to emulate skills required for COMPLEX divided attention activities such as driving and running. SHORT TERM GOAL #4:  NO NEW GOAL. INTERVENTIONS: n/a    SHORT TERM GOAL #5:  Goal 5: Patient will complete higher level naming tasks (divergent, convergent naming) with 85% accuracy without cues to promote enhanced expressive communication. INTERVENTIONS: Not specifically addressed this session. Long-term Goals  Timeframe for Long-term Goals: 4 weeks  Goal 1: Patient will improve YOHAN NOMS memory score from a Level 5 to a Level 6 for improved participation in ADL/IADL tasks and hobbies. - ONGOING    ASSESSMENT: Progressing towards goals  Specific Interventions Next Treatment: delayed recall, short term memory, working memory, complex attention, word finding within conversation tasks, higher level naming     Activity/Treatment Tolerance:  [x]  Patient tolerated treatment well overall  []  Patient limited by fatigue  []  Patient limited by pain   []  Patient limited by other medical complications  []  Other:      Patient Education:   [x]  HEP/Education Completed: Deduction Puzzle/reasoning  []  No new Education completed  [x]  Reviewed Prior HEP      [x]  Patient verbalized and/or demonstrated understanding of education provided. []  Patient unable to verbalize and/or demonstrate understanding of education provided. Will continue education. []  Barriers to learning:     PLAN:  []  Plan of care initiated. Plan to see patient 2 times per week for 4 weeks to address the treatment planned outlined above.   [x]  Continue with current plan of care  []  Modify plan of care as follows:    []  Hold pending physician visit  []  Discharge    Time In 2710   Time Out 1018   Timed Code Minutes:  26 min   Total Treatment Time: 26 min         Nai Kessler M.S. [facility] 8820

## 2021-07-21 ENCOUNTER — HOSPITAL ENCOUNTER (OUTPATIENT)
Dept: PHYSICAL THERAPY | Age: 71
Setting detail: THERAPIES SERIES
Discharge: HOME | End: 2021-07-21
Payer: MEDICARE

## 2021-07-21 ENCOUNTER — HOSPITAL ENCOUNTER (OUTPATIENT)
Dept: SPEECH THERAPY | Age: 71
Setting detail: THERAPIES SERIES
Discharge: HOME | End: 2021-07-21
Payer: MEDICARE

## 2021-07-21 PROCEDURE — 97129 THER IVNTJ 1ST 15 MIN: CPT

## 2021-07-21 PROCEDURE — 97110 THERAPEUTIC EXERCISES: CPT

## 2021-07-21 PROCEDURE — 97130 THER IVNTJ EA ADDL 15 MIN: CPT

## 2021-07-21 NOTE — PROGRESS NOTES
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 Visit # 8, 8/10 for progress note   Visits Allowed: Unlimited visits based on medical necessity   Recertification Date: 3/4/52   Physician Follow-Up: 2021, next CT scan on  to check on bleeding   Physician Orders: Eval and treat   History of Present Illness: On May 28th, Matthias Thornton was playing pickleball at the [facility]. He ran into a wall hard hitting back of head on wall and lost conscious. Matthias Thornton has a fracture back of head. He also has multiple brain bleeds. At hospital he was originally in ICU and spouse reports he had aphasia at that time. Progressed quickly and was admitted to Inpatient Rehab. Was discharged home on . He is very active with running, swimming, pickleball, working in garage. SUBJECTIVE: Patient continues to note improvements in his mobility and endurance. He still wants to return to running and climbing ladders.        TREATMENT   Precautions: High BP, TBI May 28, 2021, low heart rate   Pain: Denies headache today. 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), lunge walking, butt kick walking 20'x4  x No UE support, light CGA to close SBA   Square stepping - large step size  5x each direction  x Close CGA, occasional LOB   In hallway - hurdles tap and step over, and lateral step over 20' x4 ea   Orange jesse    In hallway - tapping cones multiple directions during single stance 10x ea  x    Matrix bike level 3, seat=4  5 min  X    Hydrostick front to back, side to side tandem stance (bilateral lead) 20x  X    Bosu ball step ups: forward, and lateral step up and over 10x B lead   X 0 UE support, CGA    Inverted BOSU ball - rocking forward/retro, lateral, mini squats 10x  x 0 UE support, CGA                   Specific Interventions Next Treatment: Advanced gait, balance on foam, rockerboard, hamstring stretch. Trampoline jogging/ hopping (if tolerated by hearing sensitivity of noisy trampoline)    Activity/Treatment Tolerance:  [x]  Patient tolerated treatment well  []  Patient limited by fatigue  []  Patient limited by pain   []  Patient limited by medical complications  []  Other:     Assessment: Several progressions made to program, dynamic standing balance tasks and multiple directional stepping with occasional LOB noted. Patient felt good effort with 4/10 fatigue, no pain. GOALS:  Patient Goal: \"Get back to running, working around house and workshop. \"    Short Term Goals:  Time Frame: 4 weeks  1.   Improve balance with Buck score of 54 or greater to assist with safety at home.  2.  Improve steps to allow Ena Peter to ascend/descend 4 steps using reciprocal pattern and no handrails to assist with community outings. 3.  Ena Peter able to ambulate on unlevel surfaces to assist with working in his garage. Long Term Goals:  Time Frame: 12 weeks  1. Independent with HEP and with progression to assist with improving balance. Patient Education:   [x]  HEP/Education Completed: Added BOSU ball step ups    [facility] Access Code: [email or web address]  [x]  No new Education completed  []  Reviewed Prior HEP      [x]  Patient verbalized and/or demonstrated understanding of education provided. []  Patient unable to verbalize and/or demonstrate understanding of education provided. Will continue education. [x]  Barriers to learning: nothing per patient    PLAN:  Treatment Recommendations: Strengthening, Range of Motion, Balance Training, Functional Mobility Training, Transfer Training, Endurance Training, Gait Training, Stair Training, Pain Management, Home Exercise Program, Patient Education, Positioning, Aquatics and Modalities    []  Plan of care initiated. Plan to see patient 2 times per week for 12 weeks to address the treatment planned outlined above.   [x]  Continue with current plan of care  []  Modify plan of care as follows:    []  Hold pending physician visit  []  Discharge    Time In 1002   Time Out 1045   Timed Code Minutes: 43   Total Treatment Time: 43        Electronically Signed by: Jace Burrows PT

## 2021-07-21 NOTE — PROGRESS NOTES
expressed tolerance for high pitched frequencies has improved. ST provided an example of driving down a highway/interstate and the car behind him blares the horn. Patient reports he would be able to maintain attention and control of the wheel and pull over at the next exit if needed. Loud and high frequency noises are now annoying/bothersome rather than painful. SHORT TERM GOAL #1:  Goal 1: Patient will utilize memory compensatory strategies to complete delayed recall tasks (>20 minutes) with >5 units of newly learned information with 70% accuracy given min cues to improve retention of novel information. INTERVENTIONS:   Recall: ST   -Immediate Trial 1: 4/5 indep, 1/5 min cues  -Immediate Trial 2: 4/5 indep, 1/5 mod phonetic cue  -Delayed 20 minutes without notes: ++oom  -Delayed 20 minutes with notes: 4/4 indep  *Fair recall without written notes     SHORT TERM GOAL #2:   Goal 2: Patient will complete short-term and working memory tasks with 75% accuracy without cueing to improve independent participation in multi-step activities/hobbies. INTERVENTIONS:  Mental Manipulation, Scrambled Sentences (4 words): Patient given 4 words via auditory provision. Patient asked to manipulate words to make a grammatically complete sentence. -9/10 indep, 1/10 min cues    Mental Manipulation, Scrambled Sentences (5 words): Patient given 5 words via auditory provision. Patient asked to manipulate words to make a grammatically complete sentence. -5/5 indep    SHORT TERM GOAL #3:  Goal 3: Patient will complete complex attention (divided, alternating) tasks with 75% accuracy given min cues to improve multitasking skills needed for daily activities (ex: driving, running). INTERVENTIONS: Did not address this date d/t focus on other goals    SHORT TERM GOAL #4:  NO NEW GOAL.   INTERVENTIONS: n/a    SHORT TERM GOAL #5:  Goal 5: Patient will complete higher level naming tasks (divergent, convergent naming) with 85% accuracy without cues to promote enhanced expressive communication. INTERVENTIONS: Did not address this date d/t focus on other goals    Long-term Goals  Timeframe for Long-term Goals: 4 weeks  Goal 1: Patient will improve OYHAN NOMS memory score from a Level 5 to a Level 6 for improved participation in ADL/IADL tasks and hobbies. - ONGOING    ASSESSMENT: Progressing towards goals  Specific Interventions Next Treatment: delayed recall, short term memory, working memory, complex attention, word finding within conversation tasks, higher level naming     Activity/Treatment Tolerance:  [x]  Patient tolerated treatment well overall  []  Patient limited by fatigue  []  Patient limited by pain   []  Patient limited by other medical complications  []  Other:      Patient Education:   [x]  HEP/Education Completed: Deduction Puzzle/reasoning  []  No new Education completed  [x]  Reviewed Prior HEP      [x]  Patient verbalized and/or demonstrated understanding of education provided. []  Patient unable to verbalize and/or demonstrate understanding of education provided. Will continue education. []  Barriers to learning:     PLAN:  []  Plan of care initiated. Plan to see patient 2 times per week for 4 weeks to address the treatment planned outlined above.   [x]  Continue with current plan of care  []  Modify plan of care as follows:    []  Hold pending physician visit  []  Discharge    Time In 1046   Time Out 1119   Timed Code Minutes:  33 min   Total Treatment Time: 35 min         CLIFTON Peterson M.S.-SLP, [id number]

## 2021-07-23 ENCOUNTER — HOSPITAL ENCOUNTER (OUTPATIENT)
Dept: OCCUPATIONAL THERAPY | Age: 71
Setting detail: THERAPIES SERIES
Discharge: HOME | End: 2021-07-23
Payer: MEDICARE

## 2021-07-23 ENCOUNTER — HOSPITAL ENCOUNTER (OUTPATIENT)
Dept: PHYSICAL THERAPY | Age: 71
Setting detail: THERAPIES SERIES
Discharge: HOME | End: 2021-07-23
Payer: MEDICARE

## 2021-07-23 PROCEDURE — 97110 THERAPEUTIC EXERCISES: CPT

## 2021-07-23 PROCEDURE — 97535 SELF CARE MNGMENT TRAINING: CPT

## 2021-07-23 NOTE — PROGRESS NOTES
[address] [facility]  PHYSICAL THERAPY  [] EVALUATION  [] DAILY NOTE (LAND) [] DAILY NOTE (AQUATIC ) [x] PROGRESS NOTE [] DISCHARGE NOTE    [x] OUTPATIENT REHABILITATION CENTER [facility]   [] [place] [address]    [] 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 Visit # 9, 0/10 for progress note   Visits Allowed: Unlimited visits based on medical necessity   Recertification Date:    Physician Follow-Up: Next CT scan on  to check on bleeding    Physician Orders: Eval and treat   History of Present Illness: On May 28th, Kevyn Rivera was playing pickleball at the [facility]. He ran into a wall hard hitting back of head on wall and lost conscious. Kevyn Rivera has a fracture back of head. He also has multiple brain bleeds. At hospital he was originally in ICU and spouse reports he had aphasia at that time. Progressed quickly and was admitted to Inpatient Rehab. Was discharged home on . He is very active with running, swimming, pickleball, working in garage. SUBJECTIVE: Patient woke up with dripping with sweat eating breakfast, then cold chills by the time he left home. He missed his meds this morning so wife went home to get them. /78, HR 68 bpm. Body temp 97.9.  Since TO STANDIN - Able to stand without using hands and stabilize independently   2. STANDING UNSUPPORTED:  4 - Able to stand safely 2 minutes   3. SITTING UNSUPPORTED, FEET ON FLOOR:  4 - Able to sit safely and securely 2 minutes   4. STANDING TO SITTIN - Sits Safely with minimal use of hands   5. TRANSFERS:  4 - Able to transfer safely with minor use of hands   6. STANDING UNSUPPORTED WITH EYES CLOSED:  4 - Able to stand 10 seconds safely   7. STANDING UNSUPPORTED, FEET TOGETHER:   4 - Able to place feet together independently and stand 1 minute safely   8. REACHING FORWARD WITH OUTSTRETCHED ARM: 4 - Can reach forward confidently >25 cm (10 inches)   9. PICK OBJECT UP FROM FLOOR: 4 - Able to  slipper safely and easily   10. TURN TO LOOK BEHIND/OVER LEFT AND RIGHT SHOULDERS:  4 - Looks behind from both sides and weight shifts well   11. TURN 360 DEGREES:  4 - Able to turn 360 degrees safely in 4 seconds or less   12. ALTERNATING STEPS ON STOOL:  4 - Able to stand independently and safely and complete 8 steps in 20 seconds   13. STANDING UNSUPPORTED, ONE FOOT IN FRONT (TANDEM STANCE):  2 - Able to take small step independently and hold 30 seconds   14. STAND ON ONE LEG:  3 - Able to lift leg independently and hold 5-10 seconds    TOTAL: 53/56     0 - 20 = wheelchair bound   21 - 40 = walking with assistance  41 - 56 = independent    Specific Interventions Next Treatment: Advanced gait, balance on foam, rockerboard, hamstring stretch. Trampoline jogging/ hopping (if tolerated by hearing sensitivity of noisy trampoline)    Activity/Treatment Tolerance:  [x]  Patient tolerated treatment well  []  Patient limited by fatigue  []  Patient limited by pain   []  Patient limited by medical complications  []  Other:     Assessment: Patient has made excellent progress toward therapy goals.  He demonstrates an improvement in his activity tolerance and endurance, better confidence with standing balance tasks. We have progressed his dynamic gait stability a great deal, no issue with sequencing or coordination. Occasional LOB with single leg or narrow base of support. Plan to continue PT in order to meet patient goal of returning to higher level household projects, climbing ladder, hanging drywall, standing on roof. He also hopes to return to running, lap swimming, pickleball at the [facility]. GOALS:  Patient Goal: \"Get back to running, working around house and workshop. \"    Short Term Goals:  Time Frame: 4 weeks  1. Improve balance with Buck score of 54 or greater to assist with safety at home.   [] Goal Met [x] Goal Not Met [x] Continue Goal [] Discontinue Goal  [] Revise Goal  Goal Assessment: Progress made, 53/56 mild difficulty with tandem and single stance patterns    2. Improve steps to allow [facility] to ascend/descend 4 steps using reciprocal pattern and no handrails to assist with community outings. [] Goal Met [] Goal Not Met [] Continue Goal [] Discontinue Goal  [] Revise Goal  Goal Assessment:   New Goal:    3. Glenn able to ambulate on unlevel surfaces to assist with working in his garage. [x] Goal Met [] Goal Not Met [] Continue Goal [x] Discontinue Goal  [] Revise Goal  Goal Assessment: Walked over therapy mat with objects propped underneath it for uneven surface, 4x 10 sec. No LOB even with extreme rolling of ankles and stepping up/over. Long Term Goals:  Time Frame: 12 weeks  1. Independent with HEP and with progression to assist with improving balance. [x] Goal Met [] Goal Not Met [x] Continue Goal [] Discontinue Goal  [] Revise Goal  Goal Assessment:  Performing pool activities walking and balancing, and standing balance activities at home also. Continue for progressions to cardio and heavier lifting tasks once approved by Dr. Hossein Hernandez.     Patient Education:   [x]  HEP/Education Completed: reviewed lifting precautions and avoiding excessive cardio - this was given by Dr. Hossein Hernandez due to concern for brain bleed. Follows up next week. [employer] Access Code: [email or web address]  [x]  No new Education completed  []  Reviewed Prior HEP      [x]  Patient verbalized and/or demonstrated understanding of education provided. []  Patient unable to verbalize and/or demonstrate understanding of education provided. Will continue education. [x]  Barriers to learning: nothing per patient    PLAN:  Treatment Recommendations: Strengthening, Range of Motion, Balance Training, Functional Mobility Training, Transfer Training, Endurance Training, Gait Training, Stair Training, Pain Management, Home Exercise Program, Patient Education, Positioning, Aquatics and Modalities    []  Plan of care initiated. Plan to see patient 2 times per week for 12 weeks to address the treatment planned outlined above.   [x]  Continue with current plan of care  []  Modify plan of care as follows:    []  Hold pending physician visit  []  Discharge    Time In 945   Time Out 1030   Timed Code Minutes: 45   Total Treatment Time: 45        Electronically Signed by: Mely Contreras, PT

## 2021-07-23 NOTE — DISCHARGE SUMMARY
[facility] [facility]  OCCUPATIONAL THERAPY  DRIVING EVALUATION    PATIENT: Susan Erickson  YOB: 1950  GENDER:  male  CSN: [id number]   REFERRING PHYSICIAN:   Dr. 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 Cait Tafoya ended up with a skull fracture  He also has multiple brain bleeds.  At hospital he was originally in ICU and spouse reports he had aphasia at that time.  Progressed quickly and was admitted to Inpatient Rehab.  Was discharged home on . PMH: Please see medical history questionnaire for past medical history, allergies, and medications. PATIENT GOALS:    Patient reports he is ready to return to driving. OBJECTIVE  VISUAL SKILLS(using the [employer] 2000 Visual Evaluator)       FAR VISUAL ACUITY:   Pass. 20/30 R, 20/40 L     STEREO DEPTH:   Pass. FUSION:    Pass. PERIPHERAL VISION:  Pass. 3/3 identified        DYNAVISION TESTIN hits in 60 second self paced trial.  Considered WNL for driving. PHYSICAL SKILLS  RANGE OF MOTION:Within functional limits for driving  STRENGTH: Within functional limits for driving  TRANSFER IN/OUT OF SIMULATOR: Within functional limits for driving  AMBULATION: Within functional limits for driving    IN VEHICLE MANIPULATION SKILLS:  Steering Wheel:Within functional limits for driving   Gas Pedal:  Within functional limits for driving  Brake Pedal: Within functional limits for driving  Turn Signal: Within functional limits for driving  Seat Belt: Within functional limits for driving     COGNITIVE SKILLS  ORIENTATION:  Within functional limits for driving  ATTENTION SPAN:Within functional limits for driving  FRUSTRATION TOLERANCE:Within functional limits for driving  IMPULSIVITY:Within functional limits for no obstacles in front of the  but there will be on-coming traffic. Divided Attention (DA) events are presented, however they will only be presented on the side monitors thus forcing the  to scan the entire scene, not just the center monitor. The DA events include only left and right arrows. The  will be presented with sixteen different DA events. The DA events will appear on the outside half of the left and right monitors with eight symbols being shown in the upper quadrant and four will be shown in the lower quadrant. The  will also need to maintain their speed and piper position during the drive.)     Total off road crashes: 0 (Good performance is 0)   Total collisions with vehicles and roadway objects: 0 (Good performance is 0)   Percentage of time over the posted speed limit: 5% (Good performance would be less than 5%, moderate would be less than 10% and anything greater than 10% would be poor)   Percentage of time out of lanes: 0.8% (Good performance would be less than 1%, moderate would be less than 2.5% and anything greater than 2.5% would be poor)   Total correct attention responses: 16/16. (Good performance is all symbols responded to correctly, moderate performance would be missing 1-2.)   Number of upper left symbols missed: 4/4   Number of lower left symbols missed: 4/4   Number of upper right symbols missed: 4/4   Number of lower right symbols missed: 4/4   Average speed: 48 MPH (Good performance is +/- 5 MPH of the posted speed limit. Average speed on this drive is 50 MPH.)       BASIC VEHICLE CONTROL:  ([place], 40 MPH road. There are 4-way stops with minimal cross traffic and signalized intersections with lights that change from green to red.  There are some pedestrians at the final intersection.)   Total number of off road crashes: 0 (Good performance is 0.)   Total number of collisions with vehicles and other roadway objects: 0 (Good performance is 0.)   Total number of traffic light tickets: 0 (Good performance is 0.)   Total number of stop sign tickets: 0 (Good performance is 0.)   Percentage of time over the posted speed limit: 1% (Good performance would be less than 5%, moderate would be less than 10% and anything greater than 10% would be poor)   Percentage of time out of lanes: 0.3% (Good performance would be less than 1%, moderate would be less than 2.5% and anything greater than 2.5% would be poor)   Number of correctly negotiated intersections: 5   Number of incorrectly negotiated intersections: 0 (Good performance is 0.)   Overall turn signal usage: Good (Good means  used their turn signals properly more than 75% of the time; moderate means  used their turn signals properly between 25-75% of the time, poor means  used their turn signals properly less than 25% of the time)     SUBURBAN DRIVE:  ([address] and school zones  by curved roadway sections. Hazards include: pedestrians, cross traffic not stopping at unmarked intersections, and vehicles backing out of drives.)   Total number of off road crashes: 0 (Good performance is 0.)   Total number of collisions with vehicles and other roadway objects: 0 (Good performance is 0.)   Total number of traffic light tickets: 0 (Good performance is 0.)   Total number of stop sign tickets: 0 (Good performance is 0.)   Percentage of time over the posted speed limit: 1% (Good performance would be less than 5%, moderate would be less than 10% and anything greater than 10% would be poor)   Collision with cross traffic vehicles? No   Time to collision with cross traffic vehicles: 9 seconds  (Average value is at least 1 second)  Collision with backing vehicle?  No   Time to collision with backing vehicle: 13 seconds  (Average value is at least 1 second)   Collision with pedestrian or animal? No   Time to collision with pedestrian or animal: 9 seconds (Average value is at least 1 second)   Did the  exceed the posted speed limit in school zone? No       ASSESSMENT AND PLAN    RESULTS: Patient tested as safe to return to independent driving    RECOMMENDATIONS:   Discussed with patient and wife strategies to limit distractions in vehicle, limit time and distance in vehicle at first before embarking on longer distance drives. Patient has been instructed to contact referring physician to discuss results of this evaluation. Patient has been informed that his or her physician is responsible for making the final decision regarding driving status.  Thank you for this referral.      Time in: 1045  Time out: 1155  Timed treatment: 70 minutes  Total time: 70 minutes          Cindi Lowers OTR/L, [address]

## 2021-08-03 ENCOUNTER — OFFICE VISIT (OUTPATIENT)
Dept: PHYSICAL MEDICINE AND REHAB | Age: 71
End: 2021-08-03
Payer: MEDICARE

## 2021-08-03 VITALS
SYSTOLIC BLOOD PRESSURE: 124 MMHG | WEIGHT: 177 LBS | HEART RATE: 78 BPM | DIASTOLIC BLOOD PRESSURE: 70 MMHG | BODY MASS INDEX: 27.78 KG/M2 | HEIGHT: 67 IN

## 2021-08-03 DIAGNOSIS — S02.101D: ICD-10-CM

## 2021-08-03 DIAGNOSIS — R41.3 MEMORY IMPAIRMENT: ICD-10-CM

## 2021-08-03 DIAGNOSIS — S06.9X1D TRAUMATIC BRAIN INJURY WITH LOSS OF CONSCIOUSNESS OF 30 MINUTES OR LESS, SUBSEQUENT ENCOUNTER: Primary | ICD-10-CM

## 2021-08-03 DIAGNOSIS — F07.81 POST CONCUSSIVE SYNDROME: ICD-10-CM

## 2021-08-03 DIAGNOSIS — S06.33AA FOCAL HEMORRHAGIC CONTUSION OF CEREBRUM: ICD-10-CM

## 2021-08-03 PROCEDURE — 99213 OFFICE O/P EST LOW 20 MIN: CPT | Performed by: PHYSICAL MEDICINE & REHABILITATION

## 2021-08-03 PROCEDURE — 4040F PNEUMOC VAC/ADMIN/RCVD: CPT | Performed by: PHYSICAL MEDICINE & REHABILITATION

## 2021-08-03 PROCEDURE — G8417 CALC BMI ABV UP PARAM F/U: HCPCS | Performed by: PHYSICAL MEDICINE & REHABILITATION

## 2021-08-03 PROCEDURE — 3017F COLORECTAL CA SCREEN DOC REV: CPT | Performed by: PHYSICAL MEDICINE & REHABILITATION

## 2021-08-03 PROCEDURE — G8427 DOCREV CUR MEDS BY ELIG CLIN: HCPCS | Performed by: PHYSICAL MEDICINE & REHABILITATION

## 2021-08-03 PROCEDURE — 1123F ACP DISCUSS/DSCN MKR DOCD: CPT | Performed by: PHYSICAL MEDICINE & REHABILITATION

## 2021-08-03 PROCEDURE — 1036F TOBACCO NON-USER: CPT | Performed by: PHYSICAL MEDICINE & REHABILITATION

## 2021-08-03 ASSESSMENT — ENCOUNTER SYMPTOMS
CONSTIPATION: 0
VOMITING: 0
NAUSEA: 0
COUGH: 0
WHEEZING: 0
TROUBLE SWALLOWING: 0
BACK PAIN: 0
RHINORRHEA: 0
DIARRHEA: 0
ABDOMINAL PAIN: 0
SHORTNESS OF BREATH: 0
SORE THROAT: 0

## 2021-08-03 NOTE — PROGRESS NOTES
Physical Medicine & Rehabilitation Follow Up Note    Chief Complaint:  Sensitivity to loud voice, occasional word finding difficulty    Subjective:    Nargis Khan is a 78 y.o. right-handed  male with history of hypertension and hyperlipidemia, comes today for a follow up visit after he was discharged from acute inpatient rehabilitation service on 6/11/2021. The patient was admitted to inpatient rehabilitation on 6/2/2021 for intensive inpatient rehabilitation for impaired ADLs, ambulation & cognition due to left frontal lobe & right cerebellar contusion with intraparenchymal hemorrhage, left posterior parietal & anterior temporal subdural hematoma & subarachnoid blood, and right basilar skull fracture secondary to fall on 5/28/2021.      The patient went after a ball and fell backward while he was playing pickle ball on 5/28/2021. The patient does not remember the event. The patient's head hit the ground and had loss of consciousness for a short period of time according the witness's account.  He was sent to [facility] ER for evaluation on 5/28/2021.  Initial CT of head showed left frontal lobe acute intraparenchymal hemorrhage and right basilar skull fracture. CT of head repeated few hours later showed interval increase in left frontal lobe contusion with multiple foci of intraparenchymal hemorrhage measuring up to 1.2 cm, interval increase right cerebellar contusion with foci of intraparenchymal hemorrhage measuring up to approximately 1 cm, new small subdural hematomas & trace adjacent subarachnoid blood within left posterior parietal and anterior temporal regions. No surgical intervention was recommended.   CTA of neck & head revealed no abnormality other than small focus of venous injury/thrombus within right transverse sinus.  Repeated CT of head on 5/29/2021 showed no significant change.  MRV of head done on 6/1/2021 showed small nonocclusive deep venous thrombus at the right transverse/sigmoid junction. The patient's inpatient rehabilitation course overall is uneventful. His headache gradually improved with reduced intensity and became intermittent. He tolerated the intensive inpatient rehabilitation treatment well. He gained improvement in performing ADLs & ambulation more independently. At the present time, the patient says he has not experienced any headache for the past 2~3 weeks. He has only occasional dizziness which has improved. He denies weakness, numbness, nausea or vomiting, blurred vision. He says he still has sensitivity to loud noise for which he is using a ear noise protector headphone. He says he is no longer sensitive to light. He had been discharged from OT. OT did a  evaluation and the patient is found save to drive. Currently the patient is still attending outpatient PT and speech therapies. His wife says the patient sometime still has difficulty finding word to say. The patient says his walking balance is improving. He is not using any assistive walking device to assist walking. He says he had seen neurosurgery service after he was discharged. Neurosurgery still put 20 lbs weight lifting restriction. He is scheduled to see neurosurgery next time on 8/27/2021. The patient wants to know whether or not he can swim, drive car, climb ladder and lifting dry wall. Medication:  Current Outpatient Medications   Medication Sig Dispense Refill    traMADol (ULTRAM) 50 MG tablet Take 50 mg by mouth every 6 hours as needed for Pain.       levETIRAcetam (KEPPRA) 500 MG tablet Take 1 tablet by mouth 2 times daily 60 tablet 3    lisinopril (PRINIVIL;ZESTRIL) 5 MG tablet Take 1 tablet by mouth daily 30 tablet 3    diclofenac sodium (VOLTAREN) 1 % GEL Apply 2 g topically 4 times daily as needed for Pain 100 g 1    docusate sodium (COLACE, DULCOLAX) 100 MG CAPS Take 100 mg by mouth 2 times daily 60 capsule 1    melatonin 3 MG TABS tablet Take 2 tablets by mouth nightly as needed (insomnia) 60 tablet 3    Nutritional Supplements (DHEA PO) Take by mouth daily      QYKTGHEEY-EFQBSVYHG-YUJKQDIMEF PO Place 1 tablet under the tongue daily CHANDLER      vitamin D (CHOLECALCIFEROL) 25 MCG (1000 UT) TABS tablet Take 5,000 Units by mouth daily       B Complex-Biotin-FA (B-100 COMPLEX CR PO) Take by mouth 3 times daily      magnesium citrate solution Take 250 mLs by mouth 4 times daily (before meals and nightly) Taking 2 soft gels TID       Omega-3 Fatty Acids (OMEGA-3 FISH OIL CONCENTRATE PO) Take 2 capsules by mouth 4 times daily      CINNAMON PO Take 3 capsules by mouth 4 times daily (before meals and nightly) Cinnamon Bark      vitamin C (ASCORBIC ACID) 500 MG tablet Take 1,000 mg by mouth daily      NONFORMULARY Curamin for pain Taking PRN      acetaminophen (TYLENOL) 500 MG tablet Take 500 mg by mouth every 6 hours as needed for Pain Extra strength 2 tabs       No current facility-administered medications for this visit. Review of Systems:  Review of Systems   Constitutional: Negative for chills, fatigue and fever. HENT: Negative for ear pain, hearing loss, rhinorrhea, sneezing, sore throat and trouble swallowing. Eyes: Negative for visual disturbance. Respiratory: Negative for cough, shortness of breath and wheezing. Cardiovascular: Negative for chest pain and palpitations. Gastrointestinal: Negative for abdominal pain, constipation, diarrhea, nausea and vomiting. Genitourinary: Negative for difficulty urinating. Musculoskeletal: Negative for back pain, gait problem, myalgias and neck pain. Neurological: Negative for dizziness, speech difficulty, weakness, numbness and headaches. Psychiatric/Behavioral: Negative for dysphoric mood and sleep disturbance. The patient is not nervous/anxious.          Objective:  /70 (Site: Left Upper Arm, Position: Sitting, Cuff Size: Medium Adult)   Pulse 78   Ht 5' 7\" (1.702 m)   Wt 177 lb (80.3 kg)   BMI 27.72 kg/m²   Physical Exam   General:  well-developed, well nourished  male ; in no acute distress ; appropriate affect & mood ; sitting on chair comfortably   Eyes: pupil equally round ; extra-ocular motion intact bilaterally   Head, Ear, Nose, Mouth & Throat : normocephalic ; no tenderness at head & face ; no discharge from ears or nose ; no deformity ; no facial swelling ; oral mucosa pink   Neck :  supple ; no tenderness ; no muscle spasm  Cardiovascular : regular rate & rhythm ; normal S1 & S2 heart sound ; no murmur   Pulmonary : lung clear to auscultation ; no wheezing ; no rale  Gastrointestinal : soft, flat abdomen ; no tenderness ; normal bowel sound present   Musculoskeletal : no limb asymmetry; no limb deformity; no tenderness at bilateral upper & lower extremities; no palpable mass at limbs ; no joints laxity or crepitation ; normal functional joints ROM at bilateral upper & lower extremities  Cerebral :  alert ; awake ; oriented to place, person and time ; follow 2 steps verbal commend  Cerebellum : no dysmetria with bilateral finger-to-nose test  Sensory : intact light touch and pin prick sensation at bilateral upper & lower extremities  Motor : normal tone at bilateral upper & lower extremities ; normal 5/5 muscle strength at bilateral upper & lower extremities  Reflex : 2+ bilateral knees reflexes : 1+ bilateral biceps reflexes  Pathological Reflex :  no ankle clonus  Gait :  Normal ; able to do heel to toe walking without lose of balance      Diagnostics:   No results found for this or any previous visit (from the past 24 hour(s)). CT of head without contrast (6/30/2021) : Impression   1. Prominent moderate-sized subacute hemorrhagic contusion of the left frontal lobe with minimal hyperdense blood at the margin.    2. Subacute subdural hemorrhage overlying the right frontal lobe measuring up to 10 mm in greatest thickness and causing mild mass effect on the underlying parenchyma without midline shift. There is a more acute component at the right frontal convexity. 3. Mildly comminuted fracture at the right skull base involving the occipital bone with nondisplaced linear component extending cephalad towards the lambdoid suture. Impression:  · Left frontal lobe & right cerebellar contusion with intraparenchymal hemorrhage, left posterior parietal & anterior temporal subdural hematoma & subarachnoid blood, and right basilar skull fracture secondary to fall  · Traumatic brain injury with brief loss of consciousness  · Impaired ADLs, ambulation & cognition, and photophobia due to traumatic brain injury and left frontal lobe & right cerebellar contusion with intraparenchymal hemorrhage    · Small nonocclusive intracranial deep venous thrombus at the right transverse/sigmoid junction. · Improved neck pain due to cervical spine sprain & muscular strain  · Hypertension  · hyperlipidemia      The patient's symptom has improved. He now mainly has hypersensitivity to loud noise, and word finding difficulty. The patient is still on outpatient PT & speech therapy program.  He passed the  test and should be able to drive starting with short distance only then gradually increase the distance drive. He should be able to swim. He may climb ladder with someone else to be present to assist him. He will needs someone's assistance for lifting dry wall.           Plan:  · Continue attending ongoing outpatient PT & speech therapy until their completion  · Continue performing the home exercise program instructed by the therapists  · Allow patient to resume driving ; advise to drive continuously for no more than 1 hour   · Allow patient to resume swimming  · Allow patient to use ladder no higher than 6 feet with someone present  · Allow patient to perform lifting of dry wall with assistance of other since he is still on 20 lbs lifting restriction  · Continue follow up with neurosurgery service  · Follow up re-evaluation in 4 months      Era Garibay MD

## 2021-08-04 ENCOUNTER — HOSPITAL ENCOUNTER (OUTPATIENT)
Dept: PHYSICAL THERAPY | Age: 71
Setting detail: THERAPIES SERIES
Discharge: HOME | End: 2021-08-04
Payer: MEDICARE

## 2021-08-04 ENCOUNTER — HOSPITAL ENCOUNTER (OUTPATIENT)
Dept: SPEECH THERAPY | Age: 71
Setting detail: THERAPIES SERIES
Discharge: HOME | End: 2021-08-04
Payer: MEDICARE

## 2021-08-04 PROCEDURE — 97129 THER IVNTJ 1ST 15 MIN: CPT

## 2021-08-04 PROCEDURE — 97130 THER IVNTJ EA ADDL 15 MIN: CPT

## 2021-08-04 PROCEDURE — 97110 THERAPEUTIC EXERCISES: CPT

## 2021-08-04 NOTE — PROGRESS NOTES
[address] [facility]  PHYSICAL THERAPY  [] EVALUATION  [x] DAILY NOTE (LAND) [] DAILY NOTE (AQUATIC ) [] PROGRESS NOTE [] DISCHARGE NOTE    [x] OUTPATIENT REHABILITATION [facility]   [] [place] [address]    [] [facility]    [] [facility]    Date: 2021  Patient Name:  Patrice Hopper  : 1950  MRN: [id number]  CSN: [id number]    Referring Practitioner Chico Allen MD   Diagnosis Nontraumatic intracerebral hemorrhage in hemisphere, unspecified [I61.2]; Focal hemorrhagic contusion of cerebrum S06.339   Treatment Diagnosis TBI, difficulty with ambulation   Date of Evaluation 21    Additional Pertinent History High BP, TBI May 28, 2021, low heart rate      Functional Outcome Measure Used Buck   Functional Outcome Score 52/56 (21)   53/56 (21)      Insurance: Primary: Payor: MEDICARE /  /  / ,   Secondary: Annetta Brianna BENEFIT   Authorization Information: Aquatics and modalities as needed except ionto and hot/cold packs. Visit # 10, 1/10 for progress note   Visits Allowed: Unlimited visits based on medical necessity   Recertification Date: 26   Physician Follow-Up: Next CT scan on  to check on bleeding    Physician Orders: Eval and treat   History of Present Illness: On May 28th, Dia Gonzales was playing pickleball at the [facility]. He ran into a wall hard hitting back of head on wall and lost conscious. Dia Gonzales has a fracture back of head. He also has multiple brain bleeds. At hospital he was originally in ICU and spouse reports he had aphasia at that time. Progressed quickly and was admitted to Inpatient Rehab. Was discharged home on . He is very active with running, swimming, pickleball, working in garage. SUBJECTIVE: Patient reports that yesterday he had a follow up appointment with Doctor Bertrand Zabala.  Patient states that the Doctor has given him back driving privileges, allowed him to do piper swimming, and climbing a 6 foot ladder with someone spotting him. Patient reports that he has been doing more walking lately. He states that his thighs have been a little more sore. TREATMENT   Precautions: High BP, TBI May 28, 2021, low heart rate, 20# lifting restriction per patient's wife from Dr. Zi Brown. 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 Bosu ball step ups: forward, and lateral step up and over 10x B lead   X 0 UE support, SBA, CGA    Inverted BOSU ball - rocking forward/retro, lateral, mini squats 10x  15x squats   X 0 UE support, CGA   BOSU alternating lunges 10x B   X    Jogging on trampoline  2 sets   1 min  X    Squats with lifting black plastic crate 10x 10#  10x 15#  X Patient has a 20# lifting restriction from Dr. Zi Brown per patient and his wife.     REASSESSMENT: see goals, Buck balance test 15 min      Walking over therapy mat with objects tucked underneath 4x 10 ft CGA  No LOB   Ladder climbing - carrying small crate and placing on shelf above head 2x 6 steps CGA for safety  No LOB, good motor planning and sequencing task            Specific Interventions Next Treatment: Advanced gait, balance on foam, rockerboard, hamstring stretch. Trampoline jogging/ hopping (if tolerated by hearing sensitivity of noisy trampoline)    Activity/Treatment Tolerance:  [x]  Patient tolerated treatment well  []  Patient limited by fatigue  []  Patient limited by pain   []  Patient limited by medical complications  []  Other:     Assessment: Added BOSU lunges, squats with lifting crate, and jogging on trampoline. Patient provided with cues on proper technique with added exercises. He reported that he felt a good workout from therapy today. He denied any pain at the conclusion of session. GOALS:  Patient Goal: \"Get back to running, working around house and workshop. \"    Short Term Goals:  Time Frame: 4 weeks  1. Improve balance with Buck score of 54 or greater to assist with safety at home. 2.  Improve steps to allow Edita Harper to ascend/descend 4 steps using reciprocal pattern and no handrails to assist with community outings. Long Term Goals:  Time Frame: 12 weeks  1. Independent with HEP and with progression to assist with improving balance. Patient Education:   [x]  HEP/Education Completed: Added therapeutic exercises as documented above. [employer] Access Code: [email or web address]  []  No new Education completed  [x]  Reviewed Prior HEP      [x]  Patient verbalized and/or demonstrated understanding of education provided. []  Patient unable to verbalize and/or demonstrate understanding of education provided. Will continue education. [x]  Barriers to learning: nothing per patient    PLAN:  Treatment Recommendations: Strengthening, Range of Motion, Balance Training, Functional Mobility Training, Transfer Training, Endurance Training, Gait Training, Stair Training, Pain Management, Home Exercise Program, Patient Education, Positioning, Aquatics and Modalities    []  Plan of care initiated.   Plan to see patient 2 times per week for 12 weeks to address the treatment planned outlined above.   [x]  Continue with current plan of care  []  Modify plan of care as follows:    []  Hold pending physician visit  []  Discharge    Time In 1133   Time Out 1218   Timed Code Minutes: 42 min   Total Treatment Time: 45 min        Electronically Signed by: René Chua PTA

## 2021-08-04 NOTE — PROGRESS NOTES
[facility] [facility]  [] SPEECH LANGUAGE COGNITIVE EVALUATION  [x] DAILY NOTE   [] PROGRESS NOTE [] DISCHARGE NOTE    [x] [facility] [place]   [] [facility]    [] [facility]   [] Page Chaves    Date: 2021  Patient Name:  Cosme Newman  : 1950  MRN: [id number]    Referring Practitioner Jairo Puga MD   Diagnosis Nontraumatic intracerebral hemorrhage in hemisphere, unspecified [I61.2]    Treatment Diagnosis Focal hemorrhagic contusion cerebrum [facility]) (C74.608I)   Date of Evaluation 21      [facility]al [facility]    Functional Outcome Score Level 4 (21), Level 5 (21)      Insurance: Primary: Payor: Real Garrett /  /  / ,   Secondary: Susana Bullard BENEFIT   Authorization Information: No precertification needed   Visit # 10, 3/10 for progress note   Visits Allowed: Unlimited based on medical necessity   Recertification Date: 17    Physician Follow-Up: 2021 f/u with Ilya Chappell MD   Physician Orders: Ilya Chappell MD   Pertinent History: Patient reports 2 1/2 weeks ago he was playing pickle ball in which he ran sideways and fell down and hit his head the concrete block wall. Patient woke up a few hours later in ICU with amnesia of playing pickle ball which is still present to date. Patient spent rest of hospital stay in inpatient rehab where he also received skilled speech therapy to address word-finding and cognitive skills. SUBJECTIVE:  Patient pleasant and cooperative throughout therapy session this date. Wife did not observe session; however, reports Dr. Krunal Hoffman recommended continuation of ST and also encouraged further HEP. Patient passed driving evaluation on  and was formally cleared to drive via Dr. Krunal Hoffman. Reports he has completed 3 short drives thus far and reports no difficulties.      SHORT TERM GOAL #1:  Goal 1: Patient will utilize memory compensatory strategies to complete delayed recall tasks (>20 minutes) with >5 units of newly learned information with 70% accuracy given min cues to improve retention of novel information. INTERVENTIONS:   Recall: Patient given 6 units of newly learned information regarding ST student observing session.   -Immediate: 5/6 indep, 1/6 min cues   -Delayed recall 15 minutes without notes: 4/6 indep, 2/6 mod phonemic cues  *Following initial immediate recall trial, patient utilized \"write it down\" memory strategy to aid in recall. *Mild word finding deficits with \"Mohan\"; however, patient able to circumlocute independently (\"starts with a \"t\", 1 hour north from here\")    Kandi Yoel Yefri 1277 #2:   Goal 2: Patient will complete short-term and working memory tasks with 75% accuracy without cueing to improve independent participation in multi-step activities/hobbies. INTERVENTIONS: Not addressed this session due to focus on additional goals. SHORT TERM GOAL #3:  Goal 3: Patient will complete complex attention (divided, alternating) tasks with 75% accuracy given min cues to improve multitasking skills needed for daily activities (ex: driving, running). INTERVENTIONS:   Divided Attention: Patient asked to state items within a category (mentioned below in STG #5) while simultaneously listening to music at a comfortable loudness. Patient able to items within categories with quick processing speed. Denies music causing overstimulation nor overt difficulties with completing task at hand. 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 ST prompted patient to add a divided attention component when working on these tasks as HEP. ST educated patient that he is in control of the duration and frequency of this HEP as overstimulation is not warranted or desired when completing these tasks.  Patient verbalized understanding and agreed to attempt to add music or background television when completing aforementioned tasks. SHORT TERM GOAL #4:  NO NEW GOAL. INTERVENTIONS: n/a    SHORT TERM GOAL #5:  Goal 5: Patient will complete higher level naming tasks (divergent, convergent naming) with 85% accuracy without cues to promote enhanced expressive communication. INTERVENTIONS:   Abstract Divergent Naming: Patient given 4 categories via auditory provision and asked patient to state as many items within category as possible within one minute. (Target 8 items in one minute)  -Trial 1: 11 items in one minute  -Trial 2: 7 items in one minute   -Trial 3: 15+ items within 20 seconds  -Trial 4: 9 items within a minute   *Great divergent naming skills! *Good divided attention as patient even able to sing along to songs in between categories     Long-term Goals  Timeframe for Long-term Goals: 4 weeks  Goal 1: Patient will improve YOHAN NOMS memory score from a Level 5 to a Level 6 for improved participation in ADL/IADL tasks and hobbies. ASSESSMENT: Progressing towards goals  Specific Interventions Next Treatment: delayed recall, short term memory, working memory, complex attention, word finding within conversation tasks, higher level naming     Activity/Treatment Tolerance:  [x]  Patient tolerated treatment well overall  []  Patient limited by fatigue  []  Patient limited by pain    []  Patient limited by other medical complications  []  Other:      Patient Education:   [x]  HEP/Education Completed: Divided attention while working on jigsaw puzzle  []  No new Education completed  [x]  Reviewed Prior HEP      [x]  Patient verbalized and/or demonstrated understanding of education provided. []  Patient unable to verbalize and/or demonstrate understanding of education provided. Will continue education. []  Barriers to learning:     PLAN:  []  Plan of care initiated. Plan to see patient 2 times per week for 4 weeks to address the treatment planned outlined above.   [x] Continue with current plan of care  []  Modify plan of care as follows:    []  Hold pending physician visit  []  Discharge    Time In 1102   Time Out 1133   Timed Code Minutes:  31 min   Total Treatment Time: 31 min         Parveen Buenrostro M.S., CF-SLP, [id number]

## 2021-08-06 ENCOUNTER — HOSPITAL ENCOUNTER (OUTPATIENT)
Dept: PHYSICAL THERAPY | Age: 71
Setting detail: THERAPIES SERIES
Discharge: HOME | End: 2021-08-06
Payer: MEDICARE

## 2021-08-06 ENCOUNTER — HOSPITAL ENCOUNTER (OUTPATIENT)
Dept: SPEECH THERAPY | Age: 71
Setting detail: THERAPIES SERIES
Discharge: HOME | End: 2021-08-06
Payer: MEDICARE

## 2021-08-06 PROCEDURE — 97110 THERAPEUTIC EXERCISES: CPT

## 2021-08-06 PROCEDURE — 97129 THER IVNTJ 1ST 15 MIN: CPT | Performed by: SPEECH-LANGUAGE PATHOLOGIST

## 2021-08-06 PROCEDURE — 97130 THER IVNTJ EA ADDL 15 MIN: CPT | Performed by: SPEECH-LANGUAGE PATHOLOGIST

## 2021-08-06 NOTE — PROGRESS NOTES
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]    Treatment Diagnosis Focal hemorrhagic contusion cerebrum [facility]) (H03.410T)   Date of Evaluation 21      [facility]    Functional Outcome Score Level 4 (21), Level 5 (21)      Insurance: Primary: Payor: Emiliano Nickerson /  /  / ,   Secondary: Fela Waller BENEFIT   Authorization Information: No precertification needed   Visit # 11, 10 for progress note   Visits Allowed: Unlimited based on medical necessity   Recertification Date:     Physician Follow-Up: 2021 f/u with Arpita Lindsey MD   Physician Orders: Arpita Lindsey MD   Pertinent History: Patient reports 2 1/2 weeks ago he was playing pickle ball in which he ran sideways and fell down and hit his head the concrete block wall. Patient woke up a few hours later in ICU with amnesia of playing pickle ball which is still present to date. Patient spent rest of hospital stay in inpatient rehab where he also received skilled speech therapy to address word-finding and cognitive skills. SUBJECTIVE:  Patient reports he has not tried listening to music while completing a physical task at home yet. Reports he plans to this afternoon. Patient reports driving is going well for him.       SHORT TERM GOAL #1:  Goal 1: Patient will utilize memory compensatory strategies to complete delayed recall tasks (>20 minutes) with >5 units of newly learned information with 70% accuracy given min cues to improve retention of novel information. INTERVENTIONS:   Recall:   Patient provided with a list of 6 grocery items with details (#, pounds, etc) and he reviewed the list for 30 seconds. Patient immediately recalled 5/6 items without cues, 1/6 recalled with min cues. Following a 22 minute delay, patient recalled 6/6 items without cues, increased time to recall 2/6 items. SHORT TERM GOAL #2:   Goal 2: Patient will complete short-term and working memory tasks with 75% accuracy without cueing to improve independent participation in multi-step activities/hobbies. INTERVENTIONS: Completed a working memory task in which the patient was given 5 words with auditory provision. Patient was asked to repeat the list of words in the same order as they were presented. Patient then answered a question about word placement immediately after: 1/2 without cues, 1/2 with mod cues. SHORT TERM GOAL #3:  Goal 3: Patient will complete complex attention (divided, alternating) tasks with 75% accuracy given min cues to improve multitasking skills needed for daily activities (ex: driving, running). INTERVENTIONS:   Elevator task with distraction from the TEA:  2/2 examples completed without cues, then completed 10/10 trials accurately without cues    SHORT TERM GOAL #4:  NO NEW GOAL. INTERVENTIONS: n/a    SHORT TERM GOAL #5:  Goal 5: Patient will complete higher level naming tasks (divergent, convergent naming) with 85% accuracy without cues to promote enhanced expressive communication. INTERVENTIONS: Patient reports he is \"occasionally\" noticing some difficulty with anomia. Given an object, patient described each object with as much detail as possible with excellent success on 5/5 trials. Patient with very descriptive explanations with good word finding skills.   An occasional \"um\" verbalized, however, very functional.    Long-term Goals  Timeframe for Long-term Goals: 4 weeks  Goal 1: Patient will improve YOHAN NOMS memory score from a Level 5 to a Level 6 for improved participation in ADL/IADL tasks and hobbies. - ONGOING    ASSESSMENT: Progressing towards goals  Specific Interventions Next Treatment: delayed recall, short term memory, working memory, complex attention, word finding within conversation tasks, higher level naming     Activity/Treatment Tolerance:  [x]  Patient tolerated treatment well  []  Patient limited by fatigue  []  Patient limited by pain    []  Patient limited by other medical complications  []  Other:      Patient Education:   [x]  HEP/Education Completed: multi-tasking, memory strategies  []  No new Education completed  [x]  Reviewed Prior HEP      [x]  Patient verbalized and/or demonstrated understanding of education provided. []  Patient unable to verbalize and/or demonstrate understanding of education provided. Will continue education. []  Barriers to learning:     PLAN:  []  Plan of care initiated. Plan to see patient 2 times per week for 4 weeks to address the treatment planned outlined above.   [x]  Continue with current plan of care  []  Modify plan of care as follows:    []  Hold pending physician visit  []  Discharge    Time In 1300   Time Out 1330   Timed Code Minutes:  30 min   Total Treatment Time: 30 min         MILTON Morales 1681

## 2021-08-06 NOTE — PROGRESS NOTES
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 Visit # 11, 2/10 for progress note   Visits Allowed: Unlimited visits based on medical necessity   Recertification Date:    Physician Follow-Up: CT scan at end of August.    Physician Orders: Eval and treat   History of Present Illness: On May 28th, Dayana Sykes was playing pickleball at the [facility]. He ran into a wall hard hitting back of head on wall and lost conscious. Dayana Sykes has a fracture back of head. He also has multiple brain bleeds. At hospital he was originally in ICU and spouse reports he had aphasia at that time. Progressed quickly and was admitted to Inpatient Rehab. Was discharged home on . He is very active with running, swimming, pickleball, working in garage. SUBJECTIVE: Patient states he is trying to do a little more each day but is still getting tired at times and needing to take an occasional nap.        TREATMENT   Precautions: High BP, TBI May 28, 2021, low heart rate, 20# lifting restriction per patient's wife from Dr. Bertrand Zabala. 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), lunge walking, butt kick walking 20'x4   No UE support, light CGA to close SBA   Square stepping - large step size  5x each direction   Close CGA, occasional LOB   In hallway - hurdles tap and step over, and lateral step over 20' x4 ea   Orange jesse    In hallway - tapping cones multiple directions during single stance 10x ea      Matrix bike (Seat 5) Level 4 6 minutes X    Hydrostick tandem stance (bilateral lead) position:      front to back, side to side       circles each direction    20x  10x    X  X    Bosu ball step ups: forward, and lateral step up and over 15x B lead   X 0 UE support, SBA, CGA    Inverted BOSU ball - rocking forward/retro, lateral, mini squats 15x  15x squats   X 0 UE support, CGA   BOSU alternating lunges 15x B   X    Jogging on trampoline  2 sets   1 min  X    Squats with lifting black plastic crate 10x 10#  10x 15#  X Patient has a 20# lifting restriction from Dr. Bertrand Zabala per patient and his wife. REASSESSMENT: see goals, Buck balance test 15 min      Walking over therapy mat with objects tucked underneath 4x 10 ft CGA  No LOB   Ladder climbing - carrying small crate and placing on shelf above head 2x 6 steps CGA for safety  No LOB, good motor planning and sequencing task            Specific Interventions Next Treatment: Advanced gait, balance on foam, rockerboard, hamstring stretch.  Trampoline jogging/ hopping (if tolerated by hearing sensitivity of noisy trampoline)    Activity/Treatment Tolerance:  [x]  Patient tolerated treatment well  []  Patient limited by fatigue  []  Patient limited by pain   []  Patient limited by medical complications  []  Other:     Assessment: Patient is motivated and progressing well with therapy. Able to increase repetitions today with good tolerance. Patient denies pain and only having mild fatigue at end of session. GOALS:  Patient Goal: \"Get back to running, working around house and workshop. \"    Short Term Goals:  Time Frame: 4 weeks  1. Improve balance with Buck score of 54 or greater to assist with safety at home. 2.  Improve steps to allow Adams Johnson to ascend/descend 4 steps using reciprocal pattern and no handrails to assist with community outings. Long Term Goals:  Time Frame: 12 weeks  1. Independent with HEP and with progression to assist with improving balance. Patient Education:   []  HEP/Education Completed: Added therapeutic exercises as documented above. innRoad Access Code: [email or web address]  []  No new Education completed  [x]  Reviewed Prior HEP      [x]  Patient verbalized and/or demonstrated understanding of education provided. []  Patient unable to verbalize and/or demonstrate understanding of education provided. Will continue education. []  Barriers to learning: nothing per patient    PLAN:  Treatment Recommendations: Strengthening, Range of Motion, Balance Training, Functional Mobility Training, Transfer Training, Endurance Training, Gait Training, Stair Training, Pain Management, Home Exercise Program, Patient Education, Positioning, Aquatics and Modalities    []  Plan of care initiated. Plan to see patient 2 times per week for 12 weeks to address the treatment planned outlined above.   [x]  Continue with current plan of care  []  Modify plan of care as follows:    []  Hold pending physician visit  []  Discharge    Time In 1330   Time Out 1415   Timed Code Minutes: 45 min   Total Treatment Time: 45 min Electronically Signed by: Remigio Reyes PTA

## 2021-08-10 ENCOUNTER — HOSPITAL ENCOUNTER (OUTPATIENT)
Dept: PHYSICAL THERAPY | Age: 71
Setting detail: THERAPIES SERIES
Discharge: HOME | End: 2021-08-10
Payer: MEDICARE

## 2021-08-10 ENCOUNTER — HOSPITAL ENCOUNTER (OUTPATIENT)
Dept: SPEECH THERAPY | Age: 71
Setting detail: THERAPIES SERIES
Discharge: HOME | End: 2021-08-10
Payer: MEDICARE

## 2021-08-10 PROCEDURE — 97110 THERAPEUTIC EXERCISES: CPT

## 2021-08-10 PROCEDURE — 97130 THER IVNTJ EA ADDL 15 MIN: CPT | Performed by: SPEECH-LANGUAGE PATHOLOGIST

## 2021-08-10 PROCEDURE — 97129 THER IVNTJ 1ST 15 MIN: CPT | Performed by: SPEECH-LANGUAGE PATHOLOGIST

## 2021-08-10 NOTE — PROGRESS NOTES
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 Visit # 12, 3/10 for progress note   Visits Allowed: Unlimited visits based on medical necessity   Recertification Date: 35   Physician Follow-Up: CT scan at end of August.    Physician Orders: Eval and treat   History of Present Illness: On May 28th, Swetha Miller was playing pickleball at the [facility]. He ran into a wall hard hitting back of head on wall and lost conscious. Swetha Miller has a fracture back of head. He also has multiple brain bleeds. At hospital he was originally in ICU and spouse reports he had aphasia at that time. Progressed quickly and was admitted to Inpatient Rehab. Was discharged home on . He is very active with running, swimming, pickleball, working in garage. SUBJECTIVE: Patient reporting that he went swimming for the first time in 9-10 weeks and did not need a nap following that.       TREATMENT   Precautions: High BP, TBI May 28, 2021, low heart rate, 20# lifting restriction per patient's wife from Dr. Jeanne Marin. 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), lunge walking, butt kick walking 20'x4   No UE support, light CGA to close SBA   Square stepping - large step size  5x each direction   Close CGA, occasional LOB   In hallway - hurdles tap and step over, and lateral step over 20' x4 ea   Orange jesse    In hallway - tapping cones multiple directions during single stance 10x ea      Matrix bike (Seat 5) Level 4 6 minutes X    Hydro stick tandem stance (bilateral lead) position:      side to side       circles each direction    20x  10x    X  X    Bosu ball step ups: forward, and lateral step up and over 15x B lead   X 0 UE support, SBA, CGA    Inverted BOSU ball - rocking forward/retro, lateral, mini squats 15x  15x squats   X 0 UE support, CGA   BOSU alternating lunges 15x B   X    Jogging on trampoline  2 sets   1 min  X    Squats with lifting black plastic crate 10x 15#  10x 20#  X  X Patient has a 20# lifting restriction from Dr. Jeanne Marin per patient and his wife. 20# plastic crate walking around therapy gym 2 laps   x    Walking over therapy mat with objects tucked underneath 4x 10 ft CGA  No LOB   Ladder climbing - carrying small crate and placing on shelf above head 2x 6 steps CGA for safety  No LOB, good motor planning and sequencing task            Specific Interventions Next Treatment: Advanced gait, balance on foam, rocker board, hamstring stretch.  Trampoline jogging/ hopping (if tolerated by hearing sensitivity of noisy trampoline)    Activity/Treatment Tolerance:  [x]  Patient tolerated treatment well  []  Patient limited by fatigue  []  Patient limited by pain   []  Patient limited by medical complications  []  Other:     Assessment: Added weight to create lift and had patient walk around therapy gym holding onto create to work on endurance with patient tolerating well. Patient reporting fatigue level 2/10 at end of session and reporting no other complains. GOALS:  Patient Goal: \"Get back to running, working around house and workshop. \"    Short Term Goals:  Time Frame: 4 weeks  1. Improve balance with Buck score of 54 or greater to assist with safety at home. 2.  Improve steps to allow Paz Curd to ascend/descend 4 steps using reciprocal pattern and no handrails to assist with community outings. Long Term Goals:  Time Frame: 12 weeks  1. Independent with HEP and with progression to assist with improving balance. Patient Education:   []  HEP/Education Completed: added create lift and walking   vIPtela Access Code: [email or web address]  []  No new Education completed  [x]  Reviewed Prior HEP      [x]  Patient verbalized and/or demonstrated understanding of education provided. []  Patient unable to verbalize and/or demonstrate understanding of education provided. Will continue education. []  Barriers to learning: nothing per patient    PLAN:  Treatment Recommendations: Strengthening, Range of Motion, Balance Training, Functional Mobility Training, Transfer Training, Endurance Training, Gait Training, Stair Training, Pain Management, Home Exercise Program, Patient Education, Positioning, Aquatics and Modalities    []  Plan of care initiated. Plan to see patient 2 times per week for 12 weeks to address the treatment planned outlined above.   [x]  Continue with current plan of care  []  Modify plan of care as follows:    []  Hold pending physician visit  []  Discharge    Time In 1059   Time Out 1140   Timed Code Minutes: 41 min   Total Treatment Time: 41 min Electronically Signed by: Ivis Doyle, TEVIN

## 2021-08-10 NOTE — DISCHARGE SUMMARY
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]    Treatment Diagnosis Focal hemorrhagic contusion cerebrum [facility]) (G79.999Y)   Date of Evaluation 21      [facility]    Functional Outcome Score Level 4 (21), Level 5 (21)      Insurance: Primary: Payor: Salo Costa /  /  / ,   Secondary: Annetta Reed BENEFIT   Authorization Information: No precertification needed   Visit # 12, 5/10 for progress note   Visits Allowed: Unlimited based on medical necessity   Recertification Date:     Physician Follow-Up: 2021 f/u with Julia Alvarado MD   Physician Orders: Julia Alvarado MD   Pertinent History: Patient reports 2 1/2 weeks ago he was playing pickle ball in which he ran sideways and fell down and hit his head the concrete block wall. Patient woke up a few hours later in ICU with amnesia of playing pickle ball which is still present to date. Patient spent rest of hospital stay in inpatient rehab where he also received skilled speech therapy to address word-finding and cognitive skills. SUBJECTIVE:  Patient drove to therapy this date by himself. Reports his wife was at home with grandchildren. Had a lengthy discussion about the patient's progress in therapy. Patient feels he occasionally has difficulty finding a word in conversation, but he uses strategies to help work through it. Reports it typically only happens about 1 time a week.   Patient also reports his wife will give him words/things/landmarks to remember when out on a drive to test his memory. Encouraged the patient to continue to have his wife do this as part of his HEP. SHORT TERM GOAL #1:  Goal 1: Patient will utilize memory compensatory strategies to complete delayed recall tasks (>20 minutes) with >5 units of newly learned information with 70% accuracy given min cues to improve retention of novel information. - GOAL MET. INTERVENTIONS:   Recall:   Patient provided with information about 2 upcoming appointments that he has scheduled and asked to recall the doctor, date and time of both appointments. Patient immediately recalled 3/3 pieces of information for both appointments without cues. Following a 23 minute delay, patient independently recalled 2/3 pieces of information for 1 of the appointments successfully. Patient initially stated the incorrect date, but once he was told it was wrong, he realized it was the date for the other appointment he was asked to remember and then successfully came up with the correct date. Patient recalled 3/3 pieces of information for the 2nd appointment without cues. SHORT TERM GOAL #2:   Goal 2: Patient will complete short-term and working memory tasks with 75% accuracy without cueing to improve independent participation in multi-step activities/hobbies. - GOAL PARTIALLY MET. INTERVENTIONS: Completed a working memory task in which the patient was given 5 words with auditory provision. Patient was asked to repeat the list of words in the same order as they were presented. Patient did this 2 times for each list prior to the question being asked. Patient then answered a question about word placement: 7/10 without cues, 2/10 with min cues, 1/10 with mod cues. SHORT TERM GOAL #3:  Goal 3: Patient will complete complex attention (divided, alternating) tasks with 75% accuracy given min cues to improve multitasking skills needed for daily activities (ex: driving, running). - GOAL MET.   INTERVENTIONS:   Divided attention:  Patient reports he was working on a jigsaw puzzle with music playing in the background for about 1 hour since the last session. Patient reports he didn't have any difficulty focusing while putting the puzzle together. Alternating attention task:  Patient completed a task in which he switched between reading the words and verbalizing the size of the print when requested to switch throughout stimulus sheet #10 from the APT:  Patient switched 9 times throughout and had 3 errors, but immediately self-corrected 1 error. Task completed within 2 minutes and 30 seconds. SHORT TERM GOAL #4:  NO NEW GOAL. INTERVENTIONS: n/a    SHORT TERM GOAL #5:  Goal 5: Patient will complete higher level naming tasks (divergent, convergent naming) with 85% accuracy without cues to promote enhanced expressive communication. - GOAL MET. INTERVENTIONS: Patient reports he will still occasionally require some extra time to come up with the words. Patient with x 1 instance of anomia throughout this session. Patient independently verbalized word finding strategies. Previous session:  Patient reports he is \"occasionally\" noticing some difficulty with anomia. Given an object, patient described each object with as much detail as possible with excellent success on 5/5 trials. Patient with very descriptive explanations with good word finding skills. An occasional \"um\" verbalized, however, very functional.    Long-term Goals  Timeframe for Long-term Goals: 4 weeks  Goal 1: Patient will improve YOHAN NOMS memory score from a Level 5 to a Level 6 for improved participation in ADL/IADL tasks and hobbies. - GOAL MET. ASSESSMENT: Progressing towards goals  Patient fully met 2/3 short term goals and partially met 1/3 short term goals. Patient met 1/1 long term goals. Patient's cognition appears functional for daily tasks.   Patient has returned to driving successfully, is taking his medications independently and helps with managing his daily schedule/appointments. Patient reports his spouse pays the bills, but he will occasionally pay at the restaurant, store, etc and he will balance the checkbook without difficulty. Patient is retired. Reports he has not returned to playing pickle ball, running, working on vintage cars (because his garage needs work first) or mowing the grass. Patient reports he plans to speak with Dr. Noah Maldonado about getting back to playing pickle ball and mowing the grass now as he feels he is ready to do both activities. Patient demonstrates understanding of the compensatory memory strategies and reports follow through with using the strategies as indicated for recall of functional information. Recommend discharge at this time with the patient continuing to work on his HEP, especially multi-tasking and use of memory strategies during functional tasks. Activity/Treatment Tolerance:  [x]  Patient tolerated treatment well  []  Patient limited by fatigue  []  Patient limited by pain    []  Patient limited by other medical complications  []  Other:      Patient Education:   []  HEP/Education Completed: multi-tasking, memory strategies  []  No new Education completed  [x]  Reviewed Prior HEP      [x]  Patient verbalized and/or demonstrated understanding of education provided. []  Patient unable to verbalize and/or demonstrate understanding of education provided. Will continue education. []  Barriers to learning:     PLAN:  []  Plan of care initiated.     []  Continue with current plan of care  []  Modify plan of care as follows:    []  Hold pending physician visit  [x]  Discharge    Time In 1142   Time Out 1225   Timed Code Minutes:  43 min   Total Treatment Time: 37 min         Montana Ramirez M.S. Jeannette Robin [id number]

## 2021-08-12 ENCOUNTER — HOSPITAL ENCOUNTER (OUTPATIENT)
Dept: PHYSICAL THERAPY | Age: 71
Setting detail: THERAPIES SERIES
Discharge: HOME | End: 2021-08-12
Payer: MEDICARE

## 2021-08-12 ENCOUNTER — APPOINTMENT (OUTPATIENT)
Dept: SPEECH THERAPY | Age: 71
End: 2021-08-12
Payer: MEDICARE

## 2021-08-12 PROCEDURE — 97110 THERAPEUTIC EXERCISES: CPT

## 2021-08-17 ENCOUNTER — APPOINTMENT (OUTPATIENT)
Dept: PHYSICAL THERAPY | Age: 71
End: 2021-08-17
Payer: MEDICARE

## 2021-08-17 ENCOUNTER — APPOINTMENT (OUTPATIENT)
Dept: SPEECH THERAPY | Age: 71
End: 2021-08-17
Payer: MEDICARE

## 2021-08-18 NOTE — TELEPHONE ENCOUNTER
Can we refax orders for the blood work to the lab? Scanned document from gregoria ospina . . . I believe this may have already been done once? But can we do again?     Goes to the next payer today
no

## 2021-08-19 ENCOUNTER — APPOINTMENT (OUTPATIENT)
Dept: SPEECH THERAPY | Age: 71
End: 2021-08-19
Payer: MEDICARE

## 2021-08-19 ENCOUNTER — HOSPITAL ENCOUNTER (OUTPATIENT)
Dept: PHYSICAL THERAPY | Age: 71
Setting detail: THERAPIES SERIES
Discharge: HOME | End: 2021-08-19
Payer: MEDICARE

## 2021-08-19 PROCEDURE — 97110 THERAPEUTIC EXERCISES: CPT

## 2021-08-19 NOTE — PROGRESS NOTES
[address] [facility]  PHYSICAL THERAPY  [x] DAILY NOTE (LAND) [] DAILY NOTE (AQUATIC ) [] PROGRESS NOTE [] DISCHARGE NOTE    [x] OUTPATIENT REHABILITATION [facility]   [] [place] [address]    [] 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 Visit # 14, 5/10 for progress note   Visits Allowed: Unlimited visits based on medical necessity   Recertification Date: 34   Physician Follow-Up: CT scan at end of August.    Physician Orders: Eval and treat   History of Present Illness: On May 28th, Kristine Do was playing pickleball at the [facility]. He ran into a wall hard hitting back of head on wall and lost conscious. Kristine Do has a fracture back of head. He also has multiple brain bleeds. At hospital he was originally in ICU and spouse reports he had aphasia at that time. Progressed quickly and was admitted to Inpatient Rehab. Was discharged home on . He is very active with running, swimming, pickleball, working in garage. SUBJECTIVE: Patient already admits to being tired before starting session today. TREATMENT   Precautions: High BP, TBI May 28, 2021, low heart rate, 20# lifting restriction per patient's wife from Dr. Abdullahi Castañeda.     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), lunge walking, butt kick walking 20'x4   No UE support, light CGA to close SBA   Square stepping - large step size  5x each direction   Close CGA, occasional LOB   In hallway - hurdles tap and step over, and lateral step over 20' x4 ea   Orange jesse    In hallway - tapping cones multiple directions during single stance 10x ea      Matrix bike (Seat 5) Level 4 6 minutes X    Hydro stick tandem stance (bilateral lead) position:      side to side       circles each direction    15x  15x    X  X    Bosu ball step ups: forward, and lateral step up and over 15x B lead   X 0 UE support, SBA, CGA    Inverted BOSU ball - rocking forward/retro, lateral, mini squats 15x  15x squats   X 0 UE support, CGA   BOSU lunges 15x B   X    Pebbles Disc: normal stance balance       Green weighted ball: shoulder flexion, trunk rotation      SLS r/l 1 min  10  5-10 sec  X  X  x    Jogging on trampoline   SLS on trampoline 2 sets  3 x 10 sec   1 min        Squats with lifting black plastic crate 2x 10x 15#  10x 20#     Patient has a 20# lifting restriction from Dr. Mary Walls per patient and his wife. 20# plastic crate walking around therapy gym 2 laps       Walking over therapy mat with objects tucked underneath 4x 10 ft CGA  No LOB   Ladder climbing - carrying small crate and placing on shelf above head 2x 6 steps CGA for safety  No LOB, good motor planning and sequencing task            Specific Interventions Next Treatment: Advanced gait, balance on foam, rocker board, hamstring stretch.  Trampoline jogging/ hopping (if tolerated by hearing sensitivity of noisy trampoline)    Activity/Treatment Tolerance:  [x]  Patient tolerated treatment well  []  Patient limited by fatigue  []  Patient limited by pain   []  Patient limited by medical complications  []  Other:     Assessment: Did add some balance exercises while standing on joe disc with patient having a little difficulty. Reporting still feeling tired at end of session but denies any increase in this tired feeling from what he had at the start of the session. GOALS:  Patient Goal: \"Get back to running, working around house and workshop. \"    Short Term Goals:  Time Frame: 4 weeks  1. Improve balance with Buck score of 54 or greater to assist with safety at home. 2.  Improve steps to allow [place] to ascend/descend 4 steps using reciprocal pattern and no handrails to assist with community outings. Long Term Goals:  Time Frame: 12 weeks  1. Independent with HEP and with progression to assist with improving balance. Patient Education:   []  HEP/Education Completed: added create lift and walking   [employer] Access Code: [email or web address]  [x]  No new Education completed  [x]  Reviewed Prior HEP      [x]  Patient verbalized and/or demonstrated understanding of education provided. []  Patient unable to verbalize and/or demonstrate understanding of education provided. Will continue education. []  Barriers to learning: nothing per patient    PLAN:  Treatment Recommendations: Strengthening, Range of Motion, Balance Training, Functional Mobility Training, Transfer Training, Endurance Training, Gait Training, Stair Training, Pain Management, Home Exercise Program, Patient Education, Positioning, Aquatics and Modalities    []  Plan of care initiated. Plan to see patient 2 times per week for 12 weeks to address the treatment planned outlined above.   [x]  Continue with current plan of care  []  Modify plan of care as follows:    []  Hold pending physician visit  []  Discharge    Time In 1337   Time Out 1418   Timed Code Minutes: 41 min   Total Treatment Time: 41 min        Electronically Signed by: Gagan Oseguera, PTA

## 2021-08-20 ENCOUNTER — HOSPITAL ENCOUNTER (OUTPATIENT)
Dept: CT IMAGING | Age: 71
Discharge: HOME | End: 2021-08-20
Payer: MEDICARE

## 2021-08-20 DIAGNOSIS — S06.9X1D TRAUMATIC BRAIN INJURY WITH LOSS OF CONSCIOUSNESS OF 30 MINUTES OR LESS, SUBSEQUENT ENCOUNTER: ICD-10-CM

## 2021-08-20 DIAGNOSIS — S06.319D: ICD-10-CM

## 2021-08-20 PROCEDURE — 70450 CT HEAD/BRAIN W/O DYE: CPT

## 2021-08-24 ENCOUNTER — HOSPITAL ENCOUNTER (OUTPATIENT)
Dept: PHYSICAL THERAPY | Age: 71
Setting detail: THERAPIES SERIES
Discharge: HOME | End: 2021-08-24
Payer: MEDICARE

## 2021-08-24 ENCOUNTER — APPOINTMENT (OUTPATIENT)
Dept: SPEECH THERAPY | Age: 71
End: 2021-08-24
Payer: MEDICARE

## 2021-08-24 PROCEDURE — 97110 THERAPEUTIC EXERCISES: CPT

## 2021-08-24 NOTE — PROGRESS NOTES
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 Visit # 15, 6/10 for progress note   Visits Allowed: Unlimited visits based on medical necessity   Recertification Date: 46   Physician Follow-Up: CT scan at end of August.    Physician Orders: Eval and treat   History of Present Illness: On May 28th, Martha Hogan was playing pickleball at the [facility]. He ran into a wall hard hitting back of head on wall and lost conscious. Martha Hogan has a fracture back of head. He also has multiple brain bleeds. At hospital he was originally in ICU and spouse reports he had aphasia at that time. Progressed quickly and was admitted to Inpatient Rehab. Was discharged home on . He is very active with running, swimming, pickleball, working in garage. SUBJECTIVE: Patient reporting that he did not sleep well last night and is not sure why. Currently reporting no other complains at this time.       TREATMENT   Precautions: High BP, TBI May 28, 2021, low heart rate, 20# lifting restriction per patient's wife from Dr. Bashir Cool. Pain: No pain      X in shaded column indicates activity completed today   Modalities Parameters/  Location  Notes         Manual Therapy Time/Technique  Notes         Exercise/Intervention   Notes   Balance on foam: feet together EC,  B step stance EC, tandem stance EO   1 minute  No UE support    Dynamic gait: slow marching, tandem walking and walking turning head side to side and up down. 20'x4  x SBA and path deviations with turning head   Square stepping - large step size  5x each direction   Close CGA, occasional LOB   In hallway - hurdles tap and step over, and lateral step over 20' x4 ea   Orange jesse    In hallway - tapping cones multiple directions during single stance 10x ea      Matrix bike (Seat 5) Level 4 6 minutes X    Hydro stick tandem stance (bilateral lead) position:      side to side       circles each direction    15x  15x    X  X    Bosu ball step ups: forward, and lateral step up and over 15x B lead   X 0 UE support, SBA, CGA    Inverted BOSU ball - rocking forward/retro, lateral, mini squats 15x  15x squats   X  x 0 UE support, CGA   BOSU lunges forward                       lateral 15x B   10  X  x    Pebbles Disc: normal stance balance       Green weighted ball: shoulder flexion, trunk rotation      SLS r/l 1 min  15  5-10 sec  X  X      Jogging on trampoline   SLS on trampoline 2 sets  3 x 10 sec   1 min        Squats with lifting black plastic crate 2x 10x 15#  10x 20#     Patient has a 20# lifting restriction from Dr. Bashir Cool per patient and his wife. 20# plastic crate walking around therapy gym 2 laps       Walking over therapy mat with objects tucked underneath 4x 10 ft CGA  No LOB   Ladder climbing - carrying small crate and placing on shelf above head 2x 6 steps CGA for safety  No LOB, good motor planning and sequencing task            Specific Interventions Next Treatment: Advanced gait, balance on foam, rocker board, hamstring stretch.  Trampoline jogging/ hopping (if tolerated by hearing sensitivity of noisy trampoline)    Activity/Treatment Tolerance:  [x]  Patient tolerated treatment well  []  Patient limited by fatigue  []  Patient limited by pain   []  Patient limited by medical complications  []  Other:     Assessment: Added lateral lunge to BOSU with good tolerance. Also added dynamic walking with patient turning head and did not path deviations with this. Patient reporting feeling tired at end of session but reporting no other complains. GOALS:  Patient Goal: \"Get back to running, working around house and workshop. \"    Short Term Goals:  Time Frame: 4 weeks  1. Improve balance with Buck score of 54 or greater to assist with safety at home. 2.  Improve steps to allow [facility] to ascend/descend 4 steps using reciprocal pattern and no handrails to assist with community outings. Long Term Goals:  Time Frame: 12 weeks  1. Independent with HEP and with progression to assist with improving balance. Patient Education:   []  HEP/Education Completed: added create lift and walking   [employer] Access Code: [email or web address]  [x]  No new Education completed  [x]  Reviewed Prior HEP      [x]  Patient verbalized and/or demonstrated understanding of education provided. []  Patient unable to verbalize and/or demonstrate understanding of education provided. Will continue education. []  Barriers to learning: nothing per patient    PLAN:  Treatment Recommendations: Strengthening, Range of Motion, Balance Training, Functional Mobility Training, Transfer Training, Endurance Training, Gait Training, Stair Training, Pain Management, Home Exercise Program, Patient Education, Positioning, Aquatics and Modalities    []  Plan of care initiated. Plan to see patient 2 times per week for 12 weeks to address the treatment planned outlined above.   [x]  Continue with current plan of care  []  Modify plan of care as follows:    []  Hold pending physician visit  []  Discharge    Time In 1334   Time Out 1415   Timed Code Minutes: 41 min   Total Treatment Time: 41 min        Electronically Signed by: Teresa Solis PTA

## 2021-08-26 ENCOUNTER — APPOINTMENT (OUTPATIENT)
Dept: SPEECH THERAPY | Age: 71
End: 2021-08-26
Payer: MEDICARE

## 2021-08-26 ENCOUNTER — HOSPITAL ENCOUNTER (OUTPATIENT)
Dept: PHYSICAL THERAPY | Age: 71
Setting detail: THERAPIES SERIES
Discharge: HOME | End: 2021-08-26
Payer: MEDICARE

## 2021-08-26 PROCEDURE — 97110 THERAPEUTIC EXERCISES: CPT

## 2021-08-26 NOTE — PROGRESS NOTES
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 Visit # 16, 7/10 for progress note   Visits Allowed: Unlimited visits based on medical necessity   Recertification Date:    Physician Follow-Up: CT scan at end of August.    Physician Orders: Eval and treat   History of Present Illness: On May 28th, Cally Shukla was playing pickle ball at the [facility]. He ran into a wall hard hitting back of head on wall and lost conscious. Cally Shukla has a fracture back of head. He also has multiple brain bleeds. At hospital he was originally in ICU and spouse reports he had aphasia at that time. Progressed quickly and was admitted to Inpatient Rehab. Was discharged home on . He is very active with running, swimming, pickle ball, working in garage. SUBJECTIVE: No complains at this time. Denies pain. TREATMENT   Precautions: High BP, TBI May 28, 2021, low heart rate, 20# lifting restriction per patient's wife from Dr. Kurt Vasquez.     Pain: No pain      X in shaded column indicates activity completed today   Modalities Parameters/  Location  Notes         Manual Therapy Time/Technique  Notes         Exercise/Intervention   Notes   Foam: feet together turning head side/side and looking up/down   1 minute x No UE support    Dynamic gait: slow marching, tandem walking and walking turning head side to side and up down. 1 laps in // bars  x SBA and path deviations with turning head   Square stepping - large step size  5x each    Close CGA, occasional LOB   In hallway - hurdles tap and step over,  lateral step over 20' x4 ea   Orange jesse    In hallway - tapping cones multiple directions during single stance 10x ea      Matrix bike (Seat 5) Level 4 6 minutes X    Hydro stick tandem stance (bilateral lead) position:      side to side       circles each direction    20x  15x    X  X    Bosu ball step ups: forward, and lateral step up and over 15x B lead   X 0 UE support, SBA, CGA    Inverted BOSU ball - rocking forward/retro, lateral, mini squats 15x  15x squats      0 UE support, CGA   BOSU lunges forward                       lateral 15x B   15  X  x    Pebbles Disc: normal stance balance       Green weighted ball: shoulder flexion, trunk rotation      SLS r/l 1 min  15  5-10 sec          Jogging on trampoline 2 sets   1 min  x    Squats with lifting black plastic crate 2x 10x 15#  10x 20#     Patient has a 20# lifting restriction from Dr. Carmelina Byers per patient and his wife. 20# plastic crate walking around therapy gym 2 laps       Walking over therapy mat with objects tucked underneath 4x 10 ft CGA  No LOB   Ladder climbing - carrying small crate and placing on shelf above head 2x 6 steps CGA for safety  No LOB, good motor planning and sequencing task            Specific Interventions Next Treatment: Advanced gait, balance on foam, rocker board, hamstring stretch.  Trampoline jogging/ hopping (if tolerated by hearing sensitivity of noisy trampoline)    Activity/Treatment Tolerance:  [x]  Patient tolerated treatment well []  Patient limited by fatigue  []  Patient limited by pain   []  Patient limited by medical complications  []  Other:     Assessment: Continued with exercises where patient has to turn head and look up and down because patient has some difficulty stabilizing while doing these tasks. Patient with no complains at end of session. GOALS:  Patient Goal: \"Get back to running, working around house and workshop. \"    Short Term Goals:  Time Frame: 4 weeks  1. Improve balance with Buck score of 54 or greater to assist with safety at home. 2.  Improve steps to allow Adams Johnson to ascend/descend 4 steps using reciprocal pattern and no handrails to assist with community outings. Long Term Goals:  Time Frame: 12 weeks  1. Independent with HEP and with progression to assist with improving balance. Patient Education:   []  HEP/Education Completed: added create lift and walking   [employer] Access Code: [email or web address]  [x]  No new Education completed  [x]  Reviewed Prior HEP      [x]  Patient verbalized and/or demonstrated understanding of education provided. []  Patient unable to verbalize and/or demonstrate understanding of education provided. Will continue education. []  Barriers to learning: nothing per patient    PLAN:  Treatment Recommendations: Strengthening, Range of Motion, Balance Training, Functional Mobility Training, Transfer Training, Endurance Training, Gait Training, Stair Training, Pain Management, Home Exercise Program, Patient Education, Positioning, Aquatics and Modalities    []  Plan of care initiated. Plan to see patient 2 times per week for 12 weeks to address the treatment planned outlined above.   [x]  Continue with current plan of care  []  Modify plan of care as follows:    []  Hold pending physician visit  []  Discharge    Time In 1337   Time Out 1418   Timed Code Minutes: 41 min   Total Treatment Time: 41 min        Electronically Signed by: Holly Boyd PTA

## 2021-08-27 ENCOUNTER — OFFICE VISIT (OUTPATIENT)
Dept: NEUROSURGERY | Age: 71
End: 2021-08-27
Payer: MEDICARE

## 2021-08-27 VITALS
HEIGHT: 67 IN | BODY MASS INDEX: 27.79 KG/M2 | HEART RATE: 65 BPM | WEIGHT: 177.03 LBS | DIASTOLIC BLOOD PRESSURE: 77 MMHG | SYSTOLIC BLOOD PRESSURE: 114 MMHG

## 2021-08-27 DIAGNOSIS — S06.319D: ICD-10-CM

## 2021-08-27 DIAGNOSIS — S06.9X1D TRAUMATIC BRAIN INJURY WITH LOSS OF CONSCIOUSNESS OF 30 MINUTES OR LESS, SUBSEQUENT ENCOUNTER: Primary | ICD-10-CM

## 2021-08-27 PROCEDURE — G8427 DOCREV CUR MEDS BY ELIG CLIN: HCPCS | Performed by: PHYSICIAN ASSISTANT

## 2021-08-27 PROCEDURE — 4040F PNEUMOC VAC/ADMIN/RCVD: CPT | Performed by: PHYSICIAN ASSISTANT

## 2021-08-27 PROCEDURE — 99213 OFFICE O/P EST LOW 20 MIN: CPT | Performed by: PHYSICIAN ASSISTANT

## 2021-08-27 PROCEDURE — G8417 CALC BMI ABV UP PARAM F/U: HCPCS | Performed by: PHYSICIAN ASSISTANT

## 2021-08-27 PROCEDURE — 1036F TOBACCO NON-USER: CPT | Performed by: PHYSICIAN ASSISTANT

## 2021-08-27 PROCEDURE — 3017F COLORECTAL CA SCREEN DOC REV: CPT | Performed by: PHYSICIAN ASSISTANT

## 2021-08-27 PROCEDURE — 1123F ACP DISCUSS/DSCN MKR DOCD: CPT | Performed by: PHYSICIAN ASSISTANT

## 2021-08-27 NOTE — PROGRESS NOTES
[address] [address] [facility]  Dept: [phone]  Dept Fax: [phone]  Loc: Tamera Christopher 1160 Follow Visit  Visit Date: 8/27/2021      Rito Jasmine  is a 78 y.o. male who is returning to the office today for a follow-up visit for continuing evaluation of traumatic brain injury and associated hematoma. He was last seen and evaluated in our office setting on 7/7/2021 with a CT scan which revealed subacute hemorrhagic contusion of the left frontal lobe with minimal hyperdense blood at the margin. Mildly commuted right skull base fracture remains nondisplaced on that image. At that appointment he was accompanied by his wife and was comfortable with persistent headaches retractable to Tylenol. On physical exam at that appointment he was stable and grossly intact without deficits for strength sensation or vision. Today's appointment includes a new CT scan of the head imaged without contrast on 8/20/2021 which serves as a comparison to the June 30, 2021 image. Today's image reveals right frontal subdural hematoma decreased in size compared to the [place] image. The collection is now estimated at 6 mm compared to 10 mm on prior imaging no significant mass-effect noted. At today's visit is accompanied by his wife with no significant complaints. He states that the fatigue he was experiencing earlier has lessened in frequency and intensity. He has begun to return to normal activities to include physical therapy and aqua therapy. Tenderness remains at the location of the occipital fracture. He is otherwise stable and intact neurologically on exam.  We reviewed the findings on the latest CT scan and of agreed to repeat the image in 6 weeks with a follow-up appointment for comparison and review. He has requested a note processed in anticipation of a plain trip scheduled in October to [facility].   That letter will be processed

## 2021-08-27 NOTE — LETTER
[address] [facility]  [address] [facility]  Phone: [phone]  Fax: Mahad Vences, PAKRYSTAL        August 27, 2021     Patient: Oziel Baig   YOB: 1950   Date of Visit: 8/27/2021       To Whom it May Concern:    Roger Pizarro was seen in my clinic on 8/27/2021. Patient is cleared from a Neurosurgical prospective to fly. We recommend an isle seat to facilitate frequent ambulation. If you have any questions or concerns, please don't hesitate to call.     Sincerely,         Thuy Taylor PA-C

## 2021-08-31 ENCOUNTER — HOSPITAL ENCOUNTER (OUTPATIENT)
Dept: PHYSICAL THERAPY | Age: 71
Setting detail: THERAPIES SERIES
Discharge: HOME | End: 2021-08-31
Payer: MEDICARE

## 2021-08-31 PROCEDURE — 97110 THERAPEUTIC EXERCISES: CPT

## 2021-08-31 NOTE — DISCHARGE SUMMARY
[address] [facility]  PHYSICAL THERAPY  [] DAILY NOTE (LAND) [] DAILY NOTE (AQUATIC ) [] PROGRESS NOTE [x] DISCHARGE NOTE    [x] OUTPATIENT REHABILITATION CENTER [facility]   [] [place] [address]    [] 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 Visit # 17, 8/10 for progress note   Visits Allowed: Unlimited visits based on medical necessity   Recertification Date: 31   Physician Follow-Up: CT scan at 21    Physician Orders: Eval and treat   History of Present Illness: On May 28th, Kristine Do was playing pickle ball at the [facility]. He ran into a wall hard hitting back of head on wall and lost conscious. Kristine Do has a fracture back of head. He also has multiple brain bleeds. At hospital he was originally in ICU and spouse reports he had aphasia at that time. Progressed quickly and was admitted to Inpatient Rehab. Was discharged home on . He is very active with running, swimming, pickle ball, working in garage. SUBJECTIVE: Patient followed up with Dr. Abdullahi Castañeda and they did repeat head CT - the bleeds are decreasing. He was cleared to try elliptical, chair yoga, cleared to resume mowing lawn.  He is not permitted to perform any running or riding on bicycle due to risk of impact and jostling. Patient and wife asking a lot about which exercises he is allowed to do - was attempting sit ups and push ups on the floor, planking etc. I advised against all of these because they force him to perform a valsalva maneuver which will increase intracranial pressure. He has attended some chair yoga classes, but some of the neck stretching gave him a bit of a headache, so he had to avoid some of the movements. TREATMENT   Precautions: High BP, TBI May 28, 2021, low heart rate, 20# lifting restriction per patient's wife from Dr. Rufina Duran. Pain: No pain      X in shaded column indicates activity completed today   Modalities Parameters/  Location  Notes         Manual Therapy Time/Technique  Notes         Exercise/Intervention   Notes   Elliptical warm up Level 1 6.5 mins  x Attempted for first time today, good tolerance          REassessment performed - stair climbing, Pepe balance test, full review of current precautions and HEP review. 20 mins  x See goals. PEPE BALANCE TEST    1. SITTING TO STANDIN - Able to stand without using hands and stabilize independently   2. STANDING UNSUPPORTED:  4 - Able to stand safely 2 minutes   3. SITTING UNSUPPORTED, FEET ON FLOOR:  4 - Able to sit safely and securely 2 minutes   4. STANDING TO SITTIN - Sits Safely with minimal use of hands   5. TRANSFERS:  4 - Able to transfer safely with minor use of hands   6. STANDING UNSUPPORTED WITH EYES CLOSED: 4 - Able to stand 10 seconds safely   7. STANDING UNSUPPORTED, FEET TOGETHER:   4 - Able to place feet together independently and stand 1 minute safely   8. REACHING FORWARD WITH OUTSTRETCHED ARM: 4 - Can reach forward confidently >25 cm (10 inches)   9. PICK OBJECT UP FROM FLOOR: 4 - Able to  slipper safely and easily   10. TURN TO LOOK BEHIND/OVER LEFT AND RIGHT SHOULDERS:  4 - Looks behind from both sides and weight shifts well   11.   TURN 360 DEGREES: 4 - Able to turn 360 degrees safely in 4 seconds or less   12. ALTERNATING STEPS ON STOOL:  4 - Able to stand independently and safely and complete 8 steps in 20 seconds   13. STANDING UNSUPPORTED, ONE FOOT IN FRONT (TANDEM STANCE):  4 - Able to place foot tandem independently and hold 30 seconds   14. STAND ON ONE LEG:  \"4 - Able to lift let independently and hold >10 seconds    TOTAL: 56/56     Activity/Treatment Tolerance:  [x]  Patient tolerated treatment well  []  Patient limited by fatigue  []  Patient limited by pain   []  Patient limited by medical complications  []  Other:     Assessment: Patient has made excellent progress toward therapy goals. His standing balance improved with Buck Balance Test, and his sequencing for complex balance tasks is improved. He still reports that he feels fatigued at times, but is progressing his endurance independently through swimming laps, using cardio equipment, and attending chair yoga class at his gym. He wants to return to running, but has not been cleared by physician yet. Patient and wife can verbalize his precautions and he has adequate insight into his deficits. Plan to discharge PT and patient can continue exercising as advised by his doctor. GOALS:  Patient Goal: \"Get back to running, working around house and workshop. \"    Short Term Goals:  Time Frame: 4 weeks  1. Improve balance with Buck score of 54 or greater to assist with safety at home. [x] Goal Met [] Goal Not Met [] Continue Goal [x] Discontinue Goal  [] Revise Goal  Goal Assessment:  Full score 56/56    2. Improve steps to allow [facility] to ascend/descend 4 steps using reciprocal pattern and no handrails to assist with community outings. [x] Goal Met [] Goal Not Met [] Continue Goal [x] Discontinue Goal  [] Revise Goal  Goal Assessment: No railings, reciprocal pattern no LOB    Long Term Goals:  Time Frame: 12 weeks  1.   Independent with HEP and with progression to assist with improving balance. [x] Goal Met [] Goal Not Met [] Continue Goal [x] Discontinue Goal  [] Revise Goal  Goal Assessment: patient is attending chair yoga class, swimming laps, exercises with cardio equipment and knows he is not cleared to run on treadmill yet. He knows his precautions about lifting heavier than 20 lbs, and therapist advised today no pushups, planks, or sit ups    Patient Education:   []  HEP/Education Completed:    [address] Access Code: [email or web address]  []  No new Education completed   [x]  Reviewed Prior HEP - see goal above, fully reviewed what is currently recommended      [x]  Patient verbalized and/or demonstrated understanding of education provided. []  Patient unable to verbalize and/or demonstrate understanding of education provided. Will continue education.   []  Barriers to learning: nothing per patient    PLAN:    [x]  Discharge    Time In 1305   Time Out 1340   Timed Code Minutes: 35   Total Treatment Time: 35        Electronically Signed by: Otilia Galeano PT

## 2021-09-21 ENCOUNTER — NURSE ONLY (OUTPATIENT)
Dept: LAB | Age: 71
End: 2021-09-21

## 2021-09-21 ENCOUNTER — OFFICE VISIT (OUTPATIENT)
Dept: FAMILY MEDICINE CLINIC | Age: 71
End: 2021-09-21
Payer: MEDICARE

## 2021-09-21 VITALS
HEIGHT: 67 IN | HEART RATE: 65 BPM | RESPIRATION RATE: 16 BRPM | BODY MASS INDEX: 28.44 KG/M2 | WEIGHT: 181.2 LBS | SYSTOLIC BLOOD PRESSURE: 122 MMHG | DIASTOLIC BLOOD PRESSURE: 70 MMHG | OXYGEN SATURATION: 98 % | TEMPERATURE: 97.3 F

## 2021-09-21 DIAGNOSIS — R79.89 ELEVATED TSH: ICD-10-CM

## 2021-09-21 DIAGNOSIS — R94.6 ABNORMAL RESULTS OF THYROID FUNCTION STUDIES: ICD-10-CM

## 2021-09-21 DIAGNOSIS — E78.00 ELEVATED LDL CHOLESTEROL LEVEL: ICD-10-CM

## 2021-09-21 DIAGNOSIS — E78.5 HYPERLIPIDEMIA, UNSPECIFIED HYPERLIPIDEMIA TYPE: ICD-10-CM

## 2021-09-21 DIAGNOSIS — K90.89 OTHER INTESTINAL MALABSORPTION: ICD-10-CM

## 2021-09-21 DIAGNOSIS — S06.0X0D CONCUSSION WITHOUT LOSS OF CONSCIOUSNESS, SUBSEQUENT ENCOUNTER: ICD-10-CM

## 2021-09-21 DIAGNOSIS — I10 ESSENTIAL HYPERTENSION: ICD-10-CM

## 2021-09-21 DIAGNOSIS — R35.1 NOCTURIA: ICD-10-CM

## 2021-09-21 DIAGNOSIS — Z00.00 ROUTINE GENERAL MEDICAL EXAMINATION AT A HEALTH CARE FACILITY: Primary | ICD-10-CM

## 2021-09-21 DIAGNOSIS — D64.9 ANEMIA, UNSPECIFIED TYPE: ICD-10-CM

## 2021-09-21 DIAGNOSIS — F07.81 POST CONCUSSIVE SYNDROME: ICD-10-CM

## 2021-09-21 DIAGNOSIS — E03.8 SUBCLINICAL HYPOTHYROIDISM: ICD-10-CM

## 2021-09-21 DIAGNOSIS — R41.3 MEMORY DIFFICULTIES: ICD-10-CM

## 2021-09-21 DIAGNOSIS — R79.89 TSH ELEVATION: ICD-10-CM

## 2021-09-21 DIAGNOSIS — E78.00 ELEVATED CHOLESTEROL: ICD-10-CM

## 2021-09-21 LAB
BASOPHILS # BLD: 1 %
BASOPHILS ABSOLUTE: 0 THOU/MM3 (ref 0–0.1)
CALCIUM SERPL-MCNC: 9.7 MG/DL (ref 8.5–10.5)
CHOLESTEROL, TOTAL: 227 MG/DL (ref 100–199)
EOSINOPHIL # BLD: 2.3 %
EOSINOPHILS ABSOLUTE: 0.1 THOU/MM3 (ref 0–0.4)
ERYTHROCYTE [DISTWIDTH] IN BLOOD BY AUTOMATED COUNT: 13.2 % (ref 11.5–14.5)
ERYTHROCYTE [DISTWIDTH] IN BLOOD BY AUTOMATED COUNT: 44.5 FL (ref 35–45)
HCT VFR BLD CALC: 41.6 % (ref 42–52)
HDLC SERPL-MCNC: 40 MG/DL
HEMOGLOBIN: 13.3 GM/DL (ref 14–18)
IMMATURE GRANS (ABS): 0.01 THOU/MM3 (ref 0–0.07)
IMMATURE GRANULOCYTES: 0.2 %
LDL CHOLESTEROL CALCULATED: 144 MG/DL
LYMPHOCYTES # BLD: 23 %
LYMPHOCYTES ABSOLUTE: 1.1 THOU/MM3 (ref 1–4.8)
MAGNESIUM: 2.2 MG/DL (ref 1.6–2.4)
MCH RBC QN AUTO: 29.3 PG (ref 26–33)
MCHC RBC AUTO-ENTMCNC: 32 GM/DL (ref 32.2–35.5)
MCV RBC AUTO: 91.6 FL (ref 80–94)
MONOCYTES # BLD: 10.8 %
MONOCYTES ABSOLUTE: 0.5 THOU/MM3 (ref 0.4–1.3)
NUCLEATED RED BLOOD CELLS: 0 /100 WBC
PLATELET # BLD: 234 THOU/MM3 (ref 130–400)
PMV BLD AUTO: 11 FL (ref 9.4–12.4)
PTH INTACT: 37.6 PG/ML (ref 15–65)
RBC # BLD: 4.54 MILL/MM3 (ref 4.7–6.1)
SEDIMENTATION RATE, ERYTHROCYTE: 7 MM/HR (ref 0–10)
SEG NEUTROPHILS: 62.7 %
SEGMENTED NEUTROPHILS ABSOLUTE COUNT: 3 THOU/MM3 (ref 1.8–7.7)
T3 TOTAL: 87 NG/DL (ref 72–181)
TRIGL SERPL-MCNC: 217 MG/DL (ref 0–199)
TSH SERPL DL<=0.05 MIU/L-ACNC: 4.59 UIU/ML (ref 0.4–4.2)
VITAMIN D 25-HYDROXY: 25 NG/ML (ref 30–100)
WBC # BLD: 4.8 THOU/MM3 (ref 4.8–10.8)

## 2021-09-21 PROCEDURE — G0439 PPPS, SUBSEQ VISIT: HCPCS | Performed by: STUDENT IN AN ORGANIZED HEALTH CARE EDUCATION/TRAINING PROGRAM

## 2021-09-21 PROCEDURE — 1123F ACP DISCUSS/DSCN MKR DOCD: CPT | Performed by: STUDENT IN AN ORGANIZED HEALTH CARE EDUCATION/TRAINING PROGRAM

## 2021-09-21 PROCEDURE — 4040F PNEUMOC VAC/ADMIN/RCVD: CPT | Performed by: STUDENT IN AN ORGANIZED HEALTH CARE EDUCATION/TRAINING PROGRAM

## 2021-09-21 PROCEDURE — 3017F COLORECTAL CA SCREEN DOC REV: CPT | Performed by: STUDENT IN AN ORGANIZED HEALTH CARE EDUCATION/TRAINING PROGRAM

## 2021-09-21 ASSESSMENT — PATIENT HEALTH QUESTIONNAIRE - PHQ9
SUM OF ALL RESPONSES TO PHQ QUESTIONS 1-9: 0
1. LITTLE INTEREST OR PLEASURE IN DOING THINGS: 0
SUM OF ALL RESPONSES TO PHQ QUESTIONS 1-9: 0
SUM OF ALL RESPONSES TO PHQ QUESTIONS 1-9: 0
SUM OF ALL RESPONSES TO PHQ9 QUESTIONS 1 & 2: 0
2. FEELING DOWN, DEPRESSED OR HOPELESS: 0

## 2021-09-21 ASSESSMENT — LIFESTYLE VARIABLES: HOW OFTEN DO YOU HAVE A DRINK CONTAINING ALCOHOL: 0

## 2021-09-21 NOTE — PROGRESS NOTES
Health Maintenance Due   Topic Date Due    Flu vaccine (1) 09/01/2021    Annual Wellness Visit (AWV)  09/26/2021 Today

## 2021-09-21 NOTE — PROGRESS NOTES
Omid Holm is a 73 y.o.male    Chief Complaint   Patient presents with   [facility] AWV     Wellness Physical       Chief complaint, [place], and all pertinent details of the case reviewed with the resident. Please see resident's note for specific details discussed at today's visit. Patient Active Problem List   Diagnosis    Injury of head    Memory impairment    Concussion with no loss of consciousness    Nonintractable headache    Post concussive syndrome    Elevated low density lipoprotein (LDL) cholesterol level    S/P appendectomy    Essential hypertension    Subclinical hypothyroidism    Intraparenchymal hematoma of right side of brain due to trauma [facility])    Closed fracture of right side of base of skull (HCC)    Closed head injury    Scalp laceration, initial encounter    Right and left hemorrhagic contusion of cerebrum (HCC)    Scalp laceration    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 (HCC)       Current Outpatient Medications   Medication Sig Dispense Refill    levETIRAcetam (KEPPRA) 500 MG tablet Take 1 tablet by mouth 2 times daily 60 tablet 3    lisinopril (PRINIVIL;ZESTRIL) 5 MG tablet Take 1 tablet by mouth daily 30 tablet 3    melatonin 3 MG TABS tablet Take 2 tablets by mouth nightly as needed (insomnia) 60 tablet 3    acetaminophen (TYLENOL) 500 MG tablet Take 500 mg by mouth every 6 hours as needed for Pain Extra strength 2 tabs (Patient not taking: Reported on 8/27/2021)      traMADol (ULTRAM) 50 MG tablet Take 50 mg by mouth every 6 hours as needed for Pain.  (Patient not taking: Reported on 8/27/2021)      diclofenac sodium (VOLTAREN) 1 % GEL Apply 2 g topically 4 times daily as needed for Pain (Patient not taking: Reported on 8/27/2021) 100 g 1    docusate sodium (COLACE, DULCOLAX) 100 MG CAPS Take 100 mg by mouth 2 times daily (Patient not taking: Reported on 8/27/2021) 60 capsule 1    Nutritional Supplements  Shingles Vaccine  Completed    Pneumococcal 65+ years Vaccine  Completed    COVID-19 Vaccine  Completed    Hepatitis C screen  Completed    Hepatitis A vaccine  Aged Out    Hepatitis B vaccine  Aged Out    Hib vaccine  Aged Out    Meningococcal (ACWY) vaccine  Aged Out          AAA ultrasound (Male, 65-75, smoked ever) indicated at this time? No tobacco history  CT Lung Screen (50-80, 20 pk-yrs, smoking or quit <15 years) indicated at this time? No tobacco history        Future Appointments   Date Time Provider Joya Cronin   9/28/2021  2:20 PM STR CT IMAGING RM1  [facility] OUT EXP STR Radiolog   10/4/2021 11:00 AM Sherri Epley, MD [facility] [place]   10/8/2021 10:30 AM Antoinette Herrera PA-C N [facility] [place]   12/7/2021  1:30 PM Myke Colin MD N [facility] Pain [facility] [place]   1/4/2022 10:40 AM Selby Lesches, MD [facility] [place]       ASSESSMENT       Diagnosis Orders   1. Routine general medical examination at a health care facility         PLAN      After discussion with Dr. Ronny Walker, we agreed on plan as follows:    Medicare annual wellness visit completed today  Patient declines statins at this time  Anticipate CBC and free T4/TSH in future per Dr. Tejal Kumari, we will order at subsequent visit. Follow-up in 3 months for reevaluation or sooner if any further problems. Preventive Health Topics:  Encouraged COVID VACCINE booster when eligible  Encouraged annual FLU VACCINE after October 1st.  COLONOSCOPY done 2020 per Dr. Tamara Lozano- to follow up again in 2025            Attending Physician Statement  I have discussed the case, including pertinent history and exam findings with the resident. I agree with the documented assessment and plan as documented by the resident.   GE modifier added  to this encounter     Electronically signed by Arun Hunt MD on 9/21/2021 at 7:51 PM

## 2021-09-21 NOTE — PROGRESS NOTES
Medicare Annual Wellness Visit  Name: March [facility] Date: 2021   MRN: [id number] Sex: Male   Age: 73 y.o. Ethnicity: Non- / Non    : 1950 Race: White (non-)      Fernanda Moran is here for Medicare AWV (Wellness Physical)    Screenings for behavioral, psychosocial and functional/safety risks, and cognitive dysfunction are all negative except as indicated below. These results, as well as other patient data from the [address] [facility] [place], operated by [facility] form, are documented in Flowsheets linked to this Encounter. Allergies   Allergen Reactions    Codeine Other (See Comments)     migraine    Codone [Hydrocodone] Other (See Comments)     headache    Hydrocodone Other (See Comments)     migraine    Oxycodone      Wife states gives pt a headache         Prior to Visit Medications    Medication Sig Taking? Authorizing Provider   levETIRAcetam (KEPPRA) 500 MG tablet Take 1 tablet by mouth 2 times daily Yes Tyler Wagoner MD   lisinopril (PRINIVIL;ZESTRIL) 5 MG tablet Take 1 tablet by mouth daily Yes Tyler Wagoner MD   melatonin 3 MG TABS tablet Take 2 tablets by mouth nightly as needed (insomnia) Yes Tyler Wagoner MD   acetaminophen (TYLENOL) 500 MG tablet Take 500 mg by mouth every 6 hours as needed for Pain Extra strength 2 tabs  Patient not taking: Reported on 2021  Historical Provider, MD   traMADol (ULTRAM) 50 MG tablet Take 50 mg by mouth every 6 hours as needed for Pain.   Patient not taking: Reported on 2021  Historical Provider, MD   diclofenac sodium (VOLTAREN) 1 % GEL Apply 2 g topically 4 times daily as needed for Pain  Patient not taking: Reported on 2021  Tyler Wagoner MD   docusate sodium (COLACE, DULCOLAX) 100 MG CAPS Take 100 mg by mouth 2 times daily  Patient not taking: Reported on 2021  Tyler Wagoner MD   Nutritional Supplements (DHEA PO) Take by mouth daily  Patient not taking: Reported on 2021  Historical Provider, MD [id number] PO Place 1 tablet under the tongue daily CHANDLER  Patient not taking: Reported on 8/27/2021  Historical Provider, MD   vitamin D (CHOLECALCIFEROL) 25 MCG (1000 UT) TABS tablet Take 5,000 Units by mouth daily   Patient not taking: Reported on 8/27/2021  Historical Provider, MD   B Complex-Biotin-FA (B-100 COMPLEX CR PO) Take by mouth 3 times daily  Patient not taking: Reported on 8/27/2021  Historical Provider, MD   magnesium citrate solution Take 250 mLs by mouth 4 times daily (before meals and nightly) Taking 2 soft gels TID   Patient not taking: Reported on 8/27/2021  Historical Provider, MD   Omega-3 Fatty Acids (OMEGA-3 FISH OIL CONCENTRATE PO) Take 2 capsules by mouth 4 times daily  Patient not taking: Reported on 8/27/2021  Historical Provider, MD   CINNAMON PO Take 3 capsules by mouth 4 times daily (before meals and nightly) Cinnamon Bark  Patient not taking: Reported on 8/27/2021  Historical Provider, MD   vitamin C (ASCORBIC ACID) 500 MG tablet Take 1,000 mg by mouth daily  Patient not taking: Reported on 8/27/2021  Historical Provider, MD   NONFORMULARY Curamin for pain Taking PRN  Patient not taking: Reported on 8/27/2021  Historical Provider, MD         Past Medical History:   Diagnosis Date    Fractured skull ([facility] [address] 05/28/2021    Hyperlipidemia     Hypertension        Past Surgical History:   Procedure Laterality Date    APPENDECTOMY      APPENDECTOMY  2017    HERNIA REPAIR           Family History   Problem Relation Age of Onset    Glaucoma Mother     Stroke Mother     Heart Disease Mother     Glaucoma Father     Heart Disease Father     High Blood Pressure Father     High Cholesterol Father     Cancer Father     Prostate Cancer Father     Stroke Father     Other Father         [employer]       CareTeam (Including outside providers/suppliers regularly involved in providing care):   Patient Care Team:  Sherrill Weber MD as PCP - General (Family Medicine)  Bruna Cruz MD (Family Medicine)  Andres Martines MD as Surgeon (Neurosurgery)    Wt Readings from Last 3 Encounters:   09/21/21 181 lb 3.2 oz (82.2 kg)   08/27/21 177 lb 0.5 oz (80.3 kg)   08/03/21 177 lb (80.3 kg)     Vitals:    09/21/21 1309   BP: 122/70   Site: Right Upper Arm   Position: Sitting   Cuff Size: Medium Adult   Pulse: 65   Resp: 16   Temp: 97.3 °F (36.3 °C)   TempSrc: Temporal   SpO2: 98%   Weight: 181 lb 3.2 oz (82.2 kg)   Height: 5' 7\" (1.702 m)     Body mass index is 28.38 kg/m². Based upon direct observation of the patient, evaluation of cognition reveals recent and remote memory intact. Having a little difficulty with recently memory since the concussion, overall doing ok. Patient's complete Health Risk Assessment and screening values have been reviewed and are found in Flowsheets. The following problems were reviewed today and where indicated follow up appointments were made and/or referrals ordered. Positive Risk Factor Screenings with Interventions:            General Health and ACP:  General  In general, how would you say your health is?: Very Good  In the past 7 days, have you experienced any of the following?  New or Increased Pain, New or Increased Fatigue, Loneliness, Social Isolation, Stress or Anger?: None of These  Do you get the social and emotional support that you need?: (!) No  Do you have a Living Will?: Yes  Advance Directives     Power of  Living Will ACP-Advance Directive ACP-Power of     Not on File Filed on 10/20/20 Filed Not on File      General Health Risk Interventions:  · Inadequate social/emotional support: states he does get the support he needs     Health Habits/Nutrition:  Health Habits/Nutrition  Do you exercise for at least 20 minutes 2-3 times per week?: Yes  Have you lost any weight without trying in the past 3 months?: (!) Yes  Do you eat only one meal per day?: No  Have you seen the dentist within the past year?: Yes  Body mass index: (!) 28.38  Health Habits/Nutrition Interventions:  · lost weight in the hospital, has since gained it back     Hearing/Vision:  No exam data present  Hearing/Vision  Do you or your family notice any trouble with your hearing that hasn't been managed with hearing aids?: (!) Yes  Do you have difficulty driving, watching TV, or doing any of your daily activities because of your eyesight?: No  Have you had an eye exam within the past year?: Yes  Hearing/Vision Interventions:  · Hearing concerns:  already see audiology, has hearing aids, thinks he needs adjustment     Safety:  Safety  Do you have working smoke detectors?: Yes  Have all throw rugs been removed or fastened?: (!) No  Do you have non-slip mats or surfaces in all bathtubs/showers?: Yes  Do all of your stairways have a railing or banister?: Yes  Are your doorways, halls and stairs free of clutter?: Yes  Do you always fasten your seatbelt when you are in a car?: Yes  Safety Interventions:  · Patient declines any further evaluation/treatment for this issue     Personalized Preventive Plan   Current Health Maintenance Status  Immunization History   Administered Date(s) Administered    COVID-19, Moderna, PF, 100mcg/0.5mL 02/23/2021, 03/23/2021    Influenza, Quadv, adjuvanted, 65 yrs +, IM, PF (Fluad) 10/08/2020    Pneumococcal Polysaccharide ([id number]) 10/08/2020    Tdap (Boostrix, Adacel) 10/22/2020    Zoster Recombinant (Shingrix) 10/22/2020, 01/14/2021        Health Maintenance   Topic Date Due    Flu vaccine (1) 09/01/2021    Annual Wellness Visit (AWV)  09/26/2021    TSH testing  04/06/2022    Potassium monitoring  06/03/2022    Creatinine monitoring  06/03/2022    Diabetes screen  01/28/2024    Lipid screen  06/03/2026    Colon cancer screen colonoscopy  07/08/2030    DTaP/Tdap/Td vaccine (2 - Td or Tdap) 10/22/2030    Shingles Vaccine  Completed    Pneumococcal 65+ years Vaccine  Completed    COVID-19 Vaccine Completed    Hepatitis C screen  Completed    Hepatitis A vaccine  Aged Out    Hepatitis B vaccine  Aged Out    Hib vaccine  Aged Out    Meningococcal (ACWY) vaccine  Aged Out     Recommendations for [employer] Due: see orders and patient instructions/AVS.  . Recommended screening schedule for the next 5-10 years is provided to the patient in written form: see Patient Instructions/AVS.    Matthias Thornton was seen today for medicare awv.     Diagnoses and all orders for this visit:    Routine general medical examination at a health care facility             Still having some memory issues since the concussion  Intermittent trouble with loud noises and bright lights  Overall doing ok and feeling like he is improving   Going to talk to Dr. Oskar Oconnor about his supplements   Anticipate Dr. Tellez Forward will order labs to cover his thyroid and CBC if not we will order in the future  Encourage flu shot when available  Follow-up in 3 months, earlier as needed    Electronically signed by Liv Carrillo MD on 9/21/2021 at 3:01 PM

## 2021-09-21 NOTE — PATIENT INSTRUCTIONS
Thank you   1. Thank you for trusting us with your healthcare needs. You may receive a survey regarding today's visit. It would help us out if you would take a few moments to provide your feedback. We value your input. 2. Please bring in ALL medications BOTTLES, including inhalers, herbal supplements, over the counter, prescribed & non-prescribed medicine. The office would like actual medication bottles and a list.   3. Please note our OFFICE POLICIES:   a. Prior to getting your labs drawn, please check with your insurance company for benefits and eligibility of lab services. Often, insurance companies cover certain tests for preventative visits only. It is patient's responsibility to see what is covered. b. We are unable to change a diagnosis after the test has been performed. c. Lab orders will not be re-printed. Please hold onto your original lab orders and take them to your lab to be completed. d. If you no show your scheduled appointment three times, you will be dismissed from this practice. e. Otila Montague must be completed 24 hours prior to your schedule appointment. 4. If the list below has been completed, PLEASE FAX RECORDS TO OUR OFFICE @ [phone]. Once the records have been received we will update your records at our office:  Health Maintenance Due   Topic Date Due    Flu vaccine (1) 09/01/2021    Annual Wellness Visit (AWV)  09/26/2021             Personalized Preventive Plan for Nargis Embs - 9/21/2021  Medicare offers a range of preventive health benefits. Some of the tests and screenings are paid in full while other may be subject to a deductible, co-insurance, and/or copay. Some of these benefits include a comprehensive review of your medical history including lifestyle, illnesses that may run in your family, and various assessments and screenings as appropriate.     After reviewing your medical record and screening and assessments performed today your provider may have ordered immunizations, labs, imaging, and/or referrals for you. A list of these orders (if applicable) as well as your Preventive Care list are included within your After Visit Summary for your review. Other Preventive Recommendations:    · A preventive eye exam performed by an eye specialist is recommended every 1-2 years to screen for glaucoma; cataracts, macular degeneration, and other eye disorders. · A preventive dental visit is recommended every 6 months. · Try to get at least 150 minutes of exercise per week or 10,000 steps per day on a pedometer . · Order or download the FREE \"Exercise & Physical Activity: Your Everyday Guide\" from [employer]. Call 5-110.996.8731 or search [employer] online. · You need 0083-7738 mg of calcium and 1445-0915 IU of vitamin D per day. It is possible to meet your calcium requirement with diet alone, but a vitamin D supplement is usually necessary to meet this goal.  · When exposed to the sun, use a sunscreen that protects against both UVA and UVB radiation with an SPF of 30 or greater. Reapply every 2 to 3 hours or after sweating, drying off with a towel, or swimming. · Always wear a seat belt when traveling in a car. Always wear a helmet when riding a bicycle or motorcycle.

## 2021-09-22 LAB
C-REACTIVE PROTEIN, HIGH SENSITIVITY: 1.1 MG/L
HOMOCYSTEINE: 10.8 UMOL/L
THYROXINE (T4): 5.4 UG/DL (ref 4.5–10.9)

## 2021-09-23 LAB — DHEAS (DHEA SULFATE): 21.7 UG/DL (ref 42–290)

## 2021-09-24 LAB — TESTOSTERONE FREE: NORMAL

## 2021-09-25 LAB — DHEA UNCONJUGATED: 0.69 NG/ML (ref 0.63–4.7)

## 2021-09-28 ENCOUNTER — HOSPITAL ENCOUNTER (OUTPATIENT)
Dept: CT IMAGING | Age: 71
Discharge: HOME | End: 2021-09-28
Payer: MEDICARE

## 2021-09-28 DIAGNOSIS — S06.319D: ICD-10-CM

## 2021-09-28 PROCEDURE — 70450 CT HEAD/BRAIN W/O DYE: CPT

## 2021-09-30 ENCOUNTER — OFFICE VISIT (OUTPATIENT)
Dept: FAMILY MEDICINE CLINIC | Age: 71
End: 2021-09-30
Payer: MEDICARE

## 2021-09-30 VITALS
HEIGHT: 67 IN | RESPIRATION RATE: 10 BRPM | HEART RATE: 53 BPM | TEMPERATURE: 97 F | BODY MASS INDEX: 28.56 KG/M2 | SYSTOLIC BLOOD PRESSURE: 115 MMHG | DIASTOLIC BLOOD PRESSURE: 70 MMHG | OXYGEN SATURATION: 99 % | WEIGHT: 182 LBS

## 2021-09-30 DIAGNOSIS — F07.81 POST CONCUSSIVE SYNDROME: ICD-10-CM

## 2021-09-30 DIAGNOSIS — E03.8 SUBCLINICAL HYPOTHYROIDISM: ICD-10-CM

## 2021-09-30 DIAGNOSIS — R79.89 ELEVATED TSH: ICD-10-CM

## 2021-09-30 DIAGNOSIS — R35.1 NOCTURIA: ICD-10-CM

## 2021-09-30 DIAGNOSIS — E78.5 HYPERLIPIDEMIA, UNSPECIFIED HYPERLIPIDEMIA TYPE: ICD-10-CM

## 2021-09-30 DIAGNOSIS — R79.89 TSH ELEVATION: ICD-10-CM

## 2021-09-30 DIAGNOSIS — I10 ESSENTIAL HYPERTENSION: Primary | ICD-10-CM

## 2021-09-30 DIAGNOSIS — K90.89 OTHER INTESTINAL MALABSORPTION: ICD-10-CM

## 2021-09-30 DIAGNOSIS — R41.3 MEMORY DIFFICULTIES: ICD-10-CM

## 2021-09-30 DIAGNOSIS — E78.00 ELEVATED CHOLESTEROL: ICD-10-CM

## 2021-09-30 PROCEDURE — 4040F PNEUMOC VAC/ADMIN/RCVD: CPT | Performed by: FAMILY MEDICINE

## 2021-09-30 PROCEDURE — 1123F ACP DISCUSS/DSCN MKR DOCD: CPT | Performed by: FAMILY MEDICINE

## 2021-09-30 PROCEDURE — 99214 OFFICE O/P EST MOD 30 MIN: CPT | Performed by: FAMILY MEDICINE

## 2021-09-30 PROCEDURE — 3017F COLORECTAL CA SCREEN DOC REV: CPT | Performed by: FAMILY MEDICINE

## 2021-09-30 PROCEDURE — G8427 DOCREV CUR MEDS BY ELIG CLIN: HCPCS | Performed by: FAMILY MEDICINE

## 2021-09-30 PROCEDURE — 1036F TOBACCO NON-USER: CPT | Performed by: FAMILY MEDICINE

## 2021-09-30 PROCEDURE — G8417 CALC BMI ABV UP PARAM F/U: HCPCS | Performed by: FAMILY MEDICINE

## 2021-09-30 NOTE — PROGRESS NOTES
[address] [facility] [address] [address]  Dept: [phone]  Dept Fax: [phone]  Loc: Álvaro Perez is a 73 y.o. White male. Adams Johnson  presents to the [place] [address] clinic today for   Chief Complaint   Patient presents with    6 Month Follow-Up    Transient Ischemic Attack     - stopped supplements   , and;   No diagnosis found. I have reviewed Heather Trevor medical, surgical and other pertinent history in detail, and have updated medication and allergy information in the computerized patient record. Clinical Care Team:     -Referring Provider for today's consult: self  -Primary Care Provider: Rosa Brown MD    Medical/Surgical History:   He  has a past medical history of Fractured skull ([address] Hyperlipidemia, and Hypertension. His  has a past surgical history that includes hernia repair; Appendectomy; and Appendectomy (2017). Family/Social History:     His family history includes Cancer in his father; Glaucoma in his father and mother; Heart Disease in his father and mother; High Blood Pressure in his father; High Cholesterol in his father; Other in his father; Prostate Cancer in his father; Stroke in his father and mother. He  reports that he has never smoked. He has never used smokeless tobacco. He reports that he does not drink alcohol and does not use drugs. Medications/Allergies/Immunizations:     His current medication(s) include   Current Outpatient Medications:     acetaminophen (TYLENOL) 500 MG tablet, Take 500 mg by mouth every 6 hours as needed for Pain Extra strength 2 tabs (Patient not taking: Reported on 8/27/2021), Disp: , Rfl:     traMADol (ULTRAM) 50 MG tablet, Take 50 mg by mouth every 6 hours as needed for Pain.  (Patient not taking: Reported on 8/27/2021), Disp: , Rfl:     levETIRAcetam (KEPPRA) 500 MG tablet, Take 1 tablet by mouth 2 times daily, Disp: 60 tablet, Rfl: 3    lisinopril (PRINIVIL;ZESTRIL) 5 MG tablet, Take 1 tablet by mouth daily, Disp: 30 tablet, Rfl: 3    diclofenac sodium (VOLTAREN) 1 % GEL, Apply 2 g topically 4 times daily as needed for Pain (Patient not taking: Reported on 8/27/2021), Disp: 100 g, Rfl: 1    docusate sodium (COLACE, DULCOLAX) 100 MG CAPS, Take 100 mg by mouth 2 times daily (Patient not taking: Reported on 8/27/2021), Disp: 60 capsule, Rfl: 1    melatonin 3 MG TABS tablet, Take 2 tablets by mouth nightly as needed (insomnia), Disp: 60 tablet, Rfl: 3    Nutritional Supplements (DHEA PO), Take by mouth daily (Patient not taking: Reported on 8/27/2021), Disp: , Rfl:     OTMDSGNBN-ZBVMJLOXB-LJSGFCJBKN PO, Place 1 tablet under the tongue daily CHANDLER (Patient not taking: Reported on 8/27/2021), Disp: , Rfl:     vitamin D (CHOLECALCIFEROL) 25 MCG (1000 UT) TABS tablet, Take 5,000 Units by mouth daily  (Patient not taking: Reported on 8/27/2021), Disp: , Rfl:     B Complex-Biotin-FA (B-100 COMPLEX CR PO), Take by mouth 3 times daily (Patient not taking: Reported on 8/27/2021), Disp: , Rfl:     magnesium citrate solution, Take 250 mLs by mouth 4 times daily (before meals and nightly) Taking 2 soft gels TID  (Patient not taking: Reported on 8/27/2021), Disp: , Rfl:     Omega-3 Fatty Acids (OMEGA-3 FISH OIL CONCENTRATE PO), Take 2 capsules by mouth 4 times daily (Patient not taking: Reported on 8/27/2021), Disp: , Rfl:     CINNAMON PO, Take 3 capsules by mouth 4 times daily (before meals and nightly) Cinnamon Bark (Patient not taking: Reported on 8/27/2021), Disp: , Rfl:     vitamin C (ASCORBIC ACID) 500 MG tablet, Take 1,000 mg by mouth daily (Patient not taking: Reported on 8/27/2021), Disp: , Rfl:     NONFORMULARY, Curamin for pain Taking PRN (Patient not taking: Reported on 8/27/2021), Disp: , Rfl:   Allergies: Codeine, Codone [hydrocodone], Hydrocodone, and Oxycodone  Immunizations:   Immunization History Administered Date(s) Administered    COVID-19, Moderna, PF, 100mcg/0.5mL 02/23/2021, 03/23/2021    Influenza, Quadv, adjuvanted, 65 yrs +, IM, PF (Fluad) 10/08/2020    Pneumococcal Polysaccharide ([id number]) 10/08/2020    Tdap (Boostrix, Adacel) 10/22/2020    Zoster Recombinant (Shingrix) 10/22/2020, 01/14/2021        History of Present Illness:     Glenn's had concerns including 6 Month Follow-Up and Transient Ischemic Attack (- stopped supplements). Gamal Rosario  presents to the [address] [place] [address] today for;   Chief Complaint   Patient presents with    6 Month Follow-Up    Transient Ischemic Attack     - stopped supplements   , abnormal labs follow up and these conditions as he  Is looking today for:     No diagnosis found. HPI    Subjective:     Review of Systems   All other systems reviewed and are negative. Objective: There were no vitals taken for this visit. Physical Exam  Vitals and nursing note reviewed. Constitutional:       Appearance: Normal appearance. HENT:      Head: Normocephalic. Pulmonary:      Effort: Pulmonary effort is normal.   Neurological:      Mental Status: He is alert. Psychiatric:         Mood and Affect: Mood normal.         Thought Content: Thought content normal.            Laboratory Data:   Lab results were searched in Care Everywhere and/or those brought by the pateint were reviewed today with Camila Fuentes and he has a copy of their most recent labs to take home with them as noted below;       Imaging Data:   Imaging Data:       Assessment & Plan:       Impression:  No diagnosis found.   Assessment and Plan:  After reviewing the patients chief complaints, reviewing their labfindings in great detail (with the patient and those accompanying them) which correlate to their chief complaints, symptoms, and or medical conditions; suggestions were made relating to changes in diet and or supplements which may improve the complaints and which will be files at [facility], [address] [facility]     Cinnamon bark, an insulin mimetic, reduces some High Carbohydrate Dietary Impacts. Methylhydroxychalcone polymers insulin-enhancing properties in fat cells are responsible for enhanced glucose uptake, inhibiting hepatic HMG-CoA reductase and lowers lipids. www.jacn. org/content/20/4/327.full     But cinnamon with additives such as Cinnamon Extract are not effective as insulin mimetics.  :eStoreDirectory.at     Nutrients for Start up from [employer] or Spot Runner for ease to get started now;  Emerald Rodarte has some useable products;  - Triple Strength Fish Oil, enteric coated  - Vit D-3 5000 IU gel caps  - Iron ferrous sulfate 325 mg tabs  - Centrum Silver look-a-like for most patients, or  - Centrum plain look-a-like if need iron    Local pharmacies or chains such as [employer], [employer], have:  - Voxeet pharmaceutical grade omega 3 is 90% EPA/DHA whereas most Triple strength fish oil are 75% EPA/DHA  - Triple Strength Fish Oil (enteric coated if available) or if not enteric coated, can take from freezer for less burps  - B-50 or B-100 released balanced B complex tabs by [address] [facility] bark 500 mg (without Chromium or extracts)   some brands list 1000 mg / serving of 2 capsules,    some brands have 1000 mg caps with the undesireable chromium extract  - Calcium carbonate/citrate, magnesium oxide/citrate, Vit D-3 as 3-4 tabs/caps/serving     Some Local Brands may contain Zinc which is acceptable for the first bottle or two  - Magnesium oxide 250 mg tabs for those having < 2 bowel movements daily  - Magnesium citrate 200 mg if having > 2 bowel movements/day  - Centrum Silver or look-a-like for most patients, Centrum plain or look-a-like with iron  - Vitamin D-3 comes as 1,000 IU or 2,000 IU or 5,000 IU gel caps or Liquid drops but keep Vitamin D levels <50 but >40     Some brands containing or derived from soy oil or corn oil are OK if not allergic to soy  - Elemental Iron 65 mg tabs at bedtime is available over the counter if need more iron     Usually turns bowel movements grey, green, or black but not a concern  - Apricot Kernel Oil (by Now) for dry skin sensitive perineal or perianal area skin    Nutrients for ongoing use by Mail order for less expense from wwwMagnet Systems ;  - Strength Fish Oil , 240 Softgels Item #[id number]  -B-100 time released balanced B complex Item #[id number]  - Cinnamon bark 500 mg without Chromium or extract Item #[id number]  - Calcium carbonate 1000 mg, Magnesium oxide 500 mg, Vit D-3 400 IU Item #093239  - Magnesium oxide 500 mg tabs Item #661720 if less than 2 bowel movements daily  - [employer] Sen[employer] Item #389262 for most patients, One Daily Item #092637 with iron  - Vit D 3  1,000 Item #560306      2,000 IU Item #238514   Item #108742     Some brands containing orderived from soy oil or corn oil are OK if not allergic to soy    Nutrients for Special Needs by Mail order for less expense from www. puritan.com;  -Elemental Iron 65 mg tabs Item #308202 if need more iron for low iron on labs    Usually turns bowel movements grey, green or black but not a concern  - Time released Niacin 250 mg Item #866186 for cold intolerance, low libido or impotence  - DHEA 50 mg Item #239649 for improving DHEA levels on labs if having Fatigue    If stools too loose substitute for your Magnesium oxide using;   Magnesium citrate 200 mg tabs (NOT liquid) at [employer]   Magnesium gluconate 550 mg by [facility] [place] at [employer] or Kähu. com  Magnesium chloride foot soaks or body sprays  www.Edenbee.com. [employer]   Magnesium chloride flakes 14.99 Item #: [id number] if back-ordered, get spray  Magnesium threonate, Magtein also helps mental clarity and sleep    Food Drink Symptom Log;  I asked this patient to track these items and any other symptoms on their list on a weekly basis to documenttheir progress or lack of same. This can be done on the symptom tracking sheet I gave them at today's visit but looks like this:                                                      Rate on scale of 0-10 with zero = not noticeable  Symptom:                            Week 1               2                 3                 4               Etc            Hair loss    Foot cramps    Paresthesia    Aches    IBS (irritable bowel)    Constipation    Diarrhea  Nocturia (up to bathroom at night)    Fatigue/Energy level  Stress      On the other side of the sheet they can track their food, drink, environment, activity, symptoms etc      Avoiding Latex-like proteins in my foods; Avocados, Bananas, Celery, Figs & Kiwi proteins have latex-like proteins to inflame our immune systems, plus 47 more foods  How Can I Have A Latex Allergy? Eating foods with latex-like protein exposes us to latex allergies. Our body cannot tell the differencebetween these latex-like proteins and latex from rubber products since many people are allergic to fruit, vegetables and latex. Read labels on pre-packaged foods. This list to avoid is only a guide if you are known allergicto latex or have a latex rash on your chin, cheeks and lines on your neck and chest. The amount of latex is different in each food product or fruit variety. Avoid out of Season if not grown locally:   Melon, Nectarine, Papaya, Cherry, Passion fruit, Plum, Chestnuts, and Tomato. Avocado, Banana, Celery, Figs, and Kiwi always contain Latex-like protein. Whats in Season? Strawberries taste better in June than December because June is strawberry season so buy locally grown produce \"in season\" for the best flavor, cost, and less Latex. Locally grown produce not only tastes great but also requires little or no ethylene exposure in food distribution so has less latex content.   Out of season: use canned, frozen, or dried since those are processed ripe and latex content is lower!!! Month     [place] Locally Grown Produce  January, February, March: use canned, frozen or dried fruits since lower in latex  April: asparagus, radishes  May: asparagus, broccoli, green onions, greens, peas, radishes, rhubarb  June: asparagus, beets, beans, broccoli, cabbage, cantaloupe, carrots, green onions, greens, lettuce, onions, parsley, peas, radishes, rhubarb, strawberries, watermelons  July: beans, beets, blueberries, broccoli, cabbage, cantaloupe, carrots, cauliflower, celery, cucumbers, eggplant, grapes, green onions, greens, lettuce, onions, parsley, peas, peaches, bell peppers, potatoes, radishes, summer raspberries, squash, sweetcorn, tomatoes, turnips, watermelons  August: apples, beans, beets, blueberries, cabbage, cantaloupe, carrots, cauliflower, celery, cucumbers, eggplant, grapes, green onions, greens, lettuce, onions, parsley, peas, peaches, pears, bell peppers, potatoes, radishes, squash, sweet corn, tomatoes, turnips, watermelons  September: apples, beans, beets, blueberries, cabbage, cantaloupe, carrots, cauliflower, celery, cucumbers, eggplant, grapes, green onions, greens, lettuce, onions, parsley, peas, peaches, pears, bell peppers, plums, potatoes, pumpkins, radishes, fall red raspberries, squash, sweet corn, tomatoes, turnips, watermelons  October: apples, beets, broccoli, cabbage, carrots, cauliflower, celery, green onions, greens, lettuce, parsley, peas, pears, potatoes, pumpkins, radishes, fall red raspberries, squash, turnips  November: broccoli, cabbage, carrots, parsley, pears, peas  December: use canned, frozen or dried fruits since lower in latex    Upto half of latex-sensitive patients show allergic reactions to fruits (avocados, bananas, kiwifruits, papayas, peaches),   Annals of Allergy, 1994. These plants contain the same proteins that are allergens in latex.  People with fruit allergies should warn physicians before undergoing procedures which may cause anaphylactic reaction if in contact with latex gloves. Some of the common foods with defined cross-reactivity to latex are avocado, banana, kiwi, chestnut, raw potato, tomato, stone fruits (e.g., peach, cherry), hazelnut, melons, celery, carrot, apple, pear, papaya, and almond. Foods with less well-defined cross-reactivity to latex are peanuts, peppers, citrus fruits, coconut, pineapple, lizbet, fig, passion fruit, Ugli fruit, and grape. This fruit/latex cross-reactivity is worsened by ethylene, a gas used to hasten commercial ripening. In nature, plants produce low levels of the hormone ethylene, which regulates germination, flowering, and ripening. Forced ripening by high ethylene concentrations, plants produce allergenic wound-repair proteins, which are similar to wound-repair proteins made during the tapping of rubber trees. Sensitive individuals who ingest the fruit get a higher dose and worse reaction. Some people may even first become sensitized to latex through fruit. Can food processing increase the concentrations of allergenic proteins? Latex-sensitized children (and adults) in [place] often experience allergic reactions after eating bananas ripened artificially with ethylene. In the [place], food distribution centers treat unripe bananas and other produce with ethylene to ripen; not commonly done in [facility] since fruit is tree-ripened there. Does treatment of food with ethylene induce banana proteins that cross-react with latex? (Koki et al.)    References:   Latex in Foods Allergy, http://ehp.niehs.nih.gov/members/2003/5811/5811.html    Search web for [employer] in Season \" for where you live or are at the time you food shop   Management of Latex, ://medicalcenter. o[employer].edu/  search for nih, latex-like proteins in foods

## 2021-10-08 ENCOUNTER — OFFICE VISIT (OUTPATIENT)
Dept: NEUROSURGERY | Age: 71
End: 2021-10-08
Payer: MEDICARE

## 2021-10-08 VITALS
DIASTOLIC BLOOD PRESSURE: 75 MMHG | HEIGHT: 66 IN | WEIGHT: 181.2 LBS | BODY MASS INDEX: 29.12 KG/M2 | SYSTOLIC BLOOD PRESSURE: 114 MMHG | HEART RATE: 62 BPM

## 2021-10-08 DIAGNOSIS — S06.319D: ICD-10-CM

## 2021-10-08 DIAGNOSIS — S06.9X1D TRAUMATIC BRAIN INJURY WITH LOSS OF CONSCIOUSNESS OF 30 MINUTES OR LESS, SUBSEQUENT ENCOUNTER: Primary | ICD-10-CM

## 2021-10-08 PROCEDURE — 1036F TOBACCO NON-USER: CPT | Performed by: PHYSICIAN ASSISTANT

## 2021-10-08 PROCEDURE — 1123F ACP DISCUSS/DSCN MKR DOCD: CPT | Performed by: PHYSICIAN ASSISTANT

## 2021-10-08 PROCEDURE — 99213 OFFICE O/P EST LOW 20 MIN: CPT | Performed by: PHYSICIAN ASSISTANT

## 2021-10-08 PROCEDURE — G8484 FLU IMMUNIZE NO ADMIN: HCPCS | Performed by: PHYSICIAN ASSISTANT

## 2021-10-08 PROCEDURE — 4040F PNEUMOC VAC/ADMIN/RCVD: CPT | Performed by: PHYSICIAN ASSISTANT

## 2021-10-08 PROCEDURE — 3017F COLORECTAL CA SCREEN DOC REV: CPT | Performed by: PHYSICIAN ASSISTANT

## 2021-10-08 PROCEDURE — G8427 DOCREV CUR MEDS BY ELIG CLIN: HCPCS | Performed by: PHYSICIAN ASSISTANT

## 2021-10-08 PROCEDURE — G8417 CALC BMI ABV UP PARAM F/U: HCPCS | Performed by: PHYSICIAN ASSISTANT

## 2021-10-08 NOTE — PROGRESS NOTES
[facility] [address] [address] [address] [facility] [address]  Dept: [phone]  Dept Fax: [phone]  Loc: Tamera Christopher 1160 Follow Visit  Visit Date: 10/8/2021      Kalyn Byrd  is a 73 y.o. male who is returning to the office today for a follow-up visit for continuing evaluation of traumatic brain injury and associated hematoma. He was last seen and evaluated in our office setting on 8/27 2021 with imaging revealing right frontal subdural hematoma decreased in size compared to the June image. Collection was estimated at 6 mm compared to 10 mm. At that visit he was accompanied by his wife and was without significant complaints with lessening fatigue. Today's appointment is to include a new CT scan for review. Arrives today having undergone a CT scan of the head without contrast on 928 2021 which reveals a very small residual right-sided subdural hematoma overlying the right frontal convexity now measuring 3 mm in thickness. Significantly decreased in size from prior imaging. Arrives today accompanied by his wife and is very comfortable without any complaints. Begun to resume activities of normal daily living but is encouraged to avoid heavy lifting or strenuous exercise until a clear scan is obtained. Based on the stable nature of today's exam and the improvements on the latest imaging we've agreed to reimage him with a new CT scan in 6 weeks with a follow-up appointment to review that imaging and encouragement for them to reach out to our office with any additional questions or concerns.     · Patient was evaluated today and is doing very well overall. · No new complaints were voiced. · Patient  lives with their spouse  · Wound: wound healing as expected  · Follow-up Studies: No orders of the defined types were placed in this encounter. ·      Assessment/Plan:  · Status Post dural hematoma.   · Doing very well overall  · Encouraged gradual increase in physical and mental activity. · Fall precaution and home safety education provided to patient. · Follow-up: Based on improvements on most recent imaging with a reduction in the size of the subdural hematoma to 3 mm we have agreed to follow-up with him in approximately 6 weeks with an additional CT scan of the head to be imaged without contrast to ascertain progress towards complete resolution. He is encouraged in the interim to contact her office with any additional questions or concerns or should experience any significant changes in his general health. They remain very happy with today's visit having question concerns addressed and answered.       Electronically signed by Carol Christiansen PA-C on 10/8/21 at 10:37 AM EDT

## 2021-10-12 RX ORDER — LEVETIRACETAM 500 MG/1
500 TABLET ORAL 2 TIMES DAILY
Qty: 120 TABLET | Refills: 1 | Status: SHIPPED | OUTPATIENT
Start: 2021-10-12 | End: 2022-02-23 | Stop reason: SDUPTHER

## 2021-10-12 RX ORDER — LISINOPRIL 5 MG/1
5 TABLET ORAL DAILY
Qty: 90 TABLET | Refills: 0 | Status: SHIPPED | OUTPATIENT
Start: 2021-10-12 | End: 2022-01-04 | Stop reason: SDUPTHER

## 2021-10-12 NOTE — TELEPHONE ENCOUNTER
Patient's last appointment was : 9/30/2021  Patient's next appointment is : 12/22/2021  Last refilled: 6/11/21

## 2021-11-11 ENCOUNTER — HOSPITAL ENCOUNTER (OUTPATIENT)
Dept: CT IMAGING | Age: 71
Discharge: HOME | End: 2021-11-11
Payer: MEDICARE

## 2021-11-11 DIAGNOSIS — S06.9X1D TRAUMATIC BRAIN INJURY WITH LOSS OF CONSCIOUSNESS OF 30 MINUTES OR LESS, SUBSEQUENT ENCOUNTER: ICD-10-CM

## 2021-11-11 DIAGNOSIS — S06.319D: ICD-10-CM

## 2021-11-11 PROCEDURE — 70450 CT HEAD/BRAIN W/O DYE: CPT

## 2021-11-19 ENCOUNTER — OFFICE VISIT (OUTPATIENT)
Dept: NEUROSURGERY | Age: 71
End: 2021-11-19
Payer: MEDICARE

## 2021-11-19 VITALS
SYSTOLIC BLOOD PRESSURE: 152 MMHG | HEIGHT: 66 IN | WEIGHT: 180 LBS | HEART RATE: 56 BPM | BODY MASS INDEX: 28.93 KG/M2 | DIASTOLIC BLOOD PRESSURE: 80 MMHG

## 2021-11-19 DIAGNOSIS — S06.319D: ICD-10-CM

## 2021-11-19 DIAGNOSIS — S06.9X1D TRAUMATIC BRAIN INJURY WITH LOSS OF CONSCIOUSNESS OF 30 MINUTES OR LESS, SUBSEQUENT ENCOUNTER: Primary | ICD-10-CM

## 2021-11-19 PROCEDURE — G8484 FLU IMMUNIZE NO ADMIN: HCPCS | Performed by: PHYSICIAN ASSISTANT

## 2021-11-19 PROCEDURE — 1123F ACP DISCUSS/DSCN MKR DOCD: CPT | Performed by: PHYSICIAN ASSISTANT

## 2021-11-19 PROCEDURE — 99213 OFFICE O/P EST LOW 20 MIN: CPT | Performed by: PHYSICIAN ASSISTANT

## 2021-11-19 PROCEDURE — 3017F COLORECTAL CA SCREEN DOC REV: CPT | Performed by: PHYSICIAN ASSISTANT

## 2021-11-19 PROCEDURE — G8417 CALC BMI ABV UP PARAM F/U: HCPCS | Performed by: PHYSICIAN ASSISTANT

## 2021-11-19 PROCEDURE — 1036F TOBACCO NON-USER: CPT | Performed by: PHYSICIAN ASSISTANT

## 2021-11-19 PROCEDURE — 4040F PNEUMOC VAC/ADMIN/RCVD: CPT | Performed by: PHYSICIAN ASSISTANT

## 2021-11-19 PROCEDURE — G8427 DOCREV CUR MEDS BY ELIG CLIN: HCPCS | Performed by: PHYSICIAN ASSISTANT

## 2021-11-19 NOTE — PROGRESS NOTES
[address] [place] [address]  Dept: [phone]  Dept Fax: [phone]  Loc: Tamera Christopher 1160 Follow Visit  Visit Date: 11/19/2021      [facility]  is a 73 y.o. male who is returning to the office today for a follow-up visit for continuing evaluation of traumatic brain injury and associated hematoma. He was last seen and evaluated in our office setting on 10/8/2021 with a CT scan of the head which revealed very small residual right-sided subdural hematoma overlying the right frontal convexity and measuring approximately 3 mm. He arrives today having undergone a new CT scan of the head without contrast on 11/11/2021 which reveals improvement in the hematoma, described as tiny at less than 2 mm in thickness. At today's visit he arrives, again, accompanied by his wife and ambulating without assistance. He relates having recently returned from a trip to [facility] [facility] where he did not experience any significant issues. He does relate the recent onset of dizziness described as a vertigo-like experience that he encounters when lying down and then when awakening and these episodes last approximately 20 to 30 seconds before resolving. Do not recur throughout the day as he enjoys activities of normal daily living but will return at the end of the day when lying down. Aside from this development, headaches are less severe and less frequent and he remains otherwise stable and neurologically intact for strength and sensation bilaterally and symmetrically. No visual deficits are noted no pronator drift on exam today and was absent nystagmus.   On the relatively stable intact nature of his exam and findings on CT scanning that show significant improvement in the subdural hematoma collection, we have agreed to follow-up with him in 4 weeks with a new CT scan of the head for comparison and review towards progress for complete resolution of the subdural hematoma. We will refer him to neurology for the dizziness with a follow-up appointment in 4 weeks to review findings and ascertain any improvements or changes from neurology evaluation.     · Patient was evaluated today and is doing well overall. · No new complaints were voiced. · Patient  lives with their spouse  · Wound: none  · Follow-up Studies: No orders of the defined types were placed in this encounter. ·      Assessment/Plan:  · Status Post subdural hematoma follow-up  · Doing marginally overall  · Encouraged gradual increase in physical and mental activity. · Fall precaution and home safety education provided to patient. · Follow-up: 4-week follow-up with new CT scan of the head for comparison and review. Referral to neurology for evaluation for dizziness. Patient and patient's family are encouraged to contact our office with any additional question concerns or should experience any significant changes in his general health. They remain very happy with today's visit having question concerns addressed and answered and look forward to their next follow-up appointment.       Electronically signed by Raj Seo PA-C on 11/19/21 at 9:22 AM EST

## 2021-11-29 ENCOUNTER — TELEPHONE (OUTPATIENT)
Dept: NEUROSURGERY | Age: 71
End: 2021-11-29

## 2021-11-29 DIAGNOSIS — R42 VERTIGO: Primary | ICD-10-CM

## 2021-11-29 NOTE — TELEPHONE ENCOUNTER
Called and spoke with Carmella Miller about the order for the Vestibular rehab. Order has been placed in [facility] waiting for PT to call and schedule to see Carmella Miller. Carmella Miller states and understands the conversations and treatment going to be provided, if this doesn't help what is going on then he knows to reach out to Dr. Bridget Sal for a higher level of care.

## 2021-12-01 ENCOUNTER — HOSPITAL ENCOUNTER (OUTPATIENT)
Dept: PHYSICAL THERAPY | Age: 71
Setting detail: THERAPIES SERIES
Discharge: HOME | End: 2021-12-01
Payer: MEDICARE

## 2021-12-01 PROCEDURE — 97161 PT EVAL LOW COMPLEX 20 MIN: CPT

## 2021-12-01 PROCEDURE — 95992 CANALITH REPOSITIONING PROC: CPT

## 2021-12-01 NOTE — PROGRESS NOTES
** PLEASE SIGN, DATE AND TIME CERTIFICATION BELOW AND RETURN TO [facility] OUTPATIENT REHABILITATION (FAX #: [phone]). ATTEST/CO-SIGN IF ACCESSING VIA INA vida Ã© feita de [place]. THANK YOU.**    I certify that I have examined the patient below and determined that Physical Medicine and Rehabilitation service is necessary and that I approve the established plan of care for up to 90 days or as specifically noted. Attestation, signature or co-signature of physician indicates approval of certification requirements.    ________________________ ____________ __________  Physician Signature   Date   Time  Juliette Bond 62  PHYSICAL THERAPY  [x] VESTIBULAR EVALUATION  [] DAILY NOTE [] PROGRESS NOTE [] DISCHARGE NOTE    [x] [facility]   [] [place] [address]    [] [facility]   [] Sabrina Crimes    Date: 2021  Patient Name:  Peri Magallanes  : 1950  MRN: [id number]  CSN: [id number]    Referring Practitioner ADRIAAN Garcia*   Diagnosis Dizziness and giddiness [R42]    Treatment Diagnosis BPPV, mild balance impairments   Date of Evaluation 21   Additional Pertinent History TBI with hematoma, HTN, memory issues. Functional Outcome Measure Used [facility]   Functional Outcome Score 16/100 (21)       Insurance: Primary: Payor: Debra Daley /  /  / ,   Secondary: Fercho Gardner BENEFIT   Authorization Information: PRECERTIFICATION REQUIRED:  No  INSURANCE THERAPY BENEFIT:  Patient has unlimited visits based on medical necessity  Benefit will not cover maintenance or preventative treatment. AQUATIC THERAPY COVERED:   Yes  MODALITIES COVERED:  Yes, with the exception of iontophoresis and hot packs/cold packs  TELEHEALTH COVERED: Yes   Visit # 1, 1/10 for progress note   Visits Allowed: unlimited   Recertification Date:    Physician Follow-Up: 22;  Dr. Hilda Kraft 21   Physician Orders:    History of Present Illness: Vertigo occurs when laying down or sitting up from laying down. Body feels like its spinning. Symptoms last 20-30 seconds, sometimes 1 minute at most. Symptoms have been present for 2-3 weeks. Pt suffered TBI in May playing pickle ball. Most recent CT scan of head shows healed skull. SUBJECTIVE: Pt notes lightheadedness going from sitting to standing. Pt has been doing chair yoga at [facility] since TBI, and sometimes vertigo occurs during class. Pt has 20# lifting restriction. Pt able to swim and run, but must do slowly. Wife notes, patient had vertigo when he was standing at [facility] multiple times. Pt takes naps daily and works on pacing activity. Social/Functional History and Current Status:  Medications and Allergies have been reviewed and are listed on Medical History Questionnaire. Zaid Sanford lives with spouse in a single story home with a level surface to enter through garage, 3 steps to porch. Pt uses both entrances.      Task Previous Current   ADLs  Independent Independent   IADL's Independent Independent- cooks breakfast, takes out trash, helps with dishes, ran vacuum yesterday, allowed to [address] Dependent/Unable per physician orders, active with chair yoga   Community Integration Independent Independent   Driving Active  Active    Work Retired  Retired       Precautions:     Pain    Neck ROM AROM WFL   Oculomotor/VOR Normal tracking and saccades   Balance Romberg eyes open no sway, eyes closed mild sway with increased time  SLS left 5 sec with more difficulty balancing, right 10 sec   Gait    Special Testing BPPV [place] Hallpike: positive B for right temporal head pain, left worse than right  Loaded Leighann Hallpike: positive for mild spinning sensation to left  Roll test: negative B   Hearing/Ear Pain      TREATMENT   Precautions: 20# lifting restriction, avoid heavy work around house   Pain: Mild headache    X in shaded column indicates activity completed today   Modalities Parameters/  Location  Notes                     Manual Therapy Time/Technique  Notes                     Exercise/Intervention   Notes   Loaded Leighann Hallpike positive to left   x    Epley maneuver for left posterior canal 1x  x Held positions 1 min, mild dizziness upon sitting up                                                                    Specific Interventions Next Treatment: reassess [place], treat appropriately    Activity/Treatment Tolerance:  [x]  Patient tolerated treatment well  []  Patient limited by fatigue  []  Patient limited by pain   []  Patient limited by medical complications  []  Other:     Assessment: 80year old presents with vertigo for past couple weeks. Loaded Leighann Hallpike positive to left and normal [place] but caused right temporal pressure bilaterally. Treated with Epley maneuver with mild dizziness upon sitting up. Pt will benefit from vestibular to address vertigo to decrease fall risk. Body Structures/Functions/Activity Limitations:impaired balance and vestibular dysfunction  Prognosis: good    GOALS:  Patient Goal: Eliminate vertigo    Short Term Goals:  Time Frame: defer to LTGs      Long Term Goals:  Time Frame: 8 weeks  Patient will have negative bilateral loaded [place] Hallpike test to tolerate laying down onto left side and getting out of bed. Patient will perform left SLS x10 seconds with minimal sway to decrease fall risk. Patient will reduce Dizziness Handicap Inventory score from 16/100 to <10/100 to tolerate bending over, quick head movements, and getting in/out of bed. Patient Education:   [x]  HEP/Education Completed: Plan of Care, Goals, attendance policy, 24 hour vestibular precautions, vestibular function, no additional restrictions   [facility]:  []  No new Education completed  []  Reviewed Prior HEP      [x]  Patient verbalized and/or demonstrated understanding of education provided.   [] Patient unable to verbalize and/or demonstrate understanding of education provided. Will continue education. []  Barriers to learning:     PLAN:  Treatment Recommendations: Balance Training, Gait Training, Neuromuscular Re-education, Home Exercise Program, Patient Education and Vestibular Rehabilitation    [x]  Plan of care initiated. Plan to see patient 1 times per week for 8 weeks to address the treatment planned outlined above.   []  Continue with current plan of care  []  Modify plan of care as follows:    []  Hold pending physician visit  []  Discharge    Time In 1432   Time Out 1515   Timed Code Minutes: 0 min   Total Treatment Time: 43 min       Electronically Signed by: Jeanine Martinez, PT

## 2021-12-07 ENCOUNTER — OFFICE VISIT (OUTPATIENT)
Dept: PHYSICAL MEDICINE AND REHAB | Age: 71
End: 2021-12-07
Payer: MEDICARE

## 2021-12-07 VITALS
DIASTOLIC BLOOD PRESSURE: 72 MMHG | SYSTOLIC BLOOD PRESSURE: 122 MMHG | WEIGHT: 180 LBS | BODY MASS INDEX: 28.93 KG/M2 | HEIGHT: 66 IN

## 2021-12-07 DIAGNOSIS — H83.2X9 VESTIBULAR DYSFUNCTION, UNSPECIFIED LATERALITY: ICD-10-CM

## 2021-12-07 DIAGNOSIS — S06.319D: ICD-10-CM

## 2021-12-07 DIAGNOSIS — I95.1 ORTHOSTATIC HYPOTENSION: ICD-10-CM

## 2021-12-07 DIAGNOSIS — S06.9X1D TRAUMATIC BRAIN INJURY WITH LOSS OF CONSCIOUSNESS OF 30 MINUTES OR LESS, SUBSEQUENT ENCOUNTER: Primary | ICD-10-CM

## 2021-12-07 PROBLEM — H81.90 VESTIBULAR DYSFUNCTION: Status: ACTIVE | Noted: 2021-12-07

## 2021-12-07 PROCEDURE — 1036F TOBACCO NON-USER: CPT | Performed by: PHYSICAL MEDICINE & REHABILITATION

## 2021-12-07 PROCEDURE — G8427 DOCREV CUR MEDS BY ELIG CLIN: HCPCS | Performed by: PHYSICAL MEDICINE & REHABILITATION

## 2021-12-07 PROCEDURE — 3017F COLORECTAL CA SCREEN DOC REV: CPT | Performed by: PHYSICAL MEDICINE & REHABILITATION

## 2021-12-07 PROCEDURE — 99213 OFFICE O/P EST LOW 20 MIN: CPT | Performed by: PHYSICAL MEDICINE & REHABILITATION

## 2021-12-07 PROCEDURE — 4040F PNEUMOC VAC/ADMIN/RCVD: CPT | Performed by: PHYSICAL MEDICINE & REHABILITATION

## 2021-12-07 PROCEDURE — G8484 FLU IMMUNIZE NO ADMIN: HCPCS | Performed by: PHYSICAL MEDICINE & REHABILITATION

## 2021-12-07 PROCEDURE — G8417 CALC BMI ABV UP PARAM F/U: HCPCS | Performed by: PHYSICAL MEDICINE & REHABILITATION

## 2021-12-07 PROCEDURE — 1123F ACP DISCUSS/DSCN MKR DOCD: CPT | Performed by: PHYSICAL MEDICINE & REHABILITATION

## 2021-12-07 RX ORDER — MECLIZINE HYDROCHLORIDE 25 MG/1
25 TABLET ORAL 3 TIMES DAILY PRN
Qty: 15 TABLET | Refills: 0 | Status: SHIPPED | OUTPATIENT
Start: 2021-12-07 | End: 2021-12-17

## 2021-12-07 ASSESSMENT — ENCOUNTER SYMPTOMS
BACK PAIN: 0
ABDOMINAL PAIN: 0
CONSTIPATION: 0
VOMITING: 0
NAUSEA: 0
WHEEZING: 0
COUGH: 0
SHORTNESS OF BREATH: 0
TROUBLE SWALLOWING: 0
SORE THROAT: 0
RHINORRHEA: 0
DIARRHEA: 0

## 2021-12-07 NOTE — PROGRESS NOTES
Physical Medicine & Rehabilitation Follow Up Note    Chief Complaint:  Sensitivity to loud voice ; improving intermittent dizziness & spinning sensation     Subjective:    Kyrie Desir is a 73 y.o. right-handed  male with history of hypertension and hyperlipidemia, comes today for a follow up visit after he was discharged from acute inpatient rehabilitation service on 6/11/2021. The patient was admitted to inpatient rehabilitation on 6/2/2021 for intensive inpatient rehabilitation for impaired ADLs, ambulation & cognition due to left frontal lobe & right cerebellar contusion with intraparenchymal hemorrhage, left posterior parietal & anterior temporal subdural hematoma & subarachnoid blood, and right basilar skull fracture secondary to fall on 5/28/2021.      The patient went after a ball and fell backward while he was playing pickle ball on 5/28/2021. The patient does not remember the event. The patient's head hit the ground and had loss of consciousness for a short period of time according the witness's account.  He was sent to [address] [place] [address] ER for evaluation on 5/28/2021.  Initial CT of head showed left frontal lobe acute intraparenchymal hemorrhage and right basilar skull fracture. CT of head repeated few hours later showed interval increase in left frontal lobe contusion with multiple foci of intraparenchymal hemorrhage measuring up to 1.2 cm, interval increase right cerebellar contusion with foci of intraparenchymal hemorrhage measuring up to approximately 1 cm, new small subdural hematomas & trace adjacent subarachnoid blood within left posterior parietal and anterior temporal regions. No surgical intervention was recommended.   CTA of neck & head revealed no abnormality other than small focus of venous injury/thrombus within right transverse sinus.  Repeated CT of head on 5/29/2021 showed no significant change.  MRV of head done on 6/1/2021 showed small nonocclusive deep venous Extra strength 2 tabs - PRN      [id number]-[id number]-RBTHVEGNEM PO Place 1 tablet under the tongue daily CHANDLER      vitamin D (CHOLECALCIFEROL) 25 MCG (1000 UT) TABS tablet Take 5,000 Units by mouth daily        No current facility-administered medications for this visit. Review of Systems:  Review of Systems   Constitutional: Negative for chills, fatigue and fever. HENT: Negative for ear pain, hearing loss, rhinorrhea, sneezing, sore throat and trouble swallowing. Eyes: Negative for visual disturbance. Respiratory: Negative for cough, shortness of breath and wheezing. Cardiovascular: Negative for chest pain and palpitations. Gastrointestinal: Negative for abdominal pain, constipation, diarrhea, nausea and vomiting. Genitourinary: Negative for difficulty urinating. Musculoskeletal: Negative for back pain, gait problem, myalgias and neck pain. Neurological: Positive for dizziness (Dizziness and room spinning sensation). Negative for speech difficulty, weakness, numbness and headaches. Psychiatric/Behavioral: Negative for dysphoric mood and sleep disturbance. The patient is not nervous/anxious.          Objective:  /72   Ht 5' 6\" (1.676 m)   Wt 180 lb (81.6 kg)   BMI 29.05 kg/m²   Physical Exam   General:  well-developed, well nourished  male ; in no acute distress ; appropriate affect & mood ; sitting on chair comfortably   Eyes: pupil equally round ; extra-ocular motion intact bilaterally   Head, Ear, Nose, Mouth & Throat : normocephalic ; no tenderness at head & face ; no discharge from ears or nose ; no deformity ; no facial swelling ; oral mucosa pink   Neck :  supple ; no tenderness ; no muscle spasm  Cardiovascular : regular rate & rhythm ; normal S1 & S2 heart sound ; no murmur   Pulmonary : lung clear to auscultation ; no wheezing ; no rale  Gastrointestinal : soft, flat abdomen ; no tenderness ; normal bowel sound present   Musculoskeletal : no limb asymmetry; no limb deformity; no tenderness at bilateral upper & lower extremities; no palpable mass at limbs ; no joints laxity or crepitation ; normal functional joints ROM at bilateral upper & lower extremities  Cerebral :  alert ; awake ; oriented to place, person and time ; follow 2 steps verbal commend  Cerebellum : no dysmetria with bilateral finger-to-nose test  Cranial nerves : Grossly intact cranial nerves II to XII functions  Sensory : intact light touch and pin prick sensation at bilateral upper & lower extremities  Motor : normal tone at bilateral upper & lower extremities ; normal 5/5 muscle strength at bilateral upper & lower extremities  Reflex : 1+ bilateral knees reflexes : 1+ bilateral biceps reflexes  Pathological Reflex :  no ankle clonus; no Madison's sign  Gait :  Normal gait      Diagnostics:   No results found for this or any previous visit (from the past 24 hour(s)). CT of head without contrast (11/11/2021) : Impression   1. There is mild heterogeneous attenuation of the CSF along the right lateral convexity which appears less conspicuous when compared to the previous examination dated 9/28/2021 likely representing evolution of a resolving tiny subdural hematoma along the right lateral convexity. This measures approximately 2 to 3 mm in thickness. 2. Stable encephalomalacia involving the anterior left frontal lobe is again seen.        Impression:  · Intermittent vertigo most likely due to vestibular dysfunction  · Intermittent dizziness likely due to orthostatic hypotension  · History of left frontal lobe & right cerebellar contusion with intraparenchymal hemorrhage, left posterior parietal & anterior temporal subdural hematoma & subarachnoid blood, and right basilar skull fracture secondary to fall  · Traumatic brain injury with brief loss of consciousness  · vertigo probably due to vestibular dysfunction  · transient dizziness probably due to orthostatic hypotension  · history of hypertension  · history of hyperlipidemia      The patient's TBI symptom continue to improve slowly. The patient recently developed vertigo and dizziness likely due to vestibular dysfunction and orthostatic hypotension. He now is on PT vestibular rehabilitation program.  Although he says the symptom is improving, we can also try Antivert on as-needed basis. Otherwise the patient is independent in all ADLs, and ambulation without using any assistive walking device currently. He is also independent in driving up to 2 hours.       Plan:  · Continue ongoing outpatient PT for vestibular rehabilitation  · Trial of Antivert 25 mg 3 times daily as needed for vertigo/dizziness  · Continue performing the home exercise program instructed by the therapists  · Advise the patient to avoid sudden rapid standing from sitting position; may consider SOY stocking and/or abdominal binder application if orthostatic hypotension dizziness symptom continue to persist  · Continue follow up with neurosurgery service  · Follow up re-evaluation in 6 months      Jennifer Bahena MD

## 2021-12-08 ENCOUNTER — NURSE ONLY (OUTPATIENT)
Dept: LAB | Age: 71
End: 2021-12-08

## 2021-12-08 DIAGNOSIS — F07.81 POST CONCUSSIVE SYNDROME: ICD-10-CM

## 2021-12-08 DIAGNOSIS — R41.3 MEMORY DIFFICULTIES: ICD-10-CM

## 2021-12-08 DIAGNOSIS — E78.5 HYPERLIPIDEMIA, UNSPECIFIED HYPERLIPIDEMIA TYPE: ICD-10-CM

## 2021-12-08 DIAGNOSIS — R79.89 TSH ELEVATION: ICD-10-CM

## 2021-12-08 DIAGNOSIS — E03.8 SUBCLINICAL HYPOTHYROIDISM: ICD-10-CM

## 2021-12-08 DIAGNOSIS — I10 ESSENTIAL HYPERTENSION: ICD-10-CM

## 2021-12-08 DIAGNOSIS — K90.89 OTHER INTESTINAL MALABSORPTION: ICD-10-CM

## 2021-12-08 DIAGNOSIS — R79.89 ELEVATED TSH: ICD-10-CM

## 2021-12-08 DIAGNOSIS — R35.1 NOCTURIA: ICD-10-CM

## 2021-12-08 DIAGNOSIS — E78.00 ELEVATED CHOLESTEROL: ICD-10-CM

## 2021-12-08 LAB
AVERAGE GLUCOSE: 105 MG/DL (ref 70–126)
BASOPHILS # BLD: 0.8 %
BASOPHILS ABSOLUTE: 0.1 THOU/MM3 (ref 0–0.1)
CALCIUM SERPL-MCNC: 9.4 MG/DL (ref 8.5–10.5)
EOSINOPHIL # BLD: 1.3 %
EOSINOPHILS ABSOLUTE: 0.1 THOU/MM3 (ref 0–0.4)
ERYTHROCYTE [DISTWIDTH] IN BLOOD BY AUTOMATED COUNT: 12.9 % (ref 11.5–14.5)
ERYTHROCYTE [DISTWIDTH] IN BLOOD BY AUTOMATED COUNT: 43.1 FL (ref 35–45)
HBA1C MFR BLD: 5.5 % (ref 4.4–6.4)
HCT VFR BLD CALC: 43.8 % (ref 42–52)
HEMOGLOBIN: 14.1 GM/DL (ref 14–18)
IMMATURE GRANS (ABS): 0.01 THOU/MM3 (ref 0–0.07)
IMMATURE GRANULOCYTES: 0.2 %
LYMPHOCYTES # BLD: 19.8 %
LYMPHOCYTES ABSOLUTE: 1.2 THOU/MM3 (ref 1–4.8)
MAGNESIUM: 2.1 MG/DL (ref 1.6–2.4)
MCH RBC QN AUTO: 29.6 PG (ref 26–33)
MCHC RBC AUTO-ENTMCNC: 32.2 GM/DL (ref 32.2–35.5)
MCV RBC AUTO: 91.8 FL (ref 80–94)
MONOCYTES # BLD: 9 %
MONOCYTES ABSOLUTE: 0.6 THOU/MM3 (ref 0.4–1.3)
NUCLEATED RED BLOOD CELLS: 0 /100 WBC
PLATELET # BLD: 258 THOU/MM3 (ref 130–400)
PMV BLD AUTO: 10.6 FL (ref 9.4–12.4)
PTH INTACT: 45.2 PG/ML (ref 15–65)
RBC # BLD: 4.77 MILL/MM3 (ref 4.7–6.1)
SEDIMENTATION RATE, ERYTHROCYTE: 3 MM/HR (ref 0–10)
SEG NEUTROPHILS: 68.9 %
SEGMENTED NEUTROPHILS ABSOLUTE COUNT: 4.3 THOU/MM3 (ref 1.8–7.7)
T3 TOTAL: 86 NG/DL (ref 72–181)
TSH SERPL DL<=0.05 MIU/L-ACNC: 5.69 UIU/ML (ref 0.4–4.2)
VITAMIN D 25-HYDROXY: 28 NG/ML (ref 30–100)
WBC # BLD: 6.3 THOU/MM3 (ref 4.8–10.8)

## 2021-12-09 LAB
C-REACTIVE PROTEIN, HIGH SENSITIVITY: 1.6 MG/L
HOMOCYSTEINE: 11.3 UMOL/L

## 2021-12-10 ENCOUNTER — HOSPITAL ENCOUNTER (OUTPATIENT)
Dept: PHYSICAL THERAPY | Age: 71
Setting detail: THERAPIES SERIES
Discharge: HOME | End: 2021-12-10
Payer: MEDICARE

## 2021-12-10 LAB
DHEAS (DHEA SULFATE): 22.2 UG/DL (ref 42–290)
GLIADIN PEPTIDE IGG: 1 U/ML
THYROID PEROXIDASE ANTIBODY: < 4 IU/ML (ref 0–25)
THYROXINE (T4): 5.5 UG/DL (ref 4.5–10.9)
TISSUE TRANSGLUTAMINASE IGA: 0.3 U/ML

## 2021-12-10 PROCEDURE — 97530 THERAPEUTIC ACTIVITIES: CPT

## 2021-12-10 NOTE — PROGRESS NOTES
Juliette Bond 62  PHYSICAL THERAPY  [] VESTIBULAR EVALUATION  [x] DAILY NOTE [] PROGRESS NOTE [] DISCHARGE NOTE    [x] [facility] [place]   [] [place] [address]    [] [facility]   [] Sabrina Crimes    Date: 12/10/2021  Patient Name:  Peri Magallanes  : 1950  MRN: [id number]  CSN: [id number]    Referring Practitioner ADRIANA Garcia*   Diagnosis Dizziness and giddiness [R42]    Treatment Diagnosis BPPV, mild balance impairments   Date of Evaluation 21   Additional Pertinent History TBI with hematoma, HTN, memory issues. Functional Outcome Measure Used [facility]   Functional Outcome Score 16/100 (21)       Insurance: Primary: Payor: Debra Daley /  /  / ,   Secondary: Fercho Gardner BENEFIT   Authorization Information: PRECERTIFICATION REQUIRED:  No  INSURANCE THERAPY BENEFIT:  Patient has unlimited visits based on medical necessity  Benefit will not cover maintenance or preventative treatment. AQUATIC THERAPY COVERED:   Yes  MODALITIES COVERED:  Yes, with the exception of iontophoresis and hot packs/cold packs  TELEHEALTH COVERED: Yes   Visit # 1, 1/10 for progress note   Visits Allowed: unlimited   Recertification Date:    Physician Follow-Up: 22; Dr. Hilda Kraft 21   Physician Orders:    History of Present Illness: Vertigo occurs when laying down or sitting up from laying down. Body feels like its spinning. Symptoms last 20-30 seconds, sometimes 1 minute at most. Symptoms have been present for 2-3 weeks. Pt suffered TBI in May playing pickle ball. Most recent CT scan of head shows healed skull. SUBJECTIVE: Patient reports of lightheadedness very rarely with only 2 incidences of lightheadedness in past 2 weeks. Noted light headedness when sitting up in bed this morning. 0 headache at this time. No spinning/vertigo since Vestibular treatment with Andreea, PT 1 week ago.               TREATMENT   Precautions: 20# lifting restriction, avoid heavy work around house   Pain: 0/10 headache    X in shaded column indicates activity completed today   Modalities Parameters/  Location  Notes                     Manual Therapy Time/Technique  Notes                     Exercise/Intervention   Notes   Loaded [place] Hallpike positive to left       Epley maneuver for left posterior canal 1x   Held positions 1 min, mild dizziness upon sitting up          Left Leighann Hallpike  negative  x    Right [place] Hallpike  negative  x                                                Specific Interventions Next Treatment: reassess Rosibel Jimenes, treat appropriately    Activity/Treatment Tolerance:  [x]  Patient tolerated treatment well  []  Patient limited by fatigue  []  Patient limited by pain   []  Patient limited by medical complications  []  Other:     Assessment: Reassessed for vertigo with BPPV. Noted negative symptoms of vertigo/spinning since last session 1 week ago. Does have lightheadedness present at times but no vertigo. [place] Hallpike testing right/left were normal. Will follow up in 1 week to further assess. vestibular to address vertigo to decrease fall risk. Body Structures/Functions/Activity Limitations:impaired balance and vestibular dysfunction  Prognosis: good    GOALS:  Patient Goal: Eliminate vertigo    Short Term Goals:  Time Frame: defer to LTGs      Long Term Goals:  Time Frame: 8 weeks  Patient will have negative bilateral loaded [place] Hallpike test to tolerate laying down onto left side and getting out of bed. Patient will perform left SLS x10 seconds with minimal sway to decrease fall risk. Patient will reduce Dizziness Handicap Inventory score from 16/100 to <10/100 to tolerate bending over, quick head movements, and getting in/out of bed.        Patient Education:   [x]  HEP/Education Completed: Plan of Care, Goals, attendance policy, 24 hour vestibular precautions, vestibular function, no additional restrictions   [employer] Access Code:  []  No new Education completed  []  Reviewed Prior HEP      [x]  Patient verbalized and/or demonstrated understanding of education provided. []  Patient unable to verbalize and/or demonstrate understanding of education provided. Will continue education. []  Barriers to learning:     PLAN:  Treatment Recommendations: Balance Training, Gait Training, Neuromuscular Re-education, Home Exercise Program, Patient Education and Vestibular Rehabilitation    [x]  Plan of care initiated. Plan to see patient 1 times per week for 8 weeks to address the treatment planned outlined above.   []  Continue with current plan of care  []  Modify plan of care as follows:    []  Hold pending physician visit  []  Discharge    Time In 1530   Time Out 1550   Timed Code Minutes: 15   Total Treatment Time: 20 min       Electronically Signed by: Tammy Solo PT

## 2021-12-13 LAB
DHEA UNCONJUGATED: 0.62 NG/ML (ref 0.63–4.7)
TESTOSTERONE FREE: NORMAL

## 2021-12-17 ENCOUNTER — HOSPITAL ENCOUNTER (OUTPATIENT)
Dept: PHYSICAL THERAPY | Age: 71
Setting detail: THERAPIES SERIES
Discharge: HOME | End: 2021-12-17
Payer: MEDICARE

## 2021-12-17 PROCEDURE — 97164 PT RE-EVAL EST PLAN CARE: CPT

## 2021-12-17 NOTE — DISCHARGE SUMMARY
Juliette Bond 62  PHYSICAL THERAPY  [] VESTIBULAR EVALUATION  [] DAILY NOTE [] PROGRESS NOTE [x] DISCHARGE NOTE    [x] [facility] [place]   [] [place] [address]    [] [facility]   [] Carin Speshas    Date: 2021  Patient Name:  Nivia Rai  : 1950  MRN: [id number]  CSN: [id number]    Referring Practitioner ADRIANA Rojas*   Diagnosis Dizziness and giddiness [R42]    Treatment Diagnosis BPPV, mild balance impairments   Date of Evaluation 21   Additional Pertinent History TBI with hematoma, HTN, memory issues. Functional Outcome Measure Used [facility]   Functional Outcome Score 16/100 (21)   2/100 21 Discharge note      Insurance: Primary: Payor: Esme Hu /  /  / ,   Secondary: Codi Rahman BENEFIT   Authorization Information: PRECERTIFICATION REQUIRED:  No  INSURANCE THERAPY BENEFIT:  Patient has unlimited visits based on medical necessity  Benefit will not cover maintenance or preventative treatment. AQUATIC THERAPY COVERED:   Yes  MODALITIES COVERED:  Yes, with the exception of iontophoresis and hot packs/cold packs  TELEHEALTH COVERED: Yes   Visit # 1, 1/10 for progress note   Visits Allowed: unlimited   Recertification Date:    Physician Follow-Up: 22; Dr. Frankie Gonzalez 21   Physician Orders:    History of Present Illness: Vertigo occurs when laying down or sitting up from laying down. Body feels like its spinning. Symptoms last 20-30 seconds, sometimes 1 minute at most. Symptoms have been present for 2-3 weeks. Pt suffered TBI in May playing pickle ball. Most recent CT scan of head shows healed skull. SUBJECTIVE: Has not had any spinning/vertigo since initial session and treatment. He does have occasional light headedness not related to vertigo when sitting down to go to bed last night. Today has been fine. Patient feels he is 100% better without spinning/vertigo.  Feels light headedness does continue but is less frequent since evaluation. Light headedness is less severe as well. Now, light headedness clear within 5 seconds unlike before. TREATMENT   Precautions: 20# lifting restriction, avoid heavy work around house   Pain: 0/10 headache    X in shaded column indicates activity completed today   Modalities Parameters/  Location  Notes                     Manual Therapy Time/Technique  Notes                     Exercise/Intervention   Notes   Loaded [place] Hallpike positive to left       Epley maneuver for left posterior canal 1x   Held positions 1 min, mild dizziness upon sitting up          Left [place] Hallpike  negative  x    Right Leighann Hallpike  negative  x                                                Specific Interventions Next Treatment: reassess Lisa Roche, treat appropriately    Activity/Treatment Tolerance:  [x]  Patient tolerated treatment well  []  Patient limited by fatigue  []  Patient limited by pain   []  Patient limited by medical complications  []  Other:     Assessment: Reassessed for vertigo with BPPV. Noted negative symptoms of vertigo/spinning since last session 1 week ago. Does have lightheadedness present at times but no vertigo. Lisa Roche testing right/left were normal. [address] goal met with score 2/100. Resumed activity as before vertigo. Will discharge at this time. Refer to reassessment for goal summary. vestibular to address vertigo to decrease fall risk. Body Structures/Functions/Activity Limitations:impaired balance and vestibular dysfunction  Prognosis: good    GOALS:  Patient Goal: Eliminate vertigo    Short Term Goals:  Time Frame: defer to [facility]      Long Term Goals:  Time Frame: 8 weeks  Patient will have negative bilateral loaded [place] Hallpike test to tolerate laying down onto left side and getting out of bed. GOAL MET Patient has negative testing with Lisa Roche test.     Patient will perform left SLS x10 seconds with minimal sway to decrease fall risk.   GOAL MET SLS x15 seconds with slight sway. Patient will reduce Dizziness Handicap Inventory score from 16/100 to <10/100 to tolerate bending over, quick head movements, and getting in/out of bed. GOAL MET 2/100 for [address] (dizziness handicap inventory)       Patient Education:   []  HEP/Education Completed: Plan of Care, Goals, attendance policy, 24 hour vestibular precautions, vestibular function, no additional restrictions   [employer] Access Code:  []  No new Education completed  []  Reviewed Prior HEP      []  Patient verbalized and/or demonstrated understanding of education provided. []  Patient unable to verbalize and/or demonstrate understanding of education provided. Will continue education. []  Barriers to learning:     PLAN:  Treatment Recommendations: Balance Training, Gait Training, Neuromuscular Re-education, Home Exercise Program, Patient Education and Vestibular Rehabilitation    []  Plan of care initiated. Plan to see patient 1 times per week for 8 weeks to address the treatment planned outlined above.   []  Continue with current plan of care  []  Modify plan of care as follows:    []  Hold pending physician visit  [x]  Discharge    Time In 1045   Time Out 1108   Timed Code Minutes: 0   Total Treatment Time: 23       Electronically Signed by: Robinson Jacobo, PT

## 2021-12-22 ENCOUNTER — OFFICE VISIT (OUTPATIENT)
Dept: FAMILY MEDICINE CLINIC | Age: 71
End: 2021-12-22
Payer: MEDICARE

## 2021-12-22 VITALS
OXYGEN SATURATION: 98 % | TEMPERATURE: 96.8 F | SYSTOLIC BLOOD PRESSURE: 116 MMHG | HEART RATE: 61 BPM | BODY MASS INDEX: 29.63 KG/M2 | WEIGHT: 184.4 LBS | RESPIRATION RATE: 10 BRPM | DIASTOLIC BLOOD PRESSURE: 68 MMHG | HEIGHT: 66 IN

## 2021-12-22 DIAGNOSIS — R41.3 MEMORY DIFFICULTIES: ICD-10-CM

## 2021-12-22 DIAGNOSIS — E78.00 ELEVATED CHOLESTEROL: ICD-10-CM

## 2021-12-22 DIAGNOSIS — R35.1 NOCTURIA: ICD-10-CM

## 2021-12-22 DIAGNOSIS — E78.00 ELEVATED LDL CHOLESTEROL LEVEL: ICD-10-CM

## 2021-12-22 DIAGNOSIS — K90.89 OTHER INTESTINAL MALABSORPTION: ICD-10-CM

## 2021-12-22 DIAGNOSIS — D64.9 ANEMIA, UNSPECIFIED TYPE: ICD-10-CM

## 2021-12-22 DIAGNOSIS — I10 ESSENTIAL HYPERTENSION: Primary | ICD-10-CM

## 2021-12-22 DIAGNOSIS — E78.5 HYPERLIPIDEMIA, UNSPECIFIED HYPERLIPIDEMIA TYPE: ICD-10-CM

## 2021-12-22 DIAGNOSIS — E03.8 SUBCLINICAL HYPOTHYROIDISM: ICD-10-CM

## 2021-12-22 DIAGNOSIS — F07.81 POST CONCUSSIVE SYNDROME: ICD-10-CM

## 2021-12-22 DIAGNOSIS — R79.89 ELEVATED TSH: ICD-10-CM

## 2021-12-22 DIAGNOSIS — R94.6 ABNORMAL RESULTS OF THYROID FUNCTION STUDIES: ICD-10-CM

## 2021-12-22 DIAGNOSIS — R79.89 TSH ELEVATION: ICD-10-CM

## 2021-12-22 PROCEDURE — G8484 FLU IMMUNIZE NO ADMIN: HCPCS | Performed by: FAMILY MEDICINE

## 2021-12-22 PROCEDURE — G8427 DOCREV CUR MEDS BY ELIG CLIN: HCPCS | Performed by: FAMILY MEDICINE

## 2021-12-22 PROCEDURE — 1036F TOBACCO NON-USER: CPT | Performed by: FAMILY MEDICINE

## 2021-12-22 PROCEDURE — 3017F COLORECTAL CA SCREEN DOC REV: CPT | Performed by: FAMILY MEDICINE

## 2021-12-22 PROCEDURE — G8417 CALC BMI ABV UP PARAM F/U: HCPCS | Performed by: FAMILY MEDICINE

## 2021-12-22 PROCEDURE — 4040F PNEUMOC VAC/ADMIN/RCVD: CPT | Performed by: FAMILY MEDICINE

## 2021-12-22 PROCEDURE — 99215 OFFICE O/P EST HI 40 MIN: CPT | Performed by: FAMILY MEDICINE

## 2021-12-22 PROCEDURE — 1123F ACP DISCUSS/DSCN MKR DOCD: CPT | Performed by: FAMILY MEDICINE

## 2021-12-22 RX ORDER — CALCIUM CARBONATE 500(1250)
500 TABLET ORAL EVERY OTHER DAY
COMMUNITY

## 2021-12-22 RX ORDER — AMPICILLIN TRIHYDRATE 250 MG
1000 CAPSULE ORAL
COMMUNITY

## 2021-12-22 ASSESSMENT — ENCOUNTER SYMPTOMS
GASTROINTESTINAL NEGATIVE: 1
SHORTNESS OF BREATH: 1
ALLERGIC/IMMUNOLOGIC NEGATIVE: 1

## 2021-12-22 NOTE — PROGRESS NOTES
[address] [facility] [address] [address]  Dept: [phone]  Dept Fax: [phone]  Loc: Vivian Nails is a 73 y.o. White male. [employer]  presents to the [place] [address] clinic today for   Chief Complaint   Patient presents with    3 Month Follow-Up   , and;   No diagnosis found. I have reviewed [facility] medical, surgical and other pertinent history in detail, and have updated medication and allergy information in the computerized patient record. Clinical Care Team:     -Referring Provider for today's consult: self  -Primary Care Provider: Ty Fritz MD    Medical/Surgical History:   He  has a past medical history of Fractured skull ([address] Hyperlipidemia, and Hypertension. His  has a past surgical history that includes hernia repair; Appendectomy; and Appendectomy (2017). Family/Social History:     His family history includes Cancer in his father; Glaucoma in his father and mother; Heart Disease in his father and mother; High Blood Pressure in his father; High Cholesterol in his father; Other in his father; Prostate Cancer in his father; Stroke in his father and mother. He  reports that he has never smoked. He has never used smokeless tobacco. He reports that he does not drink alcohol and does not use drugs.     Medications/Allergies/Immunizations:     His current medication(s) include   Current Outpatient Medications:     lisinopril (PRINIVIL;ZESTRIL) 5 MG tablet, Take 1 tablet by mouth daily, Disp: 90 tablet, Rfl: 0    levETIRAcetam (KEPPRA) 500 MG tablet, Take 1 tablet by mouth 2 times daily, Disp: 120 tablet, Rfl: 1    acetaminophen (TYLENOL) 500 MG tablet, Take 500 mg by mouth every 6 hours as needed for Pain Extra strength 2 tabs - PRN, Disp: , Rfl:     MGLFHKPTB-REBDOYUQE-[id number] PO, Place 1 tablet under the tongue daily CHANDLER, Disp: , Rfl:     vitamin D (CHOLECALCIFEROL) 25 MCG (1000 UT) TABS tablet, Take 5,000 Units by mouth daily , Disp: , Rfl:   Allergies: Codeine, Codone [hydrocodone], Hydrocodone, and Oxycodone  Immunizations:   Immunization History   Administered Date(s) Administered    COVID-19, Moderna, Primary or Immunocompromised, PF, 100mcg/0.5mL 02/23/2021, 03/23/2021    Influenza, Quadv, adjuvanted, 65 yrs +, IM, PF (Fluad) 10/08/2020    Pneumococcal Polysaccharide ([id number]) 10/08/2020    Tdap (Boostrix, Adacel) 10/22/2020    Zoster Recombinant (Shingrix) 10/22/2020, 01/14/2021        History of Present Illness:     Glenn's had concerns including 3 Month Follow-Up. Cindy Zarate  presents to the [address] [place] [address] today for;   Chief Complaint   Patient presents with    3 Month Follow-Up   , abnormal labs follow up and these conditions as he  Is looking today for:     No diagnosis found. HPI    Subjective:     Review of Systems   Constitutional: Positive for fatigue and unexpected weight change. HENT: Negative. Eyes: Positive for visual disturbance. Respiratory: Positive for shortness of breath. Cardiovascular: Negative. Gastrointestinal: Negative. Endocrine: Positive for cold intolerance. Genitourinary: Positive for frequency. Musculoskeletal: Positive for gait problem, myalgias, neck pain and neck stiffness. Skin: Negative. Allergic/Immunologic: Negative. Neurological: Positive for dizziness, tremors, speech difficulty and light-headedness. Hematological: Negative. Psychiatric/Behavioral: Positive for decreased concentration. All other systems reviewed and are negative. Objective:     /68 (Site: Right Upper Arm, Position: Sitting, Cuff Size: Medium Adult)   Pulse 61   Temp 96.8 °F (36 °C) (Temporal)   Resp 10   Ht 5' 6\" (1.676 m)   Wt 184 lb 6.4 oz (83.6 kg)   SpO2 98%   BMI 29.76 kg/m²   Physical Exam  Vitals and nursing note reviewed.    Constitutional:       Appearance: Normal appearance. HENT:      Head: Normocephalic. Pulmonary:      Effort: Pulmonary effort is normal.   Neurological:      Mental Status: He is alert. Psychiatric:         Mood and Affect: Mood normal.         Thought Content: Thought content normal.            Laboratory Data:   Lab results were searched in Care Everywhere and/or those brought by the pateint were reviewed today with Danny Trammell and he has a copy of their most recent labs to take home with them as noted below;       Imaging Data:   Imaging Data:       Assessment & Plan:       Impression:  No diagnosis found. Assessment and Plan:  After reviewing the patients chief complaints, reviewing their labfindings in great detail (with the patient and those accompanying them) which correlate to their chief complaints, symptoms, and or medical conditions; suggestions were made relating to changes in diet and or supplements which may improve the complaints and which will be reflected in their future lab findings; Chief Complaint   Patient presents with    3 Month Follow-Up   ;    Plans for the next visits:  - Abnormal and non-optimal Labs were ordered today to be repeated in the next 120-365 days to assess changes from adjustments in nutrition and or nutrients. - Patient instructed when having a blood draw to ask the  to divide their lab draws into multiple draws over several days if not feeling good at the time of the lab draw or if either prefers to do several smaller blood draws over several days  -Patient instructed to check with insurer before each lab draw and to go to the lab which the insurer directs them for the most cost effective lab draw with the least patient's cost  - Danny Trammell  will be scheduled subsequent to those results. Sammy Kawasaki will bring in his drink, food, supplement log to his next visit    Chronic Problems Addressed on this Visit:                                   1.  Intensity of Service;     Uncontrolled items at this visit; Chief Complaint   Patient presents with    3 Month Follow-Up   ; Improved items at this visit and Stable items were discussed at this visit;  2. Patients food, drinks, supplements and symptoms were reviewed with the patient,       - Danny Trammell will bring food, drink, supplements and symptoms log to next visit for inclusion in their record      - 75 better food list reviewed & given to patient with the omega 6 food list to avoid      - The 52 Latex foods list was reviewed and given to the patients with the information on carrageenan         - Gluten in corn and oats abstracts sheet reviewed and given to the patient today   3. Greater than 40 minutes time was spent with the patient face to face on this visit; of which >50% was for counseling and coordination of care, as well as the time spent before and after the visit reviewing the chart, documenting the encounter, reviewing labs,reports, NIH listed studies, making phone calls, etc.      Patients food and drinks were reviewed with the patient,   - They will bring a food drink symptom log to future visits for inclusion in their record    - 75 better food list reviewed & given to patient along with the omega 6 food list to avoid      - Gluten in corn and oats abstracts sheet reviewed and given to the patient today    - 23 Foods containing Latex-like proteins was reviewed and copy to be taken if desired     - Nutrient Supplements list provided and copyto be taken if desired    - [email or web address]. com web site offered to patient to review at their convenience by staff with login information    Note:  I have discussed with the patient that with all nutraceuticals, there is often mixed data and emerging research which needs to be monitored; as well as an array of NIH fact sheets on nutrients and supplements, available at www.nih,issue plus [employer]. com plus www.lpi,org.      If I have recommended cinnamon at the request of this patient to assist them in control of their blood sugar, triglyceride, and/or weight issues. I discussed that the patient's clinical use of cinnamon bark, calcium, magnesium, Vitamin D, and pharmaceutical grade [employer] omega 3 oil or triple-strength fish oil, and B-50/B-100 time-released B-complex by [address] [facility] will be for a time-limited trial to determine their individual effectiveness and safety in this patient. I also referred the patient to the NMCD: Nutrition, Metabolism, and Cardiovascular Diseases (SecuritiesCard.pl) and concerns about long-term use and hepatotoxicity of cinnamon and other nutrients. I suggested they frequently search nih.gov for the latest non-proprietary information on nutriceuticals as well as consider a subscription to [facility] for details on reviewed supplements, or at the least review the nutrient files at [facility], [address] bark, an insulin mimetic, reduces some High Carbohydrate Dietary Impacts. Methylhydroxychalcone polymers insulin-enhancing properties in fat cells are responsible for enhanced glucose uptake, inhibiting hepatic HMG-CoA reductase and lowers lipids. www.jacn. org/content/20/4/327.full     But cinnamon with additives such as Cinnamon Extract are not effective as insulin mimetics.  :eStoreDirectory.at     Nutrients for Start up from [employer] or [facility] for ease to get started now;  Emerald [employer] has some useable products;  - Triple Strength Fish Oil, enteric coated  - Vit D-3 5000 IU gel caps  - Iron ferrous sulfate 325 mg tabs  - Centrum Silver look-a-like for most patients, or  - Centrum plain look-a-like if need iron    Local pharmacies or chains such as [employer], have:  - Tamion pharmaceutical grade omega 3 is 90% EPA/DHA whereas most Triple strength fish oil are 75% EPA/DHA  - Triple Strength Fish Oil (enteric coated if available) or if not enteric coated, can take from freezer for less burps  - B-50 or B-100 released balanced B complex tabs by [address] [facility] bark 500 mg (without Chromium or extracts)   some brands list 1000 mg / serving of 2 capsules,    some brands have 1000 mg caps with the undesireable chromium extract  - Calcium carbonate/citrate, magnesium oxide/citrate, Vit D-3 as 3-4 tabs/caps/serving     Some Local Brands may contain Zinc which is acceptable for the first bottle or two  - Magnesium oxide 250 mg tabs for those having < 2 bowel movements daily  - Magnesium citrate 200 mg if having > 2 bowel movements/day  - Centrum Silver or look-a-like for most patients, Centrum plain or look-a-like with iron  - Vitamin D-3 comes as 1,000 IU or 2,000 IU or 5,000 IU gel caps or Liquid drops but keep Vitamin D levels <50 but >40     Some brands containing or derived from soy oil or corn oil are OK if not allergic to soy  - Elemental Iron 65 mg tabs at bedtime is available over the counter if need more iron     Usually turns bowel movements grey, green, or black but not a concern  - Apricot Kernel Oil (by Now) for dry skin sensitive perineal or perianal area skin    Nutrients for ongoing use by Mail order for less expense from [employer]InPronto ;  - Strength Fish Oil , 240 Softgels Item #226240  -B-100 time released balanced B complex Item #347634  - Cinnamon bark 500 mg without Chromium or extract Item #657930  - Calcium carbonate 1000 mg, Magnesium oxide 500 mg, Vit D-3 400 IU Item #[id number]  - Magnesium oxide 500 mg tabs Item #667167 if less than 2 bowel movements daily  - [employer] Item #353472 for most patients, One Daily Item #830571 with iron  - Vit D 3  1,000 Item #451983      2,000 IU Item #[id number]   Item #301919     Some brands containing orderived from soy oil or corn oil are OK if not allergic to soy    Nutrients for Special Needs by Mail order for less expense from www. puritan.com;  -Elemental Iron 65 mg tabs Item #956616 if need more iron for low iron on labs    Usually turns bowel movements grey, green or black but not a concern  - Time released Niacin 250 mg Item #523294 for cold intolerance, low libido or impotence  - DHEA 50 mg Item #894347 for improving DHEA levels on labs if having Fatigue    If stools too loose substitute for your Magnesium oxide using;   Magnesium citrate 200 mg tabs (NOT liquid) at [employer]   Magnesium gluconate 550 mg by Corrinne Ink at [employer] or [facility]  Magnesium chloride foot soaks or body sprays  www.[employer]   Magnesium chloride flakes 14.99 Item #: [id number] if back-ordered, get spray  Magnesium threonate, Magtein also helps mental clarity and sleep    Food Drink Symptom Log;  I asked this patient to track these items and any other symptoms on their list on a weekly basis to documenttheir progress or lack of same. This can be done on the symptom tracking sheet I gave them at today's visit but looks like this:                                                      Rate on scale of 0-10 with zero = not noticeable  Symptom:                            Week 1               2                 3                 4               Etc            Hair loss    Foot cramps    Paresthesia    Aches    IBS (irritable bowel)    Constipation    Diarrhea  Nocturia (up to bathroom at night)    Fatigue/Energy level  Stress      On the other side of the sheet they can track their food, drink, environment, activity, symptoms etc      Avoiding Latex-like proteins in my foods; Avocados, Bananas, Celery, Figs & Kiwi proteins have latex-like proteins to inflame our immune systems, plus 47 more foods  How Can I Have A Latex Allergy? Eating foods with latex-like protein exposes us to latex allergies. Our body cannot tell the differencebetween these latex-like proteins and latex from rubber products since many people are allergic to fruit, vegetables and latex. Read labels on pre-packaged foods.  This list to avoid is only a guide if you are known allergicto latex or have a latex rash on your chin, cheeks and lines on your neck and chest. The amount of latex is different in each food product or fruit variety. Avoid out of Season if not grown locally:   Melon, Nectarine, Papaya, Cherry, Passion fruit, Plum, Chestnuts, and Tomato. Avocado, Banana, Celery, Figs, and Kiwi always contain Latex-like protein. Whats in Season? Strawberries taste better in June than December because June is strawberry season so buy locally grown produce \"in season\" for the best flavor, cost, and less Latex. Locally grown produce not only tastes great but also requires little or no ethylene exposure in food distribution so has less latex content. Out of season: use canned, frozen, or dried since those are processed ripe and latex content is lower!!!     Month     [place] Locally Grown Produce  January, February, March: use canned, frozen or dried fruits since lower in latex  April: asparagus, radishes  May: asparagus, broccoli, green onions, greens, peas, radishes, rhubarb  [place]: asparagus, beets, beans, broccoli, cabbage, cantaloupe, carrots, green onions, greens, lettuce, onions, parsley, peas, radishes, rhubarb, strawberries, watermelons  July: beans, beets, blueberries, broccoli, cabbage, cantaloupe, carrots, cauliflower, celery, cucumbers, eggplant, grapes, green onions, greens, lettuce, onions, parsley, peas, peaches, bell peppers, potatoes, radishes, summer raspberries, squash, sweetcorn, tomatoes, turnips, watermelons  August: apples, beans, beets, blueberries, cabbage, cantaloupe, carrots, cauliflower, celery, cucumbers, eggplant, grapes, green onions, greens, lettuce, onions, parsley, peas, peaches, pears, bell peppers, potatoes, radishes, squash, sweet corn, tomatoes, turnips, watermelons  September: apples, beans, beets, blueberries, cabbage, cantaloupe, carrots, cauliflower, celery, cucumbers, eggplant, grapes, green onions, greens, lettuce, onions, parsley, peas, peaches, pears, bell peppers, plums, potatoes, pumpkins, radishes, fall red raspberries, squash, sweet corn, tomatoes, turnips, watermelons  October: apples, beets, broccoli, cabbage, carrots, cauliflower, celery, green onions, greens, lettuce, parsley, peas, pears, potatoes, pumpkins, radishes, fall red raspberries, squash, turnips  November: broccoli, cabbage, carrots, parsley, pears, peas  December: use canned, frozen or dried fruits since lower in latex    Upto half of latex-sensitive patients show allergic reactions to fruits (avocados, bananas, kiwifruits, papayas, peaches),   Annals of Allergy, 1994. These plants contain the same proteins that are allergens in latex. People with fruit allergies should warn physicians before undergoing procedures which may cause anaphylactic reaction if in contact with latex gloves. Some of the common foods with defined cross-reactivity to latex are avocado, banana, kiwi, chestnut, raw potato, tomato, stone fruits (e.g., peach, cherry), hazelnut, melons, celery, carrot, apple, pear, papaya, and almond. Foods with less well-defined cross-reactivity to latex are peanuts, peppers, citrus fruits, coconut, pineapple, lizbet, fig, passion fruit, Ugli fruit, and grape. This fruit/latex cross-reactivity is worsened by ethylene, a gas used to hasten commercial ripening. In nature, plants produce low levels of the hormone ethylene, which regulates germination, flowering, and ripening. Forced ripening by high ethylene concentrations, plants produce allergenic wound-repair proteins, which are similar to wound-repair proteins made during the tapping of rubber trees. Sensitive individuals who ingest the fruit get a higher dose and worse reaction. Some people may even first become sensitized to latex through fruit. Can food processing increase the concentrations of allergenic proteins?  Latex-sensitized children (and adults) in [place] often experience allergic reactions after eating bananas ripened artificially with ethylene. In the [place], food distribution centers treat unripe bananas and other produce with ethylene to ripen; not commonly done in [facility] since fruit is tree-ripened there. Does treatment of food with ethylene induce banana proteins that cross-react with latex? (Koki et al.)    References:   Latex in Foods Allergy, http://ehp.niehs.nih.gov/members/2003/5811/5811.html    Search web for [employer] in Season \" for where you live or are at the time you food shop   Management of Latex, ://medicalcenter. [place].edu/  search for nih, latex-like proteins in foods

## 2021-12-22 NOTE — PROGRESS NOTES
[address] [facility] Rigoberto [address] [address]  Dept: [phone]  Dept Fax: [phone]  Loc: Gen Nix is a 73 y.o. White male. Major Saskia  presents to the [place] [address] clinic today for   Chief Complaint   Patient presents with    3 Month Follow-Up   , and;   No diagnosis found. I have reviewed [facility] medical, surgical and other pertinent history in detail, and have updated medication and allergy information in the computerized patient record. Clinical Care Team:     -Referring Provider for today's consult: self  -Primary Care Provider: Jose Sullivan MD    Medical/Surgical History:   He  has a past medical history of Fractured skull ([address] Hyperlipidemia, and Hypertension. His  has a past surgical history that includes hernia repair; Appendectomy; and Appendectomy (2017). Family/Social History:     His family history includes Cancer in his father; Glaucoma in his father and mother; Heart Disease in his father and mother; High Blood Pressure in his father; High Cholesterol in his father; Other in his father; Prostate Cancer in his father; Stroke in his father and mother. He  reports that he has never smoked. He has never used smokeless tobacco. He reports that he does not drink alcohol and does not use drugs.     Medications/Allergies/Immunizations:     His current medication(s) include   Current Outpatient Medications:     lisinopril (PRINIVIL;ZESTRIL) 5 MG tablet, Take 1 tablet by mouth daily, Disp: 90 tablet, Rfl: 0    levETIRAcetam (KEPPRA) 500 MG tablet, Take 1 tablet by mouth 2 times daily, Disp: 120 tablet, Rfl: 1    acetaminophen (TYLENOL) 500 MG tablet, Take 500 mg by mouth every 6 hours as needed for Pain Extra strength 2 tabs - PRN, Disp: , Rfl:     KQDFYAOSN-ELPAVCADJ-QFIXMHLJCM PO, Place 1 tablet under the tongue daily CHANDLER, Disp: , Rfl:     vitamin D (CHOLECALCIFEROL) 25 MCG (1000 UT) TABS tablet, Take 5,000 Units by mouth daily , Disp: , Rfl:   Allergies: Codeine, Codone [hydrocodone], Hydrocodone, and Oxycodone  Immunizations:   Immunization History   Administered Date(s) Administered    COVID-19, Moderna, Primary or Immunocompromised, PF, 100mcg/0.5mL 02/23/2021, 03/23/2021    Influenza, Quadv, adjuvanted, 65 yrs +, IM, PF (Fluad) 10/08/2020    Pneumococcal Polysaccharide ([id number]) 10/08/2020    Tdap (Boostrix, Adacel) 10/22/2020    Zoster Recombinant (Shingrix) 10/22/2020, 01/14/2021        History of Present Illness:     Glenn's had concerns including 3 Month Follow-Up. Dave Montoya  presents to the [address] [place] [address] today for;   Chief Complaint   Patient presents with    3 Month Follow-Up   , abnormal labs follow up and these conditions as he  Is looking today for:     No diagnosis found. HPI    Subjective:     Review of Systems   All other systems reviewed and are negative. Objective: There were no vitals taken for this visit. Physical Exam  Vitals and nursing note reviewed. Constitutional:       Appearance: Normal appearance. HENT:      Head: Normocephalic. Pulmonary:      Effort: Pulmonary effort is normal.   Neurological:      Mental Status: He is alert. Psychiatric:         Mood and Affect: Mood normal.         Thought Content: Thought content normal.            Laboratory Data:   Lab results were searched in Care Everywhere and/or those brought by the pateint were reviewed today with Josefa Stern and he has a copy of their most recent labs to take home with them as noted below;       Imaging Data:   Imaging Data:       Assessment & Plan:       Impression:  No diagnosis found.   Assessment and Plan:  After reviewing the patients chief complaints, reviewing their labfindings in great detail (with the patient and those accompanying them) which correlate to their chief complaints, symptoms, and or medical conditions; suggestions were made relating to changes in diet and or supplements which may improve the complaints and which will be reflected in their future lab findings; Chief Complaint   Patient presents with    3 Month Follow-Up   ;    Plans for the next visits:  - Abnormal and non-optimal Labs were ordered today to be repeated in the next 120-365 days to assess changes from adjustments in nutrition and or nutrients. - Patient instructed when having a blood draw to ask the  to divide their lab draws into multiple draws over several days if not feeling good at the time of the lab draw or if either prefers to do several smaller blood draws over several days  -Patient instructed to check with insurer before each lab draw and to go to the lab which the insurer directs them for the most cost effective lab draw with the least patient's cost  - Marylee Liter  will be scheduled subsequent to those results. Malia Erwin will bring in his drink, food, supplement log to his next visit    Chronic Problems Addressed on this Visit:                                   1.  Intensity of Service; Uncontrolled items at this visit; Chief Complaint   Patient presents with    3 Month Follow-Up   ; Improved items at this visit and Stable items were discussed at this visit;  2. Patients food, drinks, supplements and symptoms were reviewed with the patient,       - Marylee Liter will bring food, drink, supplements and symptoms log to next visit for inclusion in their record      - 75 better food list reviewed & given to patient with the omega 6 food list to avoid      - The 52 Latex foods list was reviewed and given to the patients with the information on carrageenan         - Gluten in corn and oats abstracts sheet reviewed and given to the patient today   3.    Greater than 40 minutes time was spent with the patient face to face on this visit; of which >50% was for counseling and coordination of care, as well as the time spent before and after the visit reviewing the chart, documenting the encounter, reviewing labs,reports, NIH listed studies, making phone calls, etc.      Patients food and drinks were reviewed with the patient,   - They will bring a food drink symptom log to future visits for inclusion in their record    - 75 better food list reviewed & given to patient along with the omega 6 food list to avoid      - Gluten in corn and oats abstracts sheet reviewed and given to the patient today    - 23 Foods containing Latex-like proteins was reviewed and copy to be taken if desired     - Nutrient Supplements list provided and copyto be taken if desired    - [employer]. [employer] web site offered to patient to review at their convenience by staff with login information    Note:  I have discussed with the patient that with all nutraceuticals, there is often mixed data and emerging research which needs to be monitored; as well as an array of NIH fact sheets on nutrients and supplements, available at www.nih,issue plus [employer] Z[employer]. App[employer]ure [employer] plus www.AirMediai,org. If I have recommended cinnamon at the request of this patient to assist them in control of their blood sugar, triglyceride, and/or weight issues. I discussed that the patient's clinical use of cinnamon bark, calcium, magnesium, Vitamin D, and pharmaceutical grade CVS omega 3 oil or triple-strength fish oil, and B-50/B-100 time-released B-complex by [address] [facility] will be for a time-limited trial to determine their individual effectiveness and safety in this patient. I also referred the patient to the NMCD: Nutrition, Metabolism, and Cardiovascular Diseases (SecuritiesCard.pl) and concerns about long-term use and hepatotoxicity of cinnamon and other nutrients. I suggested they frequently search nih.gov for the latest non-proprietary information on nutriceuticals as well as consider a subscription to IWT for details on reviewed supplements, or at the least review the nutrient files at [employer] at [facility], [address] [facility]     Cinnamon bark, an insulin mimetic, reduces some High Carbohydrate Dietary Impacts. Methylhydroxychalcone polymers insulin-enhancing properties in fat cells are responsible for enhanced glucose uptake, inhibiting hepatic HMG-CoA reductase and lowers lipids. www.jacn. org/content/20/4/327.full     But cinnamon with additives such as Cinnamon Extract are not effective as insulin mimetics.  :eStoreDirectory.at     Nutrients for Start up from reportbrain or Welcu for ease to get started now;  Emerald Rodarte has some useable products;  - Triple Strength Fish Oil, enteric coated  - Vit D-3 5000 IU gel caps  - Iron ferrous sulfate 325 mg tabs  - Centrum Silver look-a-like for most patients, or  - Centrum plain look-a-like if need iron    Local pharmacies or chains such as [employer], [employer], have:  - PowerSmart pharmaceutical grade omega 3 is 90% EPA/DHA whereas most Triple strength fish oil are 75% EPA/DHA  - Triple Strength Fish Oil (enteric coated if available) or if not enteric coated, can take from freezer for less burps  - B-50 or B-100 released balanced B complex tabs by [address] [facility] at [facility] bark 500 mg (without Chromium or extracts)   some brands list 1000 mg / serving of 2 capsules,    some brands have 1000 mg caps with the undesireable chromium extract  - Calcium carbonate/citrate, magnesium oxide/citrate, Vit D-3 as 3-4 tabs/caps/serving     Some Local Brands may contain Zinc which is acceptable for the first bottle or two  - Magnesium oxide 250 mg tabs for those having < 2 bowel movements daily  - Magnesium citrate 200 mg if having > 2 bowel movements/day  - Centrum Silver or look-a-like for most patients, Centrum plain or look-a-like with iron  - Vitamin D-3 comes as 1,000 IU or 2,000 IU or 5,000 IU gel caps or Liquid drops but keep Vitamin D levels <50 but >40     Some brands containing or derived from soy oil or corn oil are OK if not allergic to soy  - Elemental Iron 65 mg tabs at bedtime is available over the counter if need more iron     Usually turns bowel movements grey, green, or black but not a concern  - Apricot Kernel Oil (by Now) for dry skin sensitive perineal or perianal area skin    Nutrients for ongoing use by Mail order for less expense from wwwteextee ;  - Strength Fish Oil , 240 Softgels Item #286935  -B-100 time released balanced B complex Item #607001  - Cinnamon bark 500 mg without Chromium or extract Item #413233  - Calcium carbonate 1000 mg, Magnesium oxide 500 mg, Vit D-3 400 IU Item #305185  - Magnesium oxide 500 mg tabs Item #917842 if less than 2 bowel movements daily  - [employer] Seniors Item #088543 for most patients, One Daily Item #072878 with iron  - Vit D 3  1,000 Item #850736      2,000 IU Item #108682   Item #221887     Some brands containing orderived from soy oil or corn oil are OK if not allergic to soy    Nutrients for Special Needs by Mail order for less expense from www. puritan.com;  -Elemental Iron 65 mg tabs Item #203428 if need more iron for low iron on labs    Usually turns bowel movements grey, green or black but not a concern  - Time released Niacin 250 mg Item #025933 for cold intolerance, low libido or impotence  - DHEA 50 mg Item #459011 for improving DHEA levels on labs if having Fatigue    If stools too loose substitute for your Magnesium oxide using;   Magnesium citrate 200 mg tabs (NOT liquid) at [employer]   Magnesium gluconate 550 mg by Emanuel Sharp at [employer] or Kähu. com  Magnesium chloride foot soaks or body sprays  www.Stand In. IVFXPERT   Magnesium chloride flakes 14.99 Item #: [id number] if back-ordered, get spray  Magnesium threonate, Magtein also helps mental clarity and sleep    Food Drink Symptom Log;  I asked this patient to track these items and any other symptoms on their list on a weekly basis to documenttheir progress or lack of same. This can be done on the symptom tracking sheet I gave them at today's visit but looks like this:                                                      Rate on scale of 0-10 with zero = not noticeable  Symptom:                            Week 1               2                 3                 4               Etc            Hair loss    Foot cramps    Paresthesia    Aches    IBS (irritable bowel)    Constipation    Diarrhea  Nocturia (up to bathroom at night)    Fatigue/Energy level  Stress      On the other side of the sheet they can track their food, drink, environment, activity, symptoms etc      Avoiding Latex-like proteins in my foods; Avocados, Bananas, Celery, Figs & Kiwi proteins have latex-like proteins to inflame our immune systems, plus 47 more foods  How Can I Have A Latex Allergy? Eating foods with latex-like protein exposes us to latex allergies. Our body cannot tell the differencebetween these latex-like proteins and latex from rubber products since many people are allergic to fruit, vegetables and latex. Read labels on pre-packaged foods. This list to avoid is only a guide if you are known allergicto latex or have a latex rash on your chin, cheeks and lines on your neck and chest. The amount of latex is different in each food product or fruit variety. Avoid out of Season if not grown locally:   Melon, Nectarine, Papaya, Cherry, Passion fruit, Plum, Chestnuts, and Tomato. Avocado, Banana, Celery, Figs, and Kiwi always contain Latex-like protein. Whats in Season? Strawberries taste better in June than December because June is strawberry season so buy locally grown produce \"in season\" for the best flavor, cost, and less Latex. Locally grown produce not only tastes great but also requires little or no ethylene exposure in food distribution so has less latex content.   Out of season: use canned, frozen, or dried since those are processed ripe and latex content is lower!!! Month     [place] Locally Grown Produce  January, February, March: use canned, frozen or dried fruits since lower in latex  April: asparagus, radishes  May: asparagus, broccoli, green onions, greens, peas, radishes, rhubarb  June: asparagus, beets, beans, broccoli, cabbage, cantaloupe, carrots, green onions, greens, lettuce, onions, parsley, peas, radishes, rhubarb, strawberries, watermelons  July: beans, beets, blueberries, broccoli, cabbage, cantaloupe, carrots, cauliflower, celery, cucumbers, eggplant, grapes, green onions, greens, lettuce, onions, parsley, peas, peaches, bell peppers, potatoes, radishes, summer raspberries, squash, sweetcorn, tomatoes, turnips, watermelons  August: apples, beans, beets, blueberries, cabbage, cantaloupe, carrots, cauliflower, celery, cucumbers, eggplant, grapes, green onions, greens, lettuce, onions, parsley, peas, peaches, pears, bell peppers, potatoes, radishes, squash, sweet corn, tomatoes, turnips, watermelons  September: apples, beans, beets, blueberries, cabbage, cantaloupe, carrots, cauliflower, celery, cucumbers, eggplant, grapes, green onions, greens, lettuce, onions, parsley, peas, peaches, pears, bell peppers, plums, potatoes, pumpkins, radishes, fall red raspberries, squash, sweet corn, tomatoes, turnips, watermelons  October: apples, beets, broccoli, cabbage, carrots, cauliflower, celery, green onions, greens, lettuce, parsley, peas, pears, potatoes, pumpkins, radishes, fall red raspberries, squash, turnips  November: broccoli, cabbage, carrots, parsley, pears, peas  December: use canned, frozen or dried fruits since lower in latex    Upto half of latex-sensitive patients show allergic reactions to fruits (avocados, bananas, kiwifruits, papayas, peaches),   Annals of Allergy, 1994. These plants contain the same proteins that are allergens in latex.  People with fruit allergies should warn physicians before undergoing procedures which may cause anaphylactic

## 2021-12-22 NOTE — PATIENT INSTRUCTIONS
Thank you   1. Thank you for trusting us with your healthcare needs. You may receive a survey regarding today's visit. It would help us out if you would take a few moments to provide your feedback. We value your input. 2. Please bring in ALL medications BOTTLES, including inhalers, herbal supplements, over the counter, prescribed & non-prescribed medicine. The office would like actual medication bottles and a list.   3. Please note our OFFICE POLICIES:   a. Prior to getting your labs drawn, please check with your insurance company for benefits and eligibility of lab services. Often, insurance companies cover certain tests for preventative visits only. It is patient's responsibility to see what is covered. b. We are unable to change a diagnosis after the test has been performed. c. Lab orders will not be re-printed. Please hold onto your original lab orders and take them to your lab to be completed. d. If you no show your scheduled appointment three times, you will be dismissed from this practice. e. Arlana Kalata must be completed 24 hours prior to your schedule appointment. 4. If the list below has been completed, PLEASE FAX RECORDS TO OUR OFFICE @ [phone].  Once the records have been received we will update your records at our office:  Health Maintenance Due   Topic Date Due    Flu vaccine (1) 09/01/2021    COVID-19 Vaccine (3 - Booster for DIRECTV series) 09/23/2021

## 2021-12-27 ENCOUNTER — HOSPITAL ENCOUNTER (OUTPATIENT)
Dept: CT IMAGING | Age: 71
Discharge: HOME | End: 2021-12-27
Payer: MEDICARE

## 2021-12-27 DIAGNOSIS — S06.319D: ICD-10-CM

## 2021-12-27 PROCEDURE — 70450 CT HEAD/BRAIN W/O DYE: CPT

## 2022-01-04 ENCOUNTER — OFFICE VISIT (OUTPATIENT)
Dept: FAMILY MEDICINE CLINIC | Age: 72
End: 2022-01-04
Payer: MEDICARE

## 2022-01-04 VITALS
HEIGHT: 66 IN | SYSTOLIC BLOOD PRESSURE: 118 MMHG | HEART RATE: 103 BPM | OXYGEN SATURATION: 97 % | DIASTOLIC BLOOD PRESSURE: 74 MMHG | RESPIRATION RATE: 16 BRPM | WEIGHT: 185.8 LBS | TEMPERATURE: 97.1 F | BODY MASS INDEX: 29.86 KG/M2

## 2022-01-04 DIAGNOSIS — Z12.5 PROSTATE CANCER SCREENING: ICD-10-CM

## 2022-01-04 DIAGNOSIS — S06.319S: ICD-10-CM

## 2022-01-04 DIAGNOSIS — I10 ESSENTIAL HYPERTENSION: Primary | ICD-10-CM

## 2022-01-04 DIAGNOSIS — E03.8 SUBCLINICAL HYPOTHYROIDISM: ICD-10-CM

## 2022-01-04 PROCEDURE — 1036F TOBACCO NON-USER: CPT | Performed by: STUDENT IN AN ORGANIZED HEALTH CARE EDUCATION/TRAINING PROGRAM

## 2022-01-04 PROCEDURE — 1123F ACP DISCUSS/DSCN MKR DOCD: CPT | Performed by: STUDENT IN AN ORGANIZED HEALTH CARE EDUCATION/TRAINING PROGRAM

## 2022-01-04 PROCEDURE — G8484 FLU IMMUNIZE NO ADMIN: HCPCS | Performed by: STUDENT IN AN ORGANIZED HEALTH CARE EDUCATION/TRAINING PROGRAM

## 2022-01-04 PROCEDURE — G8417 CALC BMI ABV UP PARAM F/U: HCPCS | Performed by: STUDENT IN AN ORGANIZED HEALTH CARE EDUCATION/TRAINING PROGRAM

## 2022-01-04 PROCEDURE — G8427 DOCREV CUR MEDS BY ELIG CLIN: HCPCS | Performed by: STUDENT IN AN ORGANIZED HEALTH CARE EDUCATION/TRAINING PROGRAM

## 2022-01-04 PROCEDURE — 4040F PNEUMOC VAC/ADMIN/RCVD: CPT | Performed by: STUDENT IN AN ORGANIZED HEALTH CARE EDUCATION/TRAINING PROGRAM

## 2022-01-04 PROCEDURE — 3017F COLORECTAL CA SCREEN DOC REV: CPT | Performed by: STUDENT IN AN ORGANIZED HEALTH CARE EDUCATION/TRAINING PROGRAM

## 2022-01-04 PROCEDURE — 99213 OFFICE O/P EST LOW 20 MIN: CPT | Performed by: STUDENT IN AN ORGANIZED HEALTH CARE EDUCATION/TRAINING PROGRAM

## 2022-01-04 RX ORDER — LISINOPRIL 5 MG/1
5 TABLET ORAL DAILY
Qty: 90 TABLET | Refills: 1 | Status: SHIPPED | OUTPATIENT
Start: 2022-01-04 | End: 2022-03-29 | Stop reason: ALTCHOICE

## 2022-01-04 ASSESSMENT — ENCOUNTER SYMPTOMS
DIARRHEA: 0
WHEEZING: 0
CONSTIPATION: 0
SHORTNESS OF BREATH: 0
ABDOMINAL PAIN: 0

## 2022-01-04 NOTE — PROGRESS NOTES
Health Maintenance Due   Topic Date Due    Flu vaccine (1) 09/01/2021 Declined    COVID-19 Vaccine (3 - Booster for Moderna series) 09/23/2021

## 2022-01-04 NOTE — PROGRESS NOTES
Tawnya Burks is a 73 y.o. male who presents today for:  Chief Complaint   Patient presents with    3 Month Follow-Up     Chronic Conditions       HPI:   Overall doing fine    Has appointment with neurosurg on Friday, f/u TBI  Hoping to get restrictions lifted/decreased  Most recent CT showed hematoma has resolved   Lightheadedness with bending/standing has improved    Vertigo from last visit has resolved    Doing well on lisinopril and keppra    Feels short term memory still needs some work    Still does not want to be on a statin     Declines flu shot this am        Food Insecurity: No Food Insecurity    Worried About Running Out of Food in the Last Year: Never true    Ran Out of Food in the Last Year: Never true     Health Maintenance reviewed -   Health Maintenance   Topic Date Due    Flu vaccine (1) 09/01/2021    COVID-19 Vaccine (3 - Booster for Moderna series) 09/23/2021    Potassium monitoring  06/03/2022    Creatinine monitoring  06/03/2022    Depression Screen  09/21/2022    Annual Wellness Visit (AWV)  09/22/2022    TSH testing  12/08/2022    Diabetes screen  12/08/2024    Colon cancer screen colonoscopy  07/08/2025    Lipid screen  09/21/2026    DTaP/Tdap/Td vaccine (2 - Td or Tdap) 10/22/2030    Shingles Vaccine  Completed    Pneumococcal 65+ years Vaccine  Completed    Hepatitis C screen  Completed    Hepatitis A vaccine  Aged Out    Hepatitis B vaccine  Aged Out    Hib vaccine  Aged Out    Meningococcal (ACWY) vaccine  Aged Out     Current Outpatient Medications   Medication Sig Dispense Refill    lisinopril (PRINIVIL;ZESTRIL) 5 MG tablet Take 1 tablet by mouth daily 90 tablet 1    calcium carbonate (OSCAL) 500 MG TABS tablet Take 500 mg by mouth daily      Magnesium Citrate 200 MG TABS Take 1 tablet by mouth 4 times daily (with meals and nightly)      B Complex-Folic Acid (G-329 BALANCED TR PO) Take 1 tablet by mouth 2 times daily (before meals) 55 Merissa Nichole No wheezing. Abdominal:      General: There is no distension. Palpations: Abdomen is soft. Tenderness: There is no abdominal tenderness. Neurological:      Mental Status: He is alert. Psychiatric:         Thought Content: Thought content normal.         Judgment: Judgment normal.         Assessment / Plan:   True Chatham was seen today for 3 month follow-up. 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 Will order BMP to check kidney function   History of brain bleed-last CT scan showed resolution of the hematoma. Patient to follow-up with neurosurgery this Friday. Deferred activity restrictions to them   Subclinical hypothyroidism-patient declines treatment at this time. Overall feeling well   Patient continues to decline statin at this time   Patient declined vaccines today   We will order PSA for prostate cancer screening    Patient to follow-up in 4 months, earlier as needed     Return in about 4 months (around 5/4/2022).     Medications Prescribed:  Orders Placed This Encounter   Medications    lisinopril (PRINIVIL;ZESTRIL) 5 MG tablet     Sig: Take 1 tablet by mouth daily     Dispense:  90 tablet     Refill:  1       Future Appointments   Date Time Provider Joya Marieisti   1/7/2022  9:00 AM Sony Gibbs AUDIOLOGY [facility]   1/7/2022 10:00 AM Chapo Mccurdy PA-C [address] [facility]   5/24/2022  3:00 PM [facility] [email or web address]   6/7/2022 11:00 AM MD SAL [facility] [email or web address]   6/7/2022  1:30 PM Kathleen Chew Jeanne King, [address]       Patient given educational materials - see patient instructions. Discussed use, benefit, and side effects of prescribed medications. All patient questions answered. Pt voiced understanding. Instructed to continue current medications, diet and exercise. Patient agreed with treatment plan. Follow up as directed. Part of this visit was documented via [id number] technology, please excuse any errors.      Electronically signed by Nikko Herrmann MD on 1/4/2022 at 12:58 PM

## 2022-01-04 NOTE — PATIENT INSTRUCTIONS
Thank you   1. Thank you for trusting us with your healthcare needs. You may receive a survey regarding today's visit. It would help us out if you would take a few moments to provide your feedback. We value your input. 2. Please bring in ALL medications BOTTLES, including inhalers, herbal supplements, over the counter, prescribed & non-prescribed medicine. The office would like actual medication bottles and a list.   3. Please note our OFFICE POLICIES:   a. Prior to getting your labs drawn, please check with your insurance company for benefits and eligibility of lab services. Often, insurance companies cover certain tests for preventative visits only. It is patient's responsibility to see what is covered. b. We are unable to change a diagnosis after the test has been performed. c. Lab orders will not be re-printed. Please hold onto your original lab orders and take them to your lab to be completed. d. If you no show your scheduled appointment three times, you will be dismissed from this practice. e. Orlando Reynolds must be completed 24 hours prior to your schedule appointment. 4. If the list below has been completed, PLEASE FAX RECORDS TO OUR OFFICE @ [phone].  Once the records have been received we will update your records at our office:  Health Maintenance Due   Topic Date Due    Flu vaccine (1) 09/01/2021    COVID-19 Vaccine (3 - Booster for Gillian Coup series) 09/23/2021

## 2022-01-04 NOTE — PROGRESS NOTES
Edwin Hendrix is a 73 y.o.male    Chief Complaint   Patient presents with    3 Month Follow-Up     Chronic Conditions       Chief complaint, [place], and all pertinent details of the case reviewed with the resident. Please see resident's note for specific details discussed at today's visit.         Patient Active Problem List   Diagnosis    Injury of head    Memory impairment    Concussion with no loss of consciousness    Nonintractable headache    Post concussive syndrome    Elevated low density lipoprotein (LDL) cholesterol level    S/P appendectomy    Essential hypertension    Subclinical hypothyroidism    Intraparenchymal hematoma of right side of brain due to trauma [facility])    Closed fracture of right side of base of skull (HCC)    Closed head injury    Scalp laceration, initial encounter    Right and left hemorrhagic contusion of cerebrum (HCC)    Scalp laceration    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 ([facility])    Vestibular dysfunction    Orthostatic hypotension       Current Outpatient Medications   Medication Sig Dispense Refill    lisinopril (PRINIVIL;ZESTRIL) 5 MG tablet Take 1 tablet by mouth daily 90 tablet 1    calcium carbonate (OSCAL) 500 MG TABS tablet Take 500 mg by mouth daily      Magnesium Citrate 200 MG TABS Take 1 tablet by mouth 4 times daily (with meals and nightly)      B Complex-Folic Acid (U-338 BALANCED TR PO) Take 1 tablet by mouth 2 times daily (before meals) [place]      Cinnamon 500 MG CAPS Take 1 capsule by mouth 4 times daily (before meals and nightly)      DHEA 10 MG CAPS Take 5 mg by mouth daily (with breakfast)      levETIRAcetam (KEPPRA) 500 MG tablet Take 1 tablet by mouth 2 times daily 120 tablet 1    acetaminophen (TYLENOL) 500 MG tablet Take 500 mg by mouth every 6 hours as needed for Pain Extra strength 2 tabs - PRN      KMRVNFVYZ-NVSAVENHM-SQTNJZNVQK PO Place 1 tablet under the tongue daily CHANDLER      vitamin D (CHOLECALCIFEROL) 25 MCG (1000 UT) TABS tablet Take 3,000 Units by mouth daily [address] [facility] 1000 U       No current facility-administered medications for this visit. Review of Systems per Dr. Melany Dumont     /74 (Site: Left Upper Arm, Position: Sitting, Cuff Size: Medium Adult)   Pulse 103   Temp 97.1 °F (36.2 °C) (Temporal)   Resp 16   Ht 5' 6\" (1.676 m)   Wt 185 lb 12.8 oz (84.3 kg)   SpO2 97%   BMI 29.99 kg/m²   BP Readings from Last 3 Encounters:   01/04/22 118/74   12/22/21 116/68   12/07/21 122/72       Wt Readings from Last 3 Encounters:   01/04/22 185 lb 12.8 oz (84.3 kg)   12/22/21 184 lb 6.4 oz (83.6 kg)   12/07/21 180 lb (81.6 kg)     Body mass index is 29.99 kg/m². Physical Exam per Dr. Majo Monge    No results found for this visit on 01/04/22.     Lab Results   Component Value Date    LABA1C 5.5 12/08/2021       Lab Results   Component Value Date    CHOL 227 (H) 09/21/2021    TRIG 217 (H) 09/21/2021    HDL 40 09/21/2021    LDLCALC 144 09/21/2021       The 10-year ASCVD risk score (Ness Lloyd, et al., 2013) is: 20.2%    Values used to calculate the score:      Age: 73 years      Sex: Male      Is Non- : No      Diabetic: No      Tobacco smoker: No      Systolic Blood Pressure: 261 mmHg      Is BP treated: Yes      HDL Cholesterol: 40 mg/dL      Total Cholesterol: 227 mg/dL    Lab Results   Component Value Date     06/03/2021    K 4.2 06/03/2021     06/03/2021    CO2 24 06/03/2021    BUN 13 06/03/2021    CREATININE 0.8 06/03/2021    GLUCOSE 128 (H) 06/03/2021    CALCIUM 9.4 12/08/2021    PROT 7.1 06/03/2021    LABALBU 4.0 06/03/2021    BILITOT 0.6 06/03/2021    ALKPHOS 45 06/03/2021    AST 13 06/03/2021    ALT 12 06/03/2021    LABGLOM >90 06/03/2021       Lab Results   Component Value Date    TSH 5.690 (H) 12/08/2021    Y2XWRBT 86 12/08/2021    T4FREE 1.03 04/06/2021       Lab Results   Component Value Date    WBC 6.3 12/08/2021    HGB 14.1 12/08/2021    HCT 43.8 12/08/2021    MCV 91.8 12/08/2021     12/08/2021       Lab Results   Component Value Date    PSA 0.71 01/28/2021       Immunization History   Administered Date(s) Administered    COVID-19, Ab Colbert, Primary or Immunocompromised, PF, 100mcg/0.5mL 02/23/2021, 03/23/2021    Influenza, Quadv, adjuvanted, 65 yrs +, IM, PF (Fluad) 10/08/2020    Pneumococcal Polysaccharide ([id number]) 10/08/2020    Tdap (Boostrix, Adacel) 10/22/2020    Zoster Recombinant (Shingrix) 10/22/2020, 01/14/2021       Health Maintenance   Topic Date Due    Flu vaccine (1) 09/01/2021    COVID-19 Vaccine (3 - Booster for Moderna series) 09/23/2021    Potassium monitoring  06/03/2022    Creatinine monitoring  06/03/2022    Depression Screen  09/21/2022    Annual Wellness Visit (AWV)  09/22/2022    TSH testing  12/08/2022    Diabetes screen  12/08/2024    Colon cancer screen colonoscopy  07/08/2025    Lipid screen  09/21/2026    DTaP/Tdap/Td vaccine (2 - Td or Tdap) 10/22/2030    Shingles Vaccine  Completed    Pneumococcal 65+ years Vaccine  Completed    Hepatitis C screen  Completed    Hepatitis A vaccine  Aged Out    Hepatitis B vaccine  Aged Out    Hib vaccine  Aged Out    Meningococcal (ACWY) vaccine  Aged Out           Future Appointments   Date Time Provider Joya Roseanne   1/7/2022  9:00 AM Sony Albert AUDIOLOGY [place] [facility]""   1/7/2022 10:00 AM Raynaldo Kayser, PA-C [address] [facility]   5/24/2022  3:00 PM Dany Crum MD [facility] [address]   6/7/2022 11:00 AM Janel Foreman MD [facility] [address]   6/7/2022  1:30 PM Pineda Maldonado MD N [facility] [address]       ASSESSMENT       Diagnosis Orders   1. Essential hypertension  lisinopril (PRINIVIL;ZESTRIL) 5 MG tablet    Basic Metabolic Panel   2. Intraparenchymal hematoma of brain due to trauma, right with loss of consciousness, sequela (HCC)     3. Subclinical hypothyroidism     4.  Prostate cancer screening  PSA Screening       PLAN      After discussion with Dr. Jama Sky, we agreed on plan as follows:    Encourage patient to work on diet/exercise per neurology recommendations  Continue lisinopril 5 mg daily-consider increased dose if BPs greater than 150/90 on a regular basis  Patient refuses statins at this time  Patient declines flu shot today  Check BMP  Check PSA after 1/28/2022  Follow-up in 3-4 months or sooner if any further problems. Preventive Health Topics:  Encouraged COVID VACCINE booster  COLONOSCOPY done 7/8/2020 per Dr. Judy Miranda do again in 2025. Attending Physician Statement  I have discussed the case, including pertinent history and exam findings with the resident. I agree with the documented assessment and plan as documented by the resident.   GE modifier added  to this encounter     Electronically signed by Sanjana Casey MD on 1/4/2022 at 1:04 PM

## 2022-01-07 ENCOUNTER — HOSPITAL ENCOUNTER (OUTPATIENT)
Dept: AUDIOLOGY | Age: 72
Discharge: HOME | End: 2022-01-07

## 2022-01-07 PROCEDURE — V5267 HEARING AID SUP/ACCESS/DEV: HCPCS | Performed by: AUDIOLOGIST

## 2022-01-07 PROCEDURE — 9990000010 HC NO CHARGE VISIT: Performed by: AUDIOLOGIST

## 2022-01-12 ENCOUNTER — OFFICE VISIT (OUTPATIENT)
Dept: NEUROSURGERY | Age: 72
End: 2022-01-12
Payer: MEDICARE

## 2022-01-12 VITALS
WEIGHT: 185 LBS | OXYGEN SATURATION: 98 % | HEART RATE: 81 BPM | RESPIRATION RATE: 18 BRPM | DIASTOLIC BLOOD PRESSURE: 78 MMHG | HEIGHT: 66 IN | BODY MASS INDEX: 29.73 KG/M2 | SYSTOLIC BLOOD PRESSURE: 123 MMHG

## 2022-01-12 DIAGNOSIS — S06.319D: ICD-10-CM

## 2022-01-12 DIAGNOSIS — S06.9X1D TRAUMATIC BRAIN INJURY WITH LOSS OF CONSCIOUSNESS OF 30 MINUTES OR LESS, SUBSEQUENT ENCOUNTER: Primary | ICD-10-CM

## 2022-01-12 PROCEDURE — G8427 DOCREV CUR MEDS BY ELIG CLIN: HCPCS | Performed by: PHYSICIAN ASSISTANT

## 2022-01-12 PROCEDURE — 4040F PNEUMOC VAC/ADMIN/RCVD: CPT | Performed by: PHYSICIAN ASSISTANT

## 2022-01-12 PROCEDURE — 99213 OFFICE O/P EST LOW 20 MIN: CPT | Performed by: PHYSICIAN ASSISTANT

## 2022-01-12 PROCEDURE — 1123F ACP DISCUSS/DSCN MKR DOCD: CPT | Performed by: PHYSICIAN ASSISTANT

## 2022-01-12 PROCEDURE — G8417 CALC BMI ABV UP PARAM F/U: HCPCS | Performed by: PHYSICIAN ASSISTANT

## 2022-01-12 PROCEDURE — 3017F COLORECTAL CA SCREEN DOC REV: CPT | Performed by: PHYSICIAN ASSISTANT

## 2022-01-12 PROCEDURE — 1036F TOBACCO NON-USER: CPT | Performed by: PHYSICIAN ASSISTANT

## 2022-01-12 PROCEDURE — G8484 FLU IMMUNIZE NO ADMIN: HCPCS | Performed by: PHYSICIAN ASSISTANT

## 2022-01-12 NOTE — PROGRESS NOTES
[address] [place] [address]  [address]  Dept: [phone]  Dept Fax: [phone]  Loc: [facility] Follow Visit  Visit Date: 1/12/2022      Rhiannon Blood  is a 73 y.o. male who is returning to the office today for a post-op follow-up visit for continuing evaluation of traumatic brain injury and associated hematoma. He was last seen and evaluated in our office setting on 11/19/2021 where he was accompanied by his wife having recently returned from a vacation in [facility]. He stated that headaches were less severe and less frequent and was otherwise stable and neurologically intact for strength and sensation bilaterally and symmetrically. No visual deficits were noted no pronator drift on exam and he was absent nystagmus. Needs follow-up includes a CT scan of the head imaged without contrast on 12/27/2021 which was considered stable for left frontal encephalomalacia with no residual or recurrent subdural hematoma. No intracranial hemorrhage, mass-effect or midline shift was identified. At today's visit he relates having been referred from [facility] neurology for what he describes as quotation higher level of care\". He relates having experienced improvement with therapy but over the last week has realized a return of some his prior symptom presentation which includes; morning dizziness. On exam today he is stable and intact neurologically without any deficits and did not have any complaints of dizziness or headache or visual disturbances. Absent a referral to a specific specialist, we have recommended that he be seen and evaluated by Dr. Atkins/neurology at [facility] and they are amicable to doing so. He is on the stable intact nature of today's exam his improvements to date and the resolution of the prior hematoma on the most recent imaging, we have agreed to follow-up with them as needed.   A renewal of his physical therapy is processed at today's appointment. They remain very happy with her care to date with today's appointment having their questions and concerns addressed and answered.     · Patient was evaluated today and is doing well overall. · No new complaints were voiced. · Patient  lives with their spouse  · Wound: none  · Follow-up Studies: No orders of the defined types were placed in this encounter. ·      Assessment/Plan:  · Status Post traumatic brain injury and subdural hematoma evaluation follow-up  · Doing well overall  · Encouraged gradual increase in physical and mental activity. · Fall precaution and home safety education provided to patient. · Follow-up: As needed follow-up with our service moving forward. A referral to Dr. Atkins/neurologist of [address] and a renewal of physical therapy prescription.       Electronically signed by Noa Ward PA-C on 1/12/22 at 2:33 PM EST

## 2022-01-14 ENCOUNTER — OFFICE VISIT (OUTPATIENT)
Dept: FAMILY MEDICINE CLINIC | Age: 72
End: 2022-01-14
Payer: MEDICARE

## 2022-01-14 ENCOUNTER — TELEPHONE (OUTPATIENT)
Dept: AUDIOLOGY | Age: 72
End: 2022-01-14

## 2022-01-14 VITALS
DIASTOLIC BLOOD PRESSURE: 62 MMHG | HEART RATE: 66 BPM | WEIGHT: 185 LBS | HEIGHT: 66 IN | TEMPERATURE: 97.1 F | SYSTOLIC BLOOD PRESSURE: 98 MMHG | BODY MASS INDEX: 29.73 KG/M2 | RESPIRATION RATE: 20 BRPM | OXYGEN SATURATION: 99 %

## 2022-01-14 DIAGNOSIS — R42 VERTIGO: ICD-10-CM

## 2022-01-14 DIAGNOSIS — R42 LIGHTHEADED: ICD-10-CM

## 2022-01-14 DIAGNOSIS — H91.93 BILATERAL HEARING LOSS, UNSPECIFIED HEARING LOSS TYPE: Primary | ICD-10-CM

## 2022-01-14 DIAGNOSIS — H91.90 HEARING LOSS, UNSPECIFIED HEARING LOSS TYPE, UNSPECIFIED LATERALITY: ICD-10-CM

## 2022-01-14 DIAGNOSIS — R26.89 BALANCE DISORDER: Primary | ICD-10-CM

## 2022-01-14 PROCEDURE — 4040F PNEUMOC VAC/ADMIN/RCVD: CPT | Performed by: STUDENT IN AN ORGANIZED HEALTH CARE EDUCATION/TRAINING PROGRAM

## 2022-01-14 PROCEDURE — 1123F ACP DISCUSS/DSCN MKR DOCD: CPT | Performed by: STUDENT IN AN ORGANIZED HEALTH CARE EDUCATION/TRAINING PROGRAM

## 2022-01-14 PROCEDURE — G8427 DOCREV CUR MEDS BY ELIG CLIN: HCPCS | Performed by: STUDENT IN AN ORGANIZED HEALTH CARE EDUCATION/TRAINING PROGRAM

## 2022-01-14 PROCEDURE — 99214 OFFICE O/P EST MOD 30 MIN: CPT | Performed by: STUDENT IN AN ORGANIZED HEALTH CARE EDUCATION/TRAINING PROGRAM

## 2022-01-14 PROCEDURE — G8484 FLU IMMUNIZE NO ADMIN: HCPCS | Performed by: STUDENT IN AN ORGANIZED HEALTH CARE EDUCATION/TRAINING PROGRAM

## 2022-01-14 PROCEDURE — G8417 CALC BMI ABV UP PARAM F/U: HCPCS | Performed by: STUDENT IN AN ORGANIZED HEALTH CARE EDUCATION/TRAINING PROGRAM

## 2022-01-14 PROCEDURE — 1036F TOBACCO NON-USER: CPT | Performed by: STUDENT IN AN ORGANIZED HEALTH CARE EDUCATION/TRAINING PROGRAM

## 2022-01-14 PROCEDURE — 3017F COLORECTAL CA SCREEN DOC REV: CPT | Performed by: STUDENT IN AN ORGANIZED HEALTH CARE EDUCATION/TRAINING PROGRAM

## 2022-01-14 NOTE — PROGRESS NOTES
Adeel Chacko is a 73 y.o. male who presents today for:  Chief Complaint   Patient presents with    Dizziness       HPI:   Having lightheadedness   Worse this am  Cant walk straight when dizzy, grabbing on to furniture to walk, unsteady on feet   Typically lasting 30 seconds to a few minutes   Sometimes feels the room is spinning      Recently got third covid test 1/12/22    Good appetite   No falls   No weakness   No vision changes   No numbness or tingling   No slurred words or drooping face     Was hoping to get into PT as this helped with his dizziness in the past    Saw audiology last week and was recommended a hearing test and video nystagmography test that they were told I would have to order     Cleared by neurosurgery from his brain bleed     Has neuro appointment in March        Food Insecurity: No Food Insecurity    Worried About Running Out of Food in the Last Year: Never true    Ran Out of Food in the Last Year: Never true     Health Maintenance reviewed -   Health Maintenance   Topic Date Due    Flu vaccine (1) 09/01/2021    Potassium monitoring  06/03/2022    Creatinine monitoring  06/03/2022    Depression Screen  09/21/2022    Annual Wellness Visit (AWV)  09/22/2022    TSH testing  12/08/2022    Diabetes screen  12/08/2024    Colon cancer screen colonoscopy  07/08/2025    Lipid screen  09/21/2026    DTaP/Tdap/Td vaccine (2 - Td or Tdap) 10/22/2030    Shingles Vaccine  Completed    Pneumococcal 65+ years Vaccine  Completed    COVID-19 Vaccine  Completed    Hepatitis C screen  Completed    Hepatitis A vaccine  Aged Out    Hepatitis B vaccine  Aged Out    Hib vaccine  Aged Out    Meningococcal (ACWY) vaccine  Aged Out     Current Outpatient Medications   Medication Sig Dispense Refill    lisinopril (PRINIVIL;ZESTRIL) 5 MG tablet Take 1 tablet by mouth daily 90 tablet 1    calcium carbonate (OSCAL) 500 MG TABS tablet Take 500 mg by mouth daily      Magnesium Citrate 200 MG TABS Take 1 tablet by mouth 4 times daily (with meals and nightly)      B Complex-Folic Acid (R-973 BALANCED TR PO) Take 1 tablet by mouth 2 times daily (before meals) [place]      Cinnamon 500 MG CAPS Take 1 capsule by mouth 4 times daily (before meals and nightly)      levETIRAcetam (KEPPRA) 500 MG tablet Take 1 tablet by mouth 2 times daily 120 tablet 1    vitamin D (CHOLECALCIFEROL) 25 MCG (1000 UT) TABS tablet Take 3,000 Units by mouth daily [address] [facility] 1000 U       No current facility-administered medications for this visit. Social History     Tobacco Use    Smoking status: Never Smoker    Smokeless tobacco: Never Used   Substance Use Topics    Alcohol use: Never      Subjective:   Review of Systems   Neurological: Positive for dizziness and light-headedness. Negative for weakness. Objective:     Vitals:    01/14/22 0936   BP: 98/62   Site: Left Upper Arm   Position: Sitting   Cuff Size: Medium Adult   Pulse: 66   Resp: 20   Temp: 97.1 °F (36.2 °C)   TempSrc: Skin   SpO2: 99%   Weight: 185 lb (83.9 kg)   Height: 5' 6\" (1.676 m)     Body mass index is 29.86 kg/m². Wt Readings from Last 3 Encounters:   01/14/22 185 lb (83.9 kg)   01/12/22 185 lb (83.9 kg)   01/04/22 185 lb 12.8 oz (84.3 kg)     BP Readings from Last 3 Encounters:   01/14/22 98/62   01/12/22 123/78   01/04/22 118/74     Physical Exam  Vitals and nursing note reviewed. Constitutional:       General: He is not in acute distress. Appearance: He is well-developed. He is not diaphoretic. Cardiovascular:      Rate and Rhythm: Normal rate and regular rhythm. Heart sounds: Normal heart sounds. No murmur heard. Pulmonary:      Effort: Pulmonary effort is normal.      Breath sounds: Normal breath sounds. No wheezing. Abdominal:      General: There is no distension. Palpations: Abdomen is soft. Tenderness: There is no abdominal tenderness. Neurological:      Mental Status: He is alert.       Sensory: No sensory deficit. Motor: No weakness. Comments: Normal Romberg   Psychiatric:         Thought Content: Thought content normal.         Judgment: Judgment normal.         Assessment / Plan:   Cristian Mcdowell was seen today for dizziness. Diagnoses and all orders for this visit:    Balance disorder  -     [facility] Therapy - [facility]'s    Hearing loss, unspecified hearing loss type, unspecified laterality    Vertigo  -     [facility] - [facility]    [facility]      Patient presents to discuss dizziness  · Dizziness/vertigo/lightheadedness-unclear etiology at this time. Patient did have BPPV in the past. Patient was also endorsing orthostatic hypotensive leg symptoms last visit. Blood pressure was low normal today, patient has not been checking blood pressure at home. Patient also got third COVID shot 2 days prior to this visit which may be contributing as well. Will refer to PT as this was helpful for him in the past. Patient instructed to stop his lisinopril for the time being. Encourage good hydration and good diet. Epley maneuver handout given to patient  · Hearing trouble-patient urged to follow-up with audiology    Patient to follow-up in 1 month, earlier as needed     Return in about 4 weeks (around 2/11/2022). Medications Prescribed:  No orders of the defined types were placed in this encounter. Future Appointments   Date Time Provider Joya Cronin   1/17/2022  8:15 AM Gladis Rich PT [facility] [facility]   1/24/2022  1:00 PM Sony Aguilar AUDIOLOGY [address] [facility]   2/15/2022 10:40 AM Helga Magallanes MD [facility] [address]   5/24/2022  3:00 PM Helga Magallanes MD [facility] [address]   6/7/2022 11:00 AM Miladys Brothers MD [email or web address] [address] [place] [address]   6/7/2022  1:30 PM MD ZANDRA Sylvester [facility] Pain [address]       Patient given educational materials - see patient instructions.   Discussed use, benefit, and side effects of prescribed medications. All patient questions answered. Pt voiced understanding. Instructed to continue current medications, diet and exercise. Patient agreed with treatment plan. Follow up as directed. Part of this visit was documented via [id number] technology, please excuse any errors.      Electronically signed by Adrianna Baldwin MD on 1/14/2022 at 5:38 PM

## 2022-01-14 NOTE — PROGRESS NOTES
S: 73 y.o. male with   Chief Complaint   Patient presents with   M[place] March a few months ago when playing pickle ball - that has been getting better    This am really bad - unsteady on his feet - has had the middle ear crystals in the past, usually lasts a few minutes    Has balance issues    Also recently had the covid booster    BP Readings from Last 3 Encounters:   01/14/22 98/62   01/12/22 123/78   01/04/22 118/74     Wt Readings from Last 3 Encounters:   01/14/22 185 lb (83.9 kg)   01/12/22 185 lb (83.9 kg)   01/04/22 185 lb 12.8 oz (84.3 kg)           O: VS:   Vitals:    01/14/22 0936   BP: 98/62   Site: Left Upper Arm   Position: Sitting   Cuff Size: Medium Adult   Pulse: 66   Resp: 20   Temp: 97.1 °F (36.2 °C)   TempSrc: Skin   SpO2: 99%   Weight: 185 lb (83.9 kg)   Height: 5' 6\" (1.676 m)     Body mass index is 29.86 kg/m². AAO/NAD, appropriate affect for mood  Normocephalic, atraumatic, eyes - conjunctiva and sclera normal,   skin no rashes on exposed areas   Insight, judgement normal and in no acute distress      Lab Results   Component Value Date    WBC 6.3 12/08/2021    HGB 14.1 12/08/2021    HCT 43.8 12/08/2021     12/08/2021    CHOL 227 (H) 09/21/2021    TRIG 217 (H) 09/21/2021    HDL 40 09/21/2021    LDLCALC 144 09/21/2021    AST 13 06/03/2021     06/03/2021    K 4.2 06/03/2021     06/03/2021    CREATININE 0.8 06/03/2021    BUN 13 06/03/2021    CO2 24 06/03/2021    TSH 5.690 (H) 12/08/2021    PSA 0.71 01/28/2021    INR 0.99 05/28/2021    LABA1C 5.5 12/08/2021    LABGLOM >90 06/03/2021    MG 2.1 12/08/2021    CALCIUM 9.4 12/08/2021    VITD25 28 (L) 12/08/2021       CT HEAD WO CONTRAST    Result Date: 12/27/2021  PROCEDURE: CT HEAD WO CONTRAST CLINICAL INFORMATION: Intraparenchymal hematoma of brain due to trauma, right with loss of consciousness, subsequent encounter. Follow-up. COMPARISON: 11/11/2021.  TECHNIQUE: Noncontrast 5 mm axial images were obtained through the brain. Sagittal and coronal reconstructions were created. All CT scans at this facility use dose modulation, iterative reconstruction, and/or weight-based dosing when appropriate to reduce radiation dose to as low as reasonably achievable. FINDINGS: Redemonstration of a moderate-sized area of hypodensity in the left frontal lobe at the convexity, unchanged compared to prior exam. Ventricles, cisterns and sulci are otherwise symmetric and normal in size and configuration. Gray-white matter differentiation otherwise appears grossly preserved. No intracranial hemorrhage, mass effect or midline shift is identified. The calvarium appears intact. Orbits are unremarkable. Visualized paranasal sinuses are clear. Mastoid air cells are clear. Stable left frontal encephalomalacia. No residual or recurrent subdural hematoma. **This report has been created using voice recognition software. It may contain minor errors which are inherent in voice recognition technology. ** Final report electronically signed by Dr. Luis Eduardo Gar MD on 12/27/2021 4:21 PM           Diagnosis Orders   1. Balance disorder     2. Hearing loss, unspecified hearing loss type, unspecified laterality     3. Vertigo         Plan  Will refer to PT for vertigo and balance    Will hold the lisinopril for now due to lower blood pressure    Will see you back in a month to see the progress    Ok to order the hearing test and vide       No follow-ups on file. Orders Placed:  No orders of the defined types were placed in this encounter. Medications Prescribed:  No orders of the defined types were placed in this encounter.       Future Appointments   Date Time Provider Joya Cronin   5/24/2022  3:00 PM MD SAL [facility] - [address] [place] [address]   6/7/2022 11:00 AM MD SAL [place] [address]   6/7/2022  1:30 PM MD Bradley [facility] Maintenance Due   Topic Date Due    Flu vaccine (1) 09/01/2021  COVID-19 Vaccine (3 - Booster for Kirke Tito series) 09/23/2021         Attending Physician Statement  I have discussed the case, including pertinent history and exam findings with the resident. I also have seen the patient and performed key portions of the examination. I agree with the documented assessment and plan as documented by the resident.   GE modifier added to this encounter      Aditya Spencer DO 1/14/2022 10:21 AM

## 2022-01-14 NOTE — LETTER
[address] [place] [address],[address] [id number]  Phone: [phone]  Fax: [phone]    Mao Alicia MD        January 14, 2022     Patient: Dov Rosnebaum   YOB: 1950   Date of Visit: 1/14/2022       To Whom It May Concern:    I would like for Laura Khalil to have a hearing test and a video nystagmography test.     If you have any questions or concerns, please don't hesitate to call.     Sincerely,        Mao Alicia MD

## 2022-01-14 NOTE — PATIENT INSTRUCTIONS
Patient Education        Epley Maneuver at Home for Vertigo: Exercises  Introduction  Vertigo is a spinning or whirling sensation when you move your head. Your doctor may have moved you in different positions to help your vertigo get better faster. This is called the Epley maneuver. Your doctor also may have asked you to do these exercises at home. Do the exercises as often as your doctor recommends. If your vertigo is getting worse, your doctor may have you change the exercise or stop it. Step 1  Step 1    1. Sit on the edge of a bed or sofa. Step 2    1. Turn your head 45 degrees in the direction your doctor told you to. This should be toward the ear that causes the most vertigo for you. In this picture, the woman is turning toward her left ear. Step 3    1. Tilt yourself backward until you are lying on your back. Your head should still be at a 45-degree turn. Your head should be about midway between looking straight ahead and looking out to your side. Hold for 30 seconds. If you have vertigo, stay in this position until it stops. Step 4    1. Turn your head 90 degrees toward the ear that has the least vertigo. In this picture, the woman is turning to the right because she has vertigo on her left side. The point of your chin should be raised and over your shoulder. Hold for 30 seconds. Step 5    1. Roll onto the side with the least vertigo. You should now be looking at the floor. Hold for 30 seconds. Follow-up care is a key part of your treatment and safety. Be sure to make and go to all appointments, and call your doctor if you are having problems. It's also a good idea to know your test results and keep a list of the medicines you take. Where can you learn more? Go to https://chsammeb.Customcells. org and sign in to your [employer] account. Enter O423 in the SeoPult box to learn more about \"Epley Maneuver at Home for Vertigo: Exercises. \"     If you do not have an account, please click on the \"Sign Up Now\" link. Current as of: April 8, 2021               Content Version: 13.1  © 2006-2021 [employer]. Care instructions adapted under license by [facility] ([facility]). If you have questions about a medical condition or this instruction, always ask your healthcare professional. [address] any warranty or liability for your use of this information. Patient Education        Epley Maneuver for Vertigo: Exercises  Introduction  The Epley maneuver is a series of movements your doctor may use to treat your vertigo. Here are the steps for the exercises. Your doctor or physical therapist will guide you through the movements. A single 10- to 15-minute session often is all that's needed. Crystal debris (canaliths) cause the vertigo. When your head is moved into different positions, the debris moves freely. This may cause your symptoms to stop. How to do the exercises  Step 1    1. You will sit on the doctor's exam table. Your legs will be out in front of you. The doctor or physical therapist will turn your head so that it is retirement between looking straight ahead and looking to the side that causes the worst vertigo. 2. Without changing your head position, he or she will guide you back quickly. Your shoulders will be on the table. Your head will hang over the edge of the table. At this point, the side of your head that is causing the worst vertigo will face the floor. You'll stay in this position for 30 seconds or until your symptoms stop. Step 2    1. Then, the doctor or physical therapist will turn your head to the other side. You don't need to lift your head. The other side of your head will face the floor. You will stay in this position for 30 seconds or until your symptoms stop. Step 3    1. The doctor or physical therapist will help you roll your body in the same direction that your head is facing. You will lie on your side.  (For example, if you are looking to your right, you will roll onto your right side.) The side that causes the worst symptoms should be facing up. You'll stay in this position for another 30 seconds or until your symptoms stop. Step 4    1. The doctor or physical therapist will then help you to sit back up. Your legs will hang off the table on the same side that you were facing. Follow-up care is a key part of your treatment and safety. Be sure to make and go to all appointments, and call your doctor if you are having problems. It's also a good idea to know your test results and keep a list of the medicines you take. Where can you learn more? Go to https://Akanoopepiceweb.HighWire Press. org and sign in to your [employer] account. Enter P083 in the openPeople box to learn more about \"Epley Maneuver for Vertigo: Exercises. \"     If you do not have an account, please click on the \"Sign Up Now\" link. Current as of: September 8, 2021               Content Version: 13.1  © 2006-2021 [employer]. Care instructions adapted under license by [facility] ([facility]). If you have questions about a medical condition or this instruction, always ask your healthcare professional. [place] [address] any warranty or liability for your use of this information. Patient Education        Lightheadedness or Faintness: Care Instructions  Your Care Instructions  Lightheadedness is a feeling that you are about to faint or \"pass out. \" You do not feel as if you or your surroundings are moving. It is different from vertigo, which is the feeling that you or things around you are spinning or tilting. Lightheadedness usually goes away or gets better when you lie down. If lightheadedness gets worse, it can lead to a fainting spell. It is common to feel lightheaded from time to time. Lightheadedness usually is not caused by a serious problem.  It often is caused by a short-lasting drop in blood pressure and blood flow to your head that occurs when you get up too quickly from a seated or lying position. Follow-up care is a key part of your treatment and safety. Be sure to make and go to all appointments, and call your doctor if you are having problems. It's also a good idea to know your test results and keep a list of the medicines you take. How can you care for yourself at home? · Lie down for 1 or 2 minutes when you feel lightheaded. After lying down, sit up slowly and remain sitting for 1 to 2 minutes before slowly standing up. · Avoid movements, positions, or activities that have made you lightheaded in the past.  · Get plenty of rest, especially if you have a cold or flu, which can cause lightheadedness. · Make sure you drink plenty of fluids, especially if you have a fever or have been sweating. · Do not drive or put yourself and others in danger while you feel lightheaded. When should you call for help? Call 911 anytime you think you may need emergency care. For example, call if:    · You have symptoms of a stroke. These may include:  ? Sudden numbness, tingling, weakness, or loss of movement in your face, arm, or leg, especially on only one side of your body. ? Sudden vision changes. ? Sudden trouble speaking. ? Sudden confusion or trouble understanding simple statements. ? Sudden problems with walking or balance. ? A sudden, severe headache that is different from past headaches.     · You have symptoms of a heart attack. These may include:  ? Chest pain or pressure, or a strange feeling in the chest.  ? Sweating. ? Shortness of breath. ? Nausea or vomiting. ? Pain, pressure, or a strange feeling in the back, neck, jaw, or upper belly or in one or both shoulders or arms. ? Lightheadedness or sudden weakness. ? A fast or irregular heartbeat. After you call 911, the  may tell you to chew 1 adult-strength or 2 to 4 low-dose aspirin. Wait for an ambulance. Do not try to drive yourself.    Watch closely for changes in your health, and be sure to contact your doctor if:    · Your lightheadedness gets worse or does not get better with home care. Where can you learn more? Go to https://ManifactpeelviraChartsNow (now MusicQubed)eb.Friend Trusted. org and sign in to your [employer] account. Enter Q314 in the [employer] box to learn more about \"Lightheadedness or Faintness: Care Instructions. \"     If you do not have an account, please click on the \"Sign Up Now\" link. Current as of: July 1, 2021               Content Version: 13.1  © 8578-6901 Healthwise[employer]. Care instructions adapted under license by [facility] ([facility]). If you have questions about a medical condition or this instruction, always ask your healthcare professional. [address] any warranty or liability for your use of this information.

## 2022-01-17 ENCOUNTER — HOSPITAL ENCOUNTER (OUTPATIENT)
Dept: PHYSICAL THERAPY | Age: 72
Setting detail: THERAPIES SERIES
Discharge: HOME | End: 2022-01-17
Payer: MEDICARE

## 2022-01-17 ENCOUNTER — TELEPHONE (OUTPATIENT)
Dept: FAMILY MEDICINE CLINIC | Age: 72
End: 2022-01-17

## 2022-01-17 PROCEDURE — 97161 PT EVAL LOW COMPLEX 20 MIN: CPT

## 2022-01-17 NOTE — PROGRESS NOTES
** PLEASE SIGN, DATE AND TIME CERTIFICATION BELOW AND RETURN TO [facility] OUTPATIENT REHABILITATION (FAX #: [phone]). ATTEST/CO-SIGN IF ACCESSING VIA INMinubo. THANK YOU.**    I certify that I have examined the patient below and determined that Physical Medicine and Rehabilitation service is necessary and that I approve the established plan of care for up to 90 days or as specifically noted. Attestation, signature or co-signature of physician indicates approval of certification requirements.    ________________________ ____________ __________  Physician Signature   Date   Time  [facility]  PHYSICAL THERAPY  [x] EVALUATION  [] DAILY NOTE (LAND) [] DAILY NOTE (AQUATIC ) [] PROGRESS NOTE [] DISCHARGE NOTE    [x] [facility]   [] [facility] 90    [] [facility]   [] Albino Slate    Date: 2022  Patient Name:  Claudell Fleck  : 1950  MRN: [id number]  CSN: [id number]    Referring Practitioner Valeri Barillas MD   Diagnosis Dizziness and giddiness [R42]    Treatment Diagnosis Vertigo, Balance Disorder   Date of Evaluation 22    Additional Pertinent History TBI, High BP      Functional Outcome Measure Used Dizziness Handicap Inventory   Functional Outcome Score 40 (22)       Insurance: Primary: Payor: Mary Cuadra /  /  / ,   Secondary: [place] [employer] [facility] BENEFIT   Authorization Information: PRECERTIFICATION REQUIRED:  No  INSURANCE THERAPY BENEFIT:  Patient has unlimited visits based on medical necessity  Benefit will not cover maintenance or preventative treatment.   AQUATIC THERAPY COVERED:   Yes  MODALITIES COVERED:  Yes, with the exception of iontophoresis and hot packs/cold packs  TELEHEALTH COVERED: Yes   Visit # 1, 1/10 for progress note   Visits Allowed: Unlimited visits based on medical necessity   Recertification Date: 42   Physician Follow-Up: 2022   Physician Orders: [employer] and treat   History of Present Illness: Vanna Toledo presents to therapy with dizziness which occurs when laying down or sitting up. Does not occur every time. Reports he had this in December, but the dizziness is lasting longer at this time. Did go to therapy in December which helped. VNG scheduled for 1/24/22. SUBJECTIVE: Vanna Toleod presents to therapy with dizziness which occurs when laying down or sitting up. Does not occur every time. Reports he had this in December, but the dizziness is lasting longer at this time. Did go to therapy in December which helped. VNG scheduled for 1/24/22. Social/Functional History and Current Status:  Medications and Allergies have been reviewed and are listed on Medical History Questionnaire. Madeleine Gustafson lives with spouse in a single story home with stairs and a handrail to enter.     Task Previous Current   ADLs  Independent Independent   IADL's Independent Independent   Ambulation Independent Independent   Transfers Independent Independent   Recreation Independent Independent   Community Integration Independent Independent   Driving Active  Active    Work Retired  Retired       TREATMENT   Precautions: High BP, Vertigo/Dizziness, Brain Injury, Memory Issues   Pain: No pain    X in shaded column indicates activity completed today   Modalities Parameters/  Location  Notes                     Manual Therapy Time/Technique  Notes                     Exercise/Intervention   Notes   Modified Vertbral Artery=negative bilateral   x Does have a sore neck   Smooth pursuit=negative   x    Saccade=negative   x    Head Thrust=negative   x    [place]: EO=30 sec; EC=30 sec with minimal sway   x    [place] Hallpike=negative bilateral   x    Roll Test=negative bilateral   x                                  Specific Interventions Next Treatment: Vestibular recheck, gaze exercises, balance exercises    Activity/Treatment Tolerance:  [x]  Patient tolerated treatment well  []  Patient limited by fatigue  []  Patient limited by pain   []  Patient limited by medical complications  []  Other:     Assessment: 73year old male presents to therapy with dizziness. He did not have any positive symptoms and no nystagmus during session this morning, but reports was dizzy at home this morning. Will schedule follow up to see if any symptoms change. Body Structures/Functions/Activity Limitations: impaired activity tolerance, impaired balance and Dizziness  Prognosis: good    GOALS:  Patient Goal: \"Decrease dizziness\"    Short Term Goals:  Time Frame: See LTG      Long Term Goals:  Time Frame: 12 weeks  1. Improve DHI to 35 or less to assist with safety at home. 2.  Lacey Manus to report no longer having dizziness to assist with getting in and out of bed. 3.  Independent with HEP to assist with decreasing dizziness. Patient Education:    [x]  HEP/Education Completed: Plan of Care, Goals, follow up in 1 week   crobo Access Code:  []  No new Education completed  []  Reviewed Prior HEP      []  Patient verbalized and/or demonstrated understanding of education provided. []  Patient unable to verbalize and/or demonstrate understanding of education provided. Will continue education. [x]  Barriers to learning: None per patient    PLAN:  Treatment Recommendations: Balance Training, Functional Mobility Training, Home Exercise Program, Patient Education and Vestibular Rehabilitation    [x]  Plan of care initiated. Plan to see patient 2 times per week for 12 weeks to address the treatment planned outlined above.   []  Continue with current plan of care  []  Modify plan of care as follows:    []  Hold pending physician visit  []  Discharge    Time In 0815   Time Out 0855   Timed Code Minutes: 0 min   Total Treatment Time: 40 min       Electronically Signed by: Amisha Hadadd PT

## 2022-01-24 ENCOUNTER — HOSPITAL ENCOUNTER (OUTPATIENT)
Dept: AUDIOLOGY | Age: 72
Discharge: HOME | End: 2022-01-24
Payer: MEDICARE

## 2022-01-24 PROCEDURE — 92557 COMPREHENSIVE HEARING TEST: CPT | Performed by: AUDIOLOGIST

## 2022-01-24 PROCEDURE — 92540 BASIC VESTIBULAR EVALUATION: CPT | Performed by: AUDIOLOGIST

## 2022-01-24 PROCEDURE — 92567 TYMPANOMETRY: CPT | Performed by: AUDIOLOGIST

## 2022-01-24 NOTE — PROGRESS NOTES
ACCOUNT #: [de-identified]                                    AUDIOLOGICAL EVALUATION WITH VNG      MEDICATIONS REVIEWED:  Patient has held appropriate medications for VNG testing. REASON FOR TESTING: Audiometric evaluation and VNG testing per the request of Dr. Yael Sun, due to the diagnosis of bilateral hearing loss, unspecified. Patient suffered a skull fracture, TBI, and concussion about 7 months ago. He was injured while playing pickle ball. He continues to recover. He has been treated for BPPV but the episodic vertigo has returned. The vertigo occurs when he goes to bed, gets out of bed or rolls over. He is reporting hyperacusis since his injury although it has improved slightly. He reports fluctuating hearing and his ability to understand speech clearly seems to fluctuate. OTOSCOPY: Clear canal and normal appearing tympanic membrane, bilaterally. AUDIOGRAM            Reliability: Good  Audiometer Used:  GSI-61      VNG RESULTS:    GAZE: WNL  SMOOTH PURSUIT: WNL  RANDOM SACCADE: Abnormal accuracy bilaterally. OPTOKINETIC: WNL  SPONTANEOUS NYSTAGMUS:  NONE   CHARLEEN-HALLPIKE: Head Left: Left beating horizontal nystagmus was noted while supine and seated. The nystagmus did not exceed normal limits while supine (3 deg) but did exceed normal limits while seated (12 deg). The horizontal nystagmus was not repeatable and was not consistent with BPPV. Up-beating nystagmus that exceeded normal limits was also noted while seated. Head Right: Up-beating nystagmus that did not exceed normal limits (4 deg) and was not repeatable was noted while supine. Up-beating nystagmus that exceeded normal limits was also present while seated but was not repeatable. Dizziness was reported by the patient upon sitting up from both head left and head right positions. POSITIONAL: Up-beating nystagmus that exceeded normal limits was present with head left (7 deg) and body left (7 deg).   CALORIC TESTING: Could not test. Attempted warm caloric irrigation on the right side and the patient felt stabbing pain from the air. BHARGAVI' SOT: Rotation to the right was present with the Stepping [place] no vision. No other errors. COMMENTS:  Normal hearing at 250-1000 Hz sloping to a mild to moderate sensorineural hearing loss for the left ear. Normal hearing at 250-1000 Hz sloping to a mild to moderate sensorineural hearing loss for the right ear. An asymmetry is noted at 8000 Hz with the left ear being poorer by   20 dB. Speech discrimination ability is good at 88% for the left ear and good at 96% for the right ear. The patient's hearing loss has increased in both ears relative to his 11/11/2020 audiogram. The asymmetry noted at 8000 Hz was not present with his previous testing. The patient's word recognition has decreased in each ear, previously his word recognition ability was excellent in both ears. Tympanometry revealed normal peak pressure and normal middle ear compliance for both ears. Results of VNG testing are mixed- Abnormal saccadic accuracy can indicate a central lesion but can also result from poor vision. The patient reported blurred vision during testing as a result of not wearing his glasses. The clinical significance of Up-beating nystagmus that exceeds normal limits  is unknown and the horizontal nystagmus noted with positional testing is considered a non-localizing finding. RECOMMENDATION(S): It is recommended that caloric testing be rescheduled when the patient feels that he is able to tolerate it to attempt to gain additional information regarding his vestibular function. The patient should complete his scheduled neurology follow-up for possible additional evaluation and management recommendations. Continue with physical therapy as scheduled Follow-up has been scheduled with audiology to management is hearing aids.  Audiometric testing should be repeated in 6 months to monitor for further progression or sooner with any suspected change.

## 2022-01-25 ENCOUNTER — TELEPHONE (OUTPATIENT)
Dept: FAMILY MEDICINE CLINIC | Age: 72
End: 2022-01-25

## 2022-01-25 NOTE — TELEPHONE ENCOUNTER
Padmini Abbott MD  Theone Dadjulianne tested Positive Covid on an at Home test and Theone Dadjulianne tested due to having a slight fever, headache, and cough and was wondering if there is anything that you would like him to do besides quarantine for 5-7 days. Wife Addy Lorenz tested Negative. Please Advise.

## 2022-01-25 NOTE — TELEPHONE ENCOUNTER
The CDC recommends that you self isolate for 10 days from the day that you were tested. We recommend that you obtain the following supplements from the pharmacy and take for 2 weeks:  l.             Zinc 50mg once daily  2. Vitamin D (Cholecalciferol) 1000 Units once daily  3. Vitamin C 1000 mg PO once daily    If possible, purchase a pulse ox to check your oxygen levels at home.      It is recommended that you contact a physician immediately or go to the ER if you have or develop any of the following symptoms:  -Worsening shortness of breath  -Shortness of breath at rest  -Pulse ox less than 92% for greater than 1 minute  -Persistent pain or pressure in the chest  -New confusion  -Inability to wake or stay awake  -Pale, gray, or blue-colored skin, lips, or nail beds, depending on skin tone

## 2022-01-26 ENCOUNTER — APPOINTMENT (OUTPATIENT)
Dept: PHYSICAL THERAPY | Age: 72
End: 2022-01-26
Payer: MEDICARE

## 2022-02-04 ENCOUNTER — HOSPITAL ENCOUNTER (OUTPATIENT)
Dept: PHYSICAL THERAPY | Age: 72
Setting detail: THERAPIES SERIES
End: 2022-02-04
Payer: MEDICARE

## 2022-02-07 ENCOUNTER — HOSPITAL ENCOUNTER (OUTPATIENT)
Dept: AUDIOLOGY | Age: 72
Discharge: HOME | End: 2022-02-07

## 2022-02-07 PROCEDURE — 9990000010 HC NO CHARGE VISIT: Performed by: AUDIOLOGIST

## 2022-02-07 NOTE — PROGRESS NOTES
Hearing Aid Check- Reset hearing aid using current audiogram. Patient pleased with the output. Discussed the use of his volume control, program switch (prog#1 speech in loud noise) and how to use the sarah. Had difficulty connecting his hearing aids to the sarah after programming. The right hearing aids was re-paired to the patient's phone and both hearing aids were re-paired to the sarah after they were forgotten from the sarah. He will need to re-pair his hearing aids to his TV streamer. He was familiar with how to do this. He will follow up as needed.

## 2022-02-09 ENCOUNTER — HOSPITAL ENCOUNTER (OUTPATIENT)
Dept: PHYSICAL THERAPY | Age: 72
Setting detail: THERAPIES SERIES
Discharge: HOME | End: 2022-02-09
Payer: MEDICARE

## 2022-02-09 PROCEDURE — 97110 THERAPEUTIC EXERCISES: CPT

## 2022-02-09 NOTE — PROGRESS NOTES
[address] [facility]  PHYSICAL THERAPY  [] EVALUATION  [x] DAILY NOTE (LAND) [] DAILY NOTE (AQUATIC ) [] PROGRESS NOTE [] DISCHARGE NOTE    [x] OUTPATIENT REHABILITATION CENTER [facility]   [] [facility]    [] [facility]   [] Rosalina Nguyen    Date: 2022  Patient Name:  Alina Cortes  : 1950  MRN: [id number]  CSN: [id number]    Referring Practitioner Carlee Earl MD   Diagnosis Dizziness and giddiness [R42]    Treatment Diagnosis Vertigo, Balance Disorder   Date of Evaluation 22    Additional Pertinent History TBI, High BP      Functional Outcome Measure Used Dizziness Handicap Inventory   Functional Outcome Score 40 (22)       Insurance: Primary: Payor: Pablo Newman /  /  / ,   Secondary: Rai Nichole BENEFIT   Authorization Information: PRECERTIFICATION REQUIRED:  No  INSURANCE THERAPY BENEFIT:  Patient has unlimited visits based on medical necessity  Benefit will not cover maintenance or preventative treatment. AQUATIC THERAPY COVERED:   Yes  MODALITIES COVERED:  Yes, with the exception of iontophoresis and hot packs/cold packs  TELEHEALTH COVERED: Yes   Visit # 2, 2/10 for progress note   Visits Allowed: Unlimited visits based on medical necessity   Recertification Date: 48   Physician Follow-Up: 2022   Physician Orders: Martin Guevara and treat   History of Present Illness: Alexandre York presents to therapy with dizziness which occurs when laying down or sitting up. Does not occur every time. Reports he had this in December, but the dizziness is lasting longer at this time. Did go to therapy in December which helped. VNG scheduled for 22. SUBJECTIVE: Alexandre York reports his head has not been clear since he got up this morning. Took Meclozine over an hour ago.         TREATMENT   Precautions: High BP, Vertigo/Dizziness, Brain Injury, Memory Issues   Pain: 2-3/10 headache    X in shaded column indicates activity completed today   Modalities Parameters/  Location  Notes                     Manual Therapy Time/Technique  Notes                     Exercise/Intervention   Notes   Modified Vertbral Artery=negative bilateral    Does have a sore neck   Smooth pursuit   x Lightheaded   Saccade   x Slower, lightheaded   Head Thrust=negative       [place]: EO=30 sec; EC=30 sec with minimal sway   x 1=30 sec with min to moderate sway. 2=30 sec with moderate sway mostly going forward   Leighann Hallpike=negative bilateral   x    Roll Test=negative bilateral   x    1x viewing with patient sitting in chair   x Instructed to keep in focus apx 6 inches from face, move no more than 1 inch either direction, horizontal and vertical motion for 60 seconds. 5 times per day. Specific Interventions Next Treatment: Vestibular recheck, gaze exercises, balance exercises    Activity/Treatment Tolerance:  [x]  Patient tolerated treatment well  []  Patient limited by fatigue  []  Patient limited by pain   []  Patient limited by medical complications  []  Other:     Assessment: No nystagmus today, but Dante Gross is feeling off and lightheaded. Difficulty with balance. Initiated 1x viewing today in seated for Glenn to start with at home. Body Structures/Functions/Activity Limitations: impaired activity tolerance, impaired balance and Dizziness  Prognosis: good    GOALS:  Patient Goal: \"Decrease dizziness\"    Short Term Goals:  Time Frame: See LTG      Long Term Goals:  Time Frame: 12 weeks  1. Improve DHI to 35 or less to assist with safety at home. 2.  [place] to report no longer having dizziness to assist with getting in and out of bed. 3.  Independent with HEP to assist with decreasing dizziness. Patient Education:    [x]  HEP/Education Completed: 1x viewing in seated   [employer] Access Code:  []  No new Education completed  []  Reviewed Prior HEP      []  Patient verbalized and/or demonstrated understanding of education provided.   []  Patient unable to verbalize and/or demonstrate understanding of education provided. Will continue education. [x]  Barriers to learning: None per patient    PLAN:  Treatment Recommendations: Balance Training, Functional Mobility Training, Home Exercise Program, Patient Education and Vestibular Rehabilitation    []  Plan of care initiated. Plan to see patient 2 times per week for 12 weeks to address the treatment planned outlined above.   [x]  Continue with current plan of care  []  Modify plan of care as follows:    []  Hold pending physician visit  []  Discharge    Time In 0845   Time Out 0925   Timed Code Minutes: 40   Total Treatment Time: 40       Electronically Signed by: Sarah Roman PT

## 2022-02-15 ENCOUNTER — OFFICE VISIT (OUTPATIENT)
Dept: FAMILY MEDICINE CLINIC | Age: 72
End: 2022-02-15
Payer: MEDICARE

## 2022-02-15 VITALS
OXYGEN SATURATION: 99 % | DIASTOLIC BLOOD PRESSURE: 60 MMHG | RESPIRATION RATE: 18 BRPM | SYSTOLIC BLOOD PRESSURE: 118 MMHG | HEART RATE: 60 BPM | BODY MASS INDEX: 29.73 KG/M2 | TEMPERATURE: 96.9 F | HEIGHT: 66 IN | WEIGHT: 185 LBS

## 2022-02-15 DIAGNOSIS — R42 DIZZINESS: Primary | ICD-10-CM

## 2022-02-15 DIAGNOSIS — I10 ESSENTIAL HYPERTENSION: ICD-10-CM

## 2022-02-15 PROCEDURE — G8427 DOCREV CUR MEDS BY ELIG CLIN: HCPCS | Performed by: STUDENT IN AN ORGANIZED HEALTH CARE EDUCATION/TRAINING PROGRAM

## 2022-02-15 PROCEDURE — 1123F ACP DISCUSS/DSCN MKR DOCD: CPT | Performed by: STUDENT IN AN ORGANIZED HEALTH CARE EDUCATION/TRAINING PROGRAM

## 2022-02-15 PROCEDURE — 1036F TOBACCO NON-USER: CPT | Performed by: STUDENT IN AN ORGANIZED HEALTH CARE EDUCATION/TRAINING PROGRAM

## 2022-02-15 PROCEDURE — 99213 OFFICE O/P EST LOW 20 MIN: CPT | Performed by: STUDENT IN AN ORGANIZED HEALTH CARE EDUCATION/TRAINING PROGRAM

## 2022-02-15 PROCEDURE — G8484 FLU IMMUNIZE NO ADMIN: HCPCS | Performed by: STUDENT IN AN ORGANIZED HEALTH CARE EDUCATION/TRAINING PROGRAM

## 2022-02-15 PROCEDURE — G8417 CALC BMI ABV UP PARAM F/U: HCPCS | Performed by: STUDENT IN AN ORGANIZED HEALTH CARE EDUCATION/TRAINING PROGRAM

## 2022-02-15 PROCEDURE — 4040F PNEUMOC VAC/ADMIN/RCVD: CPT | Performed by: STUDENT IN AN ORGANIZED HEALTH CARE EDUCATION/TRAINING PROGRAM

## 2022-02-15 PROCEDURE — 3017F COLORECTAL CA SCREEN DOC REV: CPT | Performed by: STUDENT IN AN ORGANIZED HEALTH CARE EDUCATION/TRAINING PROGRAM

## 2022-02-15 ASSESSMENT — ENCOUNTER SYMPTOMS
SHORTNESS OF BREATH: 0
WHEEZING: 0

## 2022-02-15 NOTE — PROGRESS NOTES
Henrique Sellers is a 73 y.o. male who presents today for:  Chief Complaint   Patient presents with    Dizziness       HPI:   Jon Clifford a month ago  Symptoms for a few days then better    Audiology wants to do caloric testing as well  Hearing loss concerns, does have hearing aids     Lightheadedness comes and goes  Worse in the morning when he first gets up   Last few minutes to all day   Started meclizine which is helping a lot   Restarted PT, sees again yesterday     Still off the BP medicine  BP today 118/60       Food Insecurity: No Food Insecurity    Worried About Running Out of Food in the Last Year: Never true    Ran Out of Food in the Last Year: Never true     Health Maintenance reviewed -   Health Maintenance   Topic Date Due    Flu vaccine (1) 09/01/2021    Potassium monitoring  06/03/2022    Creatinine monitoring  06/03/2022    Depression Screen  09/21/2022    Annual Wellness Visit (AWV)  09/22/2022    TSH testing  12/08/2022    Diabetes screen  12/08/2024    Colon cancer screen colonoscopy  07/08/2025    Lipid screen  09/21/2026    DTaP/Tdap/Td vaccine (2 - Td or Tdap) 10/22/2030    Shingles Vaccine  Completed    Pneumococcal 65+ years Vaccine  Completed    COVID-19 Vaccine  Completed    Hepatitis C screen  Completed    Hepatitis A vaccine  Aged Out    Hepatitis B vaccine  Aged Out    Hib vaccine  Aged Out    Meningococcal (ACWY) vaccine  Aged Out     Current Outpatient Medications   Medication Sig Dispense Refill    lisinopril (PRINIVIL;ZESTRIL) 5 MG tablet Take 1 tablet by mouth daily (Patient not taking: Reported on 2/15/2022) 90 tablet 1    calcium carbonate (OSCAL) 500 MG TABS tablet Take 500 mg by mouth daily      Magnesium Citrate 200 MG TABS Take 1 tablet by mouth 4 times daily (with meals and nightly)      B Complex-Folic Acid (N-502 BALANCED TR PO) Take 1 tablet by mouth 2 times daily (before meals) [id number] [facility]      Cinnamon 500 MG CAPS Take 1 capsule by mouth 4 times daily (before meals and nightly)      levETIRAcetam (KEPPRA) 500 MG tablet Take 1 tablet by mouth 2 times daily 120 tablet 1    vitamin D (CHOLECALCIFEROL) 25 MCG (1000 UT) TABS tablet Take 3,000 Units by mouth daily [id number] [facility] 1000 U       No current facility-administered medications for this visit. Social History     Tobacco Use    Smoking status: Never Smoker    Smokeless tobacco: Never Used   Substance Use Topics    Alcohol use: Never      Subjective:   Review of Systems   Respiratory: Negative for shortness of breath and wheezing. Cardiovascular: Negative for chest pain and palpitations. Neurological: Positive for dizziness and light-headedness. Objective:     Vitals:    02/15/22 1048   BP: 118/60   Site: Left Upper Arm   Position: Sitting   Cuff Size: Medium Adult   Pulse: 60   Resp: 18   Temp: 96.9 °F (36.1 °C)   TempSrc: Skin   SpO2: 99%   Weight: 185 lb (83.9 kg)   Height: 5' 6\" (1.676 m)     Body mass index is 29.86 kg/m². Wt Readings from Last 3 Encounters:   02/15/22 185 lb (83.9 kg)   01/14/22 185 lb (83.9 kg)   01/12/22 185 lb (83.9 kg)     BP Readings from Last 3 Encounters:   02/15/22 118/60   01/14/22 98/62   01/12/22 123/78     Physical Exam  Vitals and nursing note reviewed. Constitutional:       General: He is not in acute distress. Appearance: He is well-developed. He is not diaphoretic. Cardiovascular:      Rate and Rhythm: Normal rate and regular rhythm. Heart sounds: Normal heart sounds. No murmur heard. Pulmonary:      Effort: Pulmonary effort is normal.      Breath sounds: Normal breath sounds. No wheezing. Abdominal:      General: There is no distension. Palpations: Abdomen is soft. Tenderness: There is no abdominal tenderness. Neurological:      Mental Status: He is alert. Psychiatric:         Thought Content:  Thought content normal.         Judgment: Judgment normal.         Assessment / Plan:   Rogelio Reyes was seen today for dizziness. Diagnoses and all orders for this visit:    Dizziness  -     External Referral To Audiology    Essential hypertension      Patient presents for general follow-up  · Dizziness-chronic, stable. Patient is working with physical therapy and audiology to get initial testing done. Encourage patient to follow-up with them. If continues to have dizziness, can consider further imaging. Patient can continue to use meclizine as needed for severe dizziness. · Essential hypertension-chronic. Patient continues to remain off his lisinopril due to concerns of hypotension exacerbating his dizziness. Blood pressure in the office today was good, however has been having some elevated blood pressures at home. Will continue patient off the medication at this time await further work-up of his dizziness. Patient to follow-up in 6 weeks, earlier as needed     Return in about 6 weeks (around 3/29/2022). Medications Prescribed:  No orders of the defined types were placed in this encounter. Future Appointments   Date Time Provider Joya Cronin   2/16/2022  8:15 AM Jarrell Adis, [address] [facility]   3/29/2022  1:20 PM Mohit Price MD [facility] [place]   5/24/2022  3:00 PM Mohit Price MD [facility] [place]   6/7/2022 11:00 AM Jeff Poewll MD [facility] [place]   6/7/2022  1:30 PM MD ZANDRA Ramírez [facility] [place]       Patient given educational materials - see patient instructions. Discussed use, benefit, and side effects of prescribed medications. All patient questions answered. Pt voiced understanding. Instructed to continue current medications, diet and exercise. Patient agreed with treatment plan. Follow up as directed. Part of this visit was documented via [id number] technology, please excuse any errors.      Electronically signed by Mohit Price MD on 2/15/2022 at 1:41 PM

## 2022-02-15 NOTE — PROGRESS NOTES
S: 73 y.o. male with   Chief Complaint   Patient presents with    Dizziness       Chief complaint, [place], and all pertinent details of the case reviewed with the resident. Please see resident's note for specific details discussed at today's visit. BP Readings from Last 3 Encounters:   02/15/22 118/60   01/14/22 98/62   01/12/22 123/78     Wt Readings from Last 3 Encounters:   02/15/22 185 lb (83.9 kg)   01/14/22 185 lb (83.9 kg)   01/12/22 185 lb (83.9 kg)       O: VS:  height is 5' 6\" (1.676 m) and weight is 185 lb (83.9 kg). His skin temperature is 96.9 °F (36.1 °C). His blood pressure is 118/60 and his pulse is 60. His respiration is 18 and oxygen saturation is 99%. Diagnosis Orders   1. Dizziness  External Referral To Audiology       Plan:  Back to audiology to finish testing, including calorimetry; cont PT and fu with neurology  Keep watch on thyroid function, pt prefers to hold on treatment   Care with getting up and would recommend full orthostatics when returns in 2 months    Health Maintenance Due   Topic Date Due    Flu vaccine (1) 09/01/2021       Attending Physician Statement  I have discussed the case, including pertinent history and exam findings with the resident. I agree with the documented assessment and plan as documented by the resident.         Marco Cobian MD 2/15/2022 1:25 PM

## 2022-02-16 ENCOUNTER — HOSPITAL ENCOUNTER (OUTPATIENT)
Dept: PHYSICAL THERAPY | Age: 72
Setting detail: THERAPIES SERIES
Discharge: HOME | End: 2022-02-16
Payer: MEDICARE

## 2022-02-16 PROCEDURE — 97110 THERAPEUTIC EXERCISES: CPT

## 2022-02-16 NOTE — PROGRESS NOTES
[address] [facility]  PHYSICAL THERAPY  [] EVALUATION  [x] DAILY NOTE (LAND) [] DAILY NOTE (AQUATIC ) [] PROGRESS NOTE [] DISCHARGE NOTE    [x] OUTPATIENT REHABILITATION [facility]   [] [place] [address]    [] [facility]   [] Willy Shi    Date: 2022  Patient Name:  José Manuel River  : 1950  MRN: [id number]  CSN: [id number]    Referring Practitioner Bekah Negron MD   Diagnosis Dizziness and giddiness [R42]    Treatment Diagnosis Vertigo, Balance Disorder   Date of Evaluation 22    Additional Pertinent History TBI, High BP      Functional Outcome Measure Used Dizziness Handicap Inventory   Functional Outcome Score 40 (22)       Insurance: Primary: Payor: Henrique Kee /  /  / ,   Secondary: Edwina Marshall BENEFIT   Authorization Information: PRECERTIFICATION REQUIRED:  No  INSURANCE THERAPY BENEFIT:  Patient has unlimited visits based on medical necessity  Benefit will not cover maintenance or preventative treatment. AQUATIC THERAPY COVERED:   Yes  MODALITIES COVERED:  Yes, with the exception of iontophoresis and hot packs/cold packs  TELEHEALTH COVERED: Yes   Visit # 3, 3/10 for progress note   Visits Allowed: Unlimited visits based on medical necessity   Recertification Date:    Physician Follow-Up: 2022   Physician Orders: Farnaz Hansen and treat   History of Present Illness: Soledad Armendariz presents to therapy with dizziness which occurs when laying down or sitting up. Does not occur every time. Reports he had this in December, but the dizziness is lasting longer at this time. Did go to therapy in December which helped. VNG scheduled for 22. SUBJECTIVE: Soledad Armendariz reports he is feeling better today than last week. He has not taken Meclizine in 2 days.     TREATMENT   Precautions: High BP, Vertigo/Dizziness, Brain Injury, Memory Issues   Pain: 1/10 headache    X in shaded column indicates activity completed today   Modalities Parameters/  Location  Notes                     Manual Therapy Time/Technique  Notes                     Exercise/Intervention   Notes   Modified Vertbral Artery=negative bilateral    Does have a sore neck   Smooth pursuit    Lightheaded   Saccade    Slower, lightheaded   Head Thrust=negative       [place]: EO=30 sec; EC=30 sec with minimal sway   x 1=30 sec with slight sway  2=30 sec with minimal sway mostly going forward  3=30 sec with minimal sway  4=30 sec with moderate sway   Hendricks Hallpike=negative bilateral       Roll Test=negative bilateral       x1 viewing with patient sitting in chair and progressed to standing with feet shoulder width apart.   x Instructed to keep in focus apx 6 inches from face, move no more than 1 inch either direction, horizontal and vertical motion for 60 seconds. 5 times per day. Specific Interventions Next Treatment: Vestibular recheck, gaze exercises, balance exercises    Activity/Treatment Tolerance:  [x]  Patient tolerated treatment well  []  Patient limited by fatigue  []  Patient limited by pain   []  Patient limited by medical complications  []  Other:     Assessment: Improved balance today. Able to progress x1 viewing to standing with feet shoulder width apart. Patient to complete this 5 times per day. Body Structures/Functions/Activity Limitations: impaired activity tolerance, impaired balance and Dizziness  Prognosis: good    GOALS:  Patient Goal: \"Decrease dizziness\"    Short Term Goals:  Time Frame: See LTG      Long Term Goals:  Time Frame: 12 weeks  1. Improve DHI to 35 or less to assist with safety at home. 2.  Joseph Millard to report no longer having dizziness to assist with getting in and out of bed. 3.  Independent with HEP to assist with decreasing dizziness.         Patient Education:    [x]  HEP/Education Completed: x1 viewing in standing   [employer] Access Code:  []  No new Education completed  []  Reviewed Prior HEP      []  Patient verbalized and/or demonstrated understanding of education provided. []  Patient unable to verbalize and/or demonstrate understanding of education provided. Will continue education. [x]  Barriers to learning: None per patient    PLAN:  Treatment Recommendations: Balance Training, Functional Mobility Training, Home Exercise Program, Patient Education and Vestibular Rehabilitation    []  Plan of care initiated. Plan to see patient 2 times per week for 12 weeks to address the treatment planned outlined above.   [x]  Continue with current plan of care  []  Modify plan of care as follows:    []  Hold pending physician visit  []  Discharge    Time In 0812   Time Out 0835   Timed Code Minutes: 23   Total Treatment Time: 23       Electronically Signed by: Gladis Rich PT

## 2022-02-23 ENCOUNTER — HOSPITAL ENCOUNTER (OUTPATIENT)
Dept: PHYSICAL THERAPY | Age: 72
Setting detail: THERAPIES SERIES
Discharge: HOME | End: 2022-02-23
Payer: MEDICARE

## 2022-02-23 PROCEDURE — 97110 THERAPEUTIC EXERCISES: CPT

## 2022-02-23 RX ORDER — LEVETIRACETAM 500 MG/1
500 TABLET ORAL 2 TIMES DAILY
Qty: 180 TABLET | Refills: 1 | Status: SHIPPED | OUTPATIENT
Start: 2022-02-23 | End: 2022-06-07 | Stop reason: ALTCHOICE

## 2022-02-23 NOTE — PROGRESS NOTES
[address] [facility]  PHYSICAL THERAPY  [] EVALUATION  [x] DAILY NOTE (LAND) [] DAILY NOTE (AQUATIC ) [] PROGRESS NOTE [] DISCHARGE NOTE    [x] OUTPATIENT REHABILITATION CENTER [facility]   [] [facility]    [] [facility]   [] Allan Howe    Date: 2022  Patient Name:  Joshua Rios  : 1950  MRN: [id number]  CSN: [id number]    Referring Practitioner Stacey Santillan MD   Diagnosis Dizziness and giddiness [R42]    Treatment Diagnosis Vertigo, Balance Disorder   Date of Evaluation 22    Additional Pertinent History TBI, High BP      Functional Outcome Measure Used Dizziness Handicap Inventory   Functional Outcome Score 40 (22)       Insurance: Primary: Payor: Adrianne Najera /  /  / ,   Secondary: Nivia Howe BENEFIT   Authorization Information: PRECERTIFICATION REQUIRED:  No  INSURANCE THERAPY BENEFIT:  Patient has unlimited visits based on medical necessity  Benefit will not cover maintenance or preventative treatment. AQUATIC THERAPY COVERED:   Yes  MODALITIES COVERED:  Yes, with the exception of iontophoresis and hot packs/cold packs  TELEHEALTH COVERED: Yes   Visit # 4, 4/10 for progress note   Visits Allowed: Unlimited visits based on medical necessity   Recertification Date:    Physician Follow-Up: 2022   Physician Orders: Malgorzata Go and treat   History of Present Illness: Holly Mariscal presents to therapy with dizziness which occurs when laying down or sitting up. Does not occur every time. Reports he had this in December, but the dizziness is lasting longer at this time. Did go to therapy in December which helped. VNG scheduled for 22. SUBJECTIVE: Holly Mariscal reports he has not had any big episodes of dizziness in 2 weeks. Does have mild dizziness daily. Last took Meclizine last week (does not remember exact date).       TREATMENT   Precautions: High BP, Vertigo/Dizziness, Brain Injury, Memory Issues   Pain: no headache    X in shaded column indicates activity completed today   Modalities Parameters/  Location  Notes                     Manual Therapy Time/Technique  Notes                     Exercise/Intervention   Notes   Modified Vertbral Artery=negative bilateral    Does have a sore neck   Smooth pursuit    Lightheaded   Saccade    Slower, lightheaded   Head Thrust=negative       [place]: EO=30 sec; EC=30 sec with minimal sway   x 1=30 sec with slight sway  2=30 sec with minimal sway mostly going to left  3=30 sec with minimal sway  4=30 sec with moderate sway   Leighann Hallpike=negative bilateral       Roll Test=negative bilateral       x1 viewing with patient standing with feet shoulder width apart and progressed to standing with feet together.   x Instructed to keep in focus apx 6 inches from face, move no more than 1 inch either direction, horizontal and vertical motion for 60 seconds. 5 times per day. Specific Interventions Next Treatment: Vestibular recheck, gaze exercises, balance exercises    Activity/Treatment Tolerance:  [x]  Patient tolerated treatment well  []  Patient limited by fatigue  []  Patient limited by pain   []  Patient limited by medical complications  []  Other:     Assessment: Progressed x1 viewing to standing with feet together. Minimal sway. He is having fewer spinning episodes at home. Body Structures/Functions/Activity Limitations: impaired activity tolerance, impaired balance and Dizziness  Prognosis: good    GOALS:  Patient Goal: \"Decrease dizziness\"    Short Term Goals:  Time Frame: See LTG      Long Term Goals:  Time Frame: 12 weeks  1. Improve DHI to 35 or less to assist with safety at home. 2.  [employer] to report no longer having dizziness to assist with getting in and out of bed. 3.  Independent with HEP to assist with decreasing dizziness.         Patient Education:    [x]  HEP/Education Completed: x1 viewing in standing with feet together   [employer] Access Code:  []  No new Education completed  []  Reviewed Prior HEP      []  Patient verbalized and/or demonstrated understanding of education provided. []  Patient unable to verbalize and/or demonstrate understanding of education provided. Will continue education. [x]  Barriers to learning: None per patient    PLAN:  Treatment Recommendations: Balance Training, Functional Mobility Training, Home Exercise Program, Patient Education and Vestibular Rehabilitation    []  Plan of care initiated. Plan to see patient 2 times per week for 12 weeks to address the treatment planned outlined above.   [x]  Continue with current plan of care  []  Modify plan of care as follows:    []  Hold pending physician visit  []  Discharge    Time In 0815   Time Out 0838   Timed Code Minutes: 23   Total Treatment Time: 23       Electronically Signed by: Nitish Elizabeth PT

## 2022-02-28 ENCOUNTER — HOSPITAL ENCOUNTER (OUTPATIENT)
Dept: AUDIOLOGY | Age: 72
Discharge: HOME | End: 2022-02-28

## 2022-02-28 PROCEDURE — 9990000010 HC NO CHARGE VISIT: Performed by: AUDIOLOGIST

## 2022-02-28 NOTE — PROGRESS NOTES
The patient was scheduled to have the caloric subtest of VNG testing repeated. He was previously unable to tolerate the air caloric stimulus on 01/24/2022. Caloric testing was again attempted today but the patient continues to not be able to tolerate the stimulus. He is reportedly noting good improvement with Vestibular Rehabilitation. Caloric testing could be rescheduled if additional progress does not occur as expected with continued Vestibular Rehabilitation.

## 2022-03-02 ENCOUNTER — HOSPITAL ENCOUNTER (OUTPATIENT)
Dept: PHYSICAL THERAPY | Age: 72
Setting detail: THERAPIES SERIES
Discharge: HOME | End: 2022-03-02
Payer: MEDICARE

## 2022-03-02 PROCEDURE — 97110 THERAPEUTIC EXERCISES: CPT

## 2022-03-02 NOTE — PROGRESS NOTES
[address] [facility]  PHYSICAL THERAPY  [] EVALUATION  [x] DAILY NOTE (LAND) [] DAILY NOTE (AQUATIC ) [] PROGRESS NOTE [] DISCHARGE NOTE    [x] OUTPATIENT REHABILITATION [facility]   [] [place] [address]    [] [facility]   [] Lore Torres    Date: 3/2/2022  Patient Name:  Pan Harrell  : 1950  MRN: [id number]  CSN: [id number]    Referring Practitioner Stephen Jones MD   Diagnosis Dizziness and giddiness [R42]    Treatment Diagnosis Vertigo, Balance Disorder   Date of Evaluation 22    Additional Pertinent History TBI, High BP      Functional Outcome Measure Used Dizziness Handicap Inventory   Functional Outcome Score 40 (22)       Insurance: Primary: Payor: Juanita Neely /  /  / ,   Secondary: Sol Blow BENEFIT   Authorization Information: PRECERTIFICATION REQUIRED:  No  INSURANCE THERAPY BENEFIT:  Patient has unlimited visits based on medical necessity  Benefit will not cover maintenance or preventative treatment. AQUATIC THERAPY COVERED:   Yes  MODALITIES COVERED:  Yes, with the exception of iontophoresis and hot packs/cold packs  TELEHEALTH COVERED: Yes   Visit # 5, 5/10 for progress note   Visits Allowed: Unlimited visits based on medical necessity   Recertification Date:    Physician Follow-Up: 2022   Physician Orders: Dequan Fowler and treat   History of Present Illness: Wai Mccord presents to therapy with dizziness which occurs when laying down or sitting up. Does not occur every time. Reports he had this in December, but the dizziness is lasting longer at this time. Did go to therapy in December which helped. VNG scheduled for 22. SUBJECTIVE: Wai Mccord reports standing with feet side by side has been more challenging. Has been getting in exercises 2-5 times per day.       TREATMENT   Precautions: High BP, Vertigo/Dizziness, Brain Injury, Memory Issues   Pain: no headache    X in shaded column indicates activity completed today Modalities Parameters/  Location  Notes                     Manual Therapy Time/Technique  Notes                     Exercise/Intervention   Notes   Modified Vertbral Artery=negative bilateral    Does have a sore neck   Smooth pursuit    Lightheaded   Saccade    Slower, lightheaded   Head Thrust=negative       [place]: EO=30 sec; EC=30 sec with minimal sway   x 1=30 sec with no to slight sway  2=30 sec with slight sway mostly going to left  3=30 sec with slight sway  4=30 sec with moderate to minimal sway   Leighann Hallpike=negative bilateral       Roll Test=negative bilateral       x1 viewing with patient standing with feet together. Progressed to checkerboard pattern with patient sitting.     x Instructed to keep in focus apx 6 inches from face, move no more than 1 inch either direction, horizontal and vertical motion for 60 seconds. 5 times per day. Specific Interventions Next Treatment: Vestibular recheck, gaze exercises, balance exercises    Activity/Treatment Tolerance:  [x]  Patient tolerated treatment well  []  Patient limited by fatigue  []  Patient limited by pain   []  Patient limited by medical complications  []  Other:     Assessment: Patient is not completing exercises consistently 5 times per day, but is still demonstrating improvements in balance and feeling of being unsteady. We were able to progress exercises to using a checkerboard in sitting today. Body Structures/Functions/Activity Limitations: impaired activity tolerance, impaired balance and Dizziness  Prognosis: good    GOALS:  Patient Goal: \"Decrease dizziness\"    Short Term Goals:  Time Frame: See LTG      Long Term Goals:  Time Frame: 12 weeks  1. Improve DHI to 35 or less to assist with safety at home. 2.  Edmund Lavern to report no longer having dizziness to assist with getting in and out of bed. 3.  Independent with HEP to assist with decreasing dizziness.         Patient Education:    [x]  HEP/Education Completed: x1 viewing with checkerboard in sitting    [employer] Access Code:  []  No new Education completed  []  Reviewed Prior HEP      []  Patient verbalized and/or demonstrated understanding of education provided. []  Patient unable to verbalize and/or demonstrate understanding of education provided. Will continue education. [x]  Barriers to learning: None per patient    PLAN:  Treatment Recommendations: Balance Training, Functional Mobility Training, Home Exercise Program, Patient Education and Vestibular Rehabilitation    []  Plan of care initiated. Plan to see patient 2 times per week for 12 weeks to address the treatment planned outlined above.   [x]  Continue with current plan of care  []  Modify plan of care as follows:    []  Hold pending physician visit  []  Discharge    Time In 1430   Time Out 1453   Timed Code Minutes: 23   Total Treatment Time: 23       Electronically Signed by: Kb Jones PT

## 2022-03-09 ENCOUNTER — HOSPITAL ENCOUNTER (OUTPATIENT)
Dept: PHYSICAL THERAPY | Age: 72
Setting detail: THERAPIES SERIES
Discharge: HOME | End: 2022-03-09
Payer: MEDICARE

## 2022-03-09 PROCEDURE — 97110 THERAPEUTIC EXERCISES: CPT

## 2022-03-09 NOTE — PROGRESS NOTES
[address] [facility]  PHYSICAL THERAPY  [] EVALUATION  [] DAILY NOTE (LAND) [] DAILY NOTE (AQUATIC ) [x] PROGRESS NOTE [] DISCHARGE NOTE    [x] OUTPATIENT REHABILITATION [facility]   [] [place] [address]    [] [facility]   [] Dejan Loyabus    Date: 3/9/2022  Patient Name:  Jen Lozano  : 1950  MRN: [id number]  CSN: [id number]    Referring Practitioner Lizeth Costello MD   Diagnosis Dizziness and giddiness [R42]    Treatment Diagnosis Vertigo, Balance Disorder   Date of Evaluation 22    Additional Pertinent History TBI, High BP      Functional Outcome Measure Used Dizziness Handicap Inventory   Functional Outcome Score 40 (22); 4 (3/9/22)      Insurance: Primary: Payor: Ana Sim /  /  / ,   Secondary: Stephen Leslie BENEFIT   Authorization Information: PRECERTIFICATION REQUIRED:  No  INSURANCE THERAPY BENEFIT:  Patient has unlimited visits based on medical necessity  Benefit will not cover maintenance or preventative treatment. AQUATIC THERAPY COVERED:   Yes  MODALITIES COVERED:  Yes, with the exception of iontophoresis and hot packs/cold packs  TELEHEALTH COVERED: Yes   Visit # 6, 6/6 for progress note   Visits Allowed: Unlimited visits based on medical necessity   Recertification Date:    Physician Follow-Up: 2022   Physician Orders: Birdie Hair and treat   History of Present Illness: Rhiannon Blood presents to therapy with dizziness which occurs when laying down or sitting up. Does not occur every time. Reports he had this in December, but the dizziness is lasting longer at this time. Did go to therapy in December which helped. VNG scheduled for 22. SUBJECTIVE: Rhiannon Blood reports he is having less lightheadedness. He feels at this time he is 80% improved since beginning therapy.       TREATMENT   Precautions: High BP, Vertigo/Dizziness, Brain Injury, Memory Issues   Pain: no headache    X in shaded column indicates activity completed today Modalities Parameters/  Location  Notes                     Manual Therapy Time/Technique  Notes                     Exercise/Intervention   Notes   Modified Vertbral Artery=negative bilateral    Does have a sore neck   Smooth pursuit    Lightheaded   Saccade    Slower, lightheaded   Head Thrust=negative       [place]: EO=30 sec; EC=30 sec with minimal sway   x 1=30 sec with no to slight sway  2=30 sec with slight sway mostly going to left  3=30 sec with slight sway  4=10 sec and LOB   Leighann Hallpike=negative bilateral       Roll Test=negative bilateral       x1 viewing with patient sitting with checkerboard pattern. Progressed to standing with feet shoulder width apart    x Instructed to keep in focus apx 6 inches from face, move no more than 1 inch either direction, horizontal and vertical motion for 60 seconds. 5 times per day. Specific Interventions Next Treatment: Vestibular recheck, gaze exercises, balance exercises    Activity/Treatment Tolerance:  [x]  Patient tolerated treatment well  []  Patient limited by fatigue  []  Patient limited by pain   []  Patient limited by medical complications  []  Other:     Assessment: Reassessment completed today. Jalil Baez has significant improvement on DHI. He also has fewer episodes of lightheadedness. In therapy he has been able to progress x1 viewing to using checkerboard pattern and standing. At this time plan to continue working with Jalil Baez 1 time per week progression viewing exercise. Body Structures/Functions/Activity Limitations: impaired activity tolerance, impaired balance and Dizziness  Prognosis: good    GOALS:  Patient Goal: \"Decrease dizziness\"    Short Term Goals:  Time Frame: See LTG      Long Term Goals:  Time Frame: 12 weeks  1. Improve DHI to 35 or less to assist with safety at home. MET: DHI 4.  NEW GOAL: Improve DHI to 2 or less to assist with safety at home.     2.  Jalil Baez to report no longer having dizziness to assist with getting in and out of bed. NOT MET: Nereyda Phelan did have an episode of lightheadedness getting out of bed this morning. 3.  Independent with HEP to assist with decreasing dizziness. MET: Averaging exercises 3-4 sets per day. Ongoing. Patient Education:    [x]  HEP/Education Completed: x1 viewing with checkerboard in standing   [employer] Access Code:  []  No new Education completed  []  Reviewed Prior HEP      []  Patient verbalized and/or demonstrated understanding of education provided. []  Patient unable to verbalize and/or demonstrate understanding of education provided. Will continue education. [x]  Barriers to learning: None per patient    PLAN:  Treatment Recommendations: Balance Training, Functional Mobility Training, Home Exercise Program, Patient Education and Vestibular Rehabilitation    []  Plan of care initiated. Plan to see patient 2 times per week for 12 weeks to address the treatment planned outlined above.   []  Continue with current plan of care  [x]  Modify plan of care as follows: 1 time per week x4 weeks   []  Hold pending physician visit  []  Discharge    Time In 0958   Time Out 1036   Timed Code Minutes: 38   Total Treatment Time: 38       Electronically Signed by: Colleen Valdez, PT

## 2022-03-18 ENCOUNTER — HOSPITAL ENCOUNTER (OUTPATIENT)
Dept: PHYSICAL THERAPY | Age: 72
Setting detail: THERAPIES SERIES
Discharge: HOME | End: 2022-03-18
Payer: MEDICARE

## 2022-03-18 PROCEDURE — 97110 THERAPEUTIC EXERCISES: CPT

## 2022-03-18 NOTE — PROGRESS NOTES
[address] [facility]  PHYSICAL THERAPY  [] EVALUATION  [x] DAILY NOTE (LAND) [] DAILY NOTE (AQUATIC ) [] PROGRESS NOTE [] DISCHARGE NOTE    [x] OUTPATIENT REHABILITATION CENTER [facility]   [] [place] [address]    [] [facility]   [] Lucas Bautista    Date: 3/18/2022  Patient Name:  Claudell Fleck  : 1950  MRN: [id number]  CSN: [id number]    Referring Practitioner Valeri Barillas MD   Diagnosis Dizziness and giddiness [R42]    Treatment Diagnosis Vertigo, Balance Disorder   Date of Evaluation 22    Additional Pertinent History TBI, High BP      Functional Outcome Measure Used Dizziness Handicap Inventory   Functional Outcome Score 40 (22); 4 (3/9/22)      Insurance: Primary: Payor: Mary Cuadra /  /  / ,   Secondary: [employer] BENEFIT   Authorization Information: PRECERTIFICATION REQUIRED:  No  INSURANCE THERAPY BENEFIT:  Patient has unlimited visits based on medical necessity  Benefit will not cover maintenance or preventative treatment. AQUATIC THERAPY COVERED:   Yes  MODALITIES COVERED:  Yes, with the exception of iontophoresis and hot packs/cold packs  TELEHEALTH COVERED: Yes   Visit # 1,  for progress note   Visits Allowed: Unlimited visits based on medical necessity   Recertification Date: 2/10/69   Physician Follow-Up: 2022   Physician Orders: Radha Polo and treat   History of Present Illness: Garrett Means presents to therapy with dizziness which occurs when laying down or sitting up. Does not occur every time. Reports he had this in December, but the dizziness is lasting longer at this time. Did go to therapy in December which helped. VNG scheduled for 22. SUBJECTIVE: Garrett Means reports he was running at the [facility] this morning. Reports he is able to run longer and faster. Reports x1 viewing exercises are going well and has been completing them 4-5 times per day.       TREATMENT   Precautions: High BP, Vertigo/Dizziness, Brain Injury, Memory Issues Pain: no headache    X in shaded column indicates activity completed today   Modalities Parameters/  Location  Notes                     Manual Therapy Time/Technique  Notes                     Exercise/Intervention   Notes   Modified Vertbral Artery=negative bilateral    Does have a sore neck   Smooth pursuit    Lightheaded   Saccade    Slower, lightheaded   Head Thrust=negative       [place]: EO=30 sec; EC=30 sec with minimal sway   x 1=30 sec with no to slight sway  2=30 sec with slight sway to moderate sway  3=30 sec with slight sway to moderate sway  4=30 sec with moderate sway   [place] Hallpike=negative bilateral       Roll Test=negative bilateral       x1 viewing with patient sitting with checkerboard pattern. Standing with feet shoulder width apart with progression to standing with feet together.   x Instructed to keep in focus apx 6 inches from face, move no more than 1 inch either direction, horizontal and vertical motion for 60 seconds. 5 times per day. Specific Interventions Next Treatment: Vestibular recheck, gaze exercises, balance exercises    Activity/Treatment Tolerance:  [x]  Patient tolerated treatment well  []  Patient limited by fatigue  []  Patient limited by pain   []  Patient limited by medical complications  []  Other:     Assessment: Dominick balance continues to improve and able to progress x1 viewing to standing with feet together. Body Structures/Functions/Activity Limitations: impaired activity tolerance, impaired balance and Dizziness  Prognosis: good    GOALS:  Patient Goal: \"Decrease dizziness\"    Short Term Goals:  Time Frame: See LTG      Long Term Goals:  Time Frame: 12 weeks  1. Improve DHI to 35 or less to assist with safety at home. MET: DHI 4.  NEW GOAL: Improve DHI to 2 or less to assist with safety at home. 2.  Nicol Mendez to report no longer having dizziness to assist with getting in and out of bed.   NOT MET: Nicol Mendez did have an episode of lightheadedness getting out of bed this morning. 3.  Independent with HEP to assist with decreasing dizziness. MET: Averaging exercises 3-4 sets per day. Ongoing. Patient Education:    [x]  HEP/Education Completed: x1 viewing with checkerboard in standing with feet together   [employer] Access Code:  []  No new Education completed  []  Reviewed Prior HEP      []  Patient verbalized and/or demonstrated understanding of education provided. []  Patient unable to verbalize and/or demonstrate understanding of education provided. Will continue education. [x]  Barriers to learning: None per patient    PLAN:  Treatment Recommendations: Balance Training, Functional Mobility Training, Home Exercise Program, Patient Education and Vestibular Rehabilitation    []  Plan of care initiated. Plan to see patient 2 times per week for 12 weeks to address the treatment planned outlined above.   []  Continue with current plan of care  [x]  Modify plan of care as follows: 1 time per week x4 weeks   []  Hold pending physician visit  []  Discharge    Time In 1600   Time Out 1623   Timed Code Minutes: 23   Total Treatment Time: 23       Electronically Signed by: Jacki Rodarte, PT

## 2022-03-28 ENCOUNTER — HOSPITAL ENCOUNTER (OUTPATIENT)
Dept: PHYSICAL THERAPY | Age: 72
Setting detail: THERAPIES SERIES
Discharge: HOME | End: 2022-03-28
Payer: MEDICARE

## 2022-03-28 PROCEDURE — 97110 THERAPEUTIC EXERCISES: CPT

## 2022-03-28 NOTE — PROGRESS NOTES
[address] [facility]  PHYSICAL THERAPY  [] EVALUATION  [x] DAILY NOTE (LAND) [] DAILY NOTE (AQUATIC ) [] PROGRESS NOTE [] DISCHARGE NOTE    [x] OUTPATIENT REHABILITATION [facility]   [] [place] [address]    [] [facility]   [] [facility]    Date: 3/28/2022  Patient Name:  Jonatan Koenig  : 1950  MRN: [id number]  CSN: [id number]    Referring Practitioner Kartik Christiansen MD   Diagnosis Dizziness and giddiness [R42]    Treatment Diagnosis Vertigo, Balance Disorder   Date of Evaluation 22    Additional Pertinent History TBI, High BP      Functional Outcome Measure Used Dizziness Handicap Inventory   Functional Outcome Score 40 (22); 4 (3/9/22)      Insurance: Primary: Payor: Ton Ley /  /  / ,   Secondary: Coy Hollingsworth BENEFIT   Authorization Information: PRECERTIFICATION REQUIRED:  No  INSURANCE THERAPY BENEFIT:  Patient has unlimited visits based on medical necessity  Benefit will not cover maintenance or preventative treatment. AQUATIC THERAPY COVERED:   Yes  MODALITIES COVERED:  Yes, with the exception of iontophoresis and hot packs/cold packs  TELEHEALTH COVERED: Yes   Visit # 8, 2/6 for progress note   Visits Allowed: Unlimited visits based on medical necessity   Recertification Date: 3/07/05   Physician Follow-Up: 2022   Physician Orders: Nakul Soler and treat   History of Present Illness: Joseph Millard presents to therapy with dizziness which occurs when laying down or sitting up. Does not occur every time. Reports he had this in December, but the dizziness is lasting longer at this time. Did go to therapy in December which helped. VNG scheduled for 22. SUBJECTIVE: Joseph Millard reports he has noticed a significant improvement in his sway with completing x1 viewing standing with feet together.   TREATMENT   Precautions: High BP, Vertigo/Dizziness, Brain Injury, Memory Issues   Pain: no headache    X in shaded column indicates activity completed today   Modalities Parameters/  Location  Notes                     Manual Therapy Time/Technique  Notes                     Exercise/Intervention   Notes   Modified Vertbral Artery=negative bilateral    Does have a sore neck   Smooth pursuit    Lightheaded   Saccade    Slower, lightheaded   Head Thrust=negative       [place]: EO=30 sec; EC=30 sec with minimal sway   x 1=30 sec with no to slight sway  2=30 sec with slight sway  3=30 sec with slight sway  4=30 sec with moderate sway   [place] Hallpike=negative bilateral       Roll Test=negative bilateral       x1 viewing with patient with checkerboard pattern standing with feet together. Progressed to x2 viewing in sitting.   x Instructed to keep in focus apx 6 inches from face, move no more than 1 inch either direction, horizontal and vertical motion for 60 seconds. 5 times per day. Specific Interventions Next Treatment: Vestibular recheck, gaze exercises, balance exercises    Activity/Treatment Tolerance:  [x]  Patient tolerated treatment well  []  Patient limited by fatigue  []  Patient limited by pain   []  Patient limited by medical complications  []  Other:     Assessment: Bruno Reeves reporting his balance has significantly improved since starting therapy. Able to progress to x2 viewing in sitting. He has 1 more appointment scheduled which may be last session depending over this next week's progression. Body Structures/Functions/Activity Limitations: impaired activity tolerance, impaired balance and Dizziness  Prognosis: good    GOALS:  Patient Goal: \"Decrease dizziness\"    Short Term Goals:  Time Frame: See LTG      Long Term Goals:  Time Frame: 12 weeks  1. Improve DHI to 35 or less to assist with safety at home. MET: DHI 4.  NEW GOAL: Improve DHI to 2 or less to assist with safety at home. 2.  Bruno Reeves to report no longer having dizziness to assist with getting in and out of bed.   NOT MET: Bruno Reeves did have an episode of lightheadedness getting out of bed this morning. 3.  Independent with HEP to assist with decreasing dizziness. MET: Averaging exercises 3-4 sets per day. Ongoing. Patient Education:    [x]  HEP/Education Completed: x2 viewing in sitting   [employer] Access Code:  []  No new Education completed  []  Reviewed Prior HEP      []  Patient verbalized and/or demonstrated understanding of education provided. []  Patient unable to verbalize and/or demonstrate understanding of education provided. Will continue education. [x]  Barriers to learning: None per patient    PLAN:  Treatment Recommendations: Balance Training, Functional Mobility Training, Home Exercise Program, Patient Education and Vestibular Rehabilitation    []  Plan of care initiated. Plan to see patient 2 times per week for 12 weeks to address the treatment planned outlined above.   [x]  Continue with current plan of care  []  Modify plan of care as follows: 1 time per week x4 weeks   []  Hold pending physician visit  []  Discharge    Time In 0815   Time Out 0840   Timed Code Minutes: 25   Total Treatment Time: 25       Electronically Signed by: Adalgisa Swann, PT

## 2022-03-29 ENCOUNTER — OFFICE VISIT (OUTPATIENT)
Dept: FAMILY MEDICINE CLINIC | Age: 72
End: 2022-03-29
Payer: MEDICARE

## 2022-03-29 VITALS
HEART RATE: 54 BPM | WEIGHT: 184 LBS | RESPIRATION RATE: 18 BRPM | TEMPERATURE: 96.3 F | HEIGHT: 66 IN | BODY MASS INDEX: 29.57 KG/M2 | DIASTOLIC BLOOD PRESSURE: 66 MMHG | OXYGEN SATURATION: 99 % | SYSTOLIC BLOOD PRESSURE: 108 MMHG

## 2022-03-29 DIAGNOSIS — R42 DIZZINESS: Primary | ICD-10-CM

## 2022-03-29 PROBLEM — S09.90XA INJURY OF HEAD: Status: RESOLVED | Noted: 2020-05-18 | Resolved: 2022-03-29

## 2022-03-29 PROBLEM — S01.01XA SCALP LACERATION, INITIAL ENCOUNTER: Status: RESOLVED | Noted: 2021-05-28 | Resolved: 2022-03-29

## 2022-03-29 PROBLEM — S09.90XA CLOSED HEAD INJURY: Status: RESOLVED | Noted: 2021-05-28 | Resolved: 2022-03-29

## 2022-03-29 PROCEDURE — G8484 FLU IMMUNIZE NO ADMIN: HCPCS | Performed by: STUDENT IN AN ORGANIZED HEALTH CARE EDUCATION/TRAINING PROGRAM

## 2022-03-29 PROCEDURE — 1123F ACP DISCUSS/DSCN MKR DOCD: CPT | Performed by: STUDENT IN AN ORGANIZED HEALTH CARE EDUCATION/TRAINING PROGRAM

## 2022-03-29 PROCEDURE — 4040F PNEUMOC VAC/ADMIN/RCVD: CPT | Performed by: STUDENT IN AN ORGANIZED HEALTH CARE EDUCATION/TRAINING PROGRAM

## 2022-03-29 PROCEDURE — G8427 DOCREV CUR MEDS BY ELIG CLIN: HCPCS | Performed by: STUDENT IN AN ORGANIZED HEALTH CARE EDUCATION/TRAINING PROGRAM

## 2022-03-29 PROCEDURE — 1036F TOBACCO NON-USER: CPT | Performed by: STUDENT IN AN ORGANIZED HEALTH CARE EDUCATION/TRAINING PROGRAM

## 2022-03-29 PROCEDURE — G8417 CALC BMI ABV UP PARAM F/U: HCPCS | Performed by: STUDENT IN AN ORGANIZED HEALTH CARE EDUCATION/TRAINING PROGRAM

## 2022-03-29 PROCEDURE — 99213 OFFICE O/P EST LOW 20 MIN: CPT | Performed by: STUDENT IN AN ORGANIZED HEALTH CARE EDUCATION/TRAINING PROGRAM

## 2022-03-29 PROCEDURE — 3017F COLORECTAL CA SCREEN DOC REV: CPT | Performed by: STUDENT IN AN ORGANIZED HEALTH CARE EDUCATION/TRAINING PROGRAM

## 2022-03-29 ASSESSMENT — ENCOUNTER SYMPTOMS
SHORTNESS OF BREATH: 0
WHEEZING: 0

## 2022-03-29 NOTE — PROGRESS NOTES
S: 73 y.o. male with   Chief Complaint   Patient presents with    1 Month Follow-Up       Here for f/u vertigo  Is working with PT  sxs are improving, just about gone  No falls  No HA's  Neg head CT in DEC  Unable to tolerate caloric testing, so was aborted  About to be released from PT      BP Readings from Last 3 Encounters:   03/29/22 108/66   02/15/22 118/60   01/14/22 98/62     Wt Readings from Last 3 Encounters:   03/29/22 184 lb (83.5 kg)   02/15/22 185 lb (83.9 kg)   01/14/22 185 lb (83.9 kg)       O: VS:  height is 5' 6\" (1.676 m) and weight is 184 lb (83.5 kg). His skin temperature is 96.3 °F (35.7 °C). His blood pressure is 108/66 and his pulse is 54. His respiration is 18 and oxygen saturation is 99%. Diagnosis Orders   1. Dizziness         Plan  -Vertigo: good improvement. Complete PT. Prn meclizine. F/u if sxs return, worsen or change. Reviewed ER precautions, pt understands. Health Maintenance Due   Topic Date Due    Flu vaccine (1) 09/01/2021         Attending Physician Statement  I have discussed the case, including pertinent history and exam findings with the resident. I agree with the documented assessment and plan as documented by the resident.   GE modifier added to this encounter      Lázaro Garibay DO 3/29/2022 2:11 PM

## 2022-03-29 NOTE — PROGRESS NOTES
Yomaira Nick is a 73 y.o. male who presents today for:  Chief Complaint   Patient presents with    1 Month Follow-Up       HPI:   Dizziness is getting better  Doing PT, sees about once a week   No falls or LOC  Able to do some long walks/runs   Not needing meclizine     Saw neuro and EEG was clear, sees again in June        Food Insecurity: No Food Insecurity    Worried About Running Out of Food in the Last Year: Never true    [place] of Food in the Last Year: Never true     Health Maintenance reviewed -   Health Maintenance   Topic Date Due    Flu vaccine (1) 09/01/2021    Potassium monitoring  06/03/2022    Creatinine monitoring  06/03/2022    Depression Screen  09/21/2022    Annual Wellness Visit (AWV)  09/22/2022    TSH testing  12/08/2022    Diabetes screen  12/08/2024    Colorectal Cancer Screen  07/08/2025    Lipid screen  09/21/2026    DTaP/Tdap/Td vaccine (2 - Td or Tdap) 10/22/2030    Shingles Vaccine  Completed    Pneumococcal 65+ years Vaccine  Completed    COVID-19 Vaccine  Completed    Hepatitis C screen  Completed    Hepatitis A vaccine  Aged Out    Hepatitis B vaccine  Aged Out    Hib vaccine  Aged Out    Meningococcal (ACWY) vaccine  Aged Out     Current Outpatient Medications   Medication Sig Dispense Refill    levETIRAcetam (KEPPRA) 500 MG tablet Take 1 tablet by mouth 2 times daily 180 tablet 1    calcium carbonate (OSCAL) 500 MG TABS tablet Take 500 mg by mouth daily      Magnesium Citrate 200 MG TABS Take 1 tablet by mouth 4 times daily (with meals and nightly)      B Complex-Folic Acid (F-659 BALANCED TR PO) Take 1 tablet by mouth 2 times daily (before meals) [place]      Cinnamon 500 MG CAPS Take 1 capsule by mouth 4 times daily (before meals and nightly)      vitamin D (CHOLECALCIFEROL) 25 MCG (1000 UT) TABS tablet Take 3,000 Units by mouth daily [address]       No current facility-administered medications for this visit.      Social History Tobacco Use    Smoking status: Never Smoker    Smokeless tobacco: Never Used   Substance Use Topics    Alcohol use: Never      Subjective:   Review of Systems   Respiratory: Negative for shortness of breath and wheezing. Cardiovascular: Negative for chest pain and palpitations. Objective:     Vitals:    03/29/22 1345   BP: 108/66   Site: Left Upper Arm   Position: Sitting   Cuff Size: Medium Adult   Pulse: 54   Resp: 18   Temp: 96.3 °F (35.7 °C)   TempSrc: Skin   SpO2: 99%   Weight: 184 lb (83.5 kg)   Height: 5' 6\" (1.676 m)     Body mass index is 29.7 kg/m². Wt Readings from Last 3 Encounters:   03/29/22 184 lb (83.5 kg)   02/15/22 185 lb (83.9 kg)   01/14/22 185 lb (83.9 kg)     BP Readings from Last 3 Encounters:   03/29/22 108/66   02/15/22 118/60   01/14/22 98/62     Physical Exam  Vitals and nursing note reviewed. Constitutional:       General: He is not in acute distress. Appearance: He is well-developed. He is not diaphoretic. Cardiovascular:      Rate and Rhythm: Normal rate and regular rhythm. Heart sounds: Normal heart sounds. No murmur heard. Pulmonary:      Effort: Pulmonary effort is normal.      Breath sounds: Normal breath sounds. No wheezing. Musculoskeletal:      Right lower leg: No edema. Left lower leg: No edema. Neurological:      Mental Status: He is alert. Psychiatric:         Thought Content: Thought content normal.         Judgment: Judgment normal.         Assessment / Plan:   Karol Lackey was seen today for 1 month follow-up. Diagnoses and all orders for this visit:    Dizziness      Patient presents for follow-up visit his dizziness   Dizziness-acute, improving. Patient states that he is seeing much improvement with his physical therapy regimen. Patient is back to be able to take long walks and runs without fear of symptoms. No recent falls or loss of consciousness. Continue to follow with PT as scheduled.   As needed meclizine    Patient to follow-up in 2 to 3 months, earlier as needed     Return in about 10 weeks (around 6/7/2022). Medications Prescribed:  No orders of the defined types were placed in this encounter. Future Appointments   Date Time Provider Joya Roseanne   4/4/2022  3:15 PM [id number] [facility], PT [facility] [place] [facility]   5/24/2022  3:00 PM Helen Hamilton MD [facility] [place]   6/7/2022 11:00 AM Kalyn Weber MD [facility] [place]   6/7/2022  1:30 PM Daniel Gongora MD N [facility] [place]   6/8/2022  1:00 PM Helen Hamilton MD [facility] [place]       Patient given educational materials - see patient instructions. Discussed use, benefit, and side effects of prescribed medications. All patient questions answered. Pt voiced understanding. Instructed to continue current medications, diet and exercise. Patient agreed with treatment plan. Follow up as directed. Part of this visit was documented via [id number] technology, please excuse any errors.      Electronically signed by Helen Hamilton MD on 3/29/2022 at 2:17 PM

## 2022-04-04 ENCOUNTER — HOSPITAL ENCOUNTER (OUTPATIENT)
Dept: PHYSICAL THERAPY | Age: 72
Setting detail: THERAPIES SERIES
Discharge: HOME | End: 2022-04-04
Payer: MEDICARE

## 2022-04-04 PROCEDURE — 97110 THERAPEUTIC EXERCISES: CPT

## 2022-04-04 NOTE — DISCHARGE SUMMARY
[address]  PHYSICAL THERAPY  [] EVALUATION  [] DAILY NOTE (LAND) [] DAILY NOTE (AQUATIC ) [] PROGRESS NOTE [x] DISCHARGE NOTE    [x] OUTPATIENT REHABILITATION CENTER [facility]   [] [place] [address]    [] [facility]   [] Malvin Pfeiffer    Date: 2022  Patient Name:  Melony Patrick  : 1950  MRN: [id number]  CSN: [id number]    Referring Practitioner Obdulia Mojica MD   Diagnosis Dizziness and giddiness [R42]    Treatment Diagnosis Vertigo, Balance Disorder   Date of Evaluation 22    Additional Pertinent History TBI, High BP      Functional Outcome Measure Used Dizziness Handicap Inventory   Functional Outcome Score 40 (22); 4 (3/9/22); 0 (22)      Insurance: Primary: Payor: Mac Raya /  /  / ,   Secondary: Tariq Glass BENEFIT   Authorization Information: PRECERTIFICATION REQUIRED:  No  INSURANCE THERAPY BENEFIT:  Patient has unlimited visits based on medical necessity  Benefit will not cover maintenance or preventative treatment. AQUATIC THERAPY COVERED:   Yes  MODALITIES COVERED:  Yes, with the exception of iontophoresis and hot packs/cold packs  TELEHEALTH COVERED: Yes   Visit # 9, 3/3 for progress note   Visits Allowed: Unlimited visits based on medical necessity   Recertification Date:    Physician Follow-Up: 2022   Physician Orders: Lakeshia Cricket and treat   History of Present Illness: [facility] presents to therapy with dizziness which occurs when laying down or sitting up. Does not occur every time. Reports he had this in December, but the dizziness is lasting longer at this time. Did go to therapy in December which helped. VNG scheduled for 22. SUBJECTIVE: [facility] reports he ran 4 miles this morning and then did chair yoga this afternoon. Has been completing HEP 4 times per day. At this point he feels 95% improved since beginning therapy.     TREATMENT   Precautions: High BP, Vertigo/Dizziness, Brain Injury, Memory Issues Pain: no headache    X in shaded column indicates activity completed today   Modalities Parameters/  Location  Notes                     Manual Therapy Time/Technique  Notes                     Exercise/Intervention   Notes   Modified Vertbral Artery=negative bilateral    Does have a sore neck   Smooth pursuit    Lightheaded   Saccade    Slower, lightheaded   Head Thrust=negative       [place]: EO=30 sec; EC=30 sec with minimal sway   x 1=30 sec with no to slight sway  2=30 sec with slight sway  3=30 sec with slight sway  4=30 sec with moderate sway   [place] Hallpike=negative bilateral       Roll Test=negative bilateral       x2 viewing in sitting with progression to standing with feet shoulder width apart   x Instructed to keep in focus apx 6 inches from face, move no more than 1 inch either direction, horizontal and vertical motion for 60 seconds. 5 times per day. Specific Interventions Next Treatment: Vestibular recheck, gaze exercises, balance exercises    Activity/Treatment Tolerance:  [x]  Patient tolerated treatment well  []  Patient limited by fatigue  []  Patient limited by pain   []  Patient limited by medical complications  []  Other:     Assessment: Reassessment completed today. Juju Soriano has significant improvement in balance and dizziness. He feels 95% improved. Perfect score on DHI. He has been able to progress to x2 viewing in sitting. At this time he is being discharged from therapy. He is to continue with HEP but instructed in decreasing frequency over time. Body Structures/Functions/Activity Limitations: impaired activity tolerance, impaired balance and Dizziness  Prognosis: good    GOALS:  Patient Goal: \"Decrease dizziness\"    Short Term Goals:  Time Frame: See LTG      Long Term Goals:  Time Frame: 12 weeks  1. Improve DHI to 2 or less to assist with safety at home. MET: DHI 0 today.     2.  Juju Soriano to report no longer having dizziness to assist with getting in and out of bed. MET: Juju Soriano is no longer having any dizziness when getting out of bed this morning. 3.  Independent with HEP to assist with decreasing dizziness. MET: Averaging exercises 4 sets per day. Patient Education:    [x]  HEP/Education Completed: x2 viewing in standing   Understory Access Code:  []  No new Education completed  []  Reviewed Prior HEP      []  Patient verbalized and/or demonstrated understanding of education provided. []  Patient unable to verbalize and/or demonstrate understanding of education provided. Will continue education. [x]  Barriers to learning: None per patient    PLAN:  Treatment Recommendations: Balance Training, Functional Mobility Training, Home Exercise Program, Patient Education and Vestibular Rehabilitation    []  Plan of care initiated. Plan to see patient 2 times per week for 12 weeks to address the treatment planned outlined above.   []  Continue with current plan of care  []  Modify plan of care as follows: 1 time per week x4 weeks   []  Hold pending physician visit  [x]  Discharge    Time In 1515   Time Out 1545   Timed Code Minutes: 30   Total Treatment Time: 30       Electronically Signed by: Yan Luna PT

## 2022-04-09 ENCOUNTER — HOSPITAL ENCOUNTER (EMERGENCY)
Age: 72
Discharge: HOME | End: 2022-04-10
Attending: EMERGENCY MEDICINE
Payer: MEDICARE

## 2022-04-09 ENCOUNTER — APPOINTMENT (OUTPATIENT)
Dept: CT IMAGING | Age: 72
End: 2022-04-09
Payer: MEDICARE

## 2022-04-09 DIAGNOSIS — K62.5 RECTAL BLEEDING: Primary | ICD-10-CM

## 2022-04-09 LAB
ANION GAP SERPL CALCULATED.3IONS-SCNC: 13 MEQ/L (ref 8–16)
APTT: 27.5 SECONDS (ref 22–38)
BASOPHILS # BLD: 0.5 %
BASOPHILS ABSOLUTE: 0 THOU/MM3 (ref 0–0.1)
BUN BLDV-MCNC: 24 MG/DL (ref 7–22)
CALCIUM SERPL-MCNC: 9.6 MG/DL (ref 8.5–10.5)
CHLORIDE BLD-SCNC: 100 MEQ/L (ref 98–111)
CO2: 26 MEQ/L (ref 23–33)
CREAT SERPL-MCNC: 1.3 MG/DL (ref 0.4–1.2)
EOSINOPHIL # BLD: 1.4 %
EOSINOPHILS ABSOLUTE: 0.1 THOU/MM3 (ref 0–0.4)
ERYTHROCYTE [DISTWIDTH] IN BLOOD BY AUTOMATED COUNT: 13.2 % (ref 11.5–14.5)
ERYTHROCYTE [DISTWIDTH] IN BLOOD BY AUTOMATED COUNT: 43.4 FL (ref 35–45)
GFR SERPL CREATININE-BSD FRML MDRD: 55 ML/MIN/1.73M2
GLUCOSE BLD-MCNC: 90 MG/DL (ref 70–108)
HCT VFR BLD CALC: 42.8 % (ref 42–52)
HEMOCCULT STL QL: NORMAL
HEMOGLOBIN: 14.1 GM/DL (ref 14–18)
IMMATURE GRANS (ABS): 0.01 THOU/MM3 (ref 0–0.07)
IMMATURE GRANULOCYTES: 0.1 %
INR BLD: 0.98 (ref 0.85–1.13)
LYMPHOCYTES # BLD: 26.7 %
LYMPHOCYTES ABSOLUTE: 2.1 THOU/MM3 (ref 1–4.8)
MCH RBC QN AUTO: 29.6 PG (ref 26–33)
MCHC RBC AUTO-ENTMCNC: 32.9 GM/DL (ref 32.2–35.5)
MCV RBC AUTO: 89.9 FL (ref 80–94)
MONOCYTES # BLD: 7.9 %
MONOCYTES ABSOLUTE: 0.6 THOU/MM3 (ref 0.4–1.3)
NUCLEATED RED BLOOD CELLS: 0 /100 WBC
OSMOLALITY CALCULATION: 281.1 MOSMOL/KG (ref 275–300)
PLATELET # BLD: 245 THOU/MM3 (ref 130–400)
PMV BLD AUTO: 10.5 FL (ref 9.4–12.4)
POTASSIUM SERPL-SCNC: 3.6 MEQ/L (ref 3.5–5.2)
RBC # BLD: 4.76 MILL/MM3 (ref 4.7–6.1)
SEG NEUTROPHILS: 63.4 %
SEGMENTED NEUTROPHILS ABSOLUTE COUNT: 4.9 THOU/MM3 (ref 1.8–7.7)
SODIUM BLD-SCNC: 139 MEQ/L (ref 135–145)
WBC # BLD: 7.7 THOU/MM3 (ref 4.8–10.8)

## 2022-04-09 PROCEDURE — 2580000003 HC RX 258: Performed by: EMERGENCY MEDICINE

## 2022-04-09 PROCEDURE — 80048 BASIC METABOLIC PNL TOTAL CA: CPT

## 2022-04-09 PROCEDURE — 99283 EMERGENCY DEPT VISIT LOW MDM: CPT

## 2022-04-09 PROCEDURE — 6360000004 HC RX CONTRAST MEDICATION: Performed by: EMERGENCY MEDICINE

## 2022-04-09 PROCEDURE — 82272 OCCULT BLD FECES 1-3 TESTS: CPT

## 2022-04-09 PROCEDURE — 85610 PROTHROMBIN TIME: CPT

## 2022-04-09 PROCEDURE — 74177 CT ABD & PELVIS W/CONTRAST: CPT

## 2022-04-09 PROCEDURE — 85730 THROMBOPLASTIN TIME PARTIAL: CPT

## 2022-04-09 PROCEDURE — 85025 COMPLETE CBC W/AUTO DIFF WBC: CPT

## 2022-04-09 PROCEDURE — 93005 ELECTROCARDIOGRAM TRACING: CPT | Performed by: EMERGENCY MEDICINE

## 2022-04-09 RX ORDER — 0.9 % SODIUM CHLORIDE 0.9 %
500 INTRAVENOUS SOLUTION INTRAVENOUS ONCE
Status: COMPLETED | OUTPATIENT
Start: 2022-04-09 | End: 2022-04-10

## 2022-04-09 RX ADMIN — IOPAMIDOL 80 ML: 755 INJECTION, SOLUTION INTRAVENOUS at 22:32

## 2022-04-09 RX ADMIN — SODIUM CHLORIDE 500 ML: 9 INJECTION, SOLUTION INTRAVENOUS at 23:19

## 2022-04-09 ASSESSMENT — ENCOUNTER SYMPTOMS
COUGH: 0
ANAL BLEEDING: 1
ABDOMINAL PAIN: 0
SORE THROAT: 0
RHINORRHEA: 0
VOMITING: 0
RECTAL PAIN: 0
CONSTIPATION: 0
SHORTNESS OF BREATH: 0
DIARRHEA: 0
NAUSEA: 0

## 2022-04-10 VITALS
HEART RATE: 57 BPM | OXYGEN SATURATION: 97 % | SYSTOLIC BLOOD PRESSURE: 122 MMHG | TEMPERATURE: 98 F | RESPIRATION RATE: 15 BRPM | WEIGHT: 177 LBS | BODY MASS INDEX: 28.45 KG/M2 | DIASTOLIC BLOOD PRESSURE: 89 MMHG | HEIGHT: 66 IN

## 2022-04-10 LAB
EKG ATRIAL RATE: 60 BPM
EKG P AXIS: 41 DEGREES
EKG P-R INTERVAL: 150 MS
EKG Q-T INTERVAL: 428 MS
EKG QRS DURATION: 76 MS
EKG QTC CALCULATION (BAZETT): 428 MS
EKG R AXIS: 30 DEGREES
EKG T AXIS: 66 DEGREES
EKG VENTRICULAR RATE: 60 BPM

## 2022-04-10 PROCEDURE — 93010 ELECTROCARDIOGRAM REPORT: CPT | Performed by: INTERNAL MEDICINE

## 2022-04-10 NOTE — ED PROVIDER NOTES
[facility] COMPLAINT       Chief Complaint   Patient presents with    Rectal Bleeding       Nurses Notes reviewed and I agree except as noted in the HPI. HISTORY OF PRESENT ILLNESS    Sim Arellano is a 73 y.o. pleasant male who presents to the emergency department for rectal bleeding. Patient states around 9 PM he was washing the dishes and felt that he needed to go to the bathroom. He went to the bathroom and did not pass any urine or have a bowel movement though he felt the urgency to defecate. He states after he wiped and saw blood on the toilet paper. He states it took a few times for him to wipe in order to dry himself. He also saw blood in the toilet. He is not on any blood thinners, only prescription medication is Keppra. He does also take some vitamins over-the-counter. His last colonoscopy was 2 years ago was reportedly normal.  He denies any abdominal pain or rectal pain. No fever, nausea, vomiting, diarrhea or constipation. No recent travel or antibiotic use. He did not use any medications over-the-counter. His last normal bowel movement was this morning. Past abdominal surgeries remarkable for an appendectomy 7 years ago and a right inguinal hernia repair in 2015. His GI is Dr. Toney Person at [place] in [facility]. He had sensation of bleeding. No other initial complaints or concerns. REVIEW OF SYSTEMS     Review of Systems   Constitutional: Negative for diaphoresis and fever. HENT: Negative for congestion, rhinorrhea and sore throat. Eyes: Negative for visual disturbance. Respiratory: Negative for cough and shortness of breath. Cardiovascular: Negative for chest pain, palpitations and leg swelling. Gastrointestinal: Positive for anal bleeding. Negative for abdominal pain, constipation, diarrhea, nausea, rectal pain and vomiting. Endocrine: Negative for polyuria. Genitourinary: Negative for dysuria. Musculoskeletal: Negative for joint swelling. Skin: Negative for rash. Neurological: Negative for dizziness, seizures, syncope, speech difficulty, weakness, numbness and headaches. Hematological: Negative for adenopathy. Psychiatric/Behavioral: Negative for confusion and self-injury. All other systems reviewed and are negative. PAST MEDICAL HISTORY    has a past medical history of Fractured skull ([facility] [address] Hyperlipidemia, and Hypertension. SURGICAL HISTORY      has a past surgical history that includes hernia repair; Appendectomy; and Appendectomy (2017). CURRENT MEDICATIONS       Discharge Medication List as of 2022 11:49 PM      CONTINUE these medications which have NOT CHANGED    Details   levETIRAcetam (KEPPRA) 500 MG tablet Take 1 tablet by mouth 2 times daily, Disp-180 tablet, R-1Normal      calcium carbonate (OSCAL) 500 MG TABS tablet Take 500 mg by mouth dailyHistorical Med      Magnesium Citrate 200 MG TABS Take 1 tablet by mouth 4 times daily (with meals and nightly)Historical Med      B Complex-Folic Acid (I-607 BALANCED TR PO) Take 1 tablet by mouth 2 times daily (before meals) [place]      Cinnamon 500 MG CAPS Take 1 capsule by mouth 4 times daily (before meals and nightly)Historical Med      vitamin D (CHOLECALCIFEROL) 25 MCG (1000 UT) TABS tablet Take 3,000 Units by mouth daily [id number] [facility] [address]     is allergic to codeine, codone [hydrocodone], hydrocodone, and oxycodone. FAMILY HISTORY     He indicated that his mother is . He indicated that his father is . family history includes Cancer in his father; Glaucoma in his father and mother; Heart Disease in his father and mother; High Blood Pressure in his father; High Cholesterol in his father; Other in his father; Prostate Cancer in his father; Stroke in his father and mother. SOCIAL HISTORY      reports that he has never smoked.  He has never used smokeless tobacco. He reports that he does not drink alcohol and does not use drugs. PHYSICAL EXAM     INITIAL VITALS:  height is 5' 6\" (1.676 m) and weight is 177 lb (80.3 kg). His temperature is 98 °F (36.7 °C). His blood pressure is 122/89 and his pulse is 57. His respiration is 15 and oxygen saturation is 97%. CONSTITUTIONAL: [Awake, alert, non toxic, well developed, well nourished, no acute distress]  HEAD: [Normocephalic, atraumatic]  EYES: [Pupils equal, round & reactive to light, extraocular movements intact, no nystagmus, clear conjunctiva, non-icteric sclera]  ENT: [External ear canal clear without evidence of cerumen impaction or foreign body, TM's clear without erythema or bulging. Nares patent without drainage, septum appears midline. Moist mucus membranes, oropharynx clear without exudate, erythema, or mass. Uvula midline]  NECK: [Nontender and supple. No meningismus, no appreciated lymphadenopathy. Intact full range of motion. C-spine midline without vertebral tenderness. Trachea midline.]  CHEST: [Inspection normal, no lesions, equal rise. No crepitus or tenderness upon palpation.]  CARDIOVASCULAR: [Regular rate, rhythm, normal S1 and S2. No appreciated murmurs, rubs, or gallops. No pulse deficits appreciated. Intact distal perfusion. JVD not appreciated.]  PULMONARY: [Respiratory distress absent. Respiratory effort normal. Breath sounds clear to auscultation without rhonchi, rales, or wheezing. No accessory muscle use. No stridor]  ABDOMEN: [Inspection normal, without surgical scars. Soft, non-tender, non-distended, with normoactive bowel sounds. No palpable masses, rebound, or guarding]  RECTAL: [Normal tone, tiny amount of dried blood externally, no gross active bleeding. No stool present in rectal vault, tiny pink streaks on gloved finger, no masses or tenderness. Normal tone. Guaic positive. No external hemorrhoids or anal fissure.]  BACK: [Intact ROM.  No midline vertebral tenderness, step off, or crepitus. No CVA tenderness.]  MUSCULOSKELETAL: [Extremities nontender to palpation. No gross deformity or evidence of external trauma. Intact range of motion. Sensation intact. No clubbing, cyanosis, or edema.]  SKIN: [Warm, dry. No jaundice, rash, urticaria, or petechiae]  NEUROLOGIC: [Alert and oriented x 3, GCS 15, normal mentation for age. Moves all four extremities. No gross sensory deficit. Cerebellar function grossly normal.]  PSYCHIATRIC: [Normal mood and affect, thought process is clear and linear]     DIFFERENTIAL DIAGNOSIS:   Internal hemorrhoid, less likely external hemorrhoid, anal fissure, mass    DIAGNOSTIC RESULTS     EKG: All EKG's are interpreted by the Emergency Department Physician who either signs or Co-signsthis chart in the absence of a cardiologist.  Normal sinus rhythm at a rate of 60 bpm, WA interval 150 ms, QRS duration 76 ms,  ms, no ST elevation or depression, my interpretation. RADIOLOGY: non-plain film images(s) such as CT,Ultrasound and MRI are read by the radiologist.    CT ABDOMEN PELVIS W IV CONTRAST Additional Contrast? None   Final Result   Impression:   1. Colonic diverticulosis without diverticulitis      This document has been electronically signed by: Radha Riggs MD on    04/09/2022 11:00 PM      All CTs at this facility use dose modulation techniques and iterative    reconstructions, and/or weight-based dosing   when appropriate to reduce radiation to a low as reasonably achievable.         [] Visualized and interpreted by me   [x] Radiologist's Wet Read Report Reviewed   [] Discussed withRadiologist.    LABS:   Labs Reviewed   BASIC METABOLIC PANEL - Abnormal; Notable for the following components:       Result Value    BUN 24 (*)     CREATININE 1.3 (*)     All other components within normal limits   GLOMERULAR FILTRATION RATE, ESTIMATED - Abnormal; Notable for the following components:    Est, Glom Filt Rate 55 (*)     All other components within normal limits   CBC WITH AUTO DIFFERENTIAL   PROTIME-INR   APTT   ANION GAP   OSMOLALITY   BLOOD OCCULT STOOL SCREEN #1       EMERGENCY DEPARTMENT COURSE:   Vitals:    Vitals:    04/09/22 2137 04/09/22 2240 04/09/22 2310 04/10/22 0010   BP: (!) 172/84 (!) 143/98 125/79 122/89   Pulse: 54 55 55 57   Resp: 16 14 14 15   Temp: 98 °F (36.7 °C)      SpO2: 100% 96% 97% 97%   Weight: 177 lb (80.3 kg)      Height: 5' 6\" (1.676 m)          The results of pertinent diagnostic studies and exam findings were discussed. The patients provisional diagnosis and plan of care were discussed with the patient and present family. The patient and/or present family expressed understanding of the diagnosis and plan. The nurse was instructed to provide written instructions and appropriate follow-up information. The patient understands their need and responsibility to obtain additional follow-up as instructed. The patient is comfortable with the plan and discharge. The risks of medications administered and prescribed were discussed with the patient and family present. No further episodes of bleeding in ED. Patient will follow up closely with his GI provider and PCP in 1-2 days. CRITICAL CARE:   None    CONSULTS:  None    PROCEDURES:  None    FINAL IMPRESSION      1.  Rectal bleeding          DISPOSITION/PLAN   Discharge    PATIENT REFERRED TO:  Bruna Cruz MD  [address]  [phone]    Schedule an appointment as soon as possible for a visit       MD Remy G[address]  [facility] [place] [address]    Schedule an appointment as soon as possible for a visit         DISCHARGE MEDICATIONS:  Discharge Medication List as of 4/9/2022 11:49 PM          (Please note that portions of this note were completed with a voice recognition program.  Efforts were made to edit the dictations but occasionally words are mis-transcribed.)    Provider:  I personally performed the services described in the documentation, reviewed and edited the documentation which was dictated, and it accurately records my words and actions.     Bella Merrill MD 4/9/22 2:56 AM                Bella Merrill MD  04/10/22 9992

## 2022-04-10 NOTE — ED NOTES
Patient resting in bed with wife at bedside. Respirations easy and unlabored. Denies further needs at this time. Call light within reach.        Sapphire Adhikari RN  04/09/22 2898

## 2022-04-10 NOTE — ED TRIAGE NOTES
Pt presents to ED with chief complaint of rectal bleeding that began around 2115 this evening. Pt describes it as bright red blood and that it \"filled the toilet. \" Denies blood thinner use or rectal bleeding in the past. Denies pain. A&O. Respirations unlabored on room air.

## 2022-04-10 NOTE — ED NOTES
Patient resting in bed. Respirations easy and unlabored. No distress noted. Denies further needs at this time. Call light within reach.        Israel Cunha RN  04/09/22 9489

## 2022-05-17 ENCOUNTER — NURSE ONLY (OUTPATIENT)
Dept: LAB | Age: 72
End: 2022-05-17

## 2022-05-17 DIAGNOSIS — I10 ESSENTIAL HYPERTENSION: ICD-10-CM

## 2022-05-17 DIAGNOSIS — R35.1 NOCTURIA: ICD-10-CM

## 2022-05-17 DIAGNOSIS — F07.81 POST CONCUSSIVE SYNDROME: ICD-10-CM

## 2022-05-17 DIAGNOSIS — E78.00 ELEVATED LDL CHOLESTEROL LEVEL: ICD-10-CM

## 2022-05-17 DIAGNOSIS — E78.5 HYPERLIPIDEMIA, UNSPECIFIED HYPERLIPIDEMIA TYPE: ICD-10-CM

## 2022-05-17 DIAGNOSIS — E03.8 SUBCLINICAL HYPOTHYROIDISM: ICD-10-CM

## 2022-05-17 DIAGNOSIS — R41.3 MEMORY DIFFICULTIES: ICD-10-CM

## 2022-05-17 DIAGNOSIS — E78.00 ELEVATED CHOLESTEROL: ICD-10-CM

## 2022-05-17 DIAGNOSIS — R79.89 ELEVATED TSH: ICD-10-CM

## 2022-05-17 DIAGNOSIS — R79.89 TSH ELEVATION: ICD-10-CM

## 2022-05-17 DIAGNOSIS — D64.9 ANEMIA, UNSPECIFIED TYPE: ICD-10-CM

## 2022-05-17 DIAGNOSIS — K90.89 OTHER INTESTINAL MALABSORPTION: ICD-10-CM

## 2022-05-17 DIAGNOSIS — R94.6 ABNORMAL RESULTS OF THYROID FUNCTION STUDIES: ICD-10-CM

## 2022-05-17 LAB
BASOPHILS # BLD: 0.6 %
BASOPHILS ABSOLUTE: 0 THOU/MM3 (ref 0–0.1)
C-REACTIVE PROTEIN, HIGH SENSITIVITY: 16.5 MG/L
CALCIUM SERPL-MCNC: 9.7 MG/DL (ref 8.5–10.5)
CHOLESTEROL, TOTAL: 193 MG/DL (ref 100–199)
EOSINOPHIL # BLD: 1.7 %
EOSINOPHILS ABSOLUTE: 0.1 THOU/MM3 (ref 0–0.4)
ERYTHROCYTE [DISTWIDTH] IN BLOOD BY AUTOMATED COUNT: 13.2 % (ref 11.5–14.5)
ERYTHROCYTE [DISTWIDTH] IN BLOOD BY AUTOMATED COUNT: 45.2 FL (ref 35–45)
HCT VFR BLD CALC: 43.5 % (ref 42–52)
HDLC SERPL-MCNC: 41 MG/DL
HEMOGLOBIN: 13.7 GM/DL (ref 14–18)
HOMOCYSTEINE: 9.1 UMOL/L
IMMATURE GRANS (ABS): 0.01 THOU/MM3 (ref 0–0.07)
IMMATURE GRANULOCYTES: 0.1 %
LDL CHOLESTEROL CALCULATED: 122 MG/DL
LYMPHOCYTES # BLD: 11.4 %
LYMPHOCYTES ABSOLUTE: 0.8 THOU/MM3 (ref 1–4.8)
MAGNESIUM: 1.9 MG/DL (ref 1.6–2.4)
MCH RBC QN AUTO: 29.2 PG (ref 26–33)
MCHC RBC AUTO-ENTMCNC: 31.5 GM/DL (ref 32.2–35.5)
MCV RBC AUTO: 92.8 FL (ref 80–94)
MONOCYTES # BLD: 10.3 %
MONOCYTES ABSOLUTE: 0.8 THOU/MM3 (ref 0.4–1.3)
NUCLEATED RED BLOOD CELLS: 0 /100 WBC
PLATELET # BLD: 197 THOU/MM3 (ref 130–400)
PMV BLD AUTO: 11.1 FL (ref 9.4–12.4)
PTH INTACT: 25.9 PG/ML (ref 15–65)
RBC # BLD: 4.69 MILL/MM3 (ref 4.7–6.1)
SEDIMENTATION RATE, ERYTHROCYTE: 10 MM/HR (ref 0–10)
SEG NEUTROPHILS: 75.9 %
SEGMENTED NEUTROPHILS ABSOLUTE COUNT: 5.5 THOU/MM3 (ref 1.8–7.7)
TRIGL SERPL-MCNC: 151 MG/DL (ref 0–199)
VITAMIN D 25-HYDROXY: 35 NG/ML (ref 30–100)
WBC # BLD: 7.3 THOU/MM3 (ref 4.8–10.8)

## 2022-05-18 LAB
DHEAS (DHEA SULFATE): 59.3 UG/DL (ref 42–290)
THYROID PEROXIDASE ANTIBODY: < 4 IU/ML (ref 0–25)

## 2022-05-19 LAB — TESTOSTERONE FREE: NORMAL

## 2022-05-20 LAB
GLIADIN PEPTIDE IGG: 1.1 U/ML
TISSUE TRANSGLUTAMINASE IGA: 0.4 U/ML
TTG, IGG: 0.6 U/ML

## 2022-05-28 LAB — DHEA UNCONJUGATED: 1.27 NG/ML (ref 0.63–4.7)

## 2022-05-31 ENCOUNTER — OFFICE VISIT (OUTPATIENT)
Dept: FAMILY MEDICINE CLINIC | Age: 72
End: 2022-05-31
Payer: MEDICARE

## 2022-05-31 VITALS
DIASTOLIC BLOOD PRESSURE: 76 MMHG | TEMPERATURE: 97.4 F | RESPIRATION RATE: 18 BRPM | BODY MASS INDEX: 29.09 KG/M2 | HEIGHT: 66 IN | OXYGEN SATURATION: 98 % | WEIGHT: 181 LBS | HEART RATE: 67 BPM | SYSTOLIC BLOOD PRESSURE: 118 MMHG

## 2022-05-31 DIAGNOSIS — H83.2X9 VESTIBULAR DYSFUNCTION, UNSPECIFIED LATERALITY: Primary | ICD-10-CM

## 2022-05-31 DIAGNOSIS — E03.8 SUBCLINICAL HYPOTHYROIDISM: ICD-10-CM

## 2022-05-31 DIAGNOSIS — Z23 NEED FOR VACCINATION: ICD-10-CM

## 2022-05-31 DIAGNOSIS — Z12.5 PROSTATE CANCER SCREENING: ICD-10-CM

## 2022-05-31 DIAGNOSIS — S06.319D: ICD-10-CM

## 2022-05-31 PROCEDURE — G8427 DOCREV CUR MEDS BY ELIG CLIN: HCPCS | Performed by: STUDENT IN AN ORGANIZED HEALTH CARE EDUCATION/TRAINING PROGRAM

## 2022-05-31 PROCEDURE — 90677 PCV20 VACCINE IM: CPT | Performed by: FAMILY MEDICINE

## 2022-05-31 PROCEDURE — 1036F TOBACCO NON-USER: CPT | Performed by: STUDENT IN AN ORGANIZED HEALTH CARE EDUCATION/TRAINING PROGRAM

## 2022-05-31 PROCEDURE — 1123F ACP DISCUSS/DSCN MKR DOCD: CPT | Performed by: STUDENT IN AN ORGANIZED HEALTH CARE EDUCATION/TRAINING PROGRAM

## 2022-05-31 PROCEDURE — G0009 ADMIN PNEUMOCOCCAL VACCINE: HCPCS | Performed by: FAMILY MEDICINE

## 2022-05-31 PROCEDURE — G8417 CALC BMI ABV UP PARAM F/U: HCPCS | Performed by: STUDENT IN AN ORGANIZED HEALTH CARE EDUCATION/TRAINING PROGRAM

## 2022-05-31 PROCEDURE — 3017F COLORECTAL CA SCREEN DOC REV: CPT | Performed by: STUDENT IN AN ORGANIZED HEALTH CARE EDUCATION/TRAINING PROGRAM

## 2022-05-31 PROCEDURE — 99214 OFFICE O/P EST MOD 30 MIN: CPT | Performed by: STUDENT IN AN ORGANIZED HEALTH CARE EDUCATION/TRAINING PROGRAM

## 2022-05-31 RX ORDER — LANOLIN ALCOHOL/MO/W.PET/CERES
3 CREAM (GRAM) TOPICAL DAILY
COMMUNITY

## 2022-05-31 ASSESSMENT — PATIENT HEALTH QUESTIONNAIRE - PHQ9
SUM OF ALL RESPONSES TO PHQ QUESTIONS 1-9: 0
2. FEELING DOWN, DEPRESSED OR HOPELESS: 0
SUM OF ALL RESPONSES TO PHQ QUESTIONS 1-9: 0
1. LITTLE INTEREST OR PLEASURE IN DOING THINGS: 0
SUM OF ALL RESPONSES TO PHQ9 QUESTIONS 1 & 2: 0
SUM OF ALL RESPONSES TO PHQ QUESTIONS 1-9: 0
SUM OF ALL RESPONSES TO PHQ QUESTIONS 1-9: 0

## 2022-05-31 ASSESSMENT — ENCOUNTER SYMPTOMS
WHEEZING: 0
DIARRHEA: 0
SHORTNESS OF BREATH: 0
ABDOMINAL PAIN: 0
CONSTIPATION: 0

## 2022-05-31 NOTE — PROGRESS NOTES
After pharmacist chart review, the following recommendations are made:  - Due to ASCVD risk score of 16.8% and most recent LDL of 122 on 5/17/22, may consider adding a moderate-intensity statin, such as atorvastatin 10 mg daily.  - Patient's most recent TSH was 5.69 on 12/8/21. May consider re-checking TSH/free T4.   - Patient is also due for pneumonia vaccine.     Immunizations Administered     Name Date Dose Route    Pneumococcal conjugate PCV20, PF (Prevnar 20) 5/31/2022 0.5 mL Intramuscular    Site: Deltoid- Right    Lot: [id number]    NDC: 0005-2000-10        For Pharmacy Admin Tracking Only     CPA in place:  No   Recommendation Provided To: Provider: 2 via Note to Provider   Intervention Detail: New Rx: 1, reason: Needs Additional Therapy and Vaccine Recommended/Administered   Gap Closed?: No    Intervention Accepted By: Provider: 1   Time Spent (min): 4108 Deb Padilla PharmD   5/31/2022, 11:08 AM

## 2022-05-31 NOTE — PROGRESS NOTES
Dilan Holt is a 73 y.o.male    Chief Complaint   Patient presents with    Follow-up     dizziness       Chief complaint, [place], and all pertinent details of the case reviewed with the resident. Please see resident's note for specific details discussed at today's visit. 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 ([facility])    Vestibular dysfunction    Orthostatic hypotension       Current Outpatient Medications   Medication Sig Dispense Refill    melatonin 3 mg TABS tablet Take 3 mg by mouth daily      folic acid-pyridoxine-cyanocobalamine (FOLTX) 2.5-25-1 MG TABS tablet Take 1 tablet by mouth daily Not taking always as no need for extra folic acid      levETIRAcetam (KEPPRA) 500 MG tablet Take 1 tablet by mouth 2 times daily 180 tablet 1    calcium carbonate (OSCAL) 500 MG TABS tablet Take 500 mg by mouth daily      Magnesium Citrate 200 MG TABS Take 1 tablet by mouth 4 times daily (with meals and nightly)      B Complex-Folic Acid (I-536 BALANCED TR PO) Take 1 tablet by mouth 2 times daily (before meals) [place]      Cinnamon 500 MG CAPS Take 1 capsule by mouth 4 times daily (before meals and nightly)      vitamin D (CHOLECALCIFEROL) 25 MCG (1000 UT) TABS tablet Take 3,000 Units by mouth daily [address] [facility] 1000 U       No current facility-administered medications for this visit.        Review of Systems per resident provider    OBJECTIVE     /76 (Site: Right Upper Arm, Position: Sitting, Cuff Size: Medium Adult)   Pulse 67   Temp 97.4 °F (36.3 °C) (Skin)   Resp 18   Ht 5' 6\" (1.676 m)   Wt 181 lb (82.1 kg)   SpO2 98%   BMI 29.21 kg/m²   BP Readings from Last 3 Encounters:   05/31/22 118/76   04/10/22 122/89   03/29/22 108/66       Wt Readings from Last 3 Encounters:   05/31/22 181 lb (82.1 kg)   04/09/22 177 lb (80.3 kg)   03/29/22 184 lb (83.5 kg)     Body mass index is 29.21 kg/m². Physical Exam  Vitals and nursing note reviewed. Constitutional:       Appearance: Normal appearance. He is well-developed and normal weight. HENT:      Head: Normocephalic and atraumatic. Cardiovascular:      Rate and Rhythm: Normal rate and regular rhythm. Heart sounds: Normal heart sounds. Pulmonary:      Effort: Pulmonary effort is normal.      Breath sounds: Normal breath sounds. Abdominal:      General: Bowel sounds are normal.      Palpations: Abdomen is soft. Skin:     General: Skin is warm and dry. Neurological:      Mental Status: He is alert. Deep Tendon Reflexes: Reflexes are normal and symmetric. Psychiatric:         Behavior: Behavior normal.         Thought Content: Thought content normal.         Judgment: Judgment normal.          No results found for this visit on 05/31/22.     Lab Results   Component Value Date    LABA1C 5.5 12/08/2021       Lab Results   Component Value Date    CHOL 193 05/17/2022    TRIG 151 05/17/2022    HDL 41 05/17/2022    LDLCALC 122 05/17/2022       The 10-year ASCVD risk score (Mindy To, et al., 2013) is: 15.9%    Values used to calculate the score:      Age: 73 years      Sex: Male      Is Non- : No      Diabetic: No      Tobacco smoker: No      Systolic Blood Pressure: 667 mmHg      Is BP treated: No      HDL Cholesterol: 41 mg/dL      Total Cholesterol: 193 mg/dL    Lab Results   Component Value Date     04/09/2022    K 3.6 04/09/2022     04/09/2022    CO2 26 04/09/2022    BUN 24 (H) 04/09/2022    CREATININE 1.3 (H) 04/09/2022    GLUCOSE 90 04/09/2022    CALCIUM 9.7 05/17/2022    PROT 7.1 06/03/2021    LABALBU 4.0 06/03/2021    BILITOT 0.6 06/03/2021    ALKPHOS 45 06/03/2021    AST 13 06/03/2021    ALT 12 06/03/2021    LABGLOM 55 (A) 04/09/2022     estimated creatinine clearance is 54 mL/min (A) (based on SCr of 1.3 mg/dL (H)).     No results found for: Thea Sprinkles    Lab Results   Component Value Date    TSH 5.690 (H) 12/08/2021    P3VMGPX 86 12/08/2021    T4FREE 1.03 04/06/2021       Lab Results   Component Value Date    WBC 7.3 05/17/2022    HGB 13.7 (L) 05/17/2022    HCT 43.5 05/17/2022    MCV 92.8 05/17/2022     05/17/2022       Lab Results   Component Value Date    PSA 0.71 01/28/2021       Immunization History   Administered Date(s) Administered    COVID-19, Moderna, Booster, PF, 50mcg/0.25ml 01/12/2022    COVID-19, Moderna, Primary or Immunocompromised, PF, 100mcg/0.5mL 02/23/2021, 03/23/2021, 01/12/2022    Influenza, Quadv, adjuvanted, 65 yrs +, IM, PF (Fluad) 10/08/2020    Pneumococcal Polysaccharide ([id number]) 10/08/2020    Pneumococcal conjugate PCV20, PF (Prevnar 20) 05/31/2022    Tdap (Boostrix, Adacel) 10/22/2020    Zoster Recombinant (Shingrix) 10/22/2020, 01/14/2021       Health Maintenance   Topic Date Due    Prostate Specific Antigen (PSA) Screening or Monitoring  01/28/2022    Flu vaccine (Season Ended) 09/01/2022    Depression Screen  09/21/2022    Annual Wellness Visit (AWV)  09/22/2022    Colorectal Cancer Screen  07/08/2025    Lipids  05/17/2027    DTaP/Tdap/Td vaccine (2 - Td or Tdap) 10/22/2030    Shingles vaccine  Completed    Pneumococcal 65+ years Vaccine  Completed    COVID-19 Vaccine  Completed    Hepatitis C screen  Completed    Hepatitis A vaccine  Aged Out    Hepatitis B vaccine  Aged Out    Hib vaccine  Aged Out    Meningococcal (ACWY) vaccine  Aged Out           Future Appointments   Date Time Provider Joya Cronin   6/7/2022 11:00 AM Alejandra Parker MD [facility]  RES [address]   6/7/2022  1:30 PM Tyrell Youngblood MD N [facility] [address]   10/4/2022  1:40 PM Marco Nichole MD [facility] [address]       ASSESSMENT       Diagnosis Orders   1. Vestibular dysfunction, unspecified laterality  Comprehensive Metabolic Panel   2. Intraparenchymal hematoma of brain due to trauma, right with loss of consciousness, subsequent encounter  Comprehensive Metabolic Panel   3. Subclinical hypothyroidism  TSH    T4, Free   4. Need for vaccination  Pneumococcal Conjugate PCV20, PF (Prevnar 20)   5. Prostate cancer screening  PSA Screening       PLAN      After discussion with pt and resident provider, we agreed on plan as follows:    Continue home vestibular exercises as needed  Follow-up with neurology as planned tomorrow-discuss Keppra continuation with them at that time  Patient refuses statin therapy  PCV 20 today  Colonoscopy done July 2020 per Dr. Thi Tatum do again in 5 years  Check CMP, free T4/TSH, and PSA  Follow-up in September 2022 as planned            Attending Physician Statement  I have discussed the case, including pertinent history and exam findings with the resident. I also have seen the patient and performed key portions of the examination. I agree with the documented assessment and plan as documented by the resident.   GC modifier added to this encounter     Electronically signed by Manny Scruggs MD on 5/31/2022 at 5:26 PM

## 2022-05-31 NOTE — PROGRESS NOTES
S: 73 y.o. male with   Chief Complaint   Patient presents with    Follow-up     dizziness       HPI: please see resident note for HPI and ROS. Dizziness is gone and doing well    BP Readings from Last 3 Encounters:   05/31/22 118/76   04/10/22 122/89   03/29/22 108/66     Wt Readings from Last 3 Encounters:   05/31/22 181 lb (82.1 kg)   04/09/22 177 lb (80.3 kg)   03/29/22 184 lb (83.5 kg)       O: VS:  height is 5' 6\" (1.676 m) and weight is 181 lb (82.1 kg). His skin temperature is 97.4 °F (36.3 °C). His blood pressure is 118/76 and his pulse is 67. His respiration is 18 and oxygen saturation is 98%. Diagnosis Orders   1. Vestibular dysfunction, unspecified laterality     2. Intraparenchymal hematoma of brain due to trauma, right with loss of consciousness, subsequent encounter     3. Subclinical hypothyroidism     4.  Need for vaccination  Pneumococcal Conjugate PCV20, PF (Prevnar 20)       Plan:  Home exercises as needed  Follow up with neurology tomorrow  Prevnar 20 today  Consider Statin in future  CMP, TSH, T4, PSA  Follow up in 4 months for Medicare Annual [address] Maintenance Due   Topic Date Due    Pneumococcal 65+ years Vaccine (2 - PCV) 10/08/2021    Prostate Specific Antigen (PSA) Screening or Monitoring  01/28/2022         Hilda Cooks, MD 5/31/2022 3:52 PM

## 2022-05-31 NOTE — PROGRESS NOTES
Shadi Santos is a 73 y.o. male who presents today for:  Chief Complaint   Patient presents with    Follow-up     dizziness       HPI:   Dizziness is gone  PT and gait stabilization was very helpful   Has home exercises as needed     See neuro next week   Recently had an eeg done  Wondering if he still needs the keppra     The 10-year ASCVD risk score (Lotus Kolb, et al., 2013) is: 15.9%    Values used to calculate the score:      Age: 73 years      Sex: Male      Is Non- : No      Diabetic: No      Tobacco smoker: No      Systolic Blood Pressure: 085 mmHg      Is BP treated: No      HDL Cholesterol: 41 mg/dL      Total Cholesterol: 193 mg/dL  Declines statin        Food Insecurity: No Food Insecurity    Worried About Running Out of Food in the Last Year: Never true    Ran Out of Food in the Last Year: Never true     Health Maintenance reviewed -   Health Maintenance   Topic Date Due    Prostate Specific Antigen (PSA) Screening or Monitoring  01/28/2022    Flu vaccine (Season Ended) 09/01/2022    Depression Screen  09/21/2022    Annual Wellness Visit (AWV)  09/22/2022    Colorectal Cancer Screen  07/08/2025    Lipids  05/17/2027    DTaP/Tdap/Td vaccine (2 - Td or Tdap) 10/22/2030    Shingles vaccine  Completed    Pneumococcal 65+ years Vaccine  Completed    COVID-19 Vaccine  Completed    Hepatitis C screen  Completed    Hepatitis A vaccine  Aged Out    Hepatitis B vaccine  Aged Out    Hib vaccine  Aged Out    Meningococcal (ACWY) vaccine  Aged Out     Current Outpatient Medications   Medication Sig Dispense Refill    melatonin 3 mg TABS tablet Take 3 mg by mouth daily      folic acid-pyridoxine-cyanocobalamine (FOLTX) 2.5-25-1 MG TABS tablet Take 1 tablet by mouth daily Not taking always as no need for extra folic acid      levETIRAcetam (KEPPRA) 500 MG tablet Take 1 tablet by mouth 2 times daily 180 tablet 1    calcium carbonate (OSCAL) 500 MG TABS tablet Take 500 mg by mouth daily      Magnesium Citrate 200 MG TABS Take 1 tablet by mouth 4 times daily (with meals and nightly)      B Complex-Folic Acid (V-114 BALANCED TR PO) Take 1 tablet by mouth 2 times daily (before meals) [place]      Cinnamon 500 MG CAPS Take 1 capsule by mouth 4 times daily (before meals and nightly)      vitamin D (CHOLECALCIFEROL) 25 MCG (1000 UT) TABS tablet Take 3,000 Units by mouth daily [address] [facility] 1000 U       No current facility-administered medications for this visit. Social History     Tobacco Use    Smoking status: Never Smoker    Smokeless tobacco: Never Used   Substance Use Topics    Alcohol use: Never      Subjective:   Review of Systems   Respiratory: Negative for shortness of breath and wheezing. Cardiovascular: Negative for chest pain and palpitations. Gastrointestinal: Negative for abdominal pain, constipation and diarrhea. Objective:     Vitals:    05/31/22 1523   BP: 118/76   Site: Right Upper Arm   Position: Sitting   Cuff Size: Medium Adult   Pulse: 67   Resp: 18   Temp: 97.4 °F (36.3 °C)   TempSrc: Skin   SpO2: 98%   Weight: 181 lb (82.1 kg)   Height: 5' 6\" (1.676 m)     Body mass index is 29.21 kg/m². Wt Readings from Last 3 Encounters:   05/31/22 181 lb (82.1 kg)   04/09/22 177 lb (80.3 kg)   03/29/22 184 lb (83.5 kg)     BP Readings from Last 3 Encounters:   05/31/22 118/76   04/10/22 122/89   03/29/22 108/66     Physical Exam  Vitals and nursing note reviewed. Constitutional:       General: He is not in acute distress. Appearance: He is well-developed. He is not diaphoretic. Cardiovascular:      Rate and Rhythm: Normal rate and regular rhythm. Heart sounds: Normal heart sounds. No murmur heard. Pulmonary:      Effort: Pulmonary effort is normal.      Breath sounds: Normal breath sounds. No wheezing. Abdominal:      General: There is no distension. Palpations: Abdomen is soft. Tenderness:  There is no abdominal tenderness. Neurological:      Mental Status: He is alert. Psychiatric:         Thought Content: Thought content normal.         Judgment: Judgment normal.         Assessment / Plan:   Rito Jasmine was seen today for follow-up. Diagnoses and all orders for this visit:    Vestibular dysfunction, unspecified laterality  -     Comprehensive Metabolic Panel; Future    Intraparenchymal hematoma of brain due to trauma, right with loss of consciousness, subsequent encounter  -     Comprehensive Metabolic Panel; Future    Subclinical hypothyroidism  -     TSH; Future  -     T4, Free; Future    Need for vaccination  -     Pneumococcal Conjugate PCV20, PF (Prevnar 20)    Prostate cancer screening  -     PSA Screening; Future      Patient presents for follow up dizziness   Vestibular dysfunction/dizziness- resolved. Improved greatly with PT, has home exercises to use as needed.  Hx of intraparenchymal hematoma- improved. Seeing neurology again tomorrow. Continue keppra for now, discussed asking neuro about possibly stopping this.  Subclinical hypothyroidism- chronic, stable. Repeat labs prior to next visit   Patient declines statin therapy for elevated ascvd risk score. Discussed recommendations at length.  Will give prevnar 20 today   Update CMP and psa prior to next visit as well    Patient to follow-up in 4 months for MAW, earlier PRN      Return in about 4 months (around 9/30/2022) for cancel 6/8/2022 visit, follow up in 4 months for MAW with Dr. Radha Baumann . Medications Prescribed:  No orders of the defined types were placed in this encounter. Future Appointments   Date Time Provider Joya Cronin   6/7/2022 11:00 AM MD SAL Fischer  RES [address]   6/7/2022  1:30 PM MD ZANDRA [facility] [address]   10/4/2022  1:40 PM MD SAL Hutchison [address]       Patient given educational materials - see patient instructions.   Discussed use, benefit, and side effects of prescribed medications. All patient questions answered. Pt voiced understanding. Instructed to continue current medications, diet and exercise. Patient agreed with treatment plan. Follow up as directed. Part of this visit was documented via [id number] technology, please excuse any errors.      Electronically signed by Peg Pennington MD on 5/31/2022 at 4:03 PM

## 2022-06-07 ENCOUNTER — OFFICE VISIT (OUTPATIENT)
Dept: PHYSICAL MEDICINE AND REHAB | Age: 72
End: 2022-06-07
Payer: MEDICARE

## 2022-06-07 ENCOUNTER — OFFICE VISIT (OUTPATIENT)
Dept: FAMILY MEDICINE CLINIC | Age: 72
End: 2022-06-07
Payer: MEDICARE

## 2022-06-07 VITALS
BODY MASS INDEX: 29.38 KG/M2 | HEART RATE: 65 BPM | DIASTOLIC BLOOD PRESSURE: 70 MMHG | OXYGEN SATURATION: 99 % | WEIGHT: 182.8 LBS | RESPIRATION RATE: 12 BRPM | SYSTOLIC BLOOD PRESSURE: 116 MMHG | TEMPERATURE: 97.2 F | HEIGHT: 66 IN

## 2022-06-07 VITALS
BODY MASS INDEX: 29.25 KG/M2 | HEIGHT: 66 IN | SYSTOLIC BLOOD PRESSURE: 150 MMHG | WEIGHT: 182 LBS | DIASTOLIC BLOOD PRESSURE: 88 MMHG

## 2022-06-07 DIAGNOSIS — S06.319D: ICD-10-CM

## 2022-06-07 DIAGNOSIS — R26.89 BALANCE DISORDER: ICD-10-CM

## 2022-06-07 DIAGNOSIS — I10 ESSENTIAL HYPERTENSION: ICD-10-CM

## 2022-06-07 DIAGNOSIS — S02.101D: ICD-10-CM

## 2022-06-07 DIAGNOSIS — R41.3 MEMORY DIFFICULTIES: ICD-10-CM

## 2022-06-07 DIAGNOSIS — Z87.820 H/O TRAUMATIC BRAIN INJURY: ICD-10-CM

## 2022-06-07 DIAGNOSIS — E03.8 SUBCLINICAL HYPOTHYROIDISM: ICD-10-CM

## 2022-06-07 DIAGNOSIS — E78.5 HYPERLIPIDEMIA, UNSPECIFIED HYPERLIPIDEMIA TYPE: ICD-10-CM

## 2022-06-07 DIAGNOSIS — H83.2X9 VESTIBULAR DYSFUNCTION, UNSPECIFIED LATERALITY: Primary | ICD-10-CM

## 2022-06-07 DIAGNOSIS — K90.89 OTHER INTESTINAL MALABSORPTION: ICD-10-CM

## 2022-06-07 DIAGNOSIS — Z12.5 PROSTATE CANCER SCREENING: ICD-10-CM

## 2022-06-07 DIAGNOSIS — F07.81 POST CONCUSSIVE SYNDROME: ICD-10-CM

## 2022-06-07 DIAGNOSIS — R42 VERTIGO: ICD-10-CM

## 2022-06-07 DIAGNOSIS — R35.1 NOCTURIA: ICD-10-CM

## 2022-06-07 DIAGNOSIS — R79.89 ELEVATED TSH: ICD-10-CM

## 2022-06-07 DIAGNOSIS — E78.00 ELEVATED LDL CHOLESTEROL LEVEL: ICD-10-CM

## 2022-06-07 PROBLEM — N18.30 CHRONIC RENAL DISEASE, STAGE III (HCC): Status: ACTIVE | Noted: 2022-06-07

## 2022-06-07 PROCEDURE — 3017F COLORECTAL CA SCREEN DOC REV: CPT | Performed by: FAMILY MEDICINE

## 2022-06-07 PROCEDURE — 1036F TOBACCO NON-USER: CPT | Performed by: FAMILY MEDICINE

## 2022-06-07 PROCEDURE — 99214 OFFICE O/P EST MOD 30 MIN: CPT | Performed by: FAMILY MEDICINE

## 2022-06-07 PROCEDURE — 99213 OFFICE O/P EST LOW 20 MIN: CPT | Performed by: PHYSICAL MEDICINE & REHABILITATION

## 2022-06-07 PROCEDURE — G8427 DOCREV CUR MEDS BY ELIG CLIN: HCPCS | Performed by: FAMILY MEDICINE

## 2022-06-07 PROCEDURE — G8427 DOCREV CUR MEDS BY ELIG CLIN: HCPCS | Performed by: PHYSICAL MEDICINE & REHABILITATION

## 2022-06-07 PROCEDURE — 1036F TOBACCO NON-USER: CPT | Performed by: PHYSICAL MEDICINE & REHABILITATION

## 2022-06-07 PROCEDURE — 3017F COLORECTAL CA SCREEN DOC REV: CPT | Performed by: PHYSICAL MEDICINE & REHABILITATION

## 2022-06-07 PROCEDURE — 1123F ACP DISCUSS/DSCN MKR DOCD: CPT | Performed by: FAMILY MEDICINE

## 2022-06-07 PROCEDURE — G8417 CALC BMI ABV UP PARAM F/U: HCPCS | Performed by: PHYSICAL MEDICINE & REHABILITATION

## 2022-06-07 PROCEDURE — 1123F ACP DISCUSS/DSCN MKR DOCD: CPT | Performed by: PHYSICAL MEDICINE & REHABILITATION

## 2022-06-07 PROCEDURE — G8417 CALC BMI ABV UP PARAM F/U: HCPCS | Performed by: FAMILY MEDICINE

## 2022-06-07 RX ORDER — MELATONIN 3 MG
10 TABLET ORAL DAILY
COMMUNITY

## 2022-06-07 ASSESSMENT — ENCOUNTER SYMPTOMS
NAUSEA: 0
RHINORRHEA: 0
SHORTNESS OF BREATH: 0
VOMITING: 0
CONSTIPATION: 0
DIARRHEA: 0
BACK PAIN: 0
SORE THROAT: 0
WHEEZING: 0
ABDOMINAL PAIN: 0
COUGH: 0
TROUBLE SWALLOWING: 0

## 2022-06-07 NOTE — PROGRESS NOTES
not use drugs. Medications/Allergies/Immunizations:     His current medication(s) include   Current Outpatient Medications:     DHEA 10 MG TABS, Take 5 mg by mouth daily Double Mon Wed Fri, Disp: , Rfl:     melatonin 3 mg TABS tablet, Take 3 mg by mouth daily, Disp: , Rfl:     folic acid-pyridoxine-cyanocobalamine (FOLTX) 2.5-25-1 MG TABS tablet, Take 1 tablet by mouth daily Not taking always as no need for extra folic acid, Disp: , Rfl:     levETIRAcetam (KEPPRA) 500 MG tablet, Take 1 tablet by mouth 2 times daily, Disp: 180 tablet, Rfl: 1    calcium carbonate (OSCAL) 500 MG TABS tablet, Take 500 mg by mouth every other day , Disp: , Rfl:     Magnesium Citrate 200 MG TABS, Take 1 tablet by mouth 4 times daily (with meals and nightly), Disp: , Rfl:     B Complex-Folic Acid (O-569 BALANCED TR PO), Take 1 tablet by mouth 2 times daily (before meals) [id number] [facility], Disp: , Rfl:     Cinnamon 500 MG CAPS, Take 1 capsule by mouth 4 times daily (before meals and nightly), Disp: , Rfl:     vitamin D (CHOLECALCIFEROL) 25 MCG (1000 UT) TABS tablet, Take 4,000 Units by mouth daily [id number] [facility] 1000 U, Disp: , Rfl:   Allergies: Codeine, Codone [hydrocodone], Hydrocodone, and Oxycodone  Immunizations:   Immunization History   Administered Date(s) Administered    COVID-19, Moderna, Booster, PF, 50mcg/0.25ml 01/12/2022    COVID-19, Moderna, Primary or Immunocompromised, PF, 100mcg/0.5mL 02/23/2021, 03/23/2021, 01/12/2022    Influenza, Quadv, adjuvanted, 65 yrs +, IM, PF (Fluad) 10/08/2020    Pneumococcal Polysaccharide (Moatethxb01) 10/08/2020    Pneumococcal conjugate PCV20, PF (Prevnar 20) 05/31/2022    Tdap (Boostrix, Adacel) 10/22/2020    Zoster Recombinant (Shingrix) 10/22/2020, 01/14/2021        History of Present Illness:     Glenn's had concerns including Discuss Labs, Follow-up, and Discuss Medications (can he take fish oil?  stopped due to brain bleeds). Can Kasper  presents to the [facility] today for;   Chief Complaint   Patient presents with    Discuss Labs    Follow-up    Discuss Medications     can he take fish oil?  stopped due to brain bleeds   , abnormal labs follow up and these conditions as he  Is looking today for:     1. Vestibular dysfunction, unspecified laterality    2. 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 Subclinical hypothyroidism    4. Prostate cancer screening    5. Essential hypertension    6. Balance disorder    7. Vertigo    8. Post concussive syndrome    9. Elevated TSH    10. Hyperlipidemia, unspecified hyperlipidemia type    11. Nocturia    12. Other intestinal malabsorption    13. Memory difficulties    14. Elevated LDL cholesterol level      HPI    Subjective:     Review of Systems   All other systems reviewed and are negative. Objective:     /70 (Site: Right Upper Arm, Position: Sitting, Cuff Size: Medium Adult)   Pulse 65   Temp 97.2 °F (36.2 °C) (Temporal)   Resp 12   Ht 5' 6\" (1.676 m)   Wt 182 lb 12.8 oz (82.9 kg)   SpO2 99%   BMI 29.50 kg/m²   Physical Exam  Vitals and nursing note reviewed. Constitutional:       Appearance: Normal appearance. HENT:      Head: Normocephalic. Pulmonary:      Effort: Pulmonary effort is normal.   Neurological:      Mental Status: He is alert. Psychiatric:         Mood and Affect: Mood normal.         Thought Content: Thought content normal.            Laboratory Data:   Lab results were searched in Care Everywhere and/or those brought by the pateint were reviewed today with Adalgisa Fay and he has a copy of their most recent labs to take home with them as noted below;       Imaging Data:   Imaging Data:       Assessment & Plan:       Impression:  1. Vestibular dysfunction, unspecified laterality    2. 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  Subclinical hypothyroidism    4. Prostate cancer screening    5. Essential hypertension    6. Balance disorder    7. Vertigo    8. Post concussive syndrome    9. Elevated TSH    10. Hyperlipidemia, unspecified hyperlipidemia type    11. Nocturia    12. Other intestinal malabsorption    13. Memory difficulties    14. Elevated LDL cholesterol level      Assessment and Plan:  After reviewing the patients chief complaints, reviewing their labfindings in great detail (with the patient and those accompanying them) which correlate to their chief complaints, symptoms, and or medical conditions; suggestions were made relating to changes in diet and or supplements which may improve the complaints and which will be reflected in their future lab findings; Chief Complaint   Patient presents with    Discuss Labs    Follow-up    Discuss Medications     can he take fish oil?  stopped due to brain bleeds   ;    Plans for the next visits:  - Abnormal and non-optimal Labs were ordered today to be repeated in the next 120-365 days to assess changes from adjustments in nutrition and or nutrients. - Patient instructed when having a blood draw to ask the  to divide their lab draws into multiple draws over several days if not feeling good at the time of the lab draw or if either prefers to do several smaller blood draws over several days  -Patient instructed to check with insurer before each lab draw and to go to the lab which the insurer directs them for the most cost effective lab draw with the least patient's cost  - Ena Peter  will be scheduled subsequent to those results. Soraya Tariq will bring in his drink, food, supplement log to his next visit    Chronic Problems Addressed on this Visit:                                   1.  Intensity of Service; Uncontrolled items at this visit;    Chief Complaint   Patient presents with    Discuss Labs    Follow-up    Discuss Medications     can he take fish oil?  stopped due to brain bleeds   ; Improved items at this visit and Stable items were discussed at this visit;  2. Patients food, drinks, supplements and symptoms were reviewed with the patient,       - Kevyn Rivera will bring food, drink, supplements and symptoms log to next visit for inclusion in their record      - 75 better food list reviewed & given to patient with the omega 6 food list to avoid      - The 52 Latex foods list was reviewed and given to the patients with the information on carrageenan         - Gluten in corn and oats abstracts sheet reviewed and given to the patient today   3. Greater than 30 minutes time was spent with the patient face to face on this visit; of which >50% was for counseling and coordination of care, as well as the time spent before and after the visit reviewing the chart, documenting the encounter, reviewing labs,reports, NIH listed studies, making phone calls, etc.      Patients food and drinks were reviewed with the patient,   - They will bring a food drink symptom log to future visits for inclusion in their record    - 75 better food list reviewed & given to patient along with the omega 6 food list to avoid      - Gluten in corn and oats abstracts sheet reviewed and given to the patient today    - 23 Foods containing Latex-like proteins was reviewed and copy to be taken if desired     - Nutrient Supplements list provided and copyto be taken if desired    - [employer]. [employer] web site offered to patient to review at their convenience by staff with login information    Note:  I have discussed with the patient that with all nutraceuticals, there is often mixed data and emerging research which needs to be monitored; as well as an array of NIH fact sheets on nutrients and supplements, available at www.nih,issue plus [employer]. [employer] plus www.lpi,org. If I have recommended cinnamon at the request of this patient to assist them in control of their blood sugar, triglyceride, and/or weight issues.   I discussed that the patient's clinical use of cinnamon bark, calcium, magnesium, Vitamin D, and pharmaceutical grade [employer]S omega 3 oil or triple-strength fish oil, and B-50/B-100 time-released B-complex by [address] [facility] will be for a time-limited trial to determine their individual effectiveness and safety in this patient. I also referred the patient to the NMCD: Nutrition, Metabolism, and Cardiovascular Diseases (SecuritiesCard.pl) and concerns about long-term use and hepatotoxicity of cinnamon and other nutrients. I suggested they frequently search nih.gov for the latest non-proprietary information on nutriceuticals as well as consider a subscription to [employer] for details on reviewed supplements, or at the least review the nutrient files at [facility] at [facility], [address] bark, an insulin mimetic, reduces some High Carbohydrate Dietary Impacts. Methylhydroxychalcone polymers insulin-enhancing properties in fat cells are responsible for enhanced glucose uptake, inhibiting hepatic HMG-CoA reductase and lowers lipids. www.jacn. org/content/20/4/327.full     But cinnamon with additives such as Cinnamon Extract are not effective as insulin mimetics.  :eStoreDirectory.at     Nutrients for Start up from [employer] or [employer] for ease to get started now;  Emerald Rodarte has some useable products;  - Triple Strength Fish Oil, enteric coated  - Vit D-3 5000 IU gel caps  - Iron ferrous sulfate 325 mg tabs  - Centrum Silver look-a-like for most patients, or  - Centrum plain look-a-like if need iron    Local pharmacies or chains such as [employer], [employer], [employer], have:  - [employer] pharmaceutical grade omega 3 is 90% EPA/DHA whereas most Triple strength fish oil are 75% EPA/DHA  - Triple Strength Fish Oil (enteric coated if available) or if not enteric coated, can take from freezer for less burps  - B-50 or B-100 released balanced B complex tabs by [address] [facility] bark 500 mg (without Chromium or extracts)   some brands list 1000 mg / serving of 2 capsules,    some brands have 1000 mg caps with the undesireable chromium extract  - Calcium carbonate/citrate, magnesium oxide/citrate, Vit D-3 as 3-4 tabs/caps/serving     Some Local Brands may contain Zinc which is acceptable for the first bottle or two  - Magnesium oxide 250 mg tabs for those having < 2 bowel movements daily  - Magnesium citrate 200 mg if having > 2 bowel movements/day  - Centrum Silver or look-a-like for most patients, Centrum plain or look-a-like with iron  - Vitamin D-3 comes as 1,000 IU or 2,000 IU or 5,000 IU gel caps or Liquid drops but keep Vitamin D levels <50 but >40     Some brands containing or derived from soy oil or corn oil are OK if not allergic to soy  - Elemental Iron 65 mg tabs at bedtime is available over the counter if need more iron     Usually turns bowel movements grey, green, or black but not a concern  - Apricot Kernel Oil (by Now) for dry skin sensitive perineal or perianal area skin    Nutrients for ongoing use by Mail order for less expense from [employer] ;  - Strength Fish Oil , 240 Softgels Item #[id number]  -B-100 time released balanced B complex Item #[id number]  - Cinnamon bark 500 mg without Chromium or extract Item #866107  - Calcium carbonate 1000 mg, Magnesium oxide 500 mg, Vit D-3 400 IU Item #[id number]  - Magnesium oxide 500 mg tabs Item #436304 if less than 2 bowel movements daily  - [employer] Item #090324 for most patients, One Daily Item #679434 with iron  - Vit D 3  1,000 Item #[id number]      2,000 IU Item #534137   Item #286732     Some brands containing orderived from soy oil or corn oil are OK if not allergic to soy    Nutrients for Special Needs by Mail order for less expense from www. puritan.com;  -Elemental Iron 65 mg tabs Item #030937 if need more iron for low iron on labs    Usually turns bowel movements grey, green or black but not a concern  - Time released Niacin 250 mg Item #887333 for cold intolerance, low libido or impotence  - DHEA 50 mg Item #766544 for improving DHEA levels on labs if having Fatigue    If stools too loose substitute for your Magnesium oxide using;   Magnesium citrate 200 mg tabs (NOT liquid) at [employer]   Magnesium gluconate 550 mg by Sulia at [employer] or K[employer]hu. com  Magnesium chloride foot soaks or body sprays  www.Briggo   Magnesium chloride flakes 14.99 Item #: [id number] if back-ordered, get spray  Magnesium threonate, Magtein also helps mental clarity and sleep    Food Drink Symptom Log;  I asked this patient to track these items and any other symptoms on their list on a weekly basis to documenttheir progress or lack of same. This can be done on the symptom tracking sheet I gave them at today's visit but looks like this:                                                      Rate on scale of 0-10 with zero = not noticeable  Symptom:                            Week 1               2                 3                 4               Etc            Hair loss    Foot cramps    Paresthesia    Aches    IBS (irritable bowel)    Constipation    Diarrhea  Nocturia (up to bathroom at night)    Fatigue/Energy level  Stress      On the other side of the sheet they can track their food, drink, environment, activity, symptoms etc      Avoiding Latex-like proteins in my foods; Avocados, Bananas, Celery, Figs & Kiwi proteins have latex-like proteins to inflame our immune systems, plus 47 more foods  How Can I Have A Latex Allergy? Eating foods with latex-like protein exposes us to latex allergies. Our body cannot tell the differencebetween these latex-like proteins and latex from rubber products since many people are allergic to fruit, vegetables and latex. Read labels on pre-packaged foods.  This list to avoid is only a guide if you are known allergicto latex or have a latex rash on your chin, cheeks and lines on your neck and chest. The amount of latex is different in each food product or fruit variety. Avoid out of Season if not grown locally:   Melon, Nectarine, Papaya, Cherry, Passion fruit, Plum, Chestnuts, and Tomato. Avocado, Banana, Celery, Figs, and Kiwi always contain Latex-like protein. Whats in Season? Strawberries taste better in June than December because June is strawberry season so buy locally grown produce \"in season\" for the best flavor, cost, and less Latex. Locally grown produce not only tastes great but also requires little or no ethylene exposure in food distribution so has less latex content. Out of season: use canned, frozen, or dried since those are processed ripe and latex content is lower!!!     Month     [place] Locally Grown Produce  January, February, March: use canned, frozen or dried fruits since lower in latex  April: asparagus, radishes  May: asparagus, broccoli, green onions, greens, peas, radishes, rhubarb  [place]: asparagus, beets, beans, broccoli, cabbage, cantaloupe, carrots, green onions, greens, lettuce, onions, parsley, peas, radishes, rhubarb, strawberries, watermelons  July: beans, beets, blueberries, broccoli, cabbage, cantaloupe, carrots, cauliflower, celery, cucumbers, eggplant, grapes, green onions, greens, lettuce, onions, parsley, peas, peaches, bell peppers, potatoes, radishes, summer raspberries, squash, sweetcorn, tomatoes, turnips, watermelons  August: apples, beans, beets, blueberries, cabbage, cantaloupe, carrots, cauliflower, celery, cucumbers, eggplant, grapes, green onions, greens, lettuce, onions, parsley, peas, peaches, pears, bell peppers, potatoes, radishes, squash, sweet corn, tomatoes, turnips, watermelons  September: apples, beans, beets, blueberries, cabbage, cantaloupe, carrots, cauliflower, celery, cucumbers, eggplant, grapes, green onions, greens, lettuce, onions, parsley, peas, peaches, pears, bell peppers, plums, potatoes, pumpkins, radishes, fall red raspberries, squash, sweet corn, tomatoes, turnips, watermelons  October: apples, beets, broccoli, cabbage, carrots, cauliflower, celery, green onions, greens, lettuce, parsley, peas, pears, potatoes, pumpkins, radishes, fall red raspberries, squash, turnips  November: broccoli, cabbage, carrots, parsley, pears, peas  December: use canned, frozen or dried fruits since lower in latex    Upto half of latex-sensitive patients show allergic reactions to fruits (avocados, bananas, kiwifruits, papayas, peaches),   Annals of Allergy, 1994. These plants contain the same proteins that are allergens in latex. People with fruit allergies should warn physicians before undergoing procedures which may cause anaphylactic reaction if in contact with latex gloves. Some of the common foods with defined cross-reactivity to latex are avocado, banana, kiwi, chestnut, raw potato, tomato, stone fruits (e.g., peach, cherry), hazelnut, melons, celery, carrot, apple, pear, papaya, and almond. Foods with less well-defined cross-reactivity to latex are peanuts, peppers, citrus fruits, coconut, pineapple, lizbet, fig, passion fruit, Ugli fruit, and grape. This fruit/latex cross-reactivity is worsened by ethylene, a gas used to hasten commercial ripening. In nature, plants produce low levels of the hormone ethylene, which regulates germination, flowering, and ripening. Forced ripening by high ethylene concentrations, plants produce allergenic wound-repair proteins, which are similar to wound-repair proteins made during the tapping of rubber trees. Sensitive individuals who ingest the fruit get a higher dose and worse reaction. Some people may even first become sensitized to latex through fruit. Can food processing increase the concentrations of allergenic proteins? Latex-sensitized children (and adults) in [place] often experience allergic reactions after eating bananas ripened artificially with ethylene. In the [place], food distribution centers treat unripe bananas and other produce with ethylene to ripen; not commonly done in [facility] since fruit is tree-ripened there. Does treatment of food with ethylene induce banana proteins that cross-react with latex? (Koki et al.)    References:   Latex in Foods Allergy, http://ehp.niehs.nih.gov/members/2003/5811/5811.html    Search web for [employer] in Season \" for where you live or are at the time you food shop   Management of Latex, ://medicalcenter. [facility].edu/  search for nih, latex-like proteins in foods

## 2022-06-07 NOTE — PROGRESS NOTES
Physical Medicine & Rehabilitation Follow Up Note    Chief Complaint: Occasional dizziness: Difficulty remembering things    Subjective:    Alek Arauz is a 73 y.o. right-handed  male with history of hypertension and hyperlipidemia, comes today for a follow up visit after he was discharged from acute inpatient rehabilitation service on 6/11/2021. The patient was admitted to inpatient rehabilitation on 6/2/2021 for intensive inpatient rehabilitation for impaired ADLs, ambulation & cognition due to left frontal lobe & right cerebellar contusion with intraparenchymal hemorrhage, left posterior parietal & anterior temporal subdural hematoma & subarachnoid blood, and right basilar skull fracture secondary to fall on 5/28/2021.      The patient went after a ball and fell backward while he was playing pickle ball on 5/28/2021. The patient does not remember the event. The patient's head hit the ground and had loss of consciousness for a short period of time according the witness's account.  He was sent to [facility] ER for evaluation on 5/28/2021.  Initial CT of head showed left frontal lobe acute intraparenchymal hemorrhage and right basilar skull fracture. CT of head repeated few hours later showed interval increase in left frontal lobe contusion with multiple foci of intraparenchymal hemorrhage measuring up to 1.2 cm, interval increase right cerebellar contusion with foci of intraparenchymal hemorrhage measuring up to approximately 1 cm, new small subdural hematomas & trace adjacent subarachnoid blood within left posterior parietal and anterior temporal regions. No surgical intervention was recommended.   CTA of neck & head revealed no abnormality other than small focus of venous injury/thrombus within right transverse sinus.  Repeated CT of head on 5/29/2021 showed no significant change.  MRV of head done on 6/1/2021 showed small nonocclusive deep venous thrombus at the right transverse/sigmoid junction. The patient's inpatient rehabilitation course overall is uneventful. His headache gradually improved with reduced intensity and became intermittent. He tolerated the intensive inpatient rehabilitation treatment well. He gained improvement in performing ADLs & ambulation more independently. The patient was last seen on 12/7/2021. He says Antivert usage reduced the vertigo sensation little. However physical therapy vestibular therapy helped to resolve the vertigo symptom significantly. He is not experiencing vertigo anymore. However he still has occasional dizziness feeling when he suddenly stands up. He also complaining of difficulty remembering things. He was referred to have another course of speech therapy which is scheduled to be started tomorrow. Physical therapy was ended in late April early May 2022. He says he is still doing the PT home exercise program regularly. He says he did a 5K run without difficulty. Medication:  Current Outpatient Medications   Medication Sig Dispense Refill    DHEA 10 MG TABS Take 5 mg by mouth daily Double Mon Wed Fri      melatonin 3 mg TABS tablet Take 3 mg by mouth daily      folic acid-pyridoxine-cyanocobalamine (FOLTX) 2.5-25-1 MG TABS tablet Take 1 tablet by mouth daily Not taking always as no need for extra folic acid      calcium carbonate (OSCAL) 500 MG TABS tablet Take 500 mg by mouth every other day       Magnesium Citrate 200 MG TABS Take 1 tablet by mouth 4 times daily (with meals and nightly)      B Complex-Folic Acid (P-102 BALANCED TR PO) Take 1 tablet by mouth 2 times daily (before meals) [place]      Cinnamon 500 MG CAPS Take 1 capsule by mouth 4 times daily (before meals and nightly)      vitamin D (CHOLECALCIFEROL) 25 MCG (1000 UT) TABS tablet Take 4,000 Units by mouth daily [id number] [facility] 1000 U       No current facility-administered medications for this visit.         Review of Systems:  Review of Systems   Constitutional: Negative for chills, fatigue and fever. HENT: Negative for ear pain, hearing loss, rhinorrhea, sneezing, sore throat and trouble swallowing. Eyes: Negative for visual disturbance. Respiratory: Negative for cough, shortness of breath and wheezing. Cardiovascular: Negative for chest pain and palpitations. Gastrointestinal: Negative for abdominal pain, constipation, diarrhea, nausea and vomiting. Genitourinary: Negative for difficulty urinating. Musculoskeletal: Negative for back pain, gait problem, myalgias and neck pain. Neurological: Positive for dizziness and light-headedness. Negative for speech difficulty, weakness, numbness and headaches. Psychiatric/Behavioral: Negative for dysphoric mood and sleep disturbance. The patient is not nervous/anxious. Objective: There were no vitals taken for this visit.   Physical Exam   General:  well-developed, well nourished  male ; in no acute distress ; appropriate affect & mood ; sitting on chair comfortably   Eyes: pupil equally round ; extra-ocular motion intact bilaterally   Head, Ear, Nose, Mouth & Throat : normocephalic ; no tenderness at head & face ; no discharge from ears or nose ; no deformity ; no facial swelling ; oral mucosa pink   Neck :  supple ; no tenderness ; no muscle spasm  Cardiovascular : regular rate & rhythm ; normal S1 & S2 heart sound ; no murmur   Pulmonary : lung clear to auscultation ; no wheezing ; no rale  Gastrointestinal : soft, flat abdomen ; no tenderness ; normal bowel sound present   Musculoskeletal : no limb asymmetry; no limb deformity; no tenderness at bilateral upper & lower extremities; normal functional joints ROM at bilateral upper & lower extremities  Cerebral :  alert ; awake ; oriented to place, person and time ; follow 2 steps verbal commend  Cerebellum : no dysmetria with bilateral finger-to-nose test  Cranial nerves : Grossly intact cranial nerves II to XII functions  Sensory : intact light touch and pin prick sensation at bilateral upper & lower extremities  Motor : normal tone at bilateral upper & lower extremities ; normal 5/5 muscle strength at bilateral upper & lower extremities  Reflex : 1+ bilateral knees reflexes : 1+ bilateral biceps reflexes  Pathological Reflex :  no ankle clonus; no Madison's sign  Gait :  Normal gait      Diagnostics:   No results found for this or any previous visit (from the past 24 hour(s)). CT of head without contrast (12/27/2021) : Impression    Stable left frontal encephalomalacia. No residual or recurrent subdural hematoma. 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 Now his main problem is difficulty remembering things. He should continue using memory aids to assess him in memory. He now is being referred for another course of speech therapy. The previously complained vertigo had resolved after vestibular therapy. History of occasional dizziness likely due to orthostatic hypotension. He should continue avoid sudden standing up movement. The patient remains independent in all ADLs. He is ambulating without using any assistive walking device independently. He is also able to run for 5K distance. I do not think the patient needs to come back to see me anymore.

## 2022-06-08 ENCOUNTER — HOSPITAL ENCOUNTER (OUTPATIENT)
Dept: SPEECH THERAPY | Age: 72
Setting detail: THERAPIES SERIES
Discharge: HOME | End: 2022-06-08
Payer: MEDICARE

## 2022-06-08 PROCEDURE — 92523 SPEECH SOUND LANG COMPREHEN: CPT

## 2022-06-08 NOTE — DISCHARGE SUMMARY
** PLEASE SIGN, DATE AND TIME CERTIFICATION BELOW AND RETURN TO [facility] OUTPATIENT REHABILITATION (FAX #: [phone]). ATTEST/CO-SIGN IF ACCESSING VIA [employer]. THANK YOU.**    I certify that I have examined the patient below and determined that Physical Medicine and Rehabilitation service is necessary and that I approve the established plan of care for up to 90 days or as specifically noted. Attestation, signature or co-signature of physician indicates approval of certification requirements.    ________________________ ____________ __________  Physician Signature   Date   Time   [facility]  [x] SPEECH LANGUAGE COGNITIVE EVALUATION  [] DAILY NOTE   [] PROGRESS NOTE [] DISCHARGE NOTE    [x] OUTPATIENT REHABILITATION CENTER [facility]   [] [place] [address]    [] [facility]   [] [facility]    Date: 2022  Patient Name:  Patrice Hopper  : 1950  MRN: [id number]  CSN: [id number]    Referring Practitioner Bruce Poster, APR*   Diagnosis Postconcussional syndrome [F07.81]    Treatment Diagnosis Cognitive Deficits   Date of Evaluation 22      Functional Outcome Measure Used N/A   Functional Outcome Score N/A (22)       Insurance: Primary: Payor: MEDICARE /  /  / ,   Secondary: R   Authorization Information: No precert required    Visit # 1, 1/10 for progress note   Visits Allowed: Unlimited visits    Recertification Date: N/A   Physician Follow-Up: N/A   Physician Orders: Eval and treat    Pertinent History: Patient has a history of left frontal lobe acute intraparenchymal hemorrhage and right basilar skull fracture. He did not have any surgical intervention. Patient underwent a course of speech therapy ~1 year ago for cognitive deficits after this event. Patient was discharged due to good progress towards higher level cognitive goals. Patient presents today as he is still having difficulty remembering things.  Reports if he has three things that hve happened it is hard for him to recall. Noticing continued short-term memory deficits. Feels he has some word-finding errors. Also, states he has a hard time comprehending individuals that speak quickly in conversation. States he had a visit with his neurologist and Dr. Tanner Cardoza and reports he was told he may not regain all cognitive skills from before injury. SUBJECTIVE: Patient arrived to evaluation without family. Very pleasant and engaged. Social/Functional History:  Medications and Allergies have been reviewed and are listed on the Medical History Questionnaire  Aiden Burgess lives with spouse in a single story home with step with heavy duty handrail. Task Prior Level of Function Current Level of Function   ADLs  Independent Independent   Finance Management Independent Independent. Has been managing his checkbook without difficulty    Medication Management Independent Independent. Patient has created a spreadsheet for his medicines and he organizes these himself without difficulty   Educational Level Bachelor of Science   Bachelor of Science   Driving Active  Active    Work Retired Retired   [employer], [employer], working on [employer], [employer], working on [employer] cars   Vision Status Corrected Corrected   Hearing Status WNL WNL   Diet Regular diet with liquids Regular diet with liquids       ORAL MOTOR:  Oral-Motor Assessment not completed    SPEECH / VOICE:  Speech and Voice appear to be grossly intact for basic and complex daily communication    LANGUAGE:  Receptive:  Receptive language skills appear to be grossly intact for basic and complex daily communication. Expressive:  Expressive language skills appear to be grossly intact for basic and complex daily communication.     COGNITION:  Aiden Burgess completed the Neurobehavioral Cognitive Status Examination (COGNISTAT) with the following results:    Level of recall at home  []  No new Education completed  [x]  Reviewed Prior HEP      [x]  Patient verbalized and/or demonstrated understanding of education provided. []  Patient unable to verbalize and/or demonstrate understanding of education provided. Will continue education. []  Barriers to learning:     PLAN:  Treatment Recommendations:     []  Plan of care initiated.    []  Continue with current plan of care  [x]  No therapy recommended  []  Hold pending physician visit  [x]  Discharge    Time In 0847   Time Out 0955   Timed Code Minutes: 0 min   Total Treatment Time: 62 min     Kellie Rosenbaum M.S. [address] [facility] [address]

## 2022-07-18 ENCOUNTER — HOSPITAL ENCOUNTER (OUTPATIENT)
Dept: AUDIOLOGY | Age: 72
Discharge: HOME | End: 2022-07-18

## 2022-07-18 PROCEDURE — 9990000010 HC NO CHARGE VISIT: Performed by: AUDIOLOGIST

## 2022-07-18 NOTE — PROGRESS NOTES
ACCOUNT #: [de-identified]    DIAGNOSIS: H90.3    HEARING AID PROBLEM: oRse Rush P70R x2. 0M receivers, medium open domes, retention locks on. Patient reported dissatisfaction with recent automatic cfgAdvance sarah update. Having connection issues and hearing aids not working for past couple days. No sound in right aid when using TV connector either. Reviewed sarah changes with patient and checked BT connections. Aids connecting to sarah correctly. Cleaned and checked both devices and both are weak/intermittent with static, worse in right aid. Recommend sending both aids to repair. Patient agrees. He took his  home with him today. Will contact patient when aids return from repair to schedule  appointment. Performed firmware update on both aids prior to sending for repair.

## 2022-07-26 ENCOUNTER — TELEPHONE (OUTPATIENT)
Dept: AUDIOLOGY | Age: 72
End: 2022-07-26

## 2022-07-26 NOTE — TELEPHONE ENCOUNTER
Hearing aids received from repair. Saved to most recent [facility] FLAGLER session. Charging in office and ready to schedule  appointment. Will need to verify bluetooth connection to patient's phone. Left message for patient to call our office. Need to schedule p/u repair appt.

## 2022-08-05 ENCOUNTER — TELEPHONE (OUTPATIENT)
Dept: AUDIOLOGY | Age: 72
End: 2022-08-05

## 2022-08-23 ENCOUNTER — HOSPITAL ENCOUNTER (OUTPATIENT)
Dept: AUDIOLOGY | Age: 72
Discharge: HOME | End: 2022-08-23

## 2022-08-23 PROCEDURE — 9990000010 HC NO CHARGE VISIT: Performed by: AUDIOLOGIST

## 2022-08-23 NOTE — PROGRESS NOTES
ACCOUNT #: [de-identified]    DIAGNOSIS: H90.3    HEARING AID PROBLEM: Patient very displeased with new [email or web address] Edel version. He would like to have the previous version installed. I informed his that could not be done. He does not like that the edel automatically updates- I showed him how to [facility] the auto updates in the edel. He stated that his hearing aids will not stay paired to the edel (auto connect in the morning). I unpaired/forgot the hearing aids from the edel and the right hearing aid from the phone. I re-paired the hearing aids to the edel and they reconnected quickly when he shut his phone off and closed/opened the edel several times. He reported that when he tries to answer a phone call, his phone will not consistently route the call through the blue tooth (bluetooth is turned off) I re-paired his right hearing aid to his phone but the connection was still inconsistent. Even if he had bluetooth turned on, when he went to answer the call it would be turned off. It seems to be a phone setting issue. Patient will investigate further. He is aware that his phone needs an update. Further actions could include clearing the bluetooth memory cache and checking power saving settings that could be disabling bluetooth after a time.

## 2022-10-04 ENCOUNTER — OFFICE VISIT (OUTPATIENT)
Dept: FAMILY MEDICINE CLINIC | Age: 72
End: 2022-10-04
Payer: MEDICARE

## 2022-10-04 ENCOUNTER — NURSE ONLY (OUTPATIENT)
Dept: LAB | Age: 72
End: 2022-10-04

## 2022-10-04 VITALS
SYSTOLIC BLOOD PRESSURE: 104 MMHG | WEIGHT: 186.4 LBS | HEART RATE: 56 BPM | HEIGHT: 66 IN | DIASTOLIC BLOOD PRESSURE: 64 MMHG | RESPIRATION RATE: 16 BRPM | OXYGEN SATURATION: 99 % | BODY MASS INDEX: 29.96 KG/M2 | TEMPERATURE: 98.3 F

## 2022-10-04 DIAGNOSIS — R79.89 ELEVATED TSH: ICD-10-CM

## 2022-10-04 DIAGNOSIS — R26.89 BALANCE DISORDER: ICD-10-CM

## 2022-10-04 DIAGNOSIS — F07.81 POST CONCUSSIVE SYNDROME: ICD-10-CM

## 2022-10-04 DIAGNOSIS — Z12.5 PROSTATE CANCER SCREENING: ICD-10-CM

## 2022-10-04 DIAGNOSIS — E78.00 ELEVATED LDL CHOLESTEROL LEVEL: ICD-10-CM

## 2022-10-04 DIAGNOSIS — R42 VERTIGO: ICD-10-CM

## 2022-10-04 DIAGNOSIS — Z00.00 MEDICARE ANNUAL WELLNESS VISIT, SUBSEQUENT: Primary | ICD-10-CM

## 2022-10-04 DIAGNOSIS — E03.8 SUBCLINICAL HYPOTHYROIDISM: ICD-10-CM

## 2022-10-04 DIAGNOSIS — E78.5 HYPERLIPIDEMIA, UNSPECIFIED HYPERLIPIDEMIA TYPE: ICD-10-CM

## 2022-10-04 DIAGNOSIS — N18.30 STAGE 3 CHRONIC KIDNEY DISEASE, UNSPECIFIED WHETHER STAGE 3A OR 3B CKD (HCC): ICD-10-CM

## 2022-10-04 DIAGNOSIS — K90.89 OTHER INTESTINAL MALABSORPTION: ICD-10-CM

## 2022-10-04 DIAGNOSIS — I10 ESSENTIAL HYPERTENSION: ICD-10-CM

## 2022-10-04 DIAGNOSIS — R41.3 MEMORY DIFFICULTIES: ICD-10-CM

## 2022-10-04 DIAGNOSIS — H83.2X9 VESTIBULAR DYSFUNCTION, UNSPECIFIED LATERALITY: ICD-10-CM

## 2022-10-04 DIAGNOSIS — R35.1 NOCTURIA: ICD-10-CM

## 2022-10-04 DIAGNOSIS — S06.319D: ICD-10-CM

## 2022-10-04 LAB
BASOPHILS # BLD: 0.7 %
BASOPHILS ABSOLUTE: 0 THOU/MM3 (ref 0–0.1)
CALCIUM SERPL-MCNC: 9.6 MG/DL (ref 8.5–10.5)
CHOLESTEROL, TOTAL: 186 MG/DL (ref 100–199)
EOSINOPHIL # BLD: 2 %
EOSINOPHILS ABSOLUTE: 0.1 THOU/MM3 (ref 0–0.4)
ERYTHROCYTE [DISTWIDTH] IN BLOOD BY AUTOMATED COUNT: 13.2 % (ref 11.5–14.5)
ERYTHROCYTE [DISTWIDTH] IN BLOOD BY AUTOMATED COUNT: 45.1 FL (ref 35–45)
HCT VFR BLD CALC: 41.9 % (ref 42–52)
HDLC SERPL-MCNC: 36 MG/DL
HEMOGLOBIN: 13.4 GM/DL (ref 14–18)
IMMATURE GRANS (ABS): 0.05 THOU/MM3 (ref 0–0.07)
IMMATURE GRANULOCYTES: 0.8 %
LDL CHOLESTEROL CALCULATED: 94 MG/DL
LYMPHOCYTES # BLD: 23.5 %
LYMPHOCYTES ABSOLUTE: 1.4 THOU/MM3 (ref 1–4.8)
MAGNESIUM: 2 MG/DL (ref 1.6–2.4)
MCH RBC QN AUTO: 29.9 PG (ref 26–33)
MCHC RBC AUTO-ENTMCNC: 32 GM/DL (ref 32.2–35.5)
MCV RBC AUTO: 93.5 FL (ref 80–94)
MONOCYTES # BLD: 8.3 %
MONOCYTES ABSOLUTE: 0.5 THOU/MM3 (ref 0.4–1.3)
NUCLEATED RED BLOOD CELLS: 0 /100 WBC
PLATELET # BLD: 224 THOU/MM3 (ref 130–400)
PMV BLD AUTO: 11.3 FL (ref 9.4–12.4)
PTH INTACT: 32.3 PG/ML (ref 15–65)
RBC # BLD: 4.48 MILL/MM3 (ref 4.7–6.1)
SEDIMENTATION RATE, ERYTHROCYTE: 7 MM/HR (ref 0–10)
SEG NEUTROPHILS: 64.7 %
SEGMENTED NEUTROPHILS ABSOLUTE COUNT: 3.9 THOU/MM3 (ref 1.8–7.7)
TRIGL SERPL-MCNC: 278 MG/DL (ref 0–199)
VITAMIN D 25-HYDROXY: 39 NG/ML (ref 30–100)
WBC # BLD: 6.1 THOU/MM3 (ref 4.8–10.8)

## 2022-10-04 PROCEDURE — G0439 PPPS, SUBSEQ VISIT: HCPCS | Performed by: STUDENT IN AN ORGANIZED HEALTH CARE EDUCATION/TRAINING PROGRAM

## 2022-10-04 PROCEDURE — 3017F COLORECTAL CA SCREEN DOC REV: CPT | Performed by: STUDENT IN AN ORGANIZED HEALTH CARE EDUCATION/TRAINING PROGRAM

## 2022-10-04 PROCEDURE — G8484 FLU IMMUNIZE NO ADMIN: HCPCS | Performed by: STUDENT IN AN ORGANIZED HEALTH CARE EDUCATION/TRAINING PROGRAM

## 2022-10-04 PROCEDURE — 1123F ACP DISCUSS/DSCN MKR DOCD: CPT | Performed by: STUDENT IN AN ORGANIZED HEALTH CARE EDUCATION/TRAINING PROGRAM

## 2022-10-04 SDOH — ECONOMIC STABILITY: FOOD INSECURITY: WITHIN THE PAST 12 MONTHS, YOU WORRIED THAT YOUR FOOD WOULD RUN OUT BEFORE YOU GOT MONEY TO BUY MORE.: NEVER TRUE

## 2022-10-04 SDOH — ECONOMIC STABILITY: FOOD INSECURITY: WITHIN THE PAST 12 MONTHS, THE FOOD YOU BOUGHT JUST DIDN'T LAST AND YOU DIDN'T HAVE MONEY TO GET MORE.: NEVER TRUE

## 2022-10-04 ASSESSMENT — LIFESTYLE VARIABLES
HOW MANY STANDARD DRINKS CONTAINING ALCOHOL DO YOU HAVE ON A TYPICAL DAY: PATIENT DOES NOT DRINK
HOW OFTEN DO YOU HAVE A DRINK CONTAINING ALCOHOL: NEVER

## 2022-10-04 ASSESSMENT — PATIENT HEALTH QUESTIONNAIRE - PHQ9
SUM OF ALL RESPONSES TO PHQ QUESTIONS 1-9: 0
SUM OF ALL RESPONSES TO PHQ9 QUESTIONS 1 & 2: 0
SUM OF ALL RESPONSES TO PHQ QUESTIONS 1-9: 0
2. FEELING DOWN, DEPRESSED OR HOPELESS: 0
1. LITTLE INTEREST OR PLEASURE IN DOING THINGS: 0
SUM OF ALL RESPONSES TO PHQ QUESTIONS 1-9: 0
SUM OF ALL RESPONSES TO PHQ QUESTIONS 1-9: 0

## 2022-10-04 ASSESSMENT — SOCIAL DETERMINANTS OF HEALTH (SDOH): HOW HARD IS IT FOR YOU TO PAY FOR THE VERY BASICS LIKE FOOD, HOUSING, MEDICAL CARE, AND HEATING?: NOT HARD AT ALL

## 2022-10-04 NOTE — PATIENT INSTRUCTIONS
Personalized Preventive Plan for Efren Hayward - 10/4/2022  Medicare offers a range of preventive health benefits. Some of the tests and screenings are paid in full while other may be subject to a deductible, co-insurance, and/or copay. Some of these benefits include a comprehensive review of your medical history including lifestyle, illnesses that may run in your family, and various assessments and screenings as appropriate. After reviewing your medical record and screening and assessments performed today your provider may have ordered immunizations, labs, imaging, and/or referrals for you. A list of these orders (if applicable) as well as your Preventive Care list are included within your After Visit Summary for your review. Other Preventive Recommendations:    A preventive eye exam performed by an eye specialist is recommended every 1-2 years to screen for glaucoma; cataracts, macular degeneration, and other eye disorders. A preventive dental visit is recommended every 6 months. Try to get at least 150 minutes of exercise per week or 10,000 steps per day on a pedometer . Order or download the FREE \"Exercise & Physical Activity: Your Everyday Guide\" from [employer]. Call 5-717.673.5310 or search [employer] online. You need 2264-2857 mg of calcium and 7706-8293 IU of vitamin D per day. It is possible to meet your calcium requirement with diet alone, but a vitamin D supplement is usually necessary to meet this goal.  When exposed to the sun, use a sunscreen that protects against both UVA and UVB radiation with an SPF of 30 or greater. Reapply every 2 to 3 hours or after sweating, drying off with a towel, or swimming. Always wear a seat belt when traveling in a car. Always wear a helmet when riding a bicycle or motorcycle.

## 2022-10-04 NOTE — PROGRESS NOTES
Medicare Annual Wellness Visit    Baron Newman is here for Medicare AWV (Pt presents for a Medicare AWV. ) and Follow-up (Pt presents for a 4 month f/u. Pt states he has been doing good. )    Assessment & Plan   Medicare annual wellness visit, subsequent  Stage 3 chronic kidney disease, unspecified whether stage 3a or 3b CKD ([address]  Elevated TSH    Recommendations for Preventive Services Due: see orders and patient instructions/AVS.  Recommended screening schedule for the next 5-10 years is provided to the patient in written form: see Patient Instructions/AVS.     Follow-up with optometry for annual eye exam.  Diet modifications and recommendations. 6-month follow-up with CMP, TSH and free T4 labs    Return for Medicare Annual Wellness Visit in 1 year. Subjective   The following acute and/or chronic problems were also addressed today:  Last month, low light reading difficulty, with light reading fine. Patient's complete Health Risk Assessment and screening values have been reviewed and are found in Flowsheets. The following problems were reviewed today and where indicated follow up appointments were made and/or referrals ordered. Positive Risk Factor Screenings with Interventions:              Health Habits/Nutrition:  Physical Activity: Sufficiently Active    Days of Exercise per Week: 3 days    Minutes of Exercise per Session: 60 min     Have you lost any weight without trying in the past 3 months?: No  Body mass index: (!) 30.08  Have you seen the dentist within the past year?: Yes  Health Habits/Nutrition Interventions:  Continue daily exercise, balanced diet recommendations. Limit sweets, carbonated beverages.     Hearing/Vision:  Do you or your family notice any trouble with your hearing that hasn't been managed with hearing aids?: No  Do you have difficulty driving, watching TV, or doing any of your daily activities because of your eyesight?: (!) Yes  Have you had an eye exam within the past year?: (!) No  No results found. Hearing/Vision Interventions:  Annual eye exam.    Safety:  Do you have working smoke detectors?: Yes  Do you have any tripping hazards - loose or unsecured carpets or rugs?: (!) Yes  Do you have any tripping hazards - clutter in doorways, halls, or stairs?: No  Do you have either shower bars, grab bars, non-slip mats or non-slip surfaces in your shower or bathtub?: Yes  Do all of your stairways have a railing or banister?: Yes  Do you always fasten your seatbelt when you are in a car?: Yes  Safety Interventions:  Home safety tips provided           Objective   Vitals:    10/04/22 1331   BP: 104/64   Pulse: 56   Resp: 16   Temp: 98.3 °F (36.8 °C)   SpO2: 99%   Weight: 186 lb 6.4 oz (84.6 kg)   Height: 5' 6\" (1.676 m)      Body mass index is 30.09 kg/m².       General Appearance: alert and oriented to person, place and time, well developed and well- nourished, in no acute distress  Skin: warm and dry, no rash or erythema  Head: normocephalic and atraumatic  Eyes: pupils equal, round, and reactive to light, extraocular eye movements intact, conjunctivae normal  ENT: tympanic membrane, external ear and ear canal normal bilaterally, nose without deformity, nasal mucosa and turbinates normal without polyps  Neck: supple and non-tender without mass, no thyromegaly or thyroid nodules, no cervical lymphadenopathy  Pulmonary/Chest: clear to auscultation bilaterally- no wheezes, rales or rhonchi, normal air movement, no respiratory distress  Cardiovascular: normal rate, regular rhythm, normal S1 and S2, no murmurs, rubs, clicks, or gallops, distal pulses intact, no carotid bruits  Abdomen: soft, non-tender, non-distended, normal bowel sounds, no masses or organomegaly  Extremities: no cyanosis, clubbing or edema  Musculoskeletal: normal range of motion, no joint swelling, deformity or tenderness  Neurologic: reflexes normal and symmetric, no cranial nerve deficit, gait, coordination and speech normal       Allergies   Allergen Reactions    Codeine Other (See Comments)     migraine    Codone [Hydrocodone] Other (See Comments)     headache    Hydrocodone Other (See Comments)     migraine    Oxycodone      Wife states gives pt a headache     Prior to Visit Medications    Medication Sig Taking?  Authorizing Provider   DHEA 10 MG TABS Take 5 mg by mouth daily Double Mon Wed Fri Yes Historical Provider, MD   melatonin 3 mg TABS tablet Take 3 mg by mouth daily Yes Historical Provider, MD   folic acid-pyridoxine-cyanocobalamine (FOLTX) 2.5-25-1 MG TABS tablet Take 1 tablet by mouth daily Not taking always as no need for extra folic acid Yes Historical Provider, MD   calcium carbonate (OSCAL) 500 MG TABS tablet Take 500 mg by mouth every other day  Yes Historical Provider, MD   Magnesium Citrate 200 MG TABS Take 1 tablet by mouth 4 times daily (with meals and nightly) Yes Historical Provider, MD   B Complex-Folic Acid (D-900 BALANCED TR PO) Take 1 tablet by mouth 2 times daily (before meals) [id number] [facility] Yes Historical Provider, MD   Cinnamon 500 MG CAPS Take 1 capsule by mouth 4 times daily (before meals and nightly) Yes Historical Provider, MD   vitamin D (CHOLECALCIFEROL) 25 MCG (1000 UT) TABS tablet Take 4,000 Units by mouth daily [address] 1000 U Yes Historical Provider, MD [place] (Including outside providers/suppliers regularly involved in providing care):   Patient Care Team:  Elena Nicolas MD as PCP - General (Family Medicine)  Teri Aadm MD as PCP - [facility] Empaneled Provider  Dayan Wells MD (Family Medicine)  Surekha Pan MD as Surgeon (Neurosurgery)     Reviewed and updated this visit:  Tobacco  Allergies  Meds  Med Hx  Surg Hx  Soc Hx  Fam Hx

## 2022-10-05 LAB
C-REACTIVE PROTEIN, HIGH SENSITIVITY: 1.1 MG/L
DHEAS (DHEA SULFATE): 53 UG/DL (ref 28–175)
THYROID PEROXIDASE ANTIBODY: < 4 IU/ML (ref 0–25)
TISSUE TRANSGLUTAMINASE IGA: 0.3 U/ML
TTG, IGG: < 0.6 U/ML

## 2022-10-08 LAB — TESTOSTERONE FREE: NORMAL

## 2022-10-10 LAB — DHEA UNCONJUGATED: 0.75 NG/ML (ref 0.63–4.7)

## 2022-10-13 ENCOUNTER — OFFICE VISIT (OUTPATIENT)
Dept: FAMILY MEDICINE CLINIC | Age: 72
End: 2022-10-13
Payer: MEDICARE

## 2022-10-13 VITALS
OXYGEN SATURATION: 99 % | HEIGHT: 66 IN | SYSTOLIC BLOOD PRESSURE: 118 MMHG | BODY MASS INDEX: 29.6 KG/M2 | RESPIRATION RATE: 10 BRPM | TEMPERATURE: 97.2 F | DIASTOLIC BLOOD PRESSURE: 78 MMHG | HEART RATE: 78 BPM | WEIGHT: 184.2 LBS

## 2022-10-13 DIAGNOSIS — R26.89 BALANCE DISORDER: ICD-10-CM

## 2022-10-13 DIAGNOSIS — R35.1 NOCTURIA: ICD-10-CM

## 2022-10-13 DIAGNOSIS — E78.5 HYPERLIPIDEMIA, UNSPECIFIED HYPERLIPIDEMIA TYPE: ICD-10-CM

## 2022-10-13 DIAGNOSIS — I10 ESSENTIAL HYPERTENSION: ICD-10-CM

## 2022-10-13 DIAGNOSIS — R41.3 MEMORY DIFFICULTIES: ICD-10-CM

## 2022-10-13 DIAGNOSIS — R79.89 ELEVATED TSH: Primary | ICD-10-CM

## 2022-10-13 DIAGNOSIS — N18.30 STAGE 3 CHRONIC KIDNEY DISEASE, UNSPECIFIED WHETHER STAGE 3A OR 3B CKD (HCC): ICD-10-CM

## 2022-10-13 DIAGNOSIS — Z00.00 ROUTINE GENERAL MEDICAL EXAMINATION AT A HEALTH CARE FACILITY: ICD-10-CM

## 2022-10-13 DIAGNOSIS — K90.89 OTHER INTESTINAL MALABSORPTION: ICD-10-CM

## 2022-10-13 DIAGNOSIS — E78.00 ELEVATED LDL CHOLESTEROL LEVEL: ICD-10-CM

## 2022-10-13 PROCEDURE — 1036F TOBACCO NON-USER: CPT | Performed by: FAMILY MEDICINE

## 2022-10-13 PROCEDURE — G8417 CALC BMI ABV UP PARAM F/U: HCPCS | Performed by: FAMILY MEDICINE

## 2022-10-13 PROCEDURE — 99215 OFFICE O/P EST HI 40 MIN: CPT | Performed by: FAMILY MEDICINE

## 2022-10-13 PROCEDURE — 3017F COLORECTAL CA SCREEN DOC REV: CPT | Performed by: FAMILY MEDICINE

## 2022-10-13 PROCEDURE — 1123F ACP DISCUSS/DSCN MKR DOCD: CPT | Performed by: FAMILY MEDICINE

## 2022-10-13 PROCEDURE — G8484 FLU IMMUNIZE NO ADMIN: HCPCS | Performed by: FAMILY MEDICINE

## 2022-10-13 PROCEDURE — G8427 DOCREV CUR MEDS BY ELIG CLIN: HCPCS | Performed by: FAMILY MEDICINE

## 2022-10-13 NOTE — PROGRESS NOTES
[address] [facility] [address] [address]  Dept: [phone]  Dept Fax: [phone]  Loc: Chloé Robles is a 81 y.o. White male. Caitlin Samuels  presents to the [place] [address] clinic today for   Chief Complaint   Patient presents with    Follow-up   , and;   1. Elevated TSH    2. Stage 3 chronic kidney disease, unspecified whether stage 3a or 3b CKD ([facility] [address]    3. Essential hypertension    4. Balance disorder    5. Hyperlipidemia, unspecified hyperlipidemia type    6. Nocturia    7. Other intestinal malabsorption    8. Memory difficulties    9. Elevated LDL cholesterol level    10. Routine general medical examination at a health care facility          I have reviewed Karena Duty medical, surgical and other pertinent history in detail, and have updated medication and allergy information in the computerized patient record. Clinical Care Team:     -Referring Provider for today's consult: self  -Primary Care Provider: Didier Ching MD    Medical/Surgical History:   He  has a past medical history of Fractured skull ([facility] [address] Hyperlipidemia, and Hypertension. His  has a past surgical history that includes hernia repair; Appendectomy; and Appendectomy (2017). Family/Social History:     His family history includes Cancer in his father; Glaucoma in his father and mother; Heart Disease in his father and mother; High Blood Pressure in his father; High Cholesterol in his father; Other in his father; Prostate Cancer in his father; Stroke in his father and mother. He  reports that he has never smoked. He has never used smokeless tobacco. He reports that he does not drink alcohol and does not use drugs.     Medications/Allergies/Immunizations:     His current medication(s) include   Current Outpatient Medications:     Barberry-Oreg Grape-Goldenseal (BERBERINE COMPLEX PO), Take 1 capsule by mouth 2 times daily (before meals) Amazing Formulas, Disp: , Rfl:     NONFORMULARY, Take 1 capsule by mouth daily (with breakfast) Phosphatidyl serine+bacopa monnieri by Traffline, Disp: , Rfl:     DHEA 10 MG TABS, Take 10 mg by mouth daily, Disp: , Rfl:     melatonin 3 mg TABS tablet, Take 3 mg by mouth daily, Disp: , Rfl:     folic acid-pyridoxine-cyanocobalamine (FOLTX) 2.5-25-1 MG TABS tablet, Take 1 tablet by mouth daily Not taking always as no need for extra folic acid, Disp: , Rfl:     calcium carbonate (OSCAL) 500 MG TABS tablet, Take 500 mg by mouth every other day , Disp: , Rfl:     Magnesium Citrate 200 MG TABS, Take 7 tablets by mouth daily 1 before and after meals and at bedtime, Disp: , Rfl:     B Complex-Folic Acid (I-619 BALANCED TR PO), Take 1 tablet by mouth 2 times daily (before meals) [id number] [facility], Disp: , Rfl:     Cinnamon 500 MG CAPS, Take 1,000 mg by mouth 4 times daily (before meals and nightly), Disp: , Rfl:     vitamin D (CHOLECALCIFEROL) 25 MCG (1000 UT) TABS tablet, Take 5,000 Units by mouth daily Skip one day a month, Disp: , Rfl:   Allergies: Codeine, Codone [hydrocodone], Hydrocodone, and Oxycodone  Immunizations:   Immunization History   Administered Date(s) Administered    COVID-19, MODERNA BLUE border, Primary or Immunocompromised, (age 12y+), IM, 100 mcg/0.5mL 02/23/2021, 03/23/2021, 01/12/2022    COVID-19, MODERNA Booster BLUE border, (age 18y+), IM, 50mcg/0.25mL 01/12/2022    Influenza, FLUAD, (age 72 y+), Adjuvanted, 0.5mL 10/08/2020    Pneumococcal Polysaccharide ([id number]) 10/08/2020    Pneumococcal conjugate PCV20, PF (Prevnar 20) 05/31/2022    Tdap (Boostrix, Adacel) 10/22/2020    Zoster Recombinant (Shingrix) 10/22/2020, 01/14/2021        History of Present Illness:     Glenn's had concerns including Follow-up. Johnna Zurita  presents to the [address] today for;   Chief Complaint   Patient presents with    Follow-up   , abnormal labs follow up and these conditions as he  Is looking today for:     1. Elevated TSH    2. Stage 3 chronic kidney disease, unspecified whether stage 3a or 3b CKD ([address]    3. Essential hypertension    4. Balance disorder    5. Hyperlipidemia, unspecified hyperlipidemia type    6. Nocturia    7. Other intestinal malabsorption    8. Memory difficulties    9. Elevated LDL cholesterol level    10. Routine general medical examination at a health care facility      HPI    Subjective:     Review of Systems   All other systems reviewed and are negative. Objective:     /78 (Site: Left Upper Arm, Position: Sitting, Cuff Size: Medium Adult)   Pulse 78   Temp 97.2 °F (36.2 °C) (Temporal)   Resp 10   Ht 5' 6\" (1.676 m)   Wt 184 lb 3.2 oz (83.6 kg)   SpO2 99%   BMI 29.73 kg/m²   Physical Exam  Vitals and nursing note reviewed. Constitutional:       Appearance: Normal appearance. HENT:      Head: Normocephalic. Pulmonary:      Effort: Pulmonary effort is normal.   Neurological:      Mental Status: He is alert. Psychiatric:         Mood and Affect: Mood normal.         Thought Content: Thought content normal.          Laboratory Data:   Lab results were searched in Care Everywhere and/or those brought by the pateint were reviewed today with Randal Nicole and he has a copy of their most recent labs to take home with them as noted below;       Imaging Data:   Imaging Data:       Assessment & Plan:       Impression:  1. Elevated TSH    2. Stage 3 chronic kidney disease, unspecified whether stage 3a or 3b CKD ([facility] [address]    3. Essential hypertension    4. Balance disorder    5. Hyperlipidemia, unspecified hyperlipidemia type    6. Nocturia    7. Other intestinal malabsorption    8. Memory difficulties    9. Elevated LDL cholesterol level    10.  Routine general medical examination at a health care facility      Assessment and Plan:  After reviewing the patients chief complaints, reviewing their labfindings in great detail (with the patient and those accompanying them) which correlate to their chief complaints, symptoms, and or medical conditions; suggestions were made relating to changes in diet and or supplements which may improve the complaints and which will be reflected in their future lab findings; Chief Complaint   Patient presents with    Follow-up   ;    Plans for the next visits:  - Abnormal and non-optimal Labs were ordered today to be repeated in the next 120-365 days to assess changes from adjustments in nutrition and or nutrients. - Patient instructed when having a blood draw to ask the  to divide their lab draws into multiple draws over several days if not feeling good at the time of the lab draw or if either prefers to do several smaller blood draws over several days  -Patient instructed to check with insurer before each lab draw and to go to the lab which the insurer directs them for the most cost effective lab draw with the least patient's cost  - Christopher Buenrostro  will be scheduled subsequent to those results. Yumiko Howard will bring in his drink, food, supplement log to his next visit    Chronic Problems Addressed on this Visit:                                   1.  Intensity of Service; Uncontrolled items at this visit; Chief Complaint   Patient presents with    Follow-up   ; Improved items at this visit and Stable items were discussed at this visit;  2. Patients food, drinks, supplements and symptoms were reviewed with the patient,       - Christopher Buenrostro will bring food, drink, supplements and symptoms log to next visit for inclusion in their record      - 75 better food list reviewed & given to patient with the omega 6 food list to avoid      - The 52 Latex foods list was reviewed and given to the patients with the information on carrageenan         - Gluten in corn and oats abstracts sheet reviewed and given to the patient today   3.    Greater than 40 minutes time was spent with the patient face to face on this visit; of which >50% was for counseling and coordination of care, as well as the time spent before and after the visit reviewing the chart, documenting the encounter, reviewing labs,reports, NIH listed studies, making phone calls, etc.      Patients food and drinks were reviewed with the patient,   - They will bring a food drink symptom log to future visits for inclusion in their record    - 75 better food list reviewed & given to patient along with the omega 6 food list to avoid      - Gluten in corn and oats abstracts sheet reviewed and given to the patient today    - 23 Foods containing Latex-like proteins was reviewed and copy to be taken if desired     - Nutrient Supplements list provided and copyto be taken if desired    - [id number]. [employer] web site offered to patient to review at their convenience by staff with login information    Note:  I have discussed with the patient that with all nutraceuticals, there is often mixed data and emerging research which needs to be monitored; as well as an array of NIH fact sheets on nutrients and supplements, available at www.nih,issue plus Omnisio. [employer] plus www.RiGHT BRAiN MEDiA,org. If I have recommended cinnamon at the request of this patient to assist them in control of their blood sugar, triglyceride, and/or weight issues. I discussed that the patient's clinical use of cinnamon bark, calcium, magnesium, Vitamin D, and pharmaceutical grade CVS omega 3 oil or triple-strength fish oil, and B-50/B-100 time-released B-complex by [address] [facility] will be for a time-limited trial to determine their individual effectiveness and safety in this patient. I also referred the patient to the NMCD: Nutrition, Metabolism, and Cardiovascular Diseases (SecuritiesCard.pl) and concerns about long-term use and hepatotoxicity of cinnamon and other nutrients.   I suggested they frequently search nih.gov for the latest non-proprietary information on nutriceuticals as well as consider a subscription to Curazy for details on reviewed supplements, or at the least review the nutrient files at [facility] at [facility], [address] bark, an insulin mimetic, reduces some High Carbohydrate Dietary Impacts. Methylhydroxychalcone polymers insulin-enhancing properties in fat cells are responsible for enhanced glucose uptake, inhibiting hepatic HMG-CoA reductase and lowers lipids. www.jacn. org/content/20/4/327.full     But cinnamon with additives such as Cinnamon Extract are not effective as insulin mimetics.  :eStoreDirectory.at     Nutrients for Start up from [employer] or [employer] for ease to get started now;  Emerald Rodarte has some useable products;  - Triple Strength Fish Oil, enteric coated  - Vit D-3 5000 IU gel caps  - Iron ferrous sulfate 325 mg tabs  - Centrum Silver look-a-like for most patients, or  - Centrum plain look-a-like if need iron    Local pharmacies or chains such as [employer], have:  - Arstasis pharmaceutical grade omega 3 is 90% EPA/DHA whereas most Triple strength fish oil are 75% EPA/DHA  - Triple Strength Fish Oil (enteric coated if available) or if not enteric coated, can take from freezer for less burps  - B-50 or B-100 released balanced B complex tabs by [address] [facility] bark 500 mg (without Chromium or extracts)   some brands list 1000 mg / serving of 2 capsules,    some brands have 1000 mg caps with the undesireable chromium extract  - Calcium carbonate/citrate, magnesium oxide/citrate, Vit D-3 as 3-4 tabs/caps/serving     Some Local Brands may contain Zinc which is acceptable for the first bottle or two  - Magnesium oxide 250 mg tabs for those having < 2 bowel movements daily  - Magnesium citrate 200 mg if having > 2 bowel movements/day  - Centrum Silver or look-a-like for most patients, Centrum plain or look-a-like with iron  - Vitamin D-3 comes as 1,000 IU or 2,000 IU or 5,000 IU gel caps or Liquid drops but keep Vitamin D levels <50 but >40     Some brands containing or derived from soy oil or corn oil are OK if not allergic to soy  - Elemental Iron 65 mg tabs at bedtime is available over the counter if need more iron     Usually turns bowel movements grey, green, or black but not a concern  - Apricot Kernel Oil (by Now) for dry skin sensitive perineal or perianal area skin    Nutrients for ongoing use by Mail order for less expense from [employer]asUg[employer] ;  - Strength Fish Oil , 240 Softgels Item #572939  -B-100 time released balanced B complex Item #376623  - Cinnamon bark 500 mg without Chromium or extract Item #[id number]  - Calcium carbonate 1000 mg, Magnesium oxide 500 mg, Vit D-3 400 IU Item #[id number]  - Magnesium oxide 500 mg tabs Item #349650 if less than 2 bowel movements daily  - ABC Seniors Item #330160 for most patients, One Daily Item #431089 with iron  - Vit D 3  1,000 Item #670525      2,000 IU Item #731474   Item #[id number]     Some brands containing orderived from soy oil or corn oil are OK if not allergic to soy    Nutrients for Special Needs by Mail order for less expense from www. puritan.com;  -Elemental Iron 65 mg tabs Item #128583 if need more iron for low iron on labs    Usually turns bowel movements grey, green or black but not a concern  - Time released Niacin 250 mg Item #158863 for cold intolerance, low libido or impotence  - DHEA 50 mg Item #730255 for improving DHEA levels on labs if having Fatigue    If stools too loose substitute for your Magnesium oxide using;   Magnesium citrate 200 mg tabs (NOT liquid) at [employer]   Magnesium gluconate 550 mg by hoccer at MailTrack.io or Kähu. com  Magnesium chloride foot soaks or body sprays  www.SolarNOW   Magnesium chloride flakes 14.99 Item #: [id number] if back-ordered, get spray  Magnesium threonate, Magtein also helps mental clarity and sleep    Food Drink Symptom Log;  I asked this patient to track these items and any other symptoms on their list on a weekly basis to documenttheir progress or lack of same. This can be done on the symptom tracking sheet I gave them at today's visit but looks like this:                                                      Rate on scale of 0-10 with zero = not noticeable  Symptom:                            Week 1               2                 3                 4               Etc            Hair loss    Foot cramps    Paresthesia    Aches    IBS (irritable bowel)    Constipation    Diarrhea  Nocturia (up to bathroom at night)    Fatigue/Energy level  Stress      On the other side of the sheet they can track their food, drink, environment, activity, symptoms etc      Avoiding Latex-like proteins in my foods; Avocados, Bananas, Celery, Figs & Kiwi proteins have latex-like proteins to inflame our immune systems, plus 47 more foods  How Can I Have A Latex Allergy? Eating foods with latex-like protein exposes us to latex allergies. Our body cannot tell the differencebetween these latex-like proteins and latex from rubber products since many people are allergic to fruit, vegetables and latex. Read labels on pre-packaged foods. This list to avoid is only a guide if you are known allergicto latex or have a latex rash on your chin, cheeks and lines on your neck and chest. The amount of latex is different in each food product or fruit variety. Avoid out of Season if not grown locally:   Melon, Nectarine, Papaya, Cherry, Passion fruit, Plum, Chestnuts, and Tomato. Avocado, Banana, Celery, Figs, and Kiwi always contain Latex-like protein. Whats in Season? Strawberries taste better in June than December because June is strawberry season so buy locally grown produce \"in season\" for the best flavor, cost, and less Latex. Locally grown produce not only tastes great but also requires little or no ethylene exposure in food distribution so has less latex content.   Out of season: use canned, frozen, or dried since those are processed ripe and latex content is lower!!! Month     [place] Locally Grown Produce  January, February, March: use canned, frozen or dried fruits since lower in latex  April: asparagus, radishes  May: asparagus, broccoli, green onions, greens, peas, radishes, rhubarb  [place]: asparagus, beets, beans, broccoli, cabbage, cantaloupe, carrots, green onions, greens, lettuce, onions, parsley, peas, radishes, rhubarb, strawberries, watermelons  July: beans, beets, blueberries, broccoli, cabbage, cantaloupe, carrots, cauliflower, celery, cucumbers, eggplant, grapes, green onions, greens, lettuce, onions, parsley, peas, peaches, bell peppers, potatoes, radishes, summer raspberries, squash, sweetcorn, tomatoes, turnips, watermelons  August: apples, beans, beets, blueberries, cabbage, cantaloupe, carrots, cauliflower, celery, cucumbers, eggplant, grapes, green onions, greens, lettuce, onions, parsley, peas, peaches, pears, bell peppers, potatoes, radishes, squash, sweet corn, tomatoes, turnips, watermelons  September: apples, beans, beets, blueberries, cabbage, cantaloupe, carrots, cauliflower, celery, cucumbers, eggplant, grapes, green onions, greens, lettuce, onions, parsley, peas, peaches, pears, bell peppers, plums, potatoes, pumpkins, radishes, fall red raspberries, squash, sweet corn, tomatoes, turnips, watermelons  October: apples, beets, broccoli, cabbage, carrots, cauliflower, celery, green onions, greens, lettuce, parsley, peas, pears, potatoes, pumpkins, radishes, fall red raspberries, squash, turnips  November: broccoli, cabbage, carrots, parsley, pears, peas  December: use canned, frozen or dried fruits since lower in latex    Upto half of latex-sensitive patients show allergic reactions to fruits (avocados, bananas, kiwifruits, papayas, peaches),   Annals of Allergy, 1994. These plants contain the same proteins that are allergens in latex.  People with fruit allergies should warn physicians before undergoing procedures which may cause anaphylactic reaction if in contact with latex gloves. Some of the common foods with defined cross-reactivity to latex are avocado, banana, kiwi, chestnut, raw potato, tomato, stone fruits (e.g., peach, cherry), hazelnut, melons, celery, carrot, apple, pear, papaya, and almond. Foods with less well-defined cross-reactivity to latex are peanuts, peppers, citrus fruits, coconut, pineapple, lizbet, fig, passion fruit, Ugli fruit, and grape. This fruit/latex cross-reactivity is worsened by ethylene, a gas used to hasten commercial ripening. In nature, plants produce low levels of the hormone ethylene, which regulates germination, flowering, and ripening. Forced ripening by high ethylene concentrations, plants produce allergenic wound-repair proteins, which are similar to wound-repair proteins made during the tapping of rubber trees. Sensitive individuals who ingest the fruit get a higher dose and worse reaction. Some people may even first become sensitized to latex through fruit. Can food processing increase the concentrations of allergenic proteins? Latex-sensitized children (and adults) in [place] often experience allergic reactions after eating bananas ripened artificially with ethylene. In the [place], food distribution centers treat unripe bananas and other produce with ethylene to ripen; not commonly done in [facility] since fruit is tree-ripened there. Does treatment of food with ethylene induce banana proteins that cross-react with latex? (Koki et al.)    References:   Latex in Foods Allergy, http://ehp.niehs.nih.gov/members/2003/5811/5811.html    Search web for [employer] in Season \" for where you live or are at the time you food shop   Management of Latex, ://medicalcenter. o[employer].edu/  search for nih, latex-like proteins in foods

## 2022-10-19 ENCOUNTER — TELEPHONE (OUTPATIENT)
Dept: NEUROSURGERY | Age: 72
End: 2022-10-19

## 2022-10-19 NOTE — TELEPHONE ENCOUNTER
Patient left  stating he was seen in the office about a year ago and was told to stop taking Omega 3 fish oil due to a brain bleed, however Gwyneth Schlatter has recently suggested pt begin taking fish oil again . Patient stated he wanted to be sure this was okay to resume taking. Please advise.

## 2022-10-20 NOTE — TELEPHONE ENCOUNTER
Called and spoke with pt to inform him per Génesis Tucker pt can resume taking fish oil with resolution of prior brain bleed. Patient expressed understanding.

## 2023-02-06 ENCOUNTER — NURSE ONLY (OUTPATIENT)
Dept: LAB | Age: 73
End: 2023-02-06

## 2023-02-06 DIAGNOSIS — E78.5 HYPERLIPIDEMIA, UNSPECIFIED HYPERLIPIDEMIA TYPE: ICD-10-CM

## 2023-02-06 DIAGNOSIS — R35.1 NOCTURIA: ICD-10-CM

## 2023-02-06 DIAGNOSIS — R41.3 MEMORY DIFFICULTIES: ICD-10-CM

## 2023-02-06 DIAGNOSIS — I10 ESSENTIAL HYPERTENSION: ICD-10-CM

## 2023-02-06 DIAGNOSIS — R79.89 ELEVATED TSH: ICD-10-CM

## 2023-02-06 DIAGNOSIS — N18.30 STAGE 3 CHRONIC KIDNEY DISEASE, UNSPECIFIED WHETHER STAGE 3A OR 3B CKD (HCC): ICD-10-CM

## 2023-02-06 DIAGNOSIS — K90.89 OTHER INTESTINAL MALABSORPTION: ICD-10-CM

## 2023-02-06 DIAGNOSIS — E78.00 ELEVATED LDL CHOLESTEROL LEVEL: ICD-10-CM

## 2023-02-06 DIAGNOSIS — R26.89 BALANCE DISORDER: ICD-10-CM

## 2023-02-06 DIAGNOSIS — Z00.00 ROUTINE GENERAL MEDICAL EXAMINATION AT A HEALTH CARE FACILITY: ICD-10-CM

## 2023-02-06 LAB
25(OH)D3 SERPL-MCNC: 31 NG/ML (ref 30–100)
ANION GAP SERPL CALC-SCNC: 13 MEQ/L (ref 8–16)
BASOPHILS ABSOLUTE: 0 THOU/MM3 (ref 0–0.1)
BASOPHILS NFR BLD AUTO: 0.7 %
BUN SERPL-MCNC: 16 MG/DL (ref 7–22)
CALCIUM SERPL-MCNC: 9.6 MG/DL (ref 8.5–10.5)
CHLORIDE SERPL-SCNC: 104 MEQ/L (ref 98–111)
CHOLEST SERPL-MCNC: 233 MG/DL (ref 100–199)
CO2 SERPL-SCNC: 24 MEQ/L (ref 23–33)
CREAT SERPL-MCNC: 1 MG/DL (ref 0.4–1.2)
DEPRECATED RDW RBC AUTO: 45 FL (ref 35–45)
EOSINOPHIL NFR BLD AUTO: 1.4 %
EOSINOPHILS ABSOLUTE: 0.1 THOU/MM3 (ref 0–0.4)
ERYTHROCYTE [DISTWIDTH] IN BLOOD BY AUTOMATED COUNT: 13.2 % (ref 11.5–14.5)
ERYTHROCYTE [SEDIMENTATION RATE] IN BLOOD BY WESTERGREN METHOD: 6 MM/HR (ref 0–10)
GFR SERPL CREATININE-BSD FRML MDRD: > 60 ML/MIN/1.73M2
GLUCOSE SERPL-MCNC: 100 MG/DL (ref 70–108)
HCT VFR BLD AUTO: 45.3 % (ref 42–52)
HDLC SERPL-MCNC: 45 MG/DL
HGB BLD-MCNC: 14.9 GM/DL (ref 14–18)
IMM GRANULOCYTES # BLD AUTO: 0.01 THOU/MM3 (ref 0–0.07)
IMM GRANULOCYTES NFR BLD AUTO: 0.2 %
LDLC SERPL CALC-MCNC: 158 MG/DL
LYMPHOCYTES ABSOLUTE: 1.2 THOU/MM3 (ref 1–4.8)
LYMPHOCYTES NFR BLD AUTO: 20.8 %
MAGNESIUM SERPL-MCNC: 2.1 MG/DL (ref 1.6–2.4)
MCH RBC QN AUTO: 30.2 PG (ref 26–33)
MCHC RBC AUTO-ENTMCNC: 32.9 GM/DL (ref 32.2–35.5)
MCV RBC AUTO: 91.9 FL (ref 80–94)
MONOCYTES ABSOLUTE: 0.4 THOU/MM3 (ref 0.4–1.3)
MONOCYTES NFR BLD AUTO: 6.4 %
NEUTROPHILS NFR BLD AUTO: 70.5 %
NRBC BLD AUTO-RTO: 0 /100 WBC
PLATELET # BLD AUTO: 263 THOU/MM3 (ref 130–400)
PMV BLD AUTO: 11.4 FL (ref 9.4–12.4)
POTASSIUM SERPL-SCNC: 4.1 MEQ/L (ref 3.5–5.2)
PTH-INTACT SERPL-MCNC: 30.5 PG/ML (ref 15–65)
RBC # BLD AUTO: 4.93 MILL/MM3 (ref 4.7–6.1)
SEGMENTED NEUTROPHILS ABSOLUTE COUNT: 4 THOU/MM3 (ref 1.8–7.7)
SODIUM SERPL-SCNC: 141 MEQ/L (ref 135–145)
TRIGL SERPL-MCNC: 150 MG/DL (ref 0–199)
WBC # BLD AUTO: 5.7 THOU/MM3 (ref 4.8–10.8)

## 2023-02-07 LAB
C-REACTIVE PROTEIN, HIGH SENSITIVITY: 1 MG/L
DHEA-S SERPL-MCNC: 105 UG/DL (ref 28–175)
TESTOSTERONE FREE: NORMAL

## 2023-02-08 LAB
GLIADIN IGG SER IA-ACNC: 0.9 U/ML
THYROPEROXIDASE AB SERPL IA-ACNC: < 4 IU/ML (ref 0–25)

## 2023-02-09 LAB — DHEA SERPL-MCNC: 2.83 NG/ML (ref 0.63–4.7)

## 2023-02-13 ENCOUNTER — OFFICE VISIT (OUTPATIENT)
Dept: FAMILY MEDICINE CLINIC | Age: 73
End: 2023-02-13
Payer: MEDICARE

## 2023-02-13 VITALS
TEMPERATURE: 97.7 F | WEIGHT: 186.2 LBS | OXYGEN SATURATION: 98 % | SYSTOLIC BLOOD PRESSURE: 138 MMHG | RESPIRATION RATE: 12 BRPM | HEART RATE: 51 BPM | BODY MASS INDEX: 29.92 KG/M2 | DIASTOLIC BLOOD PRESSURE: 76 MMHG | HEIGHT: 66 IN

## 2023-02-13 DIAGNOSIS — R35.1 NOCTURIA: ICD-10-CM

## 2023-02-13 DIAGNOSIS — R79.89 ELEVATED TSH: ICD-10-CM

## 2023-02-13 DIAGNOSIS — R26.89 BALANCE DISORDER: ICD-10-CM

## 2023-02-13 DIAGNOSIS — I10 ESSENTIAL HYPERTENSION: ICD-10-CM

## 2023-02-13 DIAGNOSIS — E78.00 ELEVATED LDL CHOLESTEROL LEVEL: Primary | ICD-10-CM

## 2023-02-13 DIAGNOSIS — R41.3 MEMORY DIFFICULTIES: ICD-10-CM

## 2023-02-13 DIAGNOSIS — K90.89 OTHER INTESTINAL MALABSORPTION: ICD-10-CM

## 2023-02-13 DIAGNOSIS — E78.5 HYPERLIPIDEMIA, UNSPECIFIED HYPERLIPIDEMIA TYPE: ICD-10-CM

## 2023-02-13 DIAGNOSIS — N18.30 STAGE 3 CHRONIC KIDNEY DISEASE, UNSPECIFIED WHETHER STAGE 3A OR 3B CKD (HCC): ICD-10-CM

## 2023-02-13 PROBLEM — R03.0 FINDING OF ABOVE NORMAL BLOOD PRESSURE: Status: ACTIVE | Noted: 2023-02-13

## 2023-02-13 PROCEDURE — 99215 OFFICE O/P EST HI 40 MIN: CPT | Performed by: FAMILY MEDICINE

## 2023-02-13 PROCEDURE — 1036F TOBACCO NON-USER: CPT | Performed by: FAMILY MEDICINE

## 2023-02-13 PROCEDURE — G8484 FLU IMMUNIZE NO ADMIN: HCPCS | Performed by: FAMILY MEDICINE

## 2023-02-13 PROCEDURE — G8427 DOCREV CUR MEDS BY ELIG CLIN: HCPCS | Performed by: FAMILY MEDICINE

## 2023-02-13 PROCEDURE — 3075F SYST BP GE 130 - 139MM HG: CPT | Performed by: FAMILY MEDICINE

## 2023-02-13 PROCEDURE — 3078F DIAST BP <80 MM HG: CPT | Performed by: FAMILY MEDICINE

## 2023-02-13 PROCEDURE — 3017F COLORECTAL CA SCREEN DOC REV: CPT | Performed by: FAMILY MEDICINE

## 2023-02-13 PROCEDURE — G8417 CALC BMI ABV UP PARAM F/U: HCPCS | Performed by: FAMILY MEDICINE

## 2023-02-13 PROCEDURE — 1123F ACP DISCUSS/DSCN MKR DOCD: CPT | Performed by: FAMILY MEDICINE

## 2023-02-13 SDOH — ECONOMIC STABILITY: FOOD INSECURITY: WITHIN THE PAST 12 MONTHS, YOU WORRIED THAT YOUR FOOD WOULD RUN OUT BEFORE YOU GOT MONEY TO BUY MORE.: NEVER TRUE

## 2023-02-13 SDOH — ECONOMIC STABILITY: FOOD INSECURITY: WITHIN THE PAST 12 MONTHS, THE FOOD YOU BOUGHT JUST DIDN'T LAST AND YOU DIDN'T HAVE MONEY TO GET MORE.: NEVER TRUE

## 2023-02-13 SDOH — ECONOMIC STABILITY: HOUSING INSECURITY
IN THE LAST 12 MONTHS, WAS THERE A TIME WHEN YOU DID NOT HAVE A STEADY PLACE TO SLEEP OR SLEPT IN A SHELTER (INCLUDING NOW)?: NO

## 2023-02-13 SDOH — ECONOMIC STABILITY: INCOME INSECURITY: HOW HARD IS IT FOR YOU TO PAY FOR THE VERY BASICS LIKE FOOD, HOUSING, MEDICAL CARE, AND HEATING?: NOT HARD AT ALL

## 2023-02-13 ASSESSMENT — PATIENT HEALTH QUESTIONNAIRE - PHQ9
1. LITTLE INTEREST OR PLEASURE IN DOING THINGS: 0
SUM OF ALL RESPONSES TO PHQ QUESTIONS 1-9: 0
SUM OF ALL RESPONSES TO PHQ QUESTIONS 1-9: 0
2. FEELING DOWN, DEPRESSED OR HOPELESS: 0
SUM OF ALL RESPONSES TO PHQ QUESTIONS 1-9: 0
SUM OF ALL RESPONSES TO PHQ9 QUESTIONS 1 & 2: 0
SUM OF ALL RESPONSES TO PHQ QUESTIONS 1-9: 0

## 2023-02-13 NOTE — PROGRESS NOTES
[id number] [facility] [address] [address]  Dept: [phone]  Dept Fax: [phone]  Loc: Gale Etienne is a 81 y.o. White male. Lulu Jack  presents to the [place] [address] clinic today for   Chief Complaint   Patient presents with    Discuss Labs    Follow-up   , and;   1. Elevated LDL cholesterol level    2. Elevated TSH    3. Stage 3 chronic kidney disease, unspecified whether stage 3a or 3b CKD ([facility] [address]    4. Essential hypertension    5. Balance disorder    6. Hyperlipidemia, unspecified hyperlipidemia type    7. Nocturia    8. Other intestinal malabsorption    9. Memory difficulties          I have reviewed Gracie Brittle medical, surgical and other pertinent history in detail, and have updated medication and allergy information in the computerized patient record. Clinical Care Team:     -Referring Provider for today's consult: self  -Primary Care Provider: Rosalia Vanegas MD    Medical/Surgical History:   He  has a past medical history of Fractured skull ([facility] [address] Hyperlipidemia, and Hypertension. His  has a past surgical history that includes hernia repair; Appendectomy; and Appendectomy (2017). Family/Social History:     His family history includes Cancer in his father; Glaucoma in his father and mother; Heart Disease in his father and mother; High Blood Pressure in his father; High Cholesterol in his father; Other in his father; Prostate Cancer in his father; Stroke in his father and mother. He  reports that he has never smoked. He has never used smokeless tobacco. He reports that he does not drink alcohol and does not use drugs. Medications/Allergies/Immunizations:     His current medication(s) include   Current Outpatient Medications:      Barberry-Oreg Grape-Goldenseal (BERBERINE COMPLEX PO), Take 1 capsule by mouth 2 times daily (before meals) Amazing Formulas, Disp: , Rfl: NONFORMULARY, Take 1 capsule by mouth daily (with breakfast) Phosphatidyl serine+bacopa monnieri by [place], Disp: , Rfl:     DHEA 50 MG TABS, Take 0.5 tablets by mouth daily 25 mg daily skip Sat Sunday, Disp: , Rfl:     melatonin 3 mg TABS tablet, Take 3 mg by mouth daily, Disp: , Rfl:     folic acid-pyridoxine-cyanocobalamine (FOLTX) 2.5-25-1 MG TABS tablet, Take 1 tablet by mouth daily Not taking always as no need for extra folic acid, Disp: , Rfl:     calcium carbonate (OSCAL) 500 MG TABS tablet, Take 500 mg by mouth every other day , Disp: , Rfl:     Magnesium Citrate 200 MG TABS, Take 7 tablets by mouth daily 1 before and after meals and at bedtime, Disp: , Rfl:     B Complex-Folic Acid (T-317 BALANCED TR PO), Take 1 tablet by mouth 2 times daily (before meals) [address] [facility], Disp: , Rfl:     Cinnamon 500 MG CAPS, Take 1,000 mg by mouth 4 times daily (before meals and nightly), Disp: , Rfl:     vitamin D (CHOLECALCIFEROL) 25 MCG (1000 UT) TABS tablet, Take 5,000 Units by mouth daily Double Mon Thur until current product is gone, Disp: , Rfl:   Allergies: Codeine, Codone [hydrocodone], Hydrocodone, and Oxycodone  Immunizations:   Immunization History   Administered Date(s) Administered    COVID-19, MODERNA BLUE border, Primary or Immunocompromised, (age 12y+), IM, 100 mcg/0.5mL 02/23/2021, 03/23/2021, 01/12/2022    COVID-19, MODERNA Booster BLUE border, (age 18y+), IM, 50mcg/0.25mL 01/12/2022    Influenza, FLUAD, (age 72 y+), Adjuvanted, 0.5mL 10/08/2020    Pneumococcal Polysaccharide (Qkljesrdm84) 10/08/2020    Pneumococcal conjugate PCV20, PF (Prevnar 20) 05/31/2022    Tdap (Boostrix, Adacel) 10/22/2020    Zoster Recombinant (Shingrix) 10/22/2020, 01/14/2021        History of Present Illness:     Glnen's had concerns including Discuss Labs and Follow-up. Denis Carbajal  presents to the [address] [place] [address] today for;   Chief Complaint   Patient presents with    Discuss Labs    Follow-up   , abnormal labs follow up and these conditions as he  Is looking today for:     1. Elevated LDL cholesterol level    2. Elevated TSH    3. Stage 3 chronic kidney disease, unspecified whether stage 3a or 3b CKD ([facility] [address]    4. Essential hypertension    5. Balance disorder    6. Hyperlipidemia, unspecified hyperlipidemia type    7. Nocturia    8. Other intestinal malabsorption    9. Memory difficulties      HPI    Subjective:     Review of Systems   All other systems reviewed and are negative. Objective:     /76 (Site: Left Upper Arm, Position: Sitting, Cuff Size: Medium Adult)   Pulse 51   Temp 97.7 °F (36.5 °C) (Temporal)   Resp 12   Ht 5' 6\" (1.676 m)   Wt 186 lb 3.2 oz (84.5 kg)   SpO2 98%   BMI 30.05 kg/m²   Physical Exam  Vitals and nursing note reviewed. Constitutional:       Appearance: Normal appearance. HENT:      Head: Normocephalic. Pulmonary:      Effort: Pulmonary effort is normal.   Neurological:      Mental Status: He is alert. Psychiatric:         Mood and Affect: Mood normal.         Thought Content: Thought content normal.          Laboratory Data:   Lab results were searched in Care Everywhere and/or those brought by the pateint were reviewed today with Marilee Marc and he has a copy of their most recent labs to take home with them as noted below;       Imaging Data:   Imaging Data:       Assessment & Plan:       Impression:  1. Elevated LDL cholesterol level    2. Elevated TSH    3. Stage 3 chronic kidney disease, unspecified whether stage 3a or 3b CKD ([facility] [address]    4. Essential hypertension    5. Balance disorder    6. Hyperlipidemia, unspecified hyperlipidemia type    7. Nocturia    8. Other intestinal malabsorption    9.  Memory difficulties      Assessment and Plan:  After reviewing the patients chief complaints, reviewing their labfindings in great detail (with the patient and those accompanying them) which correlate to their chief complaints, symptoms, and or medical conditions; suggestions were made relating to changes in diet and or supplements which may improve the complaints and which will be reflected in their future lab findings;  Chief Complaint   Patient presents with    Discuss Labs    Follow-up   ;    Plans for the next visits:  - Abnormal and non-optimal Labs were ordered today to be repeated in the next 120-365 days to assess changes from adjustments in nutrition and or nutrients.   - Patient instructed when having a blood draw to ask the  to divide their lab draws into multiple draws over several days if not feeling good at the time of the lab draw or if either prefers to do several smaller blood draws over several days  -Patient instructed to check with insurer before each lab draw and to go to the lab which the insurer directs them for the most cost effective lab draw with the least patient's cost  - Glenn  will be scheduled subsequent to those results.  - Glenn will bring in his drink, food, supplement log to his next visit    Chronic Problems Addressed on this Visit:                                   1.  Intensity of Service;    Uncontrolled items at this visit;   Chief Complaint   Patient presents with    Discuss Labs    Follow-up   ;              Improved items at this visit and Stable items were discussed at this visit;  2. Patients food, drinks, supplements and symptoms were reviewed with the patient,       - Glenn will bring food, drink, supplements and symptoms log to next visit for inclusion in their record      - 75 better food list reviewed & given to patient with the omega 6 food list to avoid      - The 52 Latex foods list was reviewed and given to the patients with the information on carrageenan         - Gluten in corn and oats abstracts sheet reviewed and given to the patient today   3.   Greater than 40 minutes time was spent with the patient face to face on this visit; of which >50% was for counseling and coordination of care, as well as the time spent  before and after the visit reviewing the chart, documenting the encounter, reviewing labs,reports, NIH listed studies, making phone calls, etc.      Patients food and drinks were reviewed with the patient,   - They will bring a food drink symptom log to future visits for inclusion in their record    - 75 better food list reviewed & given to patient along with the omega 6 food list to avoid      - Gluten in corn and oats abstracts sheet reviewed and given to the patient today    - 23 Foods containing Latex-like proteins was reviewed and copy to be taken if desired     - Nutrient Supplements list provided and copyto be taken if desired    - Agvcoaflznicux657emji. Freever web site offered to patient to review at their convenience by staff with login information    Note:  I have discussed with the patient that with all nutraceuticals, there is often mixed data and emerging research which needs to be monitored; as well as an array of NIH fact sheets on nutrients and supplements, available at www.nih,issue plus [employer]. Freever plus www.Informatics In Contexti,org. If I have recommended cinnamon at the request of this patient to assist them in control of their blood sugar, triglyceride, and/or weight issues. I discussed that the patient's clinical use of cinnamon bark, calcium, magnesium, Vitamin D, and pharmaceutical grade CVS omega 3 oil or triple-strength fish oil, and B-50/B-100 time-released B-complex by [address] [facility] will be for a time-limited trial to determine their individual effectiveness and safety in this patient. I also referred the patient to the NMCD: Nutrition, Metabolism, and Cardiovascular Diseases (SecuritiesCard.pl) and concerns about long-term use and hepatotoxicity of cinnamon and other nutrients. I suggested they frequently search nih.gov for the latest non-proprietary information on nutriceuticals as well as consider a subscription to Tepha for details on reviewed supplements, or at the least review the nutrient files at [facility], lpi.[email or web address]     Cinnamon bark, an insulin mimetic, reduces some High Carbohydrate Dietary Impacts.  Methylhydroxychalcone polymer’s insulin-enhancing properties in fat cells are responsible for enhanced glucose uptake, inhibiting hepatic HMG-CoA reductase and lowers lipids. www.jacn.org/content/20/4/327.full     But cinnamon with additives such as Cinnamon Extract are not effective as insulin mimetics. ://www.ars.usda.gov/is/AR/archive/apr04/pwlecv8707.htm     Nutrients for Start up from Local pharmacy or grocery for ease to get started now;  [employer] has some useable products;  - Triple Strength Fish Oil, enteric coated  - Vit D-3 5000 IU gel caps  - Iron ferrous sulfate 325 mg tabs  - Centrum Silver look-a-like for most patients, or  - Centrum plain look-a-like if need iron    Local pharmacies or chains such as [employer], have:  - [place]ushino pharmaceutical grade omega 3 is 90% EPA/DHA whereas most Triple strength fish oil are 75% EPA/DHA  - Triple Strength Fish Oil (enteric coated if available) or if not enteric coated, can take from freezer for less burps  - B-50 or B-100 released balanced B complex tabs by [place] [facility]  - Cinnamon bark 500 mg (without Chromium or extracts)   some brands list 1000 mg / serving of 2 capsules,    some brands have 1000 mg caps with the undesireable chromium extract  - Calcium carbonate/citrate, magnesium oxide/citrate, Vit D-3 as 3-4 tabs/caps/serving     Some Local Brands may contain Zinc which is acceptable for the first bottle or two  - Magnesium oxide 250 mg tabs for those having < 2 bowel movements daily  - Magnesium citrate 200 mg if having > 2 bowel movements/day  - Centrum Silver or look-a-like for most patients, Centrum plain or look-a-like with iron  - Vitamin D-3 comes as 1,000 IU or 2,000 IU or 5,000 IU gel caps or Liquid drops but keep Vitamin D levels <50 but >40     Some  brands containing or derived from soy oil or corn oil are OK if not allergic to soy  - Elemental Iron 65 mg tabs at bedtime is available over the counter if need more iron     Usually turns bowel movements grey, green, or black but not a concern  - Apricot Kernel Oil (by Now) for dry skin sensitive perineal or perianal area skin    Nutrients for ongoing use by Mail order for less expense from wwwMoerae Matrix ;  - Strength Fish Oil , 240 Softgels Item #064143  -B-100 time released balanced B complex Item #333486  - Cinnamon bark 500 mg without Chromium or extract Item #594343  - Calcium carbonate 1000 mg, Magnesium oxide 500 mg, Vit D-3 400 IU Item #368409  - Magnesium oxide 500 mg tabs Item #124582 if less than 2 bowel movements daily  - ABC Seniors Item #231789 for most patients, One Daily Item #644995 with iron  - Vit D 3  1,000 Item #967777      2,000 IU Item #529025   Item #240296     Some brands containing orderived from soy oil or corn oil are OK if not allergic to soy    Nutrients for Special Needs by Mail order for less expense from www. puritan.com;  -Elemental Iron 65 mg tabs Item #940300 if need more iron for low iron on labs    Usually turns bowel movements grey, green or black but not a concern  - Time released Niacin 250 mg Item #533653 for cold intolerance, low libido or impotence  - DHEA 50 mg Item #570365 for improving DHEA levels on labs if having Fatigue    If stools too loose substitute for your Magnesium oxide using;   Magnesium citrate 200 mg tabs (NOT liquid) at [employer]   Magnesium gluconate 550 mg by [place] at [employer] or K[employer]hu. com  Magnesium chloride foot soaks or body sprays  www.Mass[employer]s. [employer]   Magnesium chloride flakes 14.99 Item #: [id number] if back-ordered, get spray  Magnesium threonate, Magtein also helps mental clarity and sleep    Food Drink Symptom Log;  I asked this patient to track these items and any other symptoms on their list on a weekly basis to documenttheir progress or lack of same. This can be done on the symptom tracking sheet I gave them at today's visit but looks like this:                                                      Rate on scale of 0-10 with zero = not noticeable  Symptom:                            Week 1               2                 3                 4               Etc            Hair loss    Foot cramps    Paresthesia    Aches    IBS (irritable bowel)    Constipation    Diarrhea  Nocturia (up to bathroom at night)    Fatigue/Energy level  Stress      On the other side of the sheet they can track their food, drink, environment, activity, symptoms etc      Avoiding Latex-like proteins in my foods; Avocados, Bananas, Celery, Figs & Kiwi proteins have latex-like proteins to inflame our immune systems, plus 47 more foods  How Can I Have A Latex Allergy? Eating foods with latex-like protein exposes us to latex allergies. Our body cannot tell the differencebetween these latex-like proteins and latex from rubber products since many people are allergic to fruit, vegetables and latex. Read labels on pre-packaged foods. This list to avoid is only a guide if you are known allergicto latex or have a latex rash on your chin, cheeks and lines on your neck and chest. The amount of latex is different in each food product or fruit variety. Avoid out of Season if not grown locally:   Melon, Nectarine, Papaya, Cherry, Passion fruit, Plum, Chestnuts, and Tomato. Avocado, Banana, Celery, Figs, and Kiwi always contain Latex-like protein. Whats in Season? Strawberries taste better in June than December because June is strawberry season so buy locally grown produce \"in season\" for the best flavor, cost, and less Latex. Locally grown produce not only tastes great but also requires little or no ethylene exposure in food distribution so has less latex content.   Out of season: use canned, frozen, or dried since those are processed ripe and latex content is lower!!! Month     [place] Locally Grown Produce  January, February, March: use canned, frozen or dried fruits since lower in latex  April: asparagus, radishes  May: asparagus, broccoli, green onions, greens, peas, radishes, rhubarb  June: asparagus, beets, beans, broccoli, cabbage, cantaloupe, carrots, green onions, greens, lettuce, onions, parsley, peas, radishes, rhubarb, strawberries, watermelons  July: beans, beets, blueberries, broccoli, cabbage, cantaloupe, carrots, cauliflower, celery, cucumbers, eggplant, grapes, green onions, greens, lettuce, onions, parsley, peas, peaches, bell peppers, potatoes, radishes, summer raspberries, squash, sweetcorn, tomatoes, turnips, watermelons  August: apples, beans, beets, blueberries, cabbage, cantaloupe, carrots, cauliflower, celery, cucumbers, eggplant, grapes, green onions, greens, lettuce, onions, parsley, peas, peaches, pears, bell peppers, potatoes, radishes, squash, sweet corn, tomatoes, turnips, watermelons  September: apples, beans, beets, blueberries, cabbage, cantaloupe, carrots, cauliflower, celery, cucumbers, eggplant, grapes, green onions, greens, lettuce, onions, parsley, peas, peaches, pears, bell peppers, plums, potatoes, pumpkins, radishes, fall red raspberries, squash, sweet corn, tomatoes, turnips, watermelons  October: apples, beets, broccoli, cabbage, carrots, cauliflower, celery, green onions, greens, lettuce, parsley, peas, pears, potatoes, pumpkins, radishes, fall red raspberries, squash, turnips  November: broccoli, cabbage, carrots, parsley, pears, peas  December: use canned, frozen or dried fruits since lower in latex    Upto half of latex-sensitive patients show allergic reactions to fruits (avocados, bananas, kiwifruits, papayas, peaches),   Annals of Allergy, 1994. These plants contain the same proteins that are allergens in latex.  People with fruit allergies should warn physicians before undergoing procedures which may cause anaphylactic reaction if in contact with latex gloves. Some of the common foods with defined cross-reactivity to latex are avocado, banana, kiwi, chestnut, raw potato, tomato, stone fruits (e.g., peach, cherry), hazelnut, melons, celery, carrot, apple, pear, papaya, and almond. Foods with less well-defined cross-reactivity to latex are peanuts, peppers, citrus fruits, coconut, pineapple, lizbet, fig, passion fruit, Ugli fruit, and grape. This fruit/latex cross-reactivity is worsened by ethylene, a gas used to hasten commercial ripening. In nature, plants produce low levels of the hormone ethylene, which regulates germination, flowering, and ripening. Forced ripening by high ethylene concentrations, plants produce allergenic wound-repair proteins, which are similar to wound-repair proteins made during the tapping of rubber trees. Sensitive individuals who ingest the fruit get a higher dose and worse reaction. Some people may even first become sensitized to latex through fruit. Can food processing increase the concentrations of allergenic proteins? Latex-sensitized children (and adults) in [place] often experience allergic reactions after eating bananas ripened artificially with ethylene. In the [facility], food distribution centers treat unripe bananas and other produce with ethylene to ripen; not commonly done in [facility] since fruit is tree-ripened there. Does treatment of food with ethylene induce banana proteins that cross-react with latex? (Koki et al.)    References:   Latex in Foods Allergy, http://ehp.niehs.nih.gov/members/2003/5811/5811.html    Search web for [employer] in Season \" for where you live or are at the time you food shop   Management of Latex, ://medicalcenter. osu.edu/  search for nih, latex-like proteins in foods

## 2023-02-13 NOTE — PATIENT INSTRUCTIONS
Thank you   Thank you for trusting us with your healthcare needs. You may receive a survey regarding today's visit. It would help us out if you would take a few moments to provide your feedback. We value your input. Please bring in ALL medications BOTTLES, including inhalers, herbal supplements, over the counter, prescribed & non-prescribed medicine. The office would like actual medication bottles and a list.   Please note our OFFICE POLICIES:   Prior to getting your labs drawn, please check with your insurance company for benefits and eligibility of lab services. Often, insurance companies cover certain tests for preventative visits only. It is patient's responsibility to see what is covered. We are unable to change a diagnosis after the test has been performed. Lab orders will not be re-printed. Please hold onto your original lab orders and take them to your lab to be completed. If you no show your scheduled appointment three times, you will be dismissed from this practice. Reschedules must be completed 24 hours prior to your schedule appointment. If the list below has been completed, PLEASE FAX RECORDS TO OUR OFFICE @ [phone].  Once the records have been received we will update your records at our office:  Health Maintenance Due   Topic Date Due    COVID-19 Vaccine (4 - Booster for Moderna series) 03/09/2022    Flu vaccine (1) 08/01/2022

## 2023-02-16 ENCOUNTER — OFFICE VISIT (OUTPATIENT)
Dept: PULMONOLOGY | Age: 73
End: 2023-02-16
Payer: MEDICARE

## 2023-02-16 VITALS
HEIGHT: 66 IN | TEMPERATURE: 98.1 F | WEIGHT: 186.2 LBS | BODY MASS INDEX: 29.92 KG/M2 | HEART RATE: 61 BPM | DIASTOLIC BLOOD PRESSURE: 80 MMHG | SYSTOLIC BLOOD PRESSURE: 120 MMHG | OXYGEN SATURATION: 96 %

## 2023-02-16 DIAGNOSIS — Z87.820 HISTORY OF TRAUMATIC BRAIN INJURY: ICD-10-CM

## 2023-02-16 DIAGNOSIS — R06.83 LOUD SNORING: ICD-10-CM

## 2023-02-16 DIAGNOSIS — R41.3 MEMORY DEFICIT: ICD-10-CM

## 2023-02-16 DIAGNOSIS — R06.89 GASPING FOR BREATH: ICD-10-CM

## 2023-02-16 DIAGNOSIS — G47.9 SLEEP DISORDER, UNSPECIFIED: Primary | ICD-10-CM

## 2023-02-16 PROCEDURE — G8417 CALC BMI ABV UP PARAM F/U: HCPCS | Performed by: NURSE PRACTITIONER

## 2023-02-16 PROCEDURE — 3079F DIAST BP 80-89 MM HG: CPT | Performed by: NURSE PRACTITIONER

## 2023-02-16 PROCEDURE — G8427 DOCREV CUR MEDS BY ELIG CLIN: HCPCS | Performed by: NURSE PRACTITIONER

## 2023-02-16 PROCEDURE — 3017F COLORECTAL CA SCREEN DOC REV: CPT | Performed by: NURSE PRACTITIONER

## 2023-02-16 PROCEDURE — 99203 OFFICE O/P NEW LOW 30 MIN: CPT | Performed by: NURSE PRACTITIONER

## 2023-02-16 PROCEDURE — 1123F ACP DISCUSS/DSCN MKR DOCD: CPT | Performed by: NURSE PRACTITIONER

## 2023-02-16 PROCEDURE — 1036F TOBACCO NON-USER: CPT | Performed by: NURSE PRACTITIONER

## 2023-02-16 PROCEDURE — G8484 FLU IMMUNIZE NO ADMIN: HCPCS | Performed by: NURSE PRACTITIONER

## 2023-02-16 PROCEDURE — 3074F SYST BP LT 130 MM HG: CPT | Performed by: NURSE PRACTITIONER

## 2023-02-16 ASSESSMENT — ENCOUNTER SYMPTOMS
SHORTNESS OF BREATH: 0
NAUSEA: 0
COUGH: 0
DIARRHEA: 0
EYES NEGATIVE: 1
VOMITING: 0
WHEEZING: 0
ABDOMINAL PAIN: 0

## 2023-02-16 NOTE — PROGRESS NOTES
[facility]    Patient: Mesfin Elliott, 81 y.o.   : 1950         Subjective     Chief Complaint   Patient presents with    New Patient     New sleep consult, no prior sleep studies/echos, no machine. HPI  Merissa Rajan was referred by Dr Corey Romo MD  for memory issues . Reports history of TBI. Was playing pickelball , hit back of his head on wall, caused brain bleed May 28, 2021   Had seizure while in hospital , was on anti seizure meds, now off the medication , testing has been neg for seizure activity   Long term memory and short term memory has been effected. SLEEP HISTORY    Very active, has ran [employer] , climbed [place]. Running/ activity set back due to TBI , getting back to running , did run 7 miles this am     Sleep Symptoms  This has not been evaluated or treated before. Sleep related complaints include snoring as well. Merissa Rajan denies any history of seizures, sleep walking or talking and there is no history suggestive of bruxism or restless leg syndrome. Bedtime Narative  He goes to bed at 10-11 pm  and wakes up at 8-9 am . Wife reports since TBI he sleeps longer hours. Merissa Rajan reports that this is not different on the weekends. Most of the time, it takes him less than 10 min  to fall asleep without difficulty falling back to sleep if he awakens, usually because he has to go to the bathroom. Nap History  Merissa Rajan does not take naps very often. If he does, they are helpful . If he does nap will take 30-40 minutes, once every couple of weeks. Snoring History  Glenn does snore or has been told he snores. There have not been witnessed apneas. He does not awaken with choking or gasping. Dreaming   Merissa Rajan does not have recurring dreams, and specifically does not have episodes of feeling paralyzed while awake, or anything to suggest cataplexy or dreams he would describe as hallucinations.     Driving History  Merissa Rajan does not report sleepiness while driving. There have not been driving accidents or near-misses related to sleepiness, fatigue or inattention. Weight Issues  There has not been a change in his weight over the past year  about 40 lbs  in the  past 5 years. Started on Barberry supplement to help aid weight loss     Caffeine Intake  Katherin Valdez does drink caffeine, usually about  2 cups coffee  per day. , 0-1 can soda per day. If drinks coffee in afternoon or evening will drink de-caff     Employment History  Katherin Valdez is retired . Used to be an , in [employer]. 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          Sleep Hx     Sleep symptoms:  Yes No  Additional Comments   Snoring [x] []    Witness Apneas [] [x]        Waking snorting/gasping [x] []     Nocturia []    [x]     Legs kicking at night [] [x]     AM Headaches [] [x]     Non-restorative sleep [] [x]     Daytime sleepiness/fatigue [] [x]     Daytime napping [] [x]     Drowsiness while driving [] [x]     Memory issues [x] []     Decreased concentration [x] []     Cataplexy [] [x]     Hypnogogic hallucinations [] [x]     Sleep paralysis [] [x]     Other [] [x]        Symptoms began 2021- following TBI        Previous evaluation and treatment has included-neurology evaluation       DOT/CDL - no  FAA/'s license - no     Previous Report(s) Reviewed: historical medical records, office notes and referral letter/letters      Download     PAP Download:   **PATIENT NOT ON PAP MACHINE AT THIS TIME    Past Medical hx     PMH:  Past Medical History:   Diagnosis Date    Fractured skull ([address] 05/28/2021    Hyperlipidemia     Hypertension      SURGICAL HISTORY:  Past Surgical History:   Procedure Laterality Date    APPENDECTOMY      APPENDECTOMY  2017    HERNIA REPAIR       SOCIAL HISTORY:  Social History     Tobacco Use    Smoking status: Never    Smokeless tobacco: Never   Substance Use Topics    Alcohol use: Never    Drug use: Never     ALLERGIES:  Allergies   Allergen Reactions    Codeine Other (See Comments)     migraine    Codone [Hydrocodone] Other (See Comments)     headache    Hydrocodone Other (See Comments)     migraine    Oxycodone      Wife states gives pt a headache     FAMILY HISTORY:  Family History   Problem Relation Age of Onset    Glaucoma Mother     Stroke Mother     Heart Disease Mother     Glaucoma Father     Heart Disease Father     High Blood Pressure Father     High Cholesterol Father     Cancer Father     Prostate Cancer Father     Stroke Father     Other Father         Brain Aneuryseum     CURRENT MEDICATIONS:  Current Outpatient Medications   Medication Sig Dispense Refill    Barberry-Oreg Grape-Goldenseal (BERBERINE COMPLEX PO) Take 1 capsule by mouth 2 times daily (before meals) Amazing Formulas      NONFORMULARY Take 1 capsule by mouth daily (with breakfast) Phosphatidyl serine+bacopa monnieri by [employer]      DHEA 50 MG TABS Take 0.5 tablets by mouth daily 25 mg daily skip Sat Sunday      melatonin 3 mg TABS tablet Take 3 mg by mouth daily      folic acid-pyridoxine-cyanocobalamine (FOLTX) 2.5-25-1 MG TABS tablet Take 1 tablet by mouth daily Not taking always as no need for extra folic acid      calcium carbonate (OSCAL) 500 MG TABS tablet Take 500 mg by mouth every other day       Magnesium Citrate 200 MG TABS Take 7 tablets by mouth daily 1 before and after meals and at bedtime      B Complex-Folic Acid (H-556 BALANCED TR PO) Take 1 tablet by mouth 2 times daily (before meals) [place]      Cinnamon 500 MG CAPS Take 1,000 mg by mouth 4 times daily (before meals and nightly)      vitamin D (CHOLECALCIFEROL) 25 MCG (1000 UT) TABS tablet Take 5,000 Units by mouth daily Double Mon Thur until current product is gone       No current facility-administered medications for this visit. Elizabeth VINCENT   Review of Systems   Constitutional:  Positive for unexpected weight change. Negative for activity change, appetite change, chills, fatigue and fever. HENT: Negative. Eyes: Negative. Respiratory:  Negative for cough, shortness of breath and wheezing. Cardiovascular:  Negative for chest pain, palpitations and leg swelling. Gastrointestinal:  Negative for abdominal pain, diarrhea, nausea and vomiting. Genitourinary: Negative. Musculoskeletal: Negative. Skin: Negative. Neurological: Negative. Memory defiicits   Hematological: Negative. Psychiatric/Behavioral: Negative. Negative for sleep disturbance. Physical exam   /80 (Site: Right Upper Arm, Position: Sitting, Cuff Size: Medium Adult)   Pulse 61   Temp 98.1 °F (36.7 °C) (Oral)   Ht 5' 6\" (1.676 m)   Wt 186 lb 3.2 oz (84.5 kg)   SpO2 96% Comment: Patient on room air  BMI 30.05 kg/m²    Neck Size: 15.5 inches  Mallampati 2  ESS:  1  SAQLI: 94    Physical Exam  Vitals and nursing note reviewed. Constitutional:       Appearance: Normal appearance. He is overweight. HENT:      Head: Normocephalic and atraumatic. Mouth/Throat:      Pharynx: Oropharynx is clear. Eyes:      Conjunctiva/sclera: Conjunctivae normal.   Pulmonary:      Effort: Pulmonary effort is normal. No tachypnea, bradypnea or respiratory distress. Skin:     Findings: No erythema or rash. Neurological:      Mental Status: He is alert and oriented to person, place, and time. Psychiatric:         Attention and Perception: Attention normal.         Mood and Affect: Mood normal.         Speech: Speech normal.         Behavior: Behavior normal.         Thought Content: Thought content normal.         Cognition and Memory: Cognition normal. He exhibits impaired remote memory. Judgment: Judgment normal.        Sleep Study   N/A   Assessment      Diagnosis Orders   1.  Sleep disorder, unspecified Baseline Diagnostic Sleep Study      2. History of traumatic brain injury  Baseline Diagnostic Sleep Study      3. Memory deficit  Baseline Diagnostic Sleep Study      4. Gasping for breath  Baseline Diagnostic Sleep Study      5. Loud snoring  Baseline Diagnostic Sleep Study           Plan   Given symptoms,  will going proceed with a baseline in lab PSG to rule out hypoxia/ sleep apnea.    Discussed the study at length with patient  Discussed treatment options if sleep study is positive  Encouraged to work on weight loss  Encouraged to get in 7 to 9 hours of sleep    Follow-up pending sleep study   -billing based on time, total time on encounter: 33 min -   Thank you for allowing us to see your patient and participate in his care, please let me know if you have any questions or concerns regarding planned course of treatment for this patient's sleep related concerns    Electronically signed by SOLO Benz CNP on 2/16/2023 at 2:34 PM

## 2023-02-28 ENCOUNTER — HOSPITAL ENCOUNTER (OUTPATIENT)
Dept: AUDIOLOGY | Age: 73
Discharge: HOME | End: 2023-02-28

## 2023-02-28 PROCEDURE — 9990000010 HC NO CHARGE VISIT: Performed by: AUDIOLOGIST

## 2023-02-28 NOTE — PROGRESS NOTES
ACCOUNT #: [de-identified]    DIAGNOSIS: H90.3    HEARING AID PROBLEM: Patient unable to pair his hearing aids to his phone or My Antonietta Rebel following a phone update. I assisted him through the process of un pairing and re pairing his devices to his phone and the sarah. Streaming and sarah functions work appropriately. He was satisfied.

## 2023-03-07 ENCOUNTER — TELEPHONE (OUTPATIENT)
Dept: FAMILY MEDICINE CLINIC | Age: 73
End: 2023-03-07

## 2023-03-07 NOTE — TELEPHONE ENCOUNTER
Regarding: Thyroid high according to Dr Deysi Nunes  ----- Message from Jacky Porras MD sent at 3/7/2023  8:58 AM EST -----       ----- Message from Cira Smallwood to Jacky Porras MD sent at 3/6/2023 11:53 AM -----   We see that he has an appointment with you on April 4. So does he need to have this added to a blood test for thyroid he is to have March 28?      ----- Message -----       From:Glenn Sanchez       Sent:3/6/2023 11:51 AM EST         To:Kyle Beatty MD    Subject:Thyroid high according to Dr Deysi Choudhary [facility] office said to let you know that Hedy Montana Daniel's thyroid blood test was high. That test was taken in December, does he need another test before he see's you and does he need to schedule an appointment?

## 2023-03-07 NOTE — TELEPHONE ENCOUNTER
Hong Fan MD 18 minutes ago (8:58 AM)     CC  Patient was advised to obtain TSH and T4 at previous visit which were ordered on 10/4/2022. Orders are still active, please have patient obtain thyroid labs and CMP as previously ordered prior to next visit.   Thank you,  Electronically signed by Bishop Yaron MD on 3/7/2023 at 8:57 AM

## 2023-03-09 ENCOUNTER — NURSE ONLY (OUTPATIENT)
Dept: LAB | Age: 73
End: 2023-03-09

## 2023-03-09 DIAGNOSIS — E03.8 SUBCLINICAL HYPOTHYROIDISM: ICD-10-CM

## 2023-03-09 DIAGNOSIS — R79.89 ELEVATED TSH: ICD-10-CM

## 2023-03-09 LAB
T4 FREE SERPL-MCNC: 1.17 NG/DL (ref 0.93–1.76)
TSH SERPL DL<=0.005 MIU/L-ACNC: 6.17 UIU/ML (ref 0.4–4.2)

## 2023-03-09 NOTE — TELEPHONE ENCOUNTER
Spoke with patient, informed him to get thryoid labs done, patient stated he would get the labs done today.

## 2023-03-10 ENCOUNTER — HOSPITAL ENCOUNTER (EMERGENCY)
Age: 73
Discharge: HOME | End: 2023-03-10
Attending: EMERGENCY MEDICINE
Payer: MEDICARE

## 2023-03-10 ENCOUNTER — APPOINTMENT (OUTPATIENT)
Dept: CT IMAGING | Age: 73
End: 2023-03-10
Payer: MEDICARE

## 2023-03-10 VITALS
DIASTOLIC BLOOD PRESSURE: 85 MMHG | OXYGEN SATURATION: 98 % | BODY MASS INDEX: 29.05 KG/M2 | SYSTOLIC BLOOD PRESSURE: 123 MMHG | RESPIRATION RATE: 16 BRPM | WEIGHT: 180 LBS | TEMPERATURE: 97.9 F | HEART RATE: 53 BPM

## 2023-03-10 DIAGNOSIS — R79.89 ELEVATED TSH: Primary | ICD-10-CM

## 2023-03-10 DIAGNOSIS — M51.36 L4-L5 DISC BULGE: Primary | ICD-10-CM

## 2023-03-10 LAB
ANION GAP SERPL CALC-SCNC: 12 MEQ/L (ref 8–16)
BASOPHILS ABSOLUTE: 0 THOU/MM3 (ref 0–0.1)
BASOPHILS NFR BLD AUTO: 0.8 %
BUN SERPL-MCNC: 25 MG/DL (ref 7–22)
CALCIUM SERPL-MCNC: 9.5 MG/DL (ref 8.5–10.5)
CHLORIDE SERPL-SCNC: 104 MEQ/L (ref 98–111)
CO2 SERPL-SCNC: 22 MEQ/L (ref 23–33)
CREAT SERPL-MCNC: 0.9 MG/DL (ref 0.4–1.2)
DEPRECATED RDW RBC AUTO: 43.8 FL (ref 35–45)
EOSINOPHIL NFR BLD AUTO: 2 %
EOSINOPHILS ABSOLUTE: 0.1 THOU/MM3 (ref 0–0.4)
ERYTHROCYTE [DISTWIDTH] IN BLOOD BY AUTOMATED COUNT: 13.4 % (ref 11.5–14.5)
GFR SERPL CREATININE-BSD FRML MDRD: > 60 ML/MIN/1.73M2
GLUCOSE SERPL-MCNC: 101 MG/DL (ref 70–108)
HCT VFR BLD AUTO: 40.8 % (ref 42–52)
HGB BLD-MCNC: 13.4 GM/DL (ref 14–18)
IMM GRANULOCYTES # BLD AUTO: 0.01 THOU/MM3 (ref 0–0.07)
IMM GRANULOCYTES NFR BLD AUTO: 0.2 %
INR PPP: 1.03 (ref 0.85–1.13)
LYMPHOCYTES ABSOLUTE: 1.3 THOU/MM3 (ref 1–4.8)
LYMPHOCYTES NFR BLD AUTO: 25 %
MCH RBC QN AUTO: 29.5 PG (ref 26–33)
MCHC RBC AUTO-ENTMCNC: 32.8 GM/DL (ref 32.2–35.5)
MCV RBC AUTO: 89.9 FL (ref 80–94)
MONOCYTES ABSOLUTE: 0.5 THOU/MM3 (ref 0.4–1.3)
MONOCYTES NFR BLD AUTO: 9.8 %
NEUTROPHILS NFR BLD AUTO: 62.2 %
NRBC BLD AUTO-RTO: 0 /100 WBC
OSMOLALITY SERPL CALC.SUM OF ELEC: 280.2 MOSMOL/KG (ref 275–300)
PLATELET # BLD AUTO: 227 THOU/MM3 (ref 130–400)
PMV BLD AUTO: 10.6 FL (ref 9.4–12.4)
POTASSIUM SERPL-SCNC: 4.1 MEQ/L (ref 3.5–5.2)
RBC # BLD AUTO: 4.54 MILL/MM3 (ref 4.7–6.1)
SEGMENTED NEUTROPHILS ABSOLUTE COUNT: 3.1 THOU/MM3 (ref 1.8–7.7)
SODIUM SERPL-SCNC: 138 MEQ/L (ref 135–145)
WBC # BLD AUTO: 5 THOU/MM3 (ref 4.8–10.8)

## 2023-03-10 PROCEDURE — 96372 THER/PROPH/DIAG INJ SC/IM: CPT

## 2023-03-10 PROCEDURE — 80048 BASIC METABOLIC PNL TOTAL CA: CPT

## 2023-03-10 PROCEDURE — 96374 THER/PROPH/DIAG INJ IV PUSH: CPT

## 2023-03-10 PROCEDURE — 2580000003 HC RX 258: Performed by: STUDENT IN AN ORGANIZED HEALTH CARE EDUCATION/TRAINING PROGRAM

## 2023-03-10 PROCEDURE — 36415 COLL VENOUS BLD VENIPUNCTURE: CPT

## 2023-03-10 PROCEDURE — 96361 HYDRATE IV INFUSION ADD-ON: CPT

## 2023-03-10 PROCEDURE — 96375 TX/PRO/DX INJ NEW DRUG ADDON: CPT

## 2023-03-10 PROCEDURE — 6370000000 HC RX 637 (ALT 250 FOR IP): Performed by: STUDENT IN AN ORGANIZED HEALTH CARE EDUCATION/TRAINING PROGRAM

## 2023-03-10 PROCEDURE — 85025 COMPLETE CBC W/AUTO DIFF WBC: CPT

## 2023-03-10 PROCEDURE — 85610 PROTHROMBIN TIME: CPT

## 2023-03-10 PROCEDURE — 70450 CT HEAD/BRAIN W/O DYE: CPT

## 2023-03-10 PROCEDURE — 72131 CT LUMBAR SPINE W/O DYE: CPT

## 2023-03-10 PROCEDURE — 99284 EMERGENCY DEPT VISIT MOD MDM: CPT

## 2023-03-10 PROCEDURE — 6360000002 HC RX W HCPCS: Performed by: STUDENT IN AN ORGANIZED HEALTH CARE EDUCATION/TRAINING PROGRAM

## 2023-03-10 RX ORDER — ORPHENADRINE CITRATE 30 MG/ML
60 INJECTION INTRAMUSCULAR; INTRAVENOUS ONCE
Status: COMPLETED | OUTPATIENT
Start: 2023-03-10 | End: 2023-03-10

## 2023-03-10 RX ORDER — TIZANIDINE 4 MG/1
4 TABLET ORAL EVERY 6 HOURS PRN
Qty: 20 TABLET | Refills: 0 | Status: SHIPPED | OUTPATIENT
Start: 2023-03-10

## 2023-03-10 RX ORDER — KETOROLAC TROMETHAMINE 30 MG/ML
15 INJECTION, SOLUTION INTRAMUSCULAR; INTRAVENOUS ONCE
Status: COMPLETED | OUTPATIENT
Start: 2023-03-10 | End: 2023-03-10

## 2023-03-10 RX ORDER — LIDOCAINE 4 G/G
1 PATCH TOPICAL DAILY
Qty: 30 PATCH | Refills: 0 | Status: SHIPPED | OUTPATIENT
Start: 2023-03-10 | End: 2023-04-09

## 2023-03-10 RX ORDER — LIDOCAINE 4 G/G
1 PATCH TOPICAL ONCE
Status: DISCONTINUED | OUTPATIENT
Start: 2023-03-10 | End: 2023-03-10 | Stop reason: HOSPADM

## 2023-03-10 RX ORDER — DEXAMETHASONE SODIUM PHOSPHATE 4 MG/ML
10 INJECTION, SOLUTION INTRA-ARTICULAR; INTRALESIONAL; INTRAMUSCULAR; INTRAVENOUS; SOFT TISSUE ONCE
Status: COMPLETED | OUTPATIENT
Start: 2023-03-10 | End: 2023-03-10

## 2023-03-10 RX ORDER — 0.9 % SODIUM CHLORIDE 0.9 %
1000 INTRAVENOUS SOLUTION INTRAVENOUS ONCE
Status: COMPLETED | OUTPATIENT
Start: 2023-03-10 | End: 2023-03-10

## 2023-03-10 RX ADMIN — SODIUM CHLORIDE 1000 ML: 9 INJECTION, SOLUTION INTRAVENOUS at 11:17

## 2023-03-10 RX ADMIN — DEXAMETHASONE SODIUM PHOSPHATE 10 MG: 4 INJECTION, SOLUTION INTRAMUSCULAR; INTRAVENOUS at 11:18

## 2023-03-10 RX ADMIN — KETOROLAC TROMETHAMINE 15 MG: 30 INJECTION, SOLUTION INTRAMUSCULAR at 11:58

## 2023-03-10 RX ADMIN — ORPHENADRINE CITRATE 60 MG: 30 INJECTION INTRAMUSCULAR; INTRAVENOUS at 11:18

## 2023-03-10 RX ADMIN — HYDROMORPHONE HYDROCHLORIDE 0.5 MG: 1 INJECTION, SOLUTION INTRAMUSCULAR; INTRAVENOUS; SUBCUTANEOUS at 11:18

## 2023-03-10 ASSESSMENT — PAIN SCALES - GENERAL
PAINLEVEL_OUTOF10: 7

## 2023-03-10 ASSESSMENT — PAIN - FUNCTIONAL ASSESSMENT
PAIN_FUNCTIONAL_ASSESSMENT: 0-10
PAIN_FUNCTIONAL_ASSESSMENT: 0-10

## 2023-03-10 ASSESSMENT — PAIN DESCRIPTION - LOCATION: LOCATION: BACK

## 2023-03-10 ASSESSMENT — PAIN DESCRIPTION - ORIENTATION: ORIENTATION: LEFT;RIGHT;LOWER

## 2023-03-10 NOTE — ED PROVIDER NOTES
[address] [facility]  EMERGENCY MEDICINE ATTENDING ATTESTATION      Evaluation of Heriberto Rubinstein. Case discussed and care plan developed with resident physician. I agree with the resident physician documentation and plan as documented by him, except if my documentation differs. Patient seen, interviewed and examined by me. I reviewed the medical, surgical, family and social history, medications and allergies. I have reviewed the nursing documentation. Brief H&P   Patient c/o low back pain radiated to the leg today. Patient has longstanding chronic pain. Patient also states he had a significant head injury possible with cranial fracture on intracranial hemorrhages 2 years ago and since then has been having memory problems. They are concerned about his memory problems today as well and would like to get evaluated for that. Physical exam is notable for well appearing, slow speech with normal rhythm and content but not slurred. No focal neurological deficit. There is midline tenderness in L5. Medical Decision Making   MDM:   Back pain  Rule out fractures  Possible sciatica  Possible sequela from previous traumatic brain injury  Plan:   IV line, labs  Analgesia  Imaging  Observation in the ED while awaiting results    Please see the resident physician completed note for final disposition except as documented on this attestation. I have reviewed and interpreted all available lab, radiology and ekg results available at the moment. Diagnosis, treatment and disposition plans were discussed and agreed upon by patient. This transcription was electronically signed. It was dictated by use of voice recognition software and electronically transcribed. The transcription may contain errors not detected in proofreading.      I performed direct supervision and was present for the critical portion following procedures: None  Critical care time on this case: None    Electronically signed by Angela Muro MD on 3/10/23 at 1:32 PM EST       Angela Muro MD  03/10/23 9180

## 2023-03-10 NOTE — ED NOTES
Pt states [facility] [employer] [facility] hurt is lower back. When asked where it hurt, pt points in between his mid and lower back, stating the pain radiates from side to side. Pt came straight to ER and has not taken any medication for the pain.       Daryle Robes, RN  03/10/23 1002

## 2023-03-10 NOTE — DISCHARGE INSTRUCTIONS
Today you were seen for low back pain. You are found to have a disc bulge. Please follow-up with spine as soon as possible. Please follow-up with primary care. Please return to ED if any worsening of condition.

## 2023-03-11 NOTE — ED PROVIDER NOTES
[facility] [address] [address] EMERGENCY DEPT      EMERGENCY MEDICINE     Pt Name: Rupal Kraft  MRN: [id number]  [place] 1950  Date of evaluation: 3/10/2023  Provider: Patric Simmons MD  Supervising Physician: Devin       Chief Complaint   Patient presents with    Lower Back Pain     History obtained from the patient. HISTORY OF PRESENT ILLNESS   Rupal Kraft is a pleasant 81 y.o. male who presents to the emergency department from from home, by private vehicle for evaluation of low back pain. 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 Does have remote history of skull fracture, states that this pain is significantly more severe than that was. Stating that pain is 9 out of 10 with movement. Denies any additional complaints. Pertinent ROS included above. PASTMEDICAL HISTORY     Past Medical History:   Diagnosis Date    Fractured skull ([facility] 05/28/2021    Hyperlipidemia     Hypertension        Patient Active Problem List   Diagnosis Code    Memory impairment R41.3    Concussion with no loss of consciousness S06.0X0A    Nonintractable headache R51.9    Post concussive syndrome F07.81    Elevated low density lipoprotein (LDL) cholesterol level E78.00    S/P appendectomy Z90.49    Essential hypertension I10    Subclinical hypothyroidism E03.8    Intraparenchymal hematoma of right side of brain due to trauma S06. 31AA    Closed fracture of right side of base of skull (HCC) S02.101A    Right and left hemorrhagic contusion of cerebrum (HCC) S06. 33AA    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 ([facility] S06. 9X1A    Vestibular dysfunction H83.2X9    Orthostatic hypotension I95.1    Chronic renal disease, stage III ([address] [[id number]] N18.30    H/O traumatic brain injury Z87.820 Finding of above normal blood pressure R03.0     SURGICAL HISTORY       Past Surgical History:   Procedure Laterality Date    APPENDECTOMY      APPENDECTOMY  2017    HERNIA REPAIR         CURRENT MEDICATIONS       Discharge Medication List as of 3/10/2023  1:42 PM        CONTINUE these medications which have NOT CHANGED    Details   Barberry-Oreg Grape-Goldenseal (BERBERINE COMPLEX PO) Take 1 capsule by mouth 2 times daily (before meals) Amazing FormulasHistorical Med      NONFORMULARY Take 1 capsule by mouth daily (with breakfast) Phosphatidyl serine+bacopa monnieri by [facility] HealthHistorical Med      DHEA 50 MG TABS Take 0.5 tablets by mouth daily 25 mg daily skip Sat Historical Med      melatonin 3 mg TABS tablet Take 3 mg by mouth dailyHistorical Med      folic acid-pyridoxine-cyanocobalamine (FOLTX) 2.5-25-1 MG TABS tablet Take 1 tablet by mouth daily Not taking always as no need for extra folic acidHistorical Med      calcium carbonate (OSCAL) 500 MG TABS tablet Take 500 mg by mouth every other day Historical Med      Magnesium Citrate 200 MG TABS Take 7 tablets by mouth daily 1 before and after meals and at bedtimeHistorical Med      B Complex-Folic Acid (Q-827 BALANCED TR PO) Take 1 tablet by mouth 2 times daily (before meals) Spring ColbyHistorical Med      Cinnamon 500 MG CAPS Take 1,000 mg by mouth 4 times daily (before meals and nightly)Historical Med      vitamin D (CHOLECALCIFEROL) 25 MCG (1000 UT) TABS tablet Take 5,000 Units by mouth daily Double Mon Thur until current product is goneHistorical Med             ALLERGIES     is allergic to codeine, codone [hydrocodone], hydrocodone, and oxycodone. FAMILY HISTORY     He indicated that his mother is . He indicated that his father is .        SOCIAL HISTORY       Social History     Tobacco Use    Smoking status: Never    Smokeless tobacco: Never   Substance Use Topics    Alcohol use: Never    Drug use: Never       PHYSICAL EXAM ED Triage Vitals [03/10/23 0955]   BP Temp Temp Source Heart Rate Resp SpO2 Height Weight   (!) 127/94 97.9 °F (36.6 °C) Oral 64 17 100 % -- 180 lb (81.6 kg)       Additional Vital Signs:  Vitals:    03/10/23 1200   BP: 123/85   Pulse: 53   Resp: 16   Temp:    SpO2: 98%     Physical Exam  Constitutional:       Appearance: Normal appearance. HENT:      Head: Normocephalic and atraumatic. Right Ear: External ear normal.      Left Ear: External ear normal.      Nose: Nose normal.      Mouth/Throat:      Mouth: Mucous membranes are moist.      Pharynx: Oropharynx is clear. Eyes:      Conjunctiva/sclera: Conjunctivae normal.   Cardiovascular:      Rate and Rhythm: Regular rhythm. Bradycardia present. Pulmonary:      Effort: No respiratory distress. Breath sounds: No wheezing or rales. Chest:      Chest wall: No tenderness. Abdominal:      General: Abdomen is flat. Bowel sounds are normal.      Palpations: Abdomen is soft. Musculoskeletal:         General: Tenderness (Midline spinal tenderness at approximately L5. Good strength and sensation in all 4 extremities. ) present. Right lower leg: No edema. Left lower leg: No edema. Skin:     General: Skin is warm and dry. Capillary Refill: Capillary refill takes less than 2 seconds. Neurological:      General: No focal deficit present. Mental Status: He is alert and oriented to person, place, and time. Sensory: No sensory deficit. Motor: No weakness. FORMAL DIAGNOSTIC RESULTS     RADIOLOGY: Interpretation per the Radiologist below, if available at the time of this note (none if blank):    CT HEAD WO CONTRAST   Final Result    Stable appearance of the brain. No acute process. Chronic changes detailed above report including small defect in the right occipital bone at the very base of the skull. **This report has been created using voice recognition software.  It may contain minor errors which are inherent in voice recognition technology. **      Final report electronically signed by Dr. Jose Alfredo Hdz on 3/10/2023 11:09 AM      CT LUMBAR SPINE WO CONTRAST   Final Result      1. Mild degenerative changes of the lumbar spine without significant spinal canal or neuroforaminal stenosis at any lumbar level. 2. 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 **This report has been created using voice recognition software. It may contain minor errors which are inherent in voice recognition technology. **      Final report electronically signed by Dr. Den Peters MD on 3/10/2023 11:09 AM          LABS: (none if blank)  Labs Reviewed   BASIC METABOLIC PANEL - Abnormal; Notable for the following components:       Result Value    CO2 22 (*)     BUN 25 (*)     All other components within normal limits   CBC WITH AUTO DIFFERENTIAL - Abnormal; Notable for the following components:    RBC 4.54 (*)     Hemoglobin 13.4 (*)     Hematocrit 40.8 (*)     All other components within normal limits   PROTIME-INR   ANION GAP   GLOMERULAR FILTRATION RATE, ESTIMATED   OSMOLALITY       (Any cultures that may have been sent were not resulted at the time of this patient visit)    [facility] / ED COURSE:     Assessment and Plan: This is a 81 y.o. male with  significant past medical history of CKD, traumatic brain injury, presenting with low back pain. Physical exam significant for L5 pinpoint tenderness, neuro intact. Differential diagnoses include, but not limited to disc bulge, multiple myeloma, fracture. Labs grossly unremarkable. CT shows L4-L5 disc bulge. As patient is neuro intact, will be stable for discharge. Was given Toradol, Dilaudid, Norflex, Decadron. Pain improved from 9 out of 10 to 5 out of 10 on reevaluation. Discharged home with strict return precautions, follow-up, tizanidine, lido patches. Mended the patient follow-up with his neurologist, as well as spine.        Shared Decision-Making was performed and disposition discussed with the        Patient/Family and questions answered              Vitals Reviewed:    Vitals:    03/10/23 0955 03/10/23 1107 03/10/23 1200   BP: (!) 127/94 115/80 123/85   Pulse: 64 56 53   Resp: 17 16 16   Temp: 97.9 °F (36.6 °C)     TempSrc: Oral     SpO2: 100% 98% 98%   Weight: 180 lb (81.6 kg)         The patient was seen and examined. Appropriate diagnostic testing was performed and results reviewed with the patient. Nursing notes reviewed. The results of pertinent diagnostic studies and exam findings were discussed. The patients provisional diagnosis and plan of care were discussed with the patient and present family who expressed understanding. Any medications were reviewed and indications and risks of medications were discussed with the patient /family present. ED Medications administered this visit:  (None if blank)  Medications   Dexamethasone Sodium Phosphate injection 10 mg (10 mg IntraVENous Given 3/10/23 1118)   orphenadrine (NORFLEX) injection 60 mg (60 mg IntraMUSCular Given 3/10/23 1118)   0.9 % sodium chloride bolus (0 mLs IntraVENous Stopped 3/10/23 1400)   ketorolac (TORADOL) injection 15 mg (15 mg IntraVENous Given 3/10/23 1158)         DISCHARGE PRESCRIPTIONS: (None if blank)  Discharge Medication List as of 3/10/2023  1:42 PM        START taking these medications    Details   tiZANidine (ZANAFLEX) 4 MG tablet Take 1 tablet by mouth every 6 hours as needed (low back pain), Disp-20 tablet, R-0Normal      lidocaine 4 % external patch Place 1 patch onto the skin daily, TransDERmal, DAILY Starting Fri 3/10/2023, Until Sun 4/9/2023, For 30 days, Disp-30 patch, R-0, Normal             FINAL IMPRESSION      1. L4-L5 disc bulge          DISPOSITION/PLAN   Condition: condition: good  Dispo: Discharge to home        OUTPATIENT FOLLOW UP THE PATIENT:    Jacky Porras MD  [address]  [address] [facility] [address] [phone]  Schedule an appointment as soon as possible for a visit in 1 day      Strict return precautions and follow-up discussed with patient. This transcription was electronically signed. Parts of this transcriptions may have been dictated by use of voice recognition software and electronically transcribed, and parts may have been transcribed with the assistance of an ED scribe. The transcription may contain errors not detected in proofreading. Please refer to my supervising physician's documentation if my documentation differs.     Electronically Signed: Irving Catalan MD, 03/10/23, 9:08 Rola Colorado MD  Resident  03/10/23 0603

## 2023-03-26 ENCOUNTER — HOSPITAL ENCOUNTER (OUTPATIENT)
Dept: SLEEP CENTER | Age: 73
Discharge: HOME | End: 2023-03-28
Payer: MEDICARE

## 2023-03-26 DIAGNOSIS — R06.83 LOUD SNORING: ICD-10-CM

## 2023-03-26 DIAGNOSIS — G47.9 SLEEP DISORDER, UNSPECIFIED: ICD-10-CM

## 2023-03-26 DIAGNOSIS — R06.89 GASPING FOR BREATH: ICD-10-CM

## 2023-03-26 DIAGNOSIS — R41.3 MEMORY DEFICIT: ICD-10-CM

## 2023-03-26 DIAGNOSIS — Z87.820 HISTORY OF TRAUMATIC BRAIN INJURY: ICD-10-CM

## 2023-03-26 PROCEDURE — 95810 POLYSOM 6/> YRS 4/> PARAM: CPT

## 2023-03-27 LAB — STATUS: NORMAL

## 2023-03-28 NOTE — PROGRESS NOTES
[address] [place] [address]                               SLEEP STUDY REPORT    PATIENT NAME: Julianne Christensen                      :        1950  MED REC NO:   [id number]                           ROOM:  ACCOUNT NO:   [de-identified]                           ADMIT DATE: 2023  PROVIDER:     Fer Hernandez MD    DATE OF STUDY:  2023    REFERRING PROVIDER:  Kandy Sawyer CNP    The patient's height is 66 inches, weight is 186 pounds with a BMI of  30. HISTORY:  The patient is a 74-year-old gentleman, who was recently  evaluated by Kandy Sawyer CNP, on 2023. The patient is  scheduled for a sleep study to evaluate for sleep apnea. The patient  had a history of memory deficit and loud snoring along with traumatic  brain injury. METHODS:  The patient underwent digital polysomnography in compliance  with the standards and specifications from the AASM Manual including the  simultaneous recording of 3 EEG channels (F4-M1, C4-M1, and O2-M1 with  back up electrodes F3-M2, C3-M2, and O1-M2), 2 EOG channels (E1-M2, and  E2-M1,), EMG (chin, left & right leg), EKG, Nonin pulse oximetry with   less than 2 second averaging time, body position, airflow recorded by  oral-nasal thermal sensor and nasal air pressure transducer, plus  respiratory effort recorded by calibrated respiratory inductance  plethysmography (RIP), flow volume loop, sound and video. Sleep staging  and scoring followed the standard put forth by the American Academy of  Sleep Medicine and utilized the 1B obstructive hypopnea event  desaturation of 4 percent or greater. INTERPRETATION:  This is a baseline sleep study and the study was  performed on 2023.   The study was started at 10:16 p.m. and was  terminated at 04:18 a.m. with a total recording time was 362.1 minutes,  sleep period time was 359.9 minutes, total sleep time was 322.4

## 2023-04-02 SDOH — ECONOMIC STABILITY: FOOD INSECURITY: WITHIN THE PAST 12 MONTHS, THE FOOD YOU BOUGHT JUST DIDN'T LAST AND YOU DIDN'T HAVE MONEY TO GET MORE.: NEVER TRUE

## 2023-04-02 SDOH — ECONOMIC STABILITY: INCOME INSECURITY: HOW HARD IS IT FOR YOU TO PAY FOR THE VERY BASICS LIKE FOOD, HOUSING, MEDICAL CARE, AND HEATING?: NOT HARD AT ALL

## 2023-04-02 SDOH — ECONOMIC STABILITY: FOOD INSECURITY: WITHIN THE PAST 12 MONTHS, YOU WORRIED THAT YOUR FOOD WOULD RUN OUT BEFORE YOU GOT MONEY TO BUY MORE.: NEVER TRUE

## 2023-04-04 ENCOUNTER — OFFICE VISIT (OUTPATIENT)
Dept: FAMILY MEDICINE CLINIC | Age: 73
End: 2023-04-04

## 2023-04-04 VITALS
HEIGHT: 66 IN | DIASTOLIC BLOOD PRESSURE: 70 MMHG | SYSTOLIC BLOOD PRESSURE: 102 MMHG | WEIGHT: 186 LBS | BODY MASS INDEX: 29.89 KG/M2 | HEART RATE: 45 BPM | TEMPERATURE: 97.9 F | RESPIRATION RATE: 24 BRPM | OXYGEN SATURATION: 98 %

## 2023-04-04 DIAGNOSIS — E78.5 HYPERLIPIDEMIA, UNSPECIFIED HYPERLIPIDEMIA TYPE: ICD-10-CM

## 2023-04-04 DIAGNOSIS — I10 ESSENTIAL HYPERTENSION: ICD-10-CM

## 2023-04-04 DIAGNOSIS — E03.8 SUBCLINICAL HYPOTHYROIDISM: Primary | ICD-10-CM

## 2023-04-04 NOTE — PROGRESS NOTES
S: 81 y.o. male with   Chief Complaint   Patient presents with    Follow-up     Pt presents for a 6 month f/u. Pt states that he has been doing good. He would like to discuss his thyroid lab results from neuro.      -Sub-clinical hypothyroidism:  Here for f/u on this  Neurologist wanted this re-evaluated  Is seeing neuro for TBI in 2021 with some memory issues  Memory is actually improving, however. Neurology wanted this re-evaluated. TSH stable at 6  No fatigue  No wt changes  No temp intolerance. BP Readings from Last 3 Encounters:   04/04/23 102/70   03/10/23 123/85   02/16/23 120/80     Wt Readings from Last 3 Encounters:   04/04/23 186 lb (84.4 kg)   03/10/23 180 lb (81.6 kg)   02/16/23 186 lb 3.2 oz (84.5 kg)       O: VS:  height is 5' 6\" (1.676 m) and weight is 186 lb (84.4 kg). His temperature is 97.9 °F (36.6 °C). His blood pressure is 102/70 and his pulse is 45 (abnormal). His respiration is 24 and oxygen saturation is 98%. Lab Results   Component Value Date    TSH 6.170 (H) 03/09/2023        Diagnosis Orders   1. Subclinical hypothyroidism            Plan  -Subclinical hypothyroidism: stable clinically. Memory issues, that are improving, more tied to his TBI. No hard indication for synthroid at this point. Con't q 3 to 6 months TSH check. F/u sooner any changes. Labs ordered. Health Maintenance Due   Topic Date Due    COVID-19 Vaccine (5 - Booster for Renate  series) 03/09/2022         Attending Physician Statement  I have discussed the case, including pertinent history and exam findings with the resident. I agree with the documented assessment and plan as documented by the resident.   GE modifier added to this encounter      Allison Drop, DO 4/4/2023 1:45 PM
Date    PSA 0.71 01/28/2021       Health Maintenance Due   Topic Date Due    COVID-19 Vaccine (5 - Booster for Moderna series) 03/09/2022         Physical Exam per Resident       ICD-10-CM    1. Essential hypertension  I10       2.  Subclinical hypothyroidism  E03.8               Plan  I participated in the discussion and care of this patient   Continue follow-up with specialists as previously scheduled  Labs ordered today
Chilo Kirkpatrick MD

## 2023-04-06 ENCOUNTER — OFFICE VISIT (OUTPATIENT)
Dept: PULMONOLOGY | Age: 73
End: 2023-04-06
Payer: MEDICARE

## 2023-04-06 VITALS
BODY MASS INDEX: 30.18 KG/M2 | DIASTOLIC BLOOD PRESSURE: 68 MMHG | OXYGEN SATURATION: 98 % | TEMPERATURE: 96.8 F | WEIGHT: 187.8 LBS | HEART RATE: 62 BPM | SYSTOLIC BLOOD PRESSURE: 136 MMHG | HEIGHT: 66 IN

## 2023-04-06 DIAGNOSIS — Z87.820 HISTORY OF TRAUMATIC BRAIN INJURY: ICD-10-CM

## 2023-04-06 DIAGNOSIS — G47.30 SEVERE SLEEP APNEA: Primary | ICD-10-CM

## 2023-04-06 DIAGNOSIS — R41.3 MEMORY DEFICIT: ICD-10-CM

## 2023-04-06 DIAGNOSIS — G47.34 NOCTURNAL HYPOXIA: ICD-10-CM

## 2023-04-06 PROCEDURE — 3075F SYST BP GE 130 - 139MM HG: CPT | Performed by: NURSE PRACTITIONER

## 2023-04-06 PROCEDURE — G8417 CALC BMI ABV UP PARAM F/U: HCPCS | Performed by: NURSE PRACTITIONER

## 2023-04-06 PROCEDURE — 3078F DIAST BP <80 MM HG: CPT | Performed by: NURSE PRACTITIONER

## 2023-04-06 PROCEDURE — 1036F TOBACCO NON-USER: CPT | Performed by: NURSE PRACTITIONER

## 2023-04-06 PROCEDURE — 1123F ACP DISCUSS/DSCN MKR DOCD: CPT | Performed by: NURSE PRACTITIONER

## 2023-04-06 PROCEDURE — 3017F COLORECTAL CA SCREEN DOC REV: CPT | Performed by: NURSE PRACTITIONER

## 2023-04-06 PROCEDURE — 99214 OFFICE O/P EST MOD 30 MIN: CPT | Performed by: NURSE PRACTITIONER

## 2023-04-06 PROCEDURE — G8427 DOCREV CUR MEDS BY ELIG CLIN: HCPCS | Performed by: NURSE PRACTITIONER

## 2023-04-06 ASSESSMENT — ENCOUNTER SYMPTOMS: APNEA: 1

## 2023-04-06 NOTE — PROGRESS NOTES
time: total time on encounter 30 min     Electronically signed by SOLO Echavarria CNP on 4/6/2023 at 9:22 AM

## 2023-05-07 ENCOUNTER — HOSPITAL ENCOUNTER (OUTPATIENT)
Dept: SLEEP CENTER | Age: 73
Discharge: HOME | End: 2023-05-09
Payer: MEDICARE

## 2023-05-07 DIAGNOSIS — G47.30 SEVERE SLEEP APNEA: ICD-10-CM

## 2023-05-07 DIAGNOSIS — R41.3 MEMORY DEFICIT: ICD-10-CM

## 2023-05-07 DIAGNOSIS — G47.34 NOCTURNAL HYPOXIA: ICD-10-CM

## 2023-05-07 DIAGNOSIS — Z87.820 HISTORY OF TRAUMATIC BRAIN INJURY: ICD-10-CM

## 2023-05-07 PROCEDURE — 95811 POLYSOM 6/>YRS CPAP 4/> PARM: CPT

## 2023-05-08 DIAGNOSIS — I49.9 IRREGULAR CARDIAC RHYTHM: Primary | ICD-10-CM

## 2023-05-08 DIAGNOSIS — G47.33 OSA TREATED WITH BIPAP: ICD-10-CM

## 2023-05-08 LAB — STATUS: NORMAL

## 2023-05-09 NOTE — PROGRESS NOTES
[address] [place] [address]                               SLEEP STUDY REPORT    PATIENT NAME: Danielle Cosme                      :        1950  MED REC NO:   [id number]                           ROOM:  ACCOUNT NO:   [de-identified]                           ADMIT DATE: 2023  PROVIDER:     Patsy Gardner. MD David    DATE OF STUDY:  2023    CPAP/BIPAP TITRATION STUDY REPORT    REFERRING PROVIDER:  Trever Perdomo CNP    The patient height is 66 inches, weight is 186 pounds with a BMI of 30. HISTORY:  The patient is a 82-year-old gentleman underwent initial  baseline sleep study on  2023. The patient was diagnosed with  severe obstructive sleep apnea with worsening of respiratory events in  supine position. The patient had associated comorbidities including  nocturnal hypoxia. The patient had a history of traumatic brain injury  in the past with memory deficits. The patient is scheduled for  CPAP/BiPAP titration as a treatment after evaluation by Colletta Morning, CNP, on 2023. METHODS:  The patient underwent digital polysomnography in compliance  with the standards and specifications from the AASM Manual including the  simultaneous recording of 3 EEG channels (F4-M1, C4-M1, and O2-M1 with  back up electrodes F3-M2, C3-M2, and O1-M2), 2 EOG channels (E1-M2, and  E2-M1,), EMG (chin, left & right leg), EKG, Nonin pulse oximetry with   less than 2 second averaging time, body position, airflow recorded by  oral-nasal thermal sensor and nasal air pressure transducer, plus  respiratory effort recorded by calibrated respiratory inductance  plethysmography (RIP), flow volume loop, sound and video. Sleep staging  and scoring followed the standard put forth by the American Academy of  Sleep Medicine and utilized the 1B obstructive hypopnea event  desaturation of 4 percent or greater.     INTERPRETATION:  This is a

## 2023-05-11 DIAGNOSIS — I49.3 PVC'S (PREMATURE VENTRICULAR CONTRACTIONS): ICD-10-CM

## 2023-05-11 DIAGNOSIS — G47.33 SEVERE OBSTRUCTIVE SLEEP APNEA: Primary | ICD-10-CM

## 2023-05-11 DIAGNOSIS — G47.61 PLMD (PERIODIC LIMB MOVEMENT DISORDER): ICD-10-CM

## 2023-05-11 DIAGNOSIS — R94.31 ABNORMAL EKG: ICD-10-CM

## 2023-05-12 ENCOUNTER — TELEPHONE (OUTPATIENT)
Dept: PULMONOLOGY | Age: 73
End: 2023-05-12

## 2023-05-12 NOTE — TELEPHONE ENCOUNTER
I called and spoke to patient regarding where he would like us to send his machine order to. He stated in the last four days that our office had called him to ask him this same question, which I apologized and he said across the street which I stated [facility]. He said yes that's is the same thing I told three other times. I apologized again and told him  that I will fax that order over.

## 2023-05-18 ENCOUNTER — OFFICE VISIT (OUTPATIENT)
Dept: CARDIOLOGY CLINIC | Age: 73
End: 2023-05-18
Payer: MEDICARE

## 2023-05-18 VITALS
HEIGHT: 66 IN | HEART RATE: 60 BPM | BODY MASS INDEX: 30.22 KG/M2 | DIASTOLIC BLOOD PRESSURE: 84 MMHG | SYSTOLIC BLOOD PRESSURE: 125 MMHG | WEIGHT: 188 LBS

## 2023-05-18 DIAGNOSIS — R94.31 ABNORMAL EKG: ICD-10-CM

## 2023-05-18 DIAGNOSIS — E78.5 DYSLIPIDEMIA: ICD-10-CM

## 2023-05-18 DIAGNOSIS — I49.3 FREQUENT PVCS: Primary | ICD-10-CM

## 2023-05-18 PROCEDURE — 1036F TOBACCO NON-USER: CPT | Performed by: INTERNAL MEDICINE

## 2023-05-18 PROCEDURE — 99203 OFFICE O/P NEW LOW 30 MIN: CPT | Performed by: INTERNAL MEDICINE

## 2023-05-18 PROCEDURE — 1123F ACP DISCUSS/DSCN MKR DOCD: CPT | Performed by: INTERNAL MEDICINE

## 2023-05-18 PROCEDURE — 3079F DIAST BP 80-89 MM HG: CPT | Performed by: INTERNAL MEDICINE

## 2023-05-18 PROCEDURE — 93000 ELECTROCARDIOGRAM COMPLETE: CPT | Performed by: INTERNAL MEDICINE

## 2023-05-18 PROCEDURE — G8417 CALC BMI ABV UP PARAM F/U: HCPCS | Performed by: INTERNAL MEDICINE

## 2023-05-18 PROCEDURE — 3074F SYST BP LT 130 MM HG: CPT | Performed by: INTERNAL MEDICINE

## 2023-05-18 PROCEDURE — G8427 DOCREV CUR MEDS BY ELIG CLIN: HCPCS | Performed by: INTERNAL MEDICINE

## 2023-05-18 PROCEDURE — 3017F COLORECTAL CA SCREEN DOC REV: CPT | Performed by: INTERNAL MEDICINE

## 2023-05-18 NOTE — PROGRESS NOTES
Chief Complaint   Patient presents with    New Patient   New patient here for check up irregular heart rhythm with freq PVC and pACs during sleep study     Denied cp, palpitation, edema or dizziness    No sob  Kimble Burkitt stated he runs few miles  twice a week  EKG done today     Nonsmoker    FHX  Father had pacer  Mother had CVA in her 63's    Past Surgical History:   Procedure Laterality Date    APPENDECTOMY      APPENDECTOMY  2017    HERNIA REPAIR         Allergies   Allergen Reactions    Codeine Other (See Comments)     migraine    Codone [Hydrocodone] Other (See Comments)     headache    Hydrocodone Other (See Comments)     migraine    Oxycodone      Wife states gives pt a headache        Family History   Problem Relation Age of Onset    Glaucoma Mother     Stroke Mother     Heart Disease Mother     Glaucoma Father     Heart Disease Father     High Blood Pressure Father     High Cholesterol Father     Cancer Father     Prostate Cancer Father     Stroke Father     Other Father         Brain Aneuryseum        Social History     Socioeconomic History    Marital status:      Spouse name: Not on file    Number of children: Not on file    Years of education: Not on file    Highest education level: Not on file   Occupational History    Not on file   Tobacco Use    Smoking status: Never    Smokeless tobacco: Never   Vaping Use    Vaping Use: Not on file   Substance and Sexual Activity    Alcohol use: Never    Drug use: Never    Sexual activity: Not on file   Other Topics Concern    Not on file   Social History Narrative    ** Merged History Encounter **          Social Determinants of Health     Financial Resource Strain: Low Risk     Difficulty of Paying Living Expenses: Not hard at all   Food Insecurity: No Food Insecurity    Worried About Running Out of Food in the Last Year: Never true    [address] in the Last Year: Never true   Transportation Needs: Unknown    Lack of Transportation (Medical):  Not on file

## 2023-05-30 ENCOUNTER — NURSE ONLY (OUTPATIENT)
Dept: LAB | Age: 73
End: 2023-05-30

## 2023-05-31 ENCOUNTER — HOSPITAL ENCOUNTER (OUTPATIENT)
Dept: NON INVASIVE DIAGNOSTICS | Age: 73
Discharge: HOME | End: 2023-05-31
Payer: MEDICARE

## 2023-05-31 DIAGNOSIS — R94.31 ABNORMAL EKG: ICD-10-CM

## 2023-05-31 DIAGNOSIS — I49.3 FREQUENT PVCS: ICD-10-CM

## 2023-05-31 DIAGNOSIS — E78.5 DYSLIPIDEMIA: ICD-10-CM

## 2023-05-31 LAB
LV EF: 63 %
LVEF MODALITY: NORMAL

## 2023-05-31 PROCEDURE — 93306 TTE W/DOPPLER COMPLETE: CPT

## 2023-05-31 PROCEDURE — 93225 XTRNL ECG REC<48 HRS REC: CPT

## 2023-05-31 PROCEDURE — 93226 XTRNL ECG REC<48 HR SCAN A/R: CPT

## 2023-05-31 NOTE — PROCEDURES
The skin was prepped and a  48 hour holter monitor was applied. The patient was instructed on the documentation of symptoms and the purpose of the holter as well as the things to avoid while wearing the holter. The patient was instructed to remove and return the holter on 06/02/2023.   The serial number of the monitor that was applied is [id number]

## 2023-06-09 LAB
ACQUISITION DURATION: NORMAL S
AVERAGE HEART RATE: 70 BPM
HOOKUP DATE: NORMAL
HOOKUP TIME: NORMAL
MAX HEART RATE TIME/DATE: NORMAL
MAX HEART RATE: 123 BPM
MIN HEART RATE TIME/DATE: NORMAL
MIN HEART RATE: 46 BPM
NUMBER OF QRS COMPLEXES: NORMAL
NUMBER OF SUPRAVENTRICULAR COUPLETS: 3
NUMBER OF SUPRAVENTRICULAR ECTOPICS: 2621
NUMBER OF SUPRAVENTRICULAR ISOLATED BEATS: 2314
NUMBER OF VENTRICULAR BIGEMINAL CYCLES: 607
NUMBER OF VENTRICULAR COUPLETS: 12
NUMBER OF VENTRICULAR ECTOPICS: 8189

## 2023-06-13 ENCOUNTER — NURSE ONLY (OUTPATIENT)
Dept: LAB | Age: 73
End: 2023-06-13

## 2023-06-13 DIAGNOSIS — R94.31 ABNORMAL EKG: ICD-10-CM

## 2023-06-13 DIAGNOSIS — E78.5 DYSLIPIDEMIA: ICD-10-CM

## 2023-06-13 DIAGNOSIS — I49.3 FREQUENT PVCS: ICD-10-CM

## 2023-06-13 LAB
ANION GAP SERPL CALC-SCNC: 14 MEQ/L (ref 8–16)
BUN SERPL-MCNC: 19 MG/DL (ref 7–22)
CALCIUM SERPL-MCNC: 9.7 MG/DL (ref 8.5–10.5)
CHLORIDE SERPL-SCNC: 102 MEQ/L (ref 98–111)
CO2 SERPL-SCNC: 24 MEQ/L (ref 23–33)
CREAT SERPL-MCNC: 0.9 MG/DL (ref 0.4–1.2)
GFR SERPL CREATININE-BSD FRML MDRD: > 60 ML/MIN/1.73M2
GLUCOSE SERPL-MCNC: 100 MG/DL (ref 70–108)
MAGNESIUM SERPL-MCNC: 2.4 MG/DL (ref 1.6–2.4)
POTASSIUM SERPL-SCNC: 4.1 MEQ/L (ref 3.5–5.2)
SODIUM SERPL-SCNC: 140 MEQ/L (ref 135–145)
T4 FREE SERPL-MCNC: 1.15 NG/DL (ref 0.93–1.76)
TSH SERPL DL<=0.005 MIU/L-ACNC: 7.07 UIU/ML (ref 0.4–4.2)

## 2023-06-15 PROBLEM — R94.31 ABNORMAL HOLTER EXAM: Status: ACTIVE | Noted: 2023-06-15

## 2023-06-15 PROBLEM — I10 ESSENTIAL HYPERTENSION: Status: RESOLVED | Noted: 2021-02-16 | Resolved: 2023-06-15

## 2023-08-03 ENCOUNTER — OFFICE VISIT (OUTPATIENT)
Dept: PULMONOLOGY | Age: 73
End: 2023-08-03
Payer: MEDICARE

## 2023-08-03 VITALS
OXYGEN SATURATION: 96 % | SYSTOLIC BLOOD PRESSURE: 104 MMHG | HEART RATE: 43 BPM | HEIGHT: 66 IN | DIASTOLIC BLOOD PRESSURE: 60 MMHG | TEMPERATURE: 97.6 F | BODY MASS INDEX: 30.37 KG/M2 | WEIGHT: 189 LBS

## 2023-08-03 DIAGNOSIS — R41.3 MEMORY DEFICIT: ICD-10-CM

## 2023-08-03 DIAGNOSIS — G47.33 OSA ON CPAP: Primary | ICD-10-CM

## 2023-08-03 DIAGNOSIS — Z87.820 HISTORY OF TRAUMATIC BRAIN INJURY: ICD-10-CM

## 2023-08-03 DIAGNOSIS — Z99.89 OSA ON CPAP: Primary | ICD-10-CM

## 2023-08-03 PROCEDURE — 99214 OFFICE O/P EST MOD 30 MIN: CPT | Performed by: NURSE PRACTITIONER

## 2023-08-03 PROCEDURE — G8427 DOCREV CUR MEDS BY ELIG CLIN: HCPCS | Performed by: NURSE PRACTITIONER

## 2023-08-03 PROCEDURE — 1036F TOBACCO NON-USER: CPT | Performed by: NURSE PRACTITIONER

## 2023-08-03 PROCEDURE — G8417 CALC BMI ABV UP PARAM F/U: HCPCS | Performed by: NURSE PRACTITIONER

## 2023-08-03 PROCEDURE — 1123F ACP DISCUSS/DSCN MKR DOCD: CPT | Performed by: NURSE PRACTITIONER

## 2023-08-03 PROCEDURE — 3017F COLORECTAL CA SCREEN DOC REV: CPT | Performed by: NURSE PRACTITIONER

## 2023-08-03 ASSESSMENT — ENCOUNTER SYMPTOMS
DIARRHEA: 0
SHORTNESS OF BREATH: 1
NAUSEA: 0
WHEEZING: 0
ABDOMINAL PAIN: 0
CHEST TIGHTNESS: 0
EYES NEGATIVE: 1
COUGH: 0
VOMITING: 0

## 2023-10-26 ENCOUNTER — APPOINTMENT (OUTPATIENT)
Dept: GENERAL RADIOLOGY | Age: 73
End: 2023-10-26
Payer: MEDICARE

## 2023-10-26 ENCOUNTER — HOSPITAL ENCOUNTER (EMERGENCY)
Age: 73
Discharge: HOME | End: 2023-10-26
Payer: MEDICARE

## 2023-10-26 VITALS
BODY MASS INDEX: 28.93 KG/M2 | SYSTOLIC BLOOD PRESSURE: 165 MMHG | RESPIRATION RATE: 16 BRPM | HEART RATE: 64 BPM | TEMPERATURE: 98 F | HEIGHT: 66 IN | DIASTOLIC BLOOD PRESSURE: 89 MMHG | OXYGEN SATURATION: 98 % | WEIGHT: 180 LBS
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
## 2023-10-26 DIAGNOSIS — S93.402A SPRAIN OF LEFT ANKLE, UNSPECIFIED LIGAMENT, INITIAL ENCOUNTER: Primary | ICD-10-CM
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
## 2023-10-26 PROCEDURE — 99213 OFFICE O/P EST LOW 20 MIN: CPT

## 2023-10-26 PROCEDURE — 6370000000 HC RX 637 (ALT 250 FOR IP): Performed by: NURSE PRACTITIONER

## 2023-10-26 PROCEDURE — 73610 X-RAY EXAM OF ANKLE: CPT

## 2023-10-26 RX ORDER — IBUPROFEN 600 MG/1
600 TABLET ORAL EVERY 6 HOURS PRN
Qty: 30 TABLET | Refills: 0 | Status: SHIPPED | OUTPATIENT
Start: 2023-10-26

## 2023-10-26 RX ORDER — IBUPROFEN 800 MG/1
800 TABLET ORAL ONCE
Status: COMPLETED | OUTPATIENT
Start: 2023-10-26 | End: 2023-10-26

## 2023-10-26 RX ADMIN — IBUPROFEN 800 MG: 800 TABLET, FILM COATED ORAL at 13:49

## 2023-10-26 ASSESSMENT — PAIN SCALES - GENERAL
PAINLEVEL_OUTOF10: 0
PAINLEVEL_OUTOF10: 2

## 2023-10-26 ASSESSMENT — PAIN DESCRIPTION - PAIN TYPE: TYPE: ACUTE PAIN

## 2023-10-26 ASSESSMENT — PAIN DESCRIPTION - DESCRIPTORS
DESCRIPTORS: SORE;STABBING;TENDER
DESCRIPTORS: ACHING

## 2023-10-26 ASSESSMENT — PAIN DESCRIPTION - FREQUENCY: FREQUENCY: CONTINUOUS

## 2023-10-26 ASSESSMENT — ENCOUNTER SYMPTOMS
COUGH: 0
SHORTNESS OF BREATH: 0

## 2023-10-26 ASSESSMENT — PAIN DESCRIPTION - LOCATION
LOCATION: ANKLE
LOCATION: ANKLE

## 2023-10-26 ASSESSMENT — PAIN - FUNCTIONAL ASSESSMENT
PAIN_FUNCTIONAL_ASSESSMENT: PREVENTS OR INTERFERES SOME ACTIVE ACTIVITIES AND ADLS
PAIN_FUNCTIONAL_ASSESSMENT: 0-10

## 2023-10-26 ASSESSMENT — PAIN DESCRIPTION - ONSET: ONSET: SUDDEN

## 2023-10-26 ASSESSMENT — PAIN DESCRIPTION - ORIENTATION
ORIENTATION: LEFT
ORIENTATION: LEFT

## 2023-10-26 NOTE — ED TRIAGE NOTES
Arrives to [facility] for the evaluation of left ankle injury that occurred today around 1030 am.  Was out running and stepped on a pile of leaves that had been covering a  drain. This caused patient to twist the left ankle. Lateral side of left ankle is swollen. Rates pain in the ankle 2/10 in severity at rest but increased to 8/10 with weight bearing and certain movements. Did elevated and ice at home prior to arrival.  At this time is ambulatory using a cane to help decrease weightbearing to the LLE. Plan for xray. Wife in room. Waiting provider to assess.

## 2023-10-31 ENCOUNTER — OFFICE VISIT (OUTPATIENT)
Dept: FAMILY MEDICINE CLINIC | Age: 73
End: 2023-10-31

## 2023-10-31 VITALS
DIASTOLIC BLOOD PRESSURE: 82 MMHG | HEART RATE: 44 BPM | RESPIRATION RATE: 16 BRPM | BODY MASS INDEX: 29.73 KG/M2 | OXYGEN SATURATION: 99 % | WEIGHT: 185 LBS | SYSTOLIC BLOOD PRESSURE: 124 MMHG | TEMPERATURE: 97.6 F | HEIGHT: 66 IN

## 2023-10-31 DIAGNOSIS — I10 ESSENTIAL HYPERTENSION: ICD-10-CM

## 2023-10-31 DIAGNOSIS — Z00.00 MEDICARE ANNUAL WELLNESS VISIT, SUBSEQUENT: Primary | ICD-10-CM

## 2023-10-31 DIAGNOSIS — E03.8 SUBCLINICAL HYPOTHYROIDISM: ICD-10-CM

## 2023-10-31 RX ORDER — ASCORBIC ACID 500 MG
500 TABLET ORAL DAILY
Qty: 30 TABLET | Refills: 3
Start: 2023-10-31

## 2023-10-31 ASSESSMENT — PATIENT HEALTH QUESTIONNAIRE - PHQ9
2. FEELING DOWN, DEPRESSED OR HOPELESS: 0
SUM OF ALL RESPONSES TO PHQ QUESTIONS 1-9: 0
SUM OF ALL RESPONSES TO PHQ9 QUESTIONS 1 & 2: 0
1. LITTLE INTEREST OR PLEASURE IN DOING THINGS: 0

## 2023-11-01 ENCOUNTER — NURSE ONLY (OUTPATIENT)
Dept: LAB | Age: 73
End: 2023-11-01

## 2023-11-01 DIAGNOSIS — E78.5 HYPERLIPIDEMIA, UNSPECIFIED HYPERLIPIDEMIA TYPE: Primary | ICD-10-CM

## 2023-11-01 DIAGNOSIS — E03.8 SUBCLINICAL HYPOTHYROIDISM: ICD-10-CM

## 2023-11-01 DIAGNOSIS — I10 ESSENTIAL HYPERTENSION: ICD-10-CM

## 2023-11-01 LAB
ALBUMIN SERPL BCG-MCNC: 4.3 G/DL (ref 3.5–5.1)
ALP SERPL-CCNC: 63 U/L (ref 38–126)
ALT SERPL W/O P-5'-P-CCNC: 23 U/L (ref 11–66)
ANION GAP SERPL CALC-SCNC: 12 MEQ/L (ref 8–16)
AST SERPL-CCNC: 23 U/L (ref 5–40)
BASOPHILS ABSOLUTE: 0.1 THOU/MM3 (ref 0–0.1)
BASOPHILS NFR BLD AUTO: 1 %
BILIRUB SERPL-MCNC: 0.6 MG/DL (ref 0.3–1.2)
BUN SERPL-MCNC: 19 MG/DL (ref 7–22)
CALCIUM SERPL-MCNC: 9.1 MG/DL (ref 8.5–10.5)
CHLORIDE SERPL-SCNC: 106 MEQ/L (ref 98–111)
CHOLESTEROL, FASTING: 207 MG/DL (ref 100–199)
CO2 SERPL-SCNC: 24 MEQ/L (ref 23–33)
CREAT SERPL-MCNC: 0.9 MG/DL (ref 0.4–1.2)
DEPRECATED RDW RBC AUTO: 44.6 FL (ref 35–45)
EOSINOPHIL NFR BLD AUTO: 3.6 %
EOSINOPHILS ABSOLUTE: 0.2 THOU/MM3 (ref 0–0.4)
ERYTHROCYTE [DISTWIDTH] IN BLOOD BY AUTOMATED COUNT: 13.2 % (ref 11.5–14.5)
GFR SERPL CREATININE-BSD FRML MDRD: > 60 ML/MIN/1.73M2
GLUCOSE SERPL-MCNC: 98 MG/DL (ref 70–108)
HCT VFR BLD AUTO: 42.8 % (ref 42–52)
HDLC SERPL-MCNC: 40 MG/DL
HGB BLD-MCNC: 13.8 GM/DL (ref 14–18)
IMM GRANULOCYTES # BLD AUTO: 0.01 THOU/MM3 (ref 0–0.07)
IMM GRANULOCYTES NFR BLD AUTO: 0.2 %
LDLC SERPL CALC-MCNC: 138 MG/DL
LYMPHOCYTES ABSOLUTE: 1.4 THOU/MM3 (ref 1–4.8)
LYMPHOCYTES NFR BLD AUTO: 24.1 %
MCH RBC QN AUTO: 29.8 PG (ref 26–33)
MCHC RBC AUTO-ENTMCNC: 32.2 GM/DL (ref 32.2–35.5)
MCV RBC AUTO: 92.4 FL (ref 80–94)
MONOCYTES ABSOLUTE: 0.5 THOU/MM3 (ref 0.4–1.3)
MONOCYTES NFR BLD AUTO: 9 %
NEUTROPHILS NFR BLD AUTO: 62.1 %
NRBC BLD AUTO-RTO: 0 /100 WBC
PLATELET # BLD AUTO: 214 THOU/MM3 (ref 130–400)
PMV BLD AUTO: 11.4 FL (ref 9.4–12.4)
POTASSIUM SERPL-SCNC: 4.1 MEQ/L (ref 3.5–5.2)
PROT SERPL-MCNC: 6.8 G/DL (ref 6.1–8)
RBC # BLD AUTO: 4.63 MILL/MM3 (ref 4.7–6.1)
SEGMENTED NEUTROPHILS ABSOLUTE COUNT: 3.6 THOU/MM3 (ref 1.8–7.7)
SODIUM SERPL-SCNC: 142 MEQ/L (ref 135–145)
T4 FREE SERPL-MCNC: 1.09 NG/DL (ref 0.93–1.76)
TRIGLYCERIDE, FASTING: 147 MG/DL (ref 0–199)
TSH SERPL DL<=0.005 MIU/L-ACNC: 6.85 UIU/ML (ref 0.4–4.2)
WBC # BLD AUTO: 5.8 THOU/MM3 (ref 4.8–10.8)

## 2023-11-01 NOTE — PROGRESS NOTES
Called and spoke with patient and his wife. Cholesterol is high, discussed ASCVD risk and factors involved in the risk and recommend statin with elevated ASCVD of 20. Patient declined statin and would like to try dietitian again as he did in the past.  TSH stable. Other blood work stable. Patient referral to dietitian.     The 10-year ASCVD risk score (Lyudmila ARMENTA, et al., 2019) is: 20.4%    Values used to calculate the score:      Age: 72 years      Sex: Male      Is Non- : No      Diabetic: No      Tobacco smoker: No      Systolic Blood Pressure: 388 mmHg      Is BP treated: No      HDL Cholesterol: 40 mg/dL      Total Cholesterol: 207 mg/dl      Electronically signed by Rene Anderson MD on 11/1/2023 at 6:11 PM

## 2023-11-02 ENCOUNTER — TELEPHONE (OUTPATIENT)
Dept: FAMILY MEDICINE CLINIC | Age: 73
End: 2023-11-02

## 2023-11-02 NOTE — TELEPHONE ENCOUNTER
----- Message from Hazel Long MD sent at 11/1/2023  6:12 PM EDT -----  Called and spoke with patient and his wife. Cholesterol is high, discussed ASCVD risk and recommend statin with elevated ASCVD of 20. Patient declined statin and would like to try dietitian again as he did in the past.   TSH stable. Other blood work stable. Patient referral to dietitian.   Electronically signed by Anirudh Gracia MD on 11/1/2023 at 6:11 PM

## 2023-11-06 NOTE — PROGRESS NOTES
[facility]      Karsten Dean         [id number]  11/7/2023   Chief Complaint   Patient presents with    Follow-up     3mo MONA f/u w/[facility] download. \"I still don't like it. Some nights I do well but then I will have mask leaking. \"  Is accompanied by his wife, Ar Castanon. Pt of Alexus Hand, CNP     PAP Download:   Original or initial AHI: 45.2     Date of initial study: 3/26/2023      Compliant  90%     Noncompliant 10 %     PAP Type BiPAP    Level  19/15 cmH2O   Avg Hrs/Day 5hrs 55mins  AHI: 3.8   Leaks : 95 th percentile: 6.6   Recorded compliance dates , 10/2/23  to 10/31/23   Machine/Mfg:   [x] ResMed    [] Respironics/Dreamstation   Interface:   [] Nasal    [] Nasal pillows   [x] FFM      Provider:      [x] -FARRAH     []Jame     [] Rodri    [] Eva Lazo    [] Dasia               [] [facility]      [] Adaptive    [] [address] [facility] [address]:      [] Other    Neck Size: 15.5 inches  Mallampati 2  ESS:  2  SAQLI: 82    Here is a scan of the most recent download:                  Presentation:   Michael Suresh presents for 3 monthssleep medicine follow up for obstructive sleep apnea  Since the last visit, Michael Suresh worked with his DME and has tried few masks, has a better seal, still has some \" bad nights of leaks\"   Stays very active, enjoys running . Initially seen due to memory issues. Wife reports he does not fall asleep as easily in chair during the day   Wife reports snoring is resolved with bipap use   History of right maxillary sinus cyst  BP better       Progress History:   Since last visit any new medical issues? Yes- left ankle sprain . Now in walking boot. Any trouble with Machine No  Any new sleep medicines? no  Trouble Falling Asleep No  Trouble Staying Asleep No  Snoring no    Equipment issues:   The pressure is  acceptable, the mask is acceptable     Review of Systems -   Review of Systems   Constitutional:  Negative for activity change, appetite change, chills,

## 2023-11-07 ENCOUNTER — OFFICE VISIT (OUTPATIENT)
Dept: PULMONOLOGY | Age: 73
End: 2023-11-07
Payer: MEDICARE

## 2023-11-07 VITALS
BODY MASS INDEX: 31.47 KG/M2 | HEIGHT: 66 IN | SYSTOLIC BLOOD PRESSURE: 116 MMHG | WEIGHT: 195.8 LBS | TEMPERATURE: 97.6 F | DIASTOLIC BLOOD PRESSURE: 62 MMHG | OXYGEN SATURATION: 97 % | HEART RATE: 34 BPM

## 2023-11-07 DIAGNOSIS — Z87.820 HISTORY OF TRAUMATIC BRAIN INJURY: ICD-10-CM

## 2023-11-07 DIAGNOSIS — E66.9 OBESITY (BMI 30-39.9): ICD-10-CM

## 2023-11-07 DIAGNOSIS — G47.33 OSA ON CPAP: Primary | ICD-10-CM

## 2023-11-07 DIAGNOSIS — G47.34 NOCTURNAL HYPOXIA: ICD-10-CM

## 2023-11-07 PROCEDURE — 99214 OFFICE O/P EST MOD 30 MIN: CPT | Performed by: NURSE PRACTITIONER

## 2023-11-07 PROCEDURE — 1036F TOBACCO NON-USER: CPT | Performed by: NURSE PRACTITIONER

## 2023-11-07 PROCEDURE — G8427 DOCREV CUR MEDS BY ELIG CLIN: HCPCS | Performed by: NURSE PRACTITIONER

## 2023-11-07 PROCEDURE — G8417 CALC BMI ABV UP PARAM F/U: HCPCS | Performed by: NURSE PRACTITIONER

## 2023-11-07 PROCEDURE — 3017F COLORECTAL CA SCREEN DOC REV: CPT | Performed by: NURSE PRACTITIONER

## 2023-11-07 PROCEDURE — G8484 FLU IMMUNIZE NO ADMIN: HCPCS | Performed by: NURSE PRACTITIONER

## 2023-11-07 PROCEDURE — 1123F ACP DISCUSS/DSCN MKR DOCD: CPT | Performed by: NURSE PRACTITIONER

## 2023-11-07 ASSESSMENT — ENCOUNTER SYMPTOMS
VOMITING: 0
DIARRHEA: 0
COUGH: 0
SHORTNESS OF BREATH: 0
ABDOMINAL PAIN: 0
EYES NEGATIVE: 1
NAUSEA: 0
WHEEZING: 0

## 2023-12-27 ENCOUNTER — HOSPITAL ENCOUNTER (OUTPATIENT)
Dept: PHYSICAL THERAPY | Age: 73
Setting detail: THERAPIES SERIES
Discharge: HOME | End: 2023-12-27
Payer: MEDICARE

## 2023-12-27 PROCEDURE — 97113 AQUATIC THERAPY/EXERCISES: CPT

## 2023-12-27 NOTE — PROGRESS NOTES
strengthening, and ROM. Long Term Goals:  Time Frame: 12 weeks  Pt to be independent with a HEP that may include pool and land exercise. Pt to attain 5/5 strength in left ankle to improve gait and ADL. Pt to verbalize understanding of jogging warm-up, stretching and jogging technique for optimal protection of ankle joint. Pt to jog for 4 minutes (treadmill or land) after thorough warm up and stretching, and using optimal jogging technique. Patient will have pain 3/10 or less with all functional motions, and that pain will guerline to 0/10 once at rest.        Patient Education:   [x]  HEP/Education Completed: Work on [employer], monitor response to exercise progressions  New [employer] Access Code:  []  No new Education completed  []  Reviewed Prior HEP      [x]  Patient verbalized and/or demonstrated understanding of education provided. []  Patient unable to verbalize and/or demonstrate understanding of education provided. Will continue education. [x]  Barriers to learning: Past TBI. PLAN:  Treatment Recommendations: Strengthening, Range of Motion, Balance Training, Functional Mobility Training, Transfer Training, Endurance Training, Gait Training, Stair Training, Neuromuscular Re-education, Manual Therapy - Soft Tissue Mobilization, Pain Management, Home Exercise Program, Patient Education, Safety Education and Training, Self-Care Education and Training, Positioning, Vestibular Rehabilitation, and Aquatics    []  Plan of care initiated. Plan to see patient 2 times per week for 12 weeks to address the treatment planned outlined above.   [x]  Continue with current plan of care  []  Modify plan of care as follows:    []  Hold pending physician visit  []  Discharge    Time In 1000   Time Out 1045   Timed Code Minutes: 45 min   Total Treatment Time: 45 min       Electronically Signed by: Brenda Merchant PTA

## 2023-12-29 ENCOUNTER — HOSPITAL ENCOUNTER (OUTPATIENT)
Dept: PHYSICAL THERAPY | Age: 73
Setting detail: THERAPIES SERIES
Discharge: HOME | End: 2023-12-29
Payer: MEDICARE

## 2023-12-29 PROCEDURE — 97110 THERAPEUTIC EXERCISES: CPT

## 2023-12-29 NOTE — PROGRESS NOTES
[address] [facility]  PHYSICAL THERAPY  [] EVALUATION  [x] DAILY NOTE (LAND) [] DAILY NOTE (AQUATIC ) [] PROGRESS NOTE [] DISCHARGE NOTE    [x] [facility] [place]   [] [address] [place] [address]    [] [facility]   [] [facility]    Date: 2023  Patient Name:  Emory Simmons  : 1950  MRN: [id number]  CSN: [id number]    Referring Practitioner  Gladis Ca MD     Diagnosis       Sprain of unspecified ligament of left ankle, initial encounter       Treatment Diagnosis M25.572 Pain in left ankle   Date of Evaluation 23    Additional Pertinent History HTN      Functional Outcome Measure Used LEFS   Functional Outcome Score deferred (23)       Insurance: Primary: Payor: MEDICARE /  /  / ,   Secondary: UMR   Authorization Information: PRECERTIFICATION REQUIRED:  No  INSURANCE THERAPY BENEFIT:  Patient has unlimited visits based on medical necessity  Benefit will not cover maintenance or preventative treatment.   AQUATIC THERAPY COVERED:   Yes  MODALITIES COVERED:  Yes, with the exception of iontophoresis and hot packs/cold packs  TELEHEALTH COVERED: Yes  REFERENCE #: NA   Approved Procedure Codes: 39965 - Therapeutic Exercise  , 93103 - Manual Therapy , 70455 - Aquatic Therapeutic Exercise, 68704 - Neuromuscular Re-Education , 17839 - PT ADL Training, 54977 - Gait Training, 32254 - Therapeutic Activities, 51547 - Canalith Repositioning Procedure, 06423 - Physical Performance Test (ea 15 min), and 33683 - Massage Therapeutic  (Codes requested indicated by red font, codes approved indicated by black font)   Visit # 4, 4/10 for progress note   Visits Allowed: Unlimited per medical necessity   Recertification Date:    Physician Follow-Up: Per patient   Physician Orders:    History of Present Illness: States on 2023 Gagan Osullivan was out wanting to jog 7 miles, when suddenly he jogged over some leaves -- but there was a sewage grating

## 2024-01-03 ENCOUNTER — HOSPITAL ENCOUNTER (OUTPATIENT)
Dept: PHYSICAL THERAPY | Age: 74
Setting detail: THERAPIES SERIES
End: 2024-01-03
Payer: MEDICARE

## 2024-01-05 ENCOUNTER — HOSPITAL ENCOUNTER (OUTPATIENT)
Dept: PHYSICAL THERAPY | Age: 74
Setting detail: THERAPIES SERIES
Discharge: HOME | End: 2024-01-05
Payer: MEDICARE

## 2024-01-05 PROCEDURE — 97113 AQUATIC THERAPY/EXERCISES: CPT

## 2024-01-08 ENCOUNTER — OFFICE VISIT (OUTPATIENT)
Dept: INTERNAL MEDICINE CLINIC | Age: 74
End: 2024-01-08
Payer: MEDICARE

## 2024-01-08 VITALS — WEIGHT: 194 LBS | BODY MASS INDEX: 31.31 KG/M2

## 2024-01-08 DIAGNOSIS — E78.5 HYPERLIPIDEMIA, UNSPECIFIED HYPERLIPIDEMIA TYPE: Primary | ICD-10-CM

## 2024-01-08 PROCEDURE — 97802 MEDICAL NUTRITION INDIV IN: CPT | Performed by: DIETITIAN, REGISTERED

## 2024-01-08 NOTE — PROGRESS NOTES
[facility]  [address] [facility]. [address] [place] [address]  [phone] (phone)  [phone] (fax)    Patient Name: Glenn Sanchez. Date of Birth: [id number]. MRN: [id number]      Assessment: Patient is a 73 y.o. male seen for Initial MNT visit for Hyperlipidemia, unspecified hyperlipidemia. Accompanied by his wife.    -Nutritionally relevant labs:   Lab Results   Component Value Date/Time    LABA1C 5.5 12/08/2021 02:21 PM    LABA1C 5.5 01/28/2021 11:03 AM    LABA1C 5.6 09/25/2020 08:14 AM    GLUCOSE 98 11/01/2023 09:12 AM    GLUCOSE 100 06/13/2023 09:06 AM    CHOL 233 (H) 02/06/2023 10:00 AM    HDL 40 11/01/2023 09:12 AM    LDLCALC 138 11/01/2023 09:12 AM    TRIG 150 02/06/2023 10:00 AM     -Food recall:   Breakfast: egg wrap at times.   Lunch: left overs, cream soups   Dinner: overall healthy meals per his wife (fish twice a week, chicken, whole grains vegetables). Dines out once per week.    Snacks: nuts (handful)    -Main Beverages: water, milk  -Impression of Dietary Intake: Glenn eats a fair to good diet. There are some things including refined grains, whole milk for years (currently three times a week on cereal and to drink), pre run (applesauce, 1 glass whole milk, yogurt). Limited fresh fruit. Some fried foods.     -  Current Outpatient Medications on File Prior to Visit   Medication Sig Dispense Refill    vitamin C (ASCORBIC ACID) 500 MG tablet Take 1 tablet by mouth daily 30 tablet 3    ibuprofen (IBU) 600 MG tablet Take 1 tablet by mouth every 6 hours as needed for Pain 30 tablet 0    calcium carbonate (OSCAL) 500 MG TABS tablet Take 1 tablet by mouth every other day      B Complex-Folic Acid (B-100 BALANCED TR PO) Take 1 tablet by mouth 2 times daily (before meals) [place]      Cinnamon 500 MG CAPS Take 2 capsules by mouth 4 times daily (before meals and nightly)      vitamin D (CHOLECALCIFEROL) 25 MCG (1000 UT) TABS tablet Take 5 tablets by

## 2024-01-09 ENCOUNTER — HOSPITAL ENCOUNTER (OUTPATIENT)
Dept: PHYSICAL THERAPY | Age: 74
Setting detail: THERAPIES SERIES
Discharge: HOME | End: 2024-01-09
Payer: MEDICARE

## 2024-01-09 PROCEDURE — 97113 AQUATIC THERAPY/EXERCISES: CPT

## 2024-01-09 NOTE — PROGRESS NOTES
strength in left ankle to improve gait and ADL.   Pt to verbalize understanding of jogging warm-up, stretching and jogging technique for optimal protection of ankle joint.   Pt to jog for 4 minutes (treadmill or land) after thorough warm up and stretching, and using optimal jogging technique.   Patient will have pain 3/10 or less with all functional motions, and that pain will guerline to 0/10 once at rest.        Patient Education:   [x]  HEP/Education Completed: Progression of reps with step ups, added single leg heel raises.    [employer] Access Code:[email or web address]   []  No new Education completed  []  Reviewed Prior HEP      []  Patient verbalized and/or demonstrated understanding of education provided.  []  Patient unable to verbalize and/or demonstrate understanding of education provided.  Will continue education.  [x]  Barriers to learning: Past TBI.      PLAN:  Treatment Recommendations: Strengthening, Range of Motion, Balance Training, Functional Mobility Training, Transfer Training, Endurance Training, Gait Training, Stair Training, Neuromuscular Re-education, Manual Therapy - Soft Tissue Mobilization, Pain Management, Home Exercise Program, Patient Education, Safety Education and Training, Self-Care Education and Training, Positioning, Vestibular Rehabilitation, and Aquatics    []  Plan of care initiated.  Plan to see patient 2 times per week for 12 weeks to address the treatment planned outlined above.  [x]  Continue with current plan of care  []  Modify plan of care as follows:    []  Hold pending physician visit  []  Discharge    Time In 1004   Time Out 1044   Timed Code Minutes: 40   Total Treatment Time: 40       Electronically Signed by: Deepika Perez PTA

## 2024-01-12 ENCOUNTER — HOSPITAL ENCOUNTER (OUTPATIENT)
Dept: PHYSICAL THERAPY | Age: 74
Setting detail: THERAPIES SERIES
Discharge: HOME | End: 2024-01-12
Payer: MEDICARE

## 2024-01-12 PROCEDURE — 97110 THERAPEUTIC EXERCISES: CPT

## 2024-01-12 NOTE — PROGRESS NOTES
[facility]  PHYSICAL THERAPY  [] EVALUATION  [x] DAILY NOTE (LAND) [] DAILY NOTE (AQUATIC ) [] PROGRESS NOTE [] DISCHARGE NOTE    [x] OUTPATIENT REHABILITATION CENTER [facility]   [] [facility]    [] [facility]   [] [facility]    Date: 2024  Patient Name:  Glenn Sanchez  : 1950  MRN: [id number]  CSN: [id number]    Referring Practitioner Kieran Hernández MD Briggs, Lloyd C, MD     Diagnosis Sprain of unspecified ligament of left ankle, initial encounter     Sprain of unspecified ligament of left ankle, initial encounter       Treatment Diagnosis M25.572 Pain in left ankle   Date of Evaluation 23    Additional Pertinent History HTN      Functional Outcome Measure Used LEFS   Functional Outcome Score deferred (23)       Insurance: Primary: Payor: MEDICARE /  /  / ,   Secondary: UMR   Authorization Information: PRECERTIFICATION REQUIRED:  No  INSURANCE THERAPY BENEFIT:  Patient has unlimited visits based on medical necessity  Benefit will not cover maintenance or preventative treatment.  AQUATIC THERAPY COVERED:   Yes  MODALITIES COVERED:  Yes, with the exception of iontophoresis and hot packs/cold packs  TELEHEALTH COVERED: Yes  REFERENCE #: NA   Approved Procedure Codes: 15278 - Therapeutic Exercise  , 70389 - Manual Therapy , 65475 - Aquatic Therapeutic Exercise, 75149 - Neuromuscular Re-Education , 41083 - PT ADL Training, 47427 - Gait Training, 36740 - Therapeutic Activities, 87184 - Canalith Repositioning Procedure, 55418 - Physical Performance Test (ea 15 min), and 37240 - Massage Therapeutic  (Codes requested indicated by red font, codes approved indicated by black font)   Visit # 7, 7/10 for progress note   Visits Allowed: Unlimited per medical necessity   Recertification Date: 3/08/2024   Physician Follow-Up: Per patient   Physician Orders:    History of Present Illness: States on 2023 Glenn was out wanting to jog 7

## 2024-01-15 ENCOUNTER — HOSPITAL ENCOUNTER (OUTPATIENT)
Dept: PHYSICAL THERAPY | Age: 74
Setting detail: THERAPIES SERIES
Discharge: HOME | End: 2024-01-15
Payer: MEDICARE

## 2024-01-15 PROCEDURE — 97113 AQUATIC THERAPY/EXERCISES: CPT

## 2024-01-15 NOTE — PROGRESS NOTES
[facility]  PHYSICAL THERAPY  [] EVALUATION  [] DAILY NOTE (LAND) [x] DAILY NOTE (AQUATIC ) [] PROGRESS NOTE [] DISCHARGE NOTE    [x] OUTPATIENT REHABILITATION CENTER [facility]   [] [place] [facility]    [] [facility]   [] [facility]    Date: 1/15/2024  Patient Name:  Glenn Sanchez  : 1950  MRN: [id number]  CSN: [id number]    Referring Practitioner Kieran Hernández MD Briggs, Lloyd C, MD     Diagnosis Sprain of unspecified ligament of left ankle, initial encounter     Sprain of unspecified ligament of left ankle, initial encounter       Treatment Diagnosis M25.572 Pain in left ankle   Date of Evaluation 23    Additional Pertinent History HTN      Functional Outcome Measure Used LEFS   Functional Outcome Score deferred (23)       Insurance: Primary: Payor: MEDICARE /  /  / ,   Secondary: UMR   Authorization Information: PRECERTIFICATION REQUIRED:  No  INSURANCE THERAPY BENEFIT:  Patient has unlimited visits based on medical necessity  Benefit will not cover maintenance or preventative treatment.  AQUATIC THERAPY COVERED:   Yes  MODALITIES COVERED:  Yes, with the exception of iontophoresis and hot packs/cold packs  TELEHEALTH COVERED: Yes  REFERENCE #: NA   Approved Procedure Codes: 95000 - Therapeutic Exercise  , 38680 - Manual Therapy , 28140 - Aquatic Therapeutic Exercise, 12634 - Neuromuscular Re-Education , 00324 - PT ADL Training, 34291 - Gait Training, 65330 - Therapeutic Activities, 29676 - Canalith Repositioning Procedure, 16352 - Physical Performance Test (ea 15 min), and 51132 - Massage Therapeutic  (Codes requested indicated by red font, codes approved indicated by black font)   Visit # 8, 8/10 for progress note   Visits Allowed: Unlimited per medical necessity   Recertification Date: 3/08/2024   Physician Follow-Up: Per patient   Physician Orders:    History of Present Illness: States on 2023 Glenn was out wanting to jog 7

## 2024-01-17 ENCOUNTER — HOSPITAL ENCOUNTER (OUTPATIENT)
Dept: PHYSICAL THERAPY | Age: 74
Setting detail: THERAPIES SERIES
Discharge: HOME | End: 2024-01-17
Payer: MEDICARE

## 2024-01-17 PROCEDURE — 97530 THERAPEUTIC ACTIVITIES: CPT

## 2024-01-17 PROCEDURE — 97110 THERAPEUTIC EXERCISES: CPT

## 2024-01-17 NOTE — PROGRESS NOTES
education provided.  Will continue education.  [x]  Barriers to learning: Past TBI.      PLAN:  Treatment Recommendations: Strengthening, Range of Motion, Balance Training, Functional Mobility Training, Transfer Training, Endurance Training, Gait Training, Stair Training, Neuromuscular Re-education, Manual Therapy - Soft Tissue Mobilization, Pain Management, Home Exercise Program, Patient Education, Safety Education and Training, Self-Care Education and Training, Positioning, Vestibular Rehabilitation, and Aquatics    []  Plan of care initiated.  Plan to see patient 2 times per week for 12 weeks to address the treatment planned outlined above.  [x]  Continue with current plan of care  []  Modify plan of care as follows:    []  Hold pending physician visit  []  Discharge    Time In 1020   Time Out 1102   Timed Code Minutes: 42 min   Total Treatment Time: 42 min       Electronically Signed by: Bib Jiang PT

## 2024-01-23 ENCOUNTER — HOSPITAL ENCOUNTER (OUTPATIENT)
Dept: PHYSICAL THERAPY | Age: 74
Setting detail: THERAPIES SERIES
Discharge: HOME | End: 2024-01-23
Payer: MEDICARE

## 2024-01-23 PROCEDURE — 97110 THERAPEUTIC EXERCISES: CPT

## 2024-01-23 NOTE — PROGRESS NOTES
[facility]  PHYSICAL THERAPY  [] EVALUATION  [x] DAILY NOTE (LAND) [] DAILY NOTE (AQUATIC ) [] PROGRESS NOTE [] DISCHARGE NOTE    [x] OUTPATIENT REHABILITATION CENTER [facility]   [] [place] [facility]    [] [facility]   [] [facility]    Date: 2024  Patient Name:  Glenn Sanchez  : 1950  MRN: [id number]  CSN: [id number]    Referring Practitioner Kieran Hernández MD Briggs, Lloyd C, MD     Diagnosis Sprain of unspecified ligament of left ankle, initial encounter     Sprain of unspecified ligament of left ankle, initial encounter       Treatment Diagnosis M25.572 Pain in left ankle   Date of Evaluation 23    Additional Pertinent History HTN      Functional Outcome Measure Used LEFS   Functional Outcome Score deferred (23)       Insurance: Primary: Payor: MEDICARE /  /  / ,   Secondary: UMR   Authorization Information: PRECERTIFICATION REQUIRED:  No  INSURANCE THERAPY BENEFIT:  Patient has unlimited visits based on medical necessity  Benefit will not cover maintenance or preventative treatment.  AQUATIC THERAPY COVERED:   Yes  MODALITIES COVERED:  Yes, with the exception of iontophoresis and hot packs/cold packs  TELEHEALTH COVERED: Yes  REFERENCE #: NA   Approved Procedure Codes: 26861 - Therapeutic Exercise  , 13991 - Manual Therapy , 41398 - Aquatic Therapeutic Exercise, 54739 - Neuromuscular Re-Education , 36153 - PT ADL Training, 01951 - Gait Training, 15222 - Therapeutic Activities, 02126 - Canalith Repositioning Procedure, 18302 - Physical Performance Test (ea 15 min), and 97554 - Massage Therapeutic  (Codes requested indicated by red font, codes approved indicated by black font)   Visit # 10, 1/10 for progress note   Visits Allowed: Unlimited per medical necessity   Recertification Date: 3/08/2024   Physician Follow-Up: Per patient   Physician Orders:    History of Present Illness: States on 2023 Glenn was out wanting to jog 7

## 2024-01-26 ENCOUNTER — HOSPITAL ENCOUNTER (OUTPATIENT)
Dept: PHYSICAL THERAPY | Age: 74
Setting detail: THERAPIES SERIES
Discharge: HOME | End: 2024-01-26
Payer: MEDICARE

## 2024-01-26 PROCEDURE — 97110 THERAPEUTIC EXERCISES: CPT

## 2024-01-29 ENCOUNTER — HOSPITAL ENCOUNTER (OUTPATIENT)
Dept: PHYSICAL THERAPY | Age: 74
Setting detail: THERAPIES SERIES
Discharge: HOME | End: 2024-01-29
Payer: MEDICARE

## 2024-01-29 PROCEDURE — 97110 THERAPEUTIC EXERCISES: CPT

## 2024-01-29 NOTE — PROGRESS NOTES
[facility]  PHYSICAL THERAPY  [] EVALUATION  [x] DAILY NOTE (LAND) [] DAILY NOTE (AQUATIC ) [] PROGRESS NOTE [] DISCHARGE NOTE    [x] OUTPATIENT REHABILITATION CENTER [facility]   [] [place] [facility]    [] [facility]   [] [facility]    Date: 2024  Patient Name:  Glenn Sanchez  : 1950  MRN: [id number]  CSN: [id number]    Referring Practitioner Kieran Hernández MD Briggs, Lloyd C, MD     Diagnosis Sprain of unspecified ligament of left ankle, initial encounter     Sprain of unspecified ligament of left ankle, initial encounter       Treatment Diagnosis M25.572 Pain in left ankle   Date of Evaluation 23    Additional Pertinent History HTN      Functional Outcome Measure Used LEFS   Functional Outcome Score deferred (23)       Insurance: Primary: Payor: MEDICARE /  /  / ,   Secondary: UMR   Authorization Information: PRECERTIFICATION REQUIRED:  No  INSURANCE THERAPY BENEFIT:  Patient has unlimited visits based on medical necessity  Benefit will not cover maintenance or preventative treatment.  AQUATIC THERAPY COVERED:   Yes  MODALITIES COVERED:  Yes, with the exception of iontophoresis and hot packs/cold packs  TELEHEALTH COVERED: Yes  REFERENCE #: NA   Approved Procedure Codes: 83105 - Therapeutic Exercise  , 87652 - Manual Therapy , 59646 - Aquatic Therapeutic Exercise, 63073 - Neuromuscular Re-Education , 90493 - PT ADL Training, 28831 - Gait Training, 84028 - Therapeutic Activities, 57658 - Canalith Repositioning Procedure, 76496 - Physical Performance Test (ea 15 min), and 75700 - Massage Therapeutic  (Codes requested indicated by red font, codes approved indicated by black font)   Visit # 12, 3/10 for progress note   Visits Allowed: Unlimited per medical necessity   Recertification Date: 3/08/2024   Physician Follow-Up: Per patient   Physician Orders:    History of Present Illness: States on 2023 Glenn was out wanting to jog 7

## 2024-01-31 ENCOUNTER — HOSPITAL ENCOUNTER (OUTPATIENT)
Dept: PHYSICAL THERAPY | Age: 74
Setting detail: THERAPIES SERIES
Discharge: HOME | End: 2024-01-31
Payer: MEDICARE

## 2024-01-31 PROCEDURE — 97110 THERAPEUTIC EXERCISES: CPT

## 2024-01-31 NOTE — PROGRESS NOTES
Education:   [x]  HEP/Education Completed: Avoid compensatory movements, continue current HEP. Added exercises. Updated HEP next visit if demonstrating good technique. Educated on icing for 10-15 minutes when he gets home.   Primary Data Access Code:[email or web address]   []  No new Education completed  [x]  Reviewed Prior HEP      []  Patient verbalized and/or demonstrated understanding of education provided.  []  Patient unable to verbalize and/or demonstrate understanding of education provided.  Will continue education.  [x]  Barriers to learning: Past TBI.      PLAN:  Treatment Recommendations: Strengthening, Range of Motion, Balance Training, Functional Mobility Training, Transfer Training, Endurance Training, Gait Training, Stair Training, Neuromuscular Re-education, Manual Therapy - Soft Tissue Mobilization, Pain Management, Home Exercise Program, Patient Education, Safety Education and Training, Self-Care Education and Training, Positioning, Vestibular Rehabilitation, and Aquatics    []  Plan of care initiated.  Plan to see patient 2 times per week for 12 weeks to address the treatment planned outlined above.  [x]  Continue with current plan of care  []  Modify plan of care as follows:    []  Hold pending physician visit  []  Discharge    Time In 1100   Time Out 1144   Timed Code Minutes: 44 min   Total Treatment Time: 44 min       Electronically Signed by: Bib Jiang, PT

## 2024-02-05 ENCOUNTER — HOSPITAL ENCOUNTER (OUTPATIENT)
Dept: PHYSICAL THERAPY | Age: 74
Setting detail: THERAPIES SERIES
Discharge: HOME | End: 2024-02-05
Payer: MEDICARE

## 2024-02-05 PROCEDURE — 97110 THERAPEUTIC EXERCISES: CPT

## 2024-02-05 NOTE — PROGRESS NOTES
[facility]  PHYSICAL THERAPY  [] EVALUATION  [x] DAILY NOTE (LAND) [] DAILY NOTE (AQUATIC ) [] PROGRESS NOTE [] DISCHARGE NOTE    [x] OUTPATIENT REHABILITATION CENTER [facility]   [] [place] [facility]    [] [facility]   [] [facility]    Date: 2024  Patient Name:  Glenn Sanchez  : 1950  MRN: [id number]  CSN: [id number]    Referring Practitioner Kieran Hernández MD Briggs, Lloyd C, MD     Diagnosis Sprain of unspecified ligament of left ankle, initial encounter     Sprain of unspecified ligament of left ankle, initial encounter       Treatment Diagnosis M25.572 Pain in left ankle   Date of Evaluation 23    Additional Pertinent History HTN      Functional Outcome Measure Used LEFS   Functional Outcome Score deferred (23)       Insurance: Primary: Payor: MEDICARE /  /  / ,   Secondary: UMR   Authorization Information: PRECERTIFICATION REQUIRED:  No  INSURANCE THERAPY BENEFIT:  Patient has unlimited visits based on medical necessity  Benefit will not cover maintenance or preventative treatment.  AQUATIC THERAPY COVERED:   Yes  MODALITIES COVERED:  Yes, with the exception of iontophoresis and hot packs/cold packs  TELEHEALTH COVERED: Yes  REFERENCE #: NA   Approved Procedure Codes: 09351 - Therapeutic Exercise  , 43687 - Manual Therapy , 43698 - Aquatic Therapeutic Exercise, 71977 - Neuromuscular Re-Education , 19754 - PT ADL Training, 35457 - Gait Training, 36207 - Therapeutic Activities, 54338 - Canalith Repositioning Procedure, 56430 - Physical Performance Test (ea 15 min), and 86754 - Massage Therapeutic  (Codes requested indicated by red font, codes approved indicated by black font)   Visit # 14, 5/10 for progress note   Visits Allowed: Unlimited per medical necessity   Recertification Date: 3/08/2024   Physician Follow-Up: Per patient   Physician Orders:    History of Present Illness: States on 2023 Glenn was out wanting to jog 7

## 2024-02-08 ENCOUNTER — HOSPITAL ENCOUNTER (OUTPATIENT)
Dept: PHYSICAL THERAPY | Age: 74
Setting detail: THERAPIES SERIES
Discharge: HOME | End: 2024-02-08
Payer: MEDICARE

## 2024-02-08 PROCEDURE — 97110 THERAPEUTIC EXERCISES: CPT

## 2024-02-08 NOTE — PROGRESS NOTES
support    Hamstring Curls  15x   X    Lunges           Step-Ups forward 12*x R/L X 4' 10\" water depth                Lower Extremity Stretches: Runners lunge stretch in // bars  3x20 sec   X                 Seated Exercises:                                   Upper Extremity Exercises:           Shoulder Flexion 15x    In 4 foot with feet touching to challenge balance   Shoulder ABD/ADD           Shoulder Horizontal ABD/ADD 15x    In 4 foot with feet touching to challenge balance    Shoulder IR/ER           Shoulder Circles           Shoulder Shrugs           Rows           Bicep Curls                       Upper Extremity Stretches:                       Balance: SLS R/L  2x 20 sec    Difficulty on both R/L               Dynamic Gait:                       Deep Water:           Hang           Bicycle           Hip ABD/ADD           Hip Flex/Ext              Specific Interventions Next Treatment: Alternating aquatic and land exercise for ROM, strengthening and functional mobility    Activity/Treatment Tolerance:  [x]  Patient tolerated treatment well  []  Patient limited by fatigue  []  Patient limited by pain   []  Patient limited by medical complications  []  Other:     Assessment: Patient completed aquatic therapy this session. He continued with exercises as shown above. He was unsteady while performing walking marches and tandem walking in the water requiring intermittent UE support. Patient was more steady while performing single leg stance exercises when his RLE was in single leg stance position vs his LLE. Progressed reps with step ups and added single leg heel raises this session. He tolerated session well and denied any pain at the conclusion of session.     GOALS:  Patient Goal: I want to resume jogging again in the future.  We discussed working towards the goal with added precautions, and can discuss jogging technique, warm-up    Short Term Goals:  Time Frame: 1 week  Pt to demonstrate 3 functional 
No

## 2024-02-08 NOTE — PROGRESS NOTES
he has not had a chance lately to jog due to being out of town for a . Patient denies any current pain.        TREATMENT   Precautions:  HTN.  Prior brain injury.    Pain:  Denies     \"X” in shaded column indicates activity completed today     “*\" next to exercise/intervention indicates progression   Modalities Parameters/  Location   Notes                                 Manual Therapy Time/Technique   Notes                                 Exercise/  Intervention     Notes   Nu Step 6 min      Upright matrix bike    \"I kept sliding off the seat.\"   SportsArt Bike 6 min L5 X           Seated:        Ankle circles, pumps, inversion and eversion 20x    Cues to avoid compensatory movements, no pumps performed today   Gastroc and soleus stretch 3x20s ea   X Performed standing today   Hamstring stretch 3x20s        Towel swipes (Inv/Ev) 12        Towel curls 12        4 way ankle 15 Berry      ABCs 1x               Standing:               Steps  - front step up- B lead  - side step up- B lead  - front step down/retro step up- B lead  2x10 6 inch    X  X  X 20 reps forward, set of 10 reps with side and retro step ups   High jesse sling shot- bilateral 2x10 ea  X Cues to avoid hip circumduction   No UE   Lateral stepping over [place] jesse with BLE 10x  X Occasional unsteadiness when leading to L side           Disk   - Step Up and Over  - Side Step Up hip hikes 15x          Alt* lunges  10x   X    Squats 10x  X Cueing for technique    HR/TR 10x2s   X Alterning between HR/TR   HR off edge of // bar with eccentric lowering *  10x2s  X    Dynamic gait: walking marches, tandem walking 2 laps  // bars  X    Star drill (3 points) with L CKC  2x5      L SLS on foam 15s x2 EO  15s x1 EC   HELD the EC SLS on foam per pain   Rocker Board: taps/balance  F/b, s/s 15 taps f/b 1 min balance  X    Wobble board: f/b, s/s, CW/CCW    20*x ea  X           Total Gym DL Squats Level G; 15x   Done to prepare for the SL squats: next row

## 2024-02-12 ENCOUNTER — HOSPITAL ENCOUNTER (OUTPATIENT)
Dept: PHYSICAL THERAPY | Age: 74
Setting detail: THERAPIES SERIES
Discharge: HOME | End: 2024-02-12
Payer: MEDICARE

## 2024-02-12 PROCEDURE — 97110 THERAPEUTIC EXERCISES: CPT

## 2024-02-12 NOTE — PROGRESS NOTES
miles, when suddenly he jogged over some leaves -- but there was a sewage grating underneath -- and he twisted his left ankle.  Went to urgent care for x-rays which found no broken bones.  Went to [facility] and several ankle tendons were damaged.  States OIO gave him a walking boot.  Also has a brace.  Wears brace at home and walking boot while in community.  States OIO said to wear brace/boot until February 28, 2024 (per current estimation).  Besides cane, be has no other AD.    Sustained brain injury 3 years ago while playing pickle ball at the [facility] and rammed into a concrete wall.  Neurosurgeon said he may not recover -- but he did recover after 15 days in the hospital.  States he does not recall dressing or playing pickle ball.      Denies having any metal in his body (e.g. from operations or surgeries).      Current exercise includes chair Yoga.  States he would like to be able to jog again.  States he will never play pickle ball again because he has to avoid a head injury.  He had run the [employer] in [place], but was 2 blocks away and had finished the race.  States he heard the [employer] but was not injured.      NOTE: Physician visit will be on February 01, 2024.  Patient firmly states that he wants to jog again and even do a marathon and has had discussions with his physician, stating his physician will let him know when he is cleared to try jogging again.  Meanwhile, patient will have land therapy to progress closed chain exercise and progress to light plyometrics until his physician appointment on February 01, 2024 where he will discuss further when his physician will clear him to resume jogging.  When given the clearance by physician, patient will focus on stretching and warm-up, and other relevant exercises to prepared joints and soft tissues prior to jogging and we can emphasize jogging technique.      SUBJECTIVE: Glenn states he is doing better overall, and he did not do jogging this

## 2024-02-14 ENCOUNTER — HOSPITAL ENCOUNTER (OUTPATIENT)
Dept: PHYSICAL THERAPY | Age: 74
Setting detail: THERAPIES SERIES
Discharge: HOME | End: 2024-02-14
Payer: MEDICARE

## 2024-02-14 PROCEDURE — 97113 AQUATIC THERAPY/EXERCISES: CPT

## 2024-02-14 NOTE — PROGRESS NOTES
miles, when suddenly he jogged over some leaves -- but there was a sewage grating underneath -- and he twisted his left ankle.  Went to urgent care for x-rays which found no broken bones.  Went to [facility] and several ankle tendons were damaged.  States O gave him a walking boot.  Also has a brace.  Wears brace at home and walking boot while in community.  States OIO said to wear brace/boot until February 28, 2024 (per current estimation).  Besides cane, be has no other AD.    Sustained brain injury 3 years ago while playing pickle ball at the [facility] and rammed into a concrete wall.  Neurosurgeon said he may not recover -- but he did recover after 15 days in the hospital.  States he does not recall dressing or playing pickle ball.      Denies having any metal in his body (e.g. from operations or surgeries).      Current exercise includes chair Yoga.  States he would like to be able to jog again.  States he will never play pickle ball again because he has to avoid a head injury.  He had run the [place] in [place], but was 2 blocks away and had finished the race.  States he heard the [employer] but was not injured.      Update:  [facility] physician has cleared him to start jogging again.  States on 2/14/2024 he jogged 2 miles this morning with rest breaks -- and warmed up prior to the jog.       SUBJECTIVE: Glenn states he jogged 2 miles this morning with rest breaks.        OBJECTIVE:    LE STRENGTH R L   Hip Flexion 4+ 4+   Knee Flexion 4+ 4+   Knee Extension 4+ 4+   Ankle DF 4+ 4+   Ankle PF 4+ 4+   Ankle Inversion 4+ 4+   Ankle Eversion 4+ 4+         TREATMENT   Precautions:  HTN.  Prior brain injury.    Pain:  Denies     \"X” in shaded column indicates activity completed today     “*\" next to exercise/intervention indicates progression   Modalities Parameters/  Location   Notes                                 Manual Therapy Time/Technique   Notes                                 Exercise/  Intervention

## 2024-02-21 ENCOUNTER — HOSPITAL ENCOUNTER (OUTPATIENT)
Dept: PHYSICAL THERAPY | Age: 74
Setting detail: THERAPIES SERIES
Discharge: HOME | End: 2024-02-21
Payer: MEDICARE

## 2024-02-21 PROCEDURE — 97110 THERAPEUTIC EXERCISES: CPT

## 2024-02-21 NOTE — PROGRESS NOTES
[facility]  PHYSICAL THERAPY  [] EVALUATION  [] DAILY NOTE (LAND) [] DAILY NOTE (AQUATIC ) [] PROGRESS NOTE [] DISCHARGE NOTE    [x] OUTPATIENT REHABILITATION CENTER [facility]   [] [place] [facility]    [] [facility]   [] [facility]    Date: 2024  Patient Name:  Glenn Sanchez  : 1950  MRN: [id number]  CSN: [id number]    Referring Practitioner Kieran Hernández MD Briggs, Lloyd C, MD     Diagnosis Sprain of unspecified ligament of left ankle, initial encounter     Sprain of unspecified ligament of left ankle, initial encounter       Treatment Diagnosis M25.572 Pain in left ankle   Date of Evaluation 23    Additional Pertinent History HTN      Functional Outcome Measure Used LEFS   Functional Outcome Score deferred (23)       Insurance: Primary: Payor: MEDICARE /  /  / ,   Secondary: UMR   Authorization Information: PRECERTIFICATION REQUIRED:  No  INSURANCE THERAPY BENEFIT:  Patient has unlimited visits based on medical necessity  Benefit will not cover maintenance or preventative treatment.  AQUATIC THERAPY COVERED:   Yes  MODALITIES COVERED:  Yes, with the exception of iontophoresis and hot packs/cold packs  TELEHEALTH COVERED: Yes  REFERENCE #: NA   Approved Procedure Codes: 60830 - Therapeutic Exercise  , 77707 - Manual Therapy , 91841 - Aquatic Therapeutic Exercise, 08162 - Neuromuscular Re-Education , 67867 - PT ADL Training, 85361 - Gait Training, 10067 - Therapeutic Activities, 05999 - Canalith Repositioning Procedure, 73460 - Physical Performance Test (ea 15 min), and 33336 - Massage Therapeutic  (Codes requested indicated by red font, codes approved indicated by black font)   Visit # 18, 1/10 for progress note   Visits Allowed: Unlimited per medical necessity   Recertification Date: 3/08/2024   Physician Follow-Up: Per patient   Physician Orders:    History of Present Illness: States on 2023 Glenn was out wanting to jog 7

## 2024-02-23 ENCOUNTER — HOSPITAL ENCOUNTER (OUTPATIENT)
Dept: PHYSICAL THERAPY | Age: 74
Setting detail: THERAPIES SERIES
Discharge: HOME | End: 2024-02-23
Payer: MEDICARE

## 2024-02-23 PROCEDURE — 97110 THERAPEUTIC EXERCISES: CPT

## 2024-02-23 NOTE — PROGRESS NOTES
[facility]  PHYSICAL THERAPY  [] EVALUATION  [] DAILY NOTE (LAND) [] DAILY NOTE (AQUATIC ) [] PROGRESS NOTE [] DISCHARGE NOTE    [x] OUTPATIENT REHABILITATION CENTER [facility]   [] [place] [facility]    [] [facility]   [facility]    Date: 2024  Patient Name:  Glenn Sanchez  : 1950  MRN: [id number]  CSN: [id number]    Referring Practitioner Kieran Hernández MD Briggs, Lloyd C, MD     Diagnosis Sprain of unspecified ligament of left ankle, initial encounter     Sprain of unspecified ligament of left ankle, initial encounter       Treatment Diagnosis M25.572 Pain in left ankle   Date of Evaluation 23    Additional Pertinent History HTN      Functional Outcome Measure Used LEFS   Functional Outcome Score deferred (23)       Insurance: Primary: Payor: MEDICARE /  /  / ,   Secondary: UMR   Authorization Information: PRECERTIFICATION REQUIRED:  No  INSURANCE THERAPY BENEFIT:  Patient has unlimited visits based on medical necessity  Benefit will not cover maintenance or preventative treatment.  AQUATIC THERAPY COVERED:   Yes  MODALITIES COVERED:  Yes, with the exception of iontophoresis and hot packs/cold packs  TELEHEALTH COVERED: Yes  REFERENCE #: NA   Approved Procedure Codes: 39867 - Therapeutic Exercise  , 62340 - Manual Therapy , 07709 - Aquatic Therapeutic Exercise, 30607 - Neuromuscular Re-Education , 93864 - PT ADL Training, 77709 - Gait Training, 43648 - Therapeutic Activities, 21423 - Canalith Repositioning Procedure, 02651 - Physical Performance Test (ea 15 min), and 63682 - Massage Therapeutic  (Codes requested indicated by red font, codes approved indicated by black font)   Visit # 19, 2/10 for progress note   Visits Allowed: Unlimited per medical necessity   Recertification Date: 3/08/2024   Physician Follow-Up: Per patient   Physician Orders:    History of Present Illness: States on 2023 Glenn was out wanting to jog 7

## 2024-02-26 ENCOUNTER — HOSPITAL ENCOUNTER (OUTPATIENT)
Dept: PHYSICAL THERAPY | Age: 74
Setting detail: THERAPIES SERIES
Discharge: HOME | End: 2024-02-26
Payer: MEDICARE

## 2024-02-26 PROCEDURE — 97110 THERAPEUTIC EXERCISES: CPT

## 2024-02-26 NOTE — PROGRESS NOTES
G; 15x      Total Gym Squats - Single Leg Partial squat;   10x2 LG      Dynamic Gait/Ex in hallway: high knees, toe walking, heel walking, heel strike with big toe push off  1-2 laps each        Specific Interventions Next Treatment: Land exercise for ROM, strengthening and functional mobility, balance training, single leg stance tasks as tolerated     Activity/Treatment Tolerance:  [x]  Patient tolerated treatment well  []  Patient limited by fatigue  []  Patient limited by pain   []  Patient limited by medical complications  []  Other:     Assessment:  Glenn consistently tolerates jogging after thorough warm-up, and today he progressed to longer distances jogging and doing so outdoors.  He jogged on flat sidewalk pavement.  Glenn continues to make progress towards his goals, but will continue to proceed slowly and cautiously, being attentive to pain, joint mechanics, and fatigue and in doing so modify or stop exercise as needed.  Glenn did well today judging when to cease a longer jog and resume walking until rested.     GOALS:  Patient Goal: I want to resume jogging again in the future.  We discussed working towards the goal with added precautions, and can discuss jogging technique, warm-up    Short Term Goals: MET  DEFER TO LTG    Long Term Goals:  Time Frame: 12 weeks  Pt to be independent with a HEP that may include pool and land exercise.     Pt to attain 5/5 strength in left ankle to improve gait and ADL.     Pt to verbalize understanding of jogging warm-up, stretching and jogging technique for optimal protection of ankle joint.     Pt to jog for 4 minutes (treadmill or land) after thorough warm up and stretching, and using optimal jogging technique.         Patient Education:   [x]  HEP/Education Completed: dynamic, mini trampoline.   [employer] Access Code:[email or web address]   []  No new Education completed  []  Reviewed Prior HEP      [x]  Patient verbalized and/or demonstrated understanding of education provided.  []

## 2024-03-01 ENCOUNTER — HOSPITAL ENCOUNTER (OUTPATIENT)
Dept: PHYSICAL THERAPY | Age: 74
Setting detail: THERAPIES SERIES
Discharge: HOME | End: 2024-03-01
Payer: MEDICARE

## 2024-03-01 PROCEDURE — 97110 THERAPEUTIC EXERCISES: CPT

## 2024-03-01 NOTE — PROGRESS NOTES
start of session          Seated:        Ankle circles, pumps, inversion and eversion 20x    Cues to avoid compensatory movements, no pumps performed today   Mat stretching:  - 3 Part Strap Stretch  - Prone quad stretch 20-60 sec  X        Gastroc and soleus stretch 3x20s ea       Hamstring stretch (heel on 18\" of step) 3x20s     Used stair steps   Groin stretch standing (heel on stair step 18\") 20 sec x 2      Quadriceps stretch standing 20 sec x 2      Towel swipes (Inv/Ev) 12        Towel curls 12        4 way ankle 15 [place]      ABCs 1x               Standing:        Gastroc stretch at steps 3x30 sec  X    Soleus lunge stretch 3x30 sec  X    Step ups:  - front step up- B lead  - side step up- B lead  - front step down/retro step up- B lead  12*x 6 inch    X  X  X      Heel raises off of step straight, toes in and toes out* 10      High jesse sling shot- bilateral 2x10 ea   Cues to avoid hip circumduction   No UE   Lateral stepping over [place] jesse with BLE 10x   Occasional unsteadiness when leading to L side    Rapid Alternating Steps 30 sec x 2 6 inch X Light plyometric   BOSU lunges lateral 15*x  X    Light jump on mini trampoline  30 sec x 2   CGA to avoid pt falling off trampoline.    Elliptical (to simulate jogging) 6* min Level 4 X    Jog/Walk in clinic 80 ft x 5   Done last after stretching, light closed chain (step) exercises, rapid alternating steps, and Elliptical.     Jog/Walk OUTDOORS    Done last after stretching, step ups, light plyometric (rapid alternating steps), and jog/walk.            Disk   - Step Up and Over  - Side Step Up hip hikes 15x          Alternating lunges BOSU*  15x  X    Squats 10x   Cueing for technique    HR/TR 10x2s    Alterning between HR/TR   HR off edge of // bar with eccentric lowering  10x2s      Dynamic gait: walking marches, tandem walking 2 laps  // bars      Star drill (3 points) with L CKC  2x5      L SLS on foam 15s x2 EO  15s x1 EC   HELD the EC SLS on foam per

## 2024-03-06 ENCOUNTER — HOSPITAL ENCOUNTER (OUTPATIENT)
Dept: PHYSICAL THERAPY | Age: 74
Setting detail: THERAPIES SERIES
Discharge: HOME | End: 2024-03-06
Payer: MEDICARE

## 2024-03-06 PROCEDURE — 97110 THERAPEUTIC EXERCISES: CPT

## 2024-03-06 NOTE — PROGRESS NOTES
pain   Rocker Board: taps/balance  F/b, single leg  15 taps f/b 15 sec balance      Bilateral Heel Raises with eccentric lowering  15      Wobble board: f/b, s/s, CW/CCW 20x ea      Total Gym DL Squats Level G; 15x      Total Gym Squats - Single Leg Partial squat;   10x2 LG      Dynamic Gait/Ex in hallway: high knees, toe walking, heel walking, heel strike with big toe push off  1-2 laps each        Specific Interventions Next Treatment: Land exercise for ROM, strengthening and functional mobility, balance training, single leg stance tasks as tolerated     Activity/Treatment Tolerance:  [x]  Patient tolerated treatment well  []  Patient limited by fatigue  []  Patient limited by pain   []  Patient limited by medical complications  []  Other:     Assessment:  Treatment interventions completed as recorded above. Mild Shortness of Breath noted throughout treatment session. No subjective reports of increased pain during session this date. Patient tolerated treatment well.       GOALS:  Patient Goal: I want to resume jogging again in the future.  We discussed working towards the goal with added precautions, and can discuss jogging technique, warm-up    Short Term Goals:   DEFER TO LTG    Long Term Goals:  Time Frame: 12 weeks  Pt to be independent with a HEP that may include pool and land exercise.     Pt to attain 5/5 strength in left ankle to improve gait and ADL.     Pt to verbalize understanding of jogging warm-up, stretching and jogging technique for optimal protection of ankle joint.     Pt to jog for 4 minutes (treadmill or land) after thorough warm up and stretching, and using optimal jogging technique.         Patient Education:   []  HEP/Education Completed: dynamic, mini trampoline.   [employer] Access Code:[email or web address]   []  No new Education completed  [x]  Reviewed Prior HEP      [x]  Patient verbalized and/or demonstrated understanding of education provided.  []  Patient unable to verbalize and/or demonstrate

## 2024-03-14 ENCOUNTER — HOSPITAL ENCOUNTER (OUTPATIENT)
Dept: PHYSICAL THERAPY | Age: 74
Setting detail: THERAPIES SERIES
Discharge: HOME | End: 2024-03-14
Payer: MEDICARE

## 2024-03-14 PROCEDURE — 97110 THERAPEUTIC EXERCISES: CPT

## 2024-03-14 NOTE — DISCHARGE SUMMARY
[facility]  PHYSICAL THERAPY  [] EVALUATION  [] DAILY NOTE (LAND) [] DAILY NOTE (AQUATIC ) [] PROGRESS NOTE [x] DISCHARGE NOTE    [x] OUTPATIENT REHABILITATION CENTER [facility]   [] [facility]    [] [facility]   [] [facility]    Date: 3/14/2024  Patient Name:  Glenn Sanchez  : 1950  MRN: [id number]  CSN: [id number]    Referring Practitioner Kieran Hernández MD Briggs, Lloyd C, MD     Diagnosis Sprain of unspecified ligament of left ankle, initial encounter   Sprain of unspecified ligament of left ankle, initial encounter      Treatment Diagnosis M25.572 Pain in left ankle   Date of Evaluation 23    Additional Pertinent History HTN      Functional Outcome Measure Used LEFS   Functional Outcome Score deferred (23)       Insurance: Primary: Payor: MEDICARE /  /  / ,   Secondary: UMR   Authorization Information: PRECERTIFICATION REQUIRED:  No  INSURANCE THERAPY BENEFIT:  Patient has unlimited visits based on medical necessity  Benefit will not cover maintenance or preventative treatment.  AQUATIC THERAPY COVERED:   Yes  MODALITIES COVERED:  Yes, with the exception of iontophoresis and hot packs/cold packs  TELEHEALTH COVERED: Yes  REFERENCE #: NA   Approved Procedure Codes: 29642 - Therapeutic Exercise  , 30813 - Manual Therapy , 62411 - Aquatic Therapeutic Exercise, 01771 - Neuromuscular Re-Education , 97175 - PT ADL Training, 28707 - Gait Training, 19854 - Therapeutic Activities, 43110 - Canalith Repositioning Procedure, 50936 - Physical Performance Test (ea 15 min), and 23507 - Massage Therapeutic  (Codes requested indicated by red font, codes approved indicated by black font)   Visit # 23, 0/10 for progress note   Visits Allowed: Unlimited per medical necessity   Recertification Date: 3/08/2024   Physician Follow-Up: Per patient   Physician Orders:    History of Present Illness: States on 2023 Glenn was out wanting to jog 7 miles,

## 2024-04-03 ENCOUNTER — TELEPHONE (OUTPATIENT)
Dept: FAMILY MEDICINE CLINIC | Age: 74
End: 2024-04-03

## 2024-04-03 ENCOUNTER — NURSE ONLY (OUTPATIENT)
Dept: LAB | Age: 74
End: 2024-04-03

## 2024-04-03 DIAGNOSIS — E03.8 SUBCLINICAL HYPOTHYROIDISM: ICD-10-CM

## 2024-04-03 DIAGNOSIS — E78.5 HYPERLIPIDEMIA, UNSPECIFIED HYPERLIPIDEMIA TYPE: Primary | ICD-10-CM

## 2024-04-03 DIAGNOSIS — E78.5 HYPERLIPIDEMIA, UNSPECIFIED HYPERLIPIDEMIA TYPE: ICD-10-CM

## 2024-04-03 LAB
BASOPHILS ABSOLUTE: 0.1 THOU/MM3 (ref 0–0.1)
BASOPHILS NFR BLD AUTO: 1 %
DEPRECATED RDW RBC AUTO: 44.5 FL (ref 35–45)
EOSINOPHIL NFR BLD AUTO: 2.4 %
EOSINOPHILS ABSOLUTE: 0.1 THOU/MM3 (ref 0–0.4)
ERYTHROCYTE [DISTWIDTH] IN BLOOD BY AUTOMATED COUNT: 13.2 % (ref 11.5–14.5)
HCT VFR BLD AUTO: 42.5 % (ref 42–52)
HGB BLD-MCNC: 13.9 GM/DL (ref 14–18)
IMM GRANULOCYTES # BLD AUTO: 0.02 THOU/MM3 (ref 0–0.07)
IMM GRANULOCYTES NFR BLD AUTO: 0.3 %
LYMPHOCYTES ABSOLUTE: 1.5 THOU/MM3 (ref 1–4.8)
LYMPHOCYTES NFR BLD AUTO: 24 %
MCH RBC QN AUTO: 30.1 PG (ref 26–33)
MCHC RBC AUTO-ENTMCNC: 32.7 GM/DL (ref 32.2–35.5)
MCV RBC AUTO: 92 FL (ref 80–94)
MONOCYTES ABSOLUTE: 0.5 THOU/MM3 (ref 0.4–1.3)
MONOCYTES NFR BLD AUTO: 8.6 %
NEUTROPHILS NFR BLD AUTO: 63.7 %
NRBC BLD AUTO-RTO: 0 /100 WBC
PLATELET # BLD AUTO: 252 THOU/MM3 (ref 130–400)
PMV BLD AUTO: 11.5 FL (ref 9.4–12.4)
RBC # BLD AUTO: 4.62 MILL/MM3 (ref 4.7–6.1)
SEGMENTED NEUTROPHILS ABSOLUTE COUNT: 3.9 THOU/MM3 (ref 1.8–7.7)
T4 FREE SERPL-MCNC: 0.97 NG/DL (ref 0.93–1.68)
TSH SERPL DL<=0.005 MIU/L-ACNC: 5.07 UIU/ML (ref 0.4–4.2)
WBC # BLD AUTO: 6.2 THOU/MM3 (ref 4.8–10.8)

## 2024-04-03 NOTE — TELEPHONE ENCOUNTER
Okay for lipid order.  Patient to fast for at least 8 hours prior to labwork.    Electronically signed by Kyle Guillaume MD on 4/3/2024 at 1:37 PM

## 2024-04-03 NOTE — TELEPHONE ENCOUNTER
Called and spoke with patient. Advised new labs are placed and to be fasting at least 8 hours prior to being drawn. Verbalized understanding.

## 2024-04-03 NOTE — TELEPHONE ENCOUNTER
Patient stopped in office. Got labs drawn, wasn't fasting. Lab asking for a new Lipid order to be placed so that patient can get it complete when fasting.

## 2024-04-04 ENCOUNTER — TELEPHONE (OUTPATIENT)
Dept: FAMILY MEDICINE CLINIC | Age: 74
End: 2024-04-04

## 2024-04-04 ENCOUNTER — NURSE ONLY (OUTPATIENT)
Dept: LAB | Age: 74
End: 2024-04-04

## 2024-04-04 DIAGNOSIS — E78.5 HYPERLIPIDEMIA, UNSPECIFIED HYPERLIPIDEMIA TYPE: ICD-10-CM

## 2024-04-04 LAB
CHOLESTEROL, FASTING: 238 MG/DL (ref 100–199)
HDLC SERPL-MCNC: 40 MG/DL
LDLC SERPL CALC-MCNC: 164 MG/DL
TRIGLYCERIDE, FASTING: 169 MG/DL (ref 0–199)

## 2024-04-04 NOTE — TELEPHONE ENCOUNTER
----- Message from Kyle Guillaume MD sent at 4/4/2024 12:32 PM EDT -----  Cholestrol reviewd.  Worsened.  Would recommend starting cholestrol lowering medications  At next follow up appointment.  Risk for heart attack or stroke over next 10 years is 19%.

## 2024-04-04 NOTE — TELEPHONE ENCOUNTER
----- Message from Kyle Guillaume MD sent at 4/3/2024  8:51 PM EDT -----  Labs reviewd,   thyroid in acceptable range for age.  Waiting fasting lipid lab.

## 2024-04-29 ENCOUNTER — OFFICE VISIT (OUTPATIENT)
Dept: FAMILY MEDICINE CLINIC | Age: 74
End: 2024-04-29

## 2024-04-29 VITALS
BODY MASS INDEX: 31.34 KG/M2 | TEMPERATURE: 97.3 F | SYSTOLIC BLOOD PRESSURE: 120 MMHG | WEIGHT: 195 LBS | OXYGEN SATURATION: 97 % | HEART RATE: 81 BPM | HEIGHT: 66 IN | RESPIRATION RATE: 20 BRPM | DIASTOLIC BLOOD PRESSURE: 74 MMHG

## 2024-04-29 DIAGNOSIS — I10 ESSENTIAL HYPERTENSION: ICD-10-CM

## 2024-04-29 DIAGNOSIS — R79.89 ELEVATED TSH: ICD-10-CM

## 2024-04-29 DIAGNOSIS — E78.5 HYPERLIPIDEMIA, UNSPECIFIED HYPERLIPIDEMIA TYPE: ICD-10-CM

## 2024-04-29 DIAGNOSIS — E03.8 SUBCLINICAL HYPOTHYROIDISM: ICD-10-CM

## 2024-04-29 DIAGNOSIS — R41.3 MEMORY IMPAIRMENT: Primary | ICD-10-CM

## 2024-04-29 DIAGNOSIS — N18.30 STAGE 3 CHRONIC KIDNEY DISEASE, UNSPECIFIED WHETHER STAGE 3A OR 3B CKD (HCC): ICD-10-CM

## 2024-04-29 PROBLEM — S06.0X0A CONCUSSION WITH NO LOSS OF CONSCIOUSNESS: Status: RESOLVED | Noted: 2020-07-10 | Resolved: 2024-04-29

## 2024-04-29 PROBLEM — S06.31AA: Status: RESOLVED | Noted: 2021-05-28 | Resolved: 2024-04-29

## 2024-04-29 PROBLEM — Z87.820 H/O TRAUMATIC BRAIN INJURY: Status: RESOLVED | Noted: 2022-06-07 | Resolved: 2024-04-29

## 2024-04-29 SDOH — ECONOMIC STABILITY: INCOME INSECURITY: HOW HARD IS IT FOR YOU TO PAY FOR THE VERY BASICS LIKE FOOD, HOUSING, MEDICAL CARE, AND HEATING?: NOT HARD AT ALL

## 2024-04-29 SDOH — ECONOMIC STABILITY: FOOD INSECURITY: WITHIN THE PAST 12 MONTHS, THE FOOD YOU BOUGHT JUST DIDN'T LAST AND YOU DIDN'T HAVE MONEY TO GET MORE.: NEVER TRUE

## 2024-04-29 SDOH — ECONOMIC STABILITY: FOOD INSECURITY: WITHIN THE PAST 12 MONTHS, YOU WORRIED THAT YOUR FOOD WOULD RUN OUT BEFORE YOU GOT MONEY TO BUY MORE.: NEVER TRUE

## 2024-04-29 ASSESSMENT — PATIENT HEALTH QUESTIONNAIRE - PHQ9
SUM OF ALL RESPONSES TO PHQ9 QUESTIONS 1 & 2: 0
2. FEELING DOWN, DEPRESSED OR HOPELESS: NOT AT ALL
SUM OF ALL RESPONSES TO PHQ QUESTIONS 1-9: 0
SUM OF ALL RESPONSES TO PHQ QUESTIONS 1-9: 0
1. LITTLE INTEREST OR PLEASURE IN DOING THINGS: NOT AT ALL
SUM OF ALL RESPONSES TO PHQ QUESTIONS 1-9: 0
SUM OF ALL RESPONSES TO PHQ QUESTIONS 1-9: 0

## 2024-04-29 NOTE — PROGRESS NOTES
[facility]  [address]  [place] [id number]  Dept: [phone]  Loc: [phone]      Glenn Sanchez (:  1950) is a 73 y.o. male,Established patient, here for evaluation of the following chief complaint(s):  6 Month Follow-Up      ASSESSMENT/PLAN:  1. Memory impairment  2. Essential hypertension  -     CBC; Future  -     Comprehensive Metabolic Panel; Future  3. Hyperlipidemia, unspecified hyperlipidemia type  -     Lipid, Fasting; Future  4. Elevated TSH  5. Stage 3 chronic kidney disease, unspecified whether stage 3a or 3b CKD (HCC)  6. Subclinical hypothyroidism  -     TSH; Future  -     T4, Free; Future    Memory impairment since injury in  resulting in skull fracture and brain bleed.  Currently stable and doing well with no acute concerns.  HTN, stable without medication since 2020, continue with regular exercise.  Recommend improvement in diet  Hyperlipidemia chronic, uncontrolled.  Patient declines statin therapy after educated on statin and elevated ASCVD risk of 20.6%.  Continue with regular exercise/running.  Continue with dietitian.  Last visit with Neurology 3/5/2024  Chronically elevated TSH/subclinical hypothyroidism.  Patient asymptomatic.  Slow improvement/stable TSH.  Continue to monitor every 6-12 months.  History of Stage 3 CKD, chronic - continue to monitor renal function.     Return in about 6 months (around 10/29/2024) for chronic conditions.    SUBJECTIVE/OBJECTIVE:  HPI  Glenn Sanchez is a 73-year-old male presenting to the clinic for 6-month follow-up.  Patient reports hypertension been controlled for several years with exercise and has not required medication over the last few years.  Reports no symptoms with history of subclinical hypothyroidism.  Does report occasional loss of memory with hx of passing out during run in 2019 and head injury  with pickle ball resulting in fracture of skull

## 2024-04-29 NOTE — PROGRESS NOTES
S: 73 y.o. male with   Chief Complaint   Patient presents with    6 Month Follow-Up       HPI: please see resident note for HPI and ROS.    BP Readings from Last 3 Encounters:   04/29/24 120/74   11/07/23 116/62   10/31/23 124/82     Wt Readings from Last 3 Encounters:   04/29/24 88.5 kg (195 lb)   01/08/24 88 kg (194 lb)   11/07/23 88.8 kg (195 lb 12.8 oz)       O: VS:  height is 1.676 m (5' 6\") and weight is 88.5 kg (195 lb). His temporal temperature is 97.3 °F (36.3 °C). His blood pressure is 120/74 and his pulse is 81. His respiration is 20 and oxygen saturation is 97%.   AAO/NAD, appropriate affect for mood  CV:  RRR, no murmur  Resp: CTAB     Diagnosis Orders   1. Memory impairment        2. Essential hypertension  CBC    Comprehensive Metabolic Panel      3. Hyperlipidemia, unspecified hyperlipidemia type  Lipid, Fasting      4. Elevated TSH        5. Stage 3 chronic kidney disease, unspecified whether stage 3a or 3b CKD (HCC)        6. Subclinical hypothyroidism  TSH    T4, Free          Plan:  Please refer to resident note for full plan.    73 year old male presents to the office for chronic condition review.    Memory impairment: Chronic, stable.  Patient still suffers from chronic memory impairment secondary to history of skull fracture resulting in a brain bleed.  Currently stable and doing well.  No acute concerns    Hyperlipidemia: Chronic, uncontrolled.  Most recent cholesterol elevated consistent with baseline with elevated ASCVD risk score.  It was educated on statin therapy but declines.  Continue work on lifestyle modification and follow-up with dietitian    The 10-year ASCVD risk score (Lyudmila ARMENTA, et al., 2019) is: 20.6%    Values used to calculate the score:      Age: 73 years      Sex: Male      Is Non- : No      Diabetic: No      Tobacco smoker: No      Systolic Blood Pressure: 120 mmHg      Is BP treated: No      HDL Cholesterol: 40 mg/dL      Total Cholesterol: 238

## 2024-05-13 ENCOUNTER — OFFICE VISIT (OUTPATIENT)
Dept: INTERNAL MEDICINE CLINIC | Age: 74
End: 2024-05-13
Payer: MEDICARE

## 2024-05-13 VITALS — BODY MASS INDEX: 31.47 KG/M2 | WEIGHT: 195 LBS

## 2024-05-13 DIAGNOSIS — E78.5 HYPERLIPIDEMIA, UNSPECIFIED HYPERLIPIDEMIA TYPE: Primary | ICD-10-CM

## 2024-05-13 PROCEDURE — 97803 MED NUTRITION INDIV SUBSEQ: CPT | Performed by: DIETITIAN, REGISTERED

## 2024-05-13 NOTE — PROGRESS NOTES
[facility]  [facility]  [address] [place] [address]  [phone] (phone)  [phone] (fax)    Patient Name: Glenn Sanchez. Date of Birth: [id number]. MRN: [id number]      Assessment: Patient is a 73 y.o. male seen for follow-up MNT visit for hyperlipidemia.     -Nutritionally relevant labs:   Lab Results   Component Value Date/Time    LABA1C 5.5 12/08/2021 02:21 PM    LABA1C 5.5 01/28/2021 11:03 AM    LABA1C 5.6 09/25/2020 08:14 AM    GLUCOSE 98 11/01/2023 09:12 AM    GLUCOSE 100 06/13/2023 09:06 AM    CHOL 233 (H) 02/06/2023 10:00 AM    HDL 40 04/04/2024 09:26 AM    TRIG 150 02/06/2023 10:00 AM     - Lipids from 4/4/24 Cholesterol  238 mg/dl, , HDL 40,      Lipids from 11/1/23 Cholesterol 207 mg/dl, , HDL 40,     - MD reccommended cholesterol lowering medications but pt refusing and would like to continue trying to lower cholesterol with diet and exercise.     - Exercise: runs few times per wk.    -Food recall:   Breakfast: increased amounts of oats/whole grain cereals   Lunch: 1-2 x/wk out to eat. Wife cooks overall healthy meals    Dinner: 1 x/wk out for dinner. Per wife he does not always choose health foods. Limiting red meat and whole milk (replacing with plant based milks)     -  Current Outpatient Medications on File Prior to Visit   Medication Sig Dispense Refill    vitamin C (ASCORBIC ACID) 500 MG tablet Take 1 tablet by mouth daily 30 tablet 3    calcium carbonate (OSCAL) 500 MG TABS tablet Take 1 tablet by mouth every other day      B Complex-Folic Acid (B-100 BALANCED TR PO) Take 1 tablet by mouth 2 times daily (before meals) [place]      Cinnamon 500 MG CAPS Take 2 capsules by mouth 4 times daily (before meals and nightly)      vitamin D (CHOLECALCIFEROL) 25 MCG (1000 UT) TABS tablet Take 5 tablets by mouth daily Double Mon Thur until current product is gone       No current facility-administered

## 2024-08-27 ENCOUNTER — OFFICE VISIT (OUTPATIENT)
Dept: CARDIOLOGY CLINIC | Age: 74
End: 2024-08-27
Payer: MEDICARE

## 2024-08-27 VITALS
WEIGHT: 194.8 LBS | HEART RATE: 58 BPM | SYSTOLIC BLOOD PRESSURE: 137 MMHG | HEIGHT: 66 IN | BODY MASS INDEX: 31.31 KG/M2 | DIASTOLIC BLOOD PRESSURE: 89 MMHG

## 2024-08-27 DIAGNOSIS — E78.5 DYSLIPIDEMIA: ICD-10-CM

## 2024-08-27 DIAGNOSIS — R94.31 ABNORMAL HOLTER EXAM: Primary | ICD-10-CM

## 2024-08-27 DIAGNOSIS — I49.3 FREQUENT PVCS: ICD-10-CM

## 2024-08-27 PROCEDURE — 1123F ACP DISCUSS/DSCN MKR DOCD: CPT | Performed by: INTERNAL MEDICINE

## 2024-08-27 PROCEDURE — 1036F TOBACCO NON-USER: CPT | Performed by: INTERNAL MEDICINE

## 2024-08-27 PROCEDURE — 3017F COLORECTAL CA SCREEN DOC REV: CPT | Performed by: INTERNAL MEDICINE

## 2024-08-27 PROCEDURE — 93000 ELECTROCARDIOGRAM COMPLETE: CPT | Performed by: INTERNAL MEDICINE

## 2024-08-27 PROCEDURE — G8417 CALC BMI ABV UP PARAM F/U: HCPCS | Performed by: INTERNAL MEDICINE

## 2024-08-27 PROCEDURE — 99214 OFFICE O/P EST MOD 30 MIN: CPT | Performed by: INTERNAL MEDICINE

## 2024-08-27 PROCEDURE — G8427 DOCREV CUR MEDS BY ELIG CLIN: HCPCS | Performed by: INTERNAL MEDICINE

## 2024-08-27 NOTE — PROGRESS NOTES
Chief Complaint   Patient presents with    1 Year Follow Up   Originally  patient here for check up irregular heart rhythm with freq PVC and pACs during sleep study        1 year follow up for abn holter .    EKG done today.    Denied cp, palpitation, edema or dizziness    No sob  Pat stated he runs few miles  twice a week  EKG done today     Nonsmoker    FHX  Father had pacer  Mother had CVA in her 60's  Brother had atr fib    Past Surgical History:   Procedure Laterality Date    APPENDECTOMY      APPENDECTOMY  2017    HERNIA REPAIR         Allergies   Allergen Reactions    Codeine Other (See Comments)     migraine    Codone [Hydrocodone] Other (See Comments)     headache    Hydrocodone Other (See Comments)     migraine    Oxycodone      Wife states gives pt a headache        Family History   Problem Relation Age of Onset    Glaucoma Mother     Stroke Mother     Heart Disease Mother     Glaucoma Father     Heart Disease Father     High Blood Pressure Father     High Cholesterol Father     Cancer Father     Prostate Cancer Father     Stroke Father     Other Father         Brain Aneuryseum        Social History     Socioeconomic History    Marital status:      Spouse name: Not on file    Number of children: Not on file    Years of education: Not on file    Highest education level: Not on file   Occupational History    Not on file   Tobacco Use    Smoking status: Never    Smokeless tobacco: Never    Tobacco comments:     Never smoker   Vaping Use    Vaping status: Never Used   Substance and Sexual Activity    Alcohol use: Never    Drug use: Never    Sexual activity: Not on file   Other Topics Concern    Not on file   Social History Narrative    ** Merged History Encounter **          Social Determinants of Health     Financial Resource Strain: Low Risk  (4/29/2024)    Overall Financial Resource Strain (CARDIA)     Difficulty of Paying Living Expenses: Not hard at all   Food Insecurity: No Food Insecurity  AM    BILITOT 0.6 11/01/2023 09:12 AM    BILIDIR <0.2 05/18/2020 12:31 PM     Magnesium:    Lab Results   Component Value Date/Time    MG 2.4 06/13/2023 09:06 AM     Warfarin PT/INR:  No components found for: \"PTPATWAR\", \"PTINRWAR\"  HgBA1c:    Lab Results   Component Value Date/Time    LABA1C 5.5 12/08/2021 02:21 PM     FLP:    Lab Results   Component Value Date/Time    TRIG 150 02/06/2023 10:00 AM    HDL 40 04/04/2024 09:26 AM     TSH:    Lab Results   Component Value Date/Time    TSH 5.070 04/03/2024 11:21 AM        EKG MONITORING:  Revealed sinus rhythm; however, the patient had a  frequent premature ventricular contractions.  The patient also noted to  have be a PACs, bigeminy during the sleep study.     IMPRESSION:  1.  Severe obstructive sleep apnea with worsening of respiratory events  in supine position.  The patient had an acceptable titration to a final  BiPAP pressure of 19/15 cm of water with room air.  2.  Decreased amount of REM sleep limiting the interpretation of sleep  study.  3..  Periodic limb movements with no significant arousals.  4.  Nocturnal hypoxia noted during the baseline sleep study, resolved  with BiPAP therapy.  5.  History of traumatic brain injury.  6.  Frequent premature ventricular contractions.  The patient noted to  have sinus pauses during the baseline sleep study.  The patient also  noted to have bigeminy during treatment part.     RECOMMENDATIONS:  1.  For the patient's sleep-disordered breathing, we recommend starting  therapy with BiPAP pressure of 19/15 cm of water with room air.  2.  Specific recommendations include the patient's choice of interface  was [employer]Med full face F20 medium size mask.  The patient told me that,  the patient of developed skin rash on his face after using full face  mask.  The patient need a mask refit by his [employer] company to avoid  development of skin rash before issuing BiPAP device.  3.  The patient required an EPR of 3.  4.  I called the patient at

## 2024-09-06 NOTE — PROGRESS NOTES
CPM/PAT Evaluation       Name: Ayanna Willard (Ayanna Willard)  /Age: 1974/49 y.o.     Visit Type:   In-Person       Chief Complaint: preop eval    HPI  The patient is a 49 year old female with history of  morbid obesity. She has had multiple failed attempts at weight loss and would like to proceed with surgical intervention. She presents today for perioperative evaluation in anticipation of Gastric Sleeve Laparoscopic  on 24 with Dr. Navas.    Past Medical History:   Diagnosis Date    Arrhythmia     paroxysmal atrial fibrillation x1 on Metop    Atrial fibrillation (Multi)     Breast cancer (Multi)     Fatty liver     GERD (gastroesophageal reflux disease)     Herpes genitalia     History of bilateral mastectomy 2022    Obesity     Sleep apnea     Vision loss     glasses    Vitamin D deficiency        Past Surgical History:   Procedure Laterality Date     SECTION, CLASSIC       Section     COLONOSCOPY      ESOPHAGOGASTRODUODENOSCOPY      MASTECTOMY Bilateral     MR PELVIS ANGIO W IV CONTRAST  2016    MR PELVIS ANGIO W IV CONTRAST 2016 [facility]    OOPHORECTOMY Bilateral     OTHER SURGICAL HISTORY  2016    Breast Reconstruction With Tissue Expander Bilateral    OTHER SURGICAL HISTORY  2016    Breast Surgery Reconstruction With Free Flap       Patient  has no history on file for sexual activity.    Family History   Problem Relation Name Age of Onset    Colon cancer Mother      Breast cancer Mother      Breast cancer Sister      Breast cancer Mother's Sister      Colon cancer Mother's Brother      Colon cancer Maternal Grandmother      Other (C SECTION) Other FAM HX     Oophorectomy Other FAM HX        No Known Allergies    Prior to Admission medications    Medication Sig Start Date End Date Taking? Authorizing Provider   cyanocobalamin (Vitamin B-12) 1,000 mcg/mL injection INJECT 1 ML (1,000 MCG) INTO THE MUSCLE EVERY 30 (THIRTY) DAYS.  4/22/24   Macho Hicks MD   ergocalciferol (Vitamin D-2) 1.25 MG (97107 UT) capsule Take 1 capsule (50,000 Units) by mouth 1 (one) time per week. 4/2/24   Macho Hicks MD   estradioL 10 mcg insert, dose pack Insert vaginally daily for 14 days followed by 2 times/week 4/29/24   JAIME Schumacher   metoprolol succinate XL (Toprol-XL) 25 mg 24 hr tablet Take 1 tablet (25 mg) by mouth once daily. 1/17/24 1/16/25  Martinez Garg MD   oxybutynin XL (Ditropan-XL) 5 mg 24 hr tablet Take 1 tablet (5 mg) by mouth once daily. Do not crush, chew, or split.  Patient not taking: Reported on 5/14/2024 4/29/24 4/29/25  JAIME Schumacher   pantoprazole (ProtoNix) 40 mg EC tablet TAKE 1 TABLET (40 MG) BY MOUTH DAILY DO NOT CRUSH, CHEW, ORSPLIT 5/29/24   Macho Hicks MD        PAT ROS:   Constitutional:   neg    Neuro/Psych:   neg    Eyes:   neg     use of corrective lenses  Ears:   neg    Nose:   neg    Mouth:   neg    Throat:   neg    Neck:   neg    Cardio:   neg    Respiratory:   neg    Endocrine:   neg    GI:   neg    :   neg    Musculoskeletal:   neg    Hematologic:   neg    Skin:  neg        Physical Exam  Vitals reviewed.   Constitutional:       Appearance: Normal appearance.   HENT:      Head: Normocephalic and atraumatic.      Nose: Nose normal.      Mouth/Throat:      Mouth: Mucous membranes are moist.      Pharynx: Oropharynx is clear.   Eyes:      Extraocular Movements: Extraocular movements intact.      Conjunctiva/sclera: Conjunctivae normal.      Pupils: Pupils are equal, round, and reactive to light.   Cardiovascular:      Rate and Rhythm: Normal rate and regular rhythm.      Pulses: Normal pulses.      Heart sounds: Normal heart sounds.   Pulmonary:      Effort: Pulmonary effort is normal.      Breath sounds: Normal breath sounds.   Musculoskeletal:         General: Normal range of motion.      Cervical back: Normal range of motion.   Skin:     General: Skin is warm and dry.   Neurological:       miles, when suddenly he jogged over some leaves -- but there was a sewage grating underneath -- and he twisted his left ankle.  Went to urgent care for x-rays which found no broken bones.  Went to [facility] and several ankle tendons were damaged.  States OIO gave him a walking boot.  Also has a brace.  Wears brace at home and walking boot while in community.  States OIO said to wear brace/boot until February 28, 2024 (per current estimation).  Besides cane, be has no other AD.    Sustained brain injury 3 years ago while playing pickle ball at the [facility] and rammed into a concrete wall.  Neurosurgeon said he may not recover -- but he did recover after 15 days in the hospital.  States he does not recall dressing or playing pickle ball.      Denies having any metal in his body (e.g. from operations or surgeries).      Current exercise includes chair Yoga.  States he would like to be able to jog again.  States he will never play pickle ball again because he has to avoid a head injury.  He had run the [place] in 2013, but was 2 blocks away and had finished the race.  States he heard the [employer] but was not injured.      NOTE: Physician visit will be on February 01, 2024.  Patient firmly states that he wants to jog again and even do a marathon and has had discussions with his physician, stating his physician will let him know when he is cleared to try jogging again.  Meanwhile, patient will have land therapy to progress closed chain exercise and progress to light plyometrics until his physician appointment on February 01, 2024 where he will discuss further when his physician will clear him to resume jogging.  When given the clearance by physician, patient will focus on stretching and warm-up, and other relevant exercises to prepared joints and soft tissues prior to jogging and we can emphasize jogging technique.      SUBJECTIVE: Patient denies any current pain. He has still been wearing his ASO brace at  "General: No focal deficit present.      Mental Status: She is alert and oriented to person, place, and time.   Psychiatric:         Mood and Affect: Mood normal.         Behavior: Behavior normal.          PAT AIRWAY:   Airway:     Mallampati::  IV    TM distance::  >3 FB    Neck ROM::  Full  normal        Visit Vitals  BP (!) 130/97   Pulse 81   Temp 36.1 °C (96.9 °F) (Temporal)   Ht 1.651 m (5' 5\")   Wt 108 kg (238 lb 1.6 oz)   SpO2 98%   BMI 39.62 kg/m²   OB Status Oopherectomy   Smoking Status Former   BSA 2.23 m²          DASI Risk Score      Flowsheet Row Most Recent Value   DASI SCORE 58.2   METS Score (Will be calculated only when all the questions are answered) 9.9          Caprini DVT Assessment      Flowsheet Row Most Recent Value   DVT Score 6   Current Status Major surgery planned, including arthroscopic and laproscopic (1-2 hours)   Age 40-59 years   BMI 31-40 (Obesity)          Modified Frailty Index      Flowsheet Row Most Recent Value   Modified Frailty Index Calculator 0          CHADS2 Stroke Risk  Current as of 2 days ago (Saturday)        1.9% 3 to 100%: High Risk   2 to < 3%: Medium Risk   0 to < 2%: Low Risk     No Change          This score determines the patient's risk of having a stroke if the patient has atrial fibrillation.          Points Metrics   0 Has Congestive Heart Failure:  No     Patients with congestive heart failure get 1 point.    Current as of 2 days ago (Saturday)   0 Has Hypertension:  No     Patients with hypertension get 1 point.    Current as of 2 days ago (Saturday)   0 Age:  49     Patients who are 75 years of age or older get 1 point.    Current as of 2 days ago (Saturday)   0 Has Diabetes:  No     Patients with diabetes get 1 point.    Current as of 2 days ago (Saturday)   0 Had Stroke:  No  Had TIA:  No  Had Thromboembolism:  No     Patients who have had a stroke, TIA, or thromboembolism get 2 points.    Current as of 2 days ago (Saturday)             Revised " Cardiac Risk Index      Flowsheet Row Most Recent Value   Revised Cardiac Risk Calculator 1          Apfel Simplified Score      Flowsheet Row Most Recent Value   Apfel Simplified Score Calculator 3          Risk Analysis Index Results This Encounter    No data found in the last 1 encounters.       Stop Bang Score      Flowsheet Row Most Recent Value   Do you snore loudly? 1   Do you often feel tired or fatigued after your sleep? 1   Has anyone ever observed you stop breathing in your sleep? 1   Do you have or are you being treated for high blood pressure? 0   Recent BMI (Calculated) 38.8   Is BMI greater than 35 kg/m2? 1=Yes   Age older than 50 years old? 0=No   Is your neck circumference greater than 17 inches (Male) or 16 inches (Female)? 0   Gender - Male 0=No   STOP-BANG Total Score 4          Recent Results (from the past 168 hour(s))   CBC and Auto Differential    Collection Time: 09/06/24 10:36 AM   Result Value Ref Range    WBC 5.4 4.4 - 11.3 x10*3/uL    nRBC 0.0 0.0 - 0.0 /100 WBCs    RBC 5.28 (H) 4.00 - 5.20 x10*6/uL    Hemoglobin 14.5 12.0 - 16.0 g/dL    Hematocrit 44.8 36.0 - 46.0 %    MCV 85 80 - 100 fL    MCH 27.5 26.0 - 34.0 pg    MCHC 32.4 32.0 - 36.0 g/dL    RDW 13.9 11.5 - 14.5 %    Platelets 264 150 - 450 x10*3/uL    Neutrophils % 50.1 40.0 - 80.0 %    Immature Granulocytes %, Automated 0.2 0.0 - 0.9 %    Lymphocytes % 38.5 13.0 - 44.0 %    Monocytes % 9.0 2.0 - 10.0 %    Eosinophils % 1.5 0.0 - 6.0 %    Basophils % 0.7 0.0 - 2.0 %    Neutrophils Absolute 2.72 1.20 - 7.70 x10*3/uL    Immature Granulocytes Absolute, Automated 0.01 0.00 - 0.70 x10*3/uL    Lymphocytes Absolute 2.09 1.20 - 4.80 x10*3/uL    Monocytes Absolute 0.49 0.10 - 1.00 x10*3/uL    Eosinophils Absolute 0.08 0.00 - 0.70 x10*3/uL    Basophils Absolute 0.04 0.00 - 0.10 x10*3/uL   Comprehensive Metabolic Panel    Collection Time: 09/06/24 10:36 AM   Result Value Ref Range    Glucose 89 74 - 99 mg/dL    Sodium 140 136 - 145 mmol/L     Potassium 3.9 3.5 - 5.3 mmol/L    Chloride 104 98 - 107 mmol/L    Bicarbonate 25 21 - 32 mmol/L    Anion Gap 15 10 - 20 mmol/L    Urea Nitrogen 13 6 - 23 mg/dL    Creatinine 0.82 0.50 - 1.05 mg/dL    eGFR 89 >60 mL/min/1.73m*2    Calcium 9.1 8.6 - 10.6 mg/dL    Albumin 4.6 3.4 - 5.0 g/dL    Alkaline Phosphatase 63 33 - 110 U/L    Total Protein 7.0 6.4 - 8.2 g/dL    AST 22 9 - 39 U/L    Bilirubin, Total 0.5 0.0 - 1.2 mg/dL    ALT 43 7 - 45 U/L   Protime-INR    Collection Time: 09/06/24 10:36 AM   Result Value Ref Range    Protime 11.4 9.8 - 12.8 seconds    INR 1.0 0.9 - 1.1   Type And Screen    Collection Time: 09/06/24 10:36 AM   Result Value Ref Range    ABO TYPE O     Rh TYPE POS     ANTIBODY SCREEN NEG    Urinalysis with Reflex Culture and Microscopic    Collection Time: 09/06/24 10:36 AM   Result Value Ref Range    Color, Urine Light-Yellow Light-Yellow, Yellow, Dark-Yellow    Appearance, Urine Clear Clear    Specific Gravity, Urine 1.021 1.005 - 1.035    pH, Urine 6.5 5.0, 5.5, 6.0, 6.5, 7.0, 7.5, 8.0    Protein, Urine NEGATIVE NEGATIVE, 10 (TRACE), 20 (TRACE) mg/dL    Glucose, Urine Normal Normal mg/dL    Blood, Urine NEGATIVE NEGATIVE    Ketones, Urine NEGATIVE NEGATIVE mg/dL    Bilirubin, Urine NEGATIVE NEGATIVE    Urobilinogen, Urine Normal Normal mg/dL    Nitrite, Urine NEGATIVE NEGATIVE    Leukocyte Esterase, Urine NEGATIVE NEGATIVE   Extra Urine Gray Tube    Collection Time: 09/06/24 10:36 AM   Result Value Ref Range    Extra Tube Hold for add-ons.         Diagnostic Results    -CXRAY 9/6/24  IMPRESSION:  No radiographic evidence of acute cardiopulmonary process.      -ECG; 1/17/24  Normal sinus rhythm  Nonspecific intraventricular block  Abnormal ECG     -Transthoracic Echo; 12/19/2017  CONCLUSIONS:   1. The left ventricular systolic function is normal with a 55-60% estimated ejection fraction.      Assessment and Plan:     Anesthesia:  The patient denies problems with anesthesia in the past such as  PONV, prolonged sedation, awareness, dental damage, aspiration, cardiac arrest, difficult intubation, or unexpected hospital admissions.     Neuro:   The patient has no neurological diagnoses or significant findings on chart review, clinical presentation, and evaluation. No grossly apparent neurological perioperative risk. The patient is at increased risk for perioperative stroke secondary to a-fib, female gender, general anesthesia. Handouts for preoperative brain exercises given to patient.  Behavioral Health: Shani Márquez, PHD LOV 4/12/24 seen to assess readiness for bariatric surgery.  Per note: Follow up: post surgery. Ayanna is cleared for bariatric surgery from a behavioral health perspective.      HEENT/Airway  The patient has diagnoses, significant findings on chart review, clinical presentation or evaluation of obesity, short thick neck. No documented or reported history of airway difficulty.     Cardiovascular  The patient has diagnoses or significant findings on chart review or clinical presentation and evaluation significant for paroxsymal afib managed on toprol-continue as prescribed in the perioperative period including morning of surgery.   She is scheduled for non-cardiac surgery associated with elevated risk. The patient has no major cardiac contraindications to non- cardiac surgery.  RCRI  The patient meets 0-1 RCRI criteria and therefore has a less than 1% risk of major adverse cardiac complications.  METS  The patient's functional capacity capacity is greater than 4 METS.  The patient has a 30-day risk for MACE of 1 predictor, 6.0% risk for cardiac death, nonfatal myocardial infarction, and nonfatal cardiac arrest.  SIMONE score which indicates a 0% risk of intraoperative or 30-day postoperative MACE (major adverse cardiac event).  Cardiology Evaluation  Cardiology: MD DALE Richardson 1/17/24 follow up visit for A-Fib and pre-op clearance for bariatric surgery.  Per note: Preop CV risk  assessment  She is pending bariatric surgery. She is considered a low risk for perioperative cardiovascular events. No further cardiac testing recommended. No cardiac barriers to surgery.     Pulmonary   The patient has findings on chart review, clinical presentation and evaluation significant for DARBY. The patient is at increased risk of perioperative pulmonary complications secondary to upper abdominal surgery, DARBY, morbid obesity. Patient educated to bring CPAP/BIPAP day of surgery.     Sleep Medicine: Hollie Clifton MD LOV 5/10/24 seen for DARBY.  Per Note: Patient PAP report reviewed and shows excellent adherence. From an DARBY management perspective, she is at low risk of donovan-operative complications after bariatric surgery with continued use of CPAP for her DARBY.     Recommend prioritizing non opioid analgesic techniques (regional and local anesthesia, nonsteroidal medications, etc) before the administration of opioids and close monitoring for hypoventilation after surgery due to DARBY/supsected DARBY. If intravenous narcotics are needed beyond the immediate donovan-operative period, the patient may benefit from continuous pulse oximetry to monitor for hypoxic events till baseline Sp02 is normal on room air and  a respiratory therapy evaluation.    ARISCAT 15, low, 1.6% risk of in-hospital postoperative pulmonary complications  PRODIGY 3, low risk of respiratory depression episode. Patient given PI sheet for preoperative deep breathing exercises.    Hematology/Oncology  The patient has diagnoses or significant findings on chart review or clinical presentation and evaluation significant for history of anemia? Patient denies.  H/H stable per past CBC 2/22/24. History of familial breast cancer in mom, sister and maternal aunt +BRCA. She is s/p b/l mastectomy  and oophorectomy.    Caprini score 6, high risk of perioperative VTE.     Patient instructed to ambulate as soon as possible postoperatively to decrease thromboembolic  risk. Initiate mechanical DVT prophylaxis as soon as possible and initiate chemical prophylaxis when deemed safe from a bleeding standpoint post surgery.     Transfusion Evaluation  A type and screen was obtained given the likelihood for perioperative transfusion of blood or blood products.    Gastrointestinal  The patient has diagnoses or significant findings on chart review or clinical presentation and evaluation significant for GERD managed on Protonix-continue as prescribed in the perioperative period,  fatty liver? normal LFTS per last CMP 2/22/24.  No current GI complaints.  Gastroenterology: Nasim Taylor MD LOV 10/4/23 seen for left lower quadrant pain. CT scan showed possible inflammatory changes in the sigmoid and possible thickening in the cecum. Plan: Colonoscopy. Scheduled for 10/2024.     Eat 10- 0,  self-perceived oropharyngeal dysphagia scale (0-40)     Genitourinary  The patient has diagnoses or significant findings on chart review or clinical presentation and evaluation significant for history of HSV.  OBGYN: JUANCARLOS Bullard- CNP LOV 4/29/24 seen for menopause.     Renal  The patient has no known history of chronic kidney disease. No renal diagnoses or significant findings on chart review or clinical presentation and evaluation. The patient is scheduled for peritoneal surgery which places patient at higher risk of perioperative renal complications. Preoperative hydration as needed.    Musculoskeletal  No diagnoses or significant findings on chart review or clinical presentation and evaluation.    Endocrine  The patient has diagnoses or significant findings on chart review or clinical presentation and evaluation significant for morbid obesity, scheduled for surgery with Dr. Navas on 9/23/24.    ID  No diagnoses or significant findings on chart review or clinical presentation and evaluation.    -Preoperative medication instructions were provided and reviewed with the patient.  Any additional  testing or evaluation was explained to the patient.  NPO Instructions were discussed, and the patient's questions were answered prior to conclusion of this encounter. Patient verbalized understanding of preoperative instructions. After Visit Summary given.

## 2024-10-23 NOTE — TELEPHONE ENCOUNTER
Last visit- 2/15/2022  Next visit- 3/29/2022    Requested Prescriptions     Pending Prescriptions Disp Refills    levETIRAcetam (KEPPRA) 500 MG tablet 120 tablet 1     Sig: Take 1 tablet by mouth 2 times daily Patient/Caregiver provided printed discharge information.

## 2024-10-24 ENCOUNTER — HOSPITAL ENCOUNTER (OUTPATIENT)
Dept: AUDIOLOGY | Age: 74
Discharge: HOME | End: 2024-10-24

## 2024-10-24 PROCEDURE — 92593 HC HEARING AID CHECK, BOTH EARS: CPT | Performed by: AUDIOLOGIST

## 2024-10-24 NOTE — PROGRESS NOTES
ACCOUNT #: [id number]    DIAGNOSIS: Sensorineural hearing loss of both ears.    HEARING AID PROBLEM: Patient reports that the hearing aids are not charging consistently and the [employer] sarah does not work with the left hearing aid (works with phone calls). Replaced  wall cube with \"popsicle\" wall cube due to issues identified with the original wall cube. Cleaned significant debris from  and cleaned  contacts with contact . Completed firmware update, which included an update for  instability. Replaced 4.0 receivers with 5.0 due to 4.0 wax filters being discontinued. Replaced domes and retention wires. Showed patient how to replace cerustop wax filters. Gave patient a year supply of cerustop wax filters and medium open domes. Vacuumed significant debris from  ports. Showed patient how to brush microphones and the area where the  plugs into the hearing aid. Deleted all pairings of hearing aids to the patient's phone. Re-paired the hearing aids to his phone and to the [employer] sarah. Completed phone call to patient- verified everything is working properly. Advised patient to make sure he does not put his phone in his pocket during phone calls, as she says he is doing. Scheduled six month hearing aid clean/check for 4/24/25 due to the amount of debris in the ports and in the  case. Billed $22.00 for clean & check.

## 2024-11-05 ENCOUNTER — OFFICE VISIT (OUTPATIENT)
Dept: PULMONOLOGY | Age: 74
End: 2024-11-05

## 2024-11-05 VITALS
OXYGEN SATURATION: 95 % | TEMPERATURE: 97.9 F | WEIGHT: 199 LBS | HEIGHT: 66 IN | HEART RATE: 100 BPM | BODY MASS INDEX: 31.98 KG/M2 | DIASTOLIC BLOOD PRESSURE: 88 MMHG | SYSTOLIC BLOOD PRESSURE: 128 MMHG

## 2024-11-05 DIAGNOSIS — G47.33 OSA TREATED WITH BIPAP: Primary | ICD-10-CM

## 2024-11-05 DIAGNOSIS — Z78.9 DIFFICULTY WITH BIPAP USE: ICD-10-CM

## 2024-11-05 NOTE — PROGRESS NOTES
obstructive sleep apnea including surgical options, positional therapy, oral appliance.  We discussed the limitations with positional therapy and oral appliance as these are only approved for mild obstructive sleep apnea.  We may get a reduction in his AHI but is certainly not going to completely treat his sleep apnea.  We could potentially use a combination of an oral appliance along with positional therapy and repeat sleep study to see where we are at.  He declines any surgical evaluation.  He does not \"want any implants\" therefore he declines inspire.  Discussed cardiovascular health risks with untreated sleep apnea   At this time patient is willing to continue BiPAP with current settings.  He is going to look into a stability strap through pad a cheek that cannot wear with his current fullface mask.      Patient verbalizes understanding.  We will see Glenn Sanchez back in: 1 year with download    Information added by my medical assistant/LPN was reviewed today    billing based on time: total time on encounter 40 min     SOLO Bhardwaj-CNP   11/5/2024

## 2025-03-02 NOTE — TELEPHONE ENCOUNTER
I responded to a voicemail message to contact Mukul Vinnieace via telephone call to offer support in the completion of Advance Directives documents as part of an [address] conversation. He expressed a desire to schedule an appointment for both himself and his wife. Documents were explained and information needed was provided for completion. An appointment is scheduled for 9/19 at [facility].
Patient requests all Lab, Cardiology, and Radiology Results on their Discharge Instructions

## 2025-04-10 NOTE — ED PROVIDER NOTES
Negative for abdominal pain and vomiting. Skin: Negative. Neurological: Positive for headaches. Negative for dizziness, syncope, light-headedness and numbness. Psychiatric/Behavioral: Negative for confusion and hallucinations. All other systems reviewed and are negative. All pertinent systems were reviewed and are negative unless indicated in the HPI. PAST MEDICAL HISTORY    has no past medical history on file. SURGICAL HISTORY      has a past surgical history that includes hernia repair and Appendectomy. CURRENT MEDICATIONS       Discharge Medication List as of 7/15/2020  5:10 AM      CONTINUE these medications which have NOT CHANGED    Details   NONFORMULARY Curamin for pain Taking PRNHistorical Med      B Complex Vitamins (VITAMIN B COMPLEX 100 IJ) Take by mouth dailyHistorical Med      Cholecalciferol (VITAMIN D3) 50 MCG (2000 UT) CAPS Take by mouth dailyHistorical Med             ALLERGIES     is allergic to codeine and hydrocodone. FAMILY HISTORY     He indicated that his mother is . He indicated that his father is . family history includes Heart Disease in his father; Other in his father; Stroke in his mother. SOCIAL HISTORY      reports that he has never smoked. He has never used smokeless tobacco. He reports that he does not drink alcohol or use drugs. PHYSICAL EXAM     INITIAL VITALS:  height is 5' 7\" (1.702 m) and weight is 175 lb (79.4 kg). His oral temperature is 98 °F (36.7 °C). His blood pressure is 128/79 and his pulse is 57. His respiration is 18 and oxygen saturation is 97%. Physical Exam  Vitals signs reviewed. Constitutional:       Appearance: He is well-developed. He is not ill-appearing or toxic-appearing. HENT:      Head: Normocephalic and atraumatic. No raccoon eyes or laceration. Eyes:      Extraocular Movements: Extraocular movements intact. Right eye: No nystagmus. Left eye: No nystagmus.       Conjunctiva/sclera: Conjunctivae normal.   Neck:      Musculoskeletal: Full passive range of motion without pain, normal range of motion and neck supple. No neck rigidity or injury. Cardiovascular:      Rate and Rhythm: Normal rate and regular rhythm. Heart sounds: Normal heart sounds, S1 normal and S2 normal. Heart sounds not distant. No murmur. No friction rub. No gallop. Pulmonary:      Effort: Pulmonary effort is normal. No tachypnea, accessory muscle usage or respiratory distress. Breath sounds: Normal breath sounds. Musculoskeletal:      Cervical back: He exhibits no tenderness, no swelling and no edema. Neurological:      Mental Status: He is alert and oriented to person, place, and time. GCS: GCS eye subscore is 4. GCS verbal subscore is 5. GCS motor subscore is 6. Cranial Nerves: Cranial nerves are intact. Sensory: Sensation is intact. Motor: Motor function is intact. No weakness or pronator drift. Coordination: Coordination is intact. Psychiatric:         Behavior: Behavior is cooperative. DIFFERENTIAL DIAGNOSIS:   Including but not limited to postconcussive syndrome, migraine, less likely intracranial pathology    DIAGNOSTIC RESULTS     EKG: AllEKG's are interpreted by the Emergency Department Physician who either signs or Co-signs this chart in the absence of a cardiologist.  None    RADIOLOGY: non-plain film images(s) such as CT, Ultrasound and MRI are read by the radiologist.  Plain radiographic images are visualized and preliminarily interpreted by the emergencyphysician unless otherwise stated below. CT HEAD WO CONTRAST   Final Result   . 1. Negative CT of the brain. 2. 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 Correlate with history of chronic sinusitis. No acute pathology of the sinus on current study. **This report has been created using voice recognition software.  It may contain minor errors which are inherent in representative isaware of care plan, questions answered, verbalizes understanding and is in agreement. ED Medications administered this visit:   Medications   ketorolac (TORADOL) injection 15 mg (15 mg Intravenous Given 7/15/20 0419)   0.9 % sodium chloride bolus (0 mLs Intravenous Stopped 7/15/20 0502)   diphenhydrAMINE (BENADRYL) injection 25 mg (25 mg Intravenous Given 7/15/20 0419)   metoclopramide (REGLAN) injection 10 mg (10 mg Intravenous Given 7/15/20 0419)       I have given the patient strict written and verbal instructions about care at home,follow-up, and signs and symptoms of worsening of condition and they did verbalize understanding. Patient was seen independently by myself. The Patient's final impression and disposition and plan was determined by myself. CRITICAL CARE:   None    CONSULTS:  None    PROCEDURES:  None    FINAL IMPRESSION      1. Post concussive syndrome    2.  Intractable chronic post-traumatic headache          DISPOSITION/PLAN   DISPOSITION Decision To Discharge 07/15/2020 05:08:49 AM        PATIENT REFERRED TO:  Osmani Stafford DO  [address] [place], [place] [id number]    Call in 1 day  For follow up      [address]:  Discharge Medication List as of 7/15/2020  5:10 AM          (Please note that portions of this note were completed with a voice recognition program.  Efforts were made to edit thedictations but occasionally words are mis-transcribed.)    SOLO Santacruz CNP, APRN - CNP  07/22/20 8592 Initiate Treatment: :\\n- Doxycycline 100mg one capsule twice daily \\n- Clindamycin lotion daily Render In Strict Bullet Format?: No Detail Level: Zone

## 2025-04-20 ENCOUNTER — APPOINTMENT (OUTPATIENT)
Dept: GENERAL RADIOLOGY | Age: 75
End: 2025-04-20
Payer: MEDICARE

## 2025-04-20 ENCOUNTER — HOSPITAL ENCOUNTER (EMERGENCY)
Age: 75
Discharge: HOME | End: 2025-04-20
Attending: EMERGENCY MEDICINE
Payer: MEDICARE

## 2025-04-20 VITALS
BODY MASS INDEX: 30.67 KG/M2 | WEIGHT: 190 LBS | HEART RATE: 55 BPM | TEMPERATURE: 98.1 F | OXYGEN SATURATION: 99 % | DIASTOLIC BLOOD PRESSURE: 73 MMHG | RESPIRATION RATE: 19 BRPM | SYSTOLIC BLOOD PRESSURE: 120 MMHG

## 2025-04-20 DIAGNOSIS — L03.116 CELLULITIS OF LEFT LOWER EXTREMITY: Primary | ICD-10-CM

## 2025-04-20 LAB
ALBUMIN SERPL BCG-MCNC: 3.9 G/DL (ref 3.4–4.9)
ALP SERPL-CCNC: 82 U/L (ref 40–129)
ALT SERPL W/O P-5'-P-CCNC: 14 U/L (ref 10–50)
ANION GAP SERPL CALC-SCNC: 11 MEQ/L (ref 8–16)
AST SERPL-CCNC: 27 U/L (ref 10–50)
BASOPHILS ABSOLUTE: 0.1 THOU/MM3 (ref 0–0.1)
BASOPHILS NFR BLD AUTO: 0.6 %
BILIRUB CONJ SERPL-MCNC: < 0.1 MG/DL (ref 0–0.2)
BILIRUB SERPL-MCNC: 0.3 MG/DL (ref 0.3–1.2)
BUN SERPL-MCNC: 19 MG/DL (ref 8–23)
CALCIUM SERPL-MCNC: 9.2 MG/DL (ref 8.8–10.2)
CHLORIDE SERPL-SCNC: 104 MEQ/L (ref 98–111)
CO2 SERPL-SCNC: 23 MEQ/L (ref 22–29)
CREAT SERPL-MCNC: 0.9 MG/DL (ref 0.7–1.2)
CRP SERPL-MCNC: 4.11 MG/DL (ref 0–0.5)
DEPRECATED RDW RBC AUTO: 44.1 FL (ref 35–45)
EOSINOPHIL NFR BLD AUTO: 1.8 %
EOSINOPHILS ABSOLUTE: 0.2 THOU/MM3 (ref 0–0.4)
ERYTHROCYTE [DISTWIDTH] IN BLOOD BY AUTOMATED COUNT: 13.2 % (ref 11.5–14.5)
GFR SERPL CREATININE-BSD FRML MDRD: 90 ML/MIN/1.73M2
GLUCOSE SERPL-MCNC: 121 MG/DL (ref 74–109)
HCT VFR BLD AUTO: 40.3 % (ref 42–52)
HGB BLD-MCNC: 12.8 GM/DL (ref 14–18)
IMM GRANULOCYTES # BLD AUTO: 0.02 THOU/MM3 (ref 0–0.07)
IMM GRANULOCYTES NFR BLD AUTO: 0.2 %
LACTATE SERPL-SCNC: 2.1 MMOL/L (ref 0.5–2)
LIPASE SERPL-CCNC: 35 U/L (ref 13–60)
LYMPHOCYTES ABSOLUTE: 1.4 THOU/MM3 (ref 1–4.8)
LYMPHOCYTES NFR BLD AUTO: 15.1 %
MAGNESIUM SERPL-MCNC: 2.1 MG/DL (ref 1.6–2.6)
MCH RBC QN AUTO: 29 PG (ref 26–33)
MCHC RBC AUTO-ENTMCNC: 31.8 GM/DL (ref 32.2–35.5)
MCV RBC AUTO: 91.2 FL (ref 80–94)
MONOCYTES ABSOLUTE: 0.7 THOU/MM3 (ref 0.4–1.3)
MONOCYTES NFR BLD AUTO: 8.2 %
NEUTROPHILS ABSOLUTE: 6.7 THOU/MM3 (ref 1.8–7.7)
NEUTROPHILS NFR BLD AUTO: 74.1 %
NRBC BLD AUTO-RTO: 0 /100 WBC
OSMOLALITY SERPL CALC.SUM OF ELEC: 279.2 MOSMOL/KG (ref 275–300)
PLATELET # BLD AUTO: 235 THOU/MM3 (ref 130–400)
PMV BLD AUTO: 11.6 FL (ref 9.4–12.4)
POTASSIUM SERPL-SCNC: 4.2 MEQ/L (ref 3.5–5.2)
PROT SERPL-MCNC: 6.8 G/DL (ref 6.4–8.3)
RBC # BLD AUTO: 4.42 MILL/MM3 (ref 4.7–6.1)
SODIUM SERPL-SCNC: 138 MEQ/L (ref 135–145)
WBC # BLD AUTO: 9 THOU/MM3 (ref 4.8–10.8)

## 2025-04-20 PROCEDURE — 96375 TX/PRO/DX INJ NEW DRUG ADDON: CPT

## 2025-04-20 PROCEDURE — 83735 ASSAY OF MAGNESIUM: CPT

## 2025-04-20 PROCEDURE — 36415 COLL VENOUS BLD VENIPUNCTURE: CPT

## 2025-04-20 PROCEDURE — 86140 C-REACTIVE PROTEIN: CPT

## 2025-04-20 PROCEDURE — 2580000003 HC RX 258: Performed by: EMERGENCY MEDICINE

## 2025-04-20 PROCEDURE — 73590 X-RAY EXAM OF LOWER LEG: CPT

## 2025-04-20 PROCEDURE — 99284 EMERGENCY DEPT VISIT MOD MDM: CPT

## 2025-04-20 PROCEDURE — 83605 ASSAY OF LACTIC ACID: CPT

## 2025-04-20 PROCEDURE — 96366 THER/PROPH/DIAG IV INF ADDON: CPT

## 2025-04-20 PROCEDURE — 82248 BILIRUBIN DIRECT: CPT

## 2025-04-20 PROCEDURE — 87040 BLOOD CULTURE FOR BACTERIA: CPT

## 2025-04-20 PROCEDURE — 96365 THER/PROPH/DIAG IV INF INIT: CPT

## 2025-04-20 PROCEDURE — 96361 HYDRATE IV INFUSION ADD-ON: CPT

## 2025-04-20 PROCEDURE — 6360000002 HC RX W HCPCS: Performed by: EMERGENCY MEDICINE

## 2025-04-20 PROCEDURE — 85025 COMPLETE CBC W/AUTO DIFF WBC: CPT

## 2025-04-20 PROCEDURE — 80053 COMPREHEN METABOLIC PANEL: CPT

## 2025-04-20 PROCEDURE — 83690 ASSAY OF LIPASE: CPT

## 2025-04-20 RX ORDER — SULFAMETHOXAZOLE AND TRIMETHOPRIM 800; 160 MG/1; MG/1
1 TABLET ORAL 2 TIMES DAILY
Qty: 14 TABLET | Refills: 0 | Status: SHIPPED | OUTPATIENT
Start: 2025-04-20 | End: 2025-04-20

## 2025-04-20 RX ORDER — CEPHALEXIN 500 MG/1
500 CAPSULE ORAL 4 TIMES DAILY
Qty: 28 CAPSULE | Refills: 0 | Status: SHIPPED | OUTPATIENT
Start: 2025-04-20 | End: 2025-04-25 | Stop reason: ALTCHOICE

## 2025-04-20 RX ORDER — KETOROLAC TROMETHAMINE 30 MG/ML
15 INJECTION, SOLUTION INTRAMUSCULAR; INTRAVENOUS ONCE
Status: COMPLETED | OUTPATIENT
Start: 2025-04-20 | End: 2025-04-20

## 2025-04-20 RX ORDER — 0.9 % SODIUM CHLORIDE 0.9 %
1000 INTRAVENOUS SOLUTION INTRAVENOUS ONCE
Status: COMPLETED | OUTPATIENT
Start: 2025-04-20 | End: 2025-04-20

## 2025-04-20 RX ORDER — SULFAMETHOXAZOLE AND TRIMETHOPRIM 800; 160 MG/1; MG/1
1 TABLET ORAL 2 TIMES DAILY
Qty: 14 TABLET | Refills: 0 | Status: SHIPPED | OUTPATIENT
Start: 2025-04-20 | End: 2025-04-27

## 2025-04-20 RX ADMIN — SODIUM CHLORIDE 1000 ML: 0.9 INJECTION, SOLUTION INTRAVENOUS at 16:15

## 2025-04-20 RX ADMIN — VANCOMYCIN HYDROCHLORIDE 2000 MG: 5 INJECTION, POWDER, LYOPHILIZED, FOR SOLUTION INTRAVENOUS at 17:02

## 2025-04-20 RX ADMIN — KETOROLAC TROMETHAMINE 15 MG: 30 INJECTION, SOLUTION INTRAMUSCULAR at 16:16

## 2025-04-20 ASSESSMENT — PAIN SCALES - GENERAL: PAINLEVEL_OUTOF10: 4

## 2025-04-20 ASSESSMENT — PAIN DESCRIPTION - LOCATION: LOCATION: LEG

## 2025-04-20 ASSESSMENT — PAIN DESCRIPTION - ORIENTATION: ORIENTATION: LEFT

## 2025-04-20 ASSESSMENT — PAIN - FUNCTIONAL ASSESSMENT: PAIN_FUNCTIONAL_ASSESSMENT: 0-10

## 2025-04-20 NOTE — DISCHARGE INSTRUCTIONS
You were seen in [facility] emergency department for a left leg wound.  In the ER, there was no sign of abscess, or deep tissue infection.  We treated you with 1 dose of IV antibiotics here, and you will need to take a 1 week course of Bactrim and Keflex, two antibiotics.  Please be sure to take the entire course even if your wound appears to be getting better.  You should follow-up with Dr. Laws, who is a podiatrist, and can recheck your wound to be sure that it is healing appropriately.  Please call his office tomorrow morning to schedule an appointment. If you experience worsening symptoms, return to the ER.

## 2025-04-20 NOTE — ED TRIAGE NOTES
Pt to ER with c/o L leg pain x a few days. Small wound with area of redness noted to L lower leg. Pt denies taking anything for pain today. Wife reports temperature of 99F earlier today.

## 2025-04-22 ENCOUNTER — HOSPITAL ENCOUNTER (EMERGENCY)
Age: 75
Discharge: HOME | End: 2025-04-22
Attending: EMERGENCY MEDICINE
Payer: MEDICARE

## 2025-04-22 VITALS
TEMPERATURE: 98 F | WEIGHT: 190 LBS | HEIGHT: 66 IN | OXYGEN SATURATION: 98 % | RESPIRATION RATE: 17 BRPM | BODY MASS INDEX: 30.53 KG/M2 | DIASTOLIC BLOOD PRESSURE: 88 MMHG | SYSTOLIC BLOOD PRESSURE: 129 MMHG | HEART RATE: 71 BPM

## 2025-04-22 DIAGNOSIS — T14.8XXA OPEN WOUND: Primary | ICD-10-CM

## 2025-04-22 LAB
ANION GAP SERPL CALC-SCNC: 11 MEQ/L (ref 8–16)
BASOPHILS ABSOLUTE: 0.1 THOU/MM3 (ref 0–0.1)
BASOPHILS NFR BLD AUTO: 0.8 %
BUN SERPL-MCNC: 15 MG/DL (ref 8–23)
CALCIUM SERPL-MCNC: 9.1 MG/DL (ref 8.8–10.2)
CHLORIDE SERPL-SCNC: 106 MEQ/L (ref 98–111)
CO2 SERPL-SCNC: 23 MEQ/L (ref 22–29)
CREAT SERPL-MCNC: 0.9 MG/DL (ref 0.7–1.2)
DEPRECATED RDW RBC AUTO: 43 FL (ref 35–45)
EOSINOPHIL NFR BLD AUTO: 3.3 %
EOSINOPHILS ABSOLUTE: 0.2 THOU/MM3 (ref 0–0.4)
ERYTHROCYTE [DISTWIDTH] IN BLOOD BY AUTOMATED COUNT: 13.1 % (ref 11.5–14.5)
GFR SERPL CREATININE-BSD FRML MDRD: 90 ML/MIN/1.73M2
GLUCOSE SERPL-MCNC: 109 MG/DL (ref 74–109)
HCT VFR BLD AUTO: 38.9 % (ref 42–52)
HGB BLD-MCNC: 12.6 GM/DL (ref 14–18)
IMM GRANULOCYTES # BLD AUTO: 0.03 THOU/MM3 (ref 0–0.07)
IMM GRANULOCYTES NFR BLD AUTO: 0.5 %
LACTATE SERPL-SCNC: 2.2 MMOL/L (ref 0.5–2)
LYMPHOCYTES ABSOLUTE: 1.2 THOU/MM3 (ref 1–4.8)
LYMPHOCYTES NFR BLD AUTO: 19 %
MCH RBC QN AUTO: 29.3 PG (ref 26–33)
MCHC RBC AUTO-ENTMCNC: 32.4 GM/DL (ref 32.2–35.5)
MCV RBC AUTO: 90.5 FL (ref 80–94)
MONOCYTES ABSOLUTE: 0.5 THOU/MM3 (ref 0.4–1.3)
MONOCYTES NFR BLD AUTO: 8.2 %
NEUTROPHILS ABSOLUTE: 4.4 THOU/MM3 (ref 1.8–7.7)
NEUTROPHILS NFR BLD AUTO: 68.2 %
NRBC BLD AUTO-RTO: 0 /100 WBC
OSMOLALITY SERPL CALC.SUM OF ELEC: 280.8 MOSMOL/KG (ref 275–300)
PLATELET # BLD AUTO: 217 THOU/MM3 (ref 130–400)
PMV BLD AUTO: 10.9 FL (ref 9.4–12.4)
POTASSIUM SERPL-SCNC: 3.7 MEQ/L (ref 3.5–5.2)
RBC # BLD AUTO: 4.3 MILL/MM3 (ref 4.7–6.1)
SODIUM SERPL-SCNC: 140 MEQ/L (ref 135–145)
WBC # BLD AUTO: 6.4 THOU/MM3 (ref 4.8–10.8)

## 2025-04-22 PROCEDURE — 36415 COLL VENOUS BLD VENIPUNCTURE: CPT

## 2025-04-22 PROCEDURE — 80048 BASIC METABOLIC PNL TOTAL CA: CPT

## 2025-04-22 PROCEDURE — 85025 COMPLETE CBC W/AUTO DIFF WBC: CPT

## 2025-04-22 PROCEDURE — 6370000000 HC RX 637 (ALT 250 FOR IP): Performed by: EMERGENCY MEDICINE

## 2025-04-22 PROCEDURE — 99283 EMERGENCY DEPT VISIT LOW MDM: CPT

## 2025-04-22 PROCEDURE — 83605 ASSAY OF LACTIC ACID: CPT

## 2025-04-22 RX ORDER — MUPIROCIN 20 MG/G
OINTMENT TOPICAL 2 TIMES DAILY
Status: DISCONTINUED | OUTPATIENT
Start: 2025-04-22 | End: 2025-04-22 | Stop reason: HOSPADM

## 2025-04-22 RX ADMIN — MUPIROCIN: 20 OINTMENT TOPICAL at 10:01
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
## 2025-04-22 NOTE — ED NOTES
Pt presents to the ED with complaints of wound noted to L shin. Pt was seen in ED on Sunday and was given one dose of vancomycin and sent home on oral antibiotics. Pt wound is red and raised and more swollen then Sunday. Pt ambulated without any difficulties.
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
## 2025-04-22 NOTE — CARE COORDINATION
Spoke with Aria at wound clinic who verified they received referral for patient and will be calling today to make initial appointment. Verified patient and spouse numbers were correct in epic.

## 2025-04-23 ENCOUNTER — HOSPITAL ENCOUNTER (OUTPATIENT)
Dept: WOUND CARE | Age: 75
Discharge: HOME | End: 2025-04-23
Attending: INTERNAL MEDICINE
Payer: MEDICARE

## 2025-04-23 VITALS
DIASTOLIC BLOOD PRESSURE: 93 MMHG | HEART RATE: 66 BPM | SYSTOLIC BLOOD PRESSURE: 128 MMHG | TEMPERATURE: 97.9 F | RESPIRATION RATE: 18 BRPM | OXYGEN SATURATION: 98 %

## 2025-04-23 PROCEDURE — 6360000002 HC RX W HCPCS: Performed by: INTERNAL MEDICINE

## 2025-04-23 PROCEDURE — 87205 SMEAR GRAM STAIN: CPT

## 2025-04-23 PROCEDURE — 99213 OFFICE O/P EST LOW 20 MIN: CPT

## 2025-04-23 PROCEDURE — 87077 CULTURE AEROBIC IDENTIFY: CPT

## 2025-04-23 PROCEDURE — 87070 CULTURE OTHR SPECIMN AEROBIC: CPT

## 2025-04-23 PROCEDURE — 87147 CULTURE TYPE IMMUNOLOGIC: CPT

## 2025-04-23 PROCEDURE — 10060 I&D ABSCESS SIMPLE/SINGLE: CPT

## 2025-04-23 PROCEDURE — 87186 SC STD MICRODIL/AGAR DIL: CPT

## 2025-04-23 RX ORDER — LIDOCAINE HYDROCHLORIDE 20 MG/ML
20 INJECTION, SOLUTION EPIDURAL; INFILTRATION; INTRACAUDAL; PERINEURAL ONCE
Status: DISCONTINUED | OUTPATIENT
Start: 2025-04-23 | End: 2025-04-23

## 2025-04-23 RX ORDER — LIDOCAINE HYDROCHLORIDE 20 MG/ML
20 INJECTION, SOLUTION INFILTRATION; PERINEURAL ONCE
Status: COMPLETED | OUTPATIENT
Start: 2025-04-23 | End: 2025-04-23

## 2025-04-23 RX ADMIN — LIDOCAINE HYDROCHLORIDE 20 ML: 20 INJECTION, SOLUTION INFILTRATION; PERINEURAL at 09:38

## 2025-04-23 ASSESSMENT — PAIN DESCRIPTION - LOCATION: LOCATION: LEG

## 2025-04-23 ASSESSMENT — PAIN SCALES - GENERAL
PAINLEVEL_OUTOF10: 1
PAINLEVEL_OUTOF10: 1

## 2025-04-23 ASSESSMENT — PAIN DESCRIPTION - ORIENTATION: ORIENTATION: LEFT
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
## 2025-04-23 NOTE — PLAN OF CARE
Problem: Wound:  Goal: Will show signs of wound healing; wound closure and no evidence of infection  Description: Will show signs of wound healing; wound closure and no evidence of infection  Outcome: Progressing   Patient seen in clinic today for left leg wound. No s/s of infection. See AVS. Follow up in  2 days and 1 week. Care plan reviewed with patient and spouse. Patient and spouse verbalizes understanding of the plan of care and contributes to goal setting.

## 2025-04-23 NOTE — PATIENT INSTRUCTIONS
Visit Discharge/Physician Orders:  - bedside I & D was done by Dr. Campos  - culture taken today, will call if antibiotic needs changed        Wound Location: left leg    Dressing orders:     1) Gather wound care supplies and arrange on clean table.     2) Wash your hands with soap and water or use alcohol based hand  for 20 seconds (sing \"Happy Birthday\" twice).    3) Cleanse wounds with normal saline or wound cleanser and gauze. Pat dry with clean gauze.    4) Placed mesalt in wound to create wick, covered with ABD and secured with roll gauze and ACE wrap. Will change dressing on Friday.        Keep all dressings clean & dry.    Follow up visit:    Friday 4/25/25 at 8AM with Hailey Wells CNP and 4/30/25 at 9:15AM with Dr. Campos    Supplies: n/a    Duration of dressings: n/a    We have sent your supply order to the following company:  n/a  If you don't receive the items you were expecting or don't know what the items are that you received, call the company where the order was sent.   If you are unable to obtain wound supplies, continue to use the supplies you have available until you are able to reach us. It is most important to keep the wound covered at all times.  It is YOUR responsibility to make sure that supplies are re-ordered before you run out. Re-order telephone numbers are included in each package.     Keep next scheduled appointment. Please give 24 hour notice if unable to keep appointment. [phone]    If you experience any of the following, please call the Wound Care Service during business hours: Monday through Friday 8:00 am - 4:30 pm  ([phone]).   *Increase in pain   *Temperature over 101   *Increase in drainage from your wound or a foul odor   *Uncontrolled swelling   *Need for compression bandage changes due to slippage, breakthrough drainage    If you need medical attention outside of business hours, please contact your Primary Care Doctor or go to the nearest emergency room.

## 2025-04-23 NOTE — PROGRESS NOTES
Patient called and said he is draining through his bandages. Updated Dr. Campos and he said to have patient come in and re-dress wound. Patient came in and it was not draining through dressing, the bandage had slid down on his leg below the wound. Wound re-dressed as ordered with mesalt packing, drawtex, ABD pads, roll gauze, and ace bandage. Instructed patient to wear shorts or loose fitting pants as the dressing rolled down due to tight fitting pants, patient verbalized understanding. Instructed patient to call with any concerns and keep scheduled follow up on Friday.

## 2025-04-24 ENCOUNTER — HOSPITAL ENCOUNTER (OUTPATIENT)
Dept: AUDIOLOGY | Age: 75
Discharge: HOME | End: 2025-04-24

## 2025-04-24 PROCEDURE — 92593 HC HEARING AID CHECK, BOTH EARS: CPT | Performed by: AUDIOLOGIST

## 2025-04-24 NOTE — PROGRESS NOTES
ACCOUNT #: [id number]      DIAGNOSIS: H90.3    6 month Hearing Aid Check:: (Rose Rush P70-R x2- fit , warranty  02/15/2023)- The patient is not having any issues. NO problems since his last visit 10/24/2025. All problems were resolved during his last visit.  Otoscopy was clear, bilaterally. Both hearing aids were cleaned- mics and receivers cleaned. New  filters, retention wires and domes were installed. NO issues noted with listening check. Verified firmware version is current. Reminded patient how to clean his hearing aids with a brush. . Scheduled annual hearing aid check for 10/23/2025. Will see patient sooner if problems arise. Patient stated he has sufficient supplies at home. Billed $25.

## 2025-04-25 ENCOUNTER — HOSPITAL ENCOUNTER (OUTPATIENT)
Dept: WOUND CARE | Age: 75
Discharge: HOME | End: 2025-04-25
Attending: INTERNAL MEDICINE
Payer: MEDICARE

## 2025-04-25 VITALS
TEMPERATURE: 97.5 F | HEART RATE: 64 BPM | DIASTOLIC BLOOD PRESSURE: 94 MMHG | RESPIRATION RATE: 16 BRPM | SYSTOLIC BLOOD PRESSURE: 133 MMHG | OXYGEN SATURATION: 100 %

## 2025-04-25 DIAGNOSIS — L02.416 ABSCESS OF LEFT LEG: Primary | ICD-10-CM

## 2025-04-25 DIAGNOSIS — Z22.322 MRSA (METHICILLIN RESISTANT STAPH AUREUS) CULTURE POSITIVE: ICD-10-CM

## 2025-04-25 LAB
BACTERIA BLD AEROBE CULT: NORMAL
BACTERIA BLD AEROBE CULT: NORMAL
BACTERIA SPEC AEROBE CULT: ABNORMAL
BACTERIA SPEC AEROBE CULT: ABNORMAL
GRAM STN SPEC: ABNORMAL
ORGANISM: ABNORMAL

## 2025-04-25 PROCEDURE — 99214 OFFICE O/P EST MOD 30 MIN: CPT | Performed by: NURSE PRACTITIONER

## 2025-04-25 PROCEDURE — 99213 OFFICE O/P EST LOW 20 MIN: CPT

## 2025-04-25 RX ORDER — IBUPROFEN 200 MG
200 TABLET ORAL EVERY 6 HOURS PRN
COMMUNITY

## 2025-04-25 RX ORDER — NEOMYCIN/BACITRACIN/POLYMYXINB 3.5-400-5K
OINTMENT (GRAM) TOPICAL PRN
OUTPATIENT
Start: 2025-04-25

## 2025-04-25 RX ORDER — GENTAMICIN SULFATE 1 MG/G
OINTMENT TOPICAL PRN
OUTPATIENT
Start: 2025-04-25

## 2025-04-25 RX ORDER — LIDOCAINE 50 MG/G
OINTMENT TOPICAL PRN
Status: CANCELLED | OUTPATIENT
Start: 2025-04-25

## 2025-04-25 RX ORDER — BETAMETHASONE DIPROPIONATE 0.5 MG/G
CREAM TOPICAL PRN
Status: CANCELLED | OUTPATIENT
Start: 2025-04-25

## 2025-04-25 RX ORDER — MUPIROCIN 20 MG/G
OINTMENT TOPICAL PRN
Status: CANCELLED | OUTPATIENT
Start: 2025-04-25

## 2025-04-25 RX ORDER — GENTAMICIN SULFATE 1 MG/G
OINTMENT TOPICAL PRN
Status: CANCELLED | OUTPATIENT
Start: 2025-04-25

## 2025-04-25 RX ORDER — SILVER SULFADIAZINE 10 MG/G
CREAM TOPICAL PRN
Status: CANCELLED | OUTPATIENT
Start: 2025-04-25

## 2025-04-25 RX ORDER — SILVER SULFADIAZINE 10 MG/G
CREAM TOPICAL PRN
OUTPATIENT
Start: 2025-04-25

## 2025-04-25 RX ORDER — LIDOCAINE HYDROCHLORIDE 20 MG/ML
JELLY TOPICAL PRN
OUTPATIENT
Start: 2025-04-25

## 2025-04-25 RX ORDER — SODIUM CHLOR/HYPOCHLOROUS ACID 0.033 %
SOLUTION, IRRIGATION IRRIGATION PRN
OUTPATIENT
Start: 2025-04-25

## 2025-04-25 RX ORDER — LIDOCAINE 40 MG/G
CREAM TOPICAL PRN
OUTPATIENT
Start: 2025-04-25

## 2025-04-25 RX ORDER — NEOMYCIN/BACITRACIN/POLYMYXINB 3.5-400-5K
OINTMENT (GRAM) TOPICAL PRN
Status: CANCELLED | OUTPATIENT
Start: 2025-04-25

## 2025-04-25 RX ORDER — TRIAMCINOLONE ACETONIDE 1 MG/G
OINTMENT TOPICAL PRN
Status: CANCELLED | OUTPATIENT
Start: 2025-04-25

## 2025-04-25 RX ORDER — LIDOCAINE 40 MG/G
CREAM TOPICAL PRN
Status: CANCELLED | OUTPATIENT
Start: 2025-04-25

## 2025-04-25 RX ORDER — LIDOCAINE HYDROCHLORIDE 40 MG/ML
SOLUTION TOPICAL PRN
OUTPATIENT
Start: 2025-04-25

## 2025-04-25 RX ORDER — BACITRACIN ZINC AND POLYMYXIN B SULFATE 500; 1000 [USP'U]/G; [USP'U]/G
OINTMENT TOPICAL PRN
Status: CANCELLED | OUTPATIENT
Start: 2025-04-25

## 2025-04-25 RX ORDER — TRIAMCINOLONE ACETONIDE 1 MG/G
OINTMENT TOPICAL PRN
OUTPATIENT
Start: 2025-04-25

## 2025-04-25 RX ORDER — GINSENG 100 MG
CAPSULE ORAL PRN
Status: CANCELLED | OUTPATIENT
Start: 2025-04-25

## 2025-04-25 RX ORDER — SULFAMETHOXAZOLE AND TRIMETHOPRIM 800; 160 MG/1; MG/1
1 TABLET ORAL 2 TIMES DAILY
Qty: 14 TABLET | Refills: 0 | Status: SHIPPED | OUTPATIENT
Start: 2025-04-25 | End: 2025-05-02

## 2025-04-25 RX ORDER — CLOBETASOL PROPIONATE 0.5 MG/G
OINTMENT TOPICAL PRN
OUTPATIENT
Start: 2025-04-25

## 2025-04-25 RX ORDER — SODIUM CHLOR/HYPOCHLOROUS ACID 0.033 %
SOLUTION, IRRIGATION IRRIGATION PRN
Status: CANCELLED | OUTPATIENT
Start: 2025-04-25

## 2025-04-25 RX ORDER — LIDOCAINE HYDROCHLORIDE 20 MG/ML
JELLY TOPICAL PRN
Status: CANCELLED | OUTPATIENT
Start: 2025-04-25

## 2025-04-25 RX ORDER — BETAMETHASONE DIPROPIONATE 0.5 MG/G
CREAM TOPICAL PRN
OUTPATIENT
Start: 2025-04-25

## 2025-04-25 RX ORDER — BACITRACIN ZINC AND POLYMYXIN B SULFATE 500; 1000 [USP'U]/G; [USP'U]/G
OINTMENT TOPICAL PRN
OUTPATIENT
Start: 2025-04-25

## 2025-04-25 RX ORDER — LIDOCAINE HYDROCHLORIDE 40 MG/ML
SOLUTION TOPICAL PRN
Status: CANCELLED | OUTPATIENT
Start: 2025-04-25

## 2025-04-25 RX ORDER — GINSENG 100 MG
CAPSULE ORAL PRN
OUTPATIENT
Start: 2025-04-25

## 2025-04-25 RX ORDER — CLOBETASOL PROPIONATE 0.5 MG/G
OINTMENT TOPICAL PRN
Status: CANCELLED | OUTPATIENT
Start: 2025-04-25

## 2025-04-25 RX ORDER — MUPIROCIN 20 MG/G
OINTMENT TOPICAL PRN
OUTPATIENT
Start: 2025-04-25

## 2025-04-25 RX ORDER — LIDOCAINE 50 MG/G
OINTMENT TOPICAL PRN
OUTPATIENT
Start: 2025-04-25

## 2025-04-25 ASSESSMENT — PAIN DESCRIPTION - ORIENTATION: ORIENTATION: LEFT

## 2025-04-25 ASSESSMENT — PAIN DESCRIPTION - DESCRIPTORS: DESCRIPTORS: ACHING

## 2025-04-25 ASSESSMENT — PAIN DESCRIPTION - LOCATION: LOCATION: LEG

## 2025-04-25 ASSESSMENT — PAIN SCALES - GENERAL: PAINLEVEL_OUTOF10: 1
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
## 2025-04-25 NOTE — PATIENT INSTRUCTIONS
Visit Discharge/Physician Orders:  - STOP cephalexin antibiotic.  - CONTINUE bactrim (sulfamethoxazole-trimethoprim), additional antibiotics sent into pharmacy to continue this.   - Take ibuprofen and tylenol for pain, alternating.   - If you develop fever, increased pain, or worsening condition then go to ER.   - Eat yogurt daily and/or daily probiotic.     Wound Location: Left leg    Dressing orders:     1) Gather wound care supplies and arrange on clean table.     2) Wash your hands with soap and water or use alcohol based hand  for 20 seconds (sing \"Happy Birthday\" twice).    3) Cleanse wounds with normal saline or wound cleanser and gauze. Pat dry with clean gauze.    4) Left leg - Packed wound with mesalt ribbon, covered with silicone bordered foam dressing, secured with roll gauze and ACE wrap. Wound clinic will change dressing on Monday.    Keep all dressings clean & dry.    Follow up visit:  Monday 4/28/25 at 9 am with Hailey Wells CNP, Wednesday 4/30/25 at 9:15 am with Dr. Campos    Supplies: n/a    Duration of dressings: n/a    We have sent your supply order to the following company:  n/a  If you don't receive the items you were expecting or don't know what the items are that you received, call the company where the order was sent.   If you are unable to obtain wound supplies, continue to use the supplies you have available until you are able to reach us. It is most important to keep the wound covered at all times.  It is YOUR responsibility to make sure that supplies are re-ordered before you run out. Re-order telephone numbers are included in each package.     Keep next scheduled appointment. Please give 24 hour notice if unable to keep appointment. [phone]    If you experience any of the following, please call the Wound Care Service during business hours: Monday through Friday 8:00 am - 4:30 pm  ([phone]).   *Increase in pain   *Temperature over 101   *Increase in drainage from

## 2025-04-25 NOTE — PLAN OF CARE
Problem: Wound:  Goal: Will show signs of wound healing; wound closure and no evidence of infection  Description: Will show signs of wound healing; wound closure and no evidence of infection  Outcome: Progressing   Patient presents to wound clinic for follow up of left leg wound. Discharge instructions/dressing orders per AVS. Follow up appointment scheduled on Monday.    Care plan reviewed with patient.  Patient verbalize understanding of the plan of care and contribute to goal setting.      "Consults    History Of Present Illness  Garrison Rodriguez \"Jean Claude\" is a 89 y.o. male with PMHx of HTN, AFIB( on Eliquis) , HLD  presenting with tremors for the last 2 yrs. Tremors present both in resting and increases with activity. He was also complaining of dizziness and vertigo, taking meclizine . on Metoprolol for afib. Also having restricted neck movement. Feels little dizzy when standing up from sitting. RTKA (10 yrs) and LTKA(5 yrs). Has BL hearing loss.           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    Post-herpetic polyneuropathy    Spondylosis, unspecified 10/12/2016    Degenerative arthritis of spine          Past Surgical History:   Procedure Laterality Date    BREAST LUMPECTOMY  07/15/2016    Left Breast Lumpectomy    OTHER SURGICAL HISTORY  05/18/2022    Gallbladder surgery    OTHER SURGICAL HISTORY  05/18/2022    Heart surgery    OTHER SURGICAL HISTORY  05/18/2022    Back surgery    OTHER SURGICAL HISTORY  04/15/2020    Cholecystectomy with cholangiography    OTHER SURGICAL HISTORY  04/15/2020    Esophagogastroduodenoscopy    TONSILLECTOMY  06/25/2018    Tonsillectomy    TOTAL KNEE ARTHROPLASTY  01/04/2023    Total Knee Arthroplasty        Social History  Social History     Tobacco Use    Smoking status: Never    Smokeless tobacco: Never   Substance Use Topics    Alcohol use: Never    Drug use: Never     Allergies  Patient has no known allergies.  (Not in a hospital admission)      Review of Systems   Neurological:  Positive for dizziness and tremors.   All other systems reviewed and are negative.        Cardiovascular system: S1-S2 " intact  Respiratory clear to auscultation bilateral on deep breathing he feels irritability on the left side of the chest.    Neurological Exam  Mental Status  Awake, alert and oriented to person, place and time. Speech is normal. Language is fluent with no aphasia. Attention and concentration are normal.    Cranial Nerves  CN II: Visual acuity is normal. Visual fields full to confrontation.  CN III, IV, VI: Extraocular movements intact bilaterally. Normal lids and orbits bilaterally. Pupils equal round and reactive to light bilaterally.  CN V: Facial sensation is normal.  CN VII: Full and symmetric facial movement.  CN VIII: Hearing is normal.  CN IX, X: Palate elevates symmetrically. Normal gag reflex.  CN XI: Shoulder shrug strength is normal.  CN XII: Tongue midline without atrophy or fasciculations.    Motor  Normal muscle bulk throughout. No fasciculations present. Normal muscle tone. The following abnormal movements were seen: Resting tremors of rt little finger and left thumb  Fine tremors of bl hands.   Strength is 5/5 throughout all four extremities.  Spiral test normal, no micro graphia  Right little finger and left thumb tremors  .    Sensory  Sensation is intact to light touch, pinprick, vibration and proprioception in all four extremities.    Reflexes                                            Right                      Left  Brachioradialis                    2+                         2+  Biceps                                 2+                         2+  Triceps                                2+                         2+  Patellar                                                        2+  Achilles                                                        2+    Right pathological reflexes: Suprapatellar absent.    Coordination  Right: Finger-to-nose normal. Rapid alternating movement normal. Heel-to-shin abnormality:Left: Finger-to-nose normal. Heel-to-shin abnormality:    Gait   Normal pull test.  "Able to rise from chair without using arms.  Mild stoup posture, stumbling, decreased swinging of both hands..        Last Recorded Vitals  Blood pressure 120/80, pulse 70, height 1.778 m (5' 10\"), weight 104 kg (229 lb).    Relevant Results      Current Outpatient Medications:     acetaminophen (Tylenol) 500 mg tablet, Take 1 tablet (500 mg) by mouth every 6 hours if needed., Disp: , Rfl:     apixaban (Eliquis) 5 mg tablet, Take 1 tablet (5 mg) by mouth 2 times a day., Disp: , Rfl:     b complex-vitamin c tablet, Take 1 tablet by mouth once daily., Disp: , Rfl:     cholecalciferol (Vitamin D-3) 50 mcg (2,000 unit) capsule, Take 1 capsule (50 mcg) by mouth early in the morning.., Disp: , Rfl:     cyanocobalamin, vitamin B-12, (Vitamin B-12) 2,500 mcg tablet, sublingual SL tablet, Take 1 tablet (2,500 mcg) by mouth once daily., Disp: , Rfl:     escitalopram (Lexapro) 20 mg tablet, TAKE 1 & 1/2 TABLETS BY MOUTH EVERY DAY, Disp: 135 tablet, Rfl: 0    loperamide (Imodium A-D) 2 mg tablet, Take 1 tablet (2 mg) by mouth if needed., Disp: , Rfl:     meclizine (Antivert) 25 mg tablet, Take 1 tablet (25 mg) by mouth if needed., Disp: , Rfl:     metoprolol succinate XL (Toprol-XL) 25 mg 24 hr tablet, Take 0.5 tablets (12.5 mg) by mouth once daily., Disp: , Rfl:     mv-min-folic-K1-lycopen-lutein (Centrum Silver Men) 341--300 mcg tablet, Take by mouth once daily., Disp: , Rfl:     rosuvastatin (Crestor) 10 mg tablet, Take 1 tablet (10 mg) by mouth once daily., Disp: , Rfl:      Continuous medications    PRN medications, reviewed                     Assessment/Plan     # Mixed tremor  # cervical myelopathy/ stenosis  # Vertigo and dizizness  - more features of ET >RT ,  will give a trial of Primidone 25 mg nightly  - pt was a former , has limited cervical neck movement. we want to exclude cervical stenosis or myelopathy,  ordered cervical MRI   - Orthostatic vitals measurement at home for dizziness and " vertigo whether it is due to low BP or not.   - recommended to dc Metoprolol if possible after discussing with cardiologist.  - return in 2 months    Oz Mitchell MD   Internal Medicine  PGY2     Patient/Family Education: Extensive time was spent educating the patient on relevant anatomy, clinical findings and imaging, as well as discussing the potential diagnoses as discussed above.  Pharmacology: as above. Exercise: I discussed the importance of maintaining a daily exercise program, including stretching and strengthening. Preventative strategies were reviewed, specifically avoidance of any exercises that exacerbate pain.Return to online virtual visit/ clinic visit for follow-up with EVE Centeno in 2 weeks or sooner as needed.The patient expressed understanding and agreement with the assessment and plan.  Patient encouraged to contact us should they have any questions, concerns, or any changes in symptoms. Thank you for allowing me to participate in the care of your patient.** This note is created using speech recognition transcription software. Despite proofreading, several typographical errors might be present that might affect the meaning of the content. Please call with any questions.**          Oz Mitchell MD

## 2025-04-28 ENCOUNTER — HOSPITAL ENCOUNTER (OUTPATIENT)
Dept: WOUND CARE | Age: 75
Discharge: HOME | End: 2025-04-28
Attending: INTERNAL MEDICINE
Payer: MEDICARE

## 2025-04-28 VITALS
OXYGEN SATURATION: 98 % | SYSTOLIC BLOOD PRESSURE: 143 MMHG | HEART RATE: 63 BPM | DIASTOLIC BLOOD PRESSURE: 67 MMHG | TEMPERATURE: 97.6 F | RESPIRATION RATE: 18 BRPM

## 2025-04-28 DIAGNOSIS — L02.416 ABSCESS OF LEFT LEG: Primary | ICD-10-CM

## 2025-04-28 DIAGNOSIS — Z22.322 MRSA (METHICILLIN RESISTANT STAPH AUREUS) CULTURE POSITIVE: ICD-10-CM

## 2025-04-28 PROCEDURE — 99214 OFFICE O/P EST MOD 30 MIN: CPT | Performed by: NURSE PRACTITIONER

## 2025-04-28 PROCEDURE — 99213 OFFICE O/P EST LOW 20 MIN: CPT

## 2025-04-28 RX ORDER — CLOBETASOL PROPIONATE 0.5 MG/G
OINTMENT TOPICAL PRN
Status: CANCELLED | OUTPATIENT
Start: 2025-04-28

## 2025-04-28 RX ORDER — SILVER SULFADIAZINE 10 MG/G
CREAM TOPICAL PRN
Status: CANCELLED | OUTPATIENT
Start: 2025-04-28

## 2025-04-28 RX ORDER — BACITRACIN ZINC AND POLYMYXIN B SULFATE 500; 1000 [USP'U]/G; [USP'U]/G
OINTMENT TOPICAL PRN
Status: CANCELLED | OUTPATIENT
Start: 2025-04-28

## 2025-04-28 RX ORDER — GINSENG 100 MG
CAPSULE ORAL PRN
Status: CANCELLED | OUTPATIENT
Start: 2025-04-28

## 2025-04-28 RX ORDER — NEOMYCIN/BACITRACIN/POLYMYXINB 3.5-400-5K
OINTMENT (GRAM) TOPICAL PRN
Status: CANCELLED | OUTPATIENT
Start: 2025-04-28

## 2025-04-28 RX ORDER — TRIAMCINOLONE ACETONIDE 1 MG/G
OINTMENT TOPICAL PRN
Status: CANCELLED | OUTPATIENT
Start: 2025-04-28

## 2025-04-28 RX ORDER — LIDOCAINE 50 MG/G
OINTMENT TOPICAL PRN
Status: CANCELLED | OUTPATIENT
Start: 2025-04-28

## 2025-04-28 RX ORDER — SODIUM CHLOR/HYPOCHLOROUS ACID 0.033 %
SOLUTION, IRRIGATION IRRIGATION PRN
Status: CANCELLED | OUTPATIENT
Start: 2025-04-28

## 2025-04-28 RX ORDER — LIDOCAINE 40 MG/G
CREAM TOPICAL PRN
Status: CANCELLED | OUTPATIENT
Start: 2025-04-28

## 2025-04-28 RX ORDER — BETAMETHASONE DIPROPIONATE 0.5 MG/G
CREAM TOPICAL PRN
Status: CANCELLED | OUTPATIENT
Start: 2025-04-28

## 2025-04-28 RX ORDER — GENTAMICIN SULFATE 1 MG/G
OINTMENT TOPICAL PRN
Status: CANCELLED | OUTPATIENT
Start: 2025-04-28

## 2025-04-28 RX ORDER — LIDOCAINE HYDROCHLORIDE 40 MG/ML
SOLUTION TOPICAL PRN
Status: CANCELLED | OUTPATIENT
Start: 2025-04-28

## 2025-04-28 RX ORDER — LIDOCAINE HYDROCHLORIDE 20 MG/ML
JELLY TOPICAL PRN
Status: CANCELLED | OUTPATIENT
Start: 2025-04-28

## 2025-04-28 RX ORDER — MUPIROCIN 20 MG/G
OINTMENT TOPICAL PRN
Status: CANCELLED | OUTPATIENT
Start: 2025-04-28

## 2025-04-28 ASSESSMENT — PAIN DESCRIPTION - LOCATION: LOCATION: LEG

## 2025-04-28 ASSESSMENT — PAIN DESCRIPTION - ORIENTATION: ORIENTATION: LEFT

## 2025-04-28 ASSESSMENT — PAIN SCALES - GENERAL: PAINLEVEL_OUTOF10: 2

## 2025-04-28 NOTE — PLAN OF CARE
Problem: Wound:  Goal: Will show signs of wound healing; wound closure and no evidence of infection  Description: Will show signs of wound healing; wound closure and no evidence of infection  Outcome: Progressing   Seen today for left leg wound. See AVS for discharge   Care plan reviewed with patient.  Patient verbalize understanding of the plan of care and contribute to goal setting.

## 2025-04-28 NOTE — PATIENT INSTRUCTIONS
Visit Discharge/Physician Orders:  - STOP cephalexin antibiotic.  - CONTINUE bactrim (sulfamethoxazole-trimethoprim)  - Take ibuprofen and tylenol for pain, alternating.   - If you develop fever, increased pain, or worsening condition then go to ER.   - Eat yogurt daily and/or daily probiotic.     Wound Location: Left leg    Dressing orders:     1) Gather wound care supplies and arrange on clean table.     2) Wash your hands with soap and water or use alcohol based hand  for 20 seconds (sing \"Happy Birthday\" twice).    3) Cleanse wounds with normal saline or wound cleanser and gauze. Pat dry with clean gauze.    4) Left leg - Packed wound with mesalt ribbon, covered with silicone bordered foam dressing, secured with roll gauze and ACE wrap. Wound clinic will change dressing on Wednesday.     Keep all dressings clean & dry.    Follow up visit:  Wednesday 4/30/25 at 9:15 am with Dr. Campos    Supplies: n/a    Duration of dressings: n/a    We have sent your supply order to the following company:  n/a  If you don't receive the items you were expecting or don't know what the items are that you received, call the company where the order was sent.   If you are unable to obtain wound supplies, continue to use the supplies you have available until you are able to reach us. It is most important to keep the wound covered at all times.  It is YOUR responsibility to make sure that supplies are re-ordered before you run out. Re-order telephone numbers are included in each package.     Keep next scheduled appointment. Please give 24 hour notice if unable to keep appointment. [phone]    If you experience any of the following, please call the Wound Care Service during business hours: Monday through Friday 8:00 am - 4:30 pm  ([phone]).   *Increase in pain   *Temperature over 101   *Increase in drainage from your wound or a foul odor   *Uncontrolled swelling   *Need for compression bandage changes due to slippage,

## 2025-04-28 NOTE — PROGRESS NOTES
Mother     Stroke Mother     Heart Disease Mother     Glaucoma Father     Heart Disease Father     High Blood Pressure Father     High Cholesterol Father     Cancer Father     Prostate Cancer Father     Stroke Father     Other Father         Brain Aneuryseum       SOCIAL HISTORY     Social History     Tobacco Use    Smoking status: Never     Passive exposure: Never    Smokeless tobacco: Never    Tobacco comments:     Never smoker   Vaping Use    Vaping status: Never Used   Substance Use Topics    Alcohol use: Never    Drug use: Never       ALLERGIES     Allergies   Allergen Reactions    Codeine Other (See Comments)     migraine    Codone [Hydrocodone] Other (See Comments)     headache    Hydrocodone Other (See Comments)     migraine    Oxycodone      Wife states gives pt a headache       MEDICATIONS     Current Outpatient Medications on File Prior to Encounter   Medication Sig Dispense Refill    ibuprofen (ADVIL;MOTRIN) 200 MG tablet Take 1 tablet by mouth every 6 hours as needed for Pain 2 tablets      sulfamethoxazole-trimethoprim (BACTRIM DS) 800-160 MG per tablet Take 1 tablet by mouth 2 times daily for 7 days 14 tablet 0    vitamin C (ASCORBIC ACID) 500 MG tablet Take 1 tablet by mouth daily 30 tablet 3    calcium carbonate (OSCAL) 500 MG TABS tablet Take 1 tablet by mouth every other day      B Complex-Folic Acid (B-100 BALANCED TR PO) Take 1 tablet by mouth 2 times daily (before meals) [place]      Cinnamon 500 MG CAPS Take 2 capsules by mouth 4 times daily (before meals and nightly)      vitamin D (CHOLECALCIFEROL) 25 MCG (1000 UT) TABS tablet Take 5 tablets by mouth daily Double Mon Thur until current product is gone       No current facility-administered medications on file prior to encounter.       REVIEW OF SYSTEMS:     A comprehensive review of systems was negative except for: Pertinent items are noted in HPI.    PHYSICAL EXAM:     BP (!) 143/67   Pulse 63   Temp 97.6 °F (36.4 °C) (Infrared)

## 2025-04-30 ENCOUNTER — HOSPITAL ENCOUNTER (OUTPATIENT)
Dept: WOUND CARE | Age: 75
Discharge: HOME | End: 2025-04-30
Attending: INTERNAL MEDICINE
Payer: MEDICARE

## 2025-04-30 VITALS
HEART RATE: 66 BPM | SYSTOLIC BLOOD PRESSURE: 138 MMHG | TEMPERATURE: 97.6 F | OXYGEN SATURATION: 97 % | RESPIRATION RATE: 18 BRPM | DIASTOLIC BLOOD PRESSURE: 84 MMHG

## 2025-04-30 DIAGNOSIS — L02.416 ABSCESS OF LEFT LEG: Primary | ICD-10-CM

## 2025-04-30 PROCEDURE — 99213 OFFICE O/P EST LOW 20 MIN: CPT

## 2025-04-30 RX ORDER — CLOBETASOL PROPIONATE 0.5 MG/G
OINTMENT TOPICAL PRN
OUTPATIENT
Start: 2025-04-30

## 2025-04-30 RX ORDER — LIDOCAINE HYDROCHLORIDE 20 MG/ML
JELLY TOPICAL PRN
Status: CANCELLED | OUTPATIENT
Start: 2025-04-30

## 2025-04-30 RX ORDER — NEOMYCIN/BACITRACIN/POLYMYXINB 3.5-400-5K
OINTMENT (GRAM) TOPICAL PRN
Status: CANCELLED | OUTPATIENT
Start: 2025-04-30

## 2025-04-30 RX ORDER — GENTAMICIN SULFATE 1 MG/G
OINTMENT TOPICAL PRN
Status: CANCELLED | OUTPATIENT
Start: 2025-04-30

## 2025-04-30 RX ORDER — NEOMYCIN/BACITRACIN/POLYMYXINB 3.5-400-5K
OINTMENT (GRAM) TOPICAL PRN
OUTPATIENT
Start: 2025-04-30

## 2025-04-30 RX ORDER — SODIUM CHLOR/HYPOCHLOROUS ACID 0.033 %
SOLUTION, IRRIGATION IRRIGATION PRN
OUTPATIENT
Start: 2025-04-30

## 2025-04-30 RX ORDER — LIDOCAINE 50 MG/G
OINTMENT TOPICAL PRN
Status: CANCELLED | OUTPATIENT
Start: 2025-04-30

## 2025-04-30 RX ORDER — SILVER SULFADIAZINE 10 MG/G
CREAM TOPICAL PRN
Status: CANCELLED | OUTPATIENT
Start: 2025-04-30

## 2025-04-30 RX ORDER — BETAMETHASONE DIPROPIONATE 0.5 MG/G
CREAM TOPICAL PRN
Status: CANCELLED | OUTPATIENT
Start: 2025-04-30

## 2025-04-30 RX ORDER — LIDOCAINE 50 MG/G
OINTMENT TOPICAL PRN
OUTPATIENT
Start: 2025-04-30

## 2025-04-30 RX ORDER — TRIAMCINOLONE ACETONIDE 1 MG/G
OINTMENT TOPICAL PRN
OUTPATIENT
Start: 2025-04-30

## 2025-04-30 RX ORDER — BACITRACIN ZINC AND POLYMYXIN B SULFATE 500; 1000 [USP'U]/G; [USP'U]/G
OINTMENT TOPICAL PRN
OUTPATIENT
Start: 2025-04-30

## 2025-04-30 RX ORDER — BACITRACIN ZINC AND POLYMYXIN B SULFATE 500; 1000 [USP'U]/G; [USP'U]/G
OINTMENT TOPICAL PRN
Status: CANCELLED | OUTPATIENT
Start: 2025-04-30

## 2025-04-30 RX ORDER — LIDOCAINE HYDROCHLORIDE 20 MG/ML
JELLY TOPICAL PRN
OUTPATIENT
Start: 2025-04-30

## 2025-04-30 RX ORDER — SODIUM CHLOR/HYPOCHLOROUS ACID 0.033 %
SOLUTION, IRRIGATION IRRIGATION PRN
Status: CANCELLED | OUTPATIENT
Start: 2025-04-30

## 2025-04-30 RX ORDER — LIDOCAINE HYDROCHLORIDE 40 MG/ML
SOLUTION TOPICAL PRN
Status: CANCELLED | OUTPATIENT
Start: 2025-04-30

## 2025-04-30 RX ORDER — LIDOCAINE HYDROCHLORIDE 40 MG/ML
SOLUTION TOPICAL PRN
OUTPATIENT
Start: 2025-04-30

## 2025-04-30 RX ORDER — TRIAMCINOLONE ACETONIDE 1 MG/G
OINTMENT TOPICAL PRN
Status: CANCELLED | OUTPATIENT
Start: 2025-04-30

## 2025-04-30 RX ORDER — MUPIROCIN 20 MG/G
OINTMENT TOPICAL PRN
Status: CANCELLED | OUTPATIENT
Start: 2025-04-30

## 2025-04-30 RX ORDER — CLOBETASOL PROPIONATE 0.5 MG/G
OINTMENT TOPICAL PRN
Status: CANCELLED | OUTPATIENT
Start: 2025-04-30

## 2025-04-30 RX ORDER — LIDOCAINE 40 MG/G
CREAM TOPICAL PRN
OUTPATIENT
Start: 2025-04-30

## 2025-04-30 RX ORDER — GENTAMICIN SULFATE 1 MG/G
OINTMENT TOPICAL PRN
OUTPATIENT
Start: 2025-04-30

## 2025-04-30 RX ORDER — MUPIROCIN 20 MG/G
OINTMENT TOPICAL PRN
OUTPATIENT
Start: 2025-04-30

## 2025-04-30 RX ORDER — LIDOCAINE 40 MG/G
CREAM TOPICAL PRN
Status: CANCELLED | OUTPATIENT
Start: 2025-04-30

## 2025-04-30 RX ORDER — SILVER SULFADIAZINE 10 MG/G
CREAM TOPICAL PRN
OUTPATIENT
Start: 2025-04-30

## 2025-04-30 RX ORDER — GINSENG 100 MG
CAPSULE ORAL PRN
Status: CANCELLED | OUTPATIENT
Start: 2025-04-30

## 2025-04-30 RX ORDER — GINSENG 100 MG
CAPSULE ORAL PRN
OUTPATIENT
Start: 2025-04-30

## 2025-04-30 RX ORDER — BETAMETHASONE DIPROPIONATE 0.5 MG/G
CREAM TOPICAL PRN
OUTPATIENT
Start: 2025-04-30

## 2025-04-30 ASSESSMENT — PAIN DESCRIPTION - LOCATION: LOCATION: LEG

## 2025-04-30 ASSESSMENT — PAIN SCALES - GENERAL: PAINLEVEL_OUTOF10: 4

## 2025-04-30 ASSESSMENT — PAIN DESCRIPTION - ORIENTATION: ORIENTATION: LEFT

## 2025-04-30 NOTE — PLAN OF CARE
Problem: Wound:  Goal: Will show signs of wound healing; wound closure and no evidence of infection  Description: Will show signs of wound healing; wound closure and no evidence of infection  Outcome: Progressing     Patient presents to wound clinic for leg wound. No s/s of infection noted. See AVS for discharge instructions. Follow up visit: 2 weeks on Wednesday May 14th at 9:00 am     Care plan reviewed with patient wife.  Patient and wife verbalize understanding of the plan of care and contribute to goal setting.

## 2025-04-30 NOTE — PROGRESS NOTES
Wound Care Supplies      Supply Company:     [employer] [address] [place] [address] f: [phone] f: [phone] p: [phone] [email or web address]      Ordering Center:   [facility]  [address] [address]  [phone]  WOUND CARE Dept: [phone]   FAX NUMBER [phone]    Patient Information:      Quique Solo  [address]  [facility] [address]   [phone]   : 1950  AGE: 74 y.o.     GENDER: male   EPISODE DATE: 2025    Insurance:      PRIMARY INSURANCE:  Plan: MEDICARE PART A AND B  Coverage: MEDICARE  Effective Date: 2017  Group Number: [unfilled]  Subscriber Number: [id number] - (Medicare)    Payer/Plan Subscr  Sex Relation Sub. Ins. ID Effective Group Num   1. MEDICARE - ME* EDILQUIQUE JANN 1950 Male Self [id number]                                    [address] [address]   2. R - UMR QUIQUE SOLO JANN 1950 Male Self [id number]                                   [address] [address]       Patient Wound Information:      Problem List Items Addressed This Visit    None      WOUNDS REQUIRING DRESSING SUPPLIES:     Wound 21 Head Posterior Laceration (Active)   Number of days: 1431       Wound 25 Leg Left (Active)   Wound Image   25 0910   Wound Etiology Other 25 0910   Dressing Status Intact;New dressing applied 25 0910   Wound Cleansed Cleansed with saline 25 0910   Dressing/Treatment Ace wrap;Packing;Silicone border 25 0910   Offloading for Diabetic Foot Ulcers Offloading not required 25 0910   Wound Length (cm) 1.5 cm 25 0910   Wound Width (cm) 1 cm 25 0910   Wound Depth (cm) 0.5 cm 25 0910   Wound Surface Area (cm^2) 1.5 cm^2 25 0910   Change in Wound Size % (l*w) -4.9 25 0910   Wound Volume (cm^3) 0.75 cm^3 25 0910   Distance Tunneling (cm) 0.6 cm 25 0852   Tunneling Position ___ O'Clock 12 25 0852   Undermining

## 2025-04-30 NOTE — PROGRESS NOTES
[facility]          Progress Note and Procedure Note      Glenn Sanchez  MEDICAL RECORD NUMBER:  [id number]  AGE: 74 y.o.   GENDER: male  : 1950  EPISODE DATE:  2025    Subjective:     SUBJECTIVE     Chief Complaint   Patient presents with    Wound Check     Left leg            HISTORY OF PRESENT ILLNESS      Glenn Sanchez is a 74 y.o. male who presents today for wound/ulcer evaluation.  He had history of abscess on his left leg he had incision and drainage on 2025.  We have been packing the wound.  He has seen in follow-up at the wound clinic.  Today he is here for follow-up visit      PAST MEDICAL HISTORY         Diagnosis Date    Concussion with no loss of consciousness 07/10/2020    Fractured skull ([facility]CC) 2021    H/O traumatic brain injury 2022    Hyperlipidemia     Hypertension     Intraparenchymal hematoma of right side of brain due to trauma ([facility]CC) 2021         PAST SURGICAL HISTORY     Past Surgical History:   Procedure Laterality Date    APPENDECTOMY      APPENDECTOMY  2017    HERNIA REPAIR       FAMILY HISTORY         Family History   Problem Relation Age of Onset    Glaucoma Mother     Stroke Mother     Heart Disease Mother     Glaucoma Father     Heart Disease Father     High Blood Pressure Father     High Cholesterol Father     Cancer Father     Prostate Cancer Father     Stroke Father     Other Father         Brain Aneuryseum     SOCIAL HISTORY       Social History     Tobacco Use    Smoking status: Never     Passive exposure: Never    Smokeless tobacco: Never    Tobacco comments:     Never smoker   Vaping Use    Vaping status: Never Used   Substance Use Topics    Alcohol use: Never    Drug use: Never     ALLERGIES         Allergies   Allergen Reactions    Codeine Other (See Comments)     migraine    Codone [Hydrocodone] Other (See Comments)     headache    Hydrocodone Other (See Comments)     migraine    Oxycodone      Wife states gives pt a headache

## 2025-04-30 NOTE — PATIENT INSTRUCTIONS
Visit Discharge/Physician Orders:  - Finish bactrim (sulfamethoxazole-trimethoprim) as prescribed.   - Take ibuprofen and tylenol for pain, alternating.   - If you develop fever, increased pain, or worsening condition then go to ER.   - Eat yogurt daily and/or daily probiotic.   - Will order supplies for you today. Call with any concerns.     Wound Location: Left leg    Dressing orders:     1) Gather wound care supplies and arrange on clean table.     2) Wash your hands with soap and water or use alcohol based hand  for 20 seconds (sing \"Happy Birthday\" twice).    3) Cleanse wounds with normal saline or wound cleanser and gauze. Pat dry with clean gauze.    4) Left leg - Lightly tuck end of mesalt ribbon into wound. Covered with Excel Daily SAP 4\" x 4\" Change daily. Wrap leg with ace wrap starting at base of toes and wrap up 1-2 inches below the bend of the knee. Change daily.     Keep all dressings clean & dry.    Follow up visit: 2 weeks on Wednesday May 14th at 9:00 am     Supplies: n/a    Duration of dressings: n/a    We have sent your supply order to the following company:  n/a  If you don't receive the items you were expecting or don't know what the items are that you received, call the company where the order was sent.   If you are unable to obtain wound supplies, continue to use the supplies you have available until you are able to reach us. It is most important to keep the wound covered at all times.  It is YOUR responsibility to make sure that supplies are re-ordered before you run out. Re-order telephone numbers are included in each package.     Keep next scheduled appointment. Please give 24 hour notice if unable to keep appointment. [phone]    If you experience any of the following, please call the Wound Care Service during business hours: Monday through Friday 8:00 am - 4:30 pm  ([phone]).   *Increase in pain   *Temperature over 101   *Increase in drainage from your wound or a foul

## 2025-05-03 ENCOUNTER — PATIENT MESSAGE (OUTPATIENT)
Dept: CARDIOLOGY CLINIC | Age: 75
End: 2025-05-03

## 2025-05-05 ENCOUNTER — TELEPHONE (OUTPATIENT)
Dept: WOUND CARE | Age: 75
End: 2025-05-05

## 2025-05-05 NOTE — TELEPHONE ENCOUNTER
----- Message from Airam CONNELLY sent at 5/5/2025  8:11 AM EDT -----   [phone] QUIQUE LEFT VM THAT HIS WOUND IS ALMOST HEALED, AND HE WANTS TO KNOW IF HE CAN START MOWING GRASS.

## 2025-05-05 NOTE — TELEPHONE ENCOUNTER
Returned patients call regarding mowing the grass, Dr Campos is fine with this. Patient also asked if he could start running again, Dr Campos said not to run until he is completely healed. Patient verbalized understanding

## 2025-05-14 ENCOUNTER — HOSPITAL ENCOUNTER (OUTPATIENT)
Dept: WOUND CARE | Age: 75
Discharge: HOME | End: 2025-05-14
Attending: INTERNAL MEDICINE
Payer: MEDICARE

## 2025-05-14 VITALS
TEMPERATURE: 97.5 F | RESPIRATION RATE: 18 BRPM | DIASTOLIC BLOOD PRESSURE: 77 MMHG | HEART RATE: 71 BPM | OXYGEN SATURATION: 96 % | SYSTOLIC BLOOD PRESSURE: 136 MMHG

## 2025-05-14 DIAGNOSIS — L02.416 ABSCESS OF LEFT LEG: Primary | ICD-10-CM

## 2025-05-14 PROCEDURE — 99212 OFFICE O/P EST SF 10 MIN: CPT

## 2025-05-14 RX ORDER — GENTAMICIN SULFATE 1 MG/G
OINTMENT TOPICAL PRN
OUTPATIENT
Start: 2025-05-14

## 2025-05-14 RX ORDER — GINSENG 100 MG
CAPSULE ORAL PRN
OUTPATIENT
Start: 2025-05-14

## 2025-05-14 RX ORDER — MUPIROCIN 20 MG/G
OINTMENT TOPICAL PRN
OUTPATIENT
Start: 2025-05-14

## 2025-05-14 RX ORDER — NEOMYCIN/BACITRACIN/POLYMYXINB 3.5-400-5K
OINTMENT (GRAM) TOPICAL PRN
OUTPATIENT
Start: 2025-05-14

## 2025-05-14 RX ORDER — LIDOCAINE 40 MG/G
CREAM TOPICAL PRN
OUTPATIENT
Start: 2025-05-14

## 2025-05-14 RX ORDER — TRIAMCINOLONE ACETONIDE 1 MG/G
OINTMENT TOPICAL PRN
OUTPATIENT
Start: 2025-05-14

## 2025-05-14 RX ORDER — LIDOCAINE HYDROCHLORIDE 40 MG/ML
SOLUTION TOPICAL PRN
OUTPATIENT
Start: 2025-05-14

## 2025-05-14 RX ORDER — LIDOCAINE HYDROCHLORIDE 20 MG/ML
JELLY TOPICAL PRN
OUTPATIENT
Start: 2025-05-14

## 2025-05-14 RX ORDER — BETAMETHASONE DIPROPIONATE 0.5 MG/G
CREAM TOPICAL PRN
OUTPATIENT
Start: 2025-05-14

## 2025-05-14 RX ORDER — CLOBETASOL PROPIONATE 0.5 MG/G
OINTMENT TOPICAL PRN
OUTPATIENT
Start: 2025-05-14

## 2025-05-14 RX ORDER — BACITRACIN ZINC AND POLYMYXIN B SULFATE 500; 1000 [USP'U]/G; [USP'U]/G
OINTMENT TOPICAL PRN
OUTPATIENT
Start: 2025-05-14

## 2025-05-14 RX ORDER — SILVER SULFADIAZINE 10 MG/G
CREAM TOPICAL PRN
OUTPATIENT
Start: 2025-05-14

## 2025-05-14 RX ORDER — SODIUM CHLOR/HYPOCHLOROUS ACID 0.033 %
SOLUTION, IRRIGATION IRRIGATION PRN
OUTPATIENT
Start: 2025-05-14

## 2025-05-14 RX ORDER — LIDOCAINE 50 MG/G
OINTMENT TOPICAL PRN
OUTPATIENT
Start: 2025-05-14

## 2025-05-14 NOTE — PATIENT INSTRUCTIONS
Visit Discharge/Physician Orders:  - Take ibuprofen and tylenol for pain, alternating.     Wound Location: Left leg    Dressing orders:     1) Gather wound care supplies and arrange on clean table.     2) Wash your hands with soap and water or use alcohol based hand  for 20 seconds (sing \"Happy Birthday\" twice).    3) Cleanse wounds with normal saline or wound cleanser and gauze. Pat dry with clean gauze.    4) Left leg -Triad to wound. Covered with Excel Daily SAP 4\" x 4\" Change daily. Wrap leg with ace wrap starting at base of toes and wrap up 1-2 inches below the bend of the knee. Change daily.     Keep all dressings clean & dry.    Follow up visit: As needed. Call with any concerns.     Supplies: n/a    Duration of dressings: n/a    We have sent your supply order to the following company:  n/a  If you don't receive the items you were expecting or don't know what the items are that you received, call the company where the order was sent.   If you are unable to obtain wound supplies, continue to use the supplies you have available until you are able to reach us. It is most important to keep the wound covered at all times.  It is YOUR responsibility to make sure that supplies are re-ordered before you run out. Re-order telephone numbers are included in each package.     Keep next scheduled appointment. Please give 24 hour notice if unable to keep appointment. [phone]    If you experience any of the following, please call the Wound Care Service during business hours: Monday through Friday 8:00 am - 4:30 pm  ([phone]).   *Increase in pain   *Temperature over 101   *Increase in drainage from your wound or a foul odor   *Uncontrolled swelling   *Need for compression bandage changes due to slippage, breakthrough drainage    If you need medical attention outside of business hours, please contact your Primary Care Doctor or go to the nearest emergency room.

## 2025-05-14 NOTE — PLAN OF CARE
Problem: Wound:  Goal: Will show signs of wound healing; wound closure and no evidence of infection  Description: Will show signs of wound healing; wound closure and no evidence of infection  Outcome: Adequate for Discharge     Patient presents to wound clinic for left leg wound. No s/s of infection noted. See AVS for discharge instructions. Follow up as needed.    Care plan reviewed with patient and wife.  Patient and wife verbalize understanding of the plan of care and contribute to goal setting.

## 2025-05-14 NOTE — PROGRESS NOTES
[facility]          Progress Note and Procedure Note      Glenn Sanchez  MEDICAL RECORD NUMBER:  [id number]  AGE: 74 y.o.   GENDER: male  : 1950  EPISODE DATE:  2025    Subjective:     SUBJECTIVE     Chief Complaint   Patient presents with    Wound Check     Left leg            HISTORY OF PRESENT ILLNESS      Glenn Sanchez is a 74 y.o. male who presents today for wound/ulcer evaluation.  Follow-up visit for abscess of the left lower leg.  Patient had incision and drainage.  We have been packing the wound with Mesalt and compression therapy.  The wound is healing well very superficial with no depth measuring 0.5 x 0.7 cm.  No drainage no pain       PAST MEDICAL HISTORY         Diagnosis Date    Concussion with no loss of consciousness 07/10/2020    Fractured skull ([facility]) 2021    H/O traumatic brain injury 2022    Hyperlipidemia     Hypertension     Intraparenchymal hematoma of right side of brain due to trauma ([facility]) 2021         PAST SURGICAL HISTORY     Past Surgical History:   Procedure Laterality Date    APPENDECTOMY      APPENDECTOMY  2017    HERNIA REPAIR       FAMILY HISTORY         Family History   Problem Relation Age of Onset    Glaucoma Mother     Stroke Mother     Heart Disease Mother     Glaucoma Father     Heart Disease Father     High Blood Pressure Father     High Cholesterol Father     Cancer Father     Prostate Cancer Father     Stroke Father     Other Father         Brain Aneuryseum     SOCIAL HISTORY       Social History     Tobacco Use    Smoking status: Never     Passive exposure: Never    Smokeless tobacco: Never    Tobacco comments:     Never smoker   Vaping Use    Vaping status: Never Used   Substance Use Topics    Alcohol use: Never    Drug use: Never     ALLERGIES         Allergies   Allergen Reactions    Codeine Other (See Comments)     migraine    Codone [Hydrocodone] Other (See Comments)     headache    Hydrocodone Other (See Comments)

## 2025-05-22 ENCOUNTER — OFFICE VISIT (OUTPATIENT)
Dept: FAMILY MEDICINE CLINIC | Age: 75
End: 2025-05-22
Payer: MEDICARE

## 2025-05-22 VITALS
TEMPERATURE: 98 F | OXYGEN SATURATION: 100 % | RESPIRATION RATE: 16 BRPM | HEART RATE: 68 BPM | WEIGHT: 195 LBS | BODY MASS INDEX: 31.34 KG/M2 | HEIGHT: 66 IN | SYSTOLIC BLOOD PRESSURE: 116 MMHG | DIASTOLIC BLOOD PRESSURE: 74 MMHG

## 2025-05-22 DIAGNOSIS — J06.9 VIRAL URI WITH COUGH: Primary | ICD-10-CM

## 2025-05-22 PROCEDURE — 1036F TOBACCO NON-USER: CPT

## 2025-05-22 PROCEDURE — 99213 OFFICE O/P EST LOW 20 MIN: CPT

## 2025-05-22 PROCEDURE — 3017F COLORECTAL CA SCREEN DOC REV: CPT

## 2025-05-22 PROCEDURE — 1123F ACP DISCUSS/DSCN MKR DOCD: CPT

## 2025-05-22 PROCEDURE — G8417 CALC BMI ABV UP PARAM F/U: HCPCS

## 2025-05-22 PROCEDURE — G8427 DOCREV CUR MEDS BY ELIG CLIN: HCPCS

## 2025-05-22 PROCEDURE — 1159F MED LIST DOCD IN RCRD: CPT

## 2025-05-22 RX ORDER — GUAIFENESIN 600 MG/1
1200 TABLET, EXTENDED RELEASE ORAL 2 TIMES DAILY
Qty: 40 TABLET | Refills: 0 | Status: SHIPPED | OUTPATIENT
Start: 2025-05-22 | End: 2025-06-01

## 2025-05-22 RX ORDER — BENZONATATE 100 MG/1
100-200 CAPSULE ORAL 3 TIMES DAILY PRN
Qty: 60 CAPSULE | Refills: 0 | Status: SHIPPED | OUTPATIENT
Start: 2025-05-22 | End: 2025-05-29

## 2025-05-22 SDOH — ECONOMIC STABILITY: FOOD INSECURITY: WITHIN THE PAST 12 MONTHS, THE FOOD YOU BOUGHT JUST DIDN'T LAST AND YOU DIDN'T HAVE MONEY TO GET MORE.: NEVER TRUE

## 2025-05-22 SDOH — ECONOMIC STABILITY: FOOD INSECURITY: WITHIN THE PAST 12 MONTHS, YOU WORRIED THAT YOUR FOOD WOULD RUN OUT BEFORE YOU GOT MONEY TO BUY MORE.: NEVER TRUE

## 2025-05-22 ASSESSMENT — PATIENT HEALTH QUESTIONNAIRE - PHQ9
SUM OF ALL RESPONSES TO PHQ QUESTIONS 1-9: 0
1. LITTLE INTEREST OR PLEASURE IN DOING THINGS: NOT AT ALL
2. FEELING DOWN, DEPRESSED OR HOPELESS: NOT AT ALL
SUM OF ALL RESPONSES TO PHQ QUESTIONS 1-9: 0

## 2025-05-22 NOTE — PROGRESS NOTES
Patient:Glenn Sanchez  YOB: 1950   MRN:[id number]    Subjective   74 y.o. male who presents for the following: Cough (Patient has been experiencing a cough and fatigue for over a week.)    Started coughing about a week and also being fatigued a week ago. Wife was sick with a cough for the last couple weeks and thinks he got that from her. Has some sore throat and congestion comes and goes. Coughing is about the same. Not bringing much up. No wheezing or shortness of breath.       Review of Systems   Constitutional:  Positive for fatigue. Negative for activity change, appetite change, chills, diaphoresis and fever.   HENT:  Positive for congestion and sore throat.    Respiratory:  Positive for cough. Negative for chest tightness, shortness of breath and wheezing.      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    Objective     Vitals:    05/22/25 1118   BP: 116/74   BP Site: Left Upper Arm   Patient Position: Sitting   BP Cuff Size: Medium Adult   Pulse: 68   Resp: 16   Temp: 98 °F (36.7 °C)   TempSrc: Oral   SpO2: 100%   Weight: 88.5 kg (195 lb)   Height: 1.676 m (5' 6\")   Body mass index is 31.47 kg/m².    Physical Exam  Constitutional:       General: He is not in acute distress.     Appearance: Normal appearance. He is obese. He is not ill-appearing, toxic-appearing or diaphoretic.   HENT:      Head: Normocephalic and atraumatic.      Right Ear: External ear normal.      Left Ear: External ear normal.      Nose: No congestion.      Mouth/Throat:      Mouth: Mucous membranes are moist.      Pharynx: Posterior oropharyngeal erythema present. No oropharyngeal exudate.   Eyes:      Extraocular Movements: Extraocular movements intact.   Cardiovascular:      Rate and Rhythm: Normal rate and regular rhythm.

## 2025-05-22 NOTE — PROGRESS NOTES
S: 74 y.o. male with   Chief Complaint   Patient presents with    Cough     Patient has been experiencing a cough and fatigue for over a week.       Chief complaint, [place], and all pertinent details of the case reviewed with the resident.    Please see resident's note for specific details discussed at today's visit.  No fever or chills.  Non productive cough. Wife recently with same cough and she improved on tesslon and mucinex.    BP Readings from Last 3 Encounters:   05/22/25 116/74   05/14/25 136/77   04/30/25 138/84     Wt Readings from Last 3 Encounters:   05/22/25 88.5 kg (195 lb)   04/22/25 86.2 kg (190 lb)   04/20/25 86.2 kg (190 lb)       O: VS:  height is 1.676 m (5' 6\") and weight is 88.5 kg (195 lb). His oral temperature is 98 °F (36.7 °C). His blood pressure is 116/74 and his pulse is 68. His respiration is 16 and oxygen saturation is 100%.   A: Viral cough     Diagnosis Orders   1. Viral URI with cough  benzonatate (TESSALON) 100 MG capsule    guaiFENesin (MUCINEX) 600 MG extended release tablet          Plan:  Declines any swabs  Tessalon perls and mucinex  F/u in 2-3 weeks if not improving, sooner if worsens    Health Maintenance Due   Topic Date Due    COVID-19 Vaccine (8 - 2024-25 season) 09/01/2024    Annual Wellness Visit (Medicare)  10/31/2024    Depression Screen  04/29/2025       Attending Physician Statement  I have discussed the case, including pertinent history and exam findings with the resident.  I agree with the documented assessment and plan as documented by the resident.        Elsa Quinn MD 5/22/2025 9:58 PM

## 2025-05-27 ENCOUNTER — TELEPHONE (OUTPATIENT)
Dept: FAMILY MEDICINE CLINIC | Age: 75
End: 2025-05-27

## 2025-05-27 ENCOUNTER — LAB (OUTPATIENT)
Dept: LAB | Age: 75
End: 2025-05-27

## 2025-05-27 DIAGNOSIS — R79.89 ELEVATED TSH: ICD-10-CM

## 2025-05-27 DIAGNOSIS — E03.8 SUBCLINICAL HYPOTHYROIDISM: ICD-10-CM

## 2025-05-27 DIAGNOSIS — I10 ESSENTIAL HYPERTENSION: ICD-10-CM

## 2025-05-27 DIAGNOSIS — N18.30 STAGE 3 CHRONIC KIDNEY DISEASE, UNSPECIFIED WHETHER STAGE 3A OR 3B CKD (HCC): ICD-10-CM

## 2025-05-27 DIAGNOSIS — D64.9 ANEMIA, UNSPECIFIED TYPE: ICD-10-CM

## 2025-05-27 DIAGNOSIS — E78.5 HYPERLIPIDEMIA, UNSPECIFIED HYPERLIPIDEMIA TYPE: Primary | ICD-10-CM

## 2025-05-27 DIAGNOSIS — E78.5 HYPERLIPIDEMIA, UNSPECIFIED HYPERLIPIDEMIA TYPE: ICD-10-CM

## 2025-05-27 LAB
BASOPHILS ABSOLUTE: 0.1 THOU/MM3 (ref 0–0.1)
BASOPHILS NFR BLD AUTO: 1 %
CHOLEST SERPL-MCNC: 183 MG/DL (ref 100–199)
CREAT UR-MCNC: 206 MG/DL
DEPRECATED RDW RBC AUTO: 43.5 FL (ref 35–45)
EOSINOPHIL NFR BLD AUTO: 3.1 %
EOSINOPHILS ABSOLUTE: 0.2 THOU/MM3 (ref 0–0.4)
ERYTHROCYTE [DISTWIDTH] IN BLOOD BY AUTOMATED COUNT: 13.2 % (ref 11.5–14.5)
FERRITIN SERPL IA-MCNC: 78 NG/ML (ref 30–400)
HCT VFR BLD AUTO: 42 % (ref 42–52)
HDLC SERPL-MCNC: 36 MG/DL
HGB BLD-MCNC: 13.3 GM/DL (ref 14–18)
IMM GRANULOCYTES # BLD AUTO: 0.02 THOU/MM3 (ref 0–0.07)
IMM GRANULOCYTES NFR BLD AUTO: 0.3 %
IRON SATN MFR SERPL: 26 % (ref 20–50)
IRON SERPL-MCNC: 71 UG/DL (ref 61–157)
LDLC SERPL CALC-MCNC: 125 MG/DL
LYMPHOCYTES ABSOLUTE: 1.4 THOU/MM3 (ref 1–4.8)
LYMPHOCYTES NFR BLD AUTO: 23.1 %
MCH RBC QN AUTO: 28.8 PG (ref 26–33)
MCHC RBC AUTO-ENTMCNC: 31.7 GM/DL (ref 32.2–35.5)
MCV RBC AUTO: 90.9 FL (ref 80–94)
MICROALBUMIN UR-MCNC: < 2 MG/DL
MICROALBUMIN/CREAT RATIO PNL UR: 10 MG/G (ref 0–30)
MONOCYTES ABSOLUTE: 0.5 THOU/MM3 (ref 0.4–1.3)
MONOCYTES NFR BLD AUTO: 7.7 %
NEUTROPHILS ABSOLUTE: 4 THOU/MM3 (ref 1.8–7.7)
NEUTROPHILS NFR BLD AUTO: 64.8 %
NRBC BLD AUTO-RTO: 0 /100 WBC
PLATELET # BLD AUTO: 240 THOU/MM3 (ref 130–400)
PMV BLD AUTO: 11.5 FL (ref 9.4–12.4)
RBC # BLD AUTO: 4.62 MILL/MM3 (ref 4.7–6.1)
T4 FREE SERPL-MCNC: 0.9 NG/DL (ref 0.92–1.68)
TIBC SERPL-MCNC: 278 UG/DL (ref 171–450)
TRIGL SERPL-MCNC: 112 MG/DL (ref 0–199)
TSH SERPL DL<=0.05 MIU/L-ACNC: 4.37 UIU/ML (ref 0.27–4.2)
WBC # BLD AUTO: 6.1 THOU/MM3 (ref 4.8–10.8)

## 2025-05-29 ENCOUNTER — RESULTS FOLLOW-UP (OUTPATIENT)
Dept: INTERNAL MEDICINE CLINIC | Age: 75
End: 2025-05-29

## 2025-05-29 NOTE — TELEPHONE ENCOUNTER
----- Message from Dr. Malena Polo MD sent at 5/29/2025  8:35 AM EDT -----  Please advise patient iron panel is within normal limits and hemoglobin has improved since last labs. Thyroid levels are also improved since last visit and are within normal range for his age. Lipid panel (cholesterol) has improved. We can discuss further at upcoming visit on 6/2. Thank you!

## 2025-06-02 ENCOUNTER — TELEPHONE (OUTPATIENT)
Dept: FAMILY MEDICINE CLINIC | Age: 75
End: 2025-06-02

## 2025-06-02 ENCOUNTER — OFFICE VISIT (OUTPATIENT)
Dept: FAMILY MEDICINE CLINIC | Age: 75
End: 2025-06-02

## 2025-06-02 ENCOUNTER — TELEPHONE (OUTPATIENT)
Dept: WOUND CARE | Age: 75
End: 2025-06-02

## 2025-06-02 VITALS
HEIGHT: 66 IN | WEIGHT: 196.2 LBS | OXYGEN SATURATION: 99 % | BODY MASS INDEX: 31.53 KG/M2 | HEART RATE: 68 BPM | RESPIRATION RATE: 18 BRPM | SYSTOLIC BLOOD PRESSURE: 116 MMHG | TEMPERATURE: 98.1 F | DIASTOLIC BLOOD PRESSURE: 68 MMHG

## 2025-06-02 DIAGNOSIS — Z13.31 DEPRESSION SCREENING NEGATIVE: ICD-10-CM

## 2025-06-02 DIAGNOSIS — L85.3 DRY SKIN: ICD-10-CM

## 2025-06-02 DIAGNOSIS — H91.93 DECREASED HEARING OF BOTH EARS: ICD-10-CM

## 2025-06-02 DIAGNOSIS — I10 PRIMARY HYPERTENSION: ICD-10-CM

## 2025-06-02 DIAGNOSIS — H91.93 DECREASED HEARING OF BOTH EARS: Primary | ICD-10-CM

## 2025-06-02 DIAGNOSIS — E78.5 HYPERLIPIDEMIA, UNSPECIFIED HYPERLIPIDEMIA TYPE: ICD-10-CM

## 2025-06-02 DIAGNOSIS — E03.8 SUBCLINICAL HYPOTHYROIDISM: ICD-10-CM

## 2025-06-02 DIAGNOSIS — Z00.00 MEDICARE ANNUAL WELLNESS VISIT, SUBSEQUENT: Primary | ICD-10-CM

## 2025-06-02 DIAGNOSIS — D50.9 IRON DEFICIENCY ANEMIA, UNSPECIFIED IRON DEFICIENCY ANEMIA TYPE: ICD-10-CM

## 2025-06-02 DIAGNOSIS — N18.30 STAGE 3 CHRONIC KIDNEY DISEASE, UNSPECIFIED WHETHER STAGE 3A OR 3B CKD (HCC): ICD-10-CM

## 2025-06-02 DIAGNOSIS — J30.9 ALLERGIC RHINITIS, UNSPECIFIED SEASONALITY, UNSPECIFIED TRIGGER: ICD-10-CM

## 2025-06-02 RX ORDER — AMMONIUM LACTATE 12 G/100G
CREAM TOPICAL
Qty: 1 EACH | Refills: 4 | Status: SHIPPED | OUTPATIENT
Start: 2025-06-02 | End: 2025-07-02

## 2025-06-02 RX ORDER — FLUTICASONE PROPIONATE 50 MCG
2 SPRAY, SUSPENSION (ML) NASAL DAILY
Qty: 16 G | Refills: 0 | Status: SHIPPED | OUTPATIENT
Start: 2025-06-02

## 2025-06-02 SDOH — HEALTH STABILITY: PHYSICAL HEALTH: ON AVERAGE, HOW MANY DAYS PER WEEK DO YOU ENGAGE IN MODERATE TO STRENUOUS EXERCISE (LIKE A BRISK WALK)?: 2 DAYS

## 2025-06-02 SDOH — HEALTH STABILITY: PHYSICAL HEALTH: ON AVERAGE, HOW MANY MINUTES DO YOU ENGAGE IN EXERCISE AT THIS LEVEL?: 110 MIN

## 2025-06-02 ASSESSMENT — LIFESTYLE VARIABLES
HOW MANY STANDARD DRINKS CONTAINING ALCOHOL DO YOU HAVE ON A TYPICAL DAY: 0
HOW MANY STANDARD DRINKS CONTAINING ALCOHOL DO YOU HAVE ON A TYPICAL DAY: PATIENT DOES NOT DRINK
HOW OFTEN DO YOU HAVE A DRINK CONTAINING ALCOHOL: 2
HOW OFTEN DO YOU HAVE SIX OR MORE DRINKS ON ONE OCCASION: 1
HOW OFTEN DO YOU HAVE A DRINK CONTAINING ALCOHOL: MONTHLY OR LESS

## 2025-06-02 ASSESSMENT — PATIENT HEALTH QUESTIONNAIRE - PHQ9
2. FEELING DOWN, DEPRESSED OR HOPELESS: NOT AT ALL
SUM OF ALL RESPONSES TO PHQ QUESTIONS 1-9: 0
1. LITTLE INTEREST OR PLEASURE IN DOING THINGS: NOT AT ALL
SUM OF ALL RESPONSES TO PHQ QUESTIONS 1-9: 0

## 2025-06-02 NOTE — PROGRESS NOTES
S: 74 y.o. male with   Chief Complaint   Patient presents with    Medicare AWV    Cough     Cough for about a month, and still won't go away.        HPI: please see resident note for HPI and ROS.    BP Readings from Last 3 Encounters:   06/02/25 116/68   05/22/25 116/74   05/14/25 136/77     Wt Readings from Last 3 Encounters:   06/02/25 89 kg (196 lb 3.2 oz)   05/22/25 88.5 kg (195 lb)   04/22/25 86.2 kg (190 lb)       O: VS:  height is 1.676 m (5' 6\") and weight is 89 kg (196 lb 3.2 oz). His oral temperature is 98.1 °F (36.7 °C). His blood pressure is 116/68 and his pulse is 68. His respiration is 18 and oxygen saturation is 99%.   Physical exam performed by resident physician     Diagnosis Orders   1. Medicare annual wellness visit, subsequent        2. Stage 3 chronic kidney disease, unspecified whether stage 3a or 3b CKD (HCC)        3. Hyperlipidemia, unspecified hyperlipidemia type        4. Primary hypertension        5. Iron deficiency anemia, unspecified iron deficiency anemia type  Vitamin B12 & Folate      6. Subclinical hypothyroidism  TSH reflex to FT4      7. Allergic rhinitis, unspecified seasonality, unspecified trigger  fluticasone (FLONASE) 50 MCG/ACT nasal spray      8. Depression screening negative        9. Dry skin  ammonium lactate (AMLACTIN) 12 % cream      10. BMI 31.0-31.9,adult        11. Decreased hearing of both ears  [facility]          Plan:  Please refer to resident note for full plan.    74 year old male presents to the office for medicare annual wellness.  Chronic conditions reviewed as above.  Medications reviewed in detail.  Most recent blood pressure today elevated TSH but continues to improve.  Patient is not interested medication assisted therapy will continue to monitor.  Otherwise follows negative, depression screen negative.  Overall patient doing really well at this time.  Does have some xerosis of bilateral lower extremities we will plan to prescribe

## 2025-06-02 NOTE — PROGRESS NOTES
Medicare Annual Wellness Visit    Glenn Sanchez is here for Medicare AWV and Cough (Cough for about a month, and still won't go away. )    Assessment & Plan   Medicare annual wellness visit, subsequent.  Discussed hearing changes, patient to follow-up with audiology- referral sent. No vision changes, encouraged to schedule annual vision exam.   Stage 3 chronic kidney disease, unspecified whether stage 3a or 3b CKD (HCC). Creatinine and BUN on 5/27 within normal range.  Albumin creatinine ratio on 5/27 also within normal range.  GFR of 90 on 4/22.  Hyperlipidemia, unspecified hyperlipidemia type  Hyperlipidemia.  Lipid panel on 5/27 improved from prior with total cholesterol decreasing 233-> 183.  Triglycerides improved from 278-> 112.  HDL of 36.  .  Patient notes has decreased red meat consumption and eats oatmeal multiple times a week.  Primary hypertension.  Chronic and controlled.  BP during today's visit of 116/68.  Not currently treated with medications.  Albumin creatinine ratio on 5/27 WNL.  Iron deficiency anemia, unspecified iron deficiency anemia type.  Normocytic anemia with most recent hemoglobin on 5/27 of 13.3, which has improved last hemoglobin of 12.6 on 4/22.  Iron panel on 5/27 WNL.  No recent B12 and folate labs noted, to be repeated in 6 months along with TFT labs.  Subclinical hypothyroidism. Chronic.  TSH on 5/27 of 4.37 with T4 of 0.90.  Improved from prior TSH of 5.07.  Patient asymptomatic at this time.  Will continue to monitor-TSH with reflex T4 ordered to be repeated in 6 months.  -     TSH reflex to FT4; Future  Allergic rhinitis, unspecified seasonality, unspecified trigger. + cough, rhinorrhea and congestion. Start flonase. Start OTC allergy med Allegra/Zyrtec.   -     fluticasone (FLONASE) 50 MCG/ACT nasal spray; 2 sprays by Each Nostril route daily, Disp-16 g, R-0Normal  Depression screening negative. PHQ2 score of 0.   Dry skin. B/l LE dry and flaky skin noted. No scabbing,

## 2025-06-02 NOTE — TELEPHONE ENCOUNTER
Pt came back in office and stated that he does need a referral to Audiology. Pt stated he went and asked if he would need a referral to be seen there they confirmed pt would. Please advise

## 2025-06-02 NOTE — PROGRESS NOTES
Latest Reference Range & Units 04/04/24 09:26 05/27/25 11:27   LDL Cholesterol mg/dL 164 125       After student pharmacist chart review, the following recommendations are made:    According to the 2018 ACC/AHA lipid guidelines, adults between the ages of 40 to 75 years of age without diabetes mellitus and 10-year ASCVD risk of 7.5% to 19.9% (intermediate risk), with risk-enhancing factors should initiate statin therapy. Patient's current LDL is 125 and ASCVD risk is 19.3% with a risk-enhancing factor of chronic kidney disease. Guidelines recommend starting a moderate intensity statin such as rosuvastatin 5 mg daily. Consider having patient's lipid panel rechecked in 6 -8 weeks.        For Pharmacy Admin Tracking Only    Program: Medical Group  CPA in place:  Yes  Recommendation Provided To: Provider: 2 via Note to Provider  Intervention Detail: Lab(s) Ordered and New Rx: 1, reason: Needs Additional Therapy  Intervention Accepted By: Other: 0  Gap Closed?: No   Time Spent (min): 30

## 2025-06-02 NOTE — PATIENT INSTRUCTIONS
Learning About Being Active as an Older Adult  Why is being active important as you get older?     Being active is one of the best things you can do for your health. And it's never too late to start. Being active--or getting active, if you aren't already--has definite benefits. It can:  Give you more energy,  Keep your mind sharp.  Improve balance to reduce your risk of falls.  Help you manage chronic illness with fewer medicines.  No matter how old you are, how fit you are, or what health problems you have, there is a form of activity that will work for you. And the more physical activity you can do, the better your overall health will be.  What kinds of activity can help you stay healthy?  Being more active will make your daily activities easier. Physical activity includes planned exercise and things you do in daily life. There are four types of activity:  Aerobic.  Doing aerobic activity makes your heart and lungs strong.  Includes walking, dancing, and gardening.  Aim for at least 2½ hours spread throughout the week.  It improves your energy and can help you sleep better.  Muscle-strengthening.  This type of activity can help maintain muscle and strengthen bones.  Includes climbing stairs, using resistance bands, and lifting or carrying heavy loads.  Aim for at least twice a week.  It can help protect the knees and other joints.  Stretching.  Stretching gives you better range of motion in joints and muscles.  Includes upper arm stretches, calf stretches, and gentle yoga.  Aim for at least twice a week, preferably after your muscles are warmed up from other activities.  It can help you function better in daily life.  Balancing.  This helps you stay coordinated and have good posture.  Includes heel-to-toe walking, darryl chi, and certain types of yoga.  Aim for at least 3 days a week.  It can reduce your risk of falling.  Even if you have a hard time meeting the recommendations, it's better to be more active

## 2025-07-08 ENCOUNTER — HOSPITAL ENCOUNTER (OUTPATIENT)
Dept: AUDIOLOGY | Age: 75
Discharge: HOME | End: 2025-07-08
Payer: MEDICARE

## 2025-07-08 PROCEDURE — 92593 HC HEARING AID CHECK, BOTH EARS: CPT | Performed by: AUDIOLOGIST

## 2025-07-08 PROCEDURE — 92557 COMPREHENSIVE HEARING TEST: CPT | Performed by: AUDIOLOGIST

## 2025-07-08 NOTE — PROGRESS NOTES
AUDIOLOGICAL EVALUATION      REASON FOR TESTING:  Audiometric evaluation per the request of Malena Polo MD, due to the diagnosis of decreased hearing both ears. The patient explained that he has been noticing the need to ask people to repeat themselves and is having difficulty hearing the TV during the last 2-3 months. He continues to wear bilateral Phonak [place] P70R x2. 0M receivers, medium open domes, retention locks on - fit , warranty  02/15/2023.    The patient's most recent audiogram was completed 2022. 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 Normal hearing at 250-1000 Hz sloping to a mild to moderate sensorineural hearing loss for the right ear. An asymmetry was noted at 8000 Hz with the left ear being poorer by 20 dB. Speech discrimination ability was good at 88% for the left ear and good at 96% for the right ear. The patient's hearing loss increased in both ears relative to his 2020 audiogram. The asymmetry noted at 8000 Hz was not present with his previous testing.    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     OTOSCOPY: clear canal- bilaterally     AUDIOGRAM        Reliability: Good    COMMENTS:  Normal hearing at 250-1000 Hz sloping to a mild to moderate sensorineural hearing loss for the left ear. Normal hearing at 250-1000 Hz sloping to a mild to moderate sensorineural hearing loss for the right ear. An asymmetry is noted at 8000 Hz with the left ear being poorer by 15 dB. Speech discrimination ability is excellent at 100% for the left ear and excellent at 100% for the right ear.     Slightly decreased hearing sensitivity noted for both ears when compared to previous audiogram (below).      ANNUAL HEARING AID CHECK: Listening check of the hearing aids revealed reduced output prior to cleaning. Vacuumed microphones and receivers.

## 2025-08-19 ENCOUNTER — OFFICE VISIT (OUTPATIENT)
Dept: FAMILY MEDICINE CLINIC | Age: 75
End: 2025-08-19
Payer: MEDICARE

## 2025-08-19 VITALS
DIASTOLIC BLOOD PRESSURE: 82 MMHG | TEMPERATURE: 97.7 F | HEIGHT: 66 IN | HEART RATE: 61 BPM | BODY MASS INDEX: 31.85 KG/M2 | RESPIRATION RATE: 12 BRPM | SYSTOLIC BLOOD PRESSURE: 132 MMHG | OXYGEN SATURATION: 99 % | WEIGHT: 198.2 LBS

## 2025-08-19 DIAGNOSIS — H00.033 EYELID CELLULITIS, RIGHT: Primary | ICD-10-CM

## 2025-08-19 DIAGNOSIS — H00.012 HORDEOLUM EXTERNUM OF RIGHT LOWER EYELID: ICD-10-CM

## 2025-08-19 PROCEDURE — G8427 DOCREV CUR MEDS BY ELIG CLIN: HCPCS

## 2025-08-19 PROCEDURE — 1123F ACP DISCUSS/DSCN MKR DOCD: CPT

## 2025-08-19 PROCEDURE — 1159F MED LIST DOCD IN RCRD: CPT

## 2025-08-19 PROCEDURE — 1036F TOBACCO NON-USER: CPT

## 2025-08-19 PROCEDURE — G8417 CALC BMI ABV UP PARAM F/U: HCPCS

## 2025-08-19 PROCEDURE — 99213 OFFICE O/P EST LOW 20 MIN: CPT

## 2025-08-19 PROCEDURE — 3017F COLORECTAL CA SCREEN DOC REV: CPT

## 2025-08-19 RX ORDER — DOXYCYCLINE HYCLATE 100 MG
100 TABLET ORAL 2 TIMES DAILY
Qty: 14 TABLET | Refills: 0 | Status: SHIPPED | OUTPATIENT
Start: 2025-08-19 | End: 2025-08-26

## 2025-08-19 ASSESSMENT — ENCOUNTER SYMPTOMS
NAUSEA: 0
SORE THROAT: 0
BACK PAIN: 0
ABDOMINAL PAIN: 0
EYE PAIN: 1
VOMITING: 0
EYE DISCHARGE: 1
BLOOD IN STOOL: 0
COUGH: 0
EYE REDNESS: 1
SHORTNESS OF BREATH: 0

## 2025-08-26 ENCOUNTER — OFFICE VISIT (OUTPATIENT)
Dept: CARDIOLOGY CLINIC | Age: 75
End: 2025-08-26
Payer: MEDICARE

## 2025-08-26 VITALS
WEIGHT: 196.6 LBS | BODY MASS INDEX: 31.6 KG/M2 | DIASTOLIC BLOOD PRESSURE: 81 MMHG | SYSTOLIC BLOOD PRESSURE: 128 MMHG | HEIGHT: 66 IN | HEART RATE: 62 BPM

## 2025-08-26 DIAGNOSIS — E78.5 DYSLIPIDEMIA: ICD-10-CM

## 2025-08-26 DIAGNOSIS — R94.31 ABNORMAL HOLTER EXAM: Primary | ICD-10-CM

## 2025-08-26 DIAGNOSIS — I49.3 FREQUENT PVCS: ICD-10-CM

## 2025-08-26 DIAGNOSIS — R94.31 ABNORMAL EKG: ICD-10-CM

## 2025-08-26 PROCEDURE — 1036F TOBACCO NON-USER: CPT | Performed by: INTERNAL MEDICINE

## 2025-08-26 PROCEDURE — 1160F RVW MEDS BY RX/DR IN RCRD: CPT | Performed by: INTERNAL MEDICINE

## 2025-08-26 PROCEDURE — G8417 CALC BMI ABV UP PARAM F/U: HCPCS | Performed by: INTERNAL MEDICINE

## 2025-08-26 PROCEDURE — 93000 ELECTROCARDIOGRAM COMPLETE: CPT | Performed by: INTERNAL MEDICINE

## 2025-08-26 PROCEDURE — 1123F ACP DISCUSS/DSCN MKR DOCD: CPT | Performed by: INTERNAL MEDICINE

## 2025-08-26 PROCEDURE — 3017F COLORECTAL CA SCREEN DOC REV: CPT | Performed by: INTERNAL MEDICINE

## 2025-08-26 PROCEDURE — G8427 DOCREV CUR MEDS BY ELIG CLIN: HCPCS | Performed by: INTERNAL MEDICINE

## 2025-08-26 PROCEDURE — 99213 OFFICE O/P EST LOW 20 MIN: CPT | Performed by: INTERNAL MEDICINE

## 2025-08-26 PROCEDURE — 1159F MED LIST DOCD IN RCRD: CPT | Performed by: INTERNAL MEDICINE
